# Patient Record
Sex: FEMALE | Race: WHITE | NOT HISPANIC OR LATINO | Employment: PART TIME | URBAN - METROPOLITAN AREA
[De-identification: names, ages, dates, MRNs, and addresses within clinical notes are randomized per-mention and may not be internally consistent; named-entity substitution may affect disease eponyms.]

---

## 2017-01-06 ENCOUNTER — RADIANT APPOINTMENT (OUTPATIENT)
Dept: GENERAL RADIOLOGY | Facility: CLINIC | Age: 57
End: 2017-01-06
Attending: NURSE PRACTITIONER
Payer: COMMERCIAL

## 2017-01-06 ENCOUNTER — OFFICE VISIT (OUTPATIENT)
Dept: URGENT CARE | Facility: URGENT CARE | Age: 57
End: 2017-01-06
Payer: COMMERCIAL

## 2017-01-06 VITALS
SYSTOLIC BLOOD PRESSURE: 176 MMHG | DIASTOLIC BLOOD PRESSURE: 87 MMHG | HEART RATE: 86 BPM | TEMPERATURE: 98.4 F | OXYGEN SATURATION: 95 %

## 2017-01-06 DIAGNOSIS — M79.604 PAIN OF RIGHT LOWER EXTREMITY: Primary | ICD-10-CM

## 2017-01-06 PROCEDURE — 73610 X-RAY EXAM OF ANKLE: CPT | Mod: RT

## 2017-01-06 PROCEDURE — 99213 OFFICE O/P EST LOW 20 MIN: CPT | Performed by: NURSE PRACTITIONER

## 2017-01-06 PROCEDURE — 73660 X-RAY EXAM OF TOE(S): CPT | Mod: RT

## 2017-01-06 RX ORDER — NAPROXEN 500 MG/1
500 TABLET ORAL 2 TIMES DAILY PRN
Qty: 10 TABLET | Refills: 0 | Status: SHIPPED | OUTPATIENT
Start: 2017-01-06 | End: 2017-01-11

## 2017-01-06 NOTE — PROGRESS NOTES
SUBJECTIVE:                                                    Jessica Jay is a 56 year old female who presents to clinic today for the following health issues:      Musculoskeletal problem/pain      Duration: yesterday    Description  Location: right ankle and big toe    Intensity:  8/10    Accompanying signs and symptoms: tingling and swelling    History  Previous similar problem: YES  Previous evaluation:  none    Precipitating or alleviating factors:  Trauma or overuse: YES  Aggravating factors include: standing, walking, climbing stairs and exercise    Therapies tried and outcome: rest/inactivity, ice, acetaminophen and NSAID - ibuprofen and no relief         Allergies   Allergen Reactions     Buspar [Buspirone Hcl] Rash       Past Medical History   Diagnosis Date     LBP (low back pain)      Panic attacks      Migraines      GERD (gastroesophageal reflux disease)      Depression, major      Cellulitis 9/26/2010     [facility]     Seasonal allergies      Sleep apnoea      Wears C-PAP     Degenerative disc disease      cervical     Dermoid cyst of brain (H)      Depressive disorder          Current Outpatient Prescriptions on File Prior to Visit:  cyclobenzaprine (FLEXERIL) 10 MG tablet Take 0.5-1 tablets (5-10 mg) by mouth 3 times daily as needed for muscle spasms   buPROPion (WELLBUTRIN SR) 150 MG 12 hr tablet Take 1 tablet once daily and increase to 1 tablet twice daily after 4 to 7 days   DULoxetine (CYMBALTA) 60 MG capsule Take 1 capsule (60 mg) by mouth daily   order for DME Equipment being ordered: SI belt (bilat, or two)Please page me [phone] if any problems or if you need an alternate order/referral.  Thanks.   omeprazole 20 MG tablet Take 1 tablet (20 mg) by mouth daily   albuterol (PROAIR HFA, PROVENTIL HFA, VENTOLIN HFA) 108 (90 BASE) MCG/ACT inhaler Inhale 2 puffs into the lungs every 6 hours as needed   traZODone (DESYREL) 50 MG tablet Take 0.5-1 tablets (25-50 mg) by mouth nightly  as needed for sleep   triamcinolone (KENALOG) 0.1 % cream APPLY TWICE DAILY TO AFFECTED AREA AS DIRECTED     No current facility-administered medications on file prior to visit.    Social History   Substance Use Topics     Smoking status: Current Every Day Smoker -- 0.80 packs/day for 30 years     Types: Cigarettes     Last Attempt to Quit: 05/08/2011     Smokeless tobacco: Never Used      Comment: 4/12- 1 ppd      Alcohol Use: 0.0 oz/week      Comment: 6 drinks a year       ROS:  GEN no fevers  SKIN as above  Musculoskel: + as above    OBJECTIVE:  /87 mmHg  Pulse 86  Temp(Src) 98.4  F (36.9  C) (Oral)  Wt   SpO2 95%  Breastfeeding? No   General:   awake, alert, and cooperative.  NAD.   Head: Normocephalic, atraumatic.  Eyes: Conjunctiva clear,   MS:  Swollen right malleolus and right big toe, reduced ROM. PULSES and sensation is intact.  Neuro: Alert and oriented - normal speech.  Results for orders placed or performed in visit on 01/06/17 (from the past 24 hour(s))   XR Ankle Right G/E 3 Views    Narrative    ANKLE RIGHT THREE OR MORE VIEWS 1/6/2017 3:49 PM     HISTORY: Fell on the floor. Pain in right leg.    COMPARISON: None.      Impression    IMPRESSION: No evidence of fracture. The mortise is congruent.    SULAIMAN ZARATE MD   XR Toe Right G/E 2 Views    Narrative    TOE RIGHT TWO OR MORE VIEWS 1/6/2017 3:50 PM     HISTORY: Fell on the floor. Pain in right leg.    COMPARISON: None.      Impression    IMPRESSION: Moderate degenerative changes of the first  metatarsophalangeal joint. No evidence of acute fracture.    SULAIMAN ZARATE MD       ASSESSMENT:    ICD-10-CM    1. Pain of right lower extremity M79.604 XR Ankle Right G/E 3 Views     XR Toe Right G/E 2 Views     naproxen (NAPROSYN) 500 MG tablet           PLAN:   I discussed xray results with the patient.  Rest the affected painful area as much as possible.  Apply ice for 15-20 minutes intermittently as needed and especially after any offending  activity. Daily stretching.  As pain recedes, begin normal activities slowly as tolerated.  Consider Physical Therapy if symptoms not better with symptomatic care.  Advised about symptoms which might herald more serious problems.    Vy Ni  [facility]  Family Nurse Practitoner

## 2017-01-06 NOTE — MR AVS SNAPSHOT
After Visit Summary   1/6/2017    Jessica Jay    MRN: [id number]           Patient Information     Date Of Birth          1960        Visit Information        Provider Department      1/6/2017 2:30 PM Vy Ni NP [facility]        Today's Diagnoses     Pain of right lower extremity    -  1       Care Instructions      Ace Wrap  Minor muscle or joint injuries are often treated with an elastic bandage. The bandage provides support and compression to the injured area. An elastic bandage is a stretchy, rolled bandage. Elastic bandages range in width from 2 to 6 inches. They can be used for a variety of injuries. The bandages are often called  ACE  bandages, after the most common brand name.  If used correctly, elastic bandages help control swelling and ease pain. An elastic bandage is also a good reminder not to overuse the injured area. However, elastic bandages do not provide a lot of support and will not prevent reinjury.  Home care  To apply an elastic bandage:    Check the skin before wrapping the injury. It should be clean, dry, and free of drainage.    Start wrapping below the injury and work your way toward the body. For an ankle sprain, start wrapping around the foot and work up toward the calf. This will help control swelling.    Overlap the edges of the bandage so it stays snuggly in place.    Wrap the bandage firmly, but not too tightly. A tight bandage can increase swelling on either end of the bandage. Make sure the bandage is wrinkle free.    Leave fingers and toes exposed.    Secure ends of the bandage (even self-sticking ones) with clips or tape.    Check frequently to ensure adequate circulation, especially in the fingers and toes. Loosen the bandage if there is local swelling, numbness, tingling, discomfort, coldness, or discoloration (skin pale or bluish in color).    Rewrap the bandage as needed during the day. Reroll the bandage as you unwind  it.  Continue using the elastic bandage until the pain and swelling are gone or as your healthcare provider advises.  If you have been told to ice the area, the ice can be secured in place with the elastic bandage. Wrap the ice pack with a thin towel to protect the skin. Do not put ice or an ice pack directly on the skin.  Ice the area for no more than 20 minutes at a time.    Follow-up care  Follow up with your healthcare provider, as advised.  When to seek medical advice  Call your healthcare provider for any of the following:    Pain and swelling that doesn't get better or gets worse    Trouble moving injured area    Skin discoloration, numbness, or tingling that doesn t go away after bandage is removed    6126-0059 [employer]. [address] [place] [address]. All rights reserved. This information is not intended as a substitute for professional medical care. Always follow your healthcare professional's instructions.        What Are Ankle Sprains?  The ankle is one of the most common places in the body for a sprain. Landing wrong on your foot can cause the ankle to roll to the side. This can stretch or tear ligaments. Ankle sprains can occur at any time, such as when you step off a curb or play sports. Once you ve had an ankle sprain, you may be more likely to sprain that ankle again.    When ligaments tear  Your ankle joint is where the bones in your leg and foot meet. Strong bands of tissue called ligaments connect these bones. Tendons cross the ankle and connect muscles in the lower leg to the foot. The ligaments and tendons help keep the ankle joint stable when you move. If you twist or turn your ankle, the ligaments can stretch or tear. This is called a sprain. A sprain can be mild, moderate, or severe. This depends on how badly the ligaments are damaged.    Symptoms  Your symptoms depend on how badly the ligaments are damaged. You may have little pain and swelling if the ligaments  are only stretched. If the ligaments tear, you will have more pain and swelling. The more severe the sprain, the less you ll be able to move the ankle or put weight on it. The ankle may also turn black-and-blue, and the bruising may extend into the foot and leg.     5950-5188 [employer]. [address] [place] [address]. All rights reserved. This information is not intended as a substitute for professional medical care. Always follow your healthcare professional's instructions.              Follow-ups after your visit        Your next 10 appointments already scheduled     Jan 11, 2017  3:00 PM   New Visit with SONIA Hogan CNP   [facility] ([facility])    [address] [facility] [address] [phone]              Who to contact     If you have questions or need follow up information about today's clinic visit or your schedule please contact [facility] directly at [phone].  Normal or non-critical lab and imaging results will be communicated to you by [employer], letter or phone within 4 business days after the clinic has received the results. If you do not hear from us within 7 days, please contact the clinic through [employer] or phone. If you have a critical or abnormal lab result, we will notify you by phone as soon as possible.  Submit refill requests through [employer] or call your pharmacy and they will forward the refill request to us. Please allow 3 business days for your refill to be completed.          Additional Information About Your Visit        [employer] Information     [employer] gives you secure access to your electronic health record. If you see a primary care provider, you can also send messages to your care team and make appointments. If you have questions, please call your primary care clinic.  If you do not have a primary care provider, please call [phone] and they will assist you.        Care  EveryWhere ID     This is your Care EveryWhere ID. This could be used by other organizations to access your [facility] medical records  [id number]        Your Vitals Were     Pulse Temperature Pulse Oximetry Breastfeeding?          86 98.4  F (36.9  C) (Oral) 95% No         Blood Pressure from Last 3 Encounters:   01/06/17 176/87   10/31/16 132/82   09/16/16 126/82    Weight from Last 3 Encounters:   10/31/16 278 lb 4 oz (126.213 kg)   10/04/16 276 lb (125.193 kg)   09/16/16 275 lb (124.739 kg)              We Performed the Following     XR Ankle Right G/E 3 Views     XR Toe Right G/E 2 Views          Today's Medication Changes          These changes are accurate as of: 1/6/17  4:11 PM.  If you have any questions, ask your nurse or doctor.               Start taking these medicines.        Dose/Directions    naproxen 500 MG tablet   Commonly known as:  NAPROSYN   Used for:  Pain of right lower extremity   Started by:  Vy Ni NP        Dose:  500 mg   Take 1 tablet (500 mg) by mouth 2 times daily as needed for moderate pain   Quantity:  10 tablet   Refills:  0            Where to get your medicines      These medications were sent to [id number] - [address]  AT [address] [facility] [address]     Phone:  [phone]    - naproxen 500 MG tablet             Primary Care Provider Office Phone # Fax #    Jazmyne Chapman [phone]       [address] [address]        Thank you!     Thank you for choosing [facility]  for your care. Our goal is always to provide you with excellent care. Hearing back from our patients is one way we can continue to improve our services. Please take a few minutes to complete the written survey that you may receive in the mail after your visit with us. Thank you!             Your Updated Medication List - Protect others  around you: Learn how to safely use, store and throw away your medicines at www.disposemymeds.org.          This list is accurate as of: 1/6/17  4:11 PM.  Always use your most recent med list.                   Brand Name Dispense Instructions for use    albuterol 108 (90 BASE) MCG/ACT Inhaler    PROAIR HFA/PROVENTIL HFA/VENTOLIN HFA    1 Inhaler    Inhale 2 puffs into the lungs every 6 hours as needed       buPROPion 150 MG 12 hr tablet    WELLBUTRIN SR    60 tablet    Take 1 tablet once daily and increase to 1 tablet twice daily after 4 to 7 days       cyclobenzaprine 10 MG tablet    FLEXERIL    20 tablet    Take 0.5-1 tablets (5-10 mg) by mouth 3 times daily as needed for muscle spasms       DULoxetine 60 MG EC capsule    CYMBALTA    90 capsule    Take 1 capsule (60 mg) by mouth daily       naproxen 500 MG tablet    NAPROSYN    10 tablet    Take 1 tablet (500 mg) by mouth 2 times daily as needed for moderate pain       omeprazole 20 MG tablet     90 tablet    Take 1 tablet (20 mg) by mouth daily       order for DME     2 each    Equipment being ordered: SI belt (bilat, or two) Please page me [phone] if any problems or if you need an alternate order/referral.  Thanks.       traZODone 50 MG tablet    DESYREL    90 tablet    Take 0.5-1 tablets (25-50 mg) by mouth nightly as needed for sleep       triamcinolone 0.1 % cream    KENALOG    60 g    APPLY TWICE DAILY TO AFFECTED AREA AS DIRECTED

## 2017-01-06 NOTE — PATIENT INSTRUCTIONS
Ace Wrap  Minor muscle or joint injuries are often treated with an elastic bandage. The bandage provides support and compression to the injured area. An elastic bandage is a stretchy, rolled bandage. Elastic bandages range in width from 2 to 6 inches. They can be used for a variety of injuries. The bandages are often called  ACE  bandages, after the most common brand name.  If used correctly, elastic bandages help control swelling and ease pain. An elastic bandage is also a good reminder not to overuse the injured area. However, elastic bandages do not provide a lot of support and will not prevent reinjury.  Home care  To apply an elastic bandage:    Check the skin before wrapping the injury. It should be clean, dry, and free of drainage.    Start wrapping below the injury and work your way toward the body. For an ankle sprain, start wrapping around the foot and work up toward the calf. This will help control swelling.    Overlap the edges of the bandage so it stays snuggly in place.    Wrap the bandage firmly, but not too tightly. A tight bandage can increase swelling on either end of the bandage. Make sure the bandage is wrinkle free.    Leave fingers and toes exposed.    Secure ends of the bandage (even self-sticking ones) with clips or tape.    Check frequently to ensure adequate circulation, especially in the fingers and toes. Loosen the bandage if there is local swelling, numbness, tingling, discomfort, coldness, or discoloration (skin pale or bluish in color).    Rewrap the bandage as needed during the day. Reroll the bandage as you unwind it.  Continue using the elastic bandage until the pain and swelling are gone or as your healthcare provider advises.  If you have been told to ice the area, the ice can be secured in place with the elastic bandage. Wrap the ice pack with a thin towel to protect the skin. Do not put ice or an ice pack directly on the skin.  Ice the area for no more than 20 minutes at a  time.    Follow-up care  Follow up with your healthcare provider, as advised.  When to seek medical advice  Call your healthcare provider for any of the following:    Pain and swelling that doesn't get better or gets worse    Trouble moving injured area    Skin discoloration, numbness, or tingling that doesn t go away after bandage is removed    9498-2677 [employer]. [address] [place] [address]. All rights reserved. This information is not intended as a substitute for professional medical care. Always follow your healthcare professional's instructions.        What Are Ankle Sprains?  The ankle is one of the most common places in the body for a sprain. Landing wrong on your foot can cause the ankle to roll to the side. This can stretch or tear ligaments. Ankle sprains can occur at any time, such as when you step off a curb or play sports. Once you ve had an ankle sprain, you may be more likely to sprain that ankle again.    When ligaments tear  Your ankle joint is where the bones in your leg and foot meet. Strong bands of tissue called ligaments connect these bones. Tendons cross the ankle and connect muscles in the lower leg to the foot. The ligaments and tendons help keep the ankle joint stable when you move. If you twist or turn your ankle, the ligaments can stretch or tear. This is called a sprain. A sprain can be mild, moderate, or severe. This depends on how badly the ligaments are damaged.    Symptoms  Your symptoms depend on how badly the ligaments are damaged. You may have little pain and swelling if the ligaments are only stretched. If the ligaments tear, you will have more pain and swelling. The more severe the sprain, the less you ll be able to move the ankle or put weight on it. The ankle may also turn black-and-blue, and the bruising may extend into the foot and leg.     9938-2517 [employer]. [address] [place] [address]. All rights reserved. This  information is not intended as a substitute for professional medical care. Always follow your healthcare professional's instructions.

## 2017-01-06 NOTE — NURSING NOTE
"Chief Complaint   Patient presents with     Musculoskeletal Problem     R Foot Pain- fell inside, ankle and big toe hurt       Initial /87 mmHg  Pulse 86  Temp(Src) 98.4  F (36.9  C) (Oral)  Wt   SpO2 95%  Breastfeeding? No Estimated body mass index is 50.05 kg/(m^2) as calculated from the following:    Height as of 10/31/16: 5' 2.5\" (1.588 m).    Weight as of 10/31/16: 278 lb 4 oz (126.213 kg).  BP completed using cuff size: luanne Orta CMA      "

## 2017-01-11 ENCOUNTER — OFFICE VISIT (OUTPATIENT)
Dept: PALLIATIVE MEDICINE | Facility: CLINIC | Age: 57
End: 2017-01-11
Payer: COMMERCIAL

## 2017-01-11 VITALS — HEART RATE: 102 BPM | DIASTOLIC BLOOD PRESSURE: 80 MMHG | SYSTOLIC BLOOD PRESSURE: 124 MMHG

## 2017-01-11 DIAGNOSIS — M25.50 PAIN IN JOINT, MULTIPLE SITES: ICD-10-CM

## 2017-01-11 DIAGNOSIS — M79.18 MYOFASCIAL PAIN: ICD-10-CM

## 2017-01-11 DIAGNOSIS — M51.369 DDD (DEGENERATIVE DISC DISEASE), LUMBAR: Primary | ICD-10-CM

## 2017-01-11 DIAGNOSIS — M54.16 LUMBAR RADICULAR PAIN: ICD-10-CM

## 2017-01-11 PROCEDURE — 99244 OFF/OP CNSLTJ NEW/EST MOD 40: CPT | Performed by: NURSE PRACTITIONER

## 2017-01-11 RX ORDER — HYDROCODONE BITARTRATE AND ACETAMINOPHEN 5; 325 MG/1; MG/1
1 TABLET ORAL EVERY 6 HOURS PRN
Qty: 15 TABLET | Refills: 0 | Status: SHIPPED | OUTPATIENT
Start: 2017-01-11 | End: 2017-10-16

## 2017-01-11 RX ORDER — GABAPENTIN 300 MG/1
CAPSULE ORAL
Qty: 120 CAPSULE | Refills: 0 | Status: SHIPPED
Start: 2017-01-11 | End: 2017-02-08

## 2017-01-11 ASSESSMENT — PAIN SCALES - GENERAL: PAINLEVEL: SEVERE PAIN (6)

## 2017-01-11 NOTE — NURSING NOTE
The norco script was given to pt.    Elsie Lester RN-BSN  [facility]

## 2017-01-11 NOTE — PATIENT INSTRUCTIONS
PLAN:  1. Schedule with pain management PHYSICAL THERAPY   2. Schedule with pain management HEALTH PSYCHOLOGY   3. Medications:   1. Start gabapentin  2. Use hydrocodone/acetaminophen sparingly, max of 2 tabs per day  4. Procedures: consider Lumbar epidural steroid injection, call me if you wish to proceed and I will place the order  5. Follow-up with me in 8-10 weeks.       Nurse Triage line:  [phone]   Call this number with any questions or concerns. You may leave a detailed message anytime. Calls are typically returned Monday through Friday between 8 AM and 4:30 PM. We usually get back to you within 2 business days depending on the issue/request.       Medication refills:    For non-narcotic medications, call your pharmacy directly to request a refill. The pharmacy will contact the [facility] Management Center for authorization. Please allow 3-4 days for these refills to be processed.     For narcotic refills, call the nurse triage line or send a [employer] message. Please contact us 7-10 days before your refill is due. The message MUST include the name of the specific medication(s) requested and how you would like to receive the prescription(s). The options are as follows:    Pain Clinic staff can mail the prescription to your pharmacy. Please tell us the name of the pharmacy.    You may pick the prescription up at the Pain Clinic (tell us the location) or during a clinic visit with your pain provider    Pain Clinic staff can deliver the prescription to the [facility] pharmacy in the clinic building. Please tell us the location.      Scheduling number: [phone].  Call this number to schedule or change appointments.    We believe regular attendance is key to your success in our program.    Any time you are unable to keep your appointment we ask that you call us at least 24 hours in advance to let us know. This will allow us to offer the appointment time to another patient.

## 2017-01-11 NOTE — MR AVS SNAPSHOT
After Visit Summary   1/11/2017    Jessica Jay    MRN: [id number]           Patient Information     Date Of Birth          1960        Visit Information        Provider Department      1/11/2017 3:00 PM Aundrea Sanchez APRN [facility]        Today's Diagnoses     DDD (degenerative disc disease), lumbar    -  1     Lumbar radicular pain         Pain in joint, multiple sites         Myofascial pain           Care Instructions    PLAN:  1. Schedule with pain management PHYSICAL THERAPY   2. Schedule with pain management HEALTH PSYCHOLOGY   3. Medications:   1. Start gabapentin  2. Use hydrocodone/acetaminophen sparingly, max of 2 tabs per day  4. Procedures: consider Lumbar epidural steroid injection, call me if you wish to proceed and I will place the order  5. Follow-up with me in 8-10 weeks.       Nurse Triage line:  [phone]   Call this number with any questions or concerns. You may leave a detailed message anytime. Calls are typically returned Monday through Friday between 8 AM and 4:30 PM. We usually get back to you within 2 business days depending on the issue/request.       Medication refills:    For non-narcotic medications, call your pharmacy directly to request a refill. The pharmacy will contact the [facility] for authorization. Please allow 3-4 days for these refills to be processed.     For narcotic refills, call the nurse triage line or send a Myntra message. Please contact us 7-10 days before your refill is due. The message MUST include the name of the specific medication(s) requested and how you would like to receive the prescription(s). The options are as follows:    Pain Clinic staff can mail the prescription to your pharmacy. Please tell us the name of the pharmacy.    You may pick the prescription up at the Pain Clinic (tell us the location) or during a clinic visit with your pain provider    Pain Clinic staff can deliver the  prescription to the [facility] pharmacy in the clinic building. Please tell us the location.      Scheduling number: [phone].  Call this number to schedule or change appointments.    We believe regular attendance is key to your success in our program.    Any time you are unable to keep your appointment we ask that you call us at least 24 hours in advance to let us know. This will allow us to offer the appointment time to another patient.             Follow-ups after your visit        Additional Services     PAIN PHD EVAL/TREAT/FOLLOW UP           PAIN PT EVAL AND TREAT                 Who to contact     If you have questions or need follow up information about today's clinic visit or your schedule please contact [facility] directly at [phone].  Normal or non-critical lab and imaging results will be communicated to you by MyChart, letter or phone within 4 business days after the clinic has received the results. If you do not hear from us within 7 days, please contact the clinic through [facility] or phone. If you have a critical or abnormal lab result, we will notify you by phone as soon as possible.  Submit refill requests through [employer] or call your pharmacy and they will forward the refill request to us. Please allow 3 business days for your refill to be completed.          Additional Information About Your Visit        [facility] Information     [employer] gives you secure access to your electronic health record. If you see a primary care provider, you can also send messages to your care team and make appointments. If you have questions, please call your primary care clinic.  If you do not have a primary care provider, please call [phone] and they will assist you.        Care EveryWhere ID     This is your Care EveryWhere ID. This could be used by other organizations to access your [facility] medical records  [id number]        Your Vitals Were     Pulse                   102            Blood  Pressure from Last 3 Encounters:   01/11/17 124/80   01/06/17 176/87   10/31/16 132/82    Weight from Last 3 Encounters:   10/31/16 126.213 kg (278 lb 4 oz)   10/04/16 125.193 kg (276 lb)   09/16/16 124.739 kg (275 lb)              We Performed the Following     PAIN PHD EVAL/TREAT/FOLLOW UP     PAIN PT EVAL AND TREAT          Today's Medication Changes          These changes are accurate as of: 1/11/17  4:25 PM.  If you have any questions, ask your nurse or doctor.               Start taking these medicines.        Dose/Directions    gabapentin 300 MG capsule   Commonly known as:  NEURONTIN   Used for:  DDD (degenerative disc disease), lumbar, Lumbar radicular pain, Pain in joint, multiple sites, Myofascial pain        Take 300mg at bedtime for 7 days, then 300mg twice daily for 7 days, then 300mg three times daily for 7 days, then 300mg in the morning and afternoon and 600mg at bedtime. If side effects, then reduce to last tolerable dosage.   Quantity:  120 capsule   Refills:  0       HYDROcodone-acetaminophen 5-325 MG per tablet   Commonly known as:  NORCO   Used for:  DDD (degenerative disc disease), lumbar, Lumbar radicular pain, Pain in joint, multiple sites, Myofascial pain        Dose:  1 tablet   Take 1 tablet by mouth every 6 hours as needed for moderate to severe pain Max of 2 tabs per day, use sparingly. OK to fill and begin on 1/11/2017 and must last at least 30 days   Quantity:  15 tablet   Refills:  0         Stop taking these medicines if you haven't already. Please contact your care team if you have questions.     order for DME           traZODone 50 MG tablet   Commonly known as:  DESYREL           triamcinolone 0.1 % cream   Commonly known as:  KENALOG                Where to get your medicines      These medications were sent to [id number] [place] [id number] AT [facility]  [address] [place] [address]     Phone:  [phone] -  gabapentin 300 MG capsule      Some of these will need a paper prescription and others can be bought over the counter.  Ask your nurse if you have questions.     Bring a paper prescription for each of these medications    - HYDROcodone-acetaminophen 5-325 MG per tablet             Primary Care Provider Office Phone # Fax #    Jazmyne Chapman [phone]       [facility] [address] [place] [id number]        Thank you!     Thank you for choosing [facility]  for your care. Our goal is always to provide you with excellent care. Hearing back from our patients is one way we can continue to improve our services. Please take a few minutes to complete the written survey that you may receive in the mail after your visit with us. Thank you!             Your Updated Medication List - Protect others around you: Learn how to safely use, store and throw away your medicines at www.disposemymeds.org.          This list is accurate as of: 1/11/17  4:25 PM.  Always use your most recent med list.                   Brand Name Dispense Instructions for use    albuterol 108 (90 BASE) MCG/ACT Inhaler    PROAIR HFA/PROVENTIL HFA/VENTOLIN HFA    1 Inhaler    Inhale 2 puffs into the lungs every 6 hours as needed       buPROPion 150 MG 12 hr tablet    WELLBUTRIN SR    60 tablet    Take 1 tablet once daily and increase to 1 tablet twice daily after 4 to 7 days       cyclobenzaprine 10 MG tablet    FLEXERIL    20 tablet    Take 0.5-1 tablets (5-10 mg) by mouth 3 times daily as needed for muscle spasms       DULoxetine 60 MG EC capsule    CYMBALTA    90 capsule    Take 1 capsule (60 mg) by mouth daily       gabapentin 300 MG capsule    NEURONTIN    120 capsule    Take 300mg at bedtime for 7 days, then 300mg twice daily for 7 days, then 300mg three times daily for 7 days, then 300mg in the morning and afternoon and 600mg at bedtime. If side effects, then reduce to last tolerable  dosage.       HYDROcodone-acetaminophen 5-325 MG per tablet    NORCO    15 tablet    Take 1 tablet by mouth every 6 hours as needed for moderate to severe pain Max of 2 tabs per day, use sparingly. OK to fill and begin on 1/11/2017 and must last at least 30 days       naproxen 500 MG tablet    NAPROSYN    10 tablet    Take 1 tablet (500 mg) by mouth 2 times daily as needed for moderate pain       omeprazole 20 MG tablet     90 tablet    Take 1 tablet (20 mg) by mouth daily
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
## 2017-01-11 NOTE — NURSING NOTE
"Chief Complaint   Patient presents with     Pain       Initial /80 mmHg  Pulse 102 Estimated body mass index is 50.05 kg/(m^2) as calculated from the following:    Height as of 10/31/16: 1.588 m (5' 2.5\").    Weight as of 10/31/16: 126.213 kg (278 lb 4 oz).  BP completed using cuff size: luanne Ren CMA (EDUARDO)      "

## 2017-01-18 ASSESSMENT — PATIENT HEALTH QUESTIONNAIRE - PHQ9: SUM OF ALL RESPONSES TO PHQ QUESTIONS 1-9: 5

## 2017-01-21 ENCOUNTER — MYC MEDICAL ADVICE (OUTPATIENT)
Dept: FAMILY MEDICINE | Facility: CLINIC | Age: 57
End: 2017-01-21

## 2017-01-21 DIAGNOSIS — Z72.0 TOBACCO ABUSE: ICD-10-CM

## 2017-01-21 DIAGNOSIS — F33.1 MODERATE EPISODE OF RECURRENT MAJOR DEPRESSIVE DISORDER (H): Primary | ICD-10-CM

## 2017-01-23 RX ORDER — BUPROPION HYDROCHLORIDE 150 MG/1
TABLET, EXTENDED RELEASE ORAL
Qty: 180 TABLET | Refills: 2 | Status: SHIPPED | OUTPATIENT
Start: 2017-01-23 | End: 2017-11-08

## 2017-01-24 ENCOUNTER — OFFICE VISIT (OUTPATIENT)
Dept: PALLIATIVE MEDICINE | Facility: CLINIC | Age: 57
End: 2017-01-24
Payer: COMMERCIAL

## 2017-01-24 DIAGNOSIS — M79.18 MYOFASCIAL PAIN: ICD-10-CM

## 2017-01-24 DIAGNOSIS — M51.369 DDD (DEGENERATIVE DISC DISEASE), LUMBAR: Primary | ICD-10-CM

## 2017-01-24 DIAGNOSIS — M25.50 MULTIPLE JOINT PAIN: ICD-10-CM

## 2017-01-24 DIAGNOSIS — M54.16 LUMBAR RADICULAR PAIN: ICD-10-CM

## 2017-01-24 PROCEDURE — 97162 PT EVAL MOD COMPLEX 30 MIN: CPT | Mod: GP | Performed by: PHYSICAL THERAPIST

## 2017-01-24 PROCEDURE — 97530 THERAPEUTIC ACTIVITIES: CPT | Mod: GP | Performed by: PHYSICAL THERAPIST

## 2017-01-24 PROCEDURE — 96150 HC HEALTH & BEHAVIOR ASSESS INITIAL, EA 15MIN: CPT | Performed by: PSYCHOLOGIST
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
## 2017-02-07 ENCOUNTER — OFFICE VISIT (OUTPATIENT)
Dept: PALLIATIVE MEDICINE | Facility: CLINIC | Age: 57
End: 2017-02-07
Payer: COMMERCIAL

## 2017-02-07 DIAGNOSIS — M51.369 DDD (DEGENERATIVE DISC DISEASE), LUMBAR: Primary | ICD-10-CM

## 2017-02-07 DIAGNOSIS — M25.50 MULTIPLE JOINT PAIN: ICD-10-CM

## 2017-02-07 DIAGNOSIS — M54.16 LUMBAR RADICULAR PAIN: ICD-10-CM

## 2017-02-07 DIAGNOSIS — M79.18 MYOFASCIAL PAIN: ICD-10-CM

## 2017-02-07 PROCEDURE — 96152 HC HEALTH AND BEHAVIOR INTERVENTION, INDIVIDUAL, EACH 15 MINUTES: CPT | Performed by: PSYCHOLOGIST

## 2017-02-07 PROCEDURE — 97530 THERAPEUTIC ACTIVITIES: CPT | Mod: GP | Performed by: PHYSICAL THERAPIST

## 2017-02-07 PROCEDURE — 97110 THERAPEUTIC EXERCISES: CPT | Mod: GP | Performed by: PHYSICAL THERAPIST

## 2017-02-07 NOTE — PROGRESS NOTES
"                                  [facility]   [facility] [place]  Behavioral Medicine Visit    Patient Name: Jessica Jay    YOB: 1960   Medical Record Number: [id number]  Date: 2/7/2017                SUBJECTIVE:  Session objective: First appointment post intake. Did introductory pain education, discussed treatment options including relaxation techniques, behavioral strategies, cognitive approaches. She reports she is interested somewhat in the relaxation skills approaches but she is seems ambivalent about it in many ways. We did introductory breathing and progressive muscle relaxation skill practice. Gemma cites many reasons why she might not be able to implement practice despite her knowing \"the path I am on is going to land me in a wheel chair\"    Interval history: requested return in 3 weeks. Reports she will return in 4 so her appt can coincide with her PT    OBJECTIVE:   Length of Visit: 60 minutes      Mental status: alert, moderate distress, cooperative, smiling, over weight and fatigued    ASSESSMENT:   Axis I: Degenerative Disc Disease     Axis II: Dx deferred    Progress toward goals: as expected.      Pain Status: unchanged    Emotional Status: unchanged   Medication / chemical use concerns: None    PLAN:   Next Appointment: Jessica Jay will schedule a follow-up appointment in 4 weeks.  Assignment: Practice intervention skills developed today  Objectives / interventions for next session: Further discussion about treatment options including a discussion of how self hypnosis might work    Adam West MA, LP, [facility]  Pain Specialist  [facility] [place]    "

## 2017-02-07 NOTE — PROGRESS NOTES
Pain Physical Therapy Progress Note    Patient Name: Jessica Jay     YOB: 1960     Medical Record Number: [id number]    Visits: 2/10    Diagnosis:    DDD (degenerative disc disease), lumbar  Multiple joint pain  Myofascial pain  Lumbar radicular pain    Subjective: Patient reports that she is doing OK.    Has tried to use sit-stand desk during the day--has helped some with back pain, feet get sore from standing more than a few minutes    Does have back support for desk chair--has been helping; keeping moving around, so needing to get used to it    Slowly working on cutting down on cigarettes during the day, down to 6/day    Was doing good with slowly putting Rony decorations away, but then needed to get it all done before sister came to help organize closet--increase pain for a couple days    Saw Adam West LP prior to PT.    Self Care  HEP: NA  Walking/Aerobic Activity: NA  Heat/Ice: NA  Posture: has been thinking about it more--has noticed hiking of shoulders, leaning forward at computer  Mini Breaks: NA  Breathing/Relaxation: NA  Pacing: NA    Objective Findings:    OBSERVATION: Fidgety; moving around in chair, room throughout session, less Guarded posture as compared to first visit; demo forward head; rounded shoulders; clenching jaw; poor core engagement with seated posture; upper chest breathing noted  Forward flexed in standing, moving from side to side in standing  Needs cues to stay on task    GAIT & LOCOMOTION: Antalgic, wide base of support, moves slowly; leaning from side to side; decreased step length    Treatment Interventions:  Therapeutic Procedures/Exercises: discussed basic walking program, working on increasing activity slowly, starting with goal of 5-10 mins a day of focused movement, can break up into small parts to begin with; discussed various activities--walking, dancing to favorite music, getting outside--patient looking into doing geocaching; using nordic  "track--starting out small.  Discussed importance of beginning active movement--patient states that she is used to playing on computer much of evening and then going to bed.     Therapeutic Activity:    Pacing and Energy Conservation: instructed patient in basics of pacing activity and importance of pacing and energy conservation; discussed basic EC principles.  Discussed looking at priorities, looking at doing activities that are \"needs vs wants\"; discussed how overdoing things can cause increase in pain; how spacing out activities may be more helpful, less painful in the long run.  Sometimes need to change old habits or look back on why one is doing things a certain way.    Discussed that she may benefit from making plans to spread out cleaning--doing one part of a room each day; do washing of kitchen floor one day a month on hands and knees--if that is important and more painful--but then have that be the one thing for that day, not a lot of other activities.    Discussed working on getting into habit of being more active; working on motiviation--as it has been lacking for a long time.  Discussed making a goal of doing 3 active things before returning to PT in 2 weeks; one may be working making a plan for how to organize spare bedroom--how to plan out how to \"tackle it\".  Patient thought 3 things was a makeable goal.  __________________________________________________________________    Instruction on home program: pacing, increasing activity    Assessment:  Ongoing Functional Limitations Include:  Patient tolerated/responded well to treatment  Impression: patient needs encouragement for activity, decreased motiviation    Recommendations: TEAM    Intensity Level: 1 (1=low intensity; 5=high intensity)    Demonstrates/Verbalizes Technique: 2 (1= poor technique-difficulty performing exercises,significant cues required; 5= good technique-performs exercises without cues)    Body Awareness: 2 (1=low awareness; 5=high " awareness)    Posture/Stability: 1, 2 (1= poor posture, stability; 5= good posture, stability)    Motivational Level: Cooperative    Response to Teaching: needs reinforcement    Factors that affect learning: None    _______________________________________________________________________  Plan of Care   Continue PT to support reactivation and integration of self regulation pain management skills;  Focus of next session will be on: self care, mini breaks, HEP     Present:  DONNA     Total Visit Time:  45 minutes  TA: 35 mins;  TE: 10 mins      Therapist: Jeanette Singh PT             Date: 2/7/2017

## 2017-02-08 DIAGNOSIS — M51.369 DDD (DEGENERATIVE DISC DISEASE), LUMBAR: Primary | ICD-10-CM

## 2017-02-08 DIAGNOSIS — M25.50 PAIN IN JOINT, MULTIPLE SITES: ICD-10-CM

## 2017-02-08 DIAGNOSIS — M54.16 LUMBAR RADICULAR PAIN: ICD-10-CM

## 2017-02-08 DIAGNOSIS — M79.18 MYOFASCIAL PAIN: ICD-10-CM

## 2017-02-08 NOTE — TELEPHONE ENCOUNTER
[facility] for gabapentin (NEURONTIN) 300 MG capsule    Last refilled on 1/11/17    Pt last seen on 1/11/17  Next appt scheduled for none    Alvarado Ambrocio MA  [facility]      Will facilitate refill.

## 2017-02-08 NOTE — TELEPHONE ENCOUNTER
Please call patient to find out how much she is currently using and if it helpful or not. If not helpful and she is tolerating it well, I can continue to increase the dose.     Thanks.  Aundrea SCOTT RN CNP, FNP  [facility] [place]

## 2017-02-09 NOTE — TELEPHONE ENCOUNTER
Called patient. LM to call triage with dosing information, provided triage number and asked her to leave a timeframe nursing can call to discuss.     Yue Wilkes  BSN-RN Care Coordinator  [facility]

## 2017-02-10 NOTE — TELEPHONE ENCOUNTER
Called pt   Current dose:  Gabapentin 300m tab BID and 2 QHS  She isn't sure if it is benefical for her.  No side effects.  She is open to increasing dose if Aundrea's feels this is an option for her.      Routed to Aundrea to review.    Elsie Lester RN-BSN  [facility]

## 2017-02-12 RX ORDER — GABAPENTIN 300 MG/1
CAPSULE ORAL
Qty: 180 CAPSULE | Refills: 0 | Status: SHIPPED
Start: 2017-02-12 | End: 2017-03-22

## 2017-02-12 NOTE — TELEPHONE ENCOUNTER
Signed Prescriptions:                        Disp   Refills    gabapentin (NEURONTIN) 300 MG capsule      180 ca*0        Sig: Take 600mg in the morning, 300mg in the afternoon and           600mg at bedtime x 7 days, then take 600mg three           times daily.If side effects, then reduce to last           tolerable dosage.  Authorizing Provider: AUNDREA LAINEZ    Notify patient I am OK with her slowly increasing according to this script's instructions.   Thanks.  Aundrea SCOTT, RN CNP, FNP  [facility] [place]

## 2017-02-13 NOTE — TELEPHONE ENCOUNTER
Called pt and left a detailed message re: OK to increase per instructions. Relayed instructions listed on VM and said that she should call back with any questions.     GABINO SolisN, RN-BC  Patient Care Supervisor/Care Coordinator  [facility]

## 2017-02-21 ENCOUNTER — OFFICE VISIT (OUTPATIENT)
Dept: PALLIATIVE MEDICINE | Facility: CLINIC | Age: 57
End: 2017-02-21
Payer: COMMERCIAL

## 2017-02-21 DIAGNOSIS — M79.18 MYOFASCIAL PAIN: ICD-10-CM

## 2017-02-21 DIAGNOSIS — M54.16 LUMBAR RADICULOPATHY: ICD-10-CM

## 2017-02-21 DIAGNOSIS — M25.50 MULTIPLE JOINT PAIN: ICD-10-CM

## 2017-02-21 DIAGNOSIS — M51.369 DDD (DEGENERATIVE DISC DISEASE), LUMBAR: Primary | ICD-10-CM

## 2017-02-21 PROCEDURE — 97112 NEUROMUSCULAR REEDUCATION: CPT | Mod: GP | Performed by: PHYSICAL THERAPIST

## 2017-02-21 PROCEDURE — 97530 THERAPEUTIC ACTIVITIES: CPT | Mod: GP | Performed by: PHYSICAL THERAPIST

## 2017-02-21 PROCEDURE — 97110 THERAPEUTIC EXERCISES: CPT | Mod: GP | Performed by: PHYSICAL THERAPIST

## 2017-02-21 NOTE — PROGRESS NOTES
Pain Physical Therapy Progress Note    Patient Name: Jessica Jay     YOB: 1960     Medical Record Number: [id number]    Visits: 3/10    Diagnosis:    DDD (degenerative disc disease), lumbar  Multiple joint pain  Myofascial pain  Lumbar radiculopathy    Subjective: Patient reports that back is doing better--did have a few days of it worse--was cleaning basement--told my time    Walking about 10 mins; hurting in the back--walking to the car--end of work day    Overall better, having issues with hips and knees.    Self Care  HEP: NA  Walking/Aerobic Activity: NA  Heat/Ice: has used rice pack a couple times--has been helpful  Posture: has been more aware of shoulders/crossing arms; leaning forward at computer  Mini Breaks: NA  Breathing/Relaxation: has been thinking about breathing more--some things that she has been learning from Adam West LP--hasn't been doing regularly  Pacing: did take breaks with working on basement--realized that she couldn't do it all in one weekend    Objective Findings:    OBSERVATION: Fidgety; moving around in chair, room throughout session, less Guarded posture as compared to first visit; demo forward head; rounded shoulders; clenching jaw; poor core engagement with seated posture; upper chest breathing noted  Forward flexed in standing, moving from side to side in standing  Needs cues to stay on task  GAIT & LOCOMOTION: Antalgic, wide base of support, moves slowly; leaning from side to side; decreased step length; difficulty with sit to stand, especially when sitting for extended time  MUSCLE PERFORMANCE: needs cues for small movement with ab sets; scapular retraction    Treatment Interventions:  Therapeutic Procedures/Exercises: Discussed importance of working on increasing activity slowly--especially since patient wants to ride bicycle again this spring/summer--hasn't done for 3 years and since pain started; discussed need to work on increasing motivation for  "activity--not just playing on computer.  Will need to starting with goal of 5-10 mins a day of focused movement, can break up into small parts to begin with; discussed various activities--walking, dancing to favorite music, getting outside--patient looking into doing geocaching--in spring when weather nicer; using nordic track--starting out small.   UBE x 4 mins  Forward/backward-liked it; has done in the past when having shoulder surgeries; did recommend to patient multiple times to slow down speed of arms--since first time doing it in awhile     Therapeutic Activity:  Discussed basics on pacing; encouraged patient to continue to be active with working on basement; spare bedroom, OK that breaking it up into parts--reinforced that she made good decision with it, vs trying to do too much and \"overdoing it\"  Discussed making adjustments to office chair to be more comfortable and assist with body mechanics/posture.  Discussed safety with riding bike again--need to have balance; strength/core, etc; patient states that at times has issues with walking straight--discussed how improving mobility on land first may be needed before safely riding bike; especially on streets.    Neuromuscular Reeducation:   Posture:  went over basic posture cues in sitting and standing, and walking, used mirror to assist with visual feedback and body awareness.  Discussed with patient that it does take time to learn; it is important to start working on it and gradually will become new habit; patient may have some muscles that are more sore as they are starting to be used in different ways, and that is OK; gave handout.  __________________________________________________________________    Instruction on home program: posture, increase activity    Assessment:  Ongoing Functional Limitations Include:  Patient tolerated/responded well to treatment    Impression: patient needs encouragement for activity; to make changes; is giving multiple " suggestions, but does come up with reasons/excuses of why that won't work or doesn't want to do it.  Wants to ride bike this spring/summer--hasn't for 3 years, discussed importance of safety; working up to the goal    Recommendations: TEAM    Intensity Level: 2 (1=low intensity; 5=high intensity)    Demonstrates/Verbalizes Technique: 2 (1= poor technique-difficulty performing exercises,significant cues required; 5= good technique-performs exercises without cues)    Body Awareness: 1 (1=low awareness; 5=high awareness)    Posture/Stability: 2 (1= poor posture, stability; 5= good posture, stability)    Motivational Level: Cooperative and limited motivation for activity at home    Response to Teaching: needs reinforcement    Factors that affect learning: None    _______________________________________________________________________  Plan of Care   Continue PT to support reactivation and integration of self regulation pain management skills;  Focus of next session will be on: HEP, mini breaks     Present:  NA     Total Visit Time:  50 minutes  TA: 15 mins; Neuro-Re Ed: 20 mins; TE: 15 mins      Therapist: Jeanette Singh PT             Date: 2/21/2017

## 2017-02-21 NOTE — MR AVS SNAPSHOT
After Visit Summary   2/21/2017    Jessica Jay    MRN: [id number]           Patient Information     Date Of Birth          1960        Visit Information        Provider Department      2/21/2017 3:45 PM Jeanette Singh, PT [facility]        Today's Diagnoses     DDD (degenerative disc disease), lumbar    -  1    Multiple joint pain        Myofascial pain        Lumbar radiculopathy           Follow-ups after your visit        Your next 10 appointments already scheduled     Mar 07, 2017  2:00 PM CST   Return Visit with Adam West LP   [facility] ([facility])    [address] [address]   [phone]            Mar 07, 2017  3:45 PM CST   Return Visit with Jeanette Singh, PT   [facility] ([facility])    [address] [address]   [phone]            Mar 22, 2017  4:00 PM CDT   Return Visit with SONIA Hogan [facility] ([facility])    [address] [id number] [phone]              Who to contact     If you have questions or need follow up information about today's clinic visit or your schedule please contact [facility] directly at [phone].  Normal or non-critical lab and imaging results will be communicated to you by MyChart, letter or phone within 4 business days after the clinic has received the results. If you do not hear from us within 7 days, please contact the clinic through MyChart or phone. If you have a critical or abnormal lab result, we will notify you by phone as soon as possible.  Submit refill requests through [employer] or call your pharmacy and they will forward the refill request to us. Please allow 3 business days for your refill to be completed.          Additional Information About Your Visit        MyChart Information     [employer]  gives you secure access to your electronic health record. If you see a primary care provider, you can also send messages to your care team and make appointments. If you have questions, please call your primary care clinic.  If you do not have a primary care provider, please call [phone] and they will assist you.        Care EveryWhere ID     This is your Care EveryWhere ID. This could be used by other organizations to access your [place] medical records  [id number]         Blood Pressure from Last 3 Encounters:   01/11/17 124/80   01/06/17 176/87   10/31/16 132/82    Weight from Last 3 Encounters:   10/31/16 126.2 kg (278 lb 4 oz)   10/04/16 125.2 kg (276 lb)   09/16/16 124.7 kg (275 lb)              We Performed the Following     NEUROMUSCULAR RE-EDUCATION     THERAPEUTIC ACTIVITIES     THERAPEUTIC EXERCISES        Primary Care Provider Office Phone # Fax #    Jazmyne Chapman [phone]       [address] [address]        Thank you!     Thank you for choosing [facility]  for your care. Our goal is always to provide you with excellent care. Hearing back from our patients is one way we can continue to improve our services. Please take a few minutes to complete the written survey that you may receive in the mail after your visit with us. Thank you!             Your Updated Medication List - Protect others around you: Learn how to safely use, store and throw away your medicines at www.disposemymeds.org.          This list is accurate as of: 2/21/17 11:59 PM.  Always use your most recent med list.                   Brand Name Dispense Instructions for use    albuterol 108 (90 BASE) MCG/ACT Inhaler    PROAIR HFA/PROVENTIL HFA/VENTOLIN HFA    1 Inhaler    Inhale 2 puffs into the lungs every 6 hours as needed       buPROPion 150 MG 12 hr tablet    WELLBUTRIN SR    180 tablet    Take 1 tablet once daily and increase to 1 tablet  twice daily after 4 to 7 days       cyclobenzaprine 10 MG tablet    FLEXERIL    20 tablet    Take 0.5-1 tablets (5-10 mg) by mouth 3 times daily as needed for muscle spasms       DULoxetine 60 MG EC capsule    CYMBALTA    90 capsule    Take 1 capsule (60 mg) by mouth daily       gabapentin 300 MG capsule    NEURONTIN    180 capsule    Take 600mg in the morning, 300mg in the afternoon and 600mg at bedtime x 7 days, then take 600mg three times daily.If side effects, then reduce to last tolerable dosage.       HYDROcodone-acetaminophen 5-325 MG per tablet    NORCO    15 tablet    Take 1 tablet by mouth every 6 hours as needed for moderate to severe pain Max of 2 tabs per day, use sparingly. OK to fill and begin on 1/11/2017 and must last at least 30 days       omeprazole 20 MG tablet     90 tablet    Take 1 tablet (20 mg) by mouth daily

## 2017-02-28 ENCOUNTER — TELEPHONE (OUTPATIENT)
Dept: FAMILY MEDICINE | Facility: CLINIC | Age: 57
End: 2017-02-28

## 2017-02-28 DIAGNOSIS — M54.50 CHRONIC BILATERAL LOW BACK PAIN WITHOUT SCIATICA: ICD-10-CM

## 2017-02-28 DIAGNOSIS — G89.29 CHRONIC BILATERAL LOW BACK PAIN WITHOUT SCIATICA: ICD-10-CM

## 2017-02-28 DIAGNOSIS — M79.671 PAIN IN BOTH FEET: ICD-10-CM

## 2017-02-28 DIAGNOSIS — M79.672 PAIN IN BOTH FEET: ICD-10-CM

## 2017-02-28 DIAGNOSIS — F33.1 MODERATE EPISODE OF RECURRENT MAJOR DEPRESSIVE DISORDER (H): ICD-10-CM

## 2017-02-28 NOTE — TELEPHONE ENCOUNTER
DULoxetine (CYMBALTA) 60 MG capsule   60 mg, DAILY 1 ordered  Edit     Summary: Take 1 capsule (60 mg) by mouth daily, Disp-90 capsule, R-1, E-Prescribe   Dose, Route, Frequency: 60 mg, Oral, DAILY  Start: 9/21/2016  Ord/Sold: 9/21/2016 (O)  Report  Taking:   Long-term:   Pharmacy: [facility] [address] [place] [address]  AT [facility] Dose History       Patient Sig: Take 1 capsule (60 mg) by mouth daily       Ordered on: 9/21/2016       Authorized by: SHANIKA THOMPSON       Dispense: 90 capsule          Last Written Prescription Date: 9-  Last Fill Quantity: 90, # refills: 1  Last Office Visit with [facility], P or [facility] prescribing provider: 10-   Next 5 appointments (look out 90 days)     Mar 07, 2017  2:00 PM CST   Return Visit with Adam West LP   [facility] ([facility])    [address] [address]   [phone]            Mar 07, 2017  3:45 PM CST   Return Visit with Jeanette Singh PT   [facility] ([facility])    [address] [address]   [phone]            Mar 22, 2017  4:00 PM CDT   Return Visit with SONIA Hogan [facility] ([facility])    [address] [address]   [phone]                   BP Readings from Last 3 Encounters:   01/11/17 124/80   01/06/17 176/87   10/31/16 132/82     Pulse: (for Fetzima)  Creatinine   Date Value Ref Range Status   09/04/2012 0.77 0.52 - 1.04 mg/dL Final   ]    Last PHQ-9 score on record=   PHQ-9 SCORE 1/17/2017   Total Score -   Total Score 5

## 2017-03-01 ENCOUNTER — TELEPHONE (OUTPATIENT)
Dept: PALLIATIVE MEDICINE | Facility: CLINIC | Age: 57
End: 2017-03-01

## 2017-03-01 RX ORDER — DULOXETIN HYDROCHLORIDE 60 MG/1
CAPSULE, DELAYED RELEASE ORAL
Qty: 90 CAPSULE | Refills: 0 | Status: SHIPPED | OUTPATIENT
Start: 2017-03-01 | End: 2017-04-10

## 2017-03-01 NOTE — TELEPHONE ENCOUNTER
Reviewed, no instruction to d/c cymbalata found in pain clinic notes. Rx sent.     TC please call her to schedule a chronic visit with Dr. Chapman.     Vivi Schwab RN   March 1, 2017 5:21 PM  [facility] Triage   [phone]

## 2017-03-01 NOTE — TELEPHONE ENCOUNTER
Routing refill request to provider for review/approval because:  Is she supposed to be on both Cymbalta and Gabapentin?     Vivi Schwab RN   March 1, 2017 3:08 PM  [facility] Triage   [phone]

## 2017-03-01 NOTE — TELEPHONE ENCOUNTER
Review pain clinic notes to see what recommendations are for medication  Okay to refill duloxetine unless pain clinic notes state otherwise.  Schedule routine chronic care with me  Jazmyne Chapman MD

## 2017-03-03 ENCOUNTER — MYC MEDICAL ADVICE (OUTPATIENT)
Dept: FAMILY MEDICINE | Facility: CLINIC | Age: 57
End: 2017-03-03

## 2017-03-03 NOTE — TELEPHONE ENCOUNTER
Called patient and left a detailed VM message for patient to call clinic and schedule a follow up w/ Dr Chapman.    Monika Olivia

## 2017-03-07 ENCOUNTER — OFFICE VISIT (OUTPATIENT)
Dept: PALLIATIVE MEDICINE | Facility: CLINIC | Age: 57
End: 2017-03-07
Payer: COMMERCIAL

## 2017-03-07 ENCOUNTER — OFFICE VISIT (OUTPATIENT)
Dept: ORTHOPEDICS | Facility: CLINIC | Age: 57
End: 2017-03-07
Payer: COMMERCIAL

## 2017-03-07 VITALS — HEART RATE: 88 BPM | RESPIRATION RATE: 16 BRPM | HEIGHT: 63 IN

## 2017-03-07 DIAGNOSIS — M51.369 DDD (DEGENERATIVE DISC DISEASE), LUMBAR: Primary | ICD-10-CM

## 2017-03-07 DIAGNOSIS — M19.079 ARTHRITIS OF FIRST METATARSOPHALANGEAL JOINT: Primary | ICD-10-CM

## 2017-03-07 DIAGNOSIS — M79.18 MYOFASCIAL PAIN: ICD-10-CM

## 2017-03-07 DIAGNOSIS — M25.50 MULTIPLE JOINT PAIN: ICD-10-CM

## 2017-03-07 DIAGNOSIS — M54.16 LUMBAR RADICULOPATHY: ICD-10-CM

## 2017-03-07 PROCEDURE — 96152 HC HEALTH AND BEHAVIOR INTERVENTION, INDIVIDUAL, EACH 15 MINUTES: CPT | Performed by: PSYCHOLOGIST

## 2017-03-07 PROCEDURE — 99214 OFFICE O/P EST MOD 30 MIN: CPT | Performed by: PEDIATRICS

## 2017-03-07 PROCEDURE — 97530 THERAPEUTIC ACTIVITIES: CPT | Mod: GP | Performed by: PHYSICAL THERAPIST

## 2017-03-07 PROCEDURE — 97112 NEUROMUSCULAR REEDUCATION: CPT | Mod: GP | Performed by: PHYSICAL THERAPIST

## 2017-03-07 RX ORDER — METHYLPREDNISOLONE 4 MG
TABLET, DOSE PACK ORAL
Qty: 21 TABLET | Refills: 0 | Status: SHIPPED | OUTPATIENT
Start: 2017-03-07 | End: 2017-03-22

## 2017-03-07 NOTE — PROGRESS NOTES
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  Patient had a fall about 8 or so weeks ago where she tripped at home and she felt like her toe hyperflexed into the ground.  She did also have ankle pain at that time.  She was seen in urgent care, x rays were taken.  No treatment.      Injury: tripped and jammed toe into the ground.   No bruising.  No pain at rest, which is typical.    Location of Pain: right great toe  Duration of Pain: 8 week(s)  Rating of Pain at worst: 8/10  Rating of Pain Currently: 8/10  Symptoms are better with: Rest  Symptoms are worse with: weight bearing  Additional Features:   Positive: swelling   Negative: bruising, popping, grinding, catching, locking, instability, paresthesias, numbness, weakness, pain in other joints and systemic symptoms  Other evaluation and/or treatments so far consists of: x rays  Prior History of related problems: HX of foot injury, unsure of what, was wearing a CAM boot for about 6 weeks in 2014?    Social History: computer work     Review of Systems  Musculoskeletal: as above  Remainder of review of systems is negative including constitutional, CV, pulmonary, GI, Skin and Neurologic except as noted in HPI or medical history.    Past Medical History   Diagnosis Date     Cellulitis 9/26/2010     [facility]     Degenerative disc disease      cervical     Depression, major      Depressive disorder      Dermoid cyst of brain (H)      GERD (gastroesophageal reflux disease)      LBP (low back pain)      Migraines      Panic attacks      Seasonal allergies      Sleep apnoea      Wears C-PAP     Past Surgical History   Procedure Laterality Date     Arthroscopy shoulder  1990     Right/spurs     C foot/toes surgery proc unlisted  1975     Toe fracture, left     Rotator cuff repair rt/lt  2009      Left     Carpal tunnel release rt/lt  1990      "Left     Craniotomy, excise tumor complex, combined  5/9/2011     Procedure:COMBINED CRANIOTOMY, EXCISE TUMOR COMPLEX; 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; Surgeon:LEE ANN PAULA; Location:[facility] OR     Colonoscopy  2016     Family History   Problem Relation Age of Onset     Hypertension Mother      C.A.D. Father      CANCER Maternal Grandmother      Neurologic Disorder Sister      migraine     CANCER Brother      Cancer - colorectal Brother      CEREBROVASCULAR DISEASE Paternal Grandmother      Breast Cancer Paternal Grandmother      Colon Cancer Brother      Social History     Social History     Marital status: Single     Spouse name: N/A     Number of children: 0     Years of education: 18     Occupational History      Ghost     Social History Main Topics     Smoking status: Current Every Day Smoker     Packs/day: 0.80     Years: 30.00     Types: Cigarettes     Last attempt to quit: 5/8/2011     Smokeless tobacco: Never Used      Comment: 4/12- 1 ppd      Alcohol use 0.0 oz/week      Comment: 6 drinks a year     Drug use: No     Sexual activity: Not Currently     Birth control/ protection: None     Other Topics Concern      Service No     Blood Transfusions No     Caffeine Concern Yes     Coffee     Occupational Exposure No     Hobby Hazards No     Sleep Concern Yes     feels she sleeps to much     Stress Concern No     Weight Concern Yes     cannot lose     Special Diet No     Back Care No     Exercise Yes     Walk on treadmill, swim     Seat Belt Yes     Self-Exams No     Parent/Sibling W/ Cabg, Mi Or Angioplasty Before 65f 55m? No     Social History Narrative    Process death claims       Objective  Pulse 88  Resp 16  Ht 5' 2.5\" (1.588 m)    GENERAL APPEARANCE: healthy, alert, no distress and over weight   GAIT: antalgic  SKIN: no suspicious lesions or rashes  NEURO: Normal strength and tone, mentation intact and speech normal  PSYCH:  mentation " appears normal and affect normal/bright  HEENT: no scleral icterus  CV: no extremity edema  RESP: nonlabored breathing    Right Foot exam    ROM:        Ankle, midfoot grossly full  Moderate limitation active motion great toe MTP joint    Strength:   Ankle grossly full  Great toe mild limitation with pain     Tender:       First MTP joint    Non-Tender:       remainder of foot and ankle    Inspection:        pes cavus    Skin:       neg (-) bruising        positive (+) swelling mild first MTP joint       well perfused       capillary refill brisk     Weightbearing:       Unable to rise to toes with pain    Sensation grossly intact light touch        Radiology:  Visualized radiographs of right great toe 1/6/17, and reviewed the images with the patient.  Impression: DJD first MTP joint.  Report reviewed:    TOE RIGHT TWO OR MORE VIEWS 1/6/2017 3:50 PM      HISTORY: Fell on the floor. Pain in right leg.     COMPARISON: None.         IMPRESSION: Moderate degenerative changes of the first  metatarsophalangeal joint. No evidence of acute fracture.     SULAIMAN ZARATE MD    Assessment:  1. Arthritis of first metatarsophalangeal joint        Plan:  Discussed the assessment with the patient. We discussed the following treatment options: symptom treatment, activity modification/rest, imaging, rehab, injection therapy, medication and support for the affected area. Following discussion, plan:  Topical Treatments: Ice, Heat or Topical Analgesics  Over the counter medication: Patient's preferred OTC medication as directed on packaging. No NSAIDs with oral steroid.  Prescription Medication as directed: Medrol Dose Pack   She desired oral steroid over injection   Pertinent potential adverse effect profile of prescribed medication(s) was discussed with the patient, who expressed understanding.  Plain films of the foot reviewed.  Activity Modification: discussed  Rehab: potential benefits discussed, will focus on getting this to calm  down first  Medical Equipment: has cam walker, may use prn; has fracture shoe, may use prn; discussed Dynaflex plate, she desired, provided, may use prn  Discussed steroid injection; she does not desire, but if she does, suggest US guidance with Dr Priest  Follow up: will leave as needed.  Questions answered. The patient indicates understanding of these issues and agrees with the plan.    Francisco Farris DO, CAQ          Disclaimer: This note consists of symbols derived from keyboarding, dictation and/or voice recognition software. As a result, there may be errors in the script that have gone undetected. Please consider this when interpreting information found in this chart.

## 2017-03-07 NOTE — MR AVS SNAPSHOT
After Visit Summary   3/7/2017    Jessica Jay    MRN: [id number]           Patient Information     Date Of Birth          1960        Visit Information        Provider Department      3/7/2017 2:00 PM Adam West LP [facility]        Today's Diagnoses     DDD (degenerative disc disease), lumbar    -  1       Follow-ups after your visit        Your next 10 appointments already scheduled     Mar 22, 2017  4:00 PM CDT   Return Visit with SONIA Hogan CNP   [facility] ([facility])    [address]  [place] [address] [phone]              Who to contact     If you have questions or need follow up information about today's clinic visit or your schedule please contact [facility] directly at [phone].  Normal or non-critical lab and imaging results will be communicated to you by MyChart, letter or phone within 4 business days after the clinic has received the results. If you do not hear from us within 7 days, please contact the clinic through MyChart or phone. If you have a critical or abnormal lab result, we will notify you by phone as soon as possible.  Submit refill requests through [employer] or call your pharmacy and they will forward the refill request to us. Please allow 3 business days for your refill to be completed.          Additional Information About Your Visit        MyChart Information     [employer] gives you secure access to your electronic health record. If you see a primary care provider, you can also send messages to your care team and make appointments. If you have questions, please call your primary care clinic.  If you do not have a primary care provider, please call [phone] and they will assist you.        Care EveryWhere ID     This is your Care EveryWhere ID. This could be used by other organizations to access your [place] medical records  [id number]         Blood  Pressure from Last 3 Encounters:   01/11/17 124/80   01/06/17 176/87   10/31/16 132/82    Weight from Last 3 Encounters:   10/31/16 126.2 kg (278 lb 4 oz)   10/04/16 125.2 kg (276 lb)   09/16/16 124.7 kg (275 lb)              We Performed the Following     HEALTH & BEHAVIOR ASSESS, INITIAL, EA 15MIN PHD          Today's Medication Changes          These changes are accurate as of: 3/7/17 11:59 PM.  If you have any questions, ask your nurse or doctor.               Start taking these medicines.        Dose/Directions    methylPREDNISolone 4 MG tablet   Commonly known as:  MEDROL DOSEPAK   Used for:  Arthritis of first metatarsophalangeal joint   Started by:  Francisco Farris, DO        Follow package instructions   Quantity:  21 tablet   Refills:  0            Where to get your medicines      These medications were sent to [facility] [place] [address] AT [address] , [facility] [address]     Phone:  [phone]     methylPREDNISolone 4 MG tablet                Primary Care Provider Office Phone # Fax #    Jazmyne Chapman [phone]       [address] [address]        Thank you!     Thank you for choosing [facility]  for your care. Our goal is always to provide you with excellent care. Hearing back from our patients is one way we can continue to improve our services. Please take a few minutes to complete the written survey that you may receive in the mail after your visit with us. Thank you!             Your Updated Medication List - Protect others around you: Learn how to safely use, store and throw away your medicines at www.disposemymeds.org.          This list is accurate as of: 3/7/17 11:59 PM.  Always use your most recent med list.                   Brand Name Dispense Instructions for use    albuterol 108 (90 BASE) MCG/ACT Inhaler    PROAIR  HFA/PROVENTIL HFA/VENTOLIN HFA    1 Inhaler    Inhale 2 puffs into the lungs every 6 hours as needed       buPROPion 150 MG 12 hr tablet    WELLBUTRIN SR    180 tablet    Take 1 tablet once daily and increase to 1 tablet twice daily after 4 to 7 days       cyclobenzaprine 10 MG tablet    FLEXERIL    20 tablet    Take 0.5-1 tablets (5-10 mg) by mouth 3 times daily as needed for muscle spasms       DULoxetine 60 MG EC capsule    CYMBALTA    90 capsule    TAKE 1 CAPSULE(60 MG) BY MOUTH DAILY       gabapentin 300 MG capsule    NEURONTIN    180 capsule    Take 600mg in the morning, 300mg in the afternoon and 600mg at bedtime x 7 days, then take 600mg three times daily.If side effects, then reduce to last tolerable dosage.       HYDROcodone-acetaminophen 5-325 MG per tablet    NORCO    15 tablet    Take 1 tablet by mouth every 6 hours as needed for moderate to severe pain Max of 2 tabs per day, use sparingly. OK to fill and begin on 1/11/2017 and must last at least 30 days       methylPREDNISolone 4 MG tablet    MEDROL DOSEPAK    21 tablet    Follow package instructions       omeprazole 20 MG tablet     90 tablet    Take 1 tablet (20 mg) by mouth daily

## 2017-03-07 NOTE — NURSING NOTE
"Chief Complaint   Patient presents with     Musculoskeletal Problem     right great toe pain       Initial Pulse 88  Resp 16  Ht 5' 2.5\" (1.588 m) Estimated body mass index is 50.08 kg/(m^2) as calculated from the following:    Height as of 10/31/16: 5' 2.5\" (1.588 m).    Weight as of 10/31/16: 278 lb 4 oz (126.2 kg).  Medication Reconciliation: complete  "

## 2017-03-07 NOTE — Clinical Note
Jessica CHANG was seen in [facility] clinic for her great toe MTP arthrosis. Please see copy of the chart note for additional details. Thanks.

## 2017-03-07 NOTE — MR AVS SNAPSHOT
After Visit Summary   3/7/2017    Jessica Jay    MRN: [id number]           Patient Information     Date Of Birth          1960        Visit Information        Provider Department      3/7/2017 3:45 PM Jeanette Singh, [facility]        Today's Diagnoses     DDD (degenerative disc disease), lumbar    -  1    Multiple joint pain        Myofascial pain        Lumbar radiculopathy           Follow-ups after your visit        Your next 10 appointments already scheduled     Mar 22, 2017  4:00 PM CDT   Return Visit with SONIA Hogan CNP   [facility] ([place] [facility])    [address] [facility] [address] [phone]              Who to contact     If you have questions or need follow up information about today's clinic visit or your schedule please contact [facility] directly at [phone].  Normal or non-critical lab and imaging results will be communicated to you by MyChart, letter or phone within 4 business days after the clinic has received the results. If you do not hear from us within 7 days, please contact the clinic through [employer] or phone. If you have a critical or abnormal lab result, we will notify you by phone as soon as possible.  Submit refill requests through [employer] or call your pharmacy and they will forward the refill request to us. Please allow 3 business days for your refill to be completed.          Additional Information About Your Visit        MyChart Information     [employer] gives you secure access to your electronic health record. If you see a primary care provider, you can also send messages to your care team and make appointments. If you have questions, please call your primary care clinic.  If you do not have a primary care provider, please call [phone] and they will assist you.        Care EveryWhere ID     This is your Care EveryWhere ID. This could be used by other  organizations to access your [facility] medical records  [id number]         Blood Pressure from Last 3 Encounters:   01/11/17 124/80   01/06/17 176/87   10/31/16 132/82    Weight from Last 3 Encounters:   10/31/16 126.2 kg (278 lb 4 oz)   10/04/16 125.2 kg (276 lb)   09/16/16 124.7 kg (275 lb)              We Performed the Following     NEUROMUSCULAR RE-EDUCATION     THERAPEUTIC ACTIVITIES          Today's Medication Changes          These changes are accurate as of: 3/7/17 11:59 PM.  If you have any questions, ask your nurse or doctor.               Start taking these medicines.        Dose/Directions    methylPREDNISolone 4 MG tablet   Commonly known as:  MEDROL DOSEPAK   Used for:  Arthritis of first metatarsophalangeal joint   Started by:  Francisco Farris, DO        Follow package instructions   Quantity:  21 tablet   Refills:  0       order for DME   Used for:  Arthritis of first metatarsophalangeal joint   Started by:  rFancisco Farris DO        Equipment being ordered: Sabreaf[employer] foot plate, 23.5 Right   Quantity:  1 Device   Refills:  0            Where to get your medicines      These medications were sent to [facility] [address] [place] [address] AT [address] [facility] [address]     Phone:  [phone]     methylPREDNISolone 4 MG tablet         Some of these will need a paper prescription and others can be bought over the counter.  Ask your nurse if you have questions.     Bring a paper prescription for each of these medications     order for DME                Primary Care Provider Office Phone # Fax #    Jazmyne Chapman [phone]       [address] [address]        Thank you!     Thank you for choosing [facility]  for your care. Our goal is always to provide you with excellent care. Hearing back from our patients is one way we can continue to  improve our services. Please take a few minutes to complete the written survey that you may receive in the mail after your visit with us. Thank you!             Your Updated Medication List - Protect others around you: Learn how to safely use, store and throw away your medicines at www.disposemymeds.org.          This list is accurate as of: 3/7/17 11:59 PM.  Always use your most recent med list.                   Brand Name Dispense Instructions for use    albuterol 108 (90 BASE) MCG/ACT Inhaler    PROAIR HFA/PROVENTIL HFA/VENTOLIN HFA    1 Inhaler    Inhale 2 puffs into the lungs every 6 hours as needed       buPROPion 150 MG 12 hr tablet    WELLBUTRIN SR    180 tablet    Take 1 tablet once daily and increase to 1 tablet twice daily after 4 to 7 days       cyclobenzaprine 10 MG tablet    FLEXERIL    20 tablet    Take 0.5-1 tablets (5-10 mg) by mouth 3 times daily as needed for muscle spasms       DULoxetine 60 MG EC capsule    CYMBALTA    90 capsule    TAKE 1 CAPSULE(60 MG) BY MOUTH DAILY       gabapentin 300 MG capsule    NEURONTIN    180 capsule    Take 600mg in the morning, 300mg in the afternoon and 600mg at bedtime x 7 days, then take 600mg three times daily.If side effects, then reduce to last tolerable dosage.       HYDROcodone-acetaminophen 5-325 MG per tablet    NORCO    15 tablet    Take 1 tablet by mouth every 6 hours as needed for moderate to severe pain Max of 2 tabs per day, use sparingly. OK to fill and begin on 1/11/2017 and must last at least 30 days       methylPREDNISolone 4 MG tablet    MEDROL DOSEPAK    21 tablet    Follow package instructions       omeprazole 20 MG tablet     90 tablet    Take 1 tablet (20 mg) by mouth daily       order for DME     1 Device    Equipment being ordered: [employer] foot plate, 23.5 Right

## 2017-03-07 NOTE — PROGRESS NOTES
"Pain Physical Therapy Progress Note    Patient Name: Jessica Jay     YOB: 1960     Medical Record Number: [id number]    Visits: 4/10    Diagnosis:    DDD (degenerative disc disease), lumbar  Multiple joint pain  Myofascial pain  Lumbar radiculopathy    Subjective: Patient reports that things are overall doing better; back is better.  Knees and feet are still sore--thinks it may be due to her toe.    Complaints about R big toe--sore, fell about a month ago; states that she didn't have it looked at.  Feels like it's been affecting her walking; can't push off her toes when walking; has been walking mostly on her R heel lately.  Wondering it would have been broken or not.  Did offer patient opportunity to follow up with [facility] while here.    Patient decided to check into [facility] for R big toe complaints.      PT session limited due to patient being called back for [facility] appointment.    Self Care  HEP: NA  Walking/Aerobic Activity: states that she tried her [employer] track; did for a short time, went OK; didn't do the arms; plans to continue  Heat/Ice: using the rice pack, is helpful  Posture: continues to work on her posture, has been working on sitting at EOC; muscles get tired and then \"scoots back for a little\"  Mini Breaks: NA  Breathing/Relaxation: has been working on it more; has been helpful  Pacing: has been working on doing a little at a time with going through basement and working on planning out going through spare bedroom; has realized that can't do it all at once      Objective Findings:    OBSERVATION: Less fidgety than before, less Guarded posture as compared to first visit; demo slight forward head; slight rounded shoulders; clenching jaw; fair core engagement with seated posture; upper chest breathing noted--overall improving, able to self correct with minimal cueing  GAIT & LOCOMOTION: Antalgic, wide base of support, moves slowly; leaning from side to side; decreased step length; decreased " "heel to toe gait bilaterally    Treatment Interventions:  Therapeutic Activity:   Reviewed basics of pacing; encouraged patient to continue to work on doing a little at a time; OK to take breaks with cleaning, activities, exercise; encouraged patient to continue to work on increasing activity when getting home from work--nordic track, walking outside, geocaching, cleaning, etc--limiting computer/screen time    Neuromuscular Reeducation:   Posture: reviewed posture basics, encouraged patient to continue to keep working on it, has been making good improvements; OK to take breaks and \"lean back and use chair for support\"; muscles will take time to get used to new positions and body will take time to get used to new habits;     __________________________________________________________________    Instruction on home program: posture, self care    Assessment:  Ongoing Functional Limitations Include:  Patient tolerated/responded well to treatment  Impression: has slowly been progressing    Recommendations: recommend continuing, patient states that she wants to wait and see; does have follow up with pain provider at end of the month    Intensity Level: 1 (1=low intensity; 5=high intensity)    Demonstrates/Verbalizes Technique: 3 (1= poor technique-difficulty performing exercises,significant cues required; 5= good technique-performs exercises without cues)    Body Awareness: 2 (1=low awareness; 5=high awareness)    Posture/Stability: 2, 3 (1= poor posture, stability; 5= good posture, stability)    Motivational Level: Cooperative    Response to Teaching: cooperative and needs reinforcement    Factors that affect learning: None    _______________________________________________________________________  Plan of Care   Continue PT to support reactivation and integration of self regulation pain management skills;  Focus of next session will be on: mini breaks, HEP     Present:  NA     Total Visit Time:  35 " minutes  TA: 20 mins; Neuro-Re Ed: 15 mins      Therapist: Jeanette Singh PT             Date: 3/7/2017

## 2017-03-08 NOTE — TELEPHONE ENCOUNTER
Mario Lopez-    I have you on gabapentin, this is not an antidepressant, but an anti-seizure drug that works well for chronic pain. It is OK with me if you are on the gabapentin and the 2 anti-depressants from Dr. Chapman.    Thanks.  Aundrea SCOTT RN CNP, P  [facility]      I sent the above [place] message to the patient.    Aundrea SCOTT RN CNP, P  [facility] [place]

## 2017-03-08 NOTE — PROGRESS NOTES
[facility]   [facility]  Behavioral Medicine Visit     Patient Name: Jessica Jay    YOB: 1960   Medical Record Number: [id number]  Date: 3/7/2017         SUBJECTIVE:  Session objective: Patient checks in on pain concerns reporting significant improvement in her discomfort. She believes it is the impact of medication    Patient reports things in the work place are going socially better for her with no temper difficulties and multiple instances of positive feedback from peers.    Pt is informed fully on the elements of self hypnosis as she had previously requested more information    Patient reports she has 85% decided she is going to stop participation in the PM program as she is doing better. She is concerned about pain relapse she has some hesitance. I have suggested she reschedule until she has a chance to meet with her pain provider in two weeks and if she remains we can continue her treatment plan.     Patient reports she also wants to speak with her PT about her perspective as she has found that work to be very helpful. She also is interested in figuring out how to start out with a plan such as bike riding as spring arrive     OBJECTIVE:  Length of Visit: 60 minutes      Mental status: alert, minimal distress, cooperative, somewhat odd mannerisms and presentation. Facial expressions at times in congruent with what she was  Talking about.      ASSESSMENT:   Axis I: Degenerative Disc Disease      Axis II: Dx deferred     Progress toward goals: as expected.      Pain Status: improved  Emotional Status: some improvement with interpersonal relating  Medication / chemical use concerns: None     PLAN:   Next Appointment: Jessica Jay will schedule a follow-up appointment in 4 weeks.  Assignment: Practice intervention skills developed today  Objectives / interventions for next session: Continue treatment plan if she opts to  continue     Adam West MA, LP, [facility]  Pain Specialist  [facility] [place]

## 2017-03-17 NOTE — TELEPHONE ENCOUNTER
Message read by pt on 3/10.     GABINO SolisN, RN-BC  Patient Care Supervisor/Care Coordinator  [facility] [place]

## 2017-03-20 ENCOUNTER — TELEPHONE (OUTPATIENT)
Dept: FAMILY MEDICINE | Facility: CLINIC | Age: 57
End: 2017-03-20

## 2017-03-20 NOTE — TELEPHONE ENCOUNTER
Reason for Call:  Form, our goal is to have forms completed with 72 hours, however, some forms may require a visit or additional information.    Type of letter, form or note:  medical    Who is the form from?: Patient    Where did the form come from: Patient or family brought in       What clinic location was the form placed at?: [facility])    Where the form was placed: 's Box    What number is listed as a contact on the form?: [phone]       Additional comments: Please fax when completed to [phone].  Call taken on 3/20/2017 at 4:47 PM by Anastacia Thurston

## 2017-03-21 NOTE — TELEPHONE ENCOUNTER
Forms received from [employer] for Jazmyne Chapman MD.  Forms placed in provider 'sign me' folder.  Please fax forms to [phone] after completion.    Monika Olivia  Patient Representative

## 2017-03-22 ENCOUNTER — OFFICE VISIT (OUTPATIENT)
Dept: PALLIATIVE MEDICINE | Facility: CLINIC | Age: 57
End: 2017-03-22
Payer: COMMERCIAL

## 2017-03-22 ENCOUNTER — TELEPHONE (OUTPATIENT)
Dept: PALLIATIVE MEDICINE | Facility: CLINIC | Age: 57
End: 2017-03-22

## 2017-03-22 VITALS
SYSTOLIC BLOOD PRESSURE: 150 MMHG | DIASTOLIC BLOOD PRESSURE: 92 MMHG | BODY MASS INDEX: 49.86 KG/M2 | HEART RATE: 84 BPM | WEIGHT: 277 LBS

## 2017-03-22 DIAGNOSIS — M79.18 MYOFASCIAL PAIN: ICD-10-CM

## 2017-03-22 DIAGNOSIS — M25.50 PAIN IN JOINT, MULTIPLE SITES: ICD-10-CM

## 2017-03-22 DIAGNOSIS — M47.817 LUMBOSACRAL SPONDYLOSIS WITHOUT MYELOPATHY: Primary | ICD-10-CM

## 2017-03-22 DIAGNOSIS — M54.16 LUMBAR RADICULAR PAIN: ICD-10-CM

## 2017-03-22 DIAGNOSIS — M51.369 DDD (DEGENERATIVE DISC DISEASE), LUMBAR: ICD-10-CM

## 2017-03-22 PROCEDURE — 99214 OFFICE O/P EST MOD 30 MIN: CPT | Performed by: NURSE PRACTITIONER

## 2017-03-22 RX ORDER — GABAPENTIN 300 MG/1
CAPSULE ORAL
Qty: 270 CAPSULE | Refills: 0 | Status: SHIPPED | OUTPATIENT
Start: 2017-03-22 | End: 2017-04-24

## 2017-03-22 ASSESSMENT — PAIN SCALES - GENERAL: PAINLEVEL: MILD PAIN (2)

## 2017-03-22 NOTE — MR AVS SNAPSHOT
After Visit Summary   3/22/2017    Jessica Jay    MRN: [id number]           Patient Information     Date Of Birth          1960        Visit Information        Provider Department      3/22/2017 4:00 PM Aundrea Sanchez APRN [facility]        Today's Diagnoses     Lumbosacral spondylosis without myelopathy    -  1    DDD (degenerative disc disease), lumbar        Lumbar radicular pain        Pain in joint, multiple sites        Myofascial pain          Care Instructions    PLAN:  1. Follow up with Ginger in PHYSICAL THERAPY and Adam in HEALTH PSYCHOLOGY as needed  2. Medications:   1. Slow increase in gabapentin as directed to 900mg three times daily  3. Procedures: discussed possible lumbar facet joint injections vs blocks. Call if you decide to move forward  4. Follow-up with me in 8-10 weeks.  5. If you continue to do well, will tentatively plan on transferring your care back to your Primary Care Provider.       ----------------------------------------------------------------  Nurse Triage line:  [phone]   Call this number with any questions or concerns. You may leave a detailed message anytime. Calls are typically returned Monday through Friday between 8 AM and 4:30 PM. We usually get back to you within 2 business days depending on the issue/request.       Medication refills:    For non-narcotic medications, call your pharmacy directly to request a refill. The pharmacy will contact the [facility] for authorization. Please allow 3-4 days for these refills to be processed.     For narcotic refills, call the nurse triage line or send a [employer] message. Please contact us 7-10 days before your refill is due. The message MUST include the name of the specific medication(s) requested and how you would like to receive the prescription(s). The options are as follows:    Pain Clinic staff can mail the prescription to your pharmacy. Please tell us the name of  the pharmacy.    You may pick the prescription up at the Pain Clinic (tell us the location) or during a clinic visit with your pain provider    Pain Clinic staff can deliver the prescription to the [facility] pharmacy in the clinic building. Please tell us the location.      Scheduling number: [phone].  Call this number to schedule or change appointments.    We believe regular attendance is key to your success in our program.    Any time you are unable to keep your appointment we ask that you call us at least 24 hours in advance to let us know. This will allow us to offer the appointment time to another patient.             Follow-ups after your visit        Your next 10 appointments already scheduled     Apr 10, 2017  5:40 PM CDT   [facility] with [facility] Chapman [facility]   [facility]    [address] [place] [id number] [phone]           Your Arrival times is X, We need you to be here at this time to get checked in and have the assistant get you ready for the provider, which can take about 15 minutes. Your appointment time with your provider is at  X. Thank you.              Who to contact     If you have questions or need follow up information about today's clinic visit or your schedule please contact [facility] directly at [phone].  Normal or non-critical lab and imaging results will be communicated to you by MyChart, letter or phone within 4 business days after the clinic has received the results. If you do not hear from us within 7 days, please contact the clinic through [email or web address] or phone. If you have a critical or abnormal lab result, we will notify you by phone as soon as possible.  Submit refill requests through [employer] or call your pharmacy and they will forward the refill request to us. Please allow 3 business days for your refill to be completed.          Additional Information About Your Visit        KUB[employer]t  Information     [employer] gives you secure access to your electronic health record. If you see a primary care provider, you can also send messages to your care team and make appointments. If you have questions, please call your primary care clinic.  If you do not have a primary care provider, please call [phone] and they will assist you.        Care EveryWhere ID     This is your Care EveryWhere ID. This could be used by other organizations to access your [facility] medical records  [id number]        Your Vitals Were     Pulse BMI (Body Mass Index)                84 49.86 kg/m2           Blood Pressure from Last 3 Encounters:   03/22/17 (!) 150/92   01/11/17 124/80   01/06/17 176/87    Weight from Last 3 Encounters:   03/22/17 125.6 kg (277 lb)   10/31/16 126.2 kg (278 lb 4 oz)   10/04/16 125.2 kg (276 lb)              Today, you had the following     No orders found for display         Today's Medication Changes          These changes are accurate as of: 3/22/17  5:25 PM.  If you have any questions, ask your nurse or doctor.               These medicines have changed or have updated prescriptions.        Dose/Directions    gabapentin 300 MG capsule   Commonly known as:  NEURONTIN   This may have changed:  additional instructions   Used for:  DDD (degenerative disc disease), lumbar, Lumbar radicular pain, Pain in joint, multiple sites, Myofascial pain   Changed by:  Aundrea Sanchez APRN CNP        Take 600mg in the morning and afternoon and 900mg at bedtime for 7 days, then take 900mg in the morning, 600mg in the afternoon and 900mg at bedtime for 7 days, then take 900mg three times daily. If side effects, then reduce to last tolerable dosage.   Quantity:  270 capsule   Refills:  0            Where to get your medicines      Some of these will need a paper prescription and others can be bought over the counter.  Ask your nurse if you have questions.     Bring a paper prescription for each of these  medications     gabapentin 300 MG capsule                Primary Care Provider Office Phone # Fax #    Jazmyne Chapman [phone]       [address] [address]        Thank you!     Thank you for choosing [facility]  for your care. Our goal is always to provide you with excellent care. Hearing back from our patients is one way we can continue to improve our services. Please take a few minutes to complete the written survey that you may receive in the mail after your visit with us. Thank you!             Your Updated Medication List - Protect others around you: Learn how to safely use, store and throw away your medicines at www.disposemymeds.org.          This list is accurate as of: 3/22/17  5:25 PM.  Always use your most recent med list.                   Brand Name Dispense Instructions for use    albuterol 108 (90 BASE) MCG/ACT Inhaler    PROAIR HFA/PROVENTIL HFA/VENTOLIN HFA    1 Inhaler    Inhale 2 puffs into the lungs every 6 hours as needed       buPROPion 150 MG 12 hr tablet    WELLBUTRIN SR    180 tablet    Take 1 tablet once daily and increase to 1 tablet twice daily after 4 to 7 days       cyclobenzaprine 10 MG tablet    FLEXERIL    20 tablet    Take 0.5-1 tablets (5-10 mg) by mouth 3 times daily as needed for muscle spasms       DULoxetine 60 MG EC capsule    CYMBALTA    90 capsule    TAKE 1 CAPSULE(60 MG) BY MOUTH DAILY       gabapentin 300 MG capsule    NEURONTIN    270 capsule    Take 600mg in the morning and afternoon and 900mg at bedtime for 7 days, then take 900mg in the morning, 600mg in the afternoon and 900mg at bedtime for 7 days, then take 900mg three times daily. If side effects, then reduce to last tolerable dosage.       HYDROcodone-acetaminophen 5-325 MG per tablet    NORCO    15 tablet    Take 1 tablet by mouth every 6 hours as needed for moderate to severe pain Max of 2 tabs per day, use sparingly. OK to  fill and begin on 1/11/2017 and must last at least 30 days       omeprazole 20 MG tablet     90 tablet    Take 1 tablet (20 mg) by mouth daily       order for DME     1 Device    Equipment being ordered: [employer] foot plate, 23.5 Right

## 2017-03-22 NOTE — NURSING NOTE
"    Chief Complaint   Patient presents with     Pain       Initial BP (!) 163/91  Wt 125.6 kg (277 lb)  BMI 49.86 kg/m2 Estimated body mass index is 49.86 kg/(m^2) as calculated from the following:    Height as of 3/7/17: 1.588 m (5' 2.5\").    Weight as of this encounter: 125.6 kg (277 lb).  Medication Reconciliation: complete     Flor Ren CMA (AAMA)      "

## 2017-03-22 NOTE — PATIENT INSTRUCTIONS
PLAN:  1. Follow up with Ginger in PHYSICAL THERAPY and Adam in HEALTH PSYCHOLOGY as needed  2. Medications:   1. Slow increase in gabapentin as directed to 900mg three times daily  3. Procedures: discussed possible lumbar facet joint injections vs blocks. Call if you decide to move forward  4. Follow-up with me in 8-10 weeks.  5. If you continue to do well, will tentatively plan on transferring your care back to your Primary Care Provider.       ----------------------------------------------------------------  Nurse Triage line:  [phone]   Call this number with any questions or concerns. You may leave a detailed message anytime. Calls are typically returned Monday through Friday between 8 AM and 4:30 PM. We usually get back to you within 2 business days depending on the issue/request.       Medication refills:    For non-narcotic medications, call your pharmacy directly to request a refill. The pharmacy will contact the [facility] Center for authorization. Please allow 3-4 days for these refills to be processed.     For narcotic refills, call the nurse triage line or send a [employer] message. Please contact us 7-10 days before your refill is due. The message MUST include the name of the specific medication(s) requested and how you would like to receive the prescription(s). The options are as follows:    Pain Clinic staff can mail the prescription to your pharmacy. Please tell us the name of the pharmacy.    You may pick the prescription up at the Pain Clinic (tell us the location) or during a clinic visit with your pain provider    Pain Clinic staff can deliver the prescription to the [facility] pharmacy in the clinic building. Please tell us the location.      Scheduling number: [phone].  Call this number to schedule or change appointments.    We believe regular attendance is key to your success in our program.    Any time you are unable to keep your appointment we ask that you call us at least 24  hours in advance to let us know. This will allow us to offer the appointment time to another patient.
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
## 2017-03-22 NOTE — TELEPHONE ENCOUNTER
Received fax request from [facility] pharmacy requesting refill(s) for gabapentin 300 mg, #180    Last refilled on 2/12/17    Pt last seen on 1/11/17  Next appt scheduled for 3/22/17;     Pt was titrating dose:  Take 600mg in the morning, 300mg in the afternoon and 600mg at bedtime x 7 days, then take 600mg three times daily.If side effects, then reduce to last tolerable dosage    Since pt is scheduled for an appointment this afternoon, will not contact to determine status of titration since this will likely be addressed today.  Will leave call open just in case further follow up is needed.     Elinor Llanos, GABINON, RN-BC  Patient Care Supervisor/Care Coordinator  [facility] [place]

## 2017-03-22 NOTE — TELEPHONE ENCOUNTER
Closing encounter to be addressed in encounter.     Aye Huggins RN, [facility]  Pain Clinic Care Coordinator

## 2017-04-10 ENCOUNTER — OFFICE VISIT (OUTPATIENT)
Dept: FAMILY MEDICINE | Facility: CLINIC | Age: 57
End: 2017-04-10
Payer: COMMERCIAL

## 2017-04-10 VITALS
WEIGHT: 280.1 LBS | SYSTOLIC BLOOD PRESSURE: 128 MMHG | HEIGHT: 63 IN | HEART RATE: 64 BPM | TEMPERATURE: 97.8 F | BODY MASS INDEX: 49.63 KG/M2 | DIASTOLIC BLOOD PRESSURE: 78 MMHG

## 2017-04-10 DIAGNOSIS — Z72.0 TOBACCO ABUSE: ICD-10-CM

## 2017-04-10 DIAGNOSIS — G89.29 CHRONIC BILATERAL LOW BACK PAIN WITHOUT SCIATICA: ICD-10-CM

## 2017-04-10 DIAGNOSIS — Z11.59 NEED FOR HEPATITIS C SCREENING TEST: Primary | ICD-10-CM

## 2017-04-10 DIAGNOSIS — M54.50 CHRONIC BILATERAL LOW BACK PAIN WITHOUT SCIATICA: ICD-10-CM

## 2017-04-10 DIAGNOSIS — E66.01 MORBID OBESITY DUE TO EXCESS CALORIES (H): ICD-10-CM

## 2017-04-10 DIAGNOSIS — F33.1 MODERATE EPISODE OF RECURRENT MAJOR DEPRESSIVE DISORDER (H): ICD-10-CM

## 2017-04-10 PROCEDURE — 99214 OFFICE O/P EST MOD 30 MIN: CPT | Performed by: FAMILY MEDICINE

## 2017-04-10 PROCEDURE — 36415 COLL VENOUS BLD VENIPUNCTURE: CPT | Performed by: FAMILY MEDICINE

## 2017-04-10 PROCEDURE — 86803 HEPATITIS C AB TEST: CPT | Performed by: FAMILY MEDICINE

## 2017-04-10 RX ORDER — DULOXETIN HYDROCHLORIDE 60 MG/1
60 CAPSULE, DELAYED RELEASE ORAL DAILY
Qty: 90 CAPSULE | Refills: 3 | Status: SHIPPED | OUTPATIENT
Start: 2017-04-10 | End: 2018-06-22

## 2017-04-10 ASSESSMENT — ANXIETY QUESTIONNAIRES
5. BEING SO RESTLESS THAT IT IS HARD TO SIT STILL: NEARLY EVERY DAY
2. NOT BEING ABLE TO STOP OR CONTROL WORRYING: NEARLY EVERY DAY
6. BECOMING EASILY ANNOYED OR IRRITABLE: NEARLY EVERY DAY
1. FEELING NERVOUS, ANXIOUS, OR ON EDGE: MORE THAN HALF THE DAYS
3. WORRYING TOO MUCH ABOUT DIFFERENT THINGS: NEARLY EVERY DAY

## 2017-04-10 ASSESSMENT — PATIENT HEALTH QUESTIONNAIRE - PHQ9: 5. POOR APPETITE OR OVEREATING: MORE THAN HALF THE DAYS

## 2017-04-10 NOTE — MR AVS SNAPSHOT
After Visit Summary   4/10/2017    Jessica Jay    MRN: [id number]           Patient Information     Date Of Birth          1960        Visit Information        Provider Department      4/10/2017 5:40 PM Jazmyne Chapman MD [facility]        Today's Diagnoses     Need for hepatitis C screening test    -  1    Tobacco abuse        Chronic bilateral low back pain without sciatica        Moderate episode of recurrent major depressive disorder (H)        Pain in both feet        overweight with BMI >40           Follow-ups after your visit        Your next 10 appointments already scheduled     Jun 06, 2017  2:00 PM CDT   Return Visit with SONIA Hogan CNP   [facility] ([facility])    [address] [address] [phone]              Who to contact     If you have questions or need follow up information about today's clinic visit or your schedule please contact [facility] directly at [phone].  Normal or non-critical lab and imaging results will be communicated to you by [employer], letter or phone within 4 business days after the clinic has received the results. If you do not hear from us within 7 days, please contact the clinic through [employer] or phone. If you have a critical or abnormal lab result, we will notify you by phone as soon as possible.  Submit refill requests through [employer] or call your pharmacy and they will forward the refill request to us. Please allow 3 business days for your refill to be completed.          Additional Information About Your Visit        MyChart Information     [employer] gives you secure access to your electronic health record. If you see a primary care provider, you can also send messages to your care team and make appointments. If you have questions, please call your primary care clinic.  If you do not have a primary care provider, please call  "[phone] and they will assist you.        Care EveryWhere ID     This is your Care EveryWhere ID. This could be used by other organizations to access your [facility] medical records  [id number]        Your Vitals Were     Pulse Temperature Height BMI (Body Mass Index)          64 97.8  F (36.6  C) (Oral) 5' 2.5\" (1.588 m) 50.41 kg/m2         Blood Pressure from Last 3 Encounters:   04/10/17 128/78   03/22/17 (!) 150/92   01/11/17 124/80    Weight from Last 3 Encounters:   04/10/17 280 lb 1.6 oz (127.1 kg)   03/22/17 277 lb (125.6 kg)   10/31/16 278 lb 4 oz (126.2 kg)              We Performed the Following     Hepatitis C Screen Reflex to HCV RNA Quant and Genotype          Today's Medication Changes          These changes are accurate as of: 4/10/17  6:43 PM.  If you have any questions, ask your nurse or doctor.               Start taking these medicines.        Dose/Directions    nicotine 10 MG Inhaler   Commonly known as:  NICOTROL   Used for:  Tobacco abuse   Started by:  Jazmyne Chapman MD        Dose:  6-16 cartridge   Inhale 6-16 cartridge into the lungs daily as needed for smoking cessation   Quantity:  180 each   Refills:  3         These medicines have changed or have updated prescriptions.        Dose/Directions    DULoxetine 60 MG EC capsule   Commonly known as:  CYMBALTA   This may have changed:  See the new instructions.   Used for:  Chronic bilateral low back pain without sciatica, Moderate episode of recurrent major depressive disorder (H), Pain in both feet   Changed by:  Jazmyne Chapman MD        Dose:  60 mg   Take 1 capsule (60 mg) by mouth daily   Quantity:  90 capsule   Refills:  3            Where to get your medicines      [facility] [place] [address] AT [place] [address], [facility] [address]     Phone:  [phone]     DULoxetine 60 MG EC capsule    nicotine 10 MG Inhaler          "       Primary Care Provider Office Phone # Fax #    Jazmyne Chapman [phone]       [facility] [address] [facility] [address]        Thank you!     Thank you for choosing [facility]  for your care. Our goal is always to provide you with excellent care. Hearing back from our patients is one way we can continue to improve our services. Please take a few minutes to complete the written survey that you may receive in the mail after your visit with us. Thank you!             Your Updated Medication List - Protect others around you: Learn how to safely use, store and throw away your medicines at www.disposemymeds.org.          This list is accurate as of: 4/10/17  6:43 PM.  Always use your most recent med list.                   Brand Name Dispense Instructions for use    albuterol 108 (90 BASE) MCG/ACT Inhaler    PROAIR HFA/PROVENTIL HFA/VENTOLIN HFA    1 Inhaler    Inhale 2 puffs into the lungs every 6 hours as needed       buPROPion 150 MG 12 hr tablet    WELLBUTRIN SR    180 tablet    Take 1 tablet once daily and increase to 1 tablet twice daily after 4 to 7 days       cyclobenzaprine 10 MG tablet    FLEXERIL    20 tablet    Take 0.5-1 tablets (5-10 mg) by mouth 3 times daily as needed for muscle spasms       DULoxetine 60 MG EC capsule    CYMBALTA    90 capsule    Take 1 capsule (60 mg) by mouth daily       gabapentin 300 MG capsule    NEURONTIN    270 capsule    Take 600mg in the morning and afternoon and 900mg at bedtime for 7 days, then take 900mg in the morning, 600mg in the afternoon and 900mg at bedtime for 7 days, then take 900mg three times daily. If side effects, then reduce to last tolerable dosage.       HYDROcodone-acetaminophen 5-325 MG per tablet    NORCO    15 tablet    Take 1 tablet by mouth every 6 hours as needed for moderate to severe pain Max of 2 tabs per day, use sparingly. OK to fill and begin on 1/11/2017 and must last at  least 30 days       nicotine 10 MG Inhaler    NICOTROL    180 each    Inhale 6-16 cartridge into the lungs daily as needed for smoking cessation       omeprazole 20 MG tablet     90 tablet    Take 1 tablet (20 mg) by mouth daily       order for DME     1 Device    Equipment being ordered: [employer] foot plate, 23.5 Right

## 2017-04-10 NOTE — NURSING NOTE
"Chief Complaint   Patient presents with     RECHECK     pain management       Initial /78 (BP Location: Other (Comment), Patient Position: Chair, Cuff Size: Adult Large)  Pulse 64  Temp 97.8  F (36.6  C) (Oral)  Ht 5' 2.5\" (1.588 m)  Wt 280 lb 1.6 oz (127.1 kg)  BMI 50.41 kg/m2 Estimated body mass index is 50.41 kg/(m^2) as calculated from the following:    Height as of this encounter: 5' 2.5\" (1.588 m).    Weight as of this encounter: 280 lb 1.6 oz (127.1 kg).  Medication Reconciliation: complete    "

## 2017-04-10 NOTE — PROGRESS NOTES
SUBJECTIVE:                                                    Jessica Jay is a 56 year old female who presents to clinic today for the following health issues:    Depression Followup    Status since last visit: Stable     See PHQ-9 for current symptoms.  Other associated symptoms: None    Complicating factors:   Significant life event:  No   Current substance abuse:  None  Anxiety or Panic symptoms:  Yes-  Been a while since last anxiety attack    PHQ-9  English PHQ-9   Any Language          Amount of exercise or physical activity: None    Problems taking medications regularly: No    Medication side effects: exhausted    Diet: regular (no restrictions)  She has been taking both Cymbalta and Wellbutrin. She relates that she is a lot happier in some ways since starting the medications. There are times that she will have a down mood, but for the most part the medication regimen has been effective. She notes that she has gained a lot of weight from her medications, which influences her moods negatively. She relates that she has been trying to lose weight by limiting caloric intake, but she has not really lost any weight doing this. She has met with a nutritionist last year, and felt that it helped, but she does not tend to cook at home.  Wt Readings from Last 5 Encounters:   04/10/17 280 lb 1.6 oz (127.1 kg)   03/22/17 277 lb (125.6 kg)   10/31/16 278 lb 4 oz (126.2 kg)   10/04/16 276 lb (125.2 kg)   09/16/16 275 lb (124.7 kg)      Back Pain: She reports that her back has been bothering her a lot today. This is the worst that she has felt in a long time. She has been keeping up with the pain clinic, especially the advice and exercises provided to her by physical therapy. She usually has intermittent back pain, but not as bad as days like today. Her Gabapentin prescription is being handled by SONIA Chapa CNP. She has been taking her Gabapentin 3 tablets three times a day. She reports that she has been  "very drowsy since starting this regimen about a week ago. She feels that it has helped a lot with her pain.     Additional Notes: She has been having pain in her left knee. This is most noticeable when she is walking, which negatively affects her weight. She is down to 6 cigarettes a day and feels that she needs something to help with break the habit of smoking.       Problem list and histories reviewed & adjusted, as indicated.  Additional history: as documented      Reviewed and updated as needed this visit by clinical staff  Tobacco  Allergies  Problems  Med Hx  Surg Hx  Fam Hx  Soc Hx      Reviewed and updated as needed this visit by Provider  Problems       ROS:  Constitutional, HEENT, cardiovascular, pulmonary, GI, , musculoskeletal, neuro, skin, endocrine and psych systems are negative, except as otherwise noted.    This document serves as a record of the services and decisions personally performed and made by Jazmyne Nye MD. It was created on his/her behalf by Yue Barfield, a trained medical scribe. The creation of this document is based the provider's statements to the medical scribe.    Scribe Yue Barfield 6:07 PM, April 10, 2017    OBJECTIVE:                                                    /78 (BP Location: Other (Comment), Patient Position: Chair, Cuff Size: Adult Large)  Pulse 64  Temp 97.8  F (36.6  C) (Oral)  Ht 5' 2.5\" (1.588 m)  Wt 280 lb 1.6 oz (127.1 kg)  BMI 50.41 kg/m2  Body mass index is 50.41 kg/(m^2).  GENERAL: healthy, alert and no distress, morbid obesity  PSYCH: mentation appears normal, affect normal/bright     Diagnostic Test Results:  No results found for this or any previous visit (from the past 24 hour(s)).   Component      Latest Ref Rng & Units 5/6/2016   Cholesterol      <200 mg/dL 209 (H)   Triglycerides      <150 mg/dL 115   HDL Cholesterol      >49 mg/dL 55   LDL Cholesterol Calculated      <100 mg/dL 131 (H)   Non HDL Cholesterol      <130 " mg/dL 154 (H)        ASSESSMENT/PLAN:                                                    1. Need for hepatitis C screening test   Given today in clinic   - Hepatitis C Screen Reflex to HCV RNA Quant and Genotype    2. Tobacco abuse   Patient to start mned  - nicotine (NICOTROL) 10 MG Inhaler; Inhale 6-16 cartridge into the lungs daily as needed for smoking cessation  Dispense: 180 each; Refill: 3    3. Chronic bilateral low back pain without sciatica  Has been going to the pain clinic. Pain has overall been more tolerable  Refill done  - DULoxetine (CYMBALTA) 60 MG EC capsule; Take 1 capsule (60 mg) by mouth daily  Dispense: 90 capsule; Refill: 3    4. Moderate episode of recurrent major depressive disorder (H)  Much improved with duloxetine  - DULoxetine (CYMBALTA) 60 MG EC capsule; Take 1 capsule (60 mg) by mouth daily  Dispense: 90 capsule; Refill: 3    5. overweight with BMI >40  Discussed nutrition consult, increasing activity as tolerated, talking to her pain team about small decrease in gabapentin     There are no Patient Instructions on file for this visit.    The information in this document, created by the medical scribe for me, accurately reflects the services I personally performed and the decisions made by me. I have reviewed and approved this document for accuracy prior to leaving the patient care area.  Jazmyne Chapman MD  6:07 PM, 04/10/17    Jazmyne Chapman MD  [employer]

## 2017-04-11 LAB — HCV AB SERPL QL IA: NORMAL

## 2017-04-11 NOTE — TELEPHONE ENCOUNTER
[employer] forms along with med list and problem list faxed per Dr. Chapman to fax: [phone].    Vickie Vogel MA

## 2017-04-24 DIAGNOSIS — M79.18 MYOFASCIAL PAIN: ICD-10-CM

## 2017-04-24 DIAGNOSIS — M54.16 LUMBAR RADICULAR PAIN: ICD-10-CM

## 2017-04-24 DIAGNOSIS — M51.369 DDD (DEGENERATIVE DISC DISEASE), LUMBAR: ICD-10-CM

## 2017-04-24 DIAGNOSIS — M25.50 PAIN IN JOINT, MULTIPLE SITES: ICD-10-CM

## 2017-04-24 NOTE — TELEPHONE ENCOUNTER
Received fax request from [facility] pharmacy requesting refill(s) for gabapentin (NEURONTIN) 300 MG capsule    Last refilled on 3/22/17    Pt last seen on 3/22/17  Next appt scheduled for 6/6/17    Alvarado Ambrocio MA  [facility]      Will facilitate refill.

## 2017-04-25 RX ORDER — GABAPENTIN 300 MG/1
CAPSULE ORAL
Qty: 270 CAPSULE | Refills: 1 | Status: SHIPPED | OUTPATIENT
Start: 2017-04-25 | End: 2017-06-06

## 2017-04-25 NOTE — TELEPHONE ENCOUNTER
Signed Prescriptions:                        Disp   Refills    gabapentin (NEURONTIN) 300 MG capsule      270 ca*1        Sig: take 900mg three times daily. If side effects, then           reduce to last tolerable dosage.  Authorizing Provider: LOLIS LAINEZ RN CNP, FNP  [place] [facility] [place]

## 2017-05-11 NOTE — TELEPHONE ENCOUNTER
Printed copy of forms and refaxed it to number below with copy of medication list and problem list.    Gemma SWEET, Certified Medical Assistant (AAMA)May 11, 2017 2:18 PM

## 2017-05-12 NOTE — TELEPHONE ENCOUNTER
The form in patients chart in Media is not complete.    Forms received from patient for Jazmyne Chapman MD.  Forms placed in provider 'sign me' folder.  Please fax forms to [phone] after completion.    Monika Olivia  Patient Representative

## 2017-05-22 ENCOUNTER — MYC MEDICAL ADVICE (OUTPATIENT)
Dept: FAMILY MEDICINE | Facility: CLINIC | Age: 57
End: 2017-05-22

## 2017-05-22 DIAGNOSIS — K21.9 GASTROESOPHAGEAL REFLUX DISEASE WITHOUT ESOPHAGITIS: ICD-10-CM

## 2017-05-22 NOTE — TELEPHONE ENCOUNTER
omeprazole 20 MG tablet   20 mg, DAILY 3 ordered  EditCancel Reorder     Summary: Take 1 tablet (20 mg) by mouth daily, Disp-90 tablet, R-3, E-Prescribe   Dose, Route, Frequency: 20 mg, Oral, DAILY  Start: 5/20/2016  Ord/Sold: 5/20/2016 (O)  Report  Taking:   Long-term:   Pharmacy: [facility] [address] [place] [address]  AT [facility] Dose History       Patient Sig: Take 1 tablet (20 mg) by mouth daily       Ordered on: 5/20/2016       Authorized by: SHANIKA THOMPSON       Dispense: 90 tablet          Last Office Visit with [facility], P or [facility] prescribing provider: 4-                                         Next 5 appointments (look out 90 days)     Jun 06, 2017  2:00 PM CDT   Return Visit with SONIA Hogan CNP   [facility] [place] ([facility] [place])    [address]  [place] [address] [phone]

## 2017-05-23 NOTE — TELEPHONE ENCOUNTER
Called patient and let her know that we do have the second set up of [employer] forms.  Explained to patient that the first set of forms was sent back to [employer] without being completed by the doctor.  Dr Chapman has completed the second set of documents but forgot to answer one question.  Will have PCP do it tomorrow and will fax tomorrow.    Monika Olivia

## 2017-05-24 ENCOUNTER — TELEPHONE (OUTPATIENT)
Dept: FAMILY MEDICINE | Facility: CLINIC | Age: 57
End: 2017-05-24

## 2017-05-24 RX ORDER — NICOTINE POLACRILEX 4 MG/1
20 GUM, CHEWING ORAL DAILY
Qty: 90 TABLET | Refills: 3 | Status: SHIPPED | OUTPATIENT
Start: 2017-05-24 | End: 2018-05-16

## 2017-05-24 NOTE — TELEPHONE ENCOUNTER
Routing refill request to provider for review/approval because:  No plan for this medication found in the last 12 months.     Vivi Schwab RN

## 2017-05-24 NOTE — TELEPHONE ENCOUNTER
Reason for Call:  Form, our goal is to have forms completed with 72 hours, however, some forms may require a visit or additional information.    Type of letter, form or note:  [employer] program form    Who is the form from?: [employer] (if other please explain)    Where did the form come from: Patient or family brought in       What clinic location was the form placed at?: [facility])    Where the form was placed: 's Box    What number is listed as a contact on the form?: [phone]       Additional comments: none    Call taken on 5/24/2017 at 5:13 PM by Jeanette Heller

## 2017-05-31 ENCOUNTER — MYC MEDICAL ADVICE (OUTPATIENT)
Dept: FAMILY MEDICINE | Facility: CLINIC | Age: 57
End: 2017-05-31

## 2017-06-02 ENCOUNTER — TRANSFERRED RECORDS (OUTPATIENT)
Dept: HEALTH INFORMATION MANAGEMENT | Facility: CLINIC | Age: 57
End: 2017-06-02

## 2017-06-06 ENCOUNTER — OFFICE VISIT (OUTPATIENT)
Dept: PALLIATIVE MEDICINE | Facility: CLINIC | Age: 57
End: 2017-06-06
Payer: COMMERCIAL

## 2017-06-06 VITALS
DIASTOLIC BLOOD PRESSURE: 76 MMHG | HEART RATE: 87 BPM | HEIGHT: 63 IN | BODY MASS INDEX: 49.08 KG/M2 | SYSTOLIC BLOOD PRESSURE: 148 MMHG | WEIGHT: 277 LBS

## 2017-06-06 DIAGNOSIS — G89.29 CHRONIC PAIN OF BOTH KNEES: ICD-10-CM

## 2017-06-06 DIAGNOSIS — M25.50 PAIN IN JOINT, MULTIPLE SITES: ICD-10-CM

## 2017-06-06 DIAGNOSIS — M25.561 CHRONIC PAIN OF BOTH KNEES: ICD-10-CM

## 2017-06-06 DIAGNOSIS — M70.60 GREATER TROCHANTERIC BURSITIS, UNSPECIFIED LATERALITY: ICD-10-CM

## 2017-06-06 DIAGNOSIS — M47.817 LUMBOSACRAL SPONDYLOSIS WITHOUT MYELOPATHY: Primary | ICD-10-CM

## 2017-06-06 DIAGNOSIS — M79.18 MYOFASCIAL PAIN: ICD-10-CM

## 2017-06-06 DIAGNOSIS — M54.50 CHRONIC BILATERAL LOW BACK PAIN WITHOUT SCIATICA: ICD-10-CM

## 2017-06-06 DIAGNOSIS — G89.29 CHRONIC BILATERAL LOW BACK PAIN WITHOUT SCIATICA: ICD-10-CM

## 2017-06-06 DIAGNOSIS — M25.562 CHRONIC PAIN OF BOTH KNEES: ICD-10-CM

## 2017-06-06 DIAGNOSIS — M51.369 DDD (DEGENERATIVE DISC DISEASE), LUMBAR: ICD-10-CM

## 2017-06-06 PROCEDURE — 99214 OFFICE O/P EST MOD 30 MIN: CPT | Performed by: NURSE PRACTITIONER

## 2017-06-06 RX ORDER — GABAPENTIN 300 MG/1
600 CAPSULE ORAL 3 TIMES DAILY
Qty: 180 CAPSULE | Refills: 1 | Status: SHIPPED | OUTPATIENT
Start: 2017-06-06 | End: 2017-12-12

## 2017-06-06 RX ORDER — CYCLOBENZAPRINE HCL 10 MG
5-10 TABLET ORAL 3 TIMES DAILY PRN
Qty: 20 TABLET | Refills: 0 | Status: SHIPPED | OUTPATIENT
Start: 2017-06-06 | End: 2018-05-16

## 2017-06-06 ASSESSMENT — PAIN SCALES - GENERAL: PAINLEVEL: MODERATE PAIN (4)

## 2017-06-06 NOTE — PATIENT INSTRUCTIONS
PLAN:.  1. Continue gentle home activity, call me if you would like to try aquatic therapy  2. Medications:   1. Reduce gabapentin to 600mg three times daily  2. Continue very sparing use of hydrocodone as you are  3. Refill for Flexeril sent  4. Start Voltaren gel, may use 4 grams to knees and lateral hips up to 4 times daily as needed for pain  5. Trial Aspercreme with 4% lidocaine cream; this is found over-the-counter and may be used according to package instructions for topical pain relief  3. Procedures: none at present. Consider lumbar facet joint steroid injections. Call clinic if you decide to try this  4. Follow-up with me in 10 weeks.      ----------------------------------------------------------------  Nurse Triage line:  [phone]   Call this number with any questions or concerns. You may leave a detailed message anytime. Calls are typically returned Monday through Friday between 8 AM and 4:30 PM. We usually get back to you within 2 business days depending on the issue/request.       Medication refills:    For non-narcotic medications, call your pharmacy directly to request a refill. The pharmacy will contact the [facility] for authorization. Please allow 3-4 days for these refills to be processed.     For narcotic refills, call the nurse triage line or send a [employer] message. Please contact us 7-10 days before your refill is due. The message MUST include the name of the specific medication(s) requested and how you would like to receive the prescription(s). The options are as follows:    Pain Clinic staff can mail the prescription to your pharmacy. Please tell us the name of the pharmacy.    You may pick the prescription up at the Pain Clinic (tell us the location) or during a clinic visit with your pain provider    Pain Clinic staff can deliver the prescription to the [facility] pharmacy in the clinic building. Please tell us the location.      Scheduling number: [phone].  Call this  number to schedule or change appointments.    We believe regular attendance is key to your success in our program.    Any time you are unable to keep your appointment we ask that you call us at least 24 hours in advance to let us know. This will allow us to offer the appointment time to another patient.

## 2017-06-06 NOTE — NURSING NOTE
"Chief Complaint   Patient presents with     Pain     Follow up        Initial /76  Pulse 87  Ht 1.588 m (5' 2.5\")  Wt 125.6 kg (277 lb)  BMI 49.86 kg/m2 Estimated body mass index is 49.86 kg/(m^2) as calculated from the following:    Height as of this encounter: 1.588 m (5' 2.5\").    Weight as of this encounter: 125.6 kg (277 lb).  Medication Reconciliation: complete     Gela Naranjo MA        "

## 2017-06-06 NOTE — MR AVS SNAPSHOT
After Visit Summary   6/6/2017    Jessica Jay    MRN: [id number]           Patient Information     Date Of Birth          1960        Visit Information        Provider Department      6/6/2017 2:00 PM Aundrea Sanchez APRN [facility]        Today's Diagnoses     Lumbosacral spondylosis without myelopathy    -  1    DDD (degenerative disc disease), lumbar        Pain in joint, multiple sites        Myofascial pain        Chronic bilateral low back pain without sciatica        Greater trochanteric bursitis, unspecified laterality        Chronic pain of both knees          Care Instructions    PLAN:.  1. Continue gentle home activity, call me if you would like to try aquatic therapy  2. Medications:   1. Reduce gabapentin to 600mg three times daily  2. Continue very sparing use of hydrocodone as you are  3. Refill for Flexeril sent  4. Start Voltaren gel, may use 4 grams to knees and lateral hips up to 4 times daily as needed for pain  5. Trial Aspercreme with 4% lidocaine cream; this is found over-the-counter and may be used according to package instructions for topical pain relief  3. Procedures: none at present. Consider lumbar facet joint steroid injections. Call clinic if you decide to try this  4. Follow-up with me in 10 weeks.      ----------------------------------------------------------------  Nurse Triage line:  [phone]   Call this number with any questions or concerns. You may leave a detailed message anytime. Calls are typically returned Monday through Friday between 8 AM and 4:30 PM. We usually get back to you within 2 business days depending on the issue/request.       Medication refills:    For non-narcotic medications, call your pharmacy directly to request a refill. The pharmacy will contact the [facility] for authorization. Please allow 3-4 days for these refills to be processed.     For narcotic refills, call the nurse triage line or send  a Avacen message. Please contact us 7-10 days before your refill is due. The message MUST include the name of the specific medication(s) requested and how you would like to receive the prescription(s). The options are as follows:    Pain Clinic staff can mail the prescription to your pharmacy. Please tell us the name of the pharmacy.    You may pick the prescription up at the Pain Clinic (tell us the location) or during a clinic visit with your pain provider    Pain Clinic staff can deliver the prescription to the [place] pharmacy in the clinic building. Please tell us the location.      Scheduling number: [phone].  Call this number to schedule or change appointments.    We believe regular attendance is key to your success in our program.    Any time you are unable to keep your appointment we ask that you call us at least 24 hours in advance to let us know. This will allow us to offer the appointment time to another patient.               Follow-ups after your visit        Who to contact     If you have questions or need follow up information about today's clinic visit or your schedule please contact [facility] directly at [phone].  Normal or non-critical lab and imaging results will be communicated to you by HireAHelperhart, letter or phone within 4 business days after the clinic has received the results. If you do not hear from us within 7 days, please contact the clinic through Crackt or phone. If you have a critical or abnormal lab result, we will notify you by phone as soon as possible.  Submit refill requests through Avacen or call your pharmacy and they will forward the refill request to us. Please allow 3 business days for your refill to be completed.          Additional Information About Your Visit        Avacen Information     Avacen gives you secure access to your electronic health record. If you see a primary care provider, you can also send messages to your care team and make  "appointments. If you have questions, please call your primary care clinic.  If you do not have a primary care provider, please call [phone] and they will assist you.        Care EveryWhere ID     This is your Care EveryWhere ID. This could be used by other organizations to access your [facility] records  [id number]        Your Vitals Were     Pulse Height BMI (Body Mass Index)             87 1.588 m (5' 2.5\") 49.86 kg/m2          Blood Pressure from Last 3 Encounters:   06/06/17 148/76   04/10/17 128/78   03/22/17 (!) 150/92    Weight from Last 3 Encounters:   06/06/17 125.6 kg (277 lb)   04/10/17 127.1 kg (280 lb 1.6 oz)   03/22/17 125.6 kg (277 lb)              Today, you had the following     No orders found for display         Today's Medication Changes          These changes are accurate as of: 6/6/17  2:46 PM.  If you have any questions, ask your nurse or doctor.               Start taking these medicines.        Dose/Directions    diclofenac 1 % Gel topical gel   Commonly known as:  VOLTAREN   Used for:  Pain in joint, multiple sites, Chronic pain of both knees, Greater trochanteric bursitis, unspecified laterality        Apply 4 grams to knees or lateral hips up to  four times daily as needed for pain using enclosed dosing card.   Quantity:  100 g   Refills:  1         These medicines have changed or have updated prescriptions.        Dose/Directions    gabapentin 300 MG capsule   Commonly known as:  NEURONTIN   This may have changed:    - how much to take  - how to take this  - when to take this  - additional instructions   Used for:  DDD (degenerative disc disease), lumbar, Pain in joint, multiple sites, Myofascial pain        Dose:  600 mg   Take 2 capsules (600 mg) by mouth 3 times daily   Quantity:  180 capsule   Refills:  1            Where to get your medicines      [facility] [place] [address] AT [facility] "  [address] [address]     Phone:  [phone]     cyclobenzaprine 10 MG tablet    diclofenac 1 % Gel topical gel    gabapentin 300 MG capsule                Primary Care Provider Office Phone # Fax #    Jazmyne Chapman [phone]       [facility] [address] [facility] [address]        Thank you!     Thank you for choosing [facility]  for your care. Our goal is always to provide you with excellent care. Hearing back from our patients is one way we can continue to improve our services. Please take a few minutes to complete the written survey that you may receive in the mail after your visit with us. Thank you!             Your Updated Medication List - Protect others around you: Learn how to safely use, store and throw away your medicines at www.disposemymeds.org.          This list is accurate as of: 6/6/17  2:46 PM.  Always use your most recent med list.                   Brand Name Dispense Instructions for use    albuterol 108 (90 BASE) MCG/ACT Inhaler    PROAIR HFA/PROVENTIL HFA/VENTOLIN HFA    1 Inhaler    Inhale 2 puffs into the lungs every 6 hours as needed       buPROPion 150 MG 12 hr tablet    WELLBUTRIN SR    180 tablet    Take 1 tablet once daily and increase to 1 tablet twice daily after 4 to 7 days       cyclobenzaprine 10 MG tablet    FLEXERIL    20 tablet    Take 0.5-1 tablets (5-10 mg) by mouth 3 times daily as needed for muscle spasms       diclofenac 1 % Gel topical gel    VOLTAREN    100 g    Apply 4 grams to knees or lateral hips up to  four times daily as needed for pain using enclosed dosing card.       DULoxetine 60 MG EC capsule    CYMBALTA    90 capsule    Take 1 capsule (60 mg) by mouth daily       gabapentin 300 MG capsule    NEURONTIN    180 capsule    Take 2 capsules (600 mg) by mouth 3 times daily       HYDROcodone-acetaminophen 5-325 MG per tablet    NORCO    15 tablet    Take 1 tablet by mouth every 6  hours as needed for moderate to severe pain Max of 2 tabs per day, use sparingly. OK to fill and begin on 1/11/2017 and must last at least 30 days       nicotine 10 MG Inhaler    NICOTROL    180 each    Inhale 6-16 cartridge into the lungs daily as needed for smoking cessation       omeprazole 20 MG tablet     90 tablet    Take 1 tablet (20 mg) by mouth daily       order for DME     1 Device    Equipment being ordered: [employer] foot plate, 23.5 Right
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
## 2017-06-07 ENCOUNTER — MYC MEDICAL ADVICE (OUTPATIENT)
Dept: FAMILY MEDICINE | Facility: CLINIC | Age: 57
End: 2017-06-07

## 2017-07-18 ENCOUNTER — OFFICE VISIT (OUTPATIENT)
Dept: OTOLARYNGOLOGY | Facility: CLINIC | Age: 57
End: 2017-07-18
Payer: COMMERCIAL

## 2017-07-18 ENCOUNTER — OFFICE VISIT (OUTPATIENT)
Dept: AUDIOLOGY | Facility: CLINIC | Age: 57
End: 2017-07-18
Payer: COMMERCIAL

## 2017-07-18 VITALS — BODY MASS INDEX: 48.8 KG/M2 | HEIGHT: 63 IN | RESPIRATION RATE: 18 BRPM | WEIGHT: 275.4 LBS

## 2017-07-18 DIAGNOSIS — H93.13 TINNITUS OF BOTH EARS: ICD-10-CM

## 2017-07-18 DIAGNOSIS — H90.3 SENSORINEURAL HEARING LOSS, BILATERAL: Primary | ICD-10-CM

## 2017-07-18 DIAGNOSIS — H93.13 TINNITUS, BILATERAL: Primary | ICD-10-CM

## 2017-07-18 DIAGNOSIS — R27.0 ATAXIA: ICD-10-CM

## 2017-07-18 PROCEDURE — 92567 TYMPANOMETRY: CPT | Performed by: AUDIOLOGIST

## 2017-07-18 PROCEDURE — 92557 COMPREHENSIVE HEARING TEST: CPT | Performed by: AUDIOLOGIST

## 2017-07-18 PROCEDURE — 99207 ZZC NO CHARGE LOS: CPT | Performed by: AUDIOLOGIST

## 2017-07-18 PROCEDURE — 99203 OFFICE O/P NEW LOW 30 MIN: CPT | Performed by: OTOLARYNGOLOGY

## 2017-07-18 RX ORDER — ALPRAZOLAM 0.5 MG
TABLET ORAL
Qty: 4 TABLET | Refills: 0 | Status: SHIPPED | OUTPATIENT
Start: 2017-07-18 | End: 2017-12-12

## 2017-07-18 ASSESSMENT — PAIN SCALES - GENERAL: PAINLEVEL: NO PAIN (0)

## 2017-07-18 NOTE — PATIENT INSTRUCTIONS
General Scheduling Information  To schedule your CT/MRI scan, please contact [place] [facility] at [phone]   [address] [place] [address]    To schedule your Surgery, please contact our Specialty Schedulers at [phone]    ENT Clinic Locations Clinic Hours Telephone Number     [facility]  [address] [place] [address]   Tuesday:       8:00am -- 4:00pm    Wednesday:  8:00am - 4:00pm   To schedule an appointment with   Dr. Gross,   please contact our   Specialty Scheduling Department at:     [phone]       [facility]  [address] [place] [address]   Friday:          8:00am - 4:00pm         Urgent Care Locations Clinic Hours Telephone Numbers     [facility]  [address] [place] [address]     Monday-Friday:     11:00pm - 9:00pm    Saturday-Sunday:  9:00am - 5:00pm   [phone]     [facility]  [address] [place] [address]     Monday-Friday:      5:00pm - 9:00pm     Saturday-Sunday:  9:00am - 5:00pm   [phone]

## 2017-07-18 NOTE — MR AVS SNAPSHOT
After Visit Summary   7/18/2017    Jessica Jay    MRN: [id number]           Patient Information     Date Of Birth          1960        Visit Information        Provider Department      7/18/2017 2:30 PM Sarthak Gross MD [facility]        Today's Diagnoses     Tinnitus, bilateral    -  1    Ataxia          Care Instructions    General Scheduling Information  To schedule your CT/MRI scan, please contact [place] [facility] at [phone] [address] [place] [address]    To schedule your Surgery, please contact our Specialty Schedulers at [phone]    ENT Clinic Locations Clinic Hours Telephone Number     [facility]  [address]  [place] [address]   Tuesday:       8:00am -- 4:00pm    Wednesday:  8:00am - 4:00pm   To schedule an appointment with   Dr. Gross,   please contact our   Specialty Scheduling Department at:     [phone]       [facility]  [address] [place] [address]   Friday:          8:00am - 4:00pm         Urgent Care Locations Clinic Hours Telephone Numbers     [facility]  [address] [place] [address]     Monday-Friday:     11:00pm - 9:00pm    Saturday-Sunday:  9:00am - 5:00pm   [phone]     [facility]  [address] [place] [address]     Monday-Friday:      5:00pm - 9:00pm     Saturday-Sunday:  9:00am - 5:00pm   [phone]               Follow-ups after your visit        Additional Services     AUDIOLOGY ADULT REFERRAL       Your provider has referred you to: [facility]: [facility] [phone]   [email or web address]    Treatment:  Evaluation & Treatment  Specialty Testing:  Audiogram w/Tymps and Reflexes    Please be aware that coverage of these services is subject to the terms and limitations of your health insurance plan.  Call member services at your health plan with any benefit or coverage questions.      Please bring the  following to your appointment:  >>   Any x-rays, CTs or MRIs which have been performed.  Contact the facility where they were done to arrange for  prior to your scheduled appointment.   >>   List of current medications  >>   This referral request   >>   Any documents/labs given to you for this referral                  Your next 10 appointments already scheduled     Jul 22, 2017  8:15 AM CDT   MR BRAIN W/O & W CONTRAST with BEMR1   [facility] [place] ([facility] [place])    [address]  [place] [address] [phone]           Take your medicines as usual, unless your doctor tells you not to. Bring a list of your current medicines to your exam (including vitamins, minerals and over-the-counter drugs).  You will be given intravenous contrast for this exam. To prepare:   The day before your exam, drink extra fluids at least six 8-ounce glasses (unless your doctor tells you to restrict your fluids).   Have a blood test (creatinine test) within 30 days of your exam. Go to your clinic or Diagnostic Imaging Department for this test.  The MRI machine uses a strong magnet. Please wear clothes without metal (snaps, zippers). A sweatsuit works well, or we may give you a hospital gown.  Please remove any body piercings and hair extensions before you arrive. You will also remove watches, jewelry, hairpins, wallets, dentures, partial dental plates and hearing aids. You may wear contact lenses, and you may be able to wear your rings. We have a safe place to keep your personal items, but it is safer to leave them at home.   **IMPORTANT** THE INSTRUCTIONS BELOW ARE ONLY FOR THOSE PATIENTS WHO HAVE BEEN TOLD THEY WILL RECEIVE SEDATION OR GENERAL ANESTHESIA DURING THEIR MRI PROCEDURE:  IF YOU WILL RECEIVE SEDATION (take medicine to help you relax during your exam):   You must get the medicine from your doctor before you arrive. Bring the medicine to the exam. Do not take it at home.   Arrive one  hour early. Bring someone who can take you home after the test. Your medicine will make you sleepy. After the exam, you may not drive, take a bus or take a taxi by yourself.   No eating 8 hours before your exam. You may have clear liquids up until 4 hours before your exam. (Clear liquids include water, clear tea, black coffee and fruit juice without pulp.)  IF YOU WILL RECEIVE ANESTHESIA (be asleep for your exam):   Arrive 1 1/2 hours early. Bring someone who can take you home after the test. You may not drive, take a bus or take a taxi by yourself.   No eating 8 hours before your exam. You may have clear liquids up until 4 hours before your exam. (Clear liquids include water, clear tea, black coffee and fruit juice without pulp.)  Please call the Imaging Department at your exam site with any questions.              Future tests that were ordered for you today     Open Future Orders        Priority Expected Expires Ordered    MR Brain w/o & w Contrast Routine  7/18/2018 7/18/2017            Who to contact     If you have questions or need follow up information about today's clinic visit or your schedule please contact [facility] directly at [phone].  Normal or non-critical lab and imaging results will be communicated to you by MyChart, letter or phone within 4 business days after the clinic has received the results. If you do not hear from us within 7 days, please contact the clinic through [employer] or phone. If you have a critical or abnormal lab result, we will notify you by phone as soon as possible.  Submit refill requests through [employer] or call your pharmacy and they will forward the refill request to us. Please allow 3 business days for your refill to be completed.          Additional Information About Your Visit        [employer] Information     [employer] gives you secure access to your electronic health record. If you see a primary care provider, you can also send messages to your care team and  "make appointments. If you have questions, please call your primary care clinic.  If you do not have a primary care provider, please call [phone] and they will assist you.        Care EveryWhere ID     This is your Care EveryWhere ID. This could be used by other organizations to access your [place] medical records  [id number]        Your Vitals Were     Respirations Height BMI (Body Mass Index)             18 1.588 m (5' 2.5\") 49.57 kg/m2          Blood Pressure from Last 3 Encounters:   06/06/17 148/76   04/10/17 128/78   03/22/17 (!) 150/92    Weight from Last 3 Encounters:   07/18/17 124.9 kg (275 lb 6.4 oz)   06/06/17 125.6 kg (277 lb)   04/10/17 127.1 kg (280 lb 1.6 oz)              We Performed the Following     AUDIOLOGY ADULT REFERRAL          Today's Medication Changes          These changes are accurate as of: 7/18/17  4:06 PM.  If you have any questions, ask your nurse or doctor.               Start taking these medicines.        Dose/Directions    ALPRAZolam 0.5 MG tablet   Commonly known as:  XANAX   Used for:  Ataxia   Started by:  Sarthak Gross MD        Take 1 tablet by mouth 30 minutes before MRI. If this fails, take an additional tablet 30 minutes later. Do not take more than 2 tablets in 6 hours.   Quantity:  4 tablet   Refills:  0            Where to get your medicines      Some of these will need a paper prescription and others can be bought over the counter.  Ask your nurse if you have questions.     Bring a paper prescription for each of these medications     ALPRAZolam 0.5 MG tablet                Primary Care Provider Office Phone # Fax #    Jazmyne Chapman [phone]       [facility] [address] [address]        Equal Access to Services     ANAID VENEGAS : Mike Graff, trinity walter, makayla farah. So [facility] [phone].    ATENCIÓN: Si habla " español, tiene a hatfield disposición servicios gratuitos de asistencia lingüística. Sav rbandon [phone].    We comply with applicable federal civil rights laws and [place] laws. We do not discriminate on the basis of race, color, national origin, age, disability sex, sexual orientation or gender identity.            Thank you!     Thank you for choosing [facility]  for your care. Our goal is always to provide you with excellent care. Hearing back from our patients is one way we can continue to improve our services. Please take a few minutes to complete the written survey that you may receive in the mail after your visit with us. Thank you!             Your Updated Medication List - Protect others around you: Learn how to safely use, store and throw away your medicines at www.disposemymeds.org.          This list is accurate as of: 7/18/17  4:06 PM.  Always use your most recent med list.                   Brand Name Dispense Instructions for use Diagnosis    albuterol 108 (90 BASE) MCG/ACT Inhaler    PROAIR HFA/PROVENTIL HFA/VENTOLIN HFA    1 Inhaler    Inhale 2 puffs into the lungs every 6 hours as needed    Acute bronchitis, unspecified organism       ALPRAZolam 0.5 MG tablet    XANAX    4 tablet    Take 1 tablet by mouth 30 minutes before MRI. If this fails, take an additional tablet 30 minutes later. Do not take more than 2 tablets in 6 hours.    Ataxia       buPROPion 150 MG 12 hr tablet    WELLBUTRIN SR    180 tablet    Take 1 tablet once daily and increase to 1 tablet twice daily after 4 to 7 days    Moderate episode of recurrent major depressive disorder (H), Tobacco abuse       cyclobenzaprine 10 MG tablet    FLEXERIL    20 tablet    Take 0.5-1 tablets (5-10 mg) by mouth 3 times daily as needed for muscle spasms    Chronic bilateral low back pain without sciatica       diclofenac 1 % Gel topical gel    VOLTAREN    100 g    Apply 4 grams to knees or lateral hips up to  four times daily as needed  for pain using enclosed dosing card.    Pain in joint, multiple sites, Chronic pain of both knees, Greater trochanteric bursitis, unspecified laterality       DULoxetine 60 MG EC capsule    CYMBALTA    90 capsule    Take 1 capsule (60 mg) by mouth daily    Chronic bilateral low back pain without sciatica, Moderate episode of recurrent major depressive disorder (H)       gabapentin 300 MG capsule    NEURONTIN    180 capsule    Take 2 capsules (600 mg) by mouth 3 times daily    DDD (degenerative disc disease), lumbar, Pain in joint, multiple sites, Myofascial pain       HYDROcodone-acetaminophen 5-325 MG per tablet    NORCO    15 tablet    Take 1 tablet by mouth every 6 hours as needed for moderate to severe pain Max of 2 tabs per day, use sparingly. OK to fill and begin on 1/11/2017 and must last at least 30 days    DDD (degenerative disc disease), lumbar, Lumbar radicular pain, Pain in joint, multiple sites, Myofascial pain       nicotine 10 MG Inhaler    NICOTROL    180 each    Inhale 6-16 cartridge into the lungs daily as needed for smoking cessation    Tobacco abuse       omeprazole 20 MG tablet     90 tablet    Take 1 tablet (20 mg) by mouth daily    Gastroesophageal reflux disease without esophagitis       order for DME     1 Device    Equipment being ordered: [employer] foot plate, 23.5 Right    Arthritis of first metatarsophalangeal joint

## 2017-07-18 NOTE — PROGRESS NOTES
Chief Complaint - Dizziness, tinnitus    History of Present Illness - Jessica Jay is a 56 year old female who presents with dizziness, or imbalance. At times she feels off-balance. The patient denies vertigo in which the entire room spins. She has had two episodes of falling at [employer]. She doesn't pass out. She just falls, feels fine later. Her imbalance with walking has been going on for a few years. She notes when trimming bushes she fades to a side. Walking down steps is the worst. The patient denies nausea and/or vomiting. She feels ears ring some while on computer, not overly bothersome. Hearing is okay. No otalgia. Has a history of headaches. She has cataracts. 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 Recovery is okay.     Past Medical History -   Patient Active Problem List   Diagnosis     Depression, major     GERD (gastroesophageal reflux disease)     Migraines     CARDIOVASCULAR SCREENING; LDL GOAL LESS THAN 160     Disc herniation     DDD (degenerative disc disease), lumbar     DDD (degenerative disc disease), cervical     Neck pain     Headache     Dermoid cyst of brain (H)     Chronic daily headache     Dizziness - light-headed     Tobacco abuse     ELYSSA (obstructive sleep apnea)     Spondylolisthesis, grade 1     Chronic low back pain     Brain lipoma     Vulvar dermatitis     History of colonic polyps     Gastroesophageal reflux disease without esophagitis     overweight with BMI >40       Current Medications -   Current Outpatient Prescriptions:      gabapentin (NEURONTIN) 300 MG capsule, Take 2 capsules (600 mg) by mouth 3 times daily, Disp: 180 capsule, Rfl: 1     cyclobenzaprine (FLEXERIL) 10 MG tablet, Take 0.5-1 tablets (5-10 mg) by mouth 3 times daily as needed for muscle spasms, Disp: 20 tablet, Rfl: 0     diclofenac (VOLTAREN) 1 % GEL topical gel, Apply 4 grams to knees or lateral hips up to  four times daily as needed for pain using enclosed dosing card., Disp: 100 g, Rfl: 1      omeprazole 20 MG tablet, Take 1 tablet (20 mg) by mouth daily, Disp: 90 tablet, Rfl: 3     DULoxetine (CYMBALTA) 60 MG EC capsule, Take 1 capsule (60 mg) by mouth daily, Disp: 90 capsule, Rfl: 3     nicotine (NICOTROL) 10 MG Inhaler, Inhale 6-16 cartridge into the lungs daily as needed for smoking cessation, Disp: 180 each, Rfl: 3     order for DME, Equipment being ordered: [employer] foot plate, 23.5 Right, Disp: 1 Device, Rfl: 0     buPROPion (WELLBUTRIN SR) 150 MG 12 hr tablet, Take 1 tablet once daily and increase to 1 tablet twice daily after 4 to 7 days, Disp: 180 tablet, Rfl: 2     HYDROcodone-acetaminophen (NORCO) 5-325 MG per tablet, Take 1 tablet by mouth every 6 hours as needed for moderate to severe pain Max of 2 tabs per day, use sparingly. OK to fill and begin on 1/11/2017 and must last at least 30 days, Disp: 15 tablet, Rfl: 0     albuterol (PROAIR HFA, PROVENTIL HFA, VENTOLIN HFA) 108 (90 BASE) MCG/ACT inhaler, Inhale 2 puffs into the lungs every 6 hours as needed, Disp: 1 Inhaler, Rfl: 1    Allergies -   Allergies   Allergen Reactions     Buspar [Buspirone Hcl] Rash       Social History -   Social History     Social History     Marital status: Single     Spouse name: N/A     Number of children: 0     Years of education: 18     Occupational History      [employer]     Social History Main Topics     Smoking status: Current Every Day Smoker     Packs/day: 0.80     Years: 30.00     Types: Cigarettes     Last attempt to quit: 5/8/2011     Smokeless tobacco: Never Used      Comment: 4/12- 1 ppd      Alcohol use 0.0 oz/week      Comment: 6 drinks a year     Drug use: No     Sexual activity: Not Currently     Birth control/ protection: None     Other Topics Concern      Service No     Blood Transfusions No     Caffeine Concern Yes     Coffee     Occupational Exposure No     Hobby Hazards No     Sleep Concern Yes     feels she sleeps to much     Stress Concern No     Weight  "Concern Yes     cannot lose     Special Diet No     Back Care No     Exercise Yes     Walk on treadmill, swim     Seat Belt Yes     Self-Exams No     Parent/Sibling W/ Cabg, Mi Or Angioplasty Before 65f 55m? No     Social History Narrative    Process death claims       Family History -   Family History   Problem Relation Age of Onset     Hypertension Mother      C.A.D. Father      CANCER Maternal Grandmother      CEREBROVASCULAR DISEASE Paternal Grandmother      Breast Cancer Paternal Grandmother      Neurologic Disorder Sister      migraine     CANCER Brother      Cancer - colorectal Brother      Colon Cancer Brother        Review of Systems - As per HPI and PMHx, otherwise 7 system review of the head and neck negative.    Physical Exam  Resp 18  Ht 1.588 m (5' 2.5\")  Wt 124.9 kg (275 lb 6.4 oz)  BMI 49.57 kg/m2  General - The patient is in no distress.  Alert and oriented x3, answers questions and cooperates with examination appropriately.   Voice and Breathing - The patient was breathing comfortably without the use of accessory muscles. There was no wheezing, stridor, or stertor.  The patients voice was clear and strong, with no dysphonia.    Eyes - Pupils are reactive to light. Extraocular movements intact. Sclera were not icteric or injected, conjunctiva were pink and moist. No nystagmus.  Neurologic - Cranial nerves II-XII are grossly intact. Specifically, the facial nerve is intact, House-Brackmann grade 1 of 6. Finger-nose-finger is normal. Normal Romberg. Unsteady gait.   Mouth - Examination of the oral cavity showed pink, healthy oral mucosa. No lesions or ulcerations noted.  The tongue was mobile and protrudes midline.   Oropharynx - The walls of the oropharynx were smooth, pink, moist, symmetric, and had no lesions or ulcerations. The uvula was midline and the palate raised symmetrically.   Neck -  Palpation of the occipital, submental, submandibular, internal jugular chain, and supraclavicular nodes " did not demonstrate any abnormal lymph nodes or masses. The parotid glands were without masses. Palpation of the thyroid was soft and smooth, with no nodules or goiter appreciated.  The trachea was midline.  Ears - The auricles appeared normal. The external auditory canals were nonedematous and nonerythematous. The tympanic membranes are normal in appearance, bony landmarks are intact.  No retraction, perforation, or masses.  No fluid or purulence was seen in the external canal or the middle ear.     Audiologic Studies - An audiogram and tympanogram were performed today as part of the evaluation and personally reviewed. The tympanogram shows normal Type A curves, with normal canal volumes and middle ear pressures.  There is no sign of eustachian tube dysfunction or middle ear effusion.  The audiogram showed mild flat sensorineural hearing loss.     A/P - Jessica Jay is a 56 year old female with dizziness. This seems most likely unsteadiness, possibly a cerebellar issue given her prior lipoma and surgery. I'll get an MRI. If this doesn't explain her symptoms I'll consider neurology, PT, or possibly vestibular testing although I doubt this is an inner ear issue. She can repeat audiogram in 1 year, sooner if this worsens.       Sarthak Gross MD  Otolaryngology  [facility]

## 2017-07-18 NOTE — NURSING NOTE
"Chief Complaint   Patient presents with     Tinnitus       Initial Resp 18  Ht 1.588 m (5' 2.5\")  Wt 124.9 kg (275 lb 6.4 oz)  BMI 49.57 kg/m2 Estimated body mass index is 49.57 kg/(m^2) as calculated from the following:    Height as of this encounter: 1.588 m (5' 2.5\").    Weight as of this encounter: 124.9 kg (275 lb 6.4 oz).  Medication Reconciliation: complete     Maritza Tanner MA    "

## 2017-07-18 NOTE — PROGRESS NOTES
AUDIOLOGY REPORT:    Patient was referred to Audiology from ENT by Dr. Gross for a hearing examination. Patient complained of occasional ringing in both ears (30 minutes or more several times a week), decreased hearing in both ears (left worse than right), and spontaneous balance issues for the past 2-3 months. Patient reported specific instance where she all of the sudden lost her balance after descending a flight of stairs.    Testing:    Otoscopy:   Otoscopic exam indicates ears are clear of cerumen bilaterally     Tympanograms:    RIGHT: normal eardrum mobility     LEFT:   normal eardrum mobility    Thresholds:   Pure Tone Thresholds assessed using conventional audiometry with good  reliability from 250-8000 Hz bilaterally using insert earphones     RIGHT:  mild sensorineural hearing loss    LEFT:    mild sensorineural hearing loss    Speech Reception Threshold:    RIGHT: 30 dB HL    LEFT:   35 dB HL    Word Recognition Score:     RIGHT: 100% at 70 dB HL using NU-6 recorded word list.    LEFT:   100% at 75 dB HL using NU-6 recorded word list.    Discussed results with the patient.     Patient was returned to ENT for follow up.     Rubi Capps.  Licensed Audiologist #[id number]  7/18/2017

## 2017-07-18 NOTE — MR AVS SNAPSHOT
After Visit Summary   7/18/2017    Jessica Jay    MRN: [id number]           Patient Information     Date Of Birth          1960        Visit Information        Provider Department      7/18/2017 2:00 PM Juan Traylor, [facility]        Today's Diagnoses     Sensorineural hearing loss, bilateral    -  1    Tinnitus of both ears           Follow-ups after your visit        Future tests that were ordered for you today     Open Future Orders        Priority Expected Expires Ordered    MR Brain w/o & w Contrast Routine  7/18/2018 7/18/2017            Who to contact     If you have questions or need follow up information about today's clinic visit or your schedule please contact [facility] directly at [phone].  Normal or non-critical lab and imaging results will be communicated to you by Veaconhart, letter or phone within 4 business days after the clinic has received the results. If you do not hear from us within 7 days, please contact the clinic through Veaconhart or phone. If you have a critical or abnormal lab result, we will notify you by phone as soon as possible.  Submit refill requests through Avocadoâ„¢ or call your pharmacy and they will forward the refill request to us. Please allow 3 business days for your refill to be completed.          Additional Information About Your Visit        MyChart Information     Avocadoâ„¢ gives you secure access to your electronic health record. If you see a primary care provider, you can also send messages to your care team and make appointments. If you have questions, please call your primary care clinic.  If you do not have a primary care provider, please call [phone] and they will assist you.        Care EveryWhere ID     This is your Care EveryWhere ID. This could be used by other organizations to access your [place] medical records  [id number]         Blood Pressure from Last 3 Encounters:   06/06/17 148/76    04/10/17 128/78   03/22/17 (!) 150/92    Weight from Last 3 Encounters:   07/18/17 275 lb 6.4 oz (124.9 kg)   06/06/17 277 lb (125.6 kg)   04/10/17 280 lb 1.6 oz (127.1 kg)              We Performed the Following     AUDIOGRAM/TYMPANOGRAM - INTERFACE     COMPREHENSIVE HEARING TEST     TYMPANOMETRY          Today's Medication Changes          These changes are accurate as of: 7/18/17  2:47 PM.  If you have any questions, ask your nurse or doctor.               Start taking these medicines.        Dose/Directions    ALPRAZolam 0.5 MG tablet   Commonly known as:  XANAX   Used for:  Ataxia   Started by:  Sarthak Gross MD        Take 1 tablet by mouth 30 minutes before MRI. If this fails, take an additional tablet 30 minutes later. Do not take more than 2 tablets in 6 hours.   Quantity:  4 tablet   Refills:  0            Where to get your medicines      Some of these will need a paper prescription and others can be bought over the counter.  Ask your nurse if you have questions.     Bring a paper prescription for each of these medications     ALPRAZolam 0.5 MG tablet                Primary Care Provider Office Phone # Fax #    Jazmyne Chapman [phone]       [address] [address]        Equal Access to Services     ANAID VENEGAS AH: Hadsly eli hadasho Soomaali, waaxda luqadaha, qaybta kaalmada adeegyada, waxay jermainein hayenma warner. So [facility] [phone].    ATENCIÓN: Si habla español, tiene a hatfield disposición servicios gratuitos de asistencia lingüística. Llame al [phone].    We comply with applicable federal civil rights laws and [place] laws. We do not discriminate on the basis of race, color, national origin, age, disability sex, sexual orientation or gender identity.            Thank you!     Thank you for choosing [facility]  for your care. Our goal is always to provide you with excellent care. Hearing  back from our patients is one way we can continue to improve our services. Please take a few minutes to complete the written survey that you may receive in the mail after your visit with us. Thank you!             Your Updated Medication List - Protect others around you: Learn how to safely use, store and throw away your medicines at www.disposemymeds.org.          This list is accurate as of: 7/18/17  2:47 PM.  Always use your most recent med list.                   Brand Name Dispense Instructions for use Diagnosis    albuterol 108 (90 BASE) MCG/ACT Inhaler    PROAIR HFA/PROVENTIL HFA/VENTOLIN HFA    1 Inhaler    Inhale 2 puffs into the lungs every 6 hours as needed    Acute bronchitis, unspecified organism       ALPRAZolam 0.5 MG tablet    XANAX    4 tablet    Take 1 tablet by mouth 30 minutes before MRI. If this fails, take an additional tablet 30 minutes later. Do not take more than 2 tablets in 6 hours.    Ataxia       buPROPion 150 MG 12 hr tablet    WELLBUTRIN SR    180 tablet    Take 1 tablet once daily and increase to 1 tablet twice daily after 4 to 7 days    Moderate episode of recurrent major depressive disorder (H), Tobacco abuse       cyclobenzaprine 10 MG tablet    FLEXERIL    20 tablet    Take 0.5-1 tablets (5-10 mg) by mouth 3 times daily as needed for muscle spasms    Chronic bilateral low back pain without sciatica       diclofenac 1 % Gel topical gel    VOLTAREN    100 g    Apply 4 grams to knees or lateral hips up to  four times daily as needed for pain using enclosed dosing card.    Pain in joint, multiple sites, Chronic pain of both knees, Greater trochanteric bursitis, unspecified laterality       DULoxetine 60 MG EC capsule    CYMBALTA    90 capsule    Take 1 capsule (60 mg) by mouth daily    Chronic bilateral low back pain without sciatica, Moderate episode of recurrent major depressive disorder (H)       gabapentin 300 MG capsule    NEURONTIN    180 capsule    Take 2 capsules (600 mg) by  mouth 3 times daily    DDD (degenerative disc disease), lumbar, Pain in joint, multiple sites, Myofascial pain       HYDROcodone-acetaminophen 5-325 MG per tablet    NORCO    15 tablet    Take 1 tablet by mouth every 6 hours as needed for moderate to severe pain Max of 2 tabs per day, use sparingly. OK to fill and begin on 1/11/2017 and must last at least 30 days    DDD (degenerative disc disease), lumbar, Lumbar radicular pain, Pain in joint, multiple sites, Myofascial pain       nicotine 10 MG Inhaler    NICOTROL    180 each    Inhale 6-16 cartridge into the lungs daily as needed for smoking cessation    Tobacco abuse       omeprazole 20 MG tablet     90 tablet    Take 1 tablet (20 mg) by mouth daily    Gastroesophageal reflux disease without esophagitis       order for DME     1 Device    Equipment being ordered: nWayaflex foot plate, 23.5 Right    Arthritis of first metatarsophalangeal joint

## 2017-07-22 ENCOUNTER — RADIANT APPOINTMENT (OUTPATIENT)
Dept: MRI IMAGING | Facility: CLINIC | Age: 57
End: 2017-07-22
Attending: OTOLARYNGOLOGY
Payer: COMMERCIAL

## 2017-07-22 DIAGNOSIS — R27.0 ATAXIA: ICD-10-CM

## 2017-07-22 PROCEDURE — 70553 MRI BRAIN STEM W/O & W/DYE: CPT | Mod: TC

## 2017-07-22 PROCEDURE — A9585 GADOBUTROL INJECTION: HCPCS | Mod: JW | Performed by: OTOLARYNGOLOGY

## 2017-07-22 RX ORDER — SODIUM CHLORIDE 9 MG/ML
INJECTION, SOLUTION INTRAVENOUS ONCE
Status: DISCONTINUED | OUTPATIENT
Start: 2017-07-22 | End: 2017-07-22

## 2017-07-22 RX ORDER — GADOBUTROL 604.72 MG/ML
15 INJECTION INTRAVENOUS ONCE
Status: COMPLETED | OUTPATIENT
Start: 2017-07-22 | End: 2017-07-22

## 2017-07-22 RX ADMIN — GADOBUTROL 15 ML: 604.72 INJECTION INTRAVENOUS at 09:23

## 2017-07-24 ENCOUNTER — MYC MEDICAL ADVICE (OUTPATIENT)
Dept: FAMILY MEDICINE | Facility: CLINIC | Age: 57
End: 2017-07-24

## 2017-07-24 ENCOUNTER — OFFICE VISIT (OUTPATIENT)
Dept: FAMILY MEDICINE | Facility: CLINIC | Age: 57
End: 2017-07-24
Payer: COMMERCIAL

## 2017-07-24 VITALS
TEMPERATURE: 98.4 F | BODY MASS INDEX: 49.28 KG/M2 | SYSTOLIC BLOOD PRESSURE: 138 MMHG | DIASTOLIC BLOOD PRESSURE: 88 MMHG | WEIGHT: 273.8 LBS | HEART RATE: 80 BPM

## 2017-07-24 DIAGNOSIS — Z13.6 CARDIOVASCULAR SCREENING; LDL GOAL LESS THAN 160: ICD-10-CM

## 2017-07-24 DIAGNOSIS — I49.8 VENTRICULAR BIGEMINY: ICD-10-CM

## 2017-07-24 DIAGNOSIS — R00.2 PALPITATIONS: ICD-10-CM

## 2017-07-24 DIAGNOSIS — Z09 HOSPITAL DISCHARGE FOLLOW-UP: Primary | ICD-10-CM

## 2017-07-24 PROCEDURE — 99495 TRANSJ CARE MGMT MOD F2F 14D: CPT | Performed by: FAMILY MEDICINE

## 2017-07-24 PROCEDURE — 80061 LIPID PANEL: CPT | Performed by: FAMILY MEDICINE

## 2017-07-24 PROCEDURE — 84443 ASSAY THYROID STIM HORMONE: CPT | Performed by: FAMILY MEDICINE

## 2017-07-24 PROCEDURE — 36415 COLL VENOUS BLD VENIPUNCTURE: CPT | Performed by: FAMILY MEDICINE

## 2017-07-24 ASSESSMENT — PAIN SCALES - GENERAL: PAINLEVEL: EXTREME PAIN (8)

## 2017-07-24 NOTE — TELEPHONE ENCOUNTER
Called and left message and sent mychart informing that we can see her tonight at 6 pm. Will wait to hear back from her to schedule.  Casi Brunson RN

## 2017-07-24 NOTE — PROGRESS NOTES
SUBJECTIVE:                                                    Jessica Jya is a 56 year old female who presents to clinic today for the following health issues:      Hospital Follow-up Visit:    Hospital/Nursing Home/IP Rehab Facility: [facility]  Date of Admission: Friday 21 July 2017  Date of Discharge: Same day  Reason(s) for Admission: Palpitations; PVC Bigeminy            Problems taking medications regularly:  None       Medication changes since discharge: None       Problems adhering to non-medication therapy:  None    Summary of hospitalization:  CareEverywhere information obtained and reviewed  Diagnostic Tests/Treatments reviewed.  Follow up needed: none  Other Healthcare Providers Involved in Patient s Care:         None  Update since discharge: stable.     Post Discharge Medication Reconciliation: discharge medications reconciled, continue medications without change.  Plan of care communicated with patient     Coding guidelines for this visit:  Type of Medical   Decision Making Face-to-Face Visit       within 7 Days of discharge Face-to-Face Visit        within 14 days of discharge   Moderate Complexity 74144 52228   High Complexity 65054 69422          [facility]  Emergency Department Attending Supervision Note    Assessment:  56 yr woman hx anxiety, now with palpitations. No other symptoms. EMS report ventricular trigeminy.    Exam:  Anxious, otherwise reassuring.    Plan:  Labs  trop  EKG    Course/MDM:  Other than ventricular ectopy, unremarkable workup. Discussed with Dr Dale (Cardiology), who advised outpt f/u with Cardiology adequate. Unlikely related to ACS. Recommended against beta blocker at this time. Discharged home.             Problem list and histories reviewed & adjusted, as indicated.  Additional history: as documented    Patient Active Problem List   Diagnosis     Depression, major     GERD (gastroesophageal reflux disease)     Migraines     CARDIOVASCULAR  SCREENING; LDL GOAL LESS THAN 160     Disc herniation     DDD (degenerative disc disease), lumbar     DDD (degenerative disc disease), cervical     Neck pain     Headache     Dermoid cyst of brain (H)     Chronic daily headache     Dizziness - light-headed     Tobacco abuse     ELYSSA (obstructive sleep apnea)     Spondylolisthesis, grade 1     Chronic low back pain     Brain lipoma     Vulvar dermatitis     History of colonic polyps     Gastroesophageal reflux disease without esophagitis     overweight with BMI >40     Past Surgical History:   Procedure Laterality Date     ARTHROSCOPY SHOULDER  1990    Right/spurs     C FOOT/TOES SURGERY PROC UNLISTED  1975    Toe fracture, left     CARPAL TUNNEL RELEASE RT/LT  1990    Left     COLONOSCOPY  2016     CRANIOTOMY, EXCISE TUMOR COMPLEX, COMBINED  5/9/2011    Procedure:COMBINED CRANIOTOMY, EXCISE TUMOR COMPLEX; 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 Father      CANCER Maternal Grandmother      CEREBROVASCULAR DISEASE Paternal Grandmother      Breast Cancer Paternal Grandmother      Neurologic Disorder Sister      migraine     CANCER Brother      Cancer - colorectal Brother      Colon Cancer Brother          Current Outpatient Prescriptions   Medication Sig Dispense Refill     ALPRAZolam (XANAX) 0.5 MG tablet Take 1 tablet by mouth 30 minutes before MRI. If this fails, take an additional tablet 30 minutes later. Do not take more than 2 tablets in 6 hours. 4 tablet 0     gabapentin  (NEURONTIN) 300 MG capsule Take 2 capsules (600 mg) by mouth 3 times daily 180 capsule 1     cyclobenzaprine (FLEXERIL) 10 MG tablet Take 0.5-1 tablets (5-10 mg) by mouth 3 times daily as needed for muscle spasms 20 tablet 0     diclofenac (VOLTAREN) 1 % GEL topical gel Apply 4 grams to knees or lateral hips up to  four times daily as needed for pain using enclosed dosing card. 100 g 1     omeprazole 20 MG tablet Take 1 tablet (20 mg) by mouth daily 90 tablet 3     DULoxetine (CYMBALTA) 60 MG EC capsule Take 1 capsule (60 mg) by mouth daily 90 capsule 3     nicotine (NICOTROL) 10 MG Inhaler Inhale 6-16 cartridge into the lungs daily as needed for smoking cessation 180 each 3     order for DME Equipment being ordered: SQZ Biotechaflex foot plate, 23.5 Right 1 Device 0     buPROPion (WELLBUTRIN SR) 150 MG 12 hr tablet Take 1 tablet once daily and increase to 1 tablet twice daily after 4 to 7 days 180 tablet 2     HYDROcodone-acetaminophen (NORCO) 5-325 MG per tablet Take 1 tablet by mouth every 6 hours as needed for moderate to severe pain Max of 2 tabs per day, use sparingly. OK to fill and begin on 1/11/2017 and must last at least 30 days 15 tablet 0     albuterol (PROAIR HFA, PROVENTIL HFA, VENTOLIN HFA) 108 (90 BASE) MCG/ACT inhaler Inhale 2 puffs into the lungs every 6 hours as needed 1 Inhaler 1     Allergies   Allergen Reactions     Buspar [Buspirone Hcl] Rash     Recent Labs   Lab Test  05/06/16   0856  09/04/12   1307  06/28/12   1157  08/22/11   1035   02/01/11   0733  05/20/10   1329   LDL  131*   --   142*   --    --   120   --    HDL  55   --   52   --    --   64   --    TRIG  115   --   124   --    --   97   --    ALT   --   39   --   36   --    --    --    CR   --   0.77  0.82  0.77   < >   --    --    GFRESTIMATED   --   79  74  79   < >   --    --    GFRESTBLACK   --   >90  89  >90   < >   --    --    POTASSIUM   --   4.6  4.3  4.5   < >   --    --    TSH   --    --    --   2.62   --    --   2.55    < > =  values in this interval not displayed.      BP Readings from Last 3 Encounters:   07/24/17 138/88   06/06/17 148/76   04/10/17 128/78    Wt Readings from Last 3 Encounters:   07/24/17 124.2 kg (273 lb 12.8 oz)   07/18/17 124.9 kg (275 lb 6.4 oz)   06/06/17 125.6 kg (277 lb)           Labs reviewed in EPIC      ROS:  Had similar symptoms today of palpitations. No shortness of breath, no chest pain, no abd pain  Constitutional, HEENT, cardiovascular, pulmonary, gi and gu systems are negative, except as otherwise noted.      OBJECTIVE:   /88 (BP Location: Right arm, Patient Position: Sitting, Cuff Size: Adult Large)  Pulse 80  Temp 98.4  F (36.9  C) (Oral)  Wt 124.2 kg (273 lb 12.8 oz)  Breastfeeding? No  BMI 49.28 kg/m2  Body mass index is 49.28 kg/(m^2).  GENERAL: healthy, alert and no distress  GENERAL: morbidly obese  NECK: no adenopathy, no asymmetry, masses, or scars and thyroid normal to palpation  RESP: lungs clear to auscultation - no rales, rhonchi or wheezes  CV: regular rate and rhythm, normal S1 S2, no S3 or S4, no murmur, click or rub, no peripheral edema and peripheral pulses strong  MS: no gross musculoskeletal defects noted, no edema  PSYCH: mentation appears normal, affect normal/bright        ASSESSMENT/PLAN:             1. Hospital discharge follow-up  Reviewed care everywhere discharge notes    2. Palpitations  She doesn't want to see cardiology at this time.  Did agree to ziopatch  - TSH with free T4 reflex  - Zio Patch Holter; Future    3. Ventricular bigeminy  Per discharge notes, will get ziopatch  - Zio Patch Holter; Future    4. CARDIOVASCULAR SCREENING; LDL GOAL LESS THAN 160    - Lipid panel reflex to direct LDL    FUTURE APPOINTMENTS:       - Follow-up visit pending results.  I've recommended cardiology consult, she isn't ready to do this.    Jazmyne Chapman MD  [facility]    Can be hear by 5 mayela

## 2017-07-24 NOTE — NURSING NOTE
"Chief Complaint   Patient presents with     Hospital F/U       Initial /88 (BP Location: Right arm, Patient Position: Sitting, Cuff Size: Adult Large)  Pulse 80  Temp 98.4  F (36.9  C) (Oral)  Wt 273 lb 12.8 oz (124.2 kg)  Breastfeeding? No  BMI 49.28 kg/m2 Estimated body mass index is 49.28 kg/(m^2) as calculated from the following:    Height as of 7/18/17: 5' 2.5\" (1.588 m).    Weight as of this encounter: 273 lb 12.8 oz (124.2 kg).  Medication Reconciliation: complete  Mary Anne Vogel Medical Assistant  "

## 2017-07-24 NOTE — MR AVS SNAPSHOT
After Visit Summary   7/24/2017    Jessica Jay    MRN: [id number]           Patient Information     Date Of Birth          1960        Visit Information        Provider Department      7/24/2017 6:00 PM Jazmyne Chapman MD [facility]        Today's Diagnoses     Hospital discharge follow-up    -  1    Palpitations        Ventricular bigeminy        CARDIOVASCULAR SCREENING; LDL GOAL LESS THAN 160           Follow-ups after your visit        Your next 10 appointments already scheduled     Jul 26, 2017  4:00 PM CDT   Nurse Only with [facility]   [facility]    [address] [place] [id number] [phone]              Future tests that were ordered for you today     Open Future Orders        Priority Expected Expires Ordered    Zio Patch Holter Routine  9/7/2017 7/24/2017            Who to contact     If you have questions or need follow up information about today's clinic visit or your schedule please contact [facility] directly at [phone].  Normal or non-critical lab and imaging results will be communicated to you by Amgenhart, letter or phone within 4 business days after the clinic has received the results. If you do not hear from us within 7 days, please contact the clinic through Bestcaket or phone. If you have a critical or abnormal lab result, we will notify you by phone as soon as possible.  Submit refill requests through Impraise or call your pharmacy and they will forward the refill request to us. Please allow 3 business days for your refill to be completed.          Additional Information About Your Visit        Amgenhart Information     Impraise gives you secure access to your electronic health record. If you see a primary care provider, you can also send messages to your care team and make appointments. If you have questions, please call your primary care clinic.  If you do  not have a primary care provider, please call [phone] and they will assist you.        Care EveryWhere ID     This is your Care EveryWhere ID. This could be used by other organizations to access your [place] medical records  [id number]        Your Vitals Were     Pulse Temperature Breastfeeding? BMI (Body Mass Index)          80 98.4  F (36.9  C) (Oral) No 49.28 kg/m2         Blood Pressure from Last 3 Encounters:   07/24/17 138/88   06/06/17 148/76   04/10/17 128/78    Weight from Last 3 Encounters:   07/24/17 273 lb 12.8 oz (124.2 kg)   07/18/17 275 lb 6.4 oz (124.9 kg)   06/06/17 277 lb (125.6 kg)              We Performed the Following     Lipid panel reflex to direct LDL     TSH with free T4 reflex        Primary Care Provider Office Phone # Fax #    Jazmyne Chapman [phone]       [address] [address]        Equal Access to Services     ANAID VENEGAS : Hadii aad ku hadasho Soomaali, waaxda luqadaha, qaybta kaalmada adeegyada, waxay cuco hayenma barrera . So [facility] [phone].    ATENCIÓN: Si habla español, tiene a hatfield disposición servicios gratuitos de asistencia lingüística. Llame al [phone].    We comply with applicable federal civil rights laws and [place] laws. We do not discriminate on the basis of race, color, national origin, age, disability sex, sexual orientation or gender identity.            Thank you!     Thank you for choosing [facility]  for your care. Our goal is always to provide you with excellent care. Hearing back from our patients is one way we can continue to improve our services. Please take a few minutes to complete the written survey that you may receive in the mail after your visit with us. Thank you!             Your Updated Medication List - Protect others around you: Learn how to safely use, store and throw away your medicines at www.disposemymeds.org.           This list is accurate as of: 7/24/17 11:59 PM.  Always use your most recent med list.                   Brand Name Dispense Instructions for use Diagnosis    albuterol 108 (90 BASE) MCG/ACT Inhaler    PROAIR HFA/PROVENTIL HFA/VENTOLIN HFA    1 Inhaler    Inhale 2 puffs into the lungs every 6 hours as needed    Acute bronchitis, unspecified organism       ALPRAZolam 0.5 MG tablet    XANAX    4 tablet    Take 1 tablet by mouth 30 minutes before MRI. If this fails, take an additional tablet 30 minutes later. Do not take more than 2 tablets in 6 hours.    Ataxia       buPROPion 150 MG 12 hr tablet    WELLBUTRIN SR    180 tablet    Take 1 tablet once daily and increase to 1 tablet twice daily after 4 to 7 days    Moderate episode of recurrent major depressive disorder (H), Tobacco abuse       cyclobenzaprine 10 MG tablet    FLEXERIL    20 tablet    Take 0.5-1 tablets (5-10 mg) by mouth 3 times daily as needed for muscle spasms    Chronic bilateral low back pain without sciatica       diclofenac 1 % Gel topical gel    VOLTAREN    100 g    Apply 4 grams to knees or lateral hips up to  four times daily as needed for pain using enclosed dosing card.    Pain in joint, multiple sites, Chronic pain of both knees, Greater trochanteric bursitis, unspecified laterality       DULoxetine 60 MG EC capsule    CYMBALTA    90 capsule    Take 1 capsule (60 mg) by mouth daily    Chronic bilateral low back pain without sciatica, Moderate episode of recurrent major depressive disorder (H)       gabapentin 300 MG capsule    NEURONTIN    180 capsule    Take 2 capsules (600 mg) by mouth 3 times daily    DDD (degenerative disc disease), lumbar, Pain in joint, multiple sites, Myofascial pain       HYDROcodone-acetaminophen 5-325 MG per tablet    NORCO    15 tablet    Take 1 tablet by mouth every 6 hours as needed for moderate to severe pain Max of 2 tabs per day, use sparingly. OK to fill and begin on 1/11/2017 and must last at least 30 days     DDD (degenerative disc disease), lumbar, Lumbar radicular pain, Pain in joint, multiple sites, Myofascial pain       nicotine 10 MG Inhaler    NICOTROL    180 each    Inhale 6-16 cartridge into the lungs daily as needed for smoking cessation    Tobacco abuse       omeprazole 20 MG tablet     90 tablet    Take 1 tablet (20 mg) by mouth daily    Gastroesophageal reflux disease without esophagitis       order for DME     1 Device    Equipment being ordered: Smartmarketaflex foot plate, 23.5 Right    Arthritis of first metatarsophalangeal joint

## 2017-07-24 NOTE — TELEPHONE ENCOUNTER
I can see her tonight for hospital follow up at 6:00 pm, 40 minute appointment.  Jazmyne Chapman MD

## 2017-07-25 ENCOUNTER — TELEPHONE (OUTPATIENT)
Dept: OTOLARYNGOLOGY | Facility: CLINIC | Age: 57
End: 2017-07-25

## 2017-07-25 ENCOUNTER — MYC MEDICAL ADVICE (OUTPATIENT)
Dept: FAMILY MEDICINE | Facility: CLINIC | Age: 57
End: 2017-07-25

## 2017-07-25 DIAGNOSIS — R27.0 ATAXIA: Primary | ICD-10-CM

## 2017-07-25 LAB
CHOLEST SERPL-MCNC: 199 MG/DL
HDLC SERPL-MCNC: 50 MG/DL
LDLC SERPL CALC-MCNC: 125 MG/DL
NONHDLC SERPL-MCNC: 149 MG/DL
TRIGL SERPL-MCNC: 121 MG/DL
TSH SERPL DL<=0.005 MIU/L-ACNC: 3.22 MU/L (ref 0.4–4)

## 2017-07-25 NOTE — TELEPHONE ENCOUNTER
Not sure who patient spoke with- no messages added to phone encounter. [facility]ics[facility] message sent to patient- if patient can make it here tomorrow Wed 7/26 by 5pm- I can add her to the schedule since I work until 5:30pm. If not- we will have to schedule it for another time since there is no extra staff for the evenings. Will wait for patient to respond back.    Vickie Vogel MA

## 2017-07-25 NOTE — TELEPHONE ENCOUNTER
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 She still has imbalance/ataxia. I'll have her see neurology. We can also consider vestibular testing if neurology cannot find a cause.

## 2017-07-25 NOTE — TELEPHONE ENCOUNTER
MA-please contact patient to schedule appt to set up zio patch placement.  I have signed order.  She can come at the end of her work day.      Jazmyne Chapman MD

## 2017-07-25 NOTE — TELEPHONE ENCOUNTER
Reason for Call:  Request for results:    Name of test or procedure: MRI    Date of test of procedure: Rivas    Location of the test or procedure: July 22nd.    OK to leave the result message on voice mail or with a family member? YES    Phone number Patient can be reached at:  Cell number on file:    Telephone Information:   Mobile [phone]       Additional comments: any    Call taken on 7/25/2017 at 9:29 AM by Renetta Noriega

## 2017-07-25 NOTE — TELEPHONE ENCOUNTER
"Tried calling patient- but cell # listed is for \"Emmanuel\" . Tried calling home # but line picked up and then disconnected. Social[employer] message sent. Will try again later ifr patient doesn't respond.    Vickie Vogel MA      "

## 2017-07-26 ENCOUNTER — ALLIED HEALTH/NURSE VISIT (OUTPATIENT)
Dept: NURSING | Facility: CLINIC | Age: 57
End: 2017-07-26
Payer: COMMERCIAL

## 2017-07-26 DIAGNOSIS — I49.8 VENTRICULAR BIGEMINY: ICD-10-CM

## 2017-07-26 DIAGNOSIS — R00.2 PALPITATIONS: ICD-10-CM

## 2017-07-26 PROCEDURE — 0298T ZZC EXT ECG > 48HR TO 21 DAY REVIEW AND INTERPRETATN: CPT | Performed by: INTERNAL MEDICINE

## 2017-07-26 NOTE — PROGRESS NOTES
Zio Patch and/or Holter monitor placed on 7/26/17. Patient instructed in use, questions answered. Instructed the patient to remove and mail the device via the pre-addressed and pre-postage box/envelope after 14/days (per provider instruction).    Vickie Vogel MA

## 2017-07-26 NOTE — MR AVS SNAPSHOT
After Visit Summary   7/26/2017    Jessica Jay    MRN: [id number]           Patient Information     Date Of Birth          1960        Visit Information        Provider Department      7/26/2017 4:00 PM NE ANCILLARY [facility]        Today's Diagnoses     Palpitations        Ventricular bigeminy           Follow-ups after your visit        Who to contact     If you have questions or need follow up information about today's clinic visit or your schedule please contact [facility] directly at [phone].  Normal or non-critical lab and imaging results will be communicated to you by MyChart, letter or phone within 4 business days after the clinic has received the results. If you do not hear from us within 7 days, please contact the clinic through [employer] or phone. If you have a critical or abnormal lab result, we will notify you by phone as soon as possible.  Submit refill requests through Etacts or call your pharmacy and they will forward the refill request to us. Please allow 3 business days for your refill to be completed.          Additional Information About Your Visit        MyChart Information     Etacts gives you secure access to your electronic health record. If you see a primary care provider, you can also send messages to your care team and make appointments. If you have questions, please call your primary care clinic.  If you do not have a primary care provider, please call [phone] and they will assist you.        Care EveryWhere ID     This is your Care EveryWhere ID. This could be used by other organizations to access your [place] medical records  [id number]         Blood Pressure from Last 3 Encounters:   07/24/17 138/88   06/06/17 148/76   04/10/17 128/78    Weight from Last 3 Encounters:   07/24/17 273 lb 12.8 oz (124.2 kg)   07/18/17 275 lb 6.4 oz (124.9 kg)   06/06/17 277 lb (125.6 kg)              We Performed the Following      Zio Patch Holter        Primary Care Provider Office Phone # Fax #    Jazmyne Chapman [phone]       [facility] [address] [address]        Equal Access to Services     ANAID VENEGAS : Hadii danielle ku hadjeno Soomaali, waaxda luqadaha, qaybta kaalmada adeegyada, waxdeandra idiin noeln ademukesh shore holly warner. So [facility] [phone].    ATENCIÓN: Si habla español, tiene a hatfield disposición servicios gratuitos de asistencia lingüística. Llame al [phone].    We comply with applicable federal civil rights laws and [place] laws. We do not discriminate on the basis of race, color, national origin, age, disability sex, sexual orientation or gender identity.            Thank you!     Thank you for choosing [facility]  for your care. Our goal is always to provide you with excellent care. Hearing back from our patients is one way we can continue to improve our services. Please take a few minutes to complete the written survey that you may receive in the mail after your visit with us. Thank you!             Your Updated Medication List - Protect others around you: Learn how to safely use, store and throw away your medicines at www.disposemymeds.org.          This list is accurate as of: 7/26/17  5:17 PM.  Always use your most recent med list.                   Brand Name Dispense Instructions for use Diagnosis    albuterol 108 (90 BASE) MCG/ACT Inhaler    PROAIR HFA/PROVENTIL HFA/VENTOLIN HFA    1 Inhaler    Inhale 2 puffs into the lungs every 6 hours as needed    Acute bronchitis, unspecified organism       ALPRAZolam 0.5 MG tablet    XANAX    4 tablet    Take 1 tablet by mouth 30 minutes before MRI. If this fails, take an additional tablet 30 minutes later. Do not take more than 2 tablets in 6 hours.    Ataxia       buPROPion 150 MG 12 hr tablet    WELLBUTRIN SR    180 tablet    Take 1 tablet once daily and increase to 1 tablet twice daily after 4  to 7 days    Moderate episode of recurrent major depressive disorder (H), Tobacco abuse       cyclobenzaprine 10 MG tablet    FLEXERIL    20 tablet    Take 0.5-1 tablets (5-10 mg) by mouth 3 times daily as needed for muscle spasms    Chronic bilateral low back pain without sciatica       diclofenac 1 % Gel topical gel    VOLTAREN    100 g    Apply 4 grams to knees or lateral hips up to  four times daily as needed for pain using enclosed dosing card.    Pain in joint, multiple sites, Chronic pain of both knees, Greater trochanteric bursitis, unspecified laterality       DULoxetine 60 MG EC capsule    CYMBALTA    90 capsule    Take 1 capsule (60 mg) by mouth daily    Chronic bilateral low back pain without sciatica, Moderate episode of recurrent major depressive disorder (H)       gabapentin 300 MG capsule    NEURONTIN    180 capsule    Take 2 capsules (600 mg) by mouth 3 times daily    DDD (degenerative disc disease), lumbar, Pain in joint, multiple sites, Myofascial pain       HYDROcodone-acetaminophen 5-325 MG per tablet    NORCO    15 tablet    Take 1 tablet by mouth every 6 hours as needed for moderate to severe pain Max of 2 tabs per day, use sparingly. OK to fill and begin on 1/11/2017 and must last at least 30 days    DDD (degenerative disc disease), lumbar, Lumbar radicular pain, Pain in joint, multiple sites, Myofascial pain       nicotine 10 MG Inhaler    NICOTROL    180 each    Inhale 6-16 cartridge into the lungs daily as needed for smoking cessation    Tobacco abuse       omeprazole 20 MG tablet     90 tablet    Take 1 tablet (20 mg) by mouth daily    Gastroesophageal reflux disease without esophagitis       order for DME     1 Device    Equipment being ordered: Dynaflex foot plate, 23.5 Right    Arthritis of first metatarsophalangeal joint

## 2017-07-26 NOTE — TELEPHONE ENCOUNTER
Patient placed on the ancillary scheduled for today 7/26/17- She will be arriving around 5pm for Ziopatch placement. Patient has been registered through i[employer] and should only need placement.     Vickie Vogel MA

## 2017-07-27 ENCOUNTER — MYC MEDICAL ADVICE (OUTPATIENT)
Dept: FAMILY MEDICINE | Facility: CLINIC | Age: 57
End: 2017-07-27

## 2017-07-27 NOTE — TELEPHONE ENCOUNTER
Patient arrived for appointment.  Encounter can be closed.    Gemma SWEET, Certified Medical Assistant (AAMA)July 27, 2017 1:36 PM

## 2017-08-03 ENCOUNTER — MYC MEDICAL ADVICE (OUTPATIENT)
Dept: PALLIATIVE MEDICINE | Facility: CLINIC | Age: 57
End: 2017-08-03

## 2017-08-03 ENCOUNTER — MYC MEDICAL ADVICE (OUTPATIENT)
Dept: FAMILY MEDICINE | Facility: CLINIC | Age: 57
End: 2017-08-03

## 2017-08-03 NOTE — TELEPHONE ENCOUNTER
Reviewed 6/6 note & pain team has been managing her gabapentin. It appears patient has already sent a message today to Aundrea Sanchez CNP, close.   Cesia Peters RN

## 2017-08-04 NOTE — TELEPHONE ENCOUNTER
Called pt. ELIDA that I wanted to gather a bit more information in response to her message. Let her know that I would send her a [address] message with my questions.    RES[employer] message sent to pt:    Lisette Gemma,     If you have been taking gabapentin for a long time, the recent balance issues may not be related to that medication.  The weight gain, however, certainly may be.  What dose are you currently taking of the gabapentin? Your chart states that you are taking two 300 mg caps 3 times daily.  If that is the case, do NOT stop taking all of the medication at once. Aundrea will need to provide instructions for how to decrease the dose safely so that you tolerate the change better.     Also, congratulations on the weight loss!    I look forward to hearing back from you,     MAGGY Solis, RN-BC  Patient Care Supervisor/Care Coordinator  [facility]

## 2017-08-04 NOTE — TELEPHONE ENCOUNTER
My chart message from pt:    I have been losing my balance a lot lately.  I am wondering if the gabapentin is the cause of the imbalance.  Also am wondering about stopping that medication all together.  I had gained 22 pounds since starting the medication and now have finally lost 10 of those 22 pounds.  Losing weight is more important than fixing the back right now.  If I had to choose one of the 2, pills lose.       Sending to provider to address.     Aye Huggins RN, [facility]  Pain Clinic Care Coordinator

## 2017-08-08 NOTE — TELEPHONE ENCOUNTER
Since the balance issues seem to be new, I would recommend that she see her Primary Care Provider for initial work up. I do not believe that it is likely the gabapentin as she has been on it for awhile.   Gabapentin can contribute to weight gain, and she certainly could begin to taper this medication. She can reduce the gabapentin by one 300mg capsule per day every 5-7 days until off.     Aundrea SCOTT RN CNP, FNP  [facility] [place]

## 2017-08-09 NOTE — TELEPHONE ENCOUNTER
"Gemma here is Aundrea's response:    \"Since the balance issues seem to be new, I would recommend that she see her Primary Care Provider for initial work up. I do not believe that it is likely the gabapentin as she has been on it for awhile.   Gabapentin can contribute to weight gain, and she certainly could begin to taper this medication. She can reduce the gabapentin by one 300mg capsule per day every 5-7 days until off.      Aundrea SCOTT RN CNP, FNP  [facility]\"  "

## 2017-08-20 ENCOUNTER — MYC MEDICAL ADVICE (OUTPATIENT)
Dept: PALLIATIVE MEDICINE | Facility: CLINIC | Age: 57
End: 2017-08-20

## 2017-08-21 NOTE — TELEPHONE ENCOUNTER
Per patient Denzelhart message:  From: Jessica Jay      Created: 8/20/2017 11:18 AM      I am hoping that you will be kind here.  The  15 pain't pills you provided me in January are close to coming to an end.   Out of 15 pills I only have 4 left. I had to take one this morning.   I will need at least 1 on September 24th probably 2, worst,case 3 that Sunday.  May I get 30 more?        Routed to the nursing pool to triage.    Elsie Lester, RN-BSN  [facility]

## 2017-08-22 ENCOUNTER — MYC MEDICAL ADVICE (OUTPATIENT)
Dept: FAMILY MEDICINE | Facility: CLINIC | Age: 57
End: 2017-08-22

## 2017-08-22 NOTE — TELEPHONE ENCOUNTER
7/26 IRhythm results say 11 beats of SVT, bigeminy & trigeminy & I am unable decipher the handwritten interpretation. Route to decide on urgency or if it is okay to wait for PCP next Monday.    Result note says her cholesterol is improving, keep up the good work. I can review good food choices if she wants & encourage her to manage her stress levels.  Cesia Peters RN

## 2017-08-22 NOTE — TELEPHONE ENCOUNTER
She is not contracted w/ anyone and there is no urine drug screen.    Aundrea Sanchez NP did prescribe #15 tabs norco for her on 1/11/17.    MPMP:  Last filled 1/11/17 for 15 tabs.  No other opioid refills.     Last office visit:  6/6/17  Future appointment:      Aundrea Sanchez NP is out of the office this week.  Unlikely that a covering provider would prescribe but will send to provider pool.  Mychart sent to pt:  From: Elsie Lester RN        Created: 8/22/2017 3:33 PM       Mario Lopez.  Aundrea Sanchez NP is out of the office until Tuesday can this wait until then?  I am doubtful that a covering provider here would prescribe b/c we don't have a urine drug screen on file for you and there is no opioid contract.  If you want I'd be happy to send it on to them to see if they would be open to this.  You are on a very little amount.    Also please call the appointment line and schedule a clinic appointment with Aundrea as she books out.      Let me know what you would like to do        Will await pt response back.    Elsie Lester RN-BSN  [facility]

## 2017-08-22 NOTE — TELEPHONE ENCOUNTER
Route about Ziopatch results. Relayed below message. Before the patch was placed her fast heart beat lasted for about 6 hours & she was at [employer] all day, but now patient states the episodes don't happen very often & she is leary to start a medication before talking to PCP.     She is trying to eat more salads to improve her cholesterol & we reviewed the list of other good foods. She is also working on managing her job stress better as well.    Seen 7/24.

## 2017-08-22 NOTE — TELEPHONE ENCOUNTER
Reviewed  Overall the heart was normal rhythm.  She did have 3 episodes of short lived SVT - longest was 11 seconds - (fast heart beat caused by an excitable heart). These are somewhat common.   They can lead to low blood pressure if they last a long time for some patients.    We could start her on a blood pressure medication to reduce episodes (probably metoprolol 25 mg twice daily) and have her see PCP in follow up in a week or two to discuss next steps - generally this doesn't cause issues. A good hard deep cough, or a few deep breaths can often help stop this when a patient is having them.      Let me know if she wishes to start.    Ramírez Mccord, MPAS, PA-C

## 2017-08-23 NOTE — TELEPHONE ENCOUNTER
My chart response from pt:    I  don't have a choice here.  I  will  have to wait.   Well all I can  do is take the  last 4 pain pills in the  next few days and then cry and get no sleep after that.     Sending to nurse pool will call pt.     Aye Huggins RN, [facility]  Pain Clinic Care Coordinator

## 2017-08-28 NOTE — TELEPHONE ENCOUNTER
No need for medication at this time unless symptoms come back/ increase.  Please have her see me if symptoms increase and we can talk about medication/management options.  Jazmyne Chapman MD

## 2017-08-31 NOTE — TELEPHONE ENCOUNTER
Contact Attempt      Outreach attempted x 1.  Left message on voicemail with call back information and requested return call to follow up with her plan as pt has not made a f/u appt with provider.     Plan:  Will await for CB.     Aye Huggins RN, [facility]  Pain Clinic Care Coordinator

## 2017-09-08 NOTE — TELEPHONE ENCOUNTER
Patient never responded back and never made a follow up appointment closed encounter.     Aye Huggins RN, [facility]  Pain Clinic Care Coordinator

## 2017-09-15 ENCOUNTER — TELEPHONE (OUTPATIENT)
Dept: PALLIATIVE MEDICINE | Facility: CLINIC | Age: 57
End: 2017-09-15

## 2017-09-15 NOTE — TELEPHONE ENCOUNTER
I typically do not do work-related forms. I am not sure what the patient is requesting. I have not seen her in clinic since June. This likely should be signed by her Primary Care Provider.     Aundrea SCOTT RN CNP, FNP  [facility] [place]

## 2017-09-15 NOTE — TELEPHONE ENCOUNTER
Form received from [employer] Asking for documentation and signature. Put in Aundrea basket for review and signature. Will fax back when it is signed.     Routed to Aundrea.      Gela Naranjo MA

## 2017-09-18 ENCOUNTER — MYC MEDICAL ADVICE (OUTPATIENT)
Dept: PALLIATIVE MEDICINE | Facility: CLINIC | Age: 57
End: 2017-09-18

## 2017-09-18 NOTE — TELEPHONE ENCOUNTER
Aundrea. Do you know where the forms are?  Pt wants the forms mailed back to her.    Elsie Lester RN-BSN  [facility] [place]

## 2017-09-18 NOTE — TELEPHONE ENCOUNTER
My chart message from pt:    What I am trying to do is to get set up FMLA so when my back is so bad, I cannot make it to work, my job would be safe.  You are still in charge of my back care.  You have not transferred my records over to Dr. Chapman.  If she will be able to fill it out I do not know.  But if between you two, I don't get FMLA, ....................................     Patient was already informed that Aundrea Sanchez does not fill out FMLA forms and this was her response. Sending to provider for an FYI only.     Aye Huggisn RN, [facility]  Pain Clinic Care Coordinator

## 2017-09-18 NOTE — TELEPHONE ENCOUNTER
See the 9/18/17 mychart encounter for more information.    Elsie Lester RN-BSN  [facility]

## 2017-09-18 NOTE — TELEPHONE ENCOUNTER
"Penelopehart sent to pt:  Mario Lopez  We received forms from [employer] Asking for documentation and signature.    This was Aundrea's response:  \"I typically do not do work-related forms. I am not sure what the patient is requesting. I have not seen her in clinic since June. This likely should be signed by her Primary Care Provider. \"    Do you need these blank forms returned to you or should we shred?    Elsie Lester, RN-BSN  [facility] [place]    "

## 2017-09-19 NOTE — TELEPHONE ENCOUNTER
Forms mailed back to patient.     Aye Huggins RN, [facility]  Pain Clinic Care Coordinator

## 2017-09-25 ENCOUNTER — TELEPHONE (OUTPATIENT)
Dept: FAMILY MEDICINE | Facility: CLINIC | Age: 57
End: 2017-09-25

## 2017-09-25 NOTE — TELEPHONE ENCOUNTER
Reason for Call:  Form, our goal is to have forms completed with 72 hours, however, some forms may require a visit or additional information.    Type of letter, form or note:  FMLA    Who is the form from?: Insurance comp    Where did the form come from: Patient or family brought in       What clinic location was the form placed at?: [facility])    Where the form was placed: 's Box    What number is listed as a contact on the form?: Per Patient *Please fill out and send in by end of this week. For leave of absence without losing job       Additional comments: Fax to: Occupational Health at [phone]    Call taken on 9/25/2017 at 2:23 PM by Claire Quinn

## 2017-09-26 NOTE — TELEPHONE ENCOUNTER
"Routing to PCP.  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  You have not seen patient since 07/24/17 so I assume you would want to have an appointment with patient before completing form.  Patient wrote on drop off form that this needs to be sent to her employer by end of this week (for leave of absence w/out losing job).  Please advise if you want me to schedule by end of week??    Monika Olivia      "

## 2017-09-27 NOTE — TELEPHONE ENCOUNTER
Huddled with Dr Chapman and she needs to see patient before she can complete form.    Called patient and she said that the form needs to be to her employer by end of week.  Patient is on vacation and would not be able to come into clinic this week. Patient asked that the form be mailed back to her at her home address.    Mailed form to home address.    Monika Olivia

## 2017-10-16 ENCOUNTER — OFFICE VISIT (OUTPATIENT)
Dept: FAMILY MEDICINE | Facility: CLINIC | Age: 57
End: 2017-10-16
Payer: COMMERCIAL

## 2017-10-16 VITALS
HEART RATE: 98 BPM | TEMPERATURE: 97.7 F | BODY MASS INDEX: 48.24 KG/M2 | DIASTOLIC BLOOD PRESSURE: 80 MMHG | OXYGEN SATURATION: 98 % | WEIGHT: 268 LBS | SYSTOLIC BLOOD PRESSURE: 143 MMHG

## 2017-10-16 DIAGNOSIS — E66.01 MORBID OBESITY DUE TO EXCESS CALORIES (H): ICD-10-CM

## 2017-10-16 DIAGNOSIS — M54.16 LUMBAR RADICULAR PAIN: ICD-10-CM

## 2017-10-16 DIAGNOSIS — R03.0 ELEVATED BLOOD PRESSURE READING WITHOUT DIAGNOSIS OF HYPERTENSION: ICD-10-CM

## 2017-10-16 DIAGNOSIS — M19.071 ARTHRITIS OF FIRST METATARSOPHALANGEAL (MTP) JOINT OF RIGHT FOOT: ICD-10-CM

## 2017-10-16 DIAGNOSIS — R42 DIZZINESS: Primary | ICD-10-CM

## 2017-10-16 PROCEDURE — 99215 OFFICE O/P EST HI 40 MIN: CPT | Performed by: FAMILY MEDICINE

## 2017-10-16 RX ORDER — HYDROCODONE BITARTRATE AND ACETAMINOPHEN 5; 325 MG/1; MG/1
1 TABLET ORAL EVERY 6 HOURS PRN
Qty: 15 TABLET | Refills: 0 | Status: SHIPPED | OUTPATIENT
Start: 2017-10-16 | End: 2019-02-26

## 2017-10-16 ASSESSMENT — ANXIETY QUESTIONNAIRES
IF YOU CHECKED OFF ANY PROBLEMS ON THIS QUESTIONNAIRE, HOW DIFFICULT HAVE THESE PROBLEMS MADE IT FOR YOU TO DO YOUR WORK, TAKE CARE OF THINGS AT HOME, OR GET ALONG WITH OTHER PEOPLE: SOMEWHAT DIFFICULT
GAD7 TOTAL SCORE: 6
6. BECOMING EASILY ANNOYED OR IRRITABLE: NEARLY EVERY DAY
5. BEING SO RESTLESS THAT IT IS HARD TO SIT STILL: NOT AT ALL
7. FEELING AFRAID AS IF SOMETHING AWFUL MIGHT HAPPEN: NOT AT ALL
3. WORRYING TOO MUCH ABOUT DIFFERENT THINGS: NOT AT ALL
1. FEELING NERVOUS, ANXIOUS, OR ON EDGE: NOT AT ALL
2. NOT BEING ABLE TO STOP OR CONTROL WORRYING: NOT AT ALL

## 2017-10-16 ASSESSMENT — PATIENT HEALTH QUESTIONNAIRE - PHQ9: 5. POOR APPETITE OR OVEREATING: NEARLY EVERY DAY

## 2017-10-16 NOTE — PATIENT INSTRUCTIONS
-- Please schedule with Podiatry Dr. Rodney Selby     -- Please make appointment with weight management center     -- Follow up with me in 3 months    -Please stop by our pharmacy and get your blood pressure rechecked in 1-2 weeks. If it is above 140/90, then I will want to see you back.

## 2017-10-16 NOTE — NURSING NOTE
"Chief Complaint   Patient presents with     RECHECK     Go over medications      Toe Pain     Right big toe        Initial /80  Pulse 98  Temp 97.7  F (36.5  C) (Oral)  Wt 268 lb (121.6 kg)  SpO2 98%  BMI 48.24 kg/m2 Estimated body mass index is 48.24 kg/(m^2) as calculated from the following:    Height as of 7/18/17: 5' 2.5\" (1.588 m).    Weight as of this encounter: 268 lb (121.6 kg).   Medication Reconciliation: complete   Clarissa Kitchen CMA      "

## 2017-10-16 NOTE — MR AVS SNAPSHOT
After Visit Summary   10/16/2017    Jessica Jay    MRN: [id number]           Patient Information     Date Of Birth          1960        Visit Information        Provider Department      10/16/2017 5:20 PM Jazmyne Chapman MD [facility]        Today's Diagnoses     Dizziness    -  1    Arthritis of first metatarsophalangeal (MTP) joint of right foot        overweight with BMI >40        Lumbar radicular pain        Elevated blood pressure reading without diagnosis of hypertension          Care Instructions    -- Please schedule with Podiatry Dr. Rodney Selby     -- Please make appointment with weight management center     -- Follow up with me in 3 months    -Please stop by our pharmacy and get your blood pressure rechecked in 1-2 weeks. If it is above 140/90, then I will want to see you back.          Follow-ups after your visit        Additional Services     BARIATRIC ADULT REFERRAL       Your provider has referred you to: [facility]: [facility] [phone]   [email or web address]  [facility]: [facility] [phone]   [email or web address]    Please be aware that coverage of these services is subject to the terms and limitations of your health insurance plan.  Call member services at your health plan with any benefit or coverage questions.      Please bring the following with you to your appointment:      (1) List of current medications   (2) This referral request   (3) Any documents/labs given to you for this referral                  Your next 10 appointments already scheduled     Dec 12, 2017  1:30 PM CST   Return Visit with SONIA Hogan CNP   [facility] [place] ([facility] [place])    [address]  [place] [address] [phone]              Who to contact      If you have questions or need follow up information about today's clinic visit or your schedule please contact [facility] directly at [phone].  Normal or non-critical lab and imaging results will be communicated to you by MyChart, letter or phone within 4 business days after the clinic has received the results. If you do not hear from us within 7 days, please contact the clinic through userfoxhart or phone. If you have a critical or abnormal lab result, we will notify you by phone as soon as possible.  Submit refill requests through SlickLogin or call your pharmacy and they will forward the refill request to us. Please allow 3 business days for your refill to be completed.          Additional Information About Your Visit        userfoxharViking Systems Information     SlickLogin gives you secure access to your electronic health record. If you see a primary care provider, you can also send messages to your care team and make appointments. If you have questions, please call your primary care clinic.  If you do not have a primary care provider, please call [phone] and they will assist you.        Care EveryWhere ID     This is your Care EveryWhere ID. This could be used by other organizations to access your [facility] medical records  [id number]        Your Vitals Were     Pulse Temperature Pulse Oximetry BMI (Body Mass Index)          98 97.7  F (36.5  C) (Oral) 98% 48.24 kg/m2         Blood Pressure from Last 3 Encounters:   10/16/17 143/80   07/24/17 138/88   06/06/17 148/76    Weight from Last 3 Encounters:   10/16/17 268 lb (121.6 kg)   07/24/17 273 lb 12.8 oz (124.2 kg)   07/18/17 275 lb 6.4 oz (124.9 kg)              We Performed the Following     BARIATRIC ADULT REFERRAL          Today's Medication Changes          These changes are accurate as of: 10/16/17  6:25 PM.  If you have any questions, ask your nurse or doctor.               These medicines have changed or have updated prescriptions.         Dose/Directions    HYDROcodone-acetaminophen 5-325 MG per tablet   Commonly known as:  NORCO   This may have changed:  additional instructions   Used for:  Lumbar radicular pain   Changed by:  Jazmyne Chapman MD        Dose:  1 tablet   Take 1 tablet by mouth every 6 hours as needed for moderate to severe pain Max of 2 tabs per day, use sparingly. OK to fill and begin on 10/16/2017.  Last refill was 1/11/17 for #15   Quantity:  15 tablet   Refills:  0            Where to get your medicines      Some of these will need a paper prescription and others can be bought over the counter.  Ask your nurse if you have questions.     Bring a paper prescription for each of these medications     HYDROcodone-acetaminophen 5-325 MG per tablet                Primary Care Provider Office Phone # Fax #    Jazmyne Chapman [phone]       [address] [address]        Equal Access to Services     [facility]: Hadii danielle ku hadasho Soomaali, waaxda luqadaha, qaybta kaalmada adeegyada, waxay idiin hayaan ellyn kharadragan barrera . So [facility] [phone].    ATENCIÓN: Si habla español, tiene a hatfield disposición servicios gratuitos de asistencia lingüística. Llame al [phone].    We comply with applicable federal civil rights laws and [place] laws. We do not discriminate on the basis of race, color, national origin, age, disability, sex, sexual orientation, or gender identity.            Thank you!     Thank you for choosing [facility]  for your care. Our goal is always to provide you with excellent care. Hearing back from our patients is one way we can continue to improve our services. Please take a few minutes to complete the written survey that you may receive in the mail after your visit with us. Thank you!             Your Updated Medication List - Protect others around you: Learn how to safely use, store and throw away your medicines at www.disposemymeds.org.           This list is accurate as of: 10/16/17  6:25 PM.  Always use your most recent med list.                   Brand Name Dispense Instructions for use Diagnosis    albuterol 108 (90 BASE) MCG/ACT Inhaler    PROAIR HFA/PROVENTIL HFA/VENTOLIN HFA    1 Inhaler    Inhale 2 puffs into the lungs every 6 hours as needed    Acute bronchitis, unspecified organism       ALPRAZolam 0.5 MG tablet    XANAX    4 tablet    Take 1 tablet by mouth 30 minutes before MRI. If this fails, take an additional tablet 30 minutes later. Do not take more than 2 tablets in 6 hours.    Ataxia       buPROPion 150 MG 12 hr tablet    WELLBUTRIN SR    180 tablet    Take 1 tablet once daily and increase to 1 tablet twice daily after 4 to 7 days    Moderate episode of recurrent major depressive disorder (H), Tobacco abuse       cyclobenzaprine 10 MG tablet    FLEXERIL    20 tablet    Take 0.5-1 tablets (5-10 mg) by mouth 3 times daily as needed for muscle spasms    Chronic bilateral low back pain without sciatica       diclofenac 1 % Gel topical gel    VOLTAREN    100 g    Apply 4 grams to knees or lateral hips up to  four times daily as needed for pain using enclosed dosing card.    Pain in joint, multiple sites, Chronic pain of both knees, Greater trochanteric bursitis, unspecified laterality       DULoxetine 60 MG EC capsule    CYMBALTA    90 capsule    Take 1 capsule (60 mg) by mouth daily    Chronic bilateral low back pain without sciatica, Moderate episode of recurrent major depressive disorder (H)       gabapentin 300 MG capsule    NEURONTIN    180 capsule    Take 2 capsules (600 mg) by mouth 3 times daily    DDD (degenerative disc disease), lumbar, Pain in joint, multiple sites, Myofascial pain       HYDROcodone-acetaminophen 5-325 MG per tablet    NORCO    15 tablet    Take 1 tablet by mouth every 6 hours as needed for moderate to severe pain Max of 2 tabs per day, use sparingly. OK to fill and begin on 10/16/2017.  Last refill was 1/11/17 for  #15    Lumbar radicular pain       nicotine 10 MG Inhaler    NICOTROL    180 each    Inhale 6-16 cartridge into the lungs daily as needed for smoking cessation    Tobacco abuse       omeprazole 20 MG tablet     90 tablet    Take 1 tablet (20 mg) by mouth daily    Gastroesophageal reflux disease without esophagitis       order for DME     1 Device    Equipment being ordered: Branchly foot plate, 23.5 Right    Arthritis of first metatarsophalangeal joint

## 2017-10-16 NOTE — PROGRESS NOTES
"  SUBJECTIVE:   Jessica Jay is a 57 year old female who presents to clinic today for the following health issues:    Back pain-- She has been having chronic bilateral lower back pain. She describes her pain as being \"poked in the back with a tonya.\" She also has radiation down to the side of her legs. She notes that laying down on a floor alleviates her pain. Her backs usually gets swollen and this makes it harder for her to get out of bed. She notes that sitting and standing aggravates her pain and this has made it difficult for her to engage in her daily activities including working. She states that she was on Gabapentin 300 MG and Hydrocodone-acetaminophen 5-325 MG , but she stopped taking her Gabapentin 300 MG due to side effects including balance problems. She notes that her balance is better since she stopped taking her Gabapentin 300 MG. She does exercises by herself at home including daily home stretches, but she had not done any PT. Patient has had injections in a pain clinic which provided only minimal benefits. She reports that she usually has flare ups about 2 times a month which makes it difficult for her to get out of bed.     Medications-- She was started on Hydrocodone-acetaminophen 5-325 MG as needed and she was wondering if she can get a refill or if she has to wait until she goes back to the pain clinic. She notes that her Hydrocodone-acetaminophen 5-325 MG usually are beneficial, but sometimes her flare ups are so severe they do not help as much. She had not tried tramadol. She followed up with her pain clinic in July but she did not need any refills then. She is unsure of having signed a pain medication contract with the pain clinic.     Right big toe-- Patient complains of constant pain, with varying degree in her right toe, some days worse than others. Her pain started in January 2017 and she has followed up with Urgent care specialist were she had an X-ray done. She states that her pain " sometimes keep her up at night. She is yet to follow up with a podiatrist.     Dizziness-- Patient notes that she started having dizzy feeling and felt like she was going to fall after she started her Gabapentin 300 MG. She is currently off her Gabapentin 300 MG but she is still having her symptoms which are not as much as she previously used to have when she was on her Gabapentin 300 MG. She notes that she usually experiences her symptoms when she sits for a long time and gets up or during sudden movement. She does not have any time of day when her balance is worse than other times. She states that she has not always had good balance, but her balance is worse now.     Weight-- She states that she has been trying to lose weight and this is making her frustrated because she has only lost 14 lbs since May 2017. She notes that she is limited on what she could do for her exercise due to her foot and back pain. She states that her weight is not due to her eating habit and she is thinking of signing up for a gym. She is currently doing weight watchers. She notes that she is not open to going for weight reduction surgery.     Problem list and histories reviewed & adjusted, as indicated.  Additional history: as documented    Patient Active Problem List   Diagnosis     Depression, major     GERD (gastroesophageal reflux disease)     Migraines     CARDIOVASCULAR SCREENING; LDL GOAL LESS THAN 160     Disc herniation     DDD (degenerative disc disease), lumbar     DDD (degenerative disc disease), cervical     Neck pain     Headache     Dermoid cyst of brain (H)     Chronic daily headache     Dizziness - light-headed     Tobacco abuse     ELYSSA (obstructive sleep apnea)     Spondylolisthesis, grade 1     Chronic low back pain     Brain lipoma     Vulvar dermatitis     History of colonic polyps     Gastroesophageal reflux disease without esophagitis     overweight with BMI >40     Past Surgical History:   Procedure Laterality Date      ARTHROSCOPY SHOULDER  1990    Right/spurs     C FOOT/TOES SURGERY PROC UNLISTED  1975    Toe fracture, left     CARPAL TUNNEL RELEASE RT/LT  1990    Left     COLONOSCOPY  2016     CRANIOTOMY, EXCISE TUMOR COMPLEX, COMBINED  5/9/2011    Procedure:COMBINED CRANIOTOMY, EXCISE TUMOR COMPLEX; 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 Father      CANCER Maternal Grandmother      CEREBROVASCULAR DISEASE Paternal Grandmother      Breast Cancer Paternal Grandmother      Neurologic Disorder Sister      migraine     CANCER Brother      Cancer - colorectal Brother      Colon Cancer Brother          Current Outpatient Prescriptions   Medication Sig Dispense Refill     HYDROcodone-acetaminophen (NORCO) 5-325 MG per tablet Take 1 tablet by mouth every 6 hours as needed for moderate to severe pain Max of 2 tabs per day, use sparingly. OK to fill and begin on 10/16/2017.  Last refill was 1/11/17 for #15 15 tablet 0     ALPRAZolam (XANAX) 0.5 MG tablet Take 1 tablet by mouth 30 minutes before MRI. If this fails, take an additional tablet 30 minutes later. Do not take more than 2 tablets in 6 hours. 4 tablet 0     cyclobenzaprine (FLEXERIL) 10 MG tablet Take 0.5-1 tablets (5-10 mg) by mouth 3 times daily as needed for muscle spasms 20 tablet 0     diclofenac (VOLTAREN) 1 % GEL topical gel Apply 4 grams to knees or lateral hips up to  four times daily as needed for pain using enclosed dosing card. 100 g 1     omeprazole 20 MG tablet Take 1  tablet (20 mg) by mouth daily 90 tablet 3     DULoxetine (CYMBALTA) 60 MG EC capsule Take 1 capsule (60 mg) by mouth daily 90 capsule 3     nicotine (NICOTROL) 10 MG Inhaler Inhale 6-16 cartridge into the lungs daily as needed for smoking cessation 180 each 3     order for DME Equipment being ordered: Dynaflex foot plate, 23.5 Right 1 Device 0     buPROPion (WELLBUTRIN SR) 150 MG 12 hr tablet Take 1 tablet once daily and increase to 1 tablet twice daily after 4 to 7 days 180 tablet 2     albuterol (PROAIR HFA, PROVENTIL HFA, VENTOLIN HFA) 108 (90 BASE) MCG/ACT inhaler Inhale 2 puffs into the lungs every 6 hours as needed 1 Inhaler 1     gabapentin (NEURONTIN) 300 MG capsule Take 2 capsules (600 mg) by mouth 3 times daily (Patient not taking: Reported on 10/16/2017) 180 capsule 1     Labs reviewed in EPIC    Reviewed and updated as needed this visit by clinical staffTobacco  Allergies  Med Hx  Surg Hx  Fam Hx  Soc Hx      Reviewed and updated as needed this visit by Provider       ROS:  Constitutional, HEENT, cardiovascular, pulmonary, GI, , musculoskeletal, neuro, skin, endocrine and psych systems are negative, except as otherwise noted.    This document serves as a record of the services and decisions personally performed and made by Jazmyne Nye MD. It was created on his/her behalf by Malorie Wagner, a trained medical scribe. The creation of this document is based the provider's statements to the medical scribe.    Scribmathieu Wagner  5:18 PM, October 16, 2017  OBJECTIVE:   /80  Pulse 98  Temp 97.7  F (36.5  C) (Oral)  Wt 121.6 kg (268 lb)  SpO2 98%  BMI 48.24 kg/m2  Body mass index is 48.24 kg/(m^2).  GENERAL: healthy, alert and no distress, morbidly obese  RESP: lungs clear to auscultation - no rales, rhonchi or wheezes  CV: regular rate and rhythm, normal S1 S2, no S3 or S4, no murmur, click or rub, no peripheral edema and peripheral pulses strong  MS:Tenderness in right medial big  toe.  No swelling or erythema.  SKIN: no suspicious lesions or rashes, No erythema.  NEURO: Normal strength and tone, mentation intact and speech normal  PSYCH: mentation appears normal, affect normal/bright    Orthostatics: Sit - 146/82     Stand - 147/83     Lay - 147/88     Diagnostic Test Results:  Results for orders placed or performed in visit on 17   Zio Patch Holter    Narrative    .[address] [id number] [phone]  2017      Patient:  Jessica Jay  Chart: [id number]  :  1960  Age:  56 year old  Sex:  female       Procedure:  ZioPatch Monitor.        Technician performing hook-up:  Vickie Vogel         ASSESSMENT/PLAN:   1. Dizziness  Chronic, intermittent.  Much improved since stopping the gabapentin, but not resolved.  Had holter.  2017 had CBC, BMP which were normal  Blood pressure high today, not orthostatic.  Recommend repeat BP at pharmacy, see below  Denies vertigo.  She is considering pursuing consult at the [facility]  Discussed PT referral for balance  Discussed other medications as possible contributors.    2. Arthritis of first metatarsophalangeal (MTP) joint of right foot  Had xray 2017, reviewed together  Podiatry consult     3. overweight with BMI >40  She is ready for consultation on options    - BARIATRIC ADULT REFERRAL    4. Lumbar radicular pain  FMLA done today for intermittent leave. She has pain clinic appointment in 2017. Small refill of hydrocodone done today, patient uses it rarely   - HYDROcodone-acetaminophen (NORCO) 5-325 MG per tablet; Take 1 tablet by mouth every 6 hours as needed for moderate to severe pain Max of 2 tabs per day, use sparingly. OK to fill and begin on 10/16/2017.  Last refill was 17 for #15  Dispense: 15 tablet; Refill: 0    (R03.0) Elevated blood pressure reading without diagnosis of hypertension  Comment: she is not convinced that  tonight's blood pressure readings are accurate for her  Plan: recommend rechecking blood pressure at pharmacy over the next 4-6 weeks.  If 140/90 or greater, needs follow up visit with me for HTN.        FUTURE APPOINTMENTS:       - Follow-up visit in 3 months, sooner if blood pressure readings are high    Greater than 50% of the 40 minute visit was spent in discussion of her back pain, reviewing pain clinic notes, filling out FMLA as well as reviewing further options on symptom of dizziness and differential that is long with dizziness.      The information in this document, created by the medical scribe for me, accurately reflects the services I personally performed and the decisions made by me. I have reviewed and approved this document for accuracy prior to leaving the patient care area.  Jazmyne Chapman MD  5:18 PM, 10/16/17    Jazmyne Chapman MD  [facility]

## 2017-10-17 ASSESSMENT — ANXIETY QUESTIONNAIRES: GAD7 TOTAL SCORE: 6

## 2017-10-25 ENCOUNTER — OFFICE VISIT (OUTPATIENT)
Dept: PODIATRY | Facility: CLINIC | Age: 57
End: 2017-10-25
Payer: COMMERCIAL

## 2017-10-25 ENCOUNTER — RADIANT APPOINTMENT (OUTPATIENT)
Dept: GENERAL RADIOLOGY | Facility: CLINIC | Age: 57
End: 2017-10-25
Attending: PODIATRIST
Payer: COMMERCIAL

## 2017-10-25 VITALS
SYSTOLIC BLOOD PRESSURE: 133 MMHG | WEIGHT: 268 LBS | DIASTOLIC BLOOD PRESSURE: 80 MMHG | BODY MASS INDEX: 47.48 KG/M2 | HEIGHT: 63 IN

## 2017-10-25 DIAGNOSIS — M20.5X1 HALLUX LIMITUS OF RIGHT FOOT: Primary | ICD-10-CM

## 2017-10-25 DIAGNOSIS — M79.671 RIGHT FOOT PAIN: ICD-10-CM

## 2017-10-25 PROCEDURE — 73630 X-RAY EXAM OF FOOT: CPT | Mod: RT

## 2017-10-25 PROCEDURE — 99203 OFFICE O/P NEW LOW 30 MIN: CPT | Performed by: PODIATRIST

## 2017-10-25 RX ORDER — MELOXICAM 7.5 MG/1
7.5 TABLET ORAL DAILY PRN
Qty: 30 TABLET | Refills: 1 | Status: SHIPPED | OUTPATIENT
Start: 2017-10-25 | End: 2018-03-07

## 2017-10-25 NOTE — PROGRESS NOTES
Weight management plan: Patient was referred to their PCP to discuss a diet and exercise plan.     EDWARD Ravi MA October 25, 2017 9:16 AM

## 2017-10-25 NOTE — LETTER
10/25/2017         RE: Jessica Jay  [address] [facility] [address]        Dear Colleague,    Thank you for referring your patient, Jessica Jay, to the [facility]. Please see a copy of my visit note below.    Weight management plan: Patient was referred to their PCP to discuss a diet and exercise plan.     EDWARD Ravi MA October 25, 2017 9:16 AM      PATIENT HISTORY:  Jessica Jay is a 57 year old female who presents to clinic for right 1st MPJ pain with activity.  Present since January.  No injury.  7-10/10 pain.  No treatments.      Review of Systems:  Patient denies fever, chills, rash, wound, limping, numbness, weakness, heart burn, blood in stool, chest pain with activity, calf pain when walking, shortness of breath with activity, chronic cough, easy bleeding/bruising, swelling of ankles, excessive thirst,  anxiety.  Patient admits to fatigue, depression, stiffness.     PAST MEDICAL HISTORY:   Past Medical History:   Diagnosis Date     Cellulitis 9/26/2010    [facility]     Degenerative disc disease     cervical     Depression, major      Depressive disorder      Dermoid cyst of brain (H)      GERD (gastroesophageal reflux disease)      LBP (low back pain)      Migraines      Panic attacks      Seasonal allergies      Sleep apnoea     Wears C-PAP        PAST SURGICAL HISTORY:   Past Surgical History:   Procedure Laterality Date     ARTHROSCOPY SHOULDER  1990    Right/spurs     C FOOT/TOES SURGERY PROC UNLISTED  1975    Toe fracture, left     CARPAL TUNNEL RELEASE RT/LT  1990    Left     COLONOSCOPY  2016     CRANIOTOMY, EXCISE TUMOR COMPLEX, COMBINED  5/9/2011    Procedure:COMBINED CRANIOTOMY, EXCISE TUMOR COMPLEX; 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; Surgeon:LEE ANN PAULA; Location:[facility] OR     ROTATOR CUFF REPAIR RT/LT  2009     Left        MEDICATIONS:   Current Outpatient Prescriptions:      meloxicam (MOBIC) 7.5 MG tablet,  Take 1 tablet (7.5 mg) by mouth daily as needed, Disp: 30 tablet, Rfl: 1     HYDROcodone-acetaminophen (NORCO) 5-325 MG per tablet, Take 1 tablet by mouth every 6 hours as needed for moderate to severe pain Max of 2 tabs per day, use sparingly. OK to fill and begin on 10/16/2017.  Last refill was 1/11/17 for #15, Disp: 15 tablet, Rfl: 0     ALPRAZolam (XANAX) 0.5 MG tablet, Take 1 tablet by mouth 30 minutes before MRI. If this fails, take an additional tablet 30 minutes later. Do not take more than 2 tablets in 6 hours., Disp: 4 tablet, Rfl: 0     gabapentin (NEURONTIN) 300 MG capsule, Take 2 capsules (600 mg) by mouth 3 times daily, Disp: 180 capsule, Rfl: 1     cyclobenzaprine (FLEXERIL) 10 MG tablet, Take 0.5-1 tablets (5-10 mg) by mouth 3 times daily as needed for muscle spasms, Disp: 20 tablet, Rfl: 0     diclofenac (VOLTAREN) 1 % GEL topical gel, Apply 4 grams to knees or lateral hips up to  four times daily as needed for pain using enclosed dosing card., Disp: 100 g, Rfl: 1     omeprazole 20 MG tablet, Take 1 tablet (20 mg) by mouth daily, Disp: 90 tablet, Rfl: 3     DULoxetine (CYMBALTA) 60 MG EC capsule, Take 1 capsule (60 mg) by mouth daily, Disp: 90 capsule, Rfl: 3     nicotine (NICOTROL) 10 MG Inhaler, Inhale 6-16 cartridge into the lungs daily as needed for smoking cessation, Disp: 180 each, Rfl: 3     order for DME, Equipment being ordered: [employer] foot plate, 23.5 Right, Disp: 1 Device, Rfl: 0     buPROPion (WELLBUTRIN SR) 150 MG 12 hr tablet, Take 1 tablet once daily and increase to 1 tablet twice daily after 4 to 7 days, Disp: 180 tablet, Rfl: 2     albuterol (PROAIR HFA, PROVENTIL HFA, VENTOLIN HFA) 108 (90 BASE) MCG/ACT inhaler, Inhale 2 puffs into the lungs every 6 hours as needed, Disp: 1 Inhaler, Rfl: 1     ALLERGIES:    Allergies   Allergen Reactions     Buspar [Buspirone Hcl] Rash        SOCIAL HISTORY:   Social History     Social History     Marital status: Single     Spouse name: N/A      "Number of children: 0     Years of education: 18     Occupational History      [employer]     Social History Main Topics     Smoking status: Former Smoker     Packs/day: 0.80     Years: 30.00     Types: Cigarettes     Quit date: 7/4/2017     Smokeless tobacco: Never Used     Alcohol use 0.0 oz/week      Comment: 6 drinks a year     Drug use: No     Sexual activity: Not Currently     Birth control/ protection: None     Other Topics Concern      Service No     Blood Transfusions No     Caffeine Concern Yes     Coffee     Occupational Exposure No     Hobby Hazards No     Sleep Concern Yes     feels she sleeps to much     Stress Concern No     Weight Concern Yes     cannot lose     Special Diet No     Back Care No     Exercise Yes     Walk on treadmill, swim     Seat Belt Yes     Self-Exams No     Parent/Sibling W/ Cabg, Mi Or Angioplasty Before 65f 55m? No     Social History Narrative    Process death claims        FAMILY HISTORY:   Family History   Problem Relation Age of Onset     Hypertension Mother      C.A.D. Father      CANCER Maternal Grandmother      CEREBROVASCULAR DISEASE Paternal Grandmother      Breast Cancer Paternal Grandmother      Neurologic Disorder Sister      migraine     CANCER Brother      Cancer - colorectal Brother      Colon Cancer Brother         EXAM:Vitals: /80 (BP Location: Right arm, Patient Position: Chair, Cuff Size: Adult Large)  Ht 5' 2.5\" (1.588 m)  Wt 268 lb (121.6 kg)  BMI 48.24 kg/m2  BMI= Body mass index is 48.24 kg/(m^2).    General appearance: Patient is alert and fully cooperative with history & exam.  No sign of distress is noted during the visit.     Psychiatric: Affect is pleasant & appropriate.  Patient appears motivated to improve health.     Respiratory: Breathing is regular & unlabored while sitting.     HEENT: Hearing is intact to spoken word.  Speech is clear.  No gross evidence of visual impairment that would impact ambulation.   "   Dermatologic: Skin is intact to the right foot without significant lesions, rash or abrasion.  No paronychia or evidence of soft tissue infection is noted.     Vascular: DP & PT pulses are intact & regular on the right.  Mild b/l LE edema noted.  CFT and skin temperature are normal to both lower extremities.     Neurologic: Lower extremity sensation is intact to light touch.  No evidence of weakness or contracture in the lower extremities.  No evidence of neuropathy.     Musculoskeletal: Right 1st MPJ pain with ROM, which is limited.  Palpable spurring noted.  Patient is ambulatory without assistive device or brace.  No gross ankle deformity noted.  No foot or ankle joint effusion is noted.    XRs of right foot reviewed with pt.  Moderate to severe 1st MPJ DJD noted with loose body.     ASSESSMENT:  Right hallux limitus     PLAN:  Reviewed patient's chart in epic.  Discussed condition and treatment options including pros and cons.    Surgical and non-surgical treatment options for hallux limitus were discussed.  Non-operative treatments like stiff soles, orthotics, injection and NSAIDs (to be taken with food) were discussed.  Shoes that provide extra room for the enlarged joint should be helpful.  I would anticipate somewhat short term benefit from joint injection.  Discussed surgical options including fusion.    Pt will try new shoes, otc inserts.  Mobic prescribed prn.  F/u as needed.  Handout given.      Rodney Selby DPM, FACFAS      Again, thank you for allowing me to participate in the care of your patient.        Sincerely,        Rodney Selby DPM

## 2017-10-25 NOTE — MR AVS SNAPSHOT
"              After Visit Summary   10/25/2017    Jessica Jay    MRN: [id number]           Patient Information     Date Of Birth          1960        Visit Information        Provider Department      10/25/2017 9:15 AM Rodney Selby DPM [facility]        Today's Diagnoses     Hallux limitus of right foot    -  1    Right foot pain          Care Instructions    DEGENERATIVE ARTHRITIS OF THE BIG TOE JOINT (hallux limitus/hallux rigidus)   Arthritis of the joint at the base of the big toe (metatarsophalangeal joint) has several causes. Usually it results from repetitive trauma to the joint, secondary to abnormal foot mechanics. Often it is hereditary. However, a one-time traumatic event can lead to arthritis. The condition can worsen with time. The cartilage wears out, joint surfaces are no longer smooth, bone rubs on bone, inflammation occurs with pain, and eventually bone spurs and loose fragments might develop.   The joint is often painful with activity, worse with flimsy shoes or walking barefoot, and it slowly progresses over time. A person might notice the toe \"locking up\" with walking. There often is an obvious, and irritating, bony bump on top of the foot. Shoes might be uncomfortable. In some people the pain is so bothersome that recreational activities sometimes even normal daily activities are difficult to perform.   The pain from this arthritis is likely a combination of joint jamming, cartilage loss and inflammation, and irritation from shoes rubbing on the bump. Sometimes other parts of the foot, leg, or back hurt from altering one's walk to compensate for the painful joint.     Ways to help a person live with the discomfort include wearing a good, supportive shoe with a rigid, rocker-type bottom. An example is a hiking boot. A rigid sole minimizes bending of the joint, and therefore, joint motion and pain. Shoes with a high toe box allow for less rubbing on the bump. " Avoiding barefoot walking, sandals, flip-flops and slippers usually helps.     Sometimes an insert or orthotic provides symptom relief. This might make shoe fit more difficult. Pads over the bump and occasionally injections into the joint provide relief.     Surgery for this condition is aimed towards alleviating pain. It does not cure the arthritis nor does it guarantee better joint motion. Depending on the condition of the metatarsophalangeal joint, there are several surgical options:    1.  Cutting off the bony bump(s) and cleaning the joint    2.  Loosening the joint up by making cuts in the first metatarsal bone or the big toe bone and removing a small section of bone.    3.  Repositioning bone to minimize jamming of the joint.    4.  In severe cases, the joint is fused. By fusing the joint, it will never bend again. This resolves the pain, because it's the movement of a worn out joint that causes pain. Oftentimes the operation involves a combination of these procedures and requires the use of screws, pins, and/or a small surgical plate.     Healing after surgery requires about six weeks of protection. This allows the bone to heal. Maximum recovery takes about one year. The scar tissue and joint structures require this amount of time to finish the healing process. Expect stiffness, swelling and numbness during that time frame.   Surgery for arthritis of the metatarsophalangeal joint does involve side effects. Some side effects are predictable and others are less common but do occur. A scar will be visible and could be irritated by shoes. The shoe may rub on the screw or internal pin requiring surgical removal of these fixation devices. The screw and pin would likely be left in place for a full year. The first toe may remain stiff after surgery. The amount of stiffness is variable. Most people never regain normal motion of the first toe. This is due to scar tissue inherent to any surgery, in addition to the  cumulative effects of arthritis. Sometimes the big toe drifts to one side or the other. Joint fusion is one option to correct an unstable, drifting toe.   All surgical procedures involve risk of infection, numbness, pain, delayed bone healing, osteotomy (bone cut) dislocation, blood clots, continued foot pain, etc. Arthritic joint surgery is quite complex and should not be taken lightly.    Any skin incision can lead to infection. Deep infection might involve the bone and thus repeat surgery and six weeks of IV antibiotics. Scar tissue can cause nerve pain or numbness. This is generally temporary but can be permanent. We do not have treatments that cure nerve problems. Second toe pain could be related to altered mechanics and pressure transferred to the second toe. Delayed bone healing would lengthen the healing time. Some bones simply do not heal. This requires repeat surgery, electronic bone stimulation and/or extended protection. Smokers have an approximate 20% chance of poor bone healing. This is double that of a non-smoker. The bone cut may displace. This may need to be repaired with a second operation. Displacement can cause joint malalignment. Immobility after surgery can cause a blood clot in the legs and lungs. This could result in death.   Foot pain is complex. Most feet hurt for more than one reason. Operating on the arthritic   big toe joint will not necessarily create a pain free foot. Appropriate shoes, healthy body weight, avoidance of bare foot walking and moderation of activity will always be necessary to enjoy foot comfort. Arthritis is incurable even with surgery.     Surgery for this type of arthritis is nevertheless quite successful. Most surgical patients are pleased with their foot following surgery. Many of the issues described above can be controlled by taking proper care of your foot during the healing process.   Cosmetic bump surgery is discouraged for the reasons listed above. A bump and  joint that is comfortable when wearing appropriate shoes should simply be treated with appropriate shoes.   Your surgeon would be happy to fully describe any of the above issues. You should pursue a full understanding of the operation, recovery process and any potential problems that could develop.     Over the Counter Inserts    Super Feet are the most common and easiest to find.    Locations include any [employer]oes [employer], [employer] in [place] on [address] and in [place] on [address], [facility] Running Room in [place] on [address], [facility] Running Room in [place] on [address], [employer] in [place] on [address] and [employer] in [place] on [address] and in [place] on [address].    Spenco can be found online and at [employer] in [place] on [address], [employer] in [facility] on [address], Gear Running Store in [place] on [address], [employer] in [place] on [address] and [employer] in [place] on [address].    Power Step can be a little harder to find.  Locations include [employer] in [place] on [address], [place] in [facility], [employer] Store in [place] on [address] and online    Walk-Fit - [employer]     [facility]    **  A good high quality over the counter insert can cost around $40-$50.  Body Mass Index (BMI)  Many things can cause foot and ankle problems. Foot structure, activity level, foot mechanics and injuries are common causes of pain.  One very important issue that often goes unmentioned is body weight. Extra weight can cause increased stress on muscles, ligaments, bones and tendons. Sometimes just a few extra pounds is all it takes to put one over her/his threshold. Without reducing that stress, it can be difficult to alleviate pain.   Some people are uncomfortable addressing this issue, but we feel it is important for you to think about it. As Foot & Ankle specialists, our job  is addressing the lower extremity problem and possible causes.   Regarding extra body weight, we encourage patients to discuss diet and weight management plans with their primary care doctors. It is this team approach that gives you the best opportunity for pain relief and getting you back on your feet.             Follow-ups after your visit        Your next 10 appointments already scheduled     Dec 12, 2017  1:30 PM CST   Return Visit with SONIA Hogan CNP   [facility] [place] ([facility])    [address]  [place] [address] [phone]              Who to contact     If you have questions or need follow up information about today's clinic visit or your schedule please contact [facility] directly at [phone].  Normal or non-critical lab and imaging results will be communicated to you by MyChart, letter or phone within 4 business days after the clinic has received the results. If you do not hear from us within 7 days, please contact the clinic through MyChart or phone. If you have a critical or abnormal lab result, we will notify you by phone as soon as possible.  Submit refill requests through [employer] or call your pharmacy and they will forward the refill request to us. Please allow 3 business days for your refill to be completed.          Additional Information About Your Visit        [employer] Information     [employer] gives you secure access to your electronic health record. If you see a primary care provider, you can also send messages to your care team and make appointments. If you have questions, please call your primary care clinic.  If you do not have a primary care provider, please call [phone] and they will assist you.        Care EveryWhere ID     This is your Care EveryWhere ID. This could be used by other organizations to access your [facility] records  [id number]        Your Vitals Were     Height BMI (Body Mass  "Index)                5' 2.5\" (1.588 m) 48.24 kg/m2           Blood Pressure from Last 3 Encounters:   10/25/17 133/80   10/16/17 143/80   07/24/17 138/88    Weight from Last 3 Encounters:   10/25/17 268 lb (121.6 kg)   10/16/17 268 lb (121.6 kg)   07/24/17 273 lb 12.8 oz (124.2 kg)              We Performed the Following     XR Foot Right G/E 3 Views          Today's Medication Changes          These changes are accurate as of: 10/25/17  9:49 AM.  If you have any questions, ask your nurse or doctor.               Start taking these medicines.        Dose/Directions    meloxicam 7.5 MG tablet   Commonly known as:  MOBIC   Used for:  Hallux limitus of right foot, Right foot pain   Started by:  Rodney Selby DPM        Dose:  7.5 mg   Take 1 tablet (7.5 mg) by mouth daily as needed   Quantity:  30 tablet   Refills:  1            Where to get your medicines      These medications were sent to [facility] [address] [place] [id number]     Phone:  [phone]     meloxicam 7.5 MG tablet                Primary Care Provider Office Phone # Fax #    Jazmyne Chapman [phone]       [address] [place] [address]        Equal Access to Services     ANAID VENEGAS AH: Hadii danielle ku hadasho Soomaali, waaxda luqadaha, qaybta kaalmada adeegyada, makayla warner. So [facility] [phone].    ATENCIÓN: Si habla español, tiene a hatfield disposición servicios gratuitos de asistencia lingüística. Llame al [phone].    We comply with applicable federal civil rights laws and [place] laws. We do not discriminate on the basis of race, color, national origin, age, disability, sex, sexual orientation, or gender identity.            Thank you!     Thank you for choosing [facility]  for your care. Our goal is always to provide you with excellent care. Hearing back from " our patients is one way we can continue to improve our services. Please take a few minutes to complete the written survey that you may receive in the mail after your visit with us. Thank you!             Your Updated Medication List - Protect others around you: Learn how to safely use, store and throw away your medicines at www.disposemymeds.org.          This list is accurate as of: 10/25/17  9:49 AM.  Always use your most recent med list.                   Brand Name Dispense Instructions for use Diagnosis    albuterol 108 (90 BASE) MCG/ACT Inhaler    PROAIR HFA/PROVENTIL HFA/VENTOLIN HFA    1 Inhaler    Inhale 2 puffs into the lungs every 6 hours as needed    Acute bronchitis, unspecified organism       ALPRAZolam 0.5 MG tablet    XANAX    4 tablet    Take 1 tablet by mouth 30 minutes before MRI. If this fails, take an additional tablet 30 minutes later. Do not take more than 2 tablets in 6 hours.    Ataxia       buPROPion 150 MG 12 hr tablet    WELLBUTRIN SR    180 tablet    Take 1 tablet once daily and increase to 1 tablet twice daily after 4 to 7 days    Moderate episode of recurrent major depressive disorder (H), Tobacco abuse       cyclobenzaprine 10 MG tablet    FLEXERIL    20 tablet    Take 0.5-1 tablets (5-10 mg) by mouth 3 times daily as needed for muscle spasms    Chronic bilateral low back pain without sciatica       diclofenac 1 % Gel topical gel    VOLTAREN    100 g    Apply 4 grams to knees or lateral hips up to  four times daily as needed for pain using enclosed dosing card.    Pain in joint, multiple sites, Chronic pain of both knees, Greater trochanteric bursitis, unspecified laterality       DULoxetine 60 MG EC capsule    CYMBALTA    90 capsule    Take 1 capsule (60 mg) by mouth daily    Chronic bilateral low back pain without sciatica, Moderate episode of recurrent major depressive disorder (H)       gabapentin 300 MG capsule    NEURONTIN    180 capsule    Take 2 capsules (600 mg) by mouth 3  times daily    DDD (degenerative disc disease), lumbar, Pain in joint, multiple sites, Myofascial pain       HYDROcodone-acetaminophen 5-325 MG per tablet    NORCO    15 tablet    Take 1 tablet by mouth every 6 hours as needed for moderate to severe pain Max of 2 tabs per day, use sparingly. OK to fill and begin on 10/16/2017.  Last refill was 1/11/17 for #15    Lumbar radicular pain       meloxicam 7.5 MG tablet    MOBIC    30 tablet    Take 1 tablet (7.5 mg) by mouth daily as needed    Hallux limitus of right foot, Right foot pain       nicotine 10 MG Inhaler    NICOTROL    180 each    Inhale 6-16 cartridge into the lungs daily as needed for smoking cessation    Tobacco abuse       omeprazole 20 MG tablet     90 tablet    Take 1 tablet (20 mg) by mouth daily    Gastroesophageal reflux disease without esophagitis       order for DME     1 Device    Equipment being ordered: [employer] foot plate, 23.5 Right    Arthritis of first metatarsophalangeal joint

## 2017-10-25 NOTE — NURSING NOTE
"Chief Complaint   Patient presents with     Toe Pain     R 1st toe pain        Initial /80 (BP Location: Right arm, Patient Position: Chair, Cuff Size: Adult Large)  Ht 5' 2.5\" (1.588 m)  Wt 268 lb (121.6 kg)  BMI 48.24 kg/m2 Estimated body mass index is 48.24 kg/(m^2) as calculated from the following:    Height as of this encounter: 5' 2.5\" (1.588 m).    Weight as of this encounter: 268 lb (121.6 kg).  Medication Reconciliation: unable or not appropriate to perform    ACesar Ravi MA October 25, 2017 9:16 AM  "

## 2017-10-25 NOTE — PATIENT INSTRUCTIONS
"DEGENERATIVE ARTHRITIS OF THE BIG TOE JOINT (hallux limitus/hallux rigidus)   Arthritis of the joint at the base of the big toe (metatarsophalangeal joint) has several causes. Usually it results from repetitive trauma to the joint, secondary to abnormal foot mechanics. Often it is hereditary. However, a one-time traumatic event can lead to arthritis. The condition can worsen with time. The cartilage wears out, joint surfaces are no longer smooth, bone rubs on bone, inflammation occurs with pain, and eventually bone spurs and loose fragments might develop.   The joint is often painful with activity, worse with flimsy shoes or walking barefoot, and it slowly progresses over time. A person might notice the toe \"locking up\" with walking. There often is an obvious, and irritating, bony bump on top of the foot. Shoes might be uncomfortable. In some people the pain is so bothersome that recreational activities sometimes even normal daily activities are difficult to perform.   The pain from this arthritis is likely a combination of joint jamming, cartilage loss and inflammation, and irritation from shoes rubbing on the bump. Sometimes other parts of the foot, leg, or back hurt from altering one's walk to compensate for the painful joint.     Ways to help a person live with the discomfort include wearing a good, supportive shoe with a rigid, rocker-type bottom. An example is a hiking boot. A rigid sole minimizes bending of the joint, and therefore, joint motion and pain. Shoes with a high toe box allow for less rubbing on the bump. Avoiding barefoot walking, sandals, flip-flops and slippers usually helps.     Sometimes an insert or orthotic provides symptom relief. This might make shoe fit more difficult. Pads over the bump and occasionally injections into the joint provide relief.     Surgery for this condition is aimed towards alleviating pain. It does not cure the arthritis nor does it guarantee better joint motion. " Depending on the condition of the metatarsophalangeal joint, there are several surgical options:    1.  Cutting off the bony bump(s) and cleaning the joint    2.  Loosening the joint up by making cuts in the first metatarsal bone or the big toe bone and removing a small section of bone.    3.  Repositioning bone to minimize jamming of the joint.    4.  In severe cases, the joint is fused. By fusing the joint, it will never bend again. This resolves the pain, because it's the movement of a worn out joint that causes pain. Oftentimes the operation involves a combination of these procedures and requires the use of screws, pins, and/or a small surgical plate.     Healing after surgery requires about six weeks of protection. This allows the bone to heal. Maximum recovery takes about one year. The scar tissue and joint structures require this amount of time to finish the healing process. Expect stiffness, swelling and numbness during that time frame.   Surgery for arthritis of the metatarsophalangeal joint does involve side effects. Some side effects are predictable and others are less common but do occur. A scar will be visible and could be irritated by shoes. The shoe may rub on the screw or internal pin requiring surgical removal of these fixation devices. The screw and pin would likely be left in place for a full year. The first toe may remain stiff after surgery. The amount of stiffness is variable. Most people never regain normal motion of the first toe. This is due to scar tissue inherent to any surgery, in addition to the cumulative effects of arthritis. Sometimes the big toe drifts to one side or the other. Joint fusion is one option to correct an unstable, drifting toe.   All surgical procedures involve risk of infection, numbness, pain, delayed bone healing, osteotomy (bone cut) dislocation, blood clots, continued foot pain, etc. Arthritic joint surgery is quite complex and should not be taken lightly.    Any  skin incision can lead to infection. Deep infection might involve the bone and thus repeat surgery and six weeks of IV antibiotics. Scar tissue can cause nerve pain or numbness. This is generally temporary but can be permanent. We do not have treatments that cure nerve problems. Second toe pain could be related to altered mechanics and pressure transferred to the second toe. Delayed bone healing would lengthen the healing time. Some bones simply do not heal. This requires repeat surgery, electronic bone stimulation and/or extended protection. Smokers have an approximate 20% chance of poor bone healing. This is double that of a non-smoker. The bone cut may displace. This may need to be repaired with a second operation. Displacement can cause joint malalignment. Immobility after surgery can cause a blood clot in the legs and lungs. This could result in death.   Foot pain is complex. Most feet hurt for more than one reason. Operating on the arthritic   big toe joint will not necessarily create a pain free foot. Appropriate shoes, healthy body weight, avoidance of bare foot walking and moderation of activity will always be necessary to enjoy foot comfort. Arthritis is incurable even with surgery.     Surgery for this type of arthritis is nevertheless quite successful. Most surgical patients are pleased with their foot following surgery. Many of the issues described above can be controlled by taking proper care of your foot during the healing process.   Cosmetic bump surgery is discouraged for the reasons listed above. A bump and joint that is comfortable when wearing appropriate shoes should simply be treated with appropriate shoes.   Your surgeon would be happy to fully describe any of the above issues. You should pursue a full understanding of the operation, recovery process and any potential problems that could develop.     Over the Counter Inserts    Super Feet are the most common and easiest to find.    Locations  include any [employer], [employer] in [place] on [address] and in [place] on [address], [employer] in [place] on [address], [facility] [employer] in [place] on [address], [employer] in [place] on [address] and [employer] in [place] on [address] and in [place] on [address].    Spenco can be found online and at [employer] in [place] on [address], [employer] in [place] on [address], [employer] in [place] on [address], [employer] in [place] on [address] and [employer] in [place] on [address].    Power Step can be a little harder to find.  Locations include [employer] in [place] on [address], [employer] in [place], [employer] in [place] on [address] and online    Walk-Fit - [employer]     [facility]    **  A good high quality over the counter insert can cost around $40-$50.  Body Mass Index (BMI)  Many things can cause foot and ankle problems. Foot structure, activity level, foot mechanics and injuries are common causes of pain.  One very important issue that often goes unmentioned is body weight. Extra weight can cause increased stress on muscles, ligaments, bones and tendons. Sometimes just a few extra pounds is all it takes to put one over her/his threshold. Without reducing that stress, it can be difficult to alleviate pain.   Some people are uncomfortable addressing this issue, but we feel it is important for you to think about it. As Foot & Ankle specialists, our job is addressing the lower extremity problem and possible causes.   Regarding extra body weight, we encourage patients to discuss diet and weight management plans with their primary care doctors. It is this team approach that gives you the best opportunity for pain relief and getting you back on your feet.

## 2017-10-25 NOTE — PROGRESS NOTES
PATIENT HISTORY:  Jessica Jay is a 57 year old female who presents to clinic for right 1st MPJ pain with activity.  Present since January.  No injury.  7-10/10 pain.  No treatments.      Review of Systems:  Patient denies fever, chills, rash, wound, limping, numbness, weakness, heart burn, blood in stool, chest pain with activity, calf pain when walking, shortness of breath with activity, chronic cough, easy bleeding/bruising, swelling of ankles, excessive thirst,  anxiety.  Patient admits to fatigue, depression, stiffness.     PAST MEDICAL HISTORY:   Past Medical History:   Diagnosis Date     Cellulitis 9/26/2010    [facility]     Degenerative disc disease     cervical     Depression, major      Depressive disorder      Dermoid cyst of brain (H)      GERD (gastroesophageal reflux disease)      LBP (low back pain)      Migraines      Panic attacks      Seasonal allergies      Sleep apnoea     Wears C-PAP        PAST SURGICAL HISTORY:   Past Surgical History:   Procedure Laterality Date     ARTHROSCOPY SHOULDER  1990    Right/spurs     C FOOT/TOES SURGERY PROC UNLISTED  1975    Toe fracture, left     CARPAL TUNNEL RELEASE RT/LT  1990    Left     COLONOSCOPY  2016     CRANIOTOMY, EXCISE TUMOR COMPLEX, COMBINED  5/9/2011    Procedure:COMBINED CRANIOTOMY, EXCISE TUMOR COMPLEX; 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; Surgeon:LEE ANN PAULA; Location:[facility]     ROTATOR CUFF REPAIR RT/LT  2009     Left        MEDICATIONS:   Current Outpatient Prescriptions:      meloxicam (MOBIC) 7.5 MG tablet, Take 1 tablet (7.5 mg) by mouth daily as needed, Disp: 30 tablet, Rfl: 1     HYDROcodone-acetaminophen (NORCO) 5-325 MG per tablet, Take 1 tablet by mouth every 6 hours as needed for moderate to severe pain Max of 2 tabs per day, use sparingly. OK to fill and begin on 10/16/2017.  Last refill was 1/11/17 for #15, Disp: 15 tablet, Rfl: 0     ALPRAZolam (XANAX) 0.5 MG tablet, Take 1 tablet by mouth  30 minutes before MRI. If this fails, take an additional tablet 30 minutes later. Do not take more than 2 tablets in 6 hours., Disp: 4 tablet, Rfl: 0     gabapentin (NEURONTIN) 300 MG capsule, Take 2 capsules (600 mg) by mouth 3 times daily, Disp: 180 capsule, Rfl: 1     cyclobenzaprine (FLEXERIL) 10 MG tablet, Take 0.5-1 tablets (5-10 mg) by mouth 3 times daily as needed for muscle spasms, Disp: 20 tablet, Rfl: 0     diclofenac (VOLTAREN) 1 % GEL topical gel, Apply 4 grams to knees or lateral hips up to  four times daily as needed for pain using enclosed dosing card., Disp: 100 g, Rfl: 1     omeprazole 20 MG tablet, Take 1 tablet (20 mg) by mouth daily, Disp: 90 tablet, Rfl: 3     DULoxetine (CYMBALTA) 60 MG EC capsule, Take 1 capsule (60 mg) by mouth daily, Disp: 90 capsule, Rfl: 3     nicotine (NICOTROL) 10 MG Inhaler, Inhale 6-16 cartridge into the lungs daily as needed for smoking cessation, Disp: 180 each, Rfl: 3     order for DME, Equipment being ordered: [employer] foot plate, 23.5 Right, Disp: 1 Device, Rfl: 0     buPROPion (WELLBUTRIN SR) 150 MG 12 hr tablet, Take 1 tablet once daily and increase to 1 tablet twice daily after 4 to 7 days, Disp: 180 tablet, Rfl: 2     albuterol (PROAIR HFA, PROVENTIL HFA, VENTOLIN HFA) 108 (90 BASE) MCG/ACT inhaler, Inhale 2 puffs into the lungs every 6 hours as needed, Disp: 1 Inhaler, Rfl: 1     ALLERGIES:    Allergies   Allergen Reactions     Buspar [Buspirone Hcl] Rash        SOCIAL HISTORY:   Social History     Social History     Marital status: Single     Spouse name: N/A     Number of children: 0     Years of education: 18     Occupational History      [employer]     Social History Main Topics     Smoking status: Former Smoker     Packs/day: 0.80     Years: 30.00     Types: Cigarettes     Quit date: 7/4/2017     Smokeless tobacco: Never Used     Alcohol use 0.0 oz/week      Comment: 6 drinks a year     Drug use: No     Sexual activity: Not  "Currently     Birth control/ protection: None     Other Topics Concern      Service No     Blood Transfusions No     Caffeine Concern Yes     Coffee     Occupational Exposure No     Hobby Hazards No     Sleep Concern Yes     feels she sleeps to much     Stress Concern No     Weight Concern Yes     cannot lose     Special Diet No     Back Care No     Exercise Yes     Walk on treadmill, swim     Seat Belt Yes     Self-Exams No     Parent/Sibling W/ Cabg, Mi Or Angioplasty Before 65f 55m? No     Social History Narrative    Process death claims        FAMILY HISTORY:   Family History   Problem Relation Age of Onset     Hypertension Mother      C.A.D. Father      CANCER Maternal Grandmother      CEREBROVASCULAR DISEASE Paternal Grandmother      Breast Cancer Paternal Grandmother      Neurologic Disorder Sister      migraine     CANCER Brother      Cancer - colorectal Brother      Colon Cancer Brother         EXAM:Vitals: /80 (BP Location: Right arm, Patient Position: Chair, Cuff Size: Adult Large)  Ht 5' 2.5\" (1.588 m)  Wt 268 lb (121.6 kg)  BMI 48.24 kg/m2  BMI= Body mass index is 48.24 kg/(m^2).    General appearance: Patient is alert and fully cooperative with history & exam.  No sign of distress is noted during the visit.     Psychiatric: Affect is pleasant & appropriate.  Patient appears motivated to improve health.     Respiratory: Breathing is regular & unlabored while sitting.     HEENT: Hearing is intact to spoken word.  Speech is clear.  No gross evidence of visual impairment that would impact ambulation.     Dermatologic: Skin is intact to the right foot without significant lesions, rash or abrasion.  No paronychia or evidence of soft tissue infection is noted.     Vascular: DP & PT pulses are intact & regular on the right.  Mild b/l LE edema noted.  CFT and skin temperature are normal to both lower extremities.     Neurologic: Lower extremity sensation is intact to light touch.  No evidence " of weakness or contracture in the lower extremities.  No evidence of neuropathy.     Musculoskeletal: Right 1st MPJ pain with ROM, which is limited.  Palpable spurring noted.  Patient is ambulatory without assistive device or brace.  No gross ankle deformity noted.  No foot or ankle joint effusion is noted.    XRs of right foot reviewed with pt.  Moderate to severe 1st MPJ DJD noted with loose body.     ASSESSMENT:  Right hallux limitus     PLAN:  Reviewed patient's chart in epic.  Discussed condition and treatment options including pros and cons.    Surgical and non-surgical treatment options for hallux limitus were discussed.  Non-operative treatments like stiff soles, orthotics, injection and NSAIDs (to be taken with food) were discussed.  Shoes that provide extra room for the enlarged joint should be helpful.  I would anticipate somewhat short term benefit from joint injection.  Discussed surgical options including fusion.    Pt will try new shoes, otc inserts.  Mobic prescribed prn.  F/u as needed.  Handout given.      Rodney Selby DPM, FACFAS

## 2017-10-30 DIAGNOSIS — Z72.0 TOBACCO ABUSE: ICD-10-CM

## 2017-10-30 DIAGNOSIS — F33.1 MODERATE EPISODE OF RECURRENT MAJOR DEPRESSIVE DISORDER (H): ICD-10-CM

## 2017-11-02 NOTE — TELEPHONE ENCOUNTER
Checked chart. Patient has read [employer] message but has not replied and has not completed PHQ-9.    Roberto David RN

## 2017-11-08 ENCOUNTER — MYC MEDICAL ADVICE (OUTPATIENT)
Dept: FAMILY MEDICINE | Facility: CLINIC | Age: 57
End: 2017-11-08

## 2017-11-08 DIAGNOSIS — Z72.0 TOBACCO ABUSE: ICD-10-CM

## 2017-11-08 DIAGNOSIS — F33.1 MODERATE EPISODE OF RECURRENT MAJOR DEPRESSIVE DISORDER (H): ICD-10-CM

## 2017-11-08 RX ORDER — BUPROPION HYDROCHLORIDE 150 MG/1
150 TABLET, EXTENDED RELEASE ORAL 2 TIMES DAILY
Qty: 180 TABLET | Refills: 3 | Status: SHIPPED | OUTPATIENT
Start: 2017-11-08 | End: 2018-10-27

## 2017-11-08 NOTE — TELEPHONE ENCOUNTER
Bupriopion 150 mg      Last Written Prescription Date: 1/23/17  Last Fill Quantity: 180,  # refills: 2   Last Office Visit with [facility], [facility] or [facility] prescribing provider: 10.16.17                                         Next 5 appointments (look out 90 days)     Dec 12, 2017  1:30 PM CST   Return Visit with SONIA Hogan CNP   [facility] ([facility])    [id number] [id number] [phone]                Encounter from 10/30/17. PHQ-9 survey was sent to patient to complete (last PHQ-9 1/17/17).   Routing refill request to provider for review/approval because:  Patient d/t be seen 3 months from last OV.  However, did not come in to get her BP checked in between.     Isamar Zuleta, RN  [facility]

## 2017-11-16 ENCOUNTER — MYC MEDICAL ADVICE (OUTPATIENT)
Dept: FAMILY MEDICINE | Facility: CLINIC | Age: 57
End: 2017-11-16

## 2017-11-24 RX ORDER — BUPROPION HYDROCHLORIDE 150 MG/1
TABLET, EXTENDED RELEASE ORAL
Qty: 180 TABLET | Refills: 0 | OUTPATIENT
Start: 2017-11-24

## 2017-11-24 NOTE — TELEPHONE ENCOUNTER
An Item was picked up at the [facility] :    Date it was picked up?  11/24/2017     What was picked up?  paperwork    Who picked them up?  Gemma Jay  Relationship to patient? Patient    Did they show ID?  Yes    Was it logged in book? Yes    Who gave it to the patient?  Gillian Jang  Patient Representative

## 2017-11-24 NOTE — TELEPHONE ENCOUNTER
Called patient at both phone numbers and left a VM message that parting Certificate is ready for .    Also sent a [employer] message that I am placing Application for Disability Parking Cert at  for her to .    Monika Olivia

## 2017-12-12 ENCOUNTER — OFFICE VISIT (OUTPATIENT)
Dept: PALLIATIVE MEDICINE | Facility: CLINIC | Age: 57
End: 2017-12-12
Payer: COMMERCIAL

## 2017-12-12 VITALS
SYSTOLIC BLOOD PRESSURE: 152 MMHG | HEART RATE: 82 BPM | DIASTOLIC BLOOD PRESSURE: 78 MMHG | BODY MASS INDEX: 45.36 KG/M2 | WEIGHT: 252 LBS

## 2017-12-12 DIAGNOSIS — M54.50 CHRONIC BILATERAL LOW BACK PAIN WITHOUT SCIATICA: Primary | ICD-10-CM

## 2017-12-12 DIAGNOSIS — M51.369 DDD (DEGENERATIVE DISC DISEASE), LUMBAR: ICD-10-CM

## 2017-12-12 DIAGNOSIS — M79.18 MYOFASCIAL PAIN: ICD-10-CM

## 2017-12-12 DIAGNOSIS — G89.29 CHRONIC BILATERAL LOW BACK PAIN WITHOUT SCIATICA: Primary | ICD-10-CM

## 2017-12-12 DIAGNOSIS — M25.50 MULTIPLE JOINT PAIN: ICD-10-CM

## 2017-12-12 DIAGNOSIS — M47.817 LUMBOSACRAL SPONDYLOSIS WITHOUT MYELOPATHY: ICD-10-CM

## 2017-12-12 PROCEDURE — 99214 OFFICE O/P EST MOD 30 MIN: CPT | Performed by: NURSE PRACTITIONER

## 2017-12-12 ASSESSMENT — PAIN SCALES - GENERAL: PAINLEVEL: MODERATE PAIN (4)

## 2017-12-12 NOTE — Clinical Note
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 She is on minimal opiates, Flexeril and psych meds prescribed by you. She is working on weight loss with [employer]. Overall, is feeling better. Tapered off of gabapentin without increase in pain. Will transfer her care back to you. She can see me if needed without referral in the next year, after that, would require a new patient evaluation referral.  Let me know if you have questions.  Thanks so much- Aundrea

## 2017-12-12 NOTE — PATIENT INSTRUCTIONS
PLAN:  1. Medications:   1. Continue taking flexeril 5-10mg 3 times daily.  2. Continue taking Cymbalta 60mg daily as prescirbed by PCP  3. Continue taking Wellbutrin 150mg twice daily  4. Continue sparing use of Norco as you have been.  2. Procedures: None at present  3. Use back brace at work for severe pain, on occasion. Use sparingly as regular use will weaken your back and make pain worse over time  4. Follow-up with me as needed.   5. Follow up with sleep medicine provider, to adjust sleep apnea mask at [facility]. Address: [facility] [address], [place]. Phone number: [phone]  6. I will plan on having you transfer your care for pain back to Primary Care Provider, if you have not seen me in over a year, you would need a new referral.           ----------------------------------------------------------------  Nurse Triage line:  [phone]   Call this number with any questions or concerns. You may leave a detailed message anytime. Calls are typically returned Monday through Friday between 8 AM and 4:30 PM. We usually get back to you within 2 business days depending on the issue/request.       Medication refills:    For non-narcotic medications, call your pharmacy directly to request a refill. The pharmacy will contact the [facility] for authorization. Please allow 3-4 days for these refills to be processed.     For narcotic refills, call the nurse triage line or send a [employer] message. Please contact us 7-10 days before your refill is due. The message MUST include the name of the specific medication(s) requested and how you would like to receive the prescription(s). The options are as follows:    Pain Clinic staff can mail the prescription to your pharmacy. Please tell us the name of the pharmacy.    You may pick the prescription up at the Pain Clinic (tell us the location) or during a clinic visit with your pain provider    Pain Clinic staff can deliver the prescription to the  [place] pharmacy in the clinic building. Please tell us the location.      Scheduling number: [phone].  Call this number to schedule or change appointments.    We believe regular attendance is key to your success in our program.    Any time you are unable to keep your appointment we ask that you call us at least 24 hours in advance to let us know. This will allow us to offer the appointment time to another patient.

## 2017-12-12 NOTE — NURSING NOTE
"Chief Complaint   Patient presents with     Pain     Back pain        Initial /78  Pulse 82  Wt 114.3 kg (252 lb)  BMI 45.36 kg/m2 Estimated body mass index is 45.36 kg/(m^2) as calculated from the following:    Height as of 10/25/17: 1.588 m (5' 2.5\").    Weight as of this encounter: 114.3 kg (252 lb).  Medication Reconciliation: complete     Gela Naranjo MA      "
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
## 2017-12-12 NOTE — MR AVS SNAPSHOT
After Visit Summary   12/12/2017    Jessica Jay    MRN: [id number]           Patient Information     Date Of Birth          1960        Visit Information        Provider Department      12/12/2017 1:30 PM Aundrea Sanchez APRN [facility] Instructions    PLAN:  1. Medications:   1. Continue taking flexeril 5-10mg 3 times daily.  2. Continue taking Cymbalta 60mg daily as prescirbed by PCP  3. Continue taking Wellbutrin 150mg twice daily  4. Continue sparing use of Norco as you have been.  2. Procedures: None at present  3. Use back brace at work for severe pain, on occasion. Use sparingly as regular use will weaken your back and make pain worse over time  4. Follow-up with me as needed.   5. Follow up with sleep medicine provider, to adjust sleep apnea mask at [facility]. Address: [facility] [address], [place]. Phone number: [phone]  6. I will plan on having you transfer your care for pain back to Primary Care Provider, if you have not seen me in over a year, you would need a new referral.           ----------------------------------------------------------------  Nurse Triage line:  [phone]   Call this number with any questions or concerns. You may leave a detailed message anytime. Calls are typically returned Monday through Friday between 8 AM and 4:30 PM. We usually get back to you within 2 business days depending on the issue/request.       Medication refills:    For non-narcotic medications, call your pharmacy directly to request a refill. The pharmacy will contact the Pain Management Center for authorization. Please allow 3-4 days for these refills to be processed.     For narcotic refills, call the nurse triage line or send a [employer] message. Please contact us 7-10 days before your refill is due. The message MUST include the name of the specific medication(s) requested and how you would like to receive the prescription(s). The  options are as follows:    Pain Clinic staff can mail the prescription to your pharmacy. Please tell us the name of the pharmacy.    You may pick the prescription up at the Pain Clinic (tell us the location) or during a clinic visit with your pain provider    Pain Clinic staff can deliver the prescription to the [facility] pharmacy in the clinic building. Please tell us the location.      Scheduling number: [phone].  Call this number to schedule or change appointments.    We believe regular attendance is key to your success in our program.    Any time you are unable to keep your appointment we ask that you call us at least 24 hours in advance to let us know. This will allow us to offer the appointment time to another patient.               Follow-ups after your visit        Who to contact     If you have questions or need follow up information about today's clinic visit or your schedule please contact [facility] directly at [phone].  Normal or non-critical lab and imaging results will be communicated to you by MyChart, letter or phone within 4 business days after the clinic has received the results. If you do not hear from us within 7 days, please contact the clinic through [employer] or phone. If you have a critical or abnormal lab result, we will notify you by phone as soon as possible.  Submit refill requests through [facility] My [facility] or call your pharmacy and they will forward the refill request to us. Please allow 3 business days for your refill to be completed.          Additional Information About Your Visit        MyChart Information     Monitor My Meds gives you secure access to your electronic health record. If you see a primary care provider, you can also send messages to your care team and make appointments. If you have questions, please call your primary care clinic.  If you do not have a primary care provider, please call [phone] and they will assist you.        Care EveryWhere ID     This is your  Care EveryWhere ID. This could be used by other organizations to access your [place] medical records  [id number]        Your Vitals Were     Pulse BMI (Body Mass Index)                82 45.36 kg/m2           Blood Pressure from Last 3 Encounters:   12/12/17 152/78   10/25/17 133/80   10/16/17 143/80    Weight from Last 3 Encounters:   12/12/17 114.3 kg (252 lb)   10/25/17 121.6 kg (268 lb)   10/16/17 121.6 kg (268 lb)              Today, you had the following     No orders found for display       Primary Care Provider Office Phone # Fax #    Jazmyne Chapman [phone]       [address] [facility] [address]        Equal Access to Services     ANIAD VENEGAS : Mike mackeyo Soedwardoali, waaxda luqadaha, qaybta kaalmada adeegyada, makayla warner. So [facility] [phone].    ATENCIÓN: Si habla español, tiene a hatfield disposición servicios gratuitos de asistencia lingüística. Llame al [phone].    We comply with applicable federal civil rights laws and [place] laws. We do not discriminate on the basis of race, color, national origin, age, disability, sex, sexual orientation, or gender identity.            Thank you!     Thank you for choosing [facility]  for your care. Our goal is always to provide you with excellent care. Hearing back from our patients is one way we can continue to improve our services. Please take a few minutes to complete the written survey that you may receive in the mail after your visit with us. Thank you!             Your Updated Medication List - Protect others around you: Learn how to safely use, store and throw away your medicines at www.disposemymeds.org.          This list is accurate as of: 12/12/17  2:11 PM.  Always use your most recent med list.                   Brand Name Dispense Instructions for use Diagnosis    albuterol 108 (90 BASE) MCG/ACT Inhaler    PROAIR HFA/PROVENTIL HFA/VENTOLIN HFA    1 Inhaler     Inhale 2 puffs into the lungs every 6 hours as needed    Acute bronchitis, unspecified organism       buPROPion 150 MG 12 hr tablet    WELLBUTRIN SR    180 tablet    Take 1 tablet (150 mg) by mouth 2 times daily    Moderate episode of recurrent major depressive disorder (H), Tobacco abuse       cyclobenzaprine 10 MG tablet    FLEXERIL    20 tablet    Take 0.5-1 tablets (5-10 mg) by mouth 3 times daily as needed for muscle spasms    Chronic bilateral low back pain without sciatica       DULoxetine 60 MG EC capsule    CYMBALTA    90 capsule    Take 1 capsule (60 mg) by mouth daily    Chronic bilateral low back pain without sciatica, Moderate episode of recurrent major depressive disorder (H)       HYDROcodone-acetaminophen 5-325 MG per tablet    NORCO    15 tablet    Take 1 tablet by mouth every 6 hours as needed for moderate to severe pain Max of 2 tabs per day, use sparingly. OK to fill and begin on 10/16/2017.  Last refill was 1/11/17 for #15    Lumbar radicular pain       meloxicam 7.5 MG tablet    MOBIC    30 tablet    Take 1 tablet (7.5 mg) by mouth daily as needed    Hallux limitus of right foot, Right foot pain       nicotine 10 MG Inhaler    NICOTROL    180 each    Inhale 6-16 cartridge into the lungs daily as needed for smoking cessation    Tobacco abuse       omeprazole 20 MG tablet     90 tablet    Take 1 tablet (20 mg) by mouth daily    Gastroesophageal reflux disease without esophagitis       order for DME     1 Device    Equipment being ordered: Pharmaxis foot plate, 23.5 Right    Arthritis of first metatarsophalangeal joint

## 2018-01-12 ENCOUNTER — MYC MEDICAL ADVICE (OUTPATIENT)
Dept: FAMILY MEDICINE | Facility: CLINIC | Age: 58
End: 2018-01-12

## 2018-01-12 DIAGNOSIS — L91.8 SKIN TAG: Primary | ICD-10-CM

## 2018-01-12 NOTE — TELEPHONE ENCOUNTER
Route to PCP about a dermatology referral, pended. I don't see skin tags noted in OV notes.   Cesia Peters RN

## 2018-02-02 ENCOUNTER — MYC MEDICAL ADVICE (OUTPATIENT)
Dept: FAMILY MEDICINE | Facility: CLINIC | Age: 58
End: 2018-02-02

## 2018-02-02 DIAGNOSIS — L91.8 SKIN TAG: Primary | ICD-10-CM

## 2018-02-08 ENCOUNTER — TRANSFERRED RECORDS (OUTPATIENT)
Dept: HEALTH INFORMATION MANAGEMENT | Facility: CLINIC | Age: 58
End: 2018-02-08

## 2018-02-13 ENCOUNTER — OFFICE VISIT (OUTPATIENT)
Dept: PODIATRY | Facility: CLINIC | Age: 58
End: 2018-02-13
Payer: COMMERCIAL

## 2018-02-13 VITALS — HEART RATE: 90 BPM | HEIGHT: 62 IN | OXYGEN SATURATION: 96 %

## 2018-02-13 DIAGNOSIS — M20.5X1 HALLUX LIMITUS OF RIGHT FOOT: Primary | ICD-10-CM

## 2018-02-13 PROCEDURE — 99214 OFFICE O/P EST MOD 30 MIN: CPT | Performed by: PODIATRIST

## 2018-02-13 NOTE — PATIENT INSTRUCTIONS
We wish you continued good healing. If you have any questions or concerns, please do not hesitate to contact us at [phone]    Please remember to call and schedule a follow up appointment if one was recommended at your earliest convenience.   PODIATRY CLINIC HOURS  TELEPHONE NUMBER    Dr. Marshall Grewal D.P.[facility]    Clinics:  [facility]    Katheryn Cabrera [facility]   Tuesday 1PM-6PM  [place]  Wednesday 7AM-2PM  [facility]  Thursday 10AM-6PM  [place]  Friday 7AM-3PM  [place]  Specialty schedulers:   [phone] to make an appointment with any Specialty Provider.        Urgent Care locations:    [facility] Monday-Friday 5 pm - 9 pm. Saturday-Sunday 9 am -5pm    Monday-Friday 11 am - 9 pm Saturday 9 am - 5 pm     Monday-Sunday 12 noon-8PM [phone]     If you need a medication refill, please contact us you may need lab work and/or a follow up visit prior to your refill (i.e. Antifungal medications).    [email or web address] (secure e-mail communication and access to your chart) to send a message or to make an appointment.    If MRI needed please call [place] at [phone]        Weight management plan: Patient was referred to their PCP to discuss a diet and exercise plan.

## 2018-02-13 NOTE — PROGRESS NOTES
Subjective:    Pt is seen today as a new pt referral with the c/c of painful right foot.  This has been symptomatic for the past 1 years.  Points to dorsum of first metatarsal phalangeal joint.  Has pain w/ ambulation and shoewear and is relieved by rest.  Denies pain in the contralateral foot.  Has tried NSAIDS,  and changing shoewear around w/o much success.  Works at sit down job.       ROS:  Denies weakness, numbness, edema and echymosis       Allergies   Allergen Reactions     Buspar [Buspirone Hcl] Rash       Current Outpatient Prescriptions   Medication Sig Dispense Refill     buPROPion (WELLBUTRIN SR) 150 MG 12 hr tablet Take 1 tablet (150 mg) by mouth 2 times daily 180 tablet 3     meloxicam (MOBIC) 7.5 MG tablet Take 1 tablet (7.5 mg) by mouth daily as needed 30 tablet 1     HYDROcodone-acetaminophen (NORCO) 5-325 MG per tablet Take 1 tablet by mouth every 6 hours as needed for moderate to severe pain Max of 2 tabs per day, use sparingly. OK to fill and begin on 10/16/2017.  Last refill was 1/11/17 for #15 15 tablet 0     cyclobenzaprine (FLEXERIL) 10 MG tablet Take 0.5-1 tablets (5-10 mg) by mouth 3 times daily as needed for muscle spasms 20 tablet 0     omeprazole 20 MG tablet Take 1 tablet (20 mg) by mouth daily 90 tablet 3     DULoxetine (CYMBALTA) 60 MG EC capsule Take 1 capsule (60 mg) by mouth daily 90 capsule 3     order for DME Equipment being ordered: Dynaflex foot plate, 23.5 Right 1 Device 0     albuterol (PROAIR HFA, PROVENTIL HFA, VENTOLIN HFA) 108 (90 BASE) MCG/ACT inhaler Inhale 2 puffs into the lungs every 6 hours as needed 1 Inhaler 1       Patient Active Problem List   Diagnosis     Depression, major     GERD (gastroesophageal reflux disease)     Migraines     CARDIOVASCULAR SCREENING; LDL GOAL LESS THAN 160     Disc herniation     DDD (degenerative disc disease), lumbar     DDD (degenerative disc disease), cervical     Neck pain     Headache     Dermoid cyst of brain (H)     Chronic  "daily headache     Dizziness - light-headed     Tobacco abuse     ELYSSA (obstructive sleep apnea)     Spondylolisthesis, grade 1     Chronic low back pain     Brain lipoma     Vulvar dermatitis     History of colonic polyps     Gastroesophageal reflux disease without esophagitis     overweight with BMI >40       Past Medical History:   Diagnosis Date     Cellulitis 9/26/2010    [facility]     Degenerative disc disease     cervical     Depression, major      Depressive disorder      Dermoid cyst of brain (H)      GERD (gastroesophageal reflux disease)      LBP (low back pain)      Migraines      Panic attacks      Seasonal allergies      Sleep apnoea     Wears C-PAP       Past Surgical History:   Procedure Laterality Date     ARTHROSCOPY SHOULDER  1990    Right/spurs     C FOOT/TOES SURGERY PROC UNLISTED  1975    Toe fracture, left     CARPAL TUNNEL RELEASE RT/LT  1990    Left     COLONOSCOPY  2016     CRANIOTOMY, EXCISE TUMOR COMPLEX, COMBINED  5/9/2011    Procedure:COMBINED CRANIOTOMY, EXCISE TUMOR COMPLEX; 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; Surgeon:LEE ANN PAULA; Location:[facility] OR     ROTATOR CUFF REPAIR RT/LT  2009     Left       Family History   Problem Relation Age of Onset     Hypertension Mother      C.A.D. Father      CANCER Maternal Grandmother      CEREBROVASCULAR DISEASE Paternal Grandmother      Breast Cancer Paternal Grandmother      Neurologic Disorder Sister      migraine     CANCER Brother      Cancer - colorectal Brother      Colon Cancer Brother        Social History   Substance Use Topics     Smoking status: Former Smoker     Packs/day: 0.80     Years: 30.00     Types: Cigarettes     Quit date: 7/4/2017     Smokeless tobacco: Never Used     Alcohol use 0.0 oz/week      Comment: 6 drinks a year       Objective:    Pulse 90  Ht 5' 2\" (1.575 m)  SpO2 96%.  Alert and oriented X 3.  Patient good historian.   Pulses DP, PT 2/4 b/l.  CRT < 3 seconds X 10 digits.  " Bilateral ankle edema or varicosities noted.  Sensation to light touch intact b/l.  Reflexes 2/4 b/l.  Skin has normal texture and turgor b/l.  Normal arch with weightbearing.  No forefoot or rear foot deformities noted.  MS 5/5 all compartments.    No equinus.   Normal ROM all forefoot and rearfoot joints except [id number] MTPJ.  Patient has pain with range of motion.  Michelet proliferation dorsally.  No echymosis, edema, signs of infection or trauma. No open lesions, increased warmth or ascending cellulitis.  X-ray three views foot shows joint space narrowing of 1st mtpj, subcondral schlerosis, and a dorsal flag with joint mice.  No other fractures/pathology noted.      Assessment:  Hallux Limitus right foot    Plan:  X-rays personally reviewed.  Discussed etiology and treatment options in detail w/ the pt.  Xrays reviewed with patient.  Recommended wearing a stiff soled shoe, icing, limiting activities, not going barefoot, and taking ibuprofen prn for discomfort.  Stiff walking shoes and made suggestions.  Discussed injection.  Discussed fusion.  Return to clinic prn.      Marshall Grewal DPM, FACFAS

## 2018-02-13 NOTE — LETTER
2/13/2018         RE: Jessica Jay  [address] [facility] [address]        Dear Colleague,    Thank you for referring your patient, Jessica Jay, to the [facility]. Please see a copy of my visit note below.     Subjective:    Pt is seen today as a new pt referral with the c/c of painful right foot.  This has been symptomatic for the past 1 years.  Points to dorsum of first metatarsal phalangeal joint.  Has pain w/ ambulation and shoewear and is relieved by rest.  Denies pain in the contralateral foot.  Has tried NSAIDS,  and changing shoewear around w/o much success.  Works at sit down job.       ROS:  Denies weakness, numbness, edema and echymosis       Allergies   Allergen Reactions     Buspar [Buspirone Hcl] Rash       Current Outpatient Prescriptions   Medication Sig Dispense Refill     buPROPion (WELLBUTRIN SR) 150 MG 12 hr tablet Take 1 tablet (150 mg) by mouth 2 times daily 180 tablet 3     meloxicam (MOBIC) 7.5 MG tablet Take 1 tablet (7.5 mg) by mouth daily as needed 30 tablet 1     HYDROcodone-acetaminophen (NORCO) 5-325 MG per tablet Take 1 tablet by mouth every 6 hours as needed for moderate to severe pain Max of 2 tabs per day, use sparingly. OK to fill and begin on 10/16/2017.  Last refill was 1/11/17 for #15 15 tablet 0     cyclobenzaprine (FLEXERIL) 10 MG tablet Take 0.5-1 tablets (5-10 mg) by mouth 3 times daily as needed for muscle spasms 20 tablet 0     omeprazole 20 MG tablet Take 1 tablet (20 mg) by mouth daily 90 tablet 3     DULoxetine (CYMBALTA) 60 MG EC capsule Take 1 capsule (60 mg) by mouth daily 90 capsule 3     order for DME Equipment being ordered: Dynaflex foot plate, 23.5 Right 1 Device 0     albuterol (PROAIR HFA, PROVENTIL HFA, VENTOLIN HFA) 108 (90 BASE) MCG/ACT inhaler Inhale 2 puffs into the lungs every 6 hours as needed 1 Inhaler 1       Patient Active Problem List   Diagnosis     Depression, major     GERD (gastroesophageal reflux disease)      Migraines     CARDIOVASCULAR SCREENING; LDL GOAL LESS THAN 160     Disc herniation     DDD (degenerative disc disease), lumbar     DDD (degenerative disc disease), cervical     Neck pain     Headache     Dermoid cyst of brain (H)     Chronic daily headache     Dizziness - light-headed     Tobacco abuse     ELYSSA (obstructive sleep apnea)     Spondylolisthesis, grade 1     Chronic low back pain     Brain lipoma     Vulvar dermatitis     History of colonic polyps     Gastroesophageal reflux disease without esophagitis     overweight with BMI >40       Past Medical History:   Diagnosis Date     Cellulitis 9/26/2010    [facility]     Degenerative disc disease     cervical     Depression, major      Depressive disorder      Dermoid cyst of brain (H)      GERD (gastroesophageal reflux disease)      LBP (low back pain)      Migraines      Panic attacks      Seasonal allergies      Sleep apnoea     Wears C-PAP       Past Surgical History:   Procedure Laterality Date     ARTHROSCOPY SHOULDER  1990    Right/spurs     C FOOT/TOES SURGERY PROC UNLISTED  1975    Toe fracture, left     CARPAL TUNNEL RELEASE RT/LT  1990    Left     COLONOSCOPY  2016     CRANIOTOMY, EXCISE TUMOR COMPLEX, COMBINED  5/9/2011    Procedure:COMBINED CRANIOTOMY, EXCISE TUMOR COMPLEX; 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; Surgeon:LEE ANN PAULA; Location:[facility]     ROTATOR CUFF REPAIR RT/LT  2009     Left       Family History   Problem Relation Age of Onset     Hypertension Mother      C.A.D. Father      CANCER Maternal Grandmother      CEREBROVASCULAR DISEASE Paternal Grandmother      Breast Cancer Paternal Grandmother      Neurologic Disorder Sister      migraine     CANCER Brother      Cancer - colorectal Brother      Colon Cancer Brother        Social History   Substance Use Topics     Smoking status: Former Smoker     Packs/day: 0.80     Years: 30.00     Types: Cigarettes     Quit date: 7/4/2017     Smokeless  "tobacco: Never Used     Alcohol use 0.0 oz/week      Comment: 6 drinks a year       Objective:    Pulse 90  Ht 5' 2\" (1.575 m)  SpO2 96%.  Alert and oriented X 3.  Patient good historian.   Pulses DP, PT 2/4 b/l.  CRT < 3 seconds X 10 digits.  Bilateral ankle edema or varicosities noted.  Sensation to light touch intact b/l.  Reflexes 2/4 b/l.  Skin has normal texture and turgor b/l.  Normal arch with weightbearing.  No forefoot or rear foot deformities noted.  MS 5/5 all compartments.    No equinus.   Normal ROM all forefoot and rearfoot joints except bften2de MTPJ.  Patient has pain with range of motion.  Michelet proliferation dorsally.  No echymosis, edema, signs of infection or trauma. No open lesions, increased warmth or ascending cellulitis.  X-ray three views foot shows joint space narrowing of 1st mtpj, subcondral schlerosis, and a dorsal flag with joint mice.  No other fractures/pathology noted.      Assessment:  Hallux Limitus right foot    Plan:  X-rays personally reviewed.  Discussed etiology and treatment options in detail w/ the pt.  Xrays reviewed with patient.  Recommended wearing a stiff soled shoe, icing, limiting activities, not going barefoot, and taking ibuprofen prn for discomfort.  Stiff walking shoes and made suggestions.  Discussed injection.  Discussed fusion.  Return to clinic prn.      Marshall Grewal DPM, FACFAS       Again, thank you for allowing me to participate in the care of your patient.        Sincerely,        Marshall Grewal DPM    "

## 2018-02-13 NOTE — MR AVS SNAPSHOT
After Visit Summary   2/13/2018    Jessica Jay    MRN: [id number]           Patient Information     Date Of Birth          1960        Visit Information        Provider Department      2/13/2018 5:15 PM Marshall Grewal, ALB[facility] Aide        Today's Diagnoses     Hallux limitus of right foot    -  1      Care Instructions    We wish you continued good healing. If you have any questions or concerns, please do not hesitate to contact us at [phone]    Please remember to call and schedule a follow up appointment if one was recommended at your earliest convenience.   PODIATRY CLINIC HOURS  TELEPHONE NUMBER    Dr. Marshall Grewal D.P.M FAC FAS    Clinics:  [facility]    Katheryn Cabrera [facility]   Tuesday 1PM-6PM  [place]  Wednesday 7AM-2PM  [place]  Thursday 10AM-6PM  [place]  Friday 7AM-3PM  [place]  Specialty schedulers:   [phone] to make an appointment with any Specialty Provider.        Urgent Care locations:    [facility] Monday-Friday 5 pm - 9 pm. Saturday-Sunday 9 am -5pm    Monday-Friday 11 am - 9 pm Saturday 9 am - 5 pm     Monday-Sunday 12 noon-8PM [phone]     If you need a medication refill, please contact us you may need lab work and/or a follow up visit prior to your refill (i.e. Antifungal medications).    [employer] (secure e-mail communication and access to your chart) to send a message or to make an appointment.    If MRI needed please call [facility] at [phone]        Weight management plan: Patient was referred to their PCP to discuss a diet and exercise plan.            Follow-ups after your visit        Who to contact     If you have questions or need follow up information about today's clinic visit or your schedule please contact [facility] AIDE directly at [phone].  Normal or  "non-critical lab and imaging results will be communicated to you by MyChart, letter or phone within 4 business days after the clinic has received the results. If you do not hear from us within 7 days, please contact the clinic through [employer] or phone. If you have a critical or abnormal lab result, we will notify you by phone as soon as possible.  Submit refill requests through [employer] or call your pharmacy and they will forward the refill request to us. Please allow 3 business days for your refill to be completed.          Additional Information About Your Visit        [employer] Information     [employer] gives you secure access to your electronic health record. If you see a primary care provider, you can also send messages to your care team and make appointments. If you have questions, please call your primary care clinic.  If you do not have a primary care provider, please call [phone] and they will assist you.        Care EveryWhere ID     This is your Care EveryWhere ID. This could be used by other organizations to access your [place] medical records  [id number]        Your Vitals Were     Pulse Height Pulse Oximetry             90 5' 2\" (1.575 m) 96%          Blood Pressure from Last 3 Encounters:   12/12/17 152/78   10/25/17 133/80   10/16/17 143/80    Weight from Last 3 Encounters:   12/12/17 252 lb (114.3 kg)   10/25/17 268 lb (121.6 kg)   10/16/17 268 lb (121.6 kg)              Today, you had the following     No orders found for display       Primary Care Provider Office Phone # Fax #    Jazmyne Chapman [phone]       [facility] [address]        Equal Access to Services     [facility] : Hadii danielle eli hadjacquelyn Soemilia, waaxda luqadaha, qaybta kaalmada makayla carrizales. So [facility] [phone].    ATENCIÓN: Si habla español, tiene a hatfield disposición servicios gratuitos de asistencia lingüística. Llame al [phone].    We " comply with applicable federal civil rights laws and [place] laws. We do not discriminate on the basis of race, color, national origin, age, disability, sex, sexual orientation, or gender identity.            Thank you!     Thank you for choosing [facility] [place]  for your care. Our goal is always to provide you with excellent care. Hearing back from our patients is one way we can continue to improve our services. Please take a few minutes to complete the written survey that you may receive in the mail after your visit with us. Thank you!             Your Updated Medication List - Protect others around you: Learn how to safely use, store and throw away your medicines at www.disposemymeds.org.          This list is accurate as of 2/13/18  6:15 PM.  Always use your most recent med list.                   Brand Name Dispense Instructions for use Diagnosis    albuterol 108 (90 BASE) MCG/ACT Inhaler    PROAIR HFA/PROVENTIL HFA/VENTOLIN HFA    1 Inhaler    Inhale 2 puffs into the lungs every 6 hours as needed    Acute bronchitis, unspecified organism       buPROPion 150 MG 12 hr tablet    WELLBUTRIN SR    180 tablet    Take 1 tablet (150 mg) by mouth 2 times daily    Moderate episode of recurrent major depressive disorder (H), Tobacco abuse       cyclobenzaprine 10 MG tablet    FLEXERIL    20 tablet    Take 0.5-1 tablets (5-10 mg) by mouth 3 times daily as needed for muscle spasms    Chronic bilateral low back pain without sciatica       DULoxetine 60 MG EC capsule    CYMBALTA    90 capsule    Take 1 capsule (60 mg) by mouth daily    Chronic bilateral low back pain without sciatica, Moderate episode of recurrent major depressive disorder (H)       HYDROcodone-acetaminophen 5-325 MG per tablet    NORCO    15 tablet    Take 1 tablet by mouth every 6 hours as needed for moderate to severe pain Max of 2 tabs per day, use sparingly. OK to fill and begin on 10/16/2017.  Last refill was 1/11/17 for #15    Lumbar  radicular pain       meloxicam 7.5 MG tablet    MOBIC    30 tablet    Take 1 tablet (7.5 mg) by mouth daily as needed    Hallux limitus of right foot, Right foot pain       omeprazole 20 MG tablet     90 tablet    Take 1 tablet (20 mg) by mouth daily    Gastroesophageal reflux disease without esophagitis       order for DME     1 Device    Equipment being ordered: Curvolex foot plate, 23.5 Right    Arthritis of first metatarsophalangeal joint

## 2018-02-20 ENCOUNTER — TELEPHONE (OUTPATIENT)
Dept: FAMILY MEDICINE | Facility: CLINIC | Age: 58
End: 2018-02-20

## 2018-02-20 NOTE — TELEPHONE ENCOUNTER
Forms received from [facility]/Order for CPAP/Bi-Level and Supplies(02/08/18) for Dr Chapman.  Forms placed in provider 'sign me' folder.  Please Fax forms to [phone] after completion.    Monika Olivia  Patient Representative

## 2018-03-07 ENCOUNTER — OFFICE VISIT (OUTPATIENT)
Dept: FAMILY MEDICINE | Facility: CLINIC | Age: 58
End: 2018-03-07
Payer: COMMERCIAL

## 2018-03-07 ENCOUNTER — RADIANT APPOINTMENT (OUTPATIENT)
Dept: GENERAL RADIOLOGY | Facility: CLINIC | Age: 58
End: 2018-03-07
Attending: FAMILY MEDICINE
Payer: COMMERCIAL

## 2018-03-07 VITALS
HEART RATE: 80 BPM | WEIGHT: 232.2 LBS | DIASTOLIC BLOOD PRESSURE: 89 MMHG | BODY MASS INDEX: 42.73 KG/M2 | OXYGEN SATURATION: 98 % | TEMPERATURE: 98.2 F | SYSTOLIC BLOOD PRESSURE: 136 MMHG | HEIGHT: 62 IN

## 2018-03-07 DIAGNOSIS — M25.562 BILATERAL CHRONIC KNEE PAIN: Primary | ICD-10-CM

## 2018-03-07 DIAGNOSIS — F33.1 MODERATE EPISODE OF RECURRENT MAJOR DEPRESSIVE DISORDER (H): ICD-10-CM

## 2018-03-07 DIAGNOSIS — R03.0 ELEVATED BLOOD PRESSURE READING WITHOUT DIAGNOSIS OF HYPERTENSION: ICD-10-CM

## 2018-03-07 DIAGNOSIS — G89.29 BILATERAL CHRONIC KNEE PAIN: Primary | ICD-10-CM

## 2018-03-07 DIAGNOSIS — M20.5X1 HALLUX LIMITUS OF RIGHT FOOT: ICD-10-CM

## 2018-03-07 DIAGNOSIS — M51.369 DDD (DEGENERATIVE DISC DISEASE), LUMBAR: ICD-10-CM

## 2018-03-07 DIAGNOSIS — M25.561 BILATERAL CHRONIC KNEE PAIN: Primary | ICD-10-CM

## 2018-03-07 PROCEDURE — 73560 X-RAY EXAM OF KNEE 1 OR 2: CPT | Mod: LT

## 2018-03-07 PROCEDURE — 99214 OFFICE O/P EST MOD 30 MIN: CPT | Performed by: FAMILY MEDICINE

## 2018-03-07 ASSESSMENT — ANXIETY QUESTIONNAIRES
2. NOT BEING ABLE TO STOP OR CONTROL WORRYING: SEVERAL DAYS
3. WORRYING TOO MUCH ABOUT DIFFERENT THINGS: SEVERAL DAYS
1. FEELING NERVOUS, ANXIOUS, OR ON EDGE: NOT AT ALL
GAD7 TOTAL SCORE: 11
7. FEELING AFRAID AS IF SOMETHING AWFUL MIGHT HAPPEN: NOT AT ALL
7. FEELING AFRAID AS IF SOMETHING AWFUL MIGHT HAPPEN: NOT AT ALL
6. BECOMING EASILY ANNOYED OR IRRITABLE: NEARLY EVERY DAY
5. BEING SO RESTLESS THAT IT IS HARD TO SIT STILL: NEARLY EVERY DAY
GAD7 TOTAL SCORE: 11
IF YOU CHECKED OFF ANY PROBLEMS ON THIS QUESTIONNAIRE, HOW DIFFICULT HAVE THESE PROBLEMS MADE IT FOR YOU TO DO YOUR WORK, TAKE CARE OF THINGS AT HOME, OR GET ALONG WITH OTHER PEOPLE: SOMEWHAT DIFFICULT
5. BEING SO RESTLESS THAT IT IS HARD TO SIT STILL: NEARLY EVERY DAY
3. WORRYING TOO MUCH ABOUT DIFFERENT THINGS: SEVERAL DAYS
2. NOT BEING ABLE TO STOP OR CONTROL WORRYING: SEVERAL DAYS
6. BECOMING EASILY ANNOYED OR IRRITABLE: NEARLY EVERY DAY
IF YOU CHECKED OFF ANY PROBLEMS ON THIS QUESTIONNAIRE, HOW DIFFICULT HAVE THESE PROBLEMS MADE IT FOR YOU TO DO YOUR WORK, TAKE CARE OF THINGS AT HOME, OR GET ALONG WITH OTHER PEOPLE: SOMEWHAT DIFFICULT
1. FEELING NERVOUS, ANXIOUS, OR ON EDGE: NOT AT ALL

## 2018-03-07 ASSESSMENT — PATIENT HEALTH QUESTIONNAIRE - PHQ9
5. POOR APPETITE OR OVEREATING: NEARLY EVERY DAY
5. POOR APPETITE OR OVEREATING: NEARLY EVERY DAY

## 2018-03-07 NOTE — MR AVS SNAPSHOT
"              After Visit Summary   3/7/2018    Jessica Jay    MRN: [id number]           Patient Information     Date Of Birth          1960        Visit Information        Provider Department      3/7/2018 5:30 PM Marybeth Silver MD [facility]        Today's Diagnoses     Bilateral chronic knee pain    -  1    DDD (degenerative disc disease), lumbar        Hallux limitus of right foot        Elevated blood pressure reading without diagnosis of hypertension           Follow-ups after your visit        Additional Services     PHYSICAL THERAPY REFERRAL       *This therapy referral will be filtered to a centralized scheduling office at [facility] and the patient will receive a call to schedule an appointment at a [place] location most convenient for them. *     [facility] provides Physical Therapy evaluation and treatment and many specialty services across the [place] system.  If requesting a specialty program, please choose from the list below.    If you have not heard from the scheduling office within 2 business days, please call [phone] for all locations, with the exception of [facility], please call [phone] and [facility], please call [phone]  Treatment: Evaluation & Treatment  Special Instructions/Modalities: None  Special Programs: None    Please be aware that coverage of these services is subject to the terms and limitations of your health insurance plan.  Call member services at your health plan with any benefit or coverage questions.      **Note to Provider:  If you are referring outside of [place] for the therapy appointment, please list the name of the location in the \"special instructions\" above, print the referral and give to the patient to schedule the appointment.                  Follow-up notes from your care team     Return if symptoms worsen or fail to improve, for Physical Exam at earliest convenience.      Who to " contact     Normal or non-critical lab and imaging results will be communicated to you by [employer], letter or phone within 4 business days after the clinic has received the results. If you do not hear from us within 7 days, please contact the clinic through [employer] or phone. If you have a critical or abnormal lab result, we will notify you by phone as soon as possible.  Submit refill requests through [employer] or call your pharmacy and they will forward the refill request to us. Please allow 3 business days for your refill to be completed.          If you need to speak with a  for additional information , please call: [phone]             Additional Information About Your Visit        Well[employer] Information     [employer] gives you secure access to your electronic health record. If you see a primary care provider, you can also send messages to your care team and make appointments. If you have questions, please call your primary care clinic.  If you do not have a primary care provider, please call [phone] and they will assist you.        Care EveryWhere ID     This is your Care EveryWhere ID. This could be used by other organizations to access your [place] medical records  [id number]         Blood Pressure from Last 3 Encounters:   12/12/17 152/78   10/25/17 133/80   10/16/17 143/80    Weight from Last 3 Encounters:   12/12/17 252 lb (114.3 kg)   10/25/17 268 lb (121.6 kg)   10/16/17 268 lb (121.6 kg)              We Performed the Following     DEPRESSION ACTION PLAN (DAP)     PHYSICAL THERAPY REFERRAL     XR Knee Bilateral 1/2 Views          Today's Medication Changes          These changes are accurate as of 3/7/18  6:10 PM.  If you have any questions, ask your nurse or doctor.               Start taking these medicines.        Dose/Directions    diclofenac 1 % Gel topical gel   Commonly known as:  VOLTAREN   Used for:  Bilateral chronic knee pain        Apply 4 grams to knees  four times daily  using enclosed dosing card.   Quantity:  100 g   Refills:  1            Where to get your medicines      These medications were sent to [id number] - [place] [address] AT [facility] & [place] [address], [facility] [address]     Phone:  [phone]     diclofenac 1 % Gel topical gel                Primary Care Provider Office Phone # Fax #    [facility] [phone]       [address] [facility] [address]        Equal Access to Services     ANAID VENEGAS : Hadii aad ku hadasho Soomaali, waaxda luqadaha, qaybta kaalmada adeegyada, waxay idiin hayfreedomn ellyn barrera . So [facility] [phone].    ATENCIÓN: Si habla español, tiene a hatfield disposición servicios gratuitos de asistencia lingüística. Sav al [phone].    We comply with applicable federal civil rights laws and [place] laws. We do not discriminate on the basis of race, color, national origin, age, disability, sex, sexual orientation, or gender identity.            Thank you!     Thank you for choosing [facility]  for your care. Our goal is always to provide you with excellent care. Hearing back from our patients is one way we can continue to improve our services. Please take a few minutes to complete the written survey that you may receive in the mail after your visit with us. Thank you!             Your Updated Medication List - Protect others around you: Learn how to safely use, store and throw away your medicines at www.disposemymeds.org.          This list is accurate as of 3/7/18  6:10 PM.  Always use your most recent med list.                   Brand Name Dispense Instructions for use Diagnosis    albuterol 108 (90 BASE) MCG/ACT Inhaler    PROAIR HFA/PROVENTIL HFA/VENTOLIN HFA    1 Inhaler    Inhale 2 puffs into the lungs every 6 hours as needed    Acute bronchitis, unspecified organism       buPROPion 150 MG 12 hr tablet    WELLBUTRIN SR    180  tablet    Take 1 tablet (150 mg) by mouth 2 times daily    Moderate episode of recurrent major depressive disorder (H), Tobacco abuse       cyclobenzaprine 10 MG tablet    FLEXERIL    20 tablet    Take 0.5-1 tablets (5-10 mg) by mouth 3 times daily as needed for muscle spasms    Chronic bilateral low back pain without sciatica       diclofenac 1 % Gel topical gel    VOLTAREN    100 g    Apply 4 grams to knees  four times daily using enclosed dosing card.    Bilateral chronic knee pain       DULoxetine 60 MG EC capsule    CYMBALTA    90 capsule    Take 1 capsule (60 mg) by mouth daily    Chronic bilateral low back pain without sciatica, Moderate episode of recurrent major depressive disorder (H)       HYDROcodone-acetaminophen 5-325 MG per tablet    NORCO    15 tablet    Take 1 tablet by mouth every 6 hours as needed for moderate to severe pain Max of 2 tabs per day, use sparingly. OK to fill and begin on 10/16/2017.  Last refill was 1/11/17 for #15    Lumbar radicular pain       omeprazole 20 MG tablet     90 tablet    Take 1 tablet (20 mg) by mouth daily    Gastroesophageal reflux disease without esophagitis       order for DME     1 Device    Equipment being ordered: [employer]-Techaflex foot plate, 23.5 Right    Arthritis of first metatarsophalangeal joint

## 2018-03-07 NOTE — LETTER
My Depression Action Plan  Name: Jessica Jay   Date of Birth 1960  Date: 3/7/2018    My doctor: [facility], [place]   My clinic: [facility]  [address]  [place] [id number] [phone]          GREEN    ZONE   Good Control    What it looks like:     Things are going generally well. You have normal up s and down s. You may even feel depressed from time to time, but bad moods usually last less than a day.   What you need to do:  1. Continue to care for yourself (see self care plan)  2. Check your depression survival kit and update it as needed  3. Follow your physician s recommendations including any medication.  4. Do not stop taking medication unless you consult with your physician first.           YELLOW         ZONE Getting Worse    What it looks like:     Depression is starting to interfere with your life.     It may be hard to get out of bed; you may be starting to isolate yourself from others.    Symptoms of depression are starting to last most all day and this has happened for several days.     You may have suicidal thoughts but they are not constant.   What you need to do:     1. Call your care team, your response to treatment will improve if you keep your care team informed of your progress. Yellow periods are signs an adjustment may need to be made.     2. Continue your self-care, even if you have to fake it!    3. Talk to someone in your support network    4. Open up your depression survival kit           RED    ZONE Medical Alert - Get Help    What it looks like:     Depression is seriously interfering with your life.     You may experience these or other symptoms: You can t get out of bed most days, can t work or engage in other necessary activities, you have trouble taking care of basic hygiene, or basic responsibilities, thoughts of suicide or death that will not go away, self-injurious behavior.     What you need to do:  1. Call your  care team and request a same-day appointment. If they are not available (weekends or after hours) call your local crisis line, emergency room or 911.      Electronically signed by: Lora Sanches, March 7, 2018    Depression Self Care Plan / Survival Kit    Self-Care for Depression  Here s the deal. Your body and mind are really not as separate as most people think.  What you do and think affects how you feel and how you feel influences what you do and think. This means if you do things that people who feel good do, it will help you feel better.  Sometimes this is all it takes.  There is also a place for medication and therapy depending on how severe your depression is, so be sure to consult with your medical provider and/ or Behavioral Health Consultant if your symptoms are worsening or not improving.     In order to better manage my stress, I will:    Exercise  Get some form of exercise, every day. This will help reduce pain and release endorphins, the  feel good  chemicals in your brain. This is almost as good as taking antidepressants!  This is not the same as joining a gym and then never going! (they count on that by the way ) It can be as simple as just going for a walk or doing some gardening, anything that will get you moving.      Hygiene   Maintain good hygiene (Get out of bed in the morning, Make your bed, Brush your teeth, Take a shower, and Get dressed like you were going to work, even if you are unemployed).  If your clothes don't fit try to get ones that do.    Diet  I will strive to eat foods that are good for me, drink plenty of water, and avoid excessive sugar, caffeine, alcohol, and other mood-altering substances.  Some foods that are helpful in depression are: complex carbohydrates, B vitamins, flaxseed, fish or fish oil, fresh fruits and vegetables.    Psychotherapy  I agree to participate in Individual Therapy (if recommended).    Medication  If prescribed medications, I agree to take  them.  Missing doses can result in serious side effects.  I understand that drinking alcohol, or other illicit drug use, may cause potential side effects.  I will not stop my medication abruptly without first discussing it with my provider.    Staying Connected With Others  I will stay in touch with my friends, family members, and my primary care provider/team.    Use your imagination  Be creative.  We all have a creative side; it doesn t matter if it s oil painting, sand castles, or mud pies! This will also kick up the endorphins.    Witness Beauty  (AKA stop and smell the roses) Take a look outside, even in mid-winter. Notice colors, textures. Watch the squirrels and birds.     Service to others  Be of service to others.  There is always someone else in need.  By helping others we can  get out of ourselves  and remember the really important things.  This also provides opportunities for practicing all the other parts of the program.    Humor  Laugh and be silly!  Adjust your TV habits for less news and crime-drama and more comedy.    Control your stress  Try breathing deep, massage therapy, biofeedback, and meditation. Find time to relax each day.     My support system    Clinic Contact:  Phone number:    Contact 1:  Phone number:    Contact 2:  Phone number:    Hoahaoism/:  Phone number:    Therapist:  Phone number:    Local crisis center:    Phone number:    Other community support:  Phone number:

## 2018-03-07 NOTE — PROGRESS NOTES
SUBJECTIVE:   Jessica Jya is a 57 year old female who presents to clinic today for the following health issues:      Joint Pain    Onset: x5 months    Description:   Location: left knee and right knee  Character: feels like bone on bone    Intensity: 6/10 Worsens when going up or down stairs.     Progression of Symptoms: worse over the winter season     Accompanying Signs & Symptoms:  Other symptoms: none    History:   Previous similar pain: no       Precipitating factors:   Trauma or overuse: no     Alleviating factors:  Improved by: rest/inactivity    Therapies Tried and outcome: none    Additional Concern    Chronic Lower Back  Has been seen for back pain through pain clinic but patient says there is nothing more they can do about her pain in her back.  Flexeril has not been giving full relief to back pain.     Carolynn STarT Back    Last 2 scores:     CAROLYNN START BACK TOTAL SCORE 3/11/2015 3/7/2018   Total Score (all 9) 9 5         Hallux limitus of right foot   Seen by podiatry, is not using plate as directed but has been wearing inserts.  No improvements with foot pain.       Patient has a history of depression, currently on antidepressants. Admits to having suicidal thoughts sometimes but no plans to carry them out.   Verbal contract that she would not do anything to hurt herself or others.   Willing to follow through with Psyhotherapay counseling    Problem list and histories reviewed & adjusted, as indicated.  Additional history: as documented    Patient Active Problem List   Diagnosis     Depression, major     GERD (gastroesophageal reflux disease)     Migraines     CARDIOVASCULAR SCREENING; LDL GOAL LESS THAN 160     Disc herniation     DDD (degenerative disc disease), lumbar     DDD (degenerative disc disease), cervical     Neck pain     Headache     Dermoid cyst of brain (H)     Chronic daily headache     Dizziness - light-headed     Tobacco abuse     ELYSSA (obstructive sleep apnea)      Spondylolisthesis, grade 1     Chronic low back pain     Brain lipoma     Vulvar dermatitis     History of colonic polyps     Gastroesophageal reflux disease without esophagitis     overweight with BMI >40     Past Surgical History:   Procedure Laterality Date     ARTHROSCOPY SHOULDER  1990    Right/spurs     C FOOT/TOES SURGERY PROC UNLISTED  1975    Toe fracture, left     CARPAL TUNNEL RELEASE RT/LT  1990    Left     COLONOSCOPY  2016     CRANIOTOMY, EXCISE TUMOR COMPLEX, COMBINED  5/9/2011    Procedure:COMBINED CRANIOTOMY, EXCISE TUMOR COMPLEX; 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 Father      CANCER Maternal Grandmother      CEREBROVASCULAR DISEASE Paternal Grandmother      Breast Cancer Paternal Grandmother      Neurologic Disorder Sister      migraine     CANCER Brother      Cancer - colorectal Brother      Colon Cancer Brother          Current Outpatient Prescriptions   Medication Sig Dispense Refill     diclofenac (VOLTAREN) 1 % GEL topical gel Apply 4 grams to knees  four times daily using enclosed dosing card. 100 g 1     buPROPion (WELLBUTRIN SR) 150 MG 12 hr tablet Take 1 tablet (150 mg) by mouth 2 times daily 180 tablet 3     cyclobenzaprine (FLEXERIL) 10 MG tablet Take 0.5-1 tablets (5-10 mg) by mouth 3 times daily as needed for muscle spasms 20 tablet 0     omeprazole 20 MG tablet Take 1 tablet (20 mg) by mouth daily 90 tablet 3     DULoxetine (CYMBALTA) 60 MG EC capsule Take 1 capsule (60 mg) by mouth daily 90 capsule 3     order for DME Equipment being  "ordered: Dynaflex foot plate, 23.5 Right 1 Device 0     HYDROcodone-acetaminophen (NORCO) 5-325 MG per tablet Take 1 tablet by mouth every 6 hours as needed for moderate to severe pain Max of 2 tabs per day, use sparingly. OK to fill and begin on 10/16/2017.  Last refill was 1/11/17 for #15 (Patient not taking: Reported on 3/7/2018) 15 tablet 0     albuterol (PROAIR HFA, PROVENTIL HFA, VENTOLIN HFA) 108 (90 BASE) MCG/ACT inhaler Inhale 2 puffs into the lungs every 6 hours as needed (Patient not taking: Reported on 3/7/2018) 1 Inhaler 1     Allergies   Allergen Reactions     Buspar [Buspirone Hcl] Rash       Reviewed and updated as needed this visit by clinical staff  Meds       Reviewed and updated as needed this visit by Provider         ROS:  Constitutional, HEENT, cardiovascular, pulmonary, gi and gu systems are negative, except as otherwise noted.    OBJECTIVE:     /89  Pulse 80  Temp 98.2  F (36.8  C) (Tympanic)  Ht 5' 2\" (1.575 m)  Wt 232 lb 3.2 oz (105.3 kg)  SpO2 98%  Breastfeeding? No  BMI 42.47 kg/m2  Body mass index is 42.47 kg/(m^2).  GENERAL: healthy, alert and no distress  RESP: lungs clear to auscultation - no rales, rhonchi or wheezes  CV: regular rate and rhythm, normal S1 S2, no S3 or S4, no murmur, click or rub, no peripheral edema and peripheral pulses strong  MS: RLE/LLE exam shows normal strength and muscle mass, no deformities, no erythema, induration, or nodules, no evidence of joint effusion, ROM of all joints is normal and no evidence of joint instability. Medial joint line tenderness bilaterally. Positive crepitus bilaterally. Negative anterior and posterior drawers sign.  Comprehensive back pain exam:  Tenderness of paraspinal tenderness to palpatin in the lumbarsacral area., Pain limits the following motions: flexion and quick extension., Lower extremity strength functional and equal on both sides, Lower extremity reflexes within normal limits bilaterally, Lower extremity " sensation normal and equal on both sides and Straight leg raise negative bilaterally  PSYCH: mentation appears normal, affect normal/bright        Diagnostic Test Results:    Previous lumbar spine MRI reviewed.  MR LUMBAR SPINE WITHOUT CONTRAST  10/11/2016 6:36 PM     HISTORY: Low back pain with tingling, numbness and weakness for two  years. No specific injury.     TECHNIQUE: Sagittal T1 and T2 and STIR, axial proton density and T2  images.     COMPARISON: 3/12/2015.     FINDINGS: Plain films dated 11/28/2014 confirm five functional lumbar  vertebral segments. The caudal thecal sac contents appear  intrinsically normal. There is a mild grade 1 degenerative  anterolisthesis of L4 on L5, and alignment in the sagittal view is  otherwise normal. There are a few small scattered Schmorl's nodes,  especially involving superior endplates of L1 and L2. No  acute-appearing bony abnormalities.     T11-T12: Signal loss, minimal disc bulging and no disc protrusion or  central or foraminal stenosis.     T12-L1: Early signal loss, minimal disc bulging and no disc protrusion  or central or foraminal stenosis. No change since prior exam.     L1-L2: Signal loss, mild disc bulging and no disc protrusion or  central or foraminal stenosis. Posterior facets are normal, and this  level is unchanged.     L2-L3: Signal loss without disc bulge. There is a central annular  fissure. No disc protrusion or central or foraminal stenosis.  Posterior facets are normal, and this level is unchanged.     L3-L4: Signal loss without disc space narrowing. Minimal disc bulging  and no disc protrusion or central or foraminal stenosis. Posterior  facets are normal, and this level is unchanged.     L4-L5: Signal loss, moderate disc space narrowing, marked posterior  facet degenerative changes with grade 1 anterolisthesis and no disc  protrusion or central stenosis. At least mild left foraminal  narrowing, and the right foramen is unremarkable. No change  since the  prior exam.     L5-S1: Signal loss and minimal posterior disc space narrowing with  minimal posterior disc bulging and no disc protrusion or central or  foraminal stenosis. Posterior facets are normal, and this level is  unchanged.         IMPRESSION:   1. No significant changes since 3/12/2015.  2. Multilevel early degenerative disc disease without disc protrusion  or significant central stenosis.  3. Grade 1 degenerative anterolisthesis L4 on L5. At least mild left  foraminal stenosis at this level. No significant foraminal stenosis  elsewhere.     MIKE DE ANDA MD    Xray - in process    ASSESSMENT/PLAN:     Jessica was seen today for back pain, knee pain and foot pain.    Diagnoses and all orders for this visit:    Bilateral chronic knee pain,likely due to degenerative changes  -     PHYSICAL THERAPY REFERRAL  -     XR Knee Bilateral 1/2 Views  -     diclofenac (VOLTAREN) 1 % GEL topical gel; Apply 4 grams to knees  four times daily using enclosed dosing card.    DDD (degenerative disc disease), lumbar  -     PHYSICAL THERAPY REFERRAL    Hallux limitus of right foot  Following with Podiatry    Elevated blood pressure reading without diagnosis of hypertension  Repeat BP improved, continue to monitor.    Moderate episode of recurrent major depressive disorder (H)  -    PHQ-9/GABRIELA 7 completed   -    DEPRESSION ACTION PLAN (DAP)  -     MENTAL HEALTH REFERRAL  - Adult; Outpatient Treatment; Individual/Couples/Family/Group Therapy/Health Psychology; [facility]: [facility] [phone]; We will contact you to schedule the appointment or please call with any questions; Future      Schedule a Physical Exam at earliest convenience.     Will notify her with the results.    Marybeth Silver MD  [facility]

## 2018-03-08 ASSESSMENT — PATIENT HEALTH QUESTIONNAIRE - PHQ9: SUM OF ALL RESPONSES TO PHQ QUESTIONS 1-9: 11

## 2018-03-08 ASSESSMENT — ANXIETY QUESTIONNAIRES: GAD7 TOTAL SCORE: 11

## 2018-04-23 ENCOUNTER — MYC MEDICAL ADVICE (OUTPATIENT)
Dept: FAMILY MEDICINE | Facility: CLINIC | Age: 58
End: 2018-04-23

## 2018-04-23 NOTE — TELEPHONE ENCOUNTER
FMLA forms are quite detailed and specific.  Patient will need to schedule an office visit to get these done. Please help her schedule an OV. Thanks

## 2018-05-03 ENCOUNTER — MYC MEDICAL ADVICE (OUTPATIENT)
Dept: FAMILY MEDICINE | Facility: CLINIC | Age: 58
End: 2018-05-03

## 2018-05-16 ENCOUNTER — OFFICE VISIT (OUTPATIENT)
Dept: FAMILY MEDICINE | Facility: CLINIC | Age: 58
End: 2018-05-16
Payer: COMMERCIAL

## 2018-05-16 VITALS
HEIGHT: 62 IN | SYSTOLIC BLOOD PRESSURE: 158 MMHG | TEMPERATURE: 98.7 F | WEIGHT: 228.4 LBS | DIASTOLIC BLOOD PRESSURE: 92 MMHG | OXYGEN SATURATION: 97 % | BODY MASS INDEX: 42.03 KG/M2 | HEART RATE: 90 BPM

## 2018-05-16 DIAGNOSIS — M51.369 DDD (DEGENERATIVE DISC DISEASE), LUMBAR: Primary | ICD-10-CM

## 2018-05-16 DIAGNOSIS — G89.29 CHRONIC BILATERAL LOW BACK PAIN WITHOUT SCIATICA: ICD-10-CM

## 2018-05-16 DIAGNOSIS — M54.50 CHRONIC BILATERAL LOW BACK PAIN WITHOUT SCIATICA: ICD-10-CM

## 2018-05-16 DIAGNOSIS — I10 HYPERTENSION GOAL BP (BLOOD PRESSURE) < 140/90: ICD-10-CM

## 2018-05-16 PROCEDURE — 99214 OFFICE O/P EST MOD 30 MIN: CPT | Performed by: FAMILY MEDICINE

## 2018-05-16 RX ORDER — CYCLOBENZAPRINE HCL 10 MG
5-10 TABLET ORAL 3 TIMES DAILY PRN
Qty: 45 TABLET | Refills: 0 | Status: SHIPPED | OUTPATIENT
Start: 2018-05-16 | End: 2020-03-26

## 2018-05-16 ASSESSMENT — ANXIETY QUESTIONNAIRES
GAD7 TOTAL SCORE: 9
5. BEING SO RESTLESS THAT IT IS HARD TO SIT STILL: SEVERAL DAYS
3. WORRYING TOO MUCH ABOUT DIFFERENT THINGS: MORE THAN HALF THE DAYS
IF YOU CHECKED OFF ANY PROBLEMS ON THIS QUESTIONNAIRE, HOW DIFFICULT HAVE THESE PROBLEMS MADE IT FOR YOU TO DO YOUR WORK, TAKE CARE OF THINGS AT HOME, OR GET ALONG WITH OTHER PEOPLE: SOMEWHAT DIFFICULT
7. FEELING AFRAID AS IF SOMETHING AWFUL MIGHT HAPPEN: NOT AT ALL
6. BECOMING EASILY ANNOYED OR IRRITABLE: SEVERAL DAYS
1. FEELING NERVOUS, ANXIOUS, OR ON EDGE: NOT AT ALL
2. NOT BEING ABLE TO STOP OR CONTROL WORRYING: MORE THAN HALF THE DAYS

## 2018-05-16 ASSESSMENT — PATIENT HEALTH QUESTIONNAIRE - PHQ9: 5. POOR APPETITE OR OVEREATING: NEARLY EVERY DAY

## 2018-05-16 NOTE — PROGRESS NOTES
SUBJECTIVE:   Jessica Jay is a 57 year old female who presents to clinic today for the following health issues:      Back Pain   Requesting FMLA forms completed for this reason.     Duration: Current flare started x3 days ago; initial pain in back began in November 2014        Specific cause: patient was in a boot for stress fracture for 12 weeks- about a month after getting out of the boot her back began to hurt.     Description:   Location of pain: low back bilateral  Character of pain: sharp at times (spasms) and cramping  Pain radiation:radiates into the right buttocks and radiates into the left buttocks  New numbness or weakness in legs, not attributed to pain:  no     Intensity: Currently 8/10, At its worst 10/10    History:   Pain interferes with job: YES, requesting FMLA forms be completed for intermittent leave.   History of back problems: no prior back problems  Any previous MRI or X-rays: Yes- at [facility].  Sees a specialist for back pain:  Yes through [facility] has not seen in about x1 year.  Therapies tried without relief: Physical Therapy. Currently taking Opioid during flare and flexeril, requesting a refill.    Alleviating factors:   Improved by: laying down on floor can give relief at times.       Precipitating factors:  Worsened by: Sitting    Functional and Psychosocial Screen (Carolynn STarT Back):      Last 2 scores:     CAROLYNN START BACK TOTAL SCORE 3/7/2018 5/16/2018   Total Score (all 9) 5 5         Accompanying Signs & Symptoms:  Risk of Fracture:  None  Risk of Cauda Equina:  None  Risk of Infection:  None  Risk of Cancer:  None  Risk of Ankylosing Spondylitis:  Onset at age <35, male, AND morning back stiffness. no         Blood pressure remains elevated today. Previously elevated as well. Currently not on medications.     BP Readings from Last 3 Encounters:   05/16/18 (!) 158/92   03/07/18 136/89   12/12/17 152/78         Problem list and histories reviewed & adjusted, as  indicated.  Additional history: as documented    Patient Active Problem List   Diagnosis     Depression, major     GERD (gastroesophageal reflux disease)     Migraines     CARDIOVASCULAR SCREENING; LDL GOAL LESS THAN 160     Disc herniation     DDD (degenerative disc disease), lumbar     DDD (degenerative disc disease), cervical     Neck pain     Headache     Dermoid cyst of brain (H)     Chronic daily headache     Dizziness - light-headed     Tobacco abuse     ELYSSA (obstructive sleep apnea)     Spondylolisthesis, grade 1     Chronic low back pain     Brain lipoma     Vulvar dermatitis     History of colonic polyps     Gastroesophageal reflux disease without esophagitis     overweight with BMI >40     Past Surgical History:   Procedure Laterality Date     ARTHROSCOPY SHOULDER  1990    Right/spurs     C FOOT/TOES SURGERY PROC UNLISTED  1975    Toe fracture, left     CARPAL TUNNEL RELEASE RT/LT  1990    Left     COLONOSCOPY  2016     CRANIOTOMY, EXCISE TUMOR COMPLEX, COMBINED  5/9/2011    Procedure:COMBINED CRANIOTOMY, EXCISE TUMOR COMPLEX; 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 Father      CANCER Maternal Grandmother      CEREBROVASCULAR DISEASE Paternal Grandmother      Breast Cancer Paternal Grandmother      Neurologic Disorder Sister      migraine     CANCER Brother      Cancer - colorectal Brother      Colon Cancer Brother          Current Outpatient Prescriptions   Medication Sig Dispense Refill     albuterol (PROAIR HFA, PROVENTIL HFA, VENTOLIN HFA) 108 (90 BASE)  "MCG/ACT inhaler Inhale 2 puffs into the lungs every 6 hours as needed 1 Inhaler 1     buPROPion (WELLBUTRIN SR) 150 MG 12 hr tablet Take 1 tablet (150 mg) by mouth 2 times daily 180 tablet 3     cyclobenzaprine (FLEXERIL) 10 MG tablet Take 0.5-1 tablets (5-10 mg) by mouth 3 times daily as needed for muscle spasms 45 tablet 0     diclofenac (VOLTAREN) 1 % GEL topical gel Apply 4 grams to knees  four times daily using enclosed dosing card. 100 g 1     DULoxetine (CYMBALTA) 60 MG EC capsule Take 1 capsule (60 mg) by mouth daily 90 capsule 3     HYDROcodone-acetaminophen (NORCO) 5-325 MG per tablet Take 1 tablet by mouth every 6 hours as needed for moderate to severe pain Max of 2 tabs per day, use sparingly. OK to fill and begin on 10/16/2017.  Last refill was 1/11/17 for #15 15 tablet 0     order for DME Equipment being ordered: [employer] foot plate, 23.5 Right 1 Device 0     Allergies   Allergen Reactions     Buspar [Buspirone Hcl] Rash       Reviewed and updated as needed this visit by clinical staff  Tobacco  Meds       Reviewed and updated as needed this visit by Provider         ROS:  Constitutional, HEENT, cardiovascular, pulmonary, gi and gu systems are negative, except as otherwise noted.    OBJECTIVE:     BP (!) 158/92  Pulse 90  Temp 98.7  F (37.1  C) (Tympanic)  Ht 5' 2\" (1.575 m)  Wt 228 lb 6.4 oz (103.6 kg)  SpO2 97%  Breastfeeding? No  BMI 41.77 kg/m2  Body mass index is 41.77 kg/(m^2).  GENERAL: healthy, alert and no distress  RESP: lungs clear to auscultation - no rales, rhonchi or wheezes  CV: regular rate and rhythm, normal S1 S2, no S3 or S4, no murmur, click or rub, no peripheral edema and peripheral pulses strong    Diagnostic Test Results:  Previous lumbar spine MRI reviewed.   MR LUMBAR SPINE WITHOUT CONTRAST  10/11/2016 6:36 PM     HISTORY: Low back pain with tingling, numbness and weakness for two  years. No specific injury.     TECHNIQUE: Sagittal T1 and T2 and STIR, axial proton " density and T2  images.     COMPARISON: 3/12/2015.     FINDINGS: Plain films dated 11/28/2014 confirm five functional lumbar  vertebral segments. The caudal thecal sac contents appear  intrinsically normal. There is a mild grade 1 degenerative  anterolisthesis of L4 on L5, and alignment in the sagittal view is  otherwise normal. There are a few small scattered Schmorl's nodes,  especially involving superior endplates of L1 and L2. No  acute-appearing bony abnormalities.     T11-T12: Signal loss, minimal disc bulging and no disc protrusion or  central or foraminal stenosis.     T12-L1: Early signal loss, minimal disc bulging and no disc protrusion  or central or foraminal stenosis. No change since prior exam.     L1-L2: Signal loss, mild disc bulging and no disc protrusion or  central or foraminal stenosis. Posterior facets are normal, and this  level is unchanged.     L2-L3: Signal loss without disc bulge. There is a central annular  fissure. No disc protrusion or central or foraminal stenosis.  Posterior facets are normal, and this level is unchanged.     L3-L4: Signal loss without disc space narrowing. Minimal disc bulging  and no disc protrusion or central or foraminal stenosis. Posterior  facets are normal, and this level is unchanged.     L4-L5: Signal loss, moderate disc space narrowing, marked posterior  facet degenerative changes with grade 1 anterolisthesis and no disc  protrusion or central stenosis. At least mild left foraminal  narrowing, and the right foramen is unremarkable. No change since the  prior exam.     L5-S1: Signal loss and minimal posterior disc space narrowing with  minimal posterior disc bulging and no disc protrusion or central or  foraminal stenosis. Posterior facets are normal, and this level is  unchanged.         IMPRESSION:   1. No significant changes since 3/12/2015.  2. Multilevel early degenerative disc disease without disc protrusion  or significant central stenosis.  3. Grade 1  degenerative anterolisthesis L4 on L5. At least mild left  foraminal stenosis at this level. No significant foraminal stenosis  elsewhere.     MIKE DE ANDA MD    ASSESSMENT/PLAN:     Jessica was seen today for back pain.    Diagnoses and all orders for this visit:    DDD (degenerative disc disease), lumbar  FMLA forms completed for intermittent leave of absence.     Chronic bilateral low back pain without sciatica  -     Refill: cyclobenzaprine (FLEXERIL) 10 MG tablet; Take 0.5-1 tablets (5-10 mg) by mouth 3 times daily as needed for muscle spasms    Hypertension goal BP (blood pressure) < 140/90  No BP meds started.   -     Contact patient for PGEN study      Schedule a Physical Exam at earliest convenience.       Marybeth Silver MD  [facility]

## 2018-05-16 NOTE — MR AVS SNAPSHOT
After Visit Summary   5/16/2018    Jessica Jay    MRN: [id number]           Patient Information     Date Of Birth          1960        Visit Information        Provider Department      5/16/2018 5:30 PM Marybeth Silver MD [facility]        Today's Diagnoses     DDD (degenerative disc disease), lumbar    -  1    Chronic bilateral low back pain without sciatica        Hypertension goal BP (blood pressure) < 140/90           Follow-ups after your visit        Follow-up notes from your care team     Return for Physical Exam at earliest convenience.      Who to contact     Normal or non-critical lab and imaging results will be communicated to you by [employer], letter or phone within 4 business days after the clinic has received the results. If you do not hear from us within 7 days, please contact the clinic through [employer] or phone. If you have a critical or abnormal lab result, we will notify you by phone as soon as possible.  Submit refill requests through [employer] or call your pharmacy and they will forward the refill request to us. Please allow 3 business days for your refill to be completed.          If you need to speak with a  for additional information , please call: [phone]             Additional Information About Your Visit        WOO SportsharFlexenclosure Information     [employer] gives you secure access to your electronic health record. If you see a primary care provider, you can also send messages to your care team and make appointments. If you have questions, please call your primary care clinic.  If you do not have a primary care provider, please call [phone] and they will assist you.        Care EveryWhere ID     This is your Care EveryWhere ID. This could be used by other organizations to access your [facility] medical records  [id number]        Your Vitals Were     Pulse Temperature Height Pulse Oximetry Breastfeeding? BMI (Body Mass Index)    90 98.7  F (37.1  " C) (Tympanic) 5' 2\" (1.575 m) 97% No 41.77 kg/m2       Blood Pressure from Last 3 Encounters:   05/16/18 176/84   03/07/18 136/89   12/12/17 152/78    Weight from Last 3 Encounters:   05/16/18 228 lb 6.4 oz (103.6 kg)   03/07/18 232 lb 3.2 oz (105.3 kg)   12/12/17 252 lb (114.3 kg)              We Performed the Following     Contact patient for PGEN study          Today's Medication Changes          These changes are accurate as of 5/16/18  5:44 PM.  If you have any questions, ask your nurse or doctor.               Stop taking these medicines if you haven't already. Please contact your care team if you have questions.     omeprazole 20 MG tablet   Stopped by:  Marybeth Silver MD                Where to get your medicines      These medications were sent to [facility] [address] [place] [address]  AT [address] [facility] [address]     Phone:  [phone]     cyclobenzaprine 10 MG tablet                Primary Care Provider Office Phone # Fax #    Marybeth Silver [phone] [address] [place] [address]        Equal Access to Services     [facility] AH: Hadii aad ku hadasho Soomaali, waaxda luqadaha, qaybta kaalmada adeegyada, waxay cuco hayenma barrera . So [facility] [phone].    ATENCIÓN: Si habla español, tiene a hatfield disposición servicios gratuitos de asistencia lingüística. Llame al [phone].    We comply with applicable federal civil rights laws and [place] laws. We do not discriminate on the basis of race, color, national origin, age, disability, sex, sexual orientation, or gender identity.            Thank you!     Thank you for choosing [facility]  for your care. Our goal is always to provide you with excellent care. Hearing back from our patients is one way we can continue to improve our services. Please take a few minutes to complete the written survey that you may receive in " the mail after your visit with us. Thank you!             Your Updated Medication List - Protect others around you: Learn how to safely use, store and throw away your medicines at www.disposemymeds.org.          This list is accurate as of 5/16/18  5:44 PM.  Always use your most recent med list.                   Brand Name Dispense Instructions for use Diagnosis    albuterol 108 (90 Base) MCG/ACT Inhaler    PROAIR HFA/PROVENTIL HFA/VENTOLIN HFA    1 Inhaler    Inhale 2 puffs into the lungs every 6 hours as needed    Acute bronchitis, unspecified organism       buPROPion 150 MG 12 hr tablet    WELLBUTRIN SR    180 tablet    Take 1 tablet (150 mg) by mouth 2 times daily    Moderate episode of recurrent major depressive disorder (H), Tobacco abuse       cyclobenzaprine 10 MG tablet    FLEXERIL    45 tablet    Take 0.5-1 tablets (5-10 mg) by mouth 3 times daily as needed for muscle spasms    Chronic bilateral low back pain without sciatica       diclofenac 1 % Gel topical gel    VOLTAREN    100 g    Apply 4 grams to knees  four times daily using enclosed dosing card.    Bilateral chronic knee pain       DULoxetine 60 MG EC capsule    CYMBALTA    90 capsule    Take 1 capsule (60 mg) by mouth daily    Chronic bilateral low back pain without sciatica, Moderate episode of recurrent major depressive disorder (H)       HYDROcodone-acetaminophen 5-325 MG per tablet    NORCO    15 tablet    Take 1 tablet by mouth every 6 hours as needed for moderate to severe pain Max of 2 tabs per day, use sparingly. OK to fill and begin on 10/16/2017.  Last refill was 1/11/17 for #15    Lumbar radicular pain       order for DME     1 Device    Equipment being ordered: Dynaflex foot plate, 23.5 Right    Arthritis of first metatarsophalangeal joint

## 2018-05-17 ENCOUNTER — TELEPHONE (OUTPATIENT)
Dept: NURSING | Facility: CLINIC | Age: 58
End: 2018-05-17

## 2018-05-17 ASSESSMENT — PATIENT HEALTH QUESTIONNAIRE - PHQ9: SUM OF ALL RESPONSES TO PHQ QUESTIONS 1-9: 10

## 2018-05-17 ASSESSMENT — ANXIETY QUESTIONNAIRES: GAD7 TOTAL SCORE: 9

## 2018-05-18 ENCOUNTER — MYC MEDICAL ADVICE (OUTPATIENT)
Dept: FAMILY MEDICINE | Facility: CLINIC | Age: 58
End: 2018-05-18

## 2018-05-18 NOTE — TELEPHONE ENCOUNTER
Reviewed PGen Blood Pressure Study and she agreed to be evaluated as a possible enrollee.  Visit #1 scheduled for Tuesday, May 29th, 2018 at 5PM at the [facility] in [place] with Taryn Angel CNP.

## 2018-05-28 ENCOUNTER — NURSE TRIAGE (OUTPATIENT)
Dept: NURSING | Facility: CLINIC | Age: 58
End: 2018-05-28

## 2018-05-29 ENCOUNTER — ALLIED HEALTH/NURSE VISIT (OUTPATIENT)
Dept: NURSING | Facility: CLINIC | Age: 58
End: 2018-05-29
Payer: COMMERCIAL

## 2018-05-29 VITALS
HEART RATE: 72 BPM | WEIGHT: 222.8 LBS | HEIGHT: 61 IN | SYSTOLIC BLOOD PRESSURE: 150 MMHG | BODY MASS INDEX: 42.06 KG/M2 | DIASTOLIC BLOOD PRESSURE: 92 MMHG

## 2018-05-29 DIAGNOSIS — I10 HYPERTENSION GOAL BP (BLOOD PRESSURE) < 140/90: Primary | ICD-10-CM

## 2018-05-29 PROCEDURE — 99207 ZZC NO CHARGE NURSE ONLY: CPT

## 2018-05-29 NOTE — PATIENT INSTRUCTIONS
[facility]   Visit 1 patient information    Medication will be prescribed in about 2 weeks. Check email for survey. Return to clinic about 4 weeks from now (2 weeks after starting medication).    Thank you for your interest in the [facility] hypertension research study. In two weeks, your hypertension medication will be prescribed through a free follow-up virtual encounter (via phone or through [employer]).    In the coming days you will be contacted by a research team member to schedule your next office visit. After it is scheduled, be sure to let the research coordinator know as soon as possible if you cannot make it so that the visit can be rescheduled for you.      Please contact the research coordinator if you receive care for your high blood pressure outside of your study visits.    If you have any questions, please contact the research coordinator at [phone]. If you experience any symptoms please call the [employer] 24/7 triage  number at [phone]. If your phone number, email or address changes please alert the research coordinator.      In the meantime, we ask that you complete the online surveys emailed out to you, if completing online, or mailed out to you, if completing paper surveys, before your next visit.    Lifestyle changes that can help control high blood pressure:  Even though [facility] to test effectiveness of genetically guided medications for managing high blood pressure, there are several things you can do to ensure your blood pressure stays in good control:    Maintain a healthy weight (BMI<26). A modest amount of weight loss can be helpful    Limit salt intake to under 2400mg daily    Follow the DASH diet (lean meats, low salt, whole grains, lots of fruits/vegies)    Stay active, try to get in 30 minutes of exercise daily.    Manage your daily stress.    Do not smoke cigarettes (or cut back)    Limit alcohol (2 drinks/day for men, 1 drink/day for women)

## 2018-05-29 NOTE — PROGRESS NOTES
"The following section is to be completed by [place] Pharmacist/ PGEN provider:      PGEN study visit 1 (or Research lab visit 1)  : Potential New Onset HTN  5/29/2018     This research participant has responded to study outreach efforts and has expressed interest in PGEN study.  she has self reported that she satisfies the following inclusion and exclusion criteria:    Inclusion criteria:  1) New diagnosis of high blood pressure but not yet on blood pressure medication or   2) Existing hypertension not controlled with one CLASS of blood pressure medication and  3) Age between 30 and 80 and  4) BMI between 19-50      Today's bmi is Body mass index is 41.75 kg/(m^2).    Exclusion Criteria:    1) All patients taking more than 1 class of hypertensive medications  2) Heart disease  (Coronary artery disease)  3) Chronic kidney disease  4) Vascular disease / Peripheral artery disease/ Pulmonary HTN   5) High blood pressure due to other underlying condition (i.e. Secondary HTN)  6) Systolic BP > 169 OR  DBP >109      Smoking: quit smoking July 4th, 2017  Alcohol consumption estimated 6 times per year  Exercise walking \"hopefully every day\" and will start biking again soon   Following a DASH diet currently ?No, currently on weight watcher, not much salt added to foods, tries to limit packaged foods due to sodium    Update  Medications, and Allergies: Done      Current Outpatient Prescriptions on File Prior to Visit:  albuterol (PROAIR HFA, PROVENTIL HFA, VENTOLIN HFA) 108 (90 BASE) MCG/ACT inhaler Inhale 2 puffs into the lungs every 6 hours as needed   buPROPion (WELLBUTRIN SR) 150 MG 12 hr tablet Take 1 tablet (150 mg) by mouth 2 times daily   cyclobenzaprine (FLEXERIL) 10 MG tablet Take 0.5-1 tablets (5-10 mg) by mouth 3 times daily as needed for muscle spasms   diclofenac (VOLTAREN) 1 % GEL topical gel Apply 4 grams to knees  four times daily using enclosed dosing card.   DULoxetine (CYMBALTA) 60 MG EC capsule Take 1 " "capsule (60 mg) by mouth daily   HYDROcodone-acetaminophen (NORCO) 5-325 MG per tablet Take 1 tablet by mouth every 6 hours as needed for moderate to severe pain Max of 2 tabs per day, use sparingly. OK to fill and begin on 10/16/2017.  Last refill was 1/11/17 for #15   order for DME Equipment being ordered: Secure [employer]af[employer] foot plate, 23.5 Right     No current facility-administered medications on file prior to visit.     Last Basic Metabolic Panel:  Lab Results   Component Value Date     09/04/2012      Lab Results   Component Value Date    POTASSIUM 4.6 09/04/2012     Lab Results   Component Value Date    CHLORIDE 101 09/04/2012     Lab Results   Component Value Date    ALCIDES 10.2 09/04/2012     Lab Results   Component Value Date    CO2 25 09/04/2012     Lab Results   Component Value Date    BUN 11 09/04/2012     Lab Results   Component Value Date    CR 0.77 09/04/2012     Lab Results   Component Value Date    GLC 92 09/04/2012        A baseline potassium, creatinine, BUN, GFR has been done within past 12 months (at [place] or accessible via Care Everywhere)        43.5\" waist circumference            BP (!) 150/92 (BP Location: Left arm, Patient Position: Sitting, Cuff Size: Adult Large)  Pulse 72  Ht 5' 1.25\" (1.556 m)  Wt 222 lb 12.8 oz (101.1 kg)  BMI 41.75 kg/m2  Estimated body mass index is 41.75 kg/(m^2) as calculated from the following:    Height as of this encounter: 5' 1.25\" (1.556 m).    Weight as of this encounter: 222 lb 12.8 oz (101.1 kg).      Is the manual confirmatory BP reading as recorded in the vitals section today within the study parameters for enrollment? Yes  >140/90 for ages 30-59 and diabetics  >150/90 for age >60  Exclude patients with: Systolic BP > 169 OR  DBP >109     Please complete \"PGEN Hypertension Study\" flow sheet : Done    Which arm is this potential patient being considered for enrollment into ? New Onset HTN     Patient has signed the consent form and HIPPAA form: Done. "     Cheek swabs (genetic profile test) was obtained per protocol and will be provided to clinic lab today : Done     Bar code associated with genetic test kit is : [id number]     Results will be available to the research team in 2-3 weeks and patient will be advised of the treatment plan, based on the randomization process, at that time (or at 4 weeks for patients in the uncontrolled HTN group)      Our research team will reach out to patient to schedule a follow up visit.    Patient was counseled regarding the lifestyle changes (listed below)  to help with BP management Done  Lifestyle changes that can help control high blood pressure:  Even though PGEN is a study to test effectiveness of genetically guided medications for managing high blood pressure, there are several things you can do to ensure your blood pressure stays in good control:    Maintain a healthy weight (BMI<26). A modest amount of weight loss can be helpful    Limit salt intake to under 2400mg daily    Follow the DASH diet (lean meats, low salt, whole grains, lots of fruits/vegies)    Stay active, try to get in 30 minutes of exercise daily.    Manage your daily stress.    Do not smoke cigarettes (or cut back)    Limit alcohol (2 drinks/day for men, 1 drink/day for women)  Was AVS  provided to patient with the relevant <dot>PGENPI... dot phrase pulled into patient instructions: yes    Patient was given an opportunity to ask questions.  Patient verbalized understanding of this plan and is agreeable to continuing with this research study : yes    Emily Zuniga RN    [facility] provider notes and plan:  Based on the above and chart review the patient is noted to have:New Onset HTN    Will patient be enrolled into PGEN study today?:  yes    I have added the diagnosis of hypertension to the problem list with the appropriate JNC8 target blood pressure:  yes           PGEN research team will reach out to patient and arrange future research visit follow-up   See avs  for more information about this study and today's visit.     Adam Martinez MD

## 2018-05-29 NOTE — MR AVS SNAPSHOT
After Visit Summary   5/29/2018    Jessica Jay    MRN: [id number]           Patient Information     Date Of Birth          1960        Visit Information        Provider Department      5/29/2018 4:00 PM [facility] Dan[facility] Morrow Instructions    [facility] Hypertension Study   Visit 1 patient information    Medication will be prescribed in about 2 weeks. Check email for survey. Return to clinic about 4 weeks from now (2 weeks after starting medication).    Thank you for your interest in the [facility] hypertension research study. In two weeks, your hypertension medication will be prescribed through a free follow-up virtual encounter (via phone or through [employer]).    In the coming days you will be contacted by a research team member to schedule your next office visit. After it is scheduled, be sure to let the research coordinator know as soon as possible if you cannot make it so that the visit can be rescheduled for you.      Please contact the research coordinator if you receive care for your high blood pressure outside of your study visits.    If you have any questions, please contact the research coordinator at [phone]. If you experience any symptoms please call the [facility] 24/7 triage  number at [phone]. If your phone number, email or address changes please alert the research coordinator.      In the meantime, we ask that you complete the online surveys emailed out to you, if completing online, or mailed out to you, if completing paper surveys, before your next visit.    Lifestyle changes that can help control high blood pressure:  Even though [facility] is a study to test effectiveness of genetically guided medications for managing high blood pressure, there are several things you can do to ensure your blood pressure stays in good control:    Maintain a healthy weight (BMI<26). A modest amount of weight loss can be helpful    Limit salt intake to under 2400mg  "daily    Follow the DASH diet (lean meats, low salt, whole grains, lots of fruits/vegies)    Stay active, try to get in 30 minutes of exercise daily.    Manage your daily stress.    Do not smoke cigarettes (or cut back)    Limit alcohol (2 drinks/day for men, 1 drink/day for women)                  Follow-ups after your visit        Follow-up notes from your care team     Return in about 4 weeks (around 6/26/2018) for PGen Visit.      Who to contact     If you have questions or need follow up information about today's clinic visit or your schedule please contact [facility] [place] directly at [phone].  Normal or non-critical lab and imaging results will be communicated to you by [employer], letter or phone within 4 business days after the clinic has received the results. If you do not hear from us within 7 days, please contact the clinic through webtidet or phone. If you have a critical or abnormal lab result, we will notify you by phone as soon as possible.  Submit refill requests through [employer] or call your pharmacy and they will forward the refill request to us. Please allow 3 business days for your refill to be completed.          Additional Information About Your Visit        [employer] Information     [employer] gives you secure access to your electronic health record. If you see a primary care provider, you can also send messages to your care team and make appointments. If you have questions, please call your primary care clinic.  If you do not have a primary care provider, please call [phone] and they will assist you.        Care EveryWhere ID     This is your Care EveryWhere ID. This could be used by other organizations to access your [place] medical records  [id number]        Your Vitals Were     Pulse Height BMI (Body Mass Index)             72 5' 1.25\" (1.556 m) 41.75 kg/m2          Blood Pressure from Last 3 Encounters:   05/29/18 (!) 150/92   05/16/18 (!) 158/92   03/07/18 136/89    Weight " from Last 3 Encounters:   05/29/18 222 lb 12.8 oz (101.1 kg)   05/16/18 228 lb 6.4 oz (103.6 kg)   03/07/18 232 lb 3.2 oz (105.3 kg)              Today, you had the following     No orders found for display       Primary Care Provider Office Phone # Fax #    Marybeth Silver [phone]       [address]  [place] [address]        Equal Access to Services     [facility] : Hadii aad ku hadasho Soomaali, waaxda luqadaha, qaybta kaalmada adeegyada, waxay idiin hayaan ademukesh barrera . So [facility] [phone].    ATENCIÓN: Si habla español, tiene a hatfield disposición servicios gratuitos de asistencia lingüística. [facility] [phone].    We comply with applicable federal civil rights laws and [place] laws. We do not discriminate on the basis of race, color, national origin, age, disability, sex, sexual orientation, or gender identity.            Thank you!     Thank you for choosing [facility]  for your care. Our goal is always to provide you with excellent care. Hearing back from our patients is one way we can continue to improve our services. Please take a few minutes to complete the written survey that you may receive in the mail after your visit with us. Thank you!             Your Updated Medication List - Protect others around you: Learn how to safely use, store and throw away your medicines at www.disposemymeds.org.          This list is accurate as of 5/29/18  4:36 PM.  Always use your most recent med list.                   Brand Name Dispense Instructions for use Diagnosis    albuterol 108 (90 Base) MCG/ACT Inhaler    PROAIR HFA/PROVENTIL HFA/VENTOLIN HFA    1 Inhaler    Inhale 2 puffs into the lungs every 6 hours as needed    Acute bronchitis, unspecified organism       buPROPion 150 MG 12 hr tablet    WELLBUTRIN SR    180 tablet    Take 1 tablet (150 mg) by mouth 2 times daily    Moderate episode of recurrent major depressive disorder (H), Tobacco abuse        cyclobenzaprine 10 MG tablet    FLEXERIL    45 tablet    Take 0.5-1 tablets (5-10 mg) by mouth 3 times daily as needed for muscle spasms    Chronic bilateral low back pain without sciatica       diclofenac 1 % Gel topical gel    VOLTAREN    100 g    Apply 4 grams to knees  four times daily using enclosed dosing card.    Bilateral chronic knee pain       DULoxetine 60 MG EC capsule    CYMBALTA    90 capsule    Take 1 capsule (60 mg) by mouth daily    Chronic bilateral low back pain without sciatica, Moderate episode of recurrent major depressive disorder (H)       HYDROcodone-acetaminophen 5-325 MG per tablet    NORCO    15 tablet    Take 1 tablet by mouth every 6 hours as needed for moderate to severe pain Max of 2 tabs per day, use sparingly. OK to fill and begin on 10/16/2017.  Last refill was 1/11/17 for #15    Lumbar radicular pain       order for DME     1 Device    Equipment being ordered: Dynaflex foot plate, 23.5 Right    Arthritis of first metatarsophalangeal joint

## 2018-06-10 PROBLEM — I10 HYPERTENSION GOAL BP (BLOOD PRESSURE) < 140/90: Status: ACTIVE | Noted: 2018-06-10

## 2018-06-11 ENCOUNTER — MYC MEDICAL ADVICE (OUTPATIENT)
Dept: FAMILY MEDICINE | Facility: CLINIC | Age: 58
End: 2018-06-11

## 2018-06-11 DIAGNOSIS — K21.9 GASTROESOPHAGEAL REFLUX DISEASE, ESOPHAGITIS PRESENCE NOT SPECIFIED: Primary | ICD-10-CM

## 2018-06-12 ENCOUNTER — TELEPHONE (OUTPATIENT)
Dept: FAMILY MEDICINE | Facility: CLINIC | Age: 58
End: 2018-06-12

## 2018-06-12 NOTE — LETTER
[facility]  [address]  [place] [address]  [phone]        June 12, 2018    Jessica Jay  [address] [address]              Dear Jessica Jay    This is to remind you that your mammogram is due.  Please call our office at [phone] to have orders placed for you, from there we can assist you in scheduling your appointment.         Sincerely,      [facility]

## 2018-06-12 NOTE — TELEPHONE ENCOUNTER
Panel Management Review      Patient has the following on her problem list: None      Composite cancer screening  Chart review shows that this patient is due/due soon for the following Mammogram  Summary:    Patient is due/failing the following:   MAMMOGRAM    Action needed:   Patient needs office visit for mammogram.    Type of outreach:    Sent letter.    Questions for provider review:    None                                                                                                                                    Lora Sanches MA       Chart routed to Care Team .

## 2018-06-14 ENCOUNTER — TELEPHONE (OUTPATIENT)
Dept: FAMILY MEDICINE | Facility: CLINIC | Age: 58
End: 2018-06-14

## 2018-06-14 NOTE — TELEPHONE ENCOUNTER
Patient brought in application for disability parking certificate for DDD. Form placed in Dr. Silver's in box

## 2018-06-18 ENCOUNTER — TRANSFERRED RECORDS (OUTPATIENT)
Dept: HEALTH INFORMATION MANAGEMENT | Facility: CLINIC | Age: 58
End: 2018-06-18

## 2018-06-20 ENCOUNTER — TELEPHONE (OUTPATIENT)
Dept: FAMILY MEDICINE | Facility: CLINIC | Age: 58
End: 2018-06-20

## 2018-06-20 DIAGNOSIS — I10 HYPERTENSION GOAL BP (BLOOD PRESSURE) < 140/90: Primary | ICD-10-CM

## 2018-06-20 RX ORDER — METOPROLOL SUCCINATE 25 MG/1
25 TABLET, EXTENDED RELEASE ORAL DAILY
Qty: 30 TABLET | Refills: 1 | Status: SHIPPED | OUTPATIENT
Start: 2018-06-20 | End: 2018-08-10

## 2018-06-20 NOTE — TELEPHONE ENCOUNTER
Patient was due to be prescribed medication on June 12th based on genetic recommendations. The preferred pharmacy is [facility] in [place].    As a reminder, please place the standing order in addition to prescribing medication.

## 2018-06-20 NOTE — TELEPHONE ENCOUNTER
Hi    Your patient in currently enrolled in the PGEN hypertension study being done at [facility].      She  has been randomized to the genetically guided group.  That result is in the media tab or is in the process of being scanned in.     Please do NOT share any of the genetic information with the patient.  Please also do NOT share which group she has been randomized to.    I have placed a standing order as well as the initial blood pressure medication as indicated by our study protocol. You can refer to today's orders for more details.       If you do NOT agree with the standing order, please route back to me with the changes you would like to see and I can then modify the order to reflect your adjustment based on clinical judgement.     More details about this study can be found by going to [email or web address].  Or typing <dot>[email or web address]>  in Epic     Study team:   Please reach out and advise prescription for metoprolol XL 25mg daily was ordered.    Please advise patient of these common side effects potentially associated with this prescription : bradycardia; fatigue.    Pharmacist will also provide more medication related education.    Please also arrange follow-up per protocol.   Please also advise that our study team advises all patients to establish/maintain an ongoing primary care relationship since that is so important to help better manage her ongoing health care needs.      Adam Martinez MD  PGen study co-      Christine Duke PA-C  PGen study co-investigator

## 2018-06-21 NOTE — TELEPHONE ENCOUNTER
Patient advised of medication. She picked up medication and will begin taking it on June 21st. Her second PGen visit is scheduled for July 10th at [facility].

## 2018-06-22 ENCOUNTER — TELEPHONE (OUTPATIENT)
Dept: FAMILY MEDICINE | Facility: CLINIC | Age: 58
End: 2018-06-22

## 2018-06-22 DIAGNOSIS — F33.1 MODERATE EPISODE OF RECURRENT MAJOR DEPRESSIVE DISORDER (H): ICD-10-CM

## 2018-06-22 DIAGNOSIS — M54.50 CHRONIC BILATERAL LOW BACK PAIN WITHOUT SCIATICA: ICD-10-CM

## 2018-06-22 DIAGNOSIS — G89.29 CHRONIC BILATERAL LOW BACK PAIN WITHOUT SCIATICA: ICD-10-CM

## 2018-06-25 RX ORDER — DULOXETIN HYDROCHLORIDE 60 MG/1
CAPSULE, DELAYED RELEASE ORAL
Qty: 30 CAPSULE | Refills: 0 | Status: SHIPPED | OUTPATIENT
Start: 2018-06-25 | End: 2018-07-23

## 2018-06-25 NOTE — TELEPHONE ENCOUNTER
"Seen 5/16/18. Patient is enrolled in PGEN. Mood wasn't discussed, PHQ9 was high and OV says patient is \"due for physical at earliest convenience.\" Ginna sent.   Cesia Peters RN   "

## 2018-06-25 NOTE — TELEPHONE ENCOUNTER
"Requested Prescriptions   Pending Prescriptions Disp Refills     DULoxetine (CYMBALTA) 60 MG EC capsule [Pharmacy Med Name: DULOXETINE DR 60MG CAPSULES]  Last Written Prescription Date:  4/10/2017  Last Fill Quantity: 90 capsule,  # refills: 3   Last office visit: 10/16/2017 with prescribing provider:  MARIUM Chapman   Future Office Visit:   Next 5 appointments (look out 90 days)     Jul 10, 2018  9:00 AM CDT   Nurse [facility] with Be [facility] Provider   [facility]    [address] [address]   [phone]               90 capsule 0     Sig: TAKE 1 CAPSULE(60 MG) BY MOUTH DAILY    Serotonin-Norepinephrine Reuptake Inhibitors  Failed    6/22/2018  5:19 PM       Failed - Blood pressure under 140/90 in past 12 months    BP Readings from Last 3 Encounters:   05/29/18 (!) 150/92   05/16/18 (!) 158/92   03/07/18 136/89          Failed - PHQ-9 score of less than 5 in past 6 months    Please review last PHQ-9 score.     PHQ-9 SCORE 1/17/2017 3/7/2018 5/16/2018   Total Score - - -   Total Score 5 11 10     GABRIELA-7 SCORE 3/7/2018 3/7/2018 5/16/2018   Total Score 11 11 9          Passed - Patient is age 18 or older       Passed - No active pregnancy on record       Passed - No positive pregnancy test in past 12 months       Passed - Recent (6 mo) or future (30 days) visit within the authorizing provider's specialty    Patient had office visit in the last 6 months or has a visit in the next 30 days with authorizing provider or within the authorizing provider's specialty.  See \"Patient Info\" tab in inbasket, or \"Choose Columns\" in Meds & Orders section of the refill encounter.              "

## 2018-07-02 NOTE — TELEPHONE ENCOUNTER
Patient has a new PCP at the [facility].  Called patient and asked her to make sure her pharmacy sends any of her refills to her new PCP.  Patient understood.      No more refills because patient has a new PCP.    Monika Olivia

## 2018-07-10 ENCOUNTER — ALLIED HEALTH/NURSE VISIT (OUTPATIENT)
Dept: NURSING | Facility: CLINIC | Age: 58
End: 2018-07-10
Payer: COMMERCIAL

## 2018-07-10 VITALS
HEART RATE: 51 BPM | SYSTOLIC BLOOD PRESSURE: 142 MMHG | WEIGHT: 226.6 LBS | BODY MASS INDEX: 42.47 KG/M2 | DIASTOLIC BLOOD PRESSURE: 84 MMHG

## 2018-07-10 DIAGNOSIS — I10 HYPERTENSION GOAL BP (BLOOD PRESSURE) < 140/90: Primary | ICD-10-CM

## 2018-07-10 LAB
ANION GAP SERPL CALCULATED.3IONS-SCNC: 5 MMOL/L (ref 3–14)
BUN SERPL-MCNC: 10 MG/DL (ref 7–30)
CALCIUM SERPL-MCNC: 9.5 MG/DL (ref 8.5–10.1)
CHLORIDE SERPL-SCNC: 105 MMOL/L (ref 94–109)
CO2 SERPL-SCNC: 27 MMOL/L (ref 20–32)
CREAT SERPL-MCNC: 0.75 MG/DL (ref 0.52–1.04)
GFR SERPL CREATININE-BSD FRML MDRD: 80 ML/MIN/1.7M2
GLUCOSE SERPL-MCNC: 103 MG/DL (ref 70–99)
POTASSIUM SERPL-SCNC: ABNORMAL MMOL/L (ref 3.4–5.3)
SODIUM SERPL-SCNC: 137 MMOL/L (ref 133–144)

## 2018-07-10 PROCEDURE — 80048 BASIC METABOLIC PNL TOTAL CA: CPT | Performed by: PHYSICIAN ASSISTANT

## 2018-07-10 PROCEDURE — 36415 COLL VENOUS BLD VENIPUNCTURE: CPT | Performed by: PHYSICIAN ASSISTANT

## 2018-07-10 PROCEDURE — 99207 ZZC NO CHARGE NURSE ONLY: CPT

## 2018-07-10 RX ORDER — AMLODIPINE BESYLATE 5 MG/1
5 TABLET ORAL DAILY
Qty: 30 TABLET | Refills: 1 | Status: SHIPPED | OUTPATIENT
Start: 2018-07-10 | End: 2018-08-10

## 2018-07-10 NOTE — PROGRESS NOTES
PGEN study visit  NEW HYPERTENSION diagnosis visit 2    SUBJECTIVE:  Jessica is here for 2nd PGEN research study visit. Please refer to research tab in the header and today's flow sheet for further details. Patient had new onset hypertension at enrollment and has a goal of <  140/90 (per Problem list target chosen by PCP)     Prior research note reviewed. Patient is on blood pressure medication(s) at this time.  she has been taking metoprolol XL 25mg QD and is tolerating the medication well.  Was taking in the AM and was very tired at work - switched to taking at night, and now no problems.    Current diet & lifestyle: no changes  Smoking: none  Alcohol consumption very little  Other pertinent history stressed over possible weight gain     BP Readings from Last 3 Encounters:   07/10/18 142/84   05/29/18 (!) 150/92   05/16/18 (!) 158/92       Current Outpatient Prescriptions   Medication Sig Dispense Refill     amLODIPine (NORVASC) 5 MG tablet Take 1 tablet (5 mg) by mouth daily 30 tablet 1     albuterol (PROAIR HFA, PROVENTIL HFA, VENTOLIN HFA) 108 (90 BASE) MCG/ACT inhaler Inhale 2 puffs into the lungs every 6 hours as needed 1 Inhaler 1     buPROPion (WELLBUTRIN SR) 150 MG 12 hr tablet Take 1 tablet (150 mg) by mouth 2 times daily 180 tablet 3     cyclobenzaprine (FLEXERIL) 10 MG tablet Take 0.5-1 tablets (5-10 mg) by mouth 3 times daily as needed for muscle spasms 45 tablet 0     diclofenac (VOLTAREN) 1 % GEL topical gel Apply 4 grams to knees  four times daily using enclosed dosing card. 100 g 1     DULoxetine (CYMBALTA) 60 MG EC capsule TAKE 1 CAPSULE(60 MG) BY MOUTH DAILY 30 capsule 0     HYDROcodone-acetaminophen (NORCO) 5-325 MG per tablet Take 1 tablet by mouth every 6 hours as needed for moderate to severe pain Max of 2 tabs per day, use sparingly. OK to fill and begin on 10/16/2017.  Last refill was 1/11/17 for #15 (Patient not taking: Reported on 7/10/2018) 15 tablet 0     metoprolol succinate  (TOPROL-XL) 25 MG 24 hr tablet Take 1 tablet (25 mg) by mouth daily 30 tablet 1     omeprazole (PRILOSEC) 20 MG CR capsule Take 1 capsule (20 mg) by mouth daily 30 capsule 1     order for DME Equipment being ordered: [facility] [employer] foot plate, 23.5 Right 1 Device 0     Potassium   Date Value Ref Range Status   09/04/2012 4.6 3.4 - 5.3 mmol/L Final     Creatinine   Date Value Ref Range Status   09/04/2012 0.77 0.52 - 1.04 mg/dL Final     Urea Nitrogen   Date Value Ref Range Status   09/04/2012 11 7 - 30 mg/dL Final     GFR Estimate   Date Value Ref Range Status   09/04/2012 79 >60 mL/min/1.7m2 Final      A baseline potassium, creatinine, BUN, GFR has not been done within past 12 months      OBJECTIVE:  Patient in in no apparent distress and able to provide full history for today's encounter. she denies pain or any current illness   Vitals: /84  Pulse 51  Wt 226 lb 9.6 oz (102.8 kg)  BMI 42.47 kg/m2  Today's BP completed using cuff size: regular on left side  arm.      Pulse Readings from Last 1 Encounters:   07/10/18 51     Is pulse 55 or greater? - No    BMI= Body mass index is 42.47 kg/(m^2).  See PGEN flow sheet for other exam findings.       ASSESSMENT/PLAN:   Blood pressure reading today is not at the provider specified goal of <140/90.      PGEN Flowsheet has been  'filed' ) for this visit (this saves flowsheet data)      1.  Based on PGEN RN HTN MGMT standing order the patient will be advised to decrease metoprolol to 12.5mg (due to low heart rate) and ADD 5mg amlodipine QD.     2.  We will be checking a metabolic lab panel today.  BMP last done 7/21/17 - need updated for within 1 year    3.  Follow up instructions include:     Next PGen visit in 4 weeks.    See avs for more details.  Full research packet also provided to patient previously  Our research team will reach out to patient to schedule follow up visit as well.     Patient was counseled regarding the lifestyle changes (listed below)  to help with  BP management Yes  Lifestyle changes that can help control high blood pressure:  Even though PGEN is a study to test effectiveness of genetically guided medications for managing high blood pressure, there are several things you can do to ensure your blood pressure stays in good control:    Maintain a healthy weight (BMI<26). A modest amount of weight loss can be helpful    Limit salt intake to under 2400mg daily    Follow the DASH diet (lean meats, low salt, whole grains, lots of fruits/vegies)    Stay active, try to get in 30 minutes of exercise daily.    Manage your daily stress.    Do not smoke cigarettes (or cut back)    Limit alcohol (2 drinks/day for men, 1 drink/day for women)  Was AVS  provided to patient with the relevant <dot>PGENPI dot phrase pulled into patient instructions Yes    Emily Zuniga RN

## 2018-07-10 NOTE — PATIENT INSTRUCTIONS
[facility] Hypertension Study  Visit 2     Decrease metoprolol to half tablet (12.5mg) once daily. START amlodipine 5mg once daily. Medications can be taken at same time. Next visit in 4 weeks.    Thank you for your continued participation in the hypertension study!  Please continue taking your hypertension medications as directed. Your next visit will be in four weeks and will be scheduled for you in the coming days. After it is scheduled, be sure to let the research coordinator know as soon as possible if you cannot make it so that the visit can be rescheduled for you.     Please contact the research coordinator if you receive care for your hypertension outside of your study visits.    If you have any questions, please contact the research coordinator at [phone]. If you experience any symptoms please call the [facility] 24/7 triage number at [phone]. If your phone number, email or address changes please alert the research coordinator.     In the meantime, we ask that you complete the online surveys emailed out to  you, if completing online, or mailed out to you, if completing paper surveys,  before your next visit.    Lifestyle changes that can help control high blood pressure:  Even though PGEN is a study to test effectiveness of genetically guided medications for managing high blood pressure, there are several things you can do to ensure your blood pressure stays in good control:    Maintain a healthy weight (BMI<26). A modest amount of weight loss can be helpful    Limit salt intake to under 2400mg daily    Follow the DASH diet (lean meats, low salt, whole grains, lots of fruits/vegies)    Stay active, try to get in 30 minutes of exercise daily.    Manage your daily stress.    Do not smoke cigarettes (or cut back)    Limit alcohol (2 drinks/day for men, 1 drink/day for women)

## 2018-07-10 NOTE — MR AVS SNAPSHOT
After Visit Summary   7/10/2018    Jessica Jay    MRN: [id number]           Patient Information     Date Of Birth          1960        Visit Information        Provider Department      7/10/2018 9:00 AM Provider, Neil Grady [facility] [place]        Today's Diagnoses     Hypertension goal BP (blood pressure) < 140/90    -  1      Care Instructions    [facility] Hypertension Study  Visit 2     Decrease metoprolol to half tablet (12.5mg) once daily. START amlodipine 5mg once daily. Medications can be taken at same time. Next visit in 4 weeks.    Thank you for your continued participation in the hypertension study!  Please continue taking your hypertension medications as directed. Your next visit will be in four weeks and will be scheduled for you in the coming days. After it is scheduled, be sure to let the research coordinator know as soon as possible if you cannot make it so that the visit can be rescheduled for you.     Please contact the research coordinator if you receive care for your hypertension outside of your study visits.    If you have any questions, please contact the research coordinator at [phone]. If you experience any symptoms please call the [place] 24/7 triage number at [phone]. If your phone number, email or address changes please alert the research coordinator.     In the meantime, we ask that you complete the online surveys emailed out to  you, if completing online, or mailed out to you, if completing paper surveys,  before your next visit.    Lifestyle changes that can help control high blood pressure:  Even though [facility] to test effectiveness of genetically guided medications for managing high blood pressure, there are several things you can do to ensure your blood pressure stays in good control:    Maintain a healthy weight (BMI<26). A modest amount of weight loss can be helpful    Limit salt intake to under 2400mg daily    Follow the DASH diet (lean  meats, low salt, whole grains, lots of fruits/vegies)    Stay active, try to get in 30 minutes of exercise daily.    Manage your daily stress.    Do not smoke cigarettes (or cut back)    Limit alcohol (2 drinks/day for men, 1 drink/day for women)            Follow-ups after your visit        Follow-up notes from your care team     Return in about 4 weeks (around 8/7/2018) for PGen Visit.      Future tests that were ordered for you today     Open Future Orders        Priority Expected Expires Ordered    **Basic metabolic panel FUTURE anytime Routine 7/10/2018 7/10/2019 7/10/2018            Who to contact     If you have questions or need follow up information about today's clinic visit or your schedule please contact [facility] [place] directly at [phone].  Normal or non-critical lab and imaging results will be communicated to you by MyChart, letter or phone within 4 business days after the clinic has received the results. If you do not hear from us within 7 days, please contact the clinic through [employer] or phone. If you have a critical or abnormal lab result, we will notify you by phone as soon as possible.  Submit refill requests through [employer] or call your pharmacy and they will forward the refill request to us. Please allow 3 business days for your refill to be completed.          Additional Information About Your Visit        [employer] Information     [employer] gives you secure access to your electronic health record. If you see a primary care provider, you can also send messages to your care team and make appointments. If you have questions, please call your primary care clinic.  If you do not have a primary care provider, please call [phone] and they will assist you.        Care EveryWhere ID     This is your Care EveryWhere ID. This could be used by other organizations to access your [place] medical records  [id number]        Your Vitals Were     Pulse BMI (Body Mass Index)                51  42.47 kg/m2           Blood Pressure from Last 3 Encounters:   07/10/18 142/84   05/29/18 (!) 150/92   05/16/18 (!) 158/92    Weight from Last 3 Encounters:   07/10/18 226 lb 9.6 oz (102.8 kg)   05/29/18 222 lb 12.8 oz (101.1 kg)   05/16/18 228 lb 6.4 oz (103.6 kg)                 Today's Medication Changes          These changes are accurate as of 7/10/18  9:28 AM.  If you have any questions, ask your nurse or doctor.               Start taking these medicines.        Dose/Directions    amLODIPine 5 MG tablet   Commonly known as:  NORVASC   Used for:  Hypertension goal BP (blood pressure) < 140/90        Dose:  5 mg   Take 1 tablet (5 mg) by mouth daily   Quantity:  30 tablet   Refills:  1            Where to get your medicines      These medications were sent to [facility] [address] [place] [address]  AT [address] [facility] [address]     Phone:  [phone]     amLODIPine 5 MG tablet                Primary Care Provider Office Phone # Fax #    Marybeth Silver [phone]       [address] [facility] [address] [facility] [address]        Equal Access to Services     ANAID VENEGAS AH: Hadii danielle ku hadasho Soomaali, waaxda luqadaha, qaybta kaalmada adeegyada, waxay idiin hayenma warner. So [facility] [phone].    ATENCIÓN: Si habla español, tiene a hatfield disposición servicios gratuitos de asistencia lingüística. [facility] [phone].    We comply with applicable federal civil rights laws and [place] laws. We do not discriminate on the basis of race, color, national origin, age, disability, sex, sexual orientation, or gender identity.            Thank you!     Thank you for choosing [facility]  for your care. Our goal is always to provide you with excellent care. Hearing back from our patients is one way we can continue to improve our services. Please take a few minutes to complete the written survey that you may receive in the  mail after your visit with us. Thank you!             Your Updated Medication List - Protect others around you: Learn how to safely use, store and throw away your medicines at www.disposemymeds.org.          This list is accurate as of 7/10/18  9:28 AM.  Always use your most recent med list.                   Brand Name Dispense Instructions for use Diagnosis    albuterol 108 (90 Base) MCG/ACT Inhaler    PROAIR HFA/PROVENTIL HFA/VENTOLIN HFA    1 Inhaler    Inhale 2 puffs into the lungs every 6 hours as needed    Acute bronchitis, unspecified organism       amLODIPine 5 MG tablet    NORVASC    30 tablet    Take 1 tablet (5 mg) by mouth daily    Hypertension goal BP (blood pressure) < 140/90       buPROPion 150 MG 12 hr tablet    WELLBUTRIN SR    180 tablet    Take 1 tablet (150 mg) by mouth 2 times daily    Moderate episode of recurrent major depressive disorder (H), Tobacco abuse       cyclobenzaprine 10 MG tablet    FLEXERIL    45 tablet    Take 0.5-1 tablets (5-10 mg) by mouth 3 times daily as needed for muscle spasms    Chronic bilateral low back pain without sciatica       diclofenac 1 % Gel topical gel    VOLTAREN    100 g    Apply 4 grams to knees  four times daily using enclosed dosing card.    Bilateral chronic knee pain       DULoxetine 60 MG EC capsule    CYMBALTA    30 capsule    TAKE 1 CAPSULE(60 MG) BY MOUTH DAILY    Chronic bilateral low back pain without sciatica, Moderate episode of recurrent major depressive disorder (H)       HYDROcodone-acetaminophen 5-325 MG per tablet    NORCO    15 tablet    Take 1 tablet by mouth every 6 hours as needed for moderate to severe pain Max of 2 tabs per day, use sparingly. OK to fill and begin on 10/16/2017.  Last refill was 1/11/17 for #15    Lumbar radicular pain       metoprolol succinate 25 MG 24 hr tablet    TOPROL-XL    30 tablet    Take 1 tablet (25 mg) by mouth daily    Hypertension goal BP (blood pressure) < 140/90       omeprazole 20 MG CR capsule     priLOSEC    30 capsule    Take 1 capsule (20 mg) by mouth daily    Gastroesophageal reflux disease, esophagitis presence not specified       order for DME     1 Device    Equipment being ordered: Dynaflex foot plate, 23.5 Right    Arthritis of first metatarsophalangeal joint

## 2018-07-23 DIAGNOSIS — F33.1 MODERATE EPISODE OF RECURRENT MAJOR DEPRESSIVE DISORDER (H): ICD-10-CM

## 2018-07-23 DIAGNOSIS — M54.50 CHRONIC BILATERAL LOW BACK PAIN WITHOUT SCIATICA: ICD-10-CM

## 2018-07-23 DIAGNOSIS — G89.29 CHRONIC BILATERAL LOW BACK PAIN WITHOUT SCIATICA: ICD-10-CM

## 2018-07-23 NOTE — TELEPHONE ENCOUNTER
"Requested Prescriptions   Pending Prescriptions Disp Refills     DULoxetine (CYMBALTA) 60 MG EC capsule  Last Written Prescription Date:  6/25/2018  Last Fill Quantity: 30 ,  # refills: 0   Last office visit: 10/16/2017 with prescribing provider:  MARIUM Chapman   Future Office Visit:     30 capsule 0    Serotonin-Norepinephrine Reuptake Inhibitors  Failed    7/23/2018  2:06 PM       Failed - Blood pressure under 140/90 in past 12 months    BP Readings from Last 3 Encounters:   07/10/18 142/84   05/29/18 (!) 150/92   05/16/18 (!) 158/92          Failed - PHQ-9 score of less than 5 in past 6 months    Please review last PHQ-9 score.   PHQ-9 SCORE 1/17/2017 3/7/2018 5/16/2018   Total Score - - -   Total Score 5 11 10     GABRIELA-7 SCORE 3/7/2018 3/7/2018 5/16/2018   Total Score 11 11 9          Passed - Patient is age 18 or older       Passed - No active pregnancy on record       Passed - No positive pregnancy test in past 12 months       Passed - Recent (6 mo) or future (30 days) visit within the authorizing provider's specialty    Patient had office visit in the last 6 months or has a visit in the next 30 days with authorizing provider or within the authorizing provider's specialty.  See \"Patient Info\" tab in inbasket, or \"Choose Columns\" in Meds & Orders section of the refill encounter.              "

## 2018-07-24 NOTE — TELEPHONE ENCOUNTER
Previous prescribed left 6 months ago and patient is now being seen at [facility] by Dr. Silver.   Cesia Peters RN

## 2018-07-25 RX ORDER — DULOXETIN HYDROCHLORIDE 60 MG/1
CAPSULE, DELAYED RELEASE ORAL
Qty: 90 CAPSULE | Refills: 3 | Status: SHIPPED | OUTPATIENT
Start: 2018-07-25 | End: 2019-07-21

## 2018-07-25 NOTE — TELEPHONE ENCOUNTER
Routing refill request to provider for review/approval because:  Prescribed by different provider.

## 2018-08-03 DIAGNOSIS — K21.9 GASTROESOPHAGEAL REFLUX DISEASE, ESOPHAGITIS PRESENCE NOT SPECIFIED: ICD-10-CM

## 2018-08-03 NOTE — TELEPHONE ENCOUNTER
"Requested Prescriptions   Pending Prescriptions Disp Refills     omeprazole (PRILOSEC) 20 MG CR capsule [Pharmacy Med Name: OMEPRAZOLE 20MG CAPSULES] 30 capsule 0    Last Written Prescription Date:  7/8/18  Last Fill Quantity: 30,  # refills: 0   Last office visit: 5/16/2018 with prescribing provider:  5/16/18 Adrianne   Future Office Visit:   Next 5 appointments (look out 90 days)     Aug 10, 2018  2:30 PM CDT   [facility]    [address] [facility] [address]   [phone]                Sig: TAKE 1 CAPSULE(20 MG) BY MOUTH DAILY    PPI Protocol Passed    8/3/2018  5:06 AM       Passed - Not on Clopidogrel (unless Pantoprazole ordered)       Passed - No diagnosis of osteoporosis on record       Passed - Recent (12 mo) or future (30 days) visit within the authorizing provider's specialty    Patient had office visit in the last 12 months or has a visit in the next 30 days with authorizing provider or within the authorizing provider's specialty.  See \"Patient Info\" tab in inbasket, or \"Choose Columns\" in Meds & Orders section of the refill encounter.           Passed - Patient is age 18 or older       Passed - No active pregnacy on record       Passed - No positive pregnancy test in past 12 months        "

## 2018-08-05 NOTE — TELEPHONE ENCOUNTER
Medication is being filled for 1 time refill only due to:  Patient needs to be seen because needs annual exam and mammogram.   Please schedule.  Aundrea Yu RN

## 2018-08-06 ENCOUNTER — MYC MEDICAL ADVICE (OUTPATIENT)
Dept: FAMILY MEDICINE | Facility: CLINIC | Age: 58
End: 2018-08-06

## 2018-08-09 ENCOUNTER — TELEPHONE (OUTPATIENT)
Dept: FAMILY MEDICINE | Facility: CLINIC | Age: 58
End: 2018-08-09

## 2018-08-09 NOTE — TELEPHONE ENCOUNTER
Patient is on Depression Remission List.    PHQ9 is due between 7/7/18 and 11/7/18.    Please call/mychart patient to complete phq9. Soheila Carrillo MA

## 2018-08-10 ENCOUNTER — OFFICE VISIT (OUTPATIENT)
Dept: FAMILY MEDICINE | Facility: CLINIC | Age: 58
End: 2018-08-10
Payer: COMMERCIAL

## 2018-08-10 VITALS
BODY MASS INDEX: 42.44 KG/M2 | WEIGHT: 224.8 LBS | HEART RATE: 71 BPM | SYSTOLIC BLOOD PRESSURE: 132 MMHG | DIASTOLIC BLOOD PRESSURE: 75 MMHG | HEIGHT: 61 IN

## 2018-08-10 DIAGNOSIS — I10 HYPERTENSION GOAL BP (BLOOD PRESSURE) < 140/90: Primary | ICD-10-CM

## 2018-08-10 PROCEDURE — 99213 OFFICE O/P EST LOW 20 MIN: CPT | Performed by: FAMILY MEDICINE

## 2018-08-10 RX ORDER — METOPROLOL SUCCINATE 25 MG/1
12.5 TABLET, EXTENDED RELEASE ORAL DAILY
Qty: 30 TABLET | Refills: 1 | Status: SHIPPED | OUTPATIENT
Start: 2018-08-10 | End: 2018-09-06

## 2018-08-10 RX ORDER — AMLODIPINE BESYLATE 5 MG/1
5 TABLET ORAL DAILY
Qty: 30 TABLET | Refills: 1 | Status: SHIPPED | OUTPATIENT
Start: 2018-08-10 | End: 2018-09-05

## 2018-08-10 NOTE — MR AVS SNAPSHOT
After Visit Summary   8/10/2018    Jessica Jay    MRN: [id number]           Patient Information     Date Of Birth          1960        Visit Information        Provider Department      8/10/2018 2:30 PM Marybeth Silver MD [facility]        Today's Diagnoses     Hypertension goal BP (blood pressure) < 140/90    -  1      Care Instructions    Continue your current medications as prescribed. No changes to your medications today.     [facility] Hypertension Study   Visit 3     Thank you for your continued participation in the hypertension study!  Please continue taking your hypertension medication as directed. Your next visit will be in four weeks and will be scheduled for you in the coming days. After scheduled, be sure to let the research coordinator know as soon as possible if you cannot make it so that the visit can be rescheduled for you.     Please contact the research coordinator if you receive care for your hypertension outside of your study visits.    If you have any questions, please contact the research coordinator at [phone]. If you experience any symptoms please call the [place] 24/7 triage number at [phone]. If your phone number, email or address changes please alert the research coordinator.     In the meantime, we ask that you complete the online surveys emailed out to you, if completing online, or mailed out to you, if completing paper surveys, before your next visit.            Follow-ups after your visit        Follow-up notes from your care team     Return in about 1 month (around 9/10/2018) for Follow up.      Your next 10 appointments already scheduled     Aug 13, 2018  2:00 PM CDT   MyChart Physical Adult with MD [facility] ([facility])    [address] [facility]  [place] [address]   [phone]            Aug 23, 2018  3:30 PM CDT   Screening Mammogram with LLMA1   [facility]  "([facility])    [address] [facility] [address] [phone]           Do NOT use body powder, lotions, perfume or deodorant the day of the exam.  If your last mammogram was not done at [place], please bring your mammogram films. We will need the name of your provider to send a copy of your report.  A mammogram may be covered on an annual or biannual basis, please check with your insurance company.              Who to contact     Normal or non-critical lab and imaging results will be communicated to you by [employer], letter or phone within 4 business days after the clinic has received the results. If you do not hear from us within 7 days, please contact the clinic through [employer] or phone. If you have a critical or abnormal lab result, we will notify you by phone as soon as possible.  Submit refill requests through [employer] or call your pharmacy and they will forward the refill request to us. Please allow 3 business days for your refill to be completed.          If you need to speak with a  for additional information , please call: [phone]             Additional Information About Your Visit        [employer] Information     [employer] gives you secure access to your electronic health record. If you see a primary care provider, you can also send messages to your care team and make appointments. If you have questions, please call your primary care clinic.  If you do not have a primary care provider, please call [phone] and they will assist you.        Care EveryWhere ID     This is your Care EveryWhere ID. This could be used by other organizations to access your [place] medical records  [id number]        Your Vitals Were     Pulse Height BMI (Body Mass Index)             71 5' 1.14\" (1.553 m) 42.28 kg/m2          Blood Pressure from Last 3 Encounters:   08/10/18 132/75   07/10/18 142/84   05/29/18 (!) 150/92    Weight from Last 3 Encounters:   08/10/18 224 lb 12.8 " oz (102 kg)   07/10/18 226 lb 9.6 oz (102.8 kg)   05/29/18 222 lb 12.8 oz (101.1 kg)              Today, you had the following     No orders found for display         Today's Medication Changes          These changes are accurate as of 8/10/18  3:13 PM.  If you have any questions, ask your nurse or doctor.               These medicines have changed or have updated prescriptions.        Dose/Directions    metoprolol succinate 25 MG 24 hr tablet   Commonly known as:  TOPROL-XL   This may have changed:  how much to take   Used for:  Hypertension goal BP (blood pressure) < 140/90        Dose:  12.5 mg   Take 0.5 tablets (12.5 mg) by mouth daily   Quantity:  30 tablet   Refills:  1            Where to get your medicines      These medications were sent to [facility] [address] [address]  AT [address] , [facility] [address]     Phone:  [phone]     amLODIPine 5 MG tablet    metoprolol succinate 25 MG 24 hr tablet                Primary Care Provider Office Phone # Fax #    Marybeth Silver [phone]       [address] [facility] [address]  [place] [address]        Equal Access to Services     ANAID VENEGAS AH: Hadii danielle ku hadasho Soomaali, waaxda luqadaha, qaybta kaalmada adeegyada, waxay jermainein hayfreedomn ellyn warner. So [facility] [phone].    ATENCIÓN: Si habla español, tiene a hatfield disposición servicios gratuitos de asistencia lingüística. Llame al [phone].    We comply with applicable federal civil rights laws and [place] laws. We do not discriminate on the basis of race, color, national origin, age, disability, sex, sexual orientation, or gender identity.            Thank you!     Thank you for choosing [facility]  for your care. Our goal is always to provide you with excellent care. Hearing back from our patients is one way we can continue to improve our services. Please take a few minutes to complete the written survey  that you may receive in the mail after your visit with us. Thank you!             Your Updated Medication List - Protect others around you: Learn how to safely use, store and throw away your medicines at www.Brandfolderemymeds.org.          This list is accurate as of 8/10/18  3:13 PM.  Always use your most recent med list.                   Brand Name Dispense Instructions for use Diagnosis    albuterol 108 (90 Base) MCG/ACT inhaler    PROAIR HFA/PROVENTIL HFA/VENTOLIN HFA    1 Inhaler    Inhale 2 puffs into the lungs every 6 hours as needed    Acute bronchitis, unspecified organism       amLODIPine 5 MG tablet    NORVASC    30 tablet    Take 1 tablet (5 mg) by mouth daily    Hypertension goal BP (blood pressure) < 140/90       buPROPion 150 MG 12 hr tablet    WELLBUTRIN SR    180 tablet    Take 1 tablet (150 mg) by mouth 2 times daily    Moderate episode of recurrent major depressive disorder (H), Tobacco abuse       cyclobenzaprine 10 MG tablet    FLEXERIL    45 tablet    Take 0.5-1 tablets (5-10 mg) by mouth 3 times daily as needed for muscle spasms    Chronic bilateral low back pain without sciatica       diclofenac 1 % Gel topical gel    VOLTAREN    100 g    Apply 4 grams to knees  four times daily using enclosed dosing card.    Bilateral chronic knee pain       DULoxetine 60 MG EC capsule    CYMBALTA    90 capsule    TAKE 1 CAPSULE(60 MG) BY MOUTH DAILY    Chronic bilateral low back pain without sciatica, Moderate episode of recurrent major depressive disorder (H)       HYDROcodone-acetaminophen 5-325 MG per tablet    NORCO    15 tablet    Take 1 tablet by mouth every 6 hours as needed for moderate to severe pain Max of 2 tabs per day, use sparingly. OK to fill and begin on 10/16/2017.  Last refill was 1/11/17 for #15    Lumbar radicular pain       metoprolol succinate 25 MG 24 hr tablet    TOPROL-XL    30 tablet    Take 0.5 tablets (12.5 mg) by mouth daily    Hypertension goal BP (blood pressure) < 140/90        omeprazole 20 MG CR capsule    priLOSEC    30 capsule    TAKE 1 CAPSULE(20 MG) BY MOUTH DAILY    Gastroesophageal reflux disease, esophagitis presence not specified       order for DME     1 Device    Equipment being ordered: Arasaflex foot plate, 23.5 Right    Arthritis of first metatarsophalangeal joint

## 2018-08-10 NOTE — PROGRESS NOTES
PGEN study visit  NEW HYPERTENSION diagnosis visit 3    SUBJECTIVE:  Jessica is here for 3rd PGEN research study visit. Please refer to research tab in the header and today's flow sheet for further details. Patient had new onset hypertension at enrollment and has a goal of <  140/90 (per Problem list target chosen by PCP)     Prior research note reviewed. Patient is on blood pressure medication(s) at this time.  she has been taking Metoprolol 12.5 mg and Amlodipine 5 mg and is tolerating the medication well.      Current diet & lifestyle: Unchanged  Smoking: Yes, started up again about a month  Alcohol consumption No  Other pertinent history metoprolol dose was decreased to 12.5 mg at the last office visit due to concerns of bradycardia, Amlodipine was added. Started smoking a month ago, plans to visit with PCP next week to discuss smoking cessation management.     BP Readings from Last 3 Encounters:   08/10/18 132/75   07/10/18 142/84   05/29/18 (!) 150/92       Current Outpatient Prescriptions   Medication Sig Dispense Refill     albuterol (PROAIR HFA, PROVENTIL HFA, VENTOLIN HFA) 108 (90 BASE) MCG/ACT inhaler Inhale 2 puffs into the lungs every 6 hours as needed 1 Inhaler 1     amLODIPine (NORVASC) 5 MG tablet Take 1 tablet (5 mg) by mouth daily 30 tablet 1     buPROPion (WELLBUTRIN SR) 150 MG 12 hr tablet Take 1 tablet (150 mg) by mouth 2 times daily 180 tablet 3     cyclobenzaprine (FLEXERIL) 10 MG tablet Take 0.5-1 tablets (5-10 mg) by mouth 3 times daily as needed for muscle spasms 45 tablet 0     diclofenac (VOLTAREN) 1 % GEL topical gel Apply 4 grams to knees  four times daily using enclosed dosing card. 100 g 1     DULoxetine (CYMBALTA) 60 MG EC capsule TAKE 1 CAPSULE(60 MG) BY MOUTH DAILY 90 capsule 3     metoprolol succinate (TOPROL-XL) 25 MG 24 hr tablet Take 0.5 tablets (12.5 mg) by mouth daily 30 tablet 1     omeprazole (PRILOSEC) 20 MG CR capsule TAKE 1 CAPSULE(20 MG) BY MOUTH DAILY 30 capsule 0      "HYDROcodone-acetaminophen (NORCO) 5-325 MG per tablet Take 1 tablet by mouth every 6 hours as needed for moderate to severe pain Max of 2 tabs per day, use sparingly. OK to fill and begin on 10/16/2017.  Last refill was 1/11/17 for #15 (Patient not taking: Reported on 7/10/2018) 15 tablet 0     order for DME Equipment being ordered: Dynaflex foot plate, 23.5 Right 1 Device 0     [DISCONTINUED] amLODIPine (NORVASC) 5 MG tablet Take 1 tablet (5 mg) by mouth daily 30 tablet 1     [DISCONTINUED] metoprolol succinate (TOPROL-XL) 25 MG 24 hr tablet Take 1 tablet (25 mg) by mouth daily 30 tablet 1     Potassium   Date Value Ref Range Status   07/10/2018 Unsatisfactory specimen - hemolyzed 3.4 - 5.3 mmol/L Final     Comment:     Specimen slightly hemolyzed, potassium may be falsely elevated     Creatinine   Date Value Ref Range Status   07/10/2018 0.75 0.52 - 1.04 mg/dL Final     Urea Nitrogen   Date Value Ref Range Status   07/10/2018 10 7 - 30 mg/dL Final     GFR Estimate   Date Value Ref Range Status   07/10/2018 80 >60 mL/min/1.7m2 Final     Comment:     Non  GFR Calc      A baseline potassium, creatinine, BUN, GFR has been done within past 12 months      OBJECTIVE:  Patient in in no apparent distress and able to provide full history for today's encounter. she denies pain or any current illness   Vitals: /75 (BP Location: Right arm, Patient Position: Sitting, Cuff Size: Adult Regular)  Pulse 71  Ht 5' 1.14\" (1.553 m)  Wt 224 lb 12.8 oz (102 kg)  BMI 42.28 kg/m2  Today's BP completed using cuff size: regular on right side arm.      Pulse Readings from Last 1 Encounters:   08/10/18 71     Is pulse 55 or greater? - Yes    BMI= Body mass index is 42.28 kg/(m^2).  See PGEN flow sheet for other exam findings.       ASSESSMENT/PLAN:   Blood pressure reading today is at the provider specified goal of <140/90.      PGEN Flowsheet has been  'filed' ) for this visit (this saves flowsheet data)      1.  " Based on PGEN RN HTN MGMT standing order the patient will be advised continue current medication regimen unchanged.     2.  We will not be checking a metabolic lab panel today.      3.  Follow up instructions include:     Next Provider visit: Follow up in 1 month..    See avs for more details.  Full research packet also provided to patient previously  Our research team will reach out to patient to schedule follow up visit as well.     Patient was counseled regarding the lifestyle changes (listed below)  to help with BP management Yes  Lifestyle changes that can help control high blood pressure:  Even though PGEN is a study to test effectiveness of genetically guided medications for managing high blood pressure, there are several things you can do to ensure your blood pressure stays in good control:    Maintain a healthy weight (BMI<26). A modest amount of weight loss can be helpful    Limit salt intake to under 2400mg daily    Follow the DASH diet (lean meats, low salt, whole grains, lots of fruits/vegies)    Stay active, try to get in 30 minutes of exercise daily.    Manage your daily stress.    Do not smoke cigarettes (or cut back)    Limit alcohol (2 drinks/day for men, 1 drink/day for women)  Was AVS  provided to patient with the relevant <dot>PGENPI dot phrase pulled into patient instructions Yes    Marybeth Silver MD

## 2018-08-10 NOTE — PATIENT INSTRUCTIONS
Continue your current medications as prescribed. No changes to your medications today.     [facility]   Visit 3     Thank you for your continued participation in the hypertension study!  Please continue taking your hypertension medication as directed. Your next visit will be in four weeks and will be scheduled for you in the coming days. After scheduled, be sure to let the research coordinator know as soon as possible if you cannot make it so that the visit can be rescheduled for you.     Please contact the research coordinator if you receive care for your hypertension outside of your study visits.    If you have any questions, please contact the research coordinator at [phone]. If you experience any symptoms please call the [employer] 24/7 triage number at [phone]. If your phone number, email or address changes please alert the research coordinator.     In the meantime, we ask that you complete the online surveys emailed out to you, if completing online, or mailed out to you, if completing paper surveys, before your next visit.

## 2018-08-13 ENCOUNTER — OFFICE VISIT (OUTPATIENT)
Dept: FAMILY MEDICINE | Facility: CLINIC | Age: 58
End: 2018-08-13
Payer: COMMERCIAL

## 2018-08-13 VITALS
WEIGHT: 226.6 LBS | HEIGHT: 61 IN | RESPIRATION RATE: 16 BRPM | OXYGEN SATURATION: 99 % | HEART RATE: 82 BPM | DIASTOLIC BLOOD PRESSURE: 80 MMHG | SYSTOLIC BLOOD PRESSURE: 132 MMHG | BODY MASS INDEX: 42.78 KG/M2 | TEMPERATURE: 97.8 F

## 2018-08-13 DIAGNOSIS — K21.9 GASTROESOPHAGEAL REFLUX DISEASE, ESOPHAGITIS PRESENCE NOT SPECIFIED: ICD-10-CM

## 2018-08-13 DIAGNOSIS — Z13.1 SCREENING FOR DIABETES MELLITUS: ICD-10-CM

## 2018-08-13 DIAGNOSIS — Z13.220 LIPID SCREENING: ICD-10-CM

## 2018-08-13 DIAGNOSIS — I10 HYPERTENSION GOAL BP (BLOOD PRESSURE) < 140/90: ICD-10-CM

## 2018-08-13 DIAGNOSIS — Z00.00 ROUTINE GENERAL MEDICAL EXAMINATION AT A HEALTH CARE FACILITY: Primary | ICD-10-CM

## 2018-08-13 DIAGNOSIS — F17.200 TOBACCO DEPENDENCE: ICD-10-CM

## 2018-08-13 DIAGNOSIS — G89.29 CHRONIC MIDLINE LOW BACK PAIN WITHOUT SCIATICA: ICD-10-CM

## 2018-08-13 DIAGNOSIS — M54.50 CHRONIC MIDLINE LOW BACK PAIN WITHOUT SCIATICA: ICD-10-CM

## 2018-08-13 DIAGNOSIS — Z12.31 VISIT FOR SCREENING MAMMOGRAM: ICD-10-CM

## 2018-08-13 DIAGNOSIS — Z11.4 SCREENING FOR HIV (HUMAN IMMUNODEFICIENCY VIRUS): ICD-10-CM

## 2018-08-13 DIAGNOSIS — R13.10 DYSPHAGIA, UNSPECIFIED TYPE: ICD-10-CM

## 2018-08-13 LAB
CHOLEST SERPL-MCNC: 208 MG/DL
GLUCOSE SERPL-MCNC: 86 MG/DL (ref 70–99)
HDLC SERPL-MCNC: 53 MG/DL
LDLC SERPL CALC-MCNC: 130 MG/DL
NONHDLC SERPL-MCNC: 155 MG/DL
TRIGL SERPL-MCNC: 123 MG/DL

## 2018-08-13 PROCEDURE — 36415 COLL VENOUS BLD VENIPUNCTURE: CPT | Performed by: FAMILY MEDICINE

## 2018-08-13 PROCEDURE — 82947 ASSAY GLUCOSE BLOOD QUANT: CPT | Performed by: FAMILY MEDICINE

## 2018-08-13 PROCEDURE — 99214 OFFICE O/P EST MOD 30 MIN: CPT | Mod: 25 | Performed by: FAMILY MEDICINE

## 2018-08-13 PROCEDURE — 80061 LIPID PANEL: CPT | Performed by: FAMILY MEDICINE

## 2018-08-13 PROCEDURE — 99396 PREV VISIT EST AGE 40-64: CPT | Performed by: FAMILY MEDICINE

## 2018-08-13 PROCEDURE — 40000358 ZZHCL STATISTIC DRUG SCREEN MULTIPLE (METRO): Performed by: FAMILY MEDICINE

## 2018-08-13 PROCEDURE — 80307 DRUG TEST PRSMV CHEM ANLYZR: CPT | Performed by: FAMILY MEDICINE

## 2018-08-13 PROCEDURE — 87389 HIV-1 AG W/HIV-1&-2 AB AG IA: CPT | Performed by: FAMILY MEDICINE

## 2018-08-13 ASSESSMENT — PAIN SCALES - GENERAL: PAINLEVEL: NO PAIN (0)

## 2018-08-13 NOTE — MR AVS SNAPSHOT
After Visit Summary   8/13/2018    Jessica Jay    MRN: [id number]           Patient Information     Date Of Birth          1960        Visit Information        Provider Department      8/13/2018 2:00 PM Marybeth Silver MD [facility]        Today's Diagnoses     Routine general medical examination at a health care facility    -  1    Hypertension goal BP (blood pressure) < 140/90        Visit for screening mammogram        Screening for HIV (human immunodeficiency virus)        Other chronic pain        Screening for diabetes mellitus        Lipid screening        Tobacco dependence        Dysphagia, unspecified type        Gastroesophageal reflux disease, esophagitis presence not specified          Care Instructions      Preventive Health Recommendations  Female Ages 50 - 64    Yearly exam: See your health care provider every year in order to  o Review health changes.   o Discuss preventive care.    o Review your medicines if your doctor has prescribed any.      Get a Pap test every three years (unless you have an abnormal result and your provider advises testing more often).    If you get Pap tests with HPV test, you only need to test every 5 years, unless you have an abnormal result.     You do not need a Pap test if your uterus was removed (hysterectomy) and you have not had cancer.    You should be tested each year for STDs (sexually transmitted diseases) if you're at risk.     Have a mammogram every 1 to 2 years.    Have a colonoscopy at age 50, or have a yearly FIT test (stool test). These exams screen for colon cancer.      Have a cholesterol test every 5 years, or more often if advised.    Have a diabetes test (fasting glucose) every three years. If you are at risk for diabetes, you should have this test more often.     If you are at risk for osteoporosis (brittle bone disease), think about having a bone density scan (DEXA).    Shots: Get a flu shot each year. Get a  tetanus shot every 10 years.    Nutrition:     Eat at least 5 servings of fruits and vegetables each day.    Eat whole-grain bread, whole-wheat pasta and brown rice instead of white grains and rice.    Get adequate Calcium and Vitamin D.     Lifestyle    Exercise at least 150 minutes a week (30 minutes a day, 5 days a week). This will help you control your weight and prevent disease.    Limit alcohol to one drink per day.    No smoking.     Wear sunscreen to prevent skin cancer.     See your dentist every six months for an exam and cleaning.    See your eye doctor every 1 to 2 years.            Follow-ups after your visit        Additional Services     GASTROENTEROLOGY ADULT REF PROCEDURE ONLY       Last Lab Result: Creatinine (mg/dL)       Date                     Value                 07/10/2018               0.75             ----------  Body mass index is 42.61 kg/(m^2).     Needed:  No  Language:  English    Patient will be contacted to schedule procedure.     Please be aware that coverage of these services is subject to the terms and limitations of your health insurance plan.  Call member services at your health plan with any benefit or coverage questions.  Any procedures must be performed at a [place] facility OR coordinated by your clinic's referral office.    Please bring the following with you to your appointment:    (1) Any X-Rays, CTs or MRIs which have been performed.  Contact the facility where they were done to arrange for  prior to your scheduled appointment.    (2) List of current medications   (3) This referral request   (4) Any documents/labs given to you for this referral                  Follow-up notes from your care team     Return in about 3 months (around 11/13/2018) for Follow up.      Your next 10 appointments already scheduled     Aug 23, 2018  3:30 PM CDT   Screening Mammogram with LLMA1   [facility]    [address]  [place] [address]   [phone]           Do NOT use body powder, lotions, perfume or deodorant the day of the exam.  If your last mammogram was not done at [facility], please bring your mammogram films. We will need the name of your provider to send a copy of your report.  A mammogram may be covered on an annual or biannual basis, please check with your insurance company.              Future tests that were ordered for you today     Open Future Orders        Priority Expected Expires Ordered    GASTROENTEROLOGY ADULT REF PROCEDURE ONLY Routine  12/13/2018 8/13/2018    MA SCREENING DIGITAL BILAT - Future  (s+30) Routine  8/13/2019 8/13/2018            Who to contact     Normal or non-critical lab and imaging results will be communicated to you by [employer], letter or phone within 4 business days after the clinic has received the results. If you do not hear from us within 7 days, please contact the clinic through [email or web address] or phone. If you have a critical or abnormal lab result, we will notify you by phone as soon as possible.  Submit refill requests through [employer] or call your pharmacy and they will forward the refill request to us. Please allow 3 business days for your refill to be completed.          If you need to speak with a  for additional information , please call: [phone]             Additional Information About Your Visit        [employer] Information     [employer] gives you secure access to your electronic health record. If you see a primary care provider, you can also send messages to your care team and make appointments. If you have questions, please call your primary care clinic.  If you do not have a primary care provider, please call [phone] and they will assist you.        Care EveryWhere ID     This is your Care EveryWhere ID. This could be used by other organizations to access your [facility] medical records  [id number]        Your Vitals Were     Pulse Temperature  "Respirations Height Pulse Oximetry BMI (Body Mass Index)    82 97.8  F (36.6  C) (Oral) 16 5' 1.15\" (1.553 m) 99% 42.61 kg/m2       Blood Pressure from Last 3 Encounters:   08/13/18 132/80   08/10/18 132/75   07/10/18 142/84    Weight from Last 3 Encounters:   08/13/18 226 lb 9.6 oz (102.8 kg)   08/10/18 224 lb 12.8 oz (102 kg)   07/10/18 226 lb 9.6 oz (102.8 kg)              We Performed the Following     Drug screen urine     Glucose     HIV Screening     Lipid panel reflex to direct LDL Fasting          Today's Medication Changes          These changes are accurate as of 8/13/18  2:50 PM.  If you have any questions, ask your nurse or doctor.               Start taking these medicines.        Dose/Directions    varenicline 0.5 MG X 11 & 1 MG X 42 tablet   Commonly known as:  CHANTIX STARTING MONTH JONO   Used for:  Tobacco dependence   Started by:  Marybeth Silver MD        Take 0.5 mg tab daily for 3 days, then 0.5 mg tab twice daily for 4 days, then 1 mg twice daily.   Quantity:  53 tablet   Refills:  0            Where to get your medicines      These medications were sent to [facility] [address] [place] [address]  AT [address] [address]     Phone:  [phone]     varenicline 0.5 MG X 11 & 1 MG X 42 tablet                Primary Care Provider Office Phone # Fax #    Marybeth Silver [phone]       [address] [place] [address]        Equal Access to Services     [facility] AH: Hadii danielle Graff, waaxda luqadaha, qaybta kaalmamakayla victoria. So [facility] [phone].    ATENCIÓN: Si habla español, tiene a hatfield disposición servicios gratuitos de asistencia lingüística. Llame al [phone].    We comply with applicable federal civil rights laws and [place] laws. We do not discriminate on the basis of race, color, national origin, age, disability, sex, sexual " orientation, or gender identity.            Thank you!     Thank you for choosing [facility]  for your care. Our goal is always to provide you with excellent care. Hearing back from our patients is one way we can continue to improve our services. Please take a few minutes to complete the written survey that you may receive in the mail after your visit with us. Thank you!             Your Updated Medication List - Protect others around you: Learn how to safely use, store and throw away your medicines at www.disposemymeds.org.          This list is accurate as of 8/13/18  2:50 PM.  Always use your most recent med list.                   Brand Name Dispense Instructions for use Diagnosis    amLODIPine 5 MG tablet    NORVASC    30 tablet    Take 1 tablet (5 mg) by mouth daily    Hypertension goal BP (blood pressure) < 140/90       buPROPion 150 MG 12 hr tablet    WELLBUTRIN SR    180 tablet    Take 1 tablet (150 mg) by mouth 2 times daily    Moderate episode of recurrent major depressive disorder (H), Tobacco abuse       cyclobenzaprine 10 MG tablet    FLEXERIL    45 tablet    Take 0.5-1 tablets (5-10 mg) by mouth 3 times daily as needed for muscle spasms    Chronic bilateral low back pain without sciatica       diclofenac 1 % Gel topical gel    VOLTAREN    100 g    Apply 4 grams to knees  four times daily using enclosed dosing card.    Bilateral chronic knee pain       DULoxetine 60 MG EC capsule    CYMBALTA    90 capsule    TAKE 1 CAPSULE(60 MG) BY MOUTH DAILY    Chronic bilateral low back pain without sciatica, Moderate episode of recurrent major depressive disorder (H)       HYDROcodone-acetaminophen 5-325 MG per tablet    NORCO    15 tablet    Take 1 tablet by mouth every 6 hours as needed for moderate to severe pain Max of 2 tabs per day, use sparingly. OK to fill and begin on 10/16/2017.  Last refill was 1/11/17 for #15    Lumbar radicular pain       metoprolol succinate 25 MG 24 hr tablet     TOPROL-XL    30 tablet    Take 0.5 tablets (12.5 mg) by mouth daily    Hypertension goal BP (blood pressure) < 140/90       omeprazole 20 MG CR capsule    priLOSEC    30 capsule    TAKE 1 CAPSULE(20 MG) BY MOUTH DAILY    Gastroesophageal reflux disease, esophagitis presence not specified       order for DME     1 Device    Equipment being ordered: [employer] foot plate, 23.5 Right    Arthritis of first metatarsophalangeal joint       varenicline 0.5 MG X 11 & 1 MG X 42 tablet    CHANTIX STARTING MONTH JONO    53 tablet    Take 0.5 mg tab daily for 3 days, then 0.5 mg tab twice daily for 4 days, then 1 mg twice daily.    Tobacco dependence

## 2018-08-13 NOTE — PROGRESS NOTES
SUBJECTIVE:   CC: Jessica Jay is an 58 year old woman who presents for preventive health visit.       Answers for HPI/ROS submitted by the patient on 8/7/2018   Annual Exam:  Getting at least 3 servings of Calcium per day:: NO  Bi-annual eye exam:: Yes  Dental care twice a year:: Yes  Sleep apnea or symptoms of sleep apnea:: Sleep apnea  Diet:: Regular (no restrictions)  Frequency of exercise:: 6-7 days/week  Taking medications regularly:: Yes  Additional concerns today:: No  PHQ-2 Score: 1  Duration of exercise:: 45-60 minutes      Healthy Habits:    Do you get at least three servings of calcium containing foods daily (dairy, green leafy vegetables, etc.)? No    Amount of exercise or daily activities, outside of work: 3-6 day(s) per week    Problems taking medications regularly No    Medication side effects: No    Have you had an eye exam in the past two years? yes    Do you see a dentist twice per year? yes    Do you have sleep apnea, excessive snoring or daytime drowsiness?yes        Reports worsening GERD symptoms. Now having dysphagia as well despite taking a PI, Omeprazole.   Denies having any weight loss or night sweats.   Wt Readings from Last 4 Encounters:   08/13/18 226 lb 9.6 oz (102.8 kg)   08/10/18 224 lb 12.8 oz (102 kg)   07/10/18 226 lb 9.6 oz (102.8 kg)   05/29/18 222 lb 12.8 oz (101.1 kg)     HYPERTENSION - Patient has a history of HTN , currently enrolled in the PGEN study. Tolerating low dose Metoprolol and Amlodipine well. Denies any symptoms referable to elevated blood pressure. Specifically denies chest pain, palpitations, dyspnea, orthopnea, PND or peripheral edema. Blood pressure readings have been in normal range.     Patient states that she was recently undergoing stress and started up smoking again, now up to 1 PPD. States that she has been unable to quit on her own so far, states that in the past she has been successful with Chantix.   Would like to try it again.                                                                                                                                                                                      .  Patient informed that anything we discuss that is not related to preventative medicine, may be billed for; patient verbalizes understanding.      Today's PHQ-2 Score:   PHQ-2 ( 1999 [employer]) 8/7/2018 5/16/2018   Q1: Little interest or pleasure in doing things 0 1   Q2: Feeling down, depressed or hopeless 1 2   PHQ-2 Score 1 3   Q1: Little interest or pleasure in doing things Not at all -   Q2: Feeling down, depressed or hopeless Several days -   PHQ-2 Score 1 -       Abuse: Current or Past(Physical, Sexual or Emotional)- No  Do you feel safe in your environment - Yes    Social History   Substance Use Topics     Smoking status: Former Smoker     Packs/day: 0.80     Years: 30.00     Types: Cigarettes     Quit date: 7/4/2017     Smokeless tobacco: Never Used     Alcohol use 0.0 oz/week      Comment: 6 drinks a year     If you drink alcohol do you typically have >3 drinks per day or >7 drinks per week? No                     Reviewed orders with patient.  Reviewed health maintenance and updated orders accordingly - Yes  BP Readings from Last 3 Encounters:   08/13/18 132/80   08/10/18 132/75   07/10/18 142/84    Wt Readings from Last 3 Encounters:   08/13/18 226 lb 9.6 oz (102.8 kg)   08/10/18 224 lb 12.8 oz (102 kg)   07/10/18 226 lb 9.6 oz (102.8 kg)                  Patient Active Problem List   Diagnosis     Depression, major     GERD (gastroesophageal reflux disease)     Migraines     CARDIOVASCULAR SCREENING; LDL GOAL LESS THAN 160     Disc herniation     DDD (degenerative disc disease), lumbar     DDD (degenerative disc disease), cervical     Neck pain     Headache     Dermoid cyst of brain (H)     Chronic daily headache     Dizziness - light-headed     Tobacco abuse     ELYSSA (obstructive sleep apnea)     Spondylolisthesis, grade 1     Chronic low back  pain     Brain lipoma     Vulvar dermatitis     History of colonic polyps     Gastroesophageal reflux disease without esophagitis     overweight with BMI >40     Hypertension goal BP (blood pressure) < 140/90     Past Surgical History:   Procedure Laterality Date     ARTHROSCOPY SHOULDER  1990    Right/spurs     C FOOT/TOES SURGERY PROC UNLISTED  1975    Toe fracture, left     CARPAL TUNNEL RELEASE RT/LT  1990    Left     COLONOSCOPY  2016     CRANIOTOMY, EXCISE TUMOR COMPLEX, COMBINED  5/9/2011    Procedure:COMBINED CRANIOTOMY, EXCISE TUMOR COMPLEX; 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 Father      Cancer Maternal Grandmother      Cerebrovascular Disease Paternal Grandmother      Breast Cancer Paternal Grandmother      Neurologic Disorder Sister      migraine     Cancer Brother      Cancer - colorectal Brother      Colon Cancer Brother          Current Outpatient Prescriptions   Medication Sig Dispense Refill     amLODIPine (NORVASC) 5 MG tablet Take 1 tablet (5 mg) by mouth daily 30 tablet 1     buPROPion (WELLBUTRIN SR) 150 MG 12 hr tablet Take 1 tablet (150 mg) by mouth 2 times daily 180 tablet 3     cyclobenzaprine (FLEXERIL) 10 MG tablet Take 0.5-1 tablets (5-10 mg) by mouth 3 times daily as needed for muscle spasms 45 tablet 0     diclofenac (VOLTAREN) 1 % GEL topical gel Apply 4 grams to knees  four times daily using enclosed dosing card. 100 g 1     DULoxetine (CYMBALTA) 60 MG EC capsule TAKE 1 CAPSULE(60 MG) BY MOUTH DAILY 90 capsule 3      HYDROcodone-acetaminophen (NORCO) 5-325 MG per tablet Take 1 tablet by mouth every 6 hours as needed for moderate to severe pain Max of 2 tabs per day, use sparingly. OK to fill and begin on 10/16/2017.  Last refill was 1/11/17 for #15 15 tablet 0     metoprolol succinate (TOPROL-XL) 25 MG 24 hr tablet Take 0.5 tablets (12.5 mg) by mouth daily 30 tablet 1     omeprazole (PRILOSEC) 20 MG CR capsule TAKE 1 CAPSULE(20 MG) BY MOUTH DAILY 30 capsule 0     order for DME Equipment being ordered: [employer] foot plate, 23.5 Right 1 Device 0     varenicline (CHANTIX STARTING MONTH JONO) 0.5 MG X 11 & 1 MG X 42 tablet Take 0.5 mg tab daily for 3 days, then 0.5 mg tab twice daily for 4 days, then 1 mg twice daily. 53 tablet 0     Allergies   Allergen Reactions     Buspar [Buspirone Hcl] Rash       Patient over age 50, mutual decision to screen reflected in health maintenance.    Pertinent mammograms are reviewed under the imaging tab.  History of abnormal Pap smear:   NO - age 30- 65 PAP every 3 years recommended  Last 3 Pap and HPV Results:   PAP / HPV Latest Ref Rng & Units 9/16/2016 11/15/2012 11/18/2009   PAP - NIL NIL NIL   HPV 16 DNA NEG Negative - -   HPV 18 DNA NEG Negative - -   OTHER HR HPV NEG Negative - -     PAP / HPV Latest Ref Rng & Units 9/16/2016 11/15/2012 11/18/2009   PAP - NIL NIL NIL   HPV 16 DNA NEG Negative - -   HPV 18 DNA NEG Negative - -   OTHER HR HPV NEG Negative - -     Reviewed and updated as needed this visit by clinical staff  Tobacco  Allergies  Meds  Med Hx  Surg Hx  Fam Hx  Soc Hx        Reviewed and updated as needed this visit by Provider  Tobacco            ROS:  CONSTITUTIONAL: NEGATIVE for fever, chills, change in weight  INTEGUMENTARY/SKIN: NEGATIVE for worrisome rashes, moles or lesions  EYES: NEGATIVE for vision changes or irritation  ENT: NEGATIVE for ear, mouth and throat problems  RESP: NEGATIVE for significant cough or SOB  BREAST: NEGATIVE for masses, tenderness or  "discharge  CV: NEGATIVE for chest pain, palpitations or peripheral edema  GI: NEGATIVE for nausea, abdominal pain, heartburn, or change in bowel habits  : NEGATIVE for unusual urinary or vaginal symptoms. No vaginal bleeding.  MUSCULOSKELETAL: NEGATIVE for significant arthralgias or myalgia  NEURO: NEGATIVE for weakness, dizziness or paresthesias  PSYCHIATRIC: NEGATIVE for changes in mood or affect     OBJECTIVE:   /80  Pulse 82  Temp 97.8  F (36.6  C) (Oral)  Resp 16  Ht 5' 1.15\" (1.553 m)  Wt 226 lb 9.6 oz (102.8 kg)  SpO2 99%  BMI 42.61 kg/m2  EXAM:  GENERAL APPEARANCE: healthy, alert and no distress  EYES: Eyes grossly normal to inspection, PERRL and conjunctivae and sclerae normal  HENT: ear canals and TM's normal, nose and mouth without ulcers or lesions, oropharynx clear and oral mucous membranes moist  NECK: no adenopathy, no asymmetry, masses, or scars and thyroid normal to palpation  RESP: lungs clear to auscultation - no rales, rhonchi or wheezes  BREAST: normal without masses, tenderness or nipple discharge and no palpable axillary masses or adenopathy  CV: regular rate and rhythm, normal S1 S2, no S3 or S4, no murmur, click or rub, no peripheral edema and peripheral pulses strong  ABDOMEN: soft, nontender, no hepatosplenomegaly, no masses and bowel sounds normal  MS: no musculoskeletal defects are noted and gait is age appropriate without ataxia  SKIN: no suspicious lesions or rashes  NEURO: Normal strength and tone, sensory exam grossly normal, mentation intact and speech normal  PSYCH: mentation appears normal and affect normal/bright    Diagnostic Test Results:  See orders below    ASSESSMENT/PLAN:   Jessica was seen today for physical.    Diagnoses and all orders for this visit:    Routine general medical examination at a health care facility    Hypertension goal BP (blood pressure) < 140/90, controlled  Enrolled in the PGEN study.  Continue current management.    Visit for screening " "mammogram  -     MA SCREENING DIGITAL BILAT - Future  (s+30); Future    Screening for diabetes mellitus  -     Glucose    Screening for HIV (human immunodeficiency virus)  -     HIV Screening    Chronic midline low back pain without sciatica  Takes Hydrocodone occasionally for pain relief.  -     Drug screen urine    Lipid screening  -     Lipid panel reflex to direct LDL Fasting    Tobacco dependence  -     varenicline (CHANTIX STARTING MONTH JONO) 0.5 MG X 11 & 1 MG X 42 tablet; Take 0.5 mg tab daily for 3 days, then 0.5 mg tab twice daily for 4 days, then 1 mg twice daily.    Dysphagia, unspecified type  -     GASTROENTEROLOGY ADULT REF PROCEDURE ONLY; Future    Gastroesophageal reflux disease, esophagitis presence not specified  -     GASTROENTEROLOGY ADULT REF PROCEDURE ONLY; Future          COUNSELING:   Reviewed preventive health counseling, as reflected in patient instructions       Regular exercise       Healthy diet/nutrition    BP Readings from Last 1 Encounters:   08/13/18 132/80     Estimated body mass index is 42.61 kg/(m^2) as calculated from the following:    Height as of this encounter: 5' 1.15\" (1.553 m).    Weight as of this encounter: 226 lb 9.6 oz (102.8 kg).      Weight management plan: Discussed healthy diet and exercise guidelines and patient will follow up in 12 months in clinic to re-evaluate.     reports that she quit smoking about 13 months ago. Her smoking use included Cigarettes. She has a 24.00 pack-year smoking history. She has never used smokeless tobacco.  Tobacco Cessation Action Plan: Pharmacotherapies : Chantix    Counseling Resources:  ATP IV Guidelines  Pooled Cohorts Equation Calculator  Breast Cancer Risk Calculator  FRAX Risk Assessment  ICSI Preventive Guidelines  Dietary Guidelines for Americans, 2010  USDA's MyPlate  ASA Prophylaxis  Lung CA Screening    Follow up in 3 months, sooner if needed.  .   Annually for a Physical.       Marybeth Silver MD  [facility] " [place]

## 2018-08-14 LAB
ACETAMINOPHEN QUAL: NEGATIVE
AMANTADINE: NEGATIVE
AMITRIPTYLINE QUAL: NEGATIVE
AMOXAPINE: NEGATIVE
AMPHETAMINES QUAL: NEGATIVE
ATROPINE: NEGATIVE
BENZODIAZ UR QL: NEGATIVE
CAFFEINE QUAL: POSITIVE
CANNABINOIDS UR QL SCN: NEGATIVE
CARBAMAZEPINE QUAL: NEGATIVE
CHLORPHENIRAMINE: NEGATIVE
CHLORPROMAZINE: NEGATIVE
CITALOPRAM QUAL: NEGATIVE
CLOMIPRAMINE QUAL: NEGATIVE
COCAINE QUAL: NEGATIVE
COCAINE UR QL: NEGATIVE
CODEINE QUAL: NEGATIVE
DESIPRAMINE QUAL: NEGATIVE
DEXTROMETHORPHAN: NEGATIVE
DIPHENHYDRAMINE: NEGATIVE
DOXEPIN/METABOLITE: NEGATIVE
DOXYLAMINE: NEGATIVE
EPHEDRINE OR PSEUDO: NEGATIVE
FENTANYL QUAL: NEGATIVE
FLUOXETINE AND METAB: NEGATIVE
HIV 1+2 AB+HIV1 P24 AG SERPL QL IA: NONREACTIVE
HYDROCODONE QUAL: NEGATIVE
HYDROMORPHONE QUAL: NEGATIVE
IBUPROFEN QUAL: NEGATIVE
IMIPRAMINE QUAL: NEGATIVE
LAMOTRIGINE QUAL: NEGATIVE
LOXAPINE: NEGATIVE
MAPROTYLINE: NEGATIVE
MDMA QUAL: NEGATIVE
MEPERIDINE QUAL: NEGATIVE
METHAMPHETAMINE: NEGATIVE
METHODONE QUAL: NEGATIVE
MORPHINE QUAL: NEGATIVE
NICOTINE: POSITIVE
NORTRIPTYLINE QUAL: NEGATIVE
OLANZAPINE QUAL: NEGATIVE
OPIATES UR QL SCN: NEGATIVE
OXYCODONE QUAL: NEGATIVE
PENTAZOCINE: NEGATIVE
PHENCYCLIDINE QUAL: NEGATIVE
PHENMETRAZINE: NEGATIVE
PHENTERMINE: NEGATIVE
PHENYLBUTAZONE: NEGATIVE
PHENYLPROPANOLAMINE: NEGATIVE
PROPOXPHENE QUAL: NEGATIVE
PROPRANOLOL QUAL: NEGATIVE
PYRILAMINE: NEGATIVE
SALICYLATE QUAL: NEGATIVE
THEOBROMINE: POSITIVE
TOPIRAMATE QUAL: NEGATIVE
TRIMIPRAMINE QUAL: NEGATIVE
VENLAFAXINE QUAL: NEGATIVE

## 2018-08-23 DIAGNOSIS — Z12.31 VISIT FOR SCREENING MAMMOGRAM: ICD-10-CM

## 2018-08-23 PROCEDURE — 77067 SCR MAMMO BI INCL CAD: CPT | Mod: TC

## 2018-09-02 DIAGNOSIS — K21.9 GASTROESOPHAGEAL REFLUX DISEASE, ESOPHAGITIS PRESENCE NOT SPECIFIED: ICD-10-CM

## 2018-09-04 NOTE — TELEPHONE ENCOUNTER
"Requested Prescriptions   Pending Prescriptions Disp Refills     omeprazole (PRILOSEC) 20 MG CR capsule [Pharmacy Med Name: OMEPRAZOLE 20MG CAPSULES] 30 capsule 0    Last Written Prescription Date:  8-6-18  Last Fill Quantity: 30,  # refills: 0   Last office visit: 8/13/2018 with prescribing provider:  8-13-18   Future Office Visit:   Next 5 appointments (look out 90 days)     Sep 05, 2018  4:30 PM CDT   [facility]    [address] [address]   [phone]                Sig: TAKE 1 CAPSULE(20 MG) BY MOUTH DAILY    PPI Protocol Passed    9/2/2018  8:48 AM       Passed - Not on Clopidogrel (unless Pantoprazole ordered)       Passed - No diagnosis of osteoporosis on record       Passed - Recent (12 mo) or future (30 days) visit within the authorizing provider's specialty    Patient had office visit in the last 12 months or has a visit in the next 30 days with authorizing provider or within the authorizing provider's specialty.  See \"Patient Info\" tab in inbasket, or \"Choose Columns\" in Meds & Orders section of the refill encounter.           Passed - Patient is age 18 or older       Passed - No active pregnacy on record       Passed - No positive pregnancy test in past 12 months        "

## 2018-09-05 ENCOUNTER — TELEPHONE (OUTPATIENT)
Dept: OTOLARYNGOLOGY | Facility: CLINIC | Age: 58
End: 2018-09-05

## 2018-09-05 ENCOUNTER — OFFICE VISIT (OUTPATIENT)
Dept: FAMILY MEDICINE | Facility: CLINIC | Age: 58
End: 2018-09-05
Payer: COMMERCIAL

## 2018-09-05 VITALS
HEART RATE: 82 BPM | TEMPERATURE: 98 F | SYSTOLIC BLOOD PRESSURE: 125 MMHG | OXYGEN SATURATION: 97 % | DIASTOLIC BLOOD PRESSURE: 76 MMHG | BODY MASS INDEX: 43.39 KG/M2 | HEIGHT: 61 IN | WEIGHT: 229.8 LBS

## 2018-09-05 DIAGNOSIS — Z00.6 RESEARCH STUDY PATIENT: Primary | ICD-10-CM

## 2018-09-05 DIAGNOSIS — I10 HYPERTENSION GOAL BP (BLOOD PRESSURE) < 140/90: ICD-10-CM

## 2018-09-05 PROCEDURE — 99213 OFFICE O/P EST LOW 20 MIN: CPT | Performed by: FAMILY MEDICINE

## 2018-09-05 RX ORDER — AMLODIPINE BESYLATE 5 MG/1
5 TABLET ORAL DAILY
Qty: 90 TABLET | Refills: 0 | Status: SHIPPED | OUTPATIENT
Start: 2018-09-05 | End: 2018-12-05

## 2018-09-05 NOTE — TELEPHONE ENCOUNTER
Controlled Substance Refill Request for ALPRAZolam (XANAX) 0.5 MG tablet   Problem List Complete:  No     PROVIDER TO CONSIDER COMPLETION OF PROBLEM LIST AND OVERVIEW/CONTROLLED SUBSTANCE AGREEMENT    Last Written Prescription Date:  07/18/2017  Last Fill Quantity: 4,   # refills: 0    [facility] Office Visit with [facility] care provider: 08/13/2018    Future Office visit:   Next 5 appointments (look out 90 days)     Sep 05, 2018  4:30 PM CDT   SHORT with Marybeth Silver MD   [facility] [place] ([facility])    [address]  [place] [address]   [phone]                  Controlled substance agreement on file: No.     Processing:  unknown   checked in past 3 months?  No, route to RN

## 2018-09-05 NOTE — PROGRESS NOTES
PGEN study visit  NEW HYPERTENSION diagnosis visit 4    SUBJECTIVE:  Jessica is here for 4th [facility] research study visit. Please refer to research tab in the header and today's flow sheet for further details. Patient had new onset hypertension at enrollment and has a goal of <  140/90 (per Problem list target chosen by PCP)     Prior research note reviewed. Patient is on blood pressure medication(s) at this time.  she has been taking Metoprolol 12.5 mg and Amlodipine 5 mg and is tolerating the medication well.      Current diet & lifestyle: Limit Na intake; trying 12,000 steps/day (5miles/day)  Smoking: Quit 2 days  Alcohol consumption: Rarely  Other pertinent history: constipation, 2 BMs/month- to follow with PCP to address.      BP Readings from Last 3 Encounters:   09/05/18 125/76   08/13/18 132/80   08/10/18 132/75       Current Outpatient Prescriptions   Medication Sig Dispense Refill     amLODIPine (NORVASC) 5 MG tablet Take 1 tablet (5 mg) by mouth daily 90 tablet 0     buPROPion (WELLBUTRIN SR) 150 MG 12 hr tablet Take 1 tablet (150 mg) by mouth 2 times daily 180 tablet 3     cyclobenzaprine (FLEXERIL) 10 MG tablet Take 0.5-1 tablets (5-10 mg) by mouth 3 times daily as needed for muscle spasms 45 tablet 0     diclofenac (VOLTAREN) 1 % GEL topical gel Apply 4 grams to knees  four times daily using enclosed dosing card. 100 g 1     DULoxetine (CYMBALTA) 60 MG EC capsule TAKE 1 CAPSULE(60 MG) BY MOUTH DAILY 90 capsule 3     metoprolol succinate (TOPROL-XL) 25 MG 24 hr tablet Take 0.5 tablets (12.5 mg) by mouth daily 30 tablet 1     omeprazole (PRILOSEC) 20 MG CR capsule TAKE 1 CAPSULE(20 MG) BY MOUTH DAILY 90 capsule 0     order for DME Equipment being ordered: [employer] foot plate, 23.5 Right 1 Device 0     varenicline (CHANTIX STARTING MONTH JONO) 0.5 MG X 11 & 1 MG X 42 tablet Take 0.5 mg tab daily for 3 days, then 0.5 mg tab twice daily for 4 days, then 1 mg twice daily. 53 tablet 0      "HYDROcodone-acetaminophen (NORCO) 5-325 MG per tablet Take 1 tablet by mouth every 6 hours as needed for moderate to severe pain Max of 2 tabs per day, use sparingly. OK to fill and begin on 10/16/2017.  Last refill was 1/11/17 for #15 15 tablet 0     Potassium   Date Value Ref Range Status   07/10/2018 Unsatisfactory specimen - hemolyzed 3.4 - 5.3 mmol/L Final     Comment:     Specimen slightly hemolyzed, potassium may be falsely elevated     Creatinine   Date Value Ref Range Status   07/10/2018 0.75 0.52 - 1.04 mg/dL Final     Urea Nitrogen   Date Value Ref Range Status   07/10/2018 10 7 - 30 mg/dL Final     GFR Estimate   Date Value Ref Range Status   07/10/2018 80 >60 mL/min/1.7m2 Final     Comment:     Non  GFR Calc      A baseline potassium, creatinine, BUN, GFR has been done within past 12 months      OBJECTIVE:  Patient in in no apparent distress and able to provide full history for today's encounter. she denies pain or any current illness   Vitals: /76  Pulse 82  Temp 98  F (36.7  C) (Tympanic)  Ht 5' 1\" (1.549 m)  Wt 229 lb 12.8 oz (104.2 kg)  SpO2 97%  Breastfeeding? No  BMI 43.42 kg/m2  Today's BP completed using cuff size: regular on left side  arm.      Pulse Readings from Last 1 Encounters:   09/05/18 82     Is pulse 55 or greater? - Yes    BMI= Body mass index is 43.42 kg/(m^2).  See PGEN flow sheet for other exam findings.       ASSESSMENT/PLAN:   Blood pressure reading today is at the provider specified goal of <140/90.      PGEN Flowsheet has been  'filed' ) for this visit (this saves flowsheet data)      1.  Based on PGEN RN HTN MGMT standing order the patient will be advised continue current medication regimen unchanged.     2.  We will not be checking a metabolic lab panel today.      3.  Follow up instructions include:     Next Provider visit: Follow up in 1 month..    See avs for more details.  Full research packet also provided to patient previously  Our research " team will reach out to patient to schedule follow up visit as well.     Patient was counseled regarding the lifestyle changes (listed below)  to help with BP management Yes  Lifestyle changes that can help control high blood pressure:  Even though PGEN is a study to test effectiveness of genetically guided medications for managing high blood pressure, there are several things you can do to ensure your blood pressure stays in good control:    Maintain a healthy weight (BMI<26). A modest amount of weight loss can be helpful    Limit salt intake to under 2400mg daily    Follow the DASH diet (lean meats, low salt, whole grains, lots of fruits/vegies)    Stay active, try to get in 30 minutes of exercise daily.    Manage your daily stress.    Do not smoke cigarettes (or cut back)    Limit alcohol (2 drinks/day for men, 1 drink/day for women)  Was AVS  provided to patient with the relevant <dot>PGENPI dot phrase pulled into patient instructions Yes    Marybeth Silver MD

## 2018-09-05 NOTE — MR AVS SNAPSHOT
After Visit Summary   9/5/2018    Jessica Jay    MRN: [id number]           Patient Information     Date Of Birth          1960        Visit Information        Provider Department      9/5/2018 4:30 PM Marybeth Silver MD [facility] Diagnoses     Research study patient    -  1    Hypertension goal BP (blood pressure) < 140/90          Care Instructions    PGen Hypertension Study   Visit 4     Thank you for your continued participation in the hypertension study!  Please continue taking your hypertension medications as directed. Your next visit will be in one and a half months and will be scheduled for you in the coming days.     After it is scheduled, be sure to let the research coordinator know as soon as possible if you cannot make it so that the visit can be rescheduled for you.     Please contact the research coordinator if you receive care for your hypertension outside of your study visits.    If you have any questions, please contact the research coordinator at [phone]. If you experience any symptoms please call the [place] 24/7 triage number at [phone]. If your phone number, email or address changes please alert the research coordinator.     In the meantime, we ask that you complete the online surveys emailed out to you, if completing online, or mailed out to you, if completing paper surveys, before your next visit.            Follow-ups after your visit        Follow-up notes from your care team     Return in about 1 month (around 10/5/2018) for Follow up.      Who to contact     Normal or non-critical lab and imaging results will be communicated to you by MyChart, letter or phone within 4 business days after the clinic has received the results. If you do not hear from us within 7 days, please contact the clinic through MyChart or phone. If you have a critical or abnormal lab result, we will notify you by phone as soon as possible.  Submit  "refill requests through [employer] or call your pharmacy and they will forward the refill request to us. Please allow 3 business days for your refill to be completed.          If you need to speak with a  for additional information , please call: [phone]             Additional Information About Your Visit        [employer] Information     [employer] gives you secure access to your electronic health record. If you see a primary care provider, you can also send messages to your care team and make appointments. If you have questions, please call your primary care clinic.  If you do not have a primary care provider, please call [phone] and they will assist you.        Care EveryWhere ID     This is your Care EveryWhere ID. This could be used by other organizations to access your [facility] medical records  [id number]        Your Vitals Were     Pulse Temperature Height Pulse Oximetry Breastfeeding? BMI (Body Mass Index)    82 98  F (36.7  C) (Tympanic) 5' 1\" (1.549 m) 97% No 43.42 kg/m2       Blood Pressure from Last 3 Encounters:   09/05/18 125/76   08/13/18 132/80   08/10/18 132/75    Weight from Last 3 Encounters:   09/05/18 229 lb 12.8 oz (104.2 kg)   08/13/18 226 lb 9.6 oz (102.8 kg)   08/10/18 224 lb 12.8 oz (102 kg)              Today, you had the following     No orders found for display         Where to get your medicines      These medications were sent to [id number] - [place] [address] [facility] [place] [address] [address] [address]     Phone:  [phone]     amLODIPine 5 MG tablet          Primary Care Provider Office Phone # Fax #    Marybeth Silver [phone] [address] [address]        Equal Access to Services     ANAID VENEGAS : Mike fregoso Soemilia, waaxda luqadaha, qaybta kaalmaarlen carrizales, makayla warner. [facility] [phone].    ATENCIÓN: Si habla " español, tiene a hatfield disposición servicios gratuitos de asistencia lingüística. Sav brandon [phone].    We comply with applicable federal civil rights laws and [place] laws. We do not discriminate on the basis of race, color, national origin, age, disability, sex, sexual orientation, or gender identity.            Thank you!     Thank you for choosing [facility]  for your care. Our goal is always to provide you with excellent care. Hearing back from our patients is one way we can continue to improve our services. Please take a few minutes to complete the written survey that you may receive in the mail after your visit with us. Thank you!             Your Updated Medication List - Protect others around you: Learn how to safely use, store and throw away your medicines at www.disposemymeds.org.          This list is accurate as of 9/5/18  4:57 PM.  Always use your most recent med list.                   Brand Name Dispense Instructions for use Diagnosis    amLODIPine 5 MG tablet    NORVASC    90 tablet    Take 1 tablet (5 mg) by mouth daily    Hypertension goal BP (blood pressure) < 140/90, Research study patient       buPROPion 150 MG 12 hr tablet    WELLBUTRIN SR    180 tablet    Take 1 tablet (150 mg) by mouth 2 times daily    Moderate episode of recurrent major depressive disorder (H), Tobacco abuse       cyclobenzaprine 10 MG tablet    FLEXERIL    45 tablet    Take 0.5-1 tablets (5-10 mg) by mouth 3 times daily as needed for muscle spasms    Chronic bilateral low back pain without sciatica       diclofenac 1 % Gel topical gel    VOLTAREN    100 g    Apply 4 grams to knees  four times daily using enclosed dosing card.    Bilateral chronic knee pain       DULoxetine 60 MG EC capsule    CYMBALTA    90 capsule    TAKE 1 CAPSULE(60 MG) BY MOUTH DAILY    Chronic bilateral low back pain without sciatica, Moderate episode of recurrent major depressive disorder (H)       HYDROcodone-acetaminophen 5-325 MG  per tablet    NORCO    15 tablet    Take 1 tablet by mouth every 6 hours as needed for moderate to severe pain Max of 2 tabs per day, use sparingly. OK to fill and begin on 10/16/2017.  Last refill was 1/11/17 for #15    Lumbar radicular pain       metoprolol succinate 25 MG 24 hr tablet    TOPROL-XL    30 tablet    Take 0.5 tablets (12.5 mg) by mouth daily    Hypertension goal BP (blood pressure) < 140/90       omeprazole 20 MG CR capsule    priLOSEC    90 capsule    TAKE 1 CAPSULE(20 MG) BY MOUTH DAILY    Gastroesophageal reflux disease, esophagitis presence not specified       order for DME     1 Device    Equipment being ordered: Dynaflex foot plate, 23.5 Right    Arthritis of first metatarsophalangeal joint       varenicline 0.5 MG X 11 & 1 MG X 42 tablet    CHANTIX STARTING MONTH JONO    53 tablet    Take 0.5 mg tab daily for 3 days, then 0.5 mg tab twice daily for 4 days, then 1 mg twice daily.    Tobacco dependence

## 2018-09-05 NOTE — PATIENT INSTRUCTIONS
[facility] Hypertension Study   Visit 4     Thank you for your continued participation in the hypertension study!  Please continue taking your hypertension medications as directed. Your next visit will be in one and a half months and will be scheduled for you in the coming days.     After it is scheduled, be sure to let the research coordinator know as soon as possible if you cannot make it so that the visit can be rescheduled for you.     Please contact the research coordinator if you receive care for your hypertension outside of your study visits.    If you have any questions, please contact the research coordinator at [phone]. If you experience any symptoms please call the [place] 24/7 triage number at [phone]. If your phone number, email or address changes please alert the research coordinator.     In the meantime, we ask that you complete the online surveys emailed out to you, if completing online, or mailed out to you, if completing paper surveys, before your next visit.

## 2018-09-06 RX ORDER — METOPROLOL SUCCINATE 25 MG/1
12.5 TABLET, EXTENDED RELEASE ORAL DAILY
Qty: 90 TABLET | Refills: 1 | Status: SHIPPED | OUTPATIENT
Start: 2018-09-06 | End: 2018-12-05

## 2018-09-07 ENCOUNTER — TRANSFERRED RECORDS (OUTPATIENT)
Dept: HEALTH INFORMATION MANAGEMENT | Facility: CLINIC | Age: 58
End: 2018-09-07

## 2018-09-19 DIAGNOSIS — F17.200 TOBACCO DEPENDENCE: ICD-10-CM

## 2018-09-19 NOTE — TELEPHONE ENCOUNTER
"Requested Prescriptions   Pending Prescriptions Disp Refills     CHANTIX STARTING MONTH JONO 0.5 MG X 11 & 1 MG X 42 tablet [Pharmacy Med Name: CHANTIX STARTING MONTH JONO 53'S] 53 tablet 0     Sig: FOLLOW PACKAGE DIRECTIONS    Partial Cholinergic Nicotinic Agonist Agents Passed    9/19/2018  4:54 PM       Passed - Blood pressure under 140/90 in past 12 months    BP Readings from Last 3 Encounters:   09/05/18 125/76   08/13/18 132/80   08/10/18 132/75                Passed - Recent (12 mo) or future (30 days) visit within the authorizing provider's specialty    Patient had office visit in the last 12 months or has a visit in the next 30 days with authorizing provider or within the authorizing provider's specialty.  See \"Patient Info\" tab in inbasket, or \"Choose Columns\" in Meds & Orders section of the refill encounter.           Passed - Patient is 18 years of age or older       Passed - Patient is not pregnant       Passed - No positive pregnancy test on file in past 12 months        Last Written Prescription Date:  8/13/18  Last Fill Quantity: 53,  # refills: 0   Last office visit: 9/5/2018 with prescribing provider:  Adrianne   Future Office Visit:      "

## 2018-09-19 NOTE — TELEPHONE ENCOUNTER
Prescription approved per [facility] Refill Protocol.  Daiana Hein RN on 9/19/2018 at 5:11 PM

## 2018-10-17 ENCOUNTER — OFFICE VISIT (OUTPATIENT)
Dept: FAMILY MEDICINE | Facility: CLINIC | Age: 58
End: 2018-10-17
Payer: COMMERCIAL

## 2018-10-17 VITALS — DIASTOLIC BLOOD PRESSURE: 68 MMHG | HEART RATE: 94 BPM | SYSTOLIC BLOOD PRESSURE: 134 MMHG | TEMPERATURE: 97.7 F

## 2018-10-17 DIAGNOSIS — Z00.6 EXAMINATION OF PARTICIPANT IN CLINICAL TRIAL: Primary | ICD-10-CM

## 2018-10-17 DIAGNOSIS — I10 HYPERTENSION GOAL BP (BLOOD PRESSURE) < 140/90: ICD-10-CM

## 2018-10-17 PROCEDURE — 99213 OFFICE O/P EST LOW 20 MIN: CPT | Performed by: FAMILY MEDICINE

## 2018-10-17 NOTE — PROGRESS NOTES
PGEN study visit  NEW HYPERTENSION diagnosis visit 5    SUBJECTIVE:  Jessica is here for 5th PGEN research study visit. Please refer to research tab in the header and today's flow sheet for further details. Patient had new onset hypertension at enrollment and has a goal of <  140/90 (per Problem list target chosen by PCP)     Prior research note reviewed. Patient is on blood pressure medication(s) at this time.  she has been taking Toprol-XL 12.5 mg and Amlodipine 5 mg/day and is tolerating the medication well.      Current diet & lifestyle: healthy diet and walking for exercise  Smoking: Quit, 10/1/2018- Congratulated patient  Alcohol consumption.  None  Other pertinent history undergoing stress- boyfriend left a year ago.     BP Readings from Last 3 Encounters:   10/17/18 134/68   09/05/18 125/76   08/13/18 132/80       Current Outpatient Prescriptions   Medication Sig Dispense Refill     amLODIPine (NORVASC) 5 MG tablet Take 1 tablet (5 mg) by mouth daily 90 tablet 0     buPROPion (WELLBUTRIN SR) 150 MG 12 hr tablet Take 1 tablet (150 mg) by mouth 2 times daily 180 tablet 3     CHANTIX STARTING MONTH JONO 0.5 MG X 11 & 1 MG X 42 tablet FOLLOW PACKAGE DIRECTIONS 53 tablet 0     cyclobenzaprine (FLEXERIL) 10 MG tablet Take 0.5-1 tablets (5-10 mg) by mouth 3 times daily as needed for muscle spasms 45 tablet 0     diclofenac (VOLTAREN) 1 % GEL topical gel Apply 4 grams to knees  four times daily using enclosed dosing card. 100 g 1     DULoxetine (CYMBALTA) 60 MG EC capsule TAKE 1 CAPSULE(60 MG) BY MOUTH DAILY 90 capsule 3     HYDROcodone-acetaminophen (NORCO) 5-325 MG per tablet Take 1 tablet by mouth every 6 hours as needed for moderate to severe pain Max of 2 tabs per day, use sparingly. OK to fill and begin on 10/16/2017.  Last refill was 1/11/17 for #15 15 tablet 0     metoprolol succinate (TOPROL-XL) 25 MG 24 hr tablet Take 0.5 tablets (12.5 mg) by mouth daily 90 tablet 1     omeprazole (PRILOSEC) 20 MG CR  capsule TAKE 1 CAPSULE(20 MG) BY MOUTH DAILY 90 capsule 0     order for DME Equipment being ordered: [employer] foot plate, 23.5 Right 1 Device 0     Potassium   Date Value Ref Range Status   07/10/2018 Unsatisfactory specimen - hemolyzed 3.4 - 5.3 mmol/L Final     Comment:     Specimen slightly hemolyzed, potassium may be falsely elevated     Creatinine   Date Value Ref Range Status   07/10/2018 0.75 0.52 - 1.04 mg/dL Final     Urea Nitrogen   Date Value Ref Range Status   07/10/2018 10 7 - 30 mg/dL Final     GFR Estimate   Date Value Ref Range Status   07/10/2018 80 >60 mL/min/1.7m2 Final     Comment:     Non  GFR Calc      A baseline potassium, creatinine, BUN, GFR has been done within past 12 months      OBJECTIVE:  Patient in in no apparent distress and able to provide full history for today's encounter. she denies pain or any current illness   Vitals: /68  Pulse 94  Temp 97.7  F (36.5  C) (Tympanic)  Breastfeeding? No  Today's BP completed using cuff size: regular on left side  arm.      Pulse Readings from Last 1 Encounters:   10/17/18 94     Is pulse 55 or greater? - Yes    BMI= There is no height or weight on file to calculate BMI.  See PGEN flow sheet for other exam findings.         ASSESSMENT/PLAN:   Blood pressure reading today is at the provider specified goal of <140/90.      PGEN Flowsheet has been  'filed' ) for this visit (this saves flowsheet data)      1.  Based on PGEN RN HTN MGMT standing order the patient will be advised continue current medication regimen unchanged.     2.  We will not be checking a metabolic lab panel today.      3.  Follow up instructions include:     Next Provider visit: Follow up in 1 month..    See avs for more details.  Full research packet also provided to patient previously  Our research team will reach out to patient to schedule follow up visit as well.     Patient was counseled regarding the lifestyle changes (listed below)  to help with BP  management Yes  Lifestyle changes that can help control high blood pressure:  Even though PGEN is a study to test effectiveness of genetically guided medications for managing high blood pressure, there are several things you can do to ensure your blood pressure stays in good control:    Maintain a healthy weight (BMI<26). A modest amount of weight loss can be helpful    Limit salt intake to under 2400mg daily    Follow the DASH diet (lean meats, low salt, whole grains, lots of fruits/vegies)    Stay active, try to get in 30 minutes of exercise daily.    Manage your daily stress.    Do not smoke cigarettes (or cut back)    Limit alcohol (2 drinks/day for men, 1 drink/day for women)  Was AVS  provided to patient with the relevant <dot>PGENPI dot phrase pulled into patient instructions Yes    Marybeth Silver MD

## 2018-10-17 NOTE — PATIENT INSTRUCTIONS
[facility] Hypertension Study  Visit 5    Thank you for your continued participation in the hypertension study!  Please continue taking your hypertension medications as directed. Your next visit will be in one and a half months and will be scheduled for you in the coming days. After it is scheduled, be sure to let the research coordinator know as soon as possible if you cannot make it so that the visit can be rescheduled for you.     Please contact the research coordinator if you receive care for your hypertension outside of your study visits.    If you have any questions, please contact the research coordinator at [phone]. If you experience any symptoms please call the [facility] 24/7 triage number at [phone]. If your phone number, email or address changes please alert the research coordinator.     In the meantime, we ask that you complete the online surveys emailed out to you, if completing online, or mailed out to you, if completing paper surveys, before your next visit.

## 2018-10-17 NOTE — MR AVS SNAPSHOT
After Visit Summary   10/17/2018    Jessica Jay    MRN: [id number]           Patient Information     Date Of Birth          1960        Visit Information        Provider Department      10/17/2018 6:00 PM Marybeth Silver MD [facility]        Today's Diagnoses     Examination of participant in clinical trial    -  1    Hypertension goal BP (blood pressure) < 140/90          Care Instructions    PGen Hypertension Study  Visit 5    Thank you for your continued participation in the hypertension study!  Please continue taking your hypertension medications as directed. Your next visit will be in one and a half months and will be scheduled for you in the coming days. After it is scheduled, be sure to let the research coordinator know as soon as possible if you cannot make it so that the visit can be rescheduled for you.     Please contact the research coordinator if you receive care for your hypertension outside of your study visits.    If you have any questions, please contact the research coordinator at [phone]. If you experience any symptoms please call the [place] 24/7 triage number at [phone]. If your phone number, email or address changes please alert the research coordinator.     In the meantime, we ask that you complete the online surveys emailed out to you, if completing online, or mailed out to you, if completing paper surveys, before your next visit.            Follow-ups after your visit        Who to contact     Normal or non-critical lab and imaging results will be communicated to you by MyChart, letter or phone within 4 business days after the clinic has received the results. If you do not hear from us within 7 days, please contact the clinic through MyChart or phone. If you have a critical or abnormal lab result, we will notify you by phone as soon as possible.  Submit refill requests through [employer] or call your pharmacy and they will forward the refill  request to us. Please allow 3 business days for your refill to be completed.          If you need to speak with a  for additional information , please call: [phone]             Additional Information About Your Visit        MyChart Information     [employer] gives you secure access to your electronic health record. If you see a primary care provider, you can also send messages to your care team and make appointments. If you have questions, please call your primary care clinic.  If you do not have a primary care provider, please call [phone] and they will assist you.        Care EveryWhere ID     This is your Care EveryWhere ID. This could be used by other organizations to access your [place] medical records  [id number]        Your Vitals Were     Pulse Temperature Breastfeeding?             94 97.7  F (36.5  C) (Tympanic) No          Blood Pressure from Last 3 Encounters:   10/17/18 134/68   09/05/18 125/76   08/13/18 132/80    Weight from Last 3 Encounters:   09/05/18 229 lb 12.8 oz (104.2 kg)   08/13/18 226 lb 9.6 oz (102.8 kg)   08/10/18 224 lb 12.8 oz (102 kg)              Today, you had the following     No orders found for display       Primary Care Provider Office Phone # Fax #    Marybeth Silver [phone]       [address] [place] [address]        Equal Access to Services     [facility]: Hadii aad ku hadasho Soomaali, waaxda luqadaha, qaybta kaalmada adeegyada, makayla barrera . So [facility] [phone].    ATENCIÓN: Si habla español, tiene a hatfield disposición servicios gratuitos de asistencia lingüística. Llame al [phone].    We comply with applicable federal civil rights laws and [place] laws. We do not discriminate on the basis of race, color, national origin, age, disability, sex, sexual orientation, or gender identity.            Thank you!     Thank you for choosing [facility]  for your care. Our goal is  always to provide you with excellent care. Hearing back from our patients is one way we can continue to improve our services. Please take a few minutes to complete the written survey that you may receive in the mail after your visit with us. Thank you!             Your Updated Medication List - Protect others around you: Learn how to safely use, store and throw away your medicines at www.disposemymeds.org.          This list is accurate as of 10/17/18  6:28 PM.  Always use your most recent med list.                   Brand Name Dispense Instructions for use Diagnosis    amLODIPine 5 MG tablet    NORVASC    90 tablet    Take 1 tablet (5 mg) by mouth daily    Hypertension goal BP (blood pressure) < 140/90, Research study patient       buPROPion 150 MG 12 hr tablet    WELLBUTRIN SR    180 tablet    Take 1 tablet (150 mg) by mouth 2 times daily    Moderate episode of recurrent major depressive disorder (H), Tobacco abuse       CHANTIX STARTING MONTH JONO 0.5 MG X 11 & 1 MG X 42 tablet   Generic drug:  varenicline     53 tablet    FOLLOW PACKAGE DIRECTIONS    Tobacco dependence       cyclobenzaprine 10 MG tablet    FLEXERIL    45 tablet    Take 0.5-1 tablets (5-10 mg) by mouth 3 times daily as needed for muscle spasms    Chronic bilateral low back pain without sciatica       diclofenac 1 % Gel topical gel    VOLTAREN    100 g    Apply 4 grams to knees  four times daily using enclosed dosing card.    Bilateral chronic knee pain       DULoxetine 60 MG EC capsule    CYMBALTA    90 capsule    TAKE 1 CAPSULE(60 MG) BY MOUTH DAILY    Chronic bilateral low back pain without sciatica, Moderate episode of recurrent major depressive disorder (H)       HYDROcodone-acetaminophen 5-325 MG per tablet    NORCO    15 tablet    Take 1 tablet by mouth every 6 hours as needed for moderate to severe pain Max of 2 tabs per day, use sparingly. OK to fill and begin on 10/16/2017.  Last refill was 1/11/17 for #15    Lumbar radicular pain        metoprolol succinate 25 MG 24 hr tablet    TOPROL-XL    90 tablet    Take 0.5 tablets (12.5 mg) by mouth daily    Hypertension goal BP (blood pressure) < 140/90       omeprazole 20 MG CR capsule    priLOSEC    90 capsule    TAKE 1 CAPSULE(20 MG) BY MOUTH DAILY    Gastroesophageal reflux disease, esophagitis presence not specified       order for DME     1 Device    Equipment being ordered: [employer] foot plate, 23.5 Right    Arthritis of first metatarsophalangeal joint

## 2018-11-13 ENCOUNTER — MYC MEDICAL ADVICE (OUTPATIENT)
Dept: FAMILY MEDICINE | Facility: CLINIC | Age: 58
End: 2018-11-13

## 2018-11-13 NOTE — TELEPHONE ENCOUNTER
Please see [employer] message and advise if Evisit should be offered or office visit is needed.     Pat Romero RN, BSN, PHN

## 2018-11-13 NOTE — TELEPHONE ENCOUNTER
My chart states inactive now.  Left message on voice mail for patient to call clinic. [phone]  Per Dr Silver patient needs to schedule OV to have pain evaluated.

## 2018-11-14 NOTE — TELEPHONE ENCOUNTER
Called and spoke with patient, notified her of information below.  Appointment scheduled with Dr. Silver on Friday.    Daiana Hein, RN, BSN

## 2018-11-16 ENCOUNTER — TRANSFERRED RECORDS (OUTPATIENT)
Dept: HEALTH INFORMATION MANAGEMENT | Facility: CLINIC | Age: 58
End: 2018-11-16

## 2018-11-19 PROBLEM — K22.70 BARRETT'S ESOPHAGUS: Status: ACTIVE | Noted: 2018-11-19

## 2018-11-28 ENCOUNTER — TELEPHONE (OUTPATIENT)
Dept: FAMILY MEDICINE | Facility: CLINIC | Age: 58
End: 2018-11-28

## 2018-11-28 NOTE — TELEPHONE ENCOUNTER
Pt called to cancel her appt on 11-28.  Transportation issues.  She would like to add this cancelled appt issue to the appt on 12-5.  Please call pt to discuss.    Caller informed that calls received after 3pm may not be returned same day.      Thank you

## 2018-11-29 NOTE — TELEPHONE ENCOUNTER
Patient scheduled to be seen for severe pain right lower back/hip 11/28/18, ok to add issue to 12/5/18 appointment?

## 2018-11-29 NOTE — TELEPHONE ENCOUNTER
Informed patient they need to be separate office visits. Patient will think about what she can do and call back. Will just keep Pgen study visit scheduled

## 2018-12-03 DIAGNOSIS — K21.9 GASTROESOPHAGEAL REFLUX DISEASE, ESOPHAGITIS PRESENCE NOT SPECIFIED: ICD-10-CM

## 2018-12-04 NOTE — TELEPHONE ENCOUNTER
"Requested Prescriptions   Pending Prescriptions Disp Refills     omeprazole (PRILOSEC) 20 MG DR capsule [Pharmacy Med Name: OMEPRAZOLE 20MG CAPSULES] 90 capsule 0    Last Written Prescription Date:  9/5/18  Last Fill Quantity: 90,  # refills: 0   Last office visit: 10/17/2018 with prescribing provider:  10/17/18  Adrianne   Future Office Visit:   Next 5 appointments (look out 90 days)     Dec 05, 2018  5:30 PM CST   [facility]    [address] [facility] [address]   [phone]                Sig: TAKE 1 CAPSULE(20 MG) BY MOUTH DAILY    PPI Protocol Passed    12/3/2018  5:59 PM       Passed - Not on Clopidogrel (unless Pantoprazole ordered)       Passed - No diagnosis of osteoporosis on record       Passed - Recent (12 mo) or future (30 days) visit within the authorizing provider's specialty    Patient had office visit in the last 12 months or has a visit in the next 30 days with authorizing provider or within the authorizing provider's specialty.  See \"Patient Info\" tab in inbasket, or \"Choose Columns\" in Meds & Orders section of the refill encounter.             Passed - Patient is age 18 or older       Passed - No active pregnacy on record       Passed - No positive pregnancy test in past 12 months        "

## 2018-12-05 ENCOUNTER — OFFICE VISIT (OUTPATIENT)
Dept: FAMILY MEDICINE | Facility: CLINIC | Age: 58
End: 2018-12-05
Payer: COMMERCIAL

## 2018-12-05 DIAGNOSIS — Z00.6 RESEARCH STUDY PATIENT: Primary | ICD-10-CM

## 2018-12-05 DIAGNOSIS — I10 HYPERTENSION GOAL BP (BLOOD PRESSURE) < 140/90: ICD-10-CM

## 2018-12-05 PROCEDURE — 99213 OFFICE O/P EST LOW 20 MIN: CPT | Performed by: FAMILY MEDICINE

## 2018-12-05 RX ORDER — AMLODIPINE BESYLATE 5 MG/1
5 TABLET ORAL DAILY
Qty: 90 TABLET | Refills: 0 | Status: SHIPPED | OUTPATIENT
Start: 2018-12-05 | End: 2019-02-26

## 2018-12-05 RX ORDER — METOPROLOL SUCCINATE 25 MG/1
12.5 TABLET, EXTENDED RELEASE ORAL DAILY
Qty: 90 TABLET | Refills: 1 | Status: SHIPPED | OUTPATIENT
Start: 2018-12-05 | End: 2019-02-26

## 2018-12-05 NOTE — PROGRESS NOTES
PGEN study visit  NEW HYPERTENSION diagnosis visit 6    SUBJECTIVE:  Jessica is here for 6th PGEN research study visit. Please refer to research tab in the header and today's flow sheet for further details. Patient had new onset hypertension at enrollment and has a goal of <  140/90 (per Problem list target chosen by PCP)     Prior research note reviewed. Patient is on blood pressure medication(s) at this time.  she has been taking Metoprolol succinate 12.5 mg/day and Amlodipine 5 mg/day and is tolerating the medication well.      Current diet & lifestyle: sedentary  Smoking: No  Alcohol consumption No    Other pertinent history recently has been under stress lately- failed a work related exam around [employer]. Worried about losing her job. Has gained weight about 11 lbs in 3 months.     Wt Readings from Last 4 Encounters:   12/05/18 240 lb (108.9 kg)   09/05/18 229 lb 12.8 oz (104.2 kg)   08/13/18 226 lb 9.6 oz (102.8 kg)   08/10/18 224 lb 12.8 oz (102 kg)          BP Readings from Last 3 Encounters:   12/05/18 136/88   10/17/18 134/68   09/05/18 125/76       Current Outpatient Prescriptions   Medication Sig Dispense Refill     amLODIPine (NORVASC) 5 MG tablet Take 1 tablet (5 mg) by mouth daily 90 tablet 0     buPROPion (WELLBUTRIN SR) 150 MG 12 hr tablet TAKE 1 TABLET(150 MG) BY MOUTH TWICE DAILY 180 tablet 3     CHANTIX STARTING MONTH JONO 0.5 MG X 11 & 1 MG X 42 tablet FOLLOW PACKAGE DIRECTIONS 53 tablet 0     cyclobenzaprine (FLEXERIL) 10 MG tablet Take 0.5-1 tablets (5-10 mg) by mouth 3 times daily as needed for muscle spasms 45 tablet 0     diclofenac (VOLTAREN) 1 % GEL topical gel Apply 4 grams to knees  four times daily using enclosed dosing card. 100 g 1     DULoxetine (CYMBALTA) 60 MG EC capsule TAKE 1 CAPSULE(60 MG) BY MOUTH DAILY 90 capsule 3     HYDROcodone-acetaminophen (NORCO) 5-325 MG per tablet Take 1 tablet by mouth every 6 hours as needed for moderate to severe pain Max of 2 tabs per day, use  sparingly. OK to fill and begin on 10/16/2017.  Last refill was 1/11/17 for #15 15 tablet 0     metoprolol succinate (TOPROL-XL) 25 MG 24 hr tablet Take 0.5 tablets (12.5 mg) by mouth daily 90 tablet 1     omeprazole (PRILOSEC) 20 MG DR capsule TAKE 1 CAPSULE(20 MG) BY MOUTH DAILY 90 capsule 0     order for DME Equipment being ordered: [employer] foot plate, 23.5 Right 1 Device 0     Potassium   Date Value Ref Range Status   07/10/2018 Unsatisfactory specimen - hemolyzed 3.4 - 5.3 mmol/L Final     Comment:     Specimen slightly hemolyzed, potassium may be falsely elevated     Creatinine   Date Value Ref Range Status   07/10/2018 0.75 0.52 - 1.04 mg/dL Final     Urea Nitrogen   Date Value Ref Range Status   07/10/2018 10 7 - 30 mg/dL Final     GFR Estimate   Date Value Ref Range Status   07/10/2018 80 >60 mL/min/1.7m2 Final     Comment:     Non  GFR Calc      A baseline potassium, creatinine, BUN, GFR has been done within past 12 months      OBJECTIVE:  Patient in in no apparent distress and able to provide full history for today's encounter. she denies pain or any current illness   Vitals: /88  Pulse 96  Resp 18  Wt 240 lb (108.9 kg)  SpO2 100%  BMI 45.35 kg/m2  Today's BP completed using cuff size: regular on left side  arm.      Pulse Readings from Last 1 Encounters:   12/05/18 96     Is pulse 55 or greater? - Yes    BMI= Body mass index is 45.35 kg/(m^2).  See PGEN flow sheet for other exam findings.       ASSESSMENT/PLAN:   Blood pressure reading today is at the provider specified goal of <140/90.      PGEN Flowsheet has been  'filed' ) for this visit (this saves flowsheet data)      1.  Based on PGEN RN HTN MGMT standing order the patient will be advised continue current medication regimen unchanged.     2.  We will not be checking a metabolic lab panel today.      3.  Follow up instructions include:     Next Provider visit: Follow up in 3 month..    See avs for more details.  Full  research packet also provided to patient previously  Our research team will reach out to patient to schedule follow up visit as well.     Patient was counseled regarding the lifestyle changes (listed below)  to help with BP management Yes  Lifestyle changes that can help control high blood pressure:  Even though PGEN is a study to test effectiveness of genetically guided medications for managing high blood pressure, there are several things you can do to ensure your blood pressure stays in good control:    Maintain a healthy weight (BMI<26). A modest amount of weight loss can be helpful    Limit salt intake to under 2400mg daily    Follow the DASH diet (lean meats, low salt, whole grains, lots of fruits/vegies)    Stay active, try to get in 30 minutes of exercise daily.    Manage your daily stress.    Do not smoke cigarettes (or cut back)    Limit alcohol (2 drinks/day for men, 1 drink/day for women)    Was AVS  provided to patient with the relevant <dot>PGENPI dot phrase pulled into patient instructions Yes    Marybeth Silver MD

## 2018-12-05 NOTE — MR AVS SNAPSHOT
After Visit Summary   12/5/2018    Jessica Jay    MRN: [id number]           Patient Information     Date Of Birth          1960        Visit Information        Provider Department      12/5/2018 5:30 PM Marybeth Silver MD [facility] Diagn[facility]     Research study patient    -  1    Hypertension goal BP (blood pressure) < 140/90          Care Instructions    PGen Hypertension Study  Visit 6     Thank you for your continued participation in the hypertension study!  Please continue taking your hypertension medications as directed. Your next visit will be in three months and will be scheduled for you in the coming days. After it is scheduled, be sure to let the research coordinator know as soon as possible if you cannot make it so that the visit can be rescheduled for you.     Please contact the research coordinator if you receive care for your hypertension outside of your study visits.    If you have any questions, please contact the research coordinator at [phone]. If you experience any symptoms please call the [facility] 24/7 triage number at [phone]. If your phone number, email or address changes please alert the research coordinator.     In the meantime, we ask that you complete the online surveys emailed out to you, if completing online, or mailed out to you, if completing paper surveys, before your next visit.            Follow-ups after your visit        Follow-up notes from your care team     Return in about 3 months (around 3/5/2019).      Who to contact     Normal or non-critical lab and imaging results will be communicated to you by MyChart, letter or phone within 4 business days after the clinic has received the results. If you do not hear from us within 7 days, please contact the clinic through MyChart or phone. If you have a critical or abnormal lab result, we will notify you by phone as soon as possible.  Submit refill requests through  "[employer] or call your pharmacy and they will forward the refill request to us. Please allow 3 business days for your refill to be completed.          If you need to speak with a  for additional information , please call: [phone]             Additional Information About Your Visit        MyChart Information     [employer] lets you send messages to your doctor, view your test results, renew your prescriptions, schedule appointments and more. To sign up, go to [email or web address] . Click on \"Log in\" on the left side of the screen, which will take you to the Welcome page. Then click on \"Sign up Now\" on the right side of the page.     You will be asked to enter the access code listed below, as well as some personal information. Please follow the directions to create your username and password.     Your access code is: [email or web address]  Expires: 2019 10:21 AM     Your access code will  in 90 days. If you need help or a new code, please call your [facility] clinic or [phone].        Care EveryWhere ID     This is your Care EveryWhere ID. This could be used by other organizations to access your [facility] medical records  [id number]        Your Vitals Were     Pulse Respirations Pulse Oximetry BMI (Body Mass Index)          96 18 100% 45.35 kg/m2         Blood Pressure from Last 3 Encounters:   18 136/88   10/17/18 134/68   18 125/76    Weight from Last 3 Encounters:   18 240 lb (108.9 kg)   18 229 lb 12.8 oz (104.2 kg)   18 226 lb 9.6 oz (102.8 kg)              Today, you had the following     No orders found for display         Where to get your medicines      [id number] - [place], [place] - [address] AT [facility]  [address] [place] [address] [place] [address]     Phone:  [phone]     amLODIPine 5 MG tablet    metoprolol succinate ER 25 MG 24 hr tablet          Primary Care Provider Office " Phone # Fax #    Marybeth Silver [phone]       [address]  [place] [address]        Equal Access to Services     SORAIDATARAN THEO : Hadsly danielle eli keyo Soemilia, waaxda luqadaha, qaybta kaalmada all, makayla shore lajamesjulio cesar warner. So [facility] [phone].    ATENCIÓN: Si habla español, tiene a hatfield disposición servicios gratuitos de asistencia lingüística. Llame al [phone].    We comply with applicable federal civil rights laws and [place] laws. We do not discriminate on the basis of race, color, national origin, age, disability, sex, sexual orientation, or gender identity.            Thank you!     Thank you for choosing [facility]  for your care. Our goal is always to provide you with excellent care. Hearing back from our patients is one way we can continue to improve our services. Please take a few minutes to complete the written survey that you may receive in the mail after your visit with us. Thank you!             Your Updated Medication List - Protect others around you: Learn how to safely use, store and throw away your medicines at www.disposemymeds.org.          This list is accurate as of 12/5/18  6:07 PM.  Always use your most recent med list.                   Brand Name Dispense Instructions for use Diagnosis    amLODIPine 5 MG tablet    NORVASC    90 tablet    Take 1 tablet (5 mg) by mouth daily    Hypertension goal BP (blood pressure) < 140/90, Research study patient       buPROPion 150 MG 12 hr tablet    WELLBUTRIN SR    180 tablet    TAKE 1 TABLET(150 MG) BY MOUTH TWICE DAILY    Moderate episode of recurrent major depressive disorder (H), Tobacco abuse       CHANTIX STARTING MONTH JONO 0.5 MG X 11 & 1 MG X 42 tablet   Generic drug:  varenicline     53 tablet    FOLLOW PACKAGE DIRECTIONS    Tobacco dependence       cyclobenzaprine 10 MG tablet    FLEXERIL    45 tablet    Take 0.5-1 tablets (5-10 mg) by mouth 3 times daily as needed for  muscle spasms    Chronic bilateral low back pain without sciatica       diclofenac 1 % topical gel    VOLTAREN    100 g    Apply 4 grams to knees  four times daily using enclosed dosing card.    Bilateral chronic knee pain       DULoxetine 60 MG capsule    CYMBALTA    90 capsule    TAKE 1 CAPSULE(60 MG) BY MOUTH DAILY    Chronic bilateral low back pain without sciatica, Moderate episode of recurrent major depressive disorder (H)       HYDROcodone-acetaminophen 5-325 MG tablet    NORCO    15 tablet    Take 1 tablet by mouth every 6 hours as needed for moderate to severe pain Max of 2 tabs per day, use sparingly. OK to fill and begin on 10/16/2017.  Last refill was 1/11/17 for #15    Lumbar radicular pain       metoprolol succinate ER 25 MG 24 hr tablet    TOPROL-XL    90 tablet    Take 0.5 tablets (12.5 mg) by mouth daily    Hypertension goal BP (blood pressure) < 140/90       omeprazole 20 MG DR capsule    priLOSEC    90 capsule    TAKE 1 CAPSULE(20 MG) BY MOUTH DAILY    Gastroesophageal reflux disease, esophagitis presence not specified       order for DME     1 Device    Equipment being ordered: [employer] foot plate, 23.5 Right    Arthritis of first metatarsophalangeal joint

## 2018-12-06 VITALS
DIASTOLIC BLOOD PRESSURE: 88 MMHG | HEART RATE: 96 BPM | BODY MASS INDEX: 45.35 KG/M2 | WEIGHT: 240 LBS | OXYGEN SATURATION: 100 % | RESPIRATION RATE: 18 BRPM | SYSTOLIC BLOOD PRESSURE: 136 MMHG

## 2018-12-06 NOTE — PATIENT INSTRUCTIONS
[facility] Hypertension Study  Visit 6     Thank you for your continued participation in the hypertension study!  Please continue taking your hypertension medications as directed. Your next visit will be in three months and will be scheduled for you in the coming days. After it is scheduled, be sure to let the research coordinator know as soon as possible if you cannot make it so that the visit can be rescheduled for you.     Please contact the research coordinator if you receive care for your hypertension outside of your study visits.    If you have any questions, please contact the research coordinator at [phone]. If you experience any symptoms please call the [employer] 24/7 triage number at [phone]. If your phone number, email or address changes please alert the research coordinator.     In the meantime, we ask that you complete the online surveys emailed out to you, if completing online, or mailed out to you, if completing paper surveys, before your next visit.

## 2019-02-04 ENCOUNTER — TRANSFERRED RECORDS (OUTPATIENT)
Dept: HEALTH INFORMATION MANAGEMENT | Facility: CLINIC | Age: 59
End: 2019-02-04

## 2019-02-05 ENCOUNTER — OFFICE VISIT (OUTPATIENT)
Dept: FAMILY MEDICINE | Facility: CLINIC | Age: 59
End: 2019-02-05
Payer: COMMERCIAL

## 2019-02-05 VITALS
HEART RATE: 93 BPM | SYSTOLIC BLOOD PRESSURE: 145 MMHG | HEIGHT: 61 IN | DIASTOLIC BLOOD PRESSURE: 84 MMHG | OXYGEN SATURATION: 97 % | BODY MASS INDEX: 46.07 KG/M2 | WEIGHT: 244 LBS | TEMPERATURE: 98.4 F

## 2019-02-05 DIAGNOSIS — L03.116 CELLULITIS OF LEFT LOWER EXTREMITY: Primary | ICD-10-CM

## 2019-02-05 PROCEDURE — 99214 OFFICE O/P EST MOD 30 MIN: CPT | Performed by: FAMILY MEDICINE

## 2019-02-05 RX ORDER — DICLOFENAC SODIUM 75 MG/1
75 TABLET, DELAYED RELEASE ORAL 2 TIMES DAILY
Qty: 30 TABLET | Refills: 0 | Status: SHIPPED | OUTPATIENT
Start: 2019-02-05 | End: 2020-03-19

## 2019-02-05 RX ORDER — CEPHALEXIN 500 MG/1
500 CAPSULE ORAL 4 TIMES DAILY
COMMUNITY
Start: 2019-02-04 | End: 2019-02-26

## 2019-02-05 ASSESSMENT — MIFFLIN-ST. JEOR: SCORE: 1624.16

## 2019-02-05 NOTE — PATIENT INSTRUCTIONS
Patient Education     Discharge Instructions for Cellulitis  You have been diagnosed with cellulitis. This is an infection in the deepest layer of the skin. In some cases, the infection also affects the muscle. Cellulitis is caused by bacteria. The bacteria can enter the body through broken skin. This can happen with a cut, scratch, animal bite, or an insect bite that has been scratched. You may have been treated in the hospital with antibiotics and fluids. You will likely be given a prescription for antibiotics to take at home. This sheet will help you take care of yourself at home.  Home care  When you are home:    Take the prescribed antibiotic medicine you are given as directed until it is gone. Take it even if you feel better. It treats the infection and stops it from returning. Not taking all the medicine can make future infections hard to treat.    Keep the infected area clean.    When possible, raise the infected area above the level of your heart. This helps keep swelling down.    Talk with your healthcare provider if you are in pain. Ask what kind of over-the-counter medicine you can take for pain.    Apply clean bandages as advised.    Take your temperature once a day for a week.    Wash your hands often to prevent spreading the infection.  In the future, wash your hands before and after you touch cuts, scratches, or bandages. This will help prevent infection.   When to call your healthcare provider  Call your healthcare provider immediately if you have any of the following:    Difficulty or pain when moving the joints above or below the infected area    Discharge or pus draining from the area    Fever of 100.4 F (38 C) or higher, or as directed by your healthcare provider    Pain that gets worse in or around the infected     Redness that gets worse in or around the infected area, particularly if the area of redness expands to a wider area    Shaking chills    Swelling of the infected area    Vomiting    Date Last Reviewed: 8/1/2016 2000-2018 [employer]. [address] [place] [address], [place] [address]. All rights reserved. This information is not intended as a substitute for professional medical care. Always follow your healthcare professional's instructions.

## 2019-02-05 NOTE — PROGRESS NOTES
SUBJECTIVE:   Jessica Jay is a 58 year old female who presents to clinic today for the following health issues:      ED/UC Followup:    Facility:  [facility]  Date of visit: 2/4/19  Reason for visit: Leg pain  Current Status: no change, still painful        Recent ER records reviewed.   Patient seen for cellulitis of the left lower extremity. No clear etiology.   Was started on antibiotics- Keflex 500 mg QID x 10 days and given Naproxen for pain relief. States that the Naproxen is ineffective for pain relief.   Her mom is present in the clinic and is concerned that it's more swollen today than it was yesterday.   Denies having any fever, chills. No recent known cuts. No shortness of breath/new onset dyspnea on exertion.   No prior known history of a DVT. No recent history of long distance travel/surgery.     Problem list and histories reviewed & adjusted, as indicated.  Additional history: as documented    Patient Active Problem List   Diagnosis     Depression, major     GERD (gastroesophageal reflux disease)     Migraines     CARDIOVASCULAR SCREENING; LDL GOAL LESS THAN 160     Disc herniation     DDD (degenerative disc disease), lumbar     DDD (degenerative disc disease), cervical     Neck pain     Headache     Dermoid cyst of brain (H)     Chronic daily headache     Dizziness - light-headed     Tobacco abuse     ELYSSA (obstructive sleep apnea)     Spondylolisthesis, grade 1     Chronic low back pain     Brain lipoma     Vulvar dermatitis     History of colonic polyps     Gastroesophageal reflux disease without esophagitis     overweight with BMI >40     Hypertension goal BP (blood pressure) < 140/90     Traylor's esophagus determined by endoscopy     Past Surgical History:   Procedure Laterality Date     ARTHROSCOPY SHOULDER  1990    Right/spurs     C FOOT/TOES SURGERY PROC UNLISTED  1975    Toe fracture, left     CARPAL TUNNEL RELEASE RT/LT  1990    Left     COLONOSCOPY  2016     CRANIOTOMY, EXCISE TUMOR  COMPLEX, COMBINED  2011    Procedure:COMBINED CRANIOTOMY, EXCISE TUMOR COMPLEX; 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; Surgeon:LEE ANN PAULA; Location:[facility] OR     ROTATOR CUFF REPAIR RT/LT  2009     Left       Social History     Tobacco Use     Smoking status: Former Smoker     Packs/day: 0.80     Years: 30.00     Pack years: 24.00     Types: Cigarettes     Last attempt to quit: 2017     Years since quittin.5     Smokeless tobacco: Never Used   Substance Use Topics     Alcohol use: Yes     Alcohol/week: 0.0 oz     Comment: 6 drinks a year     Family History   Problem Relation Age of Onset     Hypertension Mother      C.A.D. Father      Cancer Maternal Grandmother      Cerebrovascular Disease Paternal Grandmother      Breast Cancer Paternal Grandmother      Neurologic Disorder Sister         migraine     Cancer Brother      Cancer - colorectal Brother      Colon Cancer Brother          Current Outpatient Medications   Medication Sig Dispense Refill     cephALEXin (KEFLEX) 500 MG capsule Take 500 mg by mouth 4 times daily       diclofenac (VOLTAREN) 75 MG EC tablet Take 1 tablet (75 mg) by mouth 2 times daily (Take food) 30 tablet 0     amLODIPine (NORVASC) 5 MG tablet Take 1 tablet (5 mg) by mouth daily 90 tablet 0     buPROPion (WELLBUTRIN SR) 150 MG 12 hr tablet TAKE 1 TABLET(150 MG) BY MOUTH TWICE DAILY 180 tablet 3     CHANTIX STARTING MONTH JONO 0.5 MG X 11 & 1 MG X 42 tablet FOLLOW PACKAGE DIRECTIONS 53 tablet 0     cyclobenzaprine (FLEXERIL) 10 MG tablet Take 0.5-1 tablets (5-10 mg) by mouth 3 times daily as needed for muscle spasms 45 tablet 0     diclofenac (VOLTAREN) 1 % GEL topical gel Apply 4 grams to knees  four times daily using enclosed dosing card. 100 g 1     DULoxetine (CYMBALTA) 60 MG EC capsule TAKE 1 CAPSULE(60 MG) BY MOUTH DAILY 90 capsule 3     HYDROcodone-acetaminophen (NORCO) 5-325 MG per tablet Take 1 tablet by mouth every 6 hours as needed for moderate  "to severe pain Max of 2 tabs per day, use sparingly. OK to fill and begin on 10/16/2017.  Last refill was 1/11/17 for #15 15 tablet 0     metoprolol succinate ER (TOPROL-XL) 25 MG 24 hr tablet Take 0.5 tablets (12.5 mg) by mouth daily 90 tablet 1     omeprazole (PRILOSEC) 20 MG DR capsule TAKE 1 CAPSULE(20 MG) BY MOUTH DAILY 90 capsule 0     order for DME Equipment being ordered: [employer] foot plate, 23.5 Right 1 Device 0     Allergies   Allergen Reactions     Buspar [Buspirone Hcl] Rash       Reviewed and updated as needed this visit by clinical staff       Reviewed and updated as needed this visit by Provider         ROS:  Constitutional, HEENT, cardiovascular, pulmonary, gi and gu systems are negative, except as otherwise noted.    OBJECTIVE:     /84   Pulse 93   Temp 98.4  F (36.9  C) (Oral)   Ht 1.549 m (5' 1\")   Wt 110.7 kg (244 lb)   SpO2 97%   BMI 46.10 kg/m    Body mass index is 46.1 kg/m .  GENERAL: healthy, alert and no distress  MS: no gross musculoskeletal defects noted, no edema  LEFT LEG: Swollen, warm erythematous left lower extremity up to the mid calf with calf tenderness. No cyanosis. Pedal pulse present.     Diagnostic Test Results:  See orders below    ASSESSMENT/PLAN:     Jessica was seen today for hospital f/u.    Diagnoses and all orders for this visit:    Cellulitis of left lower extremity  -     US Lower Extremity Venous Duplex Left; Future  -     Continue Keflex 500 mg QID x 10 days as prescribed from the ER  -     Start: diclofenac (VOLTAREN) 75 MG EC tablet; Take 1 tablet (75 mg) by mouth 2 times daily (Take food) as needed for pain.   Keep leg elevated to keep the swelling down. Keep infected area clean and dry and discussed good hand hygiene.     Patient education and Handout with home care instructions given       Follow up if symptoms fail to improve in 1 week or worsen.      The patient was in agreement with the plan today and had no questions or concerns prior to " leaving the clinic.        Marybeth Silver MD  [facility]

## 2019-02-08 ENCOUNTER — ANCILLARY PROCEDURE (OUTPATIENT)
Dept: ULTRASOUND IMAGING | Facility: CLINIC | Age: 59
End: 2019-02-08
Attending: FAMILY MEDICINE
Payer: COMMERCIAL

## 2019-02-08 DIAGNOSIS — L03.116 CELLULITIS OF LEFT LOWER EXTREMITY: ICD-10-CM

## 2019-02-08 PROCEDURE — 93971 EXTREMITY STUDY: CPT | Mod: LT

## 2019-02-11 ENCOUNTER — TELEPHONE (OUTPATIENT)
Dept: FAMILY MEDICINE | Facility: CLINIC | Age: 59
End: 2019-02-11

## 2019-02-11 NOTE — TELEPHONE ENCOUNTER
Patient states her left leg infection is not getting better.  Should she come back in to be seen?  Please advise.  Ok to leave a message.  Thank You.

## 2019-02-11 NOTE — TELEPHONE ENCOUNTER
Spoke with patient and RN scheduled soonest opening appointment with PCP (10:00 am tomorrow 2/12).     Per office visit notes from 2/5, patient was to follow up if symptoms failed to improve in 1 week or worsen.  Patient stated nothing has changed.   Patient does not report any streaking or fevers but is concerned about the swelling compared to her other leg.     RN advised keeping leg elevated.    Pat Romero RN, BSN, PHN

## 2019-02-12 ENCOUNTER — TELEPHONE (OUTPATIENT)
Dept: FAMILY MEDICINE | Facility: CLINIC | Age: 59
End: 2019-02-12

## 2019-02-12 ENCOUNTER — OFFICE VISIT (OUTPATIENT)
Dept: FAMILY MEDICINE | Facility: CLINIC | Age: 59
End: 2019-02-12
Payer: COMMERCIAL

## 2019-02-12 VITALS
WEIGHT: 244 LBS | DIASTOLIC BLOOD PRESSURE: 81 MMHG | OXYGEN SATURATION: 98 % | BODY MASS INDEX: 46.07 KG/M2 | HEART RATE: 91 BPM | SYSTOLIC BLOOD PRESSURE: 126 MMHG | TEMPERATURE: 97.2 F | HEIGHT: 61 IN

## 2019-02-12 DIAGNOSIS — D33.2 DERMOID CYST OF BRAIN (H): ICD-10-CM

## 2019-02-12 DIAGNOSIS — F33.1 MODERATE EPISODE OF RECURRENT MAJOR DEPRESSIVE DISORDER (H): ICD-10-CM

## 2019-02-12 DIAGNOSIS — E66.01 MORBID OBESITY DUE TO EXCESS CALORIES (H): ICD-10-CM

## 2019-02-12 DIAGNOSIS — I87.2 VENOUS (PERIPHERAL) INSUFFICIENCY: ICD-10-CM

## 2019-02-12 DIAGNOSIS — L03.116 LEFT LEG CELLULITIS: Primary | ICD-10-CM

## 2019-02-12 PROCEDURE — 99214 OFFICE O/P EST MOD 30 MIN: CPT | Performed by: FAMILY MEDICINE

## 2019-02-12 RX ORDER — DOXYCYCLINE 100 MG/1
100 CAPSULE ORAL 2 TIMES DAILY
Qty: 20 CAPSULE | Refills: 0 | Status: SHIPPED | OUTPATIENT
Start: 2019-02-12 | End: 2019-02-26

## 2019-02-12 ASSESSMENT — ANXIETY QUESTIONNAIRES
5. BEING SO RESTLESS THAT IT IS HARD TO SIT STILL: NOT AT ALL
GAD7 TOTAL SCORE: 12
6. BECOMING EASILY ANNOYED OR IRRITABLE: NEARLY EVERY DAY
2. NOT BEING ABLE TO STOP OR CONTROL WORRYING: MORE THAN HALF THE DAYS
1. FEELING NERVOUS, ANXIOUS, OR ON EDGE: MORE THAN HALF THE DAYS
3. WORRYING TOO MUCH ABOUT DIFFERENT THINGS: MORE THAN HALF THE DAYS
IF YOU CHECKED OFF ANY PROBLEMS ON THIS QUESTIONNAIRE, HOW DIFFICULT HAVE THESE PROBLEMS MADE IT FOR YOU TO DO YOUR WORK, TAKE CARE OF THINGS AT HOME, OR GET ALONG WITH OTHER PEOPLE: SOMEWHAT DIFFICULT
7. FEELING AFRAID AS IF SOMETHING AWFUL MIGHT HAPPEN: NOT AT ALL

## 2019-02-12 ASSESSMENT — PATIENT HEALTH QUESTIONNAIRE - PHQ9
SUM OF ALL RESPONSES TO PHQ QUESTIONS 1-9: 11
5. POOR APPETITE OR OVEREATING: NEARLY EVERY DAY

## 2019-02-12 ASSESSMENT — MIFFLIN-ST. JEOR: SCORE: 1624.16

## 2019-02-12 NOTE — TELEPHONE ENCOUNTER
Patient states her work still has not received the forms the need to be faxed. Please fax to, [phone].    Thank you.

## 2019-02-12 NOTE — PROGRESS NOTES
SUBJECTIVE:   Jessica Jay is a 58 year old female who presents to clinic today for the following health issues:      Cellulitis of Left Lower Extremity- Follow Up  Is still taking the antibiotic and Voltaren as directed, reporting some improvements but not completely gone.  Continued redness, pain, and swelling.  Leg is warm to the touch  Requesting disability forms be completed for this health concern.   Is also in need of a letter to return to work;  would like to return 2/18 as long as symptoms do improve by then.       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     Depression- states that her symptoms were stable on her current medications until recently when she failed a test 3 times for her current job position. States that her symptoms have worsened.   States that she will need to re-apply for another job.     - PHQ-9/GABRIELA 7 completed, see above/Epic for details    GABRIELA-7   [employer], 2002; Used with Permission) 2/12/2019   1. Feeling nervous, anxious, or on edge 2   2. Not being able to stop or control worrying 2   3. Worrying too much about different things 2   4. Trouble relaxing 3   5. Being so restless that it is hard to sit still 0   6. Becoming easily annoyed or irritable 3   7. Feeling afraid, as if something awful might happen 0   GABRIELA-7 Total Score 12   If you checked any problems, how difficult have they made it for you to do your work, take care of things at home, or get along with other people? Somewhat difficult     PHQ-9 ([employer]) 2/12/2019   1.  Little interest or pleasure in doing things 0   2.  Feeling down, depressed, or hopeless 3   3.  Trouble falling or staying asleep, or sleeping too much 3   4.  Feeling tired or having little energy 3   5.  Poor appetite or overeating 0   6.  Feeling bad about yourself 2   7.  Trouble concentrating 0   8.  Moving slowly or restless 0   9.  Suicidal or self-harm thoughts 0   PHQ-9 Total Score 11    Difficulty at work, home, or with people Not difficult at all         Problem list and histories reviewed & adjusted, as indicated.  Additional history: as documented    Patient Active Problem List   Diagnosis     Depression, major     GERD (gastroesophageal reflux disease)     Migraines     CARDIOVASCULAR SCREENING; LDL GOAL LESS THAN 160     Disc herniation     DDD (degenerative disc disease), lumbar     DDD (degenerative disc disease), cervical     Neck pain     Headache     Dermoid cyst of brain (H)     Chronic daily headache     Dizziness - light-headed     Tobacco abuse     ELYSSA (obstructive sleep apnea)     Spondylolisthesis, grade 1     Chronic low back pain     Brain lipoma     Vulvar dermatitis     History of colonic polyps     Gastroesophageal reflux disease without esophagitis     overweight with BMI >40     Hypertension goal BP (blood pressure) < 140/90     Traylor's esophagus determined by endoscopy     Past Surgical History:   Procedure Laterality Date     ARTHROSCOPY SHOULDER      Right/spurs     C FOOT/TOES SURGERY PROC UNLISTED      Toe fracture, left     CARPAL TUNNEL RELEASE RT/LT      Left     COLONOSCOPY       CRANIOTOMY, EXCISE TUMOR COMPLEX, COMBINED  2011    Procedure:COMBINED CRANIOTOMY, EXCISE TUMOR COMPLEX; 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; Surgeon:LEE ANN PAULA; Location:[facility] OR     ROTATOR CUFF REPAIR RT/LT       Left       Social History     Tobacco Use     Smoking status: Former Smoker     Packs/day: 0.80     Years: 30.00     Pack years: 24.00     Types: Cigarettes     Last attempt to quit: 2017     Years since quittin.6     Smokeless tobacco: Never Used   Substance Use Topics     Alcohol use: Yes     Alcohol/week: 0.0 oz     Comment: 6 drinks a year     Family History   Problem Relation Age of Onset     Hypertension Mother      C.A.D. Father      Cancer Maternal Grandmother      Cerebrovascular Disease Paternal  Grandmother      Breast Cancer Paternal Grandmother      Neurologic Disorder Sister         migraine     Cancer Brother      Cancer - colorectal Brother      Colon Cancer Brother          Current Outpatient Medications   Medication Sig Dispense Refill     amLODIPine (NORVASC) 5 MG tablet Take 1 tablet (5 mg) by mouth daily 90 tablet 0     buPROPion (WELLBUTRIN SR) 150 MG 12 hr tablet TAKE 1 TABLET(150 MG) BY MOUTH TWICE DAILY 180 tablet 3     cephALEXin (KEFLEX) 500 MG capsule Take 500 mg by mouth 4 times daily       CHANTIX STARTING MONTH JONO 0.5 MG X 11 & 1 MG X 42 tablet FOLLOW PACKAGE DIRECTIONS 53 tablet 0     cyclobenzaprine (FLEXERIL) 10 MG tablet Take 0.5-1 tablets (5-10 mg) by mouth 3 times daily as needed for muscle spasms 45 tablet 0     diclofenac (VOLTAREN) 1 % GEL topical gel Apply 4 grams to knees  four times daily using enclosed dosing card. 100 g 1     diclofenac (VOLTAREN) 75 MG EC tablet Take 1 tablet (75 mg) by mouth 2 times daily (Take food) 30 tablet 0     doxycycline hyclate (VIBRAMYCIN) 100 MG capsule Take 1 capsule (100 mg) by mouth 2 times daily for 10 days 20 capsule 0     DULoxetine (CYMBALTA) 60 MG EC capsule TAKE 1 CAPSULE(60 MG) BY MOUTH DAILY 90 capsule 3     HYDROcodone-acetaminophen (NORCO) 5-325 MG per tablet Take 1 tablet by mouth every 6 hours as needed for moderate to severe pain Max of 2 tabs per day, use sparingly. OK to fill and begin on 10/16/2017.  Last refill was 1/11/17 for #15 15 tablet 0     metoprolol succinate ER (TOPROL-XL) 25 MG 24 hr tablet Take 0.5 tablets (12.5 mg) by mouth daily 90 tablet 1     omeprazole (PRILOSEC) 20 MG DR capsule TAKE 1 CAPSULE(20 MG) BY MOUTH DAILY 90 capsule 0     order for DME Equipment being ordered: Milabraaflex foot plate, 23.5 Right 1 Device 0     Allergies   Allergen Reactions     Buspar [Buspirone Hcl] Rash       Reviewed and updated as needed this visit by clinical staff  Tobacco  Allergies  Meds       Reviewed and updated as needed  "this visit by Provider         ROS:  Constitutional, HEENT, cardiovascular, pulmonary, gi and gu systems are negative, except as otherwise noted.    OBJECTIVE:     /81   Pulse 91   Temp 97.2  F (36.2  C) (Tympanic)   Ht 1.549 m (5' 1\")   Wt 110.7 kg (244 lb)   SpO2 98%   BMI 46.10 kg/m    Body mass index is 46.1 kg/m .  GENERAL: healthy, alert and no distress  SKIN: left leg is erythematous, swollen and warm to touch, erythema is receding compared to previous exam albeit slowly.     Diagnostic Test Results:  none     ASSESSMENT/PLAN:     Jessica was seen today for cellulitis.    Diagnoses and all orders for this visit:    Left leg cellulitis- treatment failure after completing 10 day course of Keflex. Suspect slow progress is due to venous insufficiency.   -     Start: doxycycline hyclate (VIBRAMYCIN) 100 MG capsule; Take 1 capsule (100 mg) by mouth 2 times daily for 10 days  - keep leg elevated. Good skin hygiene.     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   States that she feels it's situational and is worried about her job    Morbid obesity due to excess calories (H)  Weight management plan: Discussed healthy diet and exercise guidelines         Follow up if symptoms fail to improve in 1 week or worsen.      The patient was in agreement with the plan today and had no questions or concerns prior to leaving the clinic.    Follow up in a month- med check    Marybeth Silver MD  [facility]  "

## 2019-02-13 ENCOUNTER — ANCILLARY PROCEDURE (OUTPATIENT)
Dept: ULTRASOUND IMAGING | Facility: CLINIC | Age: 59
End: 2019-02-13
Attending: FAMILY MEDICINE
Payer: COMMERCIAL

## 2019-02-13 DIAGNOSIS — I87.2 VENOUS (PERIPHERAL) INSUFFICIENCY: ICD-10-CM

## 2019-02-13 PROCEDURE — 93970 EXTREMITY STUDY: CPT | Performed by: INTERNAL MEDICINE

## 2019-02-13 ASSESSMENT — ANXIETY QUESTIONNAIRES: GAD7 TOTAL SCORE: 12

## 2019-02-18 NOTE — TELEPHONE ENCOUNTER
Patient is requesting late appt only, after 5:30. No openings late this week. She is now asking for a return call to discuss leg symptoms. [phone]

## 2019-02-18 NOTE — TELEPHONE ENCOUNTER
VM left on patients phone advising to call clinic back with questions.   [phone].    Noted patient was seen on 2/12 by PCP and advised to follow up in 1 week if symptoms failed to improve.    Pat Romero, RN, BSN, PHN

## 2019-02-19 NOTE — TELEPHONE ENCOUNTER
Left message on voice mail for patient to call clinic. [phone]  Patient does have appt 2/26/18.  Conchis Napoles RN

## 2019-02-19 NOTE — TELEPHONE ENCOUNTER
Patient returned call, she can only make a 5:00 or later appointment. She states that her leg is feeling a bit better now so she will continue her Rx and call for an appointment if needed. Reminder to go to  if leg gets worse.

## 2019-02-19 NOTE — TELEPHONE ENCOUNTER
Left message on voice mail for patient to call clinic. [phone]  Tentatively scheduled patient for 3 pm today with Dr Silver.  Asked patient to call back to confirm appointment or reschedule for another day.

## 2019-02-19 NOTE — TELEPHONE ENCOUNTER
Patient left a message on TC voice mail, she still has enough medication until Saturday, however her leg is still red. No openings on 's schedule, she is asking if she should be seen? (Normal leg US result in basket also)

## 2019-02-24 ENCOUNTER — MYC MEDICAL ADVICE (OUTPATIENT)
Dept: FAMILY MEDICINE | Facility: CLINIC | Age: 59
End: 2019-02-24

## 2019-02-24 DIAGNOSIS — L03.116 LEFT LEG CELLULITIS: Primary | ICD-10-CM

## 2019-02-26 ENCOUNTER — OFFICE VISIT (OUTPATIENT)
Dept: FAMILY MEDICINE | Facility: CLINIC | Age: 59
End: 2019-02-26
Payer: COMMERCIAL

## 2019-02-26 VITALS
OXYGEN SATURATION: 99 % | WEIGHT: 248.2 LBS | HEART RATE: 86 BPM | TEMPERATURE: 98.1 F | SYSTOLIC BLOOD PRESSURE: 136 MMHG | BODY MASS INDEX: 46.86 KG/M2 | DIASTOLIC BLOOD PRESSURE: 87 MMHG | HEIGHT: 61 IN

## 2019-02-26 DIAGNOSIS — I10 HYPERTENSION GOAL BP (BLOOD PRESSURE) < 140/90: ICD-10-CM

## 2019-02-26 DIAGNOSIS — Z00.6 RESEARCH STUDY PATIENT: ICD-10-CM

## 2019-02-26 PROCEDURE — 99213 OFFICE O/P EST LOW 20 MIN: CPT | Performed by: FAMILY MEDICINE

## 2019-02-26 RX ORDER — SULFAMETHOXAZOLE/TRIMETHOPRIM 800-160 MG
1 TABLET ORAL 2 TIMES DAILY
Qty: 20 TABLET | Refills: 0 | Status: SHIPPED | OUTPATIENT
Start: 2019-02-26 | End: 2019-03-08

## 2019-02-26 RX ORDER — AMLODIPINE BESYLATE 5 MG/1
5 TABLET ORAL DAILY
Qty: 90 TABLET | Refills: 1 | Status: SHIPPED | OUTPATIENT
Start: 2019-02-26 | End: 2019-10-25

## 2019-02-26 RX ORDER — METOPROLOL SUCCINATE 25 MG/1
12.5 TABLET, EXTENDED RELEASE ORAL DAILY
Qty: 90 TABLET | Refills: 1 | Status: SHIPPED | OUTPATIENT
Start: 2019-02-26 | End: 2020-02-28

## 2019-02-26 ASSESSMENT — MIFFLIN-ST. JEOR: SCORE: 1643.21

## 2019-02-27 NOTE — PATIENT INSTRUCTIONS
[facility] Hypertension Study  Visit 7     Thank you for your continued participation in the hypertension study!  Please continue taking your hypertension medications as directed. Your next visit will be in four months and will be scheduled for you in the coming days. After it is scheduled, be sure to let the research coordinator know as soon as possible if you cannot make it so that the visit can be rescheduled for you.     Please contact the research coordinator if you receive care for your hypertension outside of your study visits.    If you have any questions, please contact the research coordinator at [phone]. If you experience any symptoms please call the [employer] 24/7 triage number at [phone]. If your phone number, email or address changes please alert the research coordinator.     In the meantime, we ask that you complete the online surveys emailed out to you, if completing online, or mailed out to you, if completing paper surveys, before your next visit.

## 2019-02-27 NOTE — PROGRESS NOTES
PGEN study visit  NEW HYPERTENSION diagnosis visit 7    SUBJECTIVE:  Jessica is here for 7th PGEN research study visit. Please refer to research tab in the header and today's flow sheet for further details. Patient had new onset hypertension at enrollment and has a goal of <  140/90 (per Problem list target chosen by PCP)     Prior research note reviewed. Patient is on blood pressure medication(s) at this time.  she has been taking Amlodipine 5 mg/day and  Metoprolol 25 mg/day and is tolerating the medication well.      Current diet & lifestyle: Sedentary lifestyle lately as she's been dealing with a left lower leg cellulitis that failed initial treatment  Smoking: Quit in 2018  Alcohol consumption. Rarely  Other pertinent history: patient worried about potential job loss; has not found alternative work yet.  She's been also  dealing with a left lower leg cellulitis that failed initial treatment, yet to try different antibiotic.        BP Readings from Last 3 Encounters:   02/26/19 136/87   02/12/19 126/81   02/05/19 145/84       Current Outpatient Medications   Medication Sig Dispense Refill     amLODIPine (NORVASC) 5 MG tablet Take 1 tablet (5 mg) by mouth daily 90 tablet 0     buPROPion (WELLBUTRIN SR) 150 MG 12 hr tablet TAKE 1 TABLET(150 MG) BY MOUTH TWICE DAILY 180 tablet 3     cyclobenzaprine (FLEXERIL) 10 MG tablet Take 0.5-1 tablets (5-10 mg) by mouth 3 times daily as needed for muscle spasms 45 tablet 0     diclofenac (VOLTAREN) 1 % GEL topical gel Apply 4 grams to knees  four times daily using enclosed dosing card. 100 g 1     diclofenac (VOLTAREN) 75 MG EC tablet Take 1 tablet (75 mg) by mouth 2 times daily (Take food) 30 tablet 0     DULoxetine (CYMBALTA) 60 MG EC capsule TAKE 1 CAPSULE(60 MG) BY MOUTH DAILY 90 capsule 3     metoprolol succinate ER (TOPROL-XL) 25 MG 24 hr tablet Take 0.5 tablets (12.5 mg) by mouth daily 90 tablet 1     omeprazole (PRILOSEC) 20 MG DR capsule TAKE 1 CAPSULE(20 MG) BY  "MOUTH DAILY 90 capsule 0     order for DME Equipment being ordered: Dynaflex foot plate, 23.5 Right 1 Device 0     sulfamethoxazole-trimethoprim (BACTRIM DS/SEPTRA DS) 800-160 MG tablet Take 1 tablet by mouth 2 times daily for 10 days 20 tablet 0     Potassium   Date Value Ref Range Status   07/10/2018 Unsatisfactory specimen - hemolyzed 3.4 - 5.3 mmol/L Final     Comment:     Specimen slightly hemolyzed, potassium may be falsely elevated     Creatinine   Date Value Ref Range Status   07/10/2018 0.75 0.52 - 1.04 mg/dL Final     Urea Nitrogen   Date Value Ref Range Status   07/10/2018 10 7 - 30 mg/dL Final     GFR Estimate   Date Value Ref Range Status   07/10/2018 80 >60 mL/min/1.7m2 Final     Comment:     Non  GFR Calc      A baseline potassium, creatinine, BUN, GFR has been done within past 12 months      OBJECTIVE:  Patient in in no apparent distress and able to provide full history for today's encounter. she denies pain or any current illness   Vitals: /87   Pulse 86   Temp 98.1  F (36.7  C) (Tympanic)   Ht 1.549 m (5' 1\")   Wt 112.6 kg (248 lb 3.2 oz)   SpO2 99%   BMI 46.90 kg/m    Today's BP completed using cuff size: regular on left side  arm.        Pulse Readings from Last 1 Encounters:   02/26/19 86     Is pulse 55 or greater? - Yes    BMI= Body mass index is 46.9 kg/m .  See PGEN flow sheet for other exam findings.       ASSESSMENT/PLAN:   Blood pressure reading today is at the provider specified goal of <140/90.      PGEN Flowsheet has been  'filed' ) for this visit (this saves flowsheet data)      1.  Based on PGEMELISSA CANCHOLA HTN MGMT standing order the patient will be advised continue current medication regimen unchanged.     2.  We will not be checking a metabolic lab panel today.      3.  Follow up instructions include:     Next Provider visit: Follow up in 1 month..    See avs for more details.  Full research packet also provided to patient previously  Our research team will " reach out to patient to schedule follow up visit as well.     Patient was counseled regarding the lifestyle changes (listed below)  to help with BP management Yes  Lifestyle changes that can help control high blood pressure:  Even though PGEN is a study to test effectiveness of genetically guided medications for managing high blood pressure, there are several things you can do to ensure your blood pressure stays in good control:    Maintain a healthy weight (BMI<26). A modest amount of weight loss can be helpful    Limit salt intake to under 2400mg daily    Follow the DASH diet (lean meats, low salt, whole grains, lots of fruits/vegies)    Stay active, try to get in 30 minutes of exercise daily.    Manage your daily stress.    Do not smoke cigarettes (or cut back)    Limit alcohol (2 drinks/day for men, 1 drink/day for women)  Was AVS  provided to patient with the relevant <dot>PGENPI dot phrase pulled into patient instructions Yes    Marybeth Silver MD

## 2019-03-07 DIAGNOSIS — K21.9 GASTROESOPHAGEAL REFLUX DISEASE, ESOPHAGITIS PRESENCE NOT SPECIFIED: ICD-10-CM

## 2019-03-08 NOTE — TELEPHONE ENCOUNTER
"Requested Prescriptions   Pending Prescriptions Disp Refills     omeprazole (PRILOSEC) 20 MG DR capsule [Pharmacy Med Name: OMEPRAZOLE 20MG CAPSULES] 90 capsule 0    Last Written Prescription Date:  12/4/18  Last Fill Quantity: 90,  # refills: 0   Last office visit: 2/26/2019 with prescribing provider:  Adrianne   Future Office Visit:   Sig: TAKE 1 CAPSULE(20 MG) BY MOUTH DAILY    PPI Protocol Passed - 3/7/2019  6:02 PM       Passed - Not on Clopidogrel (unless Pantoprazole ordered)       Passed - No diagnosis of osteoporosis on record       Passed - Recent (12 mo) or future (30 days) visit within the authorizing provider's specialty    Patient had office visit in the last 12 months or has a visit in the next 30 days with authorizing provider or within the authorizing provider's specialty.  See \"Patient Info\" tab in inbasket, or \"Choose Columns\" in Meds & Orders section of the refill encounter.             Passed - Medication is active on med list       Passed - Patient is age 18 or older       Passed - No active pregnacy on record       Passed - No positive pregnancy test in past 12 months        "

## 2019-06-01 DIAGNOSIS — K21.9 GASTROESOPHAGEAL REFLUX DISEASE, ESOPHAGITIS PRESENCE NOT SPECIFIED: ICD-10-CM

## 2019-06-03 NOTE — TELEPHONE ENCOUNTER
"Requested Prescriptions   Pending Prescriptions Disp Refills     omeprazole (PRILOSEC) 20 MG DR capsule [Pharmacy Med Name: OMEPRAZOLE 20MG CAPSULES]  Last Written Prescription Date:  03/08/19  Last Fill Quantity: 90,  # refills: 0   Last office visit: 2/26/2019 with prescribing provider:  ERICA Silver   Future Office Visit:     90 capsule 0     Sig: TAKE 1 CAPSULE(20 MG) BY MOUTH DAILY       PPI Protocol Passed - 6/1/2019  9:13 PM        Passed - Not on Clopidogrel (unless Pantoprazole ordered)        Passed - No diagnosis of osteoporosis on record        Passed - Recent (12 mo) or future (30 days) visit within the authorizing provider's specialty     Patient had office visit in the last 12 months or has a visit in the next 30 days with authorizing provider or within the authorizing provider's specialty.  See \"Patient Info\" tab in inbasket, or \"Choose Columns\" in Meds & Orders section of the refill encounter.              Passed - Medication is active on med list        Passed - Patient is age 18 or older        Passed - No active pregnacy on record        Passed - No positive pregnancy test in past 12 months          "

## 2019-07-21 DIAGNOSIS — G89.29 CHRONIC BILATERAL LOW BACK PAIN WITHOUT SCIATICA: ICD-10-CM

## 2019-07-21 DIAGNOSIS — F33.1 MODERATE EPISODE OF RECURRENT MAJOR DEPRESSIVE DISORDER (H): ICD-10-CM

## 2019-07-21 DIAGNOSIS — M54.50 CHRONIC BILATERAL LOW BACK PAIN WITHOUT SCIATICA: ICD-10-CM

## 2019-07-22 NOTE — TELEPHONE ENCOUNTER
"Requested Prescriptions   Pending Prescriptions Disp Refills     DULoxetine (CYMBALTA) 60 MG capsule [Pharmacy Med Name: DULOXETINE DR 60MG CAPSULES] 90 capsule 0     Sig: TAKE ONE CAPSULE BY MOUTH DAILY       Serotonin-Norepinephrine Reuptake Inhibitors  Failed - 7/21/2019 11:39 AM        Failed - PHQ-9 score of less than 5 in past 6 months     Please review last PHQ-9 score.      Last Written Prescription Date:  6-22-19  Last Fill Quantity: 90,  # refills: 0   Last office visit: 2/26/2019 with prescribing provider:  2-26-19   Future Office Visit:       Passed - Blood pressure under 140/90 in past 12 months     BP Readings from Last 3 Encounters:   02/26/19 136/87   02/12/19 126/81   02/05/19 145/84                 Passed - Medication is active on med list        Passed - Patient is age 18 or older        Passed - No active pregnancy on record        Passed - No positive pregnancy test in past 12 months        Passed - Recent (6 mo) or future (30 days) visit within the authorizing provider's specialty     Patient had office visit in the last 6 months or has a visit in the next 30 days with authorizing provider or within the authorizing provider's specialty.  See \"Patient Info\" tab in inbasket, or \"Choose Columns\" in Meds & Orders section of the refill encounter.            "

## 2019-07-29 NOTE — TELEPHONE ENCOUNTER
No response through [employer].    Left message for patient to call back pod 2 line.([phone])

## 2019-07-30 ASSESSMENT — ANXIETY QUESTIONNAIRES
1. FEELING NERVOUS, ANXIOUS, OR ON EDGE: NOT AT ALL
2. NOT BEING ABLE TO STOP OR CONTROL WORRYING: NOT AT ALL
7. FEELING AFRAID AS IF SOMETHING AWFUL MIGHT HAPPEN: SEVERAL DAYS
GAD7 TOTAL SCORE: 3
3. WORRYING TOO MUCH ABOUT DIFFERENT THINGS: NOT AT ALL
5. BEING SO RESTLESS THAT IT IS HARD TO SIT STILL: NOT AT ALL
6. BECOMING EASILY ANNOYED OR IRRITABLE: MORE THAN HALF THE DAYS
IF YOU CHECKED OFF ANY PROBLEMS ON THIS QUESTIONNAIRE, HOW DIFFICULT HAVE THESE PROBLEMS MADE IT FOR YOU TO DO YOUR WORK, TAKE CARE OF THINGS AT HOME, OR GET ALONG WITH OTHER PEOPLE: SOMEWHAT DIFFICULT

## 2019-07-30 ASSESSMENT — PATIENT HEALTH QUESTIONNAIRE - PHQ9
SUM OF ALL RESPONSES TO PHQ QUESTIONS 1-9: 11
5. POOR APPETITE OR OVEREATING: NOT AT ALL

## 2019-07-30 NOTE — TELEPHONE ENCOUNTER
Routing refill request to provider for review/approval because:  Patient needs to be seen because:  No return calls from patient,   Can med be refused with reason patient needs to call clinic? Or only 1 month given with reminder appt due?  Conchis Napoles RN

## 2019-07-30 NOTE — TELEPHONE ENCOUNTER
Currently has a lot of stress at work. PHQ/ GABRIELA completed by phone.     GABRIELA-7 SCORE 5/16/2018 2/12/2019 7/30/2019   Total Score 9 12 3     PHQ-9 SCORE 8/14/2018 2/12/2019 7/30/2019   PHQ-9 Total Score - - -   PHQ-9 Total Score MyChart 7 (Mild depression) - -   PHQ-9 Total Score 7 11 11         Due for follow up- appt made  8/13

## 2019-07-31 ASSESSMENT — ANXIETY QUESTIONNAIRES: GAD7 TOTAL SCORE: 3

## 2019-08-01 RX ORDER — DULOXETIN HYDROCHLORIDE 60 MG/1
CAPSULE, DELAYED RELEASE ORAL
Qty: 90 CAPSULE | Refills: 0 | Status: SHIPPED | OUTPATIENT
Start: 2019-08-01 | End: 2019-10-24

## 2019-08-01 NOTE — TELEPHONE ENCOUNTER
Noted.   Continue current dose. Rx sent.   Would patient be agreeable to go for Therapy in addition to meds?

## 2019-08-06 NOTE — TELEPHONE ENCOUNTER
Message left on VM to return call to clinic at [phone] or [phone].    Pat Romero RN BSN PHN

## 2019-08-07 NOTE — TELEPHONE ENCOUNTER
Message left on VM to return call to clinic at [phone] or [phone].    Pat Romero RN BSN PHN

## 2019-08-12 NOTE — TELEPHONE ENCOUNTER
Message left on VM to return call to clinic at [phone] or [phone].    3rd attempt by RN's to reach patient with no call backs.     Routing to PCP to advise if letter should be sent to patients home?    Pat Romero, RN BSN PHN

## 2019-08-13 ENCOUNTER — OFFICE VISIT (OUTPATIENT)
Dept: FAMILY MEDICINE | Facility: CLINIC | Age: 59
End: 2019-08-13

## 2019-08-13 VITALS
DIASTOLIC BLOOD PRESSURE: 80 MMHG | RESPIRATION RATE: 24 BRPM | WEIGHT: 268.2 LBS | TEMPERATURE: 97.2 F | SYSTOLIC BLOOD PRESSURE: 138 MMHG | OXYGEN SATURATION: 98 % | BODY MASS INDEX: 50.68 KG/M2 | HEART RATE: 85 BPM

## 2019-08-13 DIAGNOSIS — F33.1 MODERATE EPISODE OF RECURRENT MAJOR DEPRESSIVE DISORDER (H): ICD-10-CM

## 2019-08-13 DIAGNOSIS — F41.1 GENERALIZED ANXIETY DISORDER: Primary | ICD-10-CM

## 2019-08-13 DIAGNOSIS — I10 HYPERTENSION GOAL BP (BLOOD PRESSURE) < 140/90: ICD-10-CM

## 2019-08-13 PROCEDURE — 99214 OFFICE O/P EST MOD 30 MIN: CPT | Performed by: FAMILY MEDICINE

## 2019-08-13 RX ORDER — MIRTAZAPINE 15 MG/1
15 TABLET, FILM COATED ORAL AT BEDTIME
Qty: 30 TABLET | Refills: 0 | Status: SHIPPED | OUTPATIENT
Start: 2019-08-13 | End: 2019-09-09

## 2019-08-13 ASSESSMENT — ANXIETY QUESTIONNAIRES
7. FEELING AFRAID AS IF SOMETHING AWFUL MIGHT HAPPEN: NOT AT ALL
3. WORRYING TOO MUCH ABOUT DIFFERENT THINGS: NOT AT ALL
6. BECOMING EASILY ANNOYED OR IRRITABLE: SEVERAL DAYS
IF YOU CHECKED OFF ANY PROBLEMS ON THIS QUESTIONNAIRE, HOW DIFFICULT HAVE THESE PROBLEMS MADE IT FOR YOU TO DO YOUR WORK, TAKE CARE OF THINGS AT HOME, OR GET ALONG WITH OTHER PEOPLE: NOT DIFFICULT AT ALL
1. FEELING NERVOUS, ANXIOUS, OR ON EDGE: SEVERAL DAYS
2. NOT BEING ABLE TO STOP OR CONTROL WORRYING: NOT AT ALL
5. BEING SO RESTLESS THAT IT IS HARD TO SIT STILL: NOT AT ALL
GAD7 TOTAL SCORE: 5

## 2019-08-13 ASSESSMENT — PATIENT HEALTH QUESTIONNAIRE - PHQ9
5. POOR APPETITE OR OVEREATING: NEARLY EVERY DAY
SUM OF ALL RESPONSES TO PHQ QUESTIONS 1-9: 11

## 2019-08-13 NOTE — PROGRESS NOTES
Subjective     Jessica Jay is a 59 year old female who presents to clinic today for the following health issues:    HPI   Depression and Anxiety Follow-Up    How are you doing with your depression since your last visit? Worsened; afraid she will lose her job again    How are you doing with your anxiety since your last visit?  Worsened     Are you having other symptoms that might be associated with depression or anxiety? Yes:  Trouble sleeping. States that she has not slept in 31 hours.     Have you had a significant life event? OTHER: employment - not performing well at work, does not get along with Supervisor.     Do you have any concerns with your use of alcohol or other drugs? No        Finding Wellbutrin expensive and wishes to discontinue it.     Reports that she has had some suicidal ideations but no plans to carry them out. States that her mom is still alive and will never do anything to harm herself or others.       PHQ-9 ([employer]) 8/13/2019   1.  Little interest or pleasure in doing things 1   2.  Feeling down, depressed, or hopeless 0   3.  Trouble falling or staying asleep, or sleeping too much 3   4.  Feeling tired or having little energy 3   5.  Poor appetite or overeating 0   6.  Feeling bad about yourself 3   7.  Trouble concentrating 0   8.  Moving slowly or restless 0   9.  Suicidal or self-harm thoughts 1   PHQ-9 Total Score 11   Difficulty at work, home, or with people Somewhat difficult     GABRIELA-7   [employer], 2002; Used with Permission) 8/13/2019   1. Feeling nervous, anxious, or on edge 1   2. Not being able to stop or control worrying 0   3. Worrying too much about different things 0   4. Trouble relaxing 3   5. Being so restless that it is hard to sit still 0   6. Becoming easily annoyed or irritable 1   7. Feeling afraid, as if something awful might happen 0   GABRIELA-7 Total Score 5   If you checked any problems, how difficult have they made it for you to do your work, take care of things  at home, or get along with other people? Not difficult at all       Social History     Tobacco Use     Smoking status: Former Smoker     Packs/day: 0.80     Years: 30.00     Pack years: 24.00     Types: Cigarettes     Last attempt to quit: 2017     Years since quittin.1     Smokeless tobacco: Never Used   Substance Use Topics     Alcohol use: Yes     Alcohol/week: 0.0 oz     Comment: 6 drinks a year     Drug use: No     PHQ 2019   PHQ-9 Total Score 11 11 11   Q9: Thoughts of better off dead/self-harm past 2 weeks Not at all Not at all Several days     GABRIELA-7 SCORE 2019   Total Score 12 3 5     No flowsheet data found.  No flowsheet data found.  In the past two weeks have you had thoughts of suicide or self-harm?  Yes  In the past two weeks have you thought of a plan or intent to harm yourself? No.  Do you have concerns about your personal safety or the safety of others?   No    Suicide Assessment Five-step Evaluation and Treatment (SAFE-T)      How many servings of fruits and vegetables do you eat daily?  0-1    On average, how many sweetened beverages do you drink each day (soda, juice, sweet tea, etc)?   1    How many days per week do you miss taking your medication? 0            Patient Active Problem List   Diagnosis     Depression, major     GERD (gastroesophageal reflux disease)     Migraines     CARDIOVASCULAR SCREENING; LDL GOAL LESS THAN 160     Disc herniation     DDD (degenerative disc disease), lumbar     DDD (degenerative disc disease), cervical     Neck pain     Headache     Dermoid cyst of brain (H)     Chronic daily headache     Dizziness - light-headed     Tobacco abuse     ELYSSA (obstructive sleep apnea)     Spondylolisthesis, grade 1     Chronic low back pain     Brain lipoma     Vulvar dermatitis     History of colonic polyps     Gastroesophageal reflux disease without esophagitis     overweight with BMI >40     Hypertension goal BP (blood  pressure) < 140/90     Traylor's esophagus determined by endoscopy     Past Surgical History:   Procedure Laterality Date     ARTHROSCOPY SHOULDER      Right/spurs     C FOOT/TOES SURGERY PROC UNLISTED      Toe fracture, left     CARPAL TUNNEL RELEASE RT/LT      Left     COLONOSCOPY  2016     CRANIOTOMY, EXCISE TUMOR COMPLEX, COMBINED  2011    Procedure:COMBINED CRANIOTOMY, EXCISE TUMOR COMPLEX; 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 Father      Cancer Maternal Grandmother      Cerebrovascular Disease Paternal Grandmother      Breast Cancer Paternal Grandmother      Neurologic Disorder Sister         migraine     Cancer Brother      Cancer - colorectal Brother      Colon Cancer Brother          Current Outpatient Medications   Medication Sig Dispense Refill     amLODIPine (NORVASC) 5 MG tablet Take 1 tablet (5 mg) by mouth daily 90 tablet 1     diclofenac (VOLTAREN) 1 % GEL topical gel Apply 4 grams to knees  four times daily using enclosed dosing card. 100 g 1     DULoxetine (CYMBALTA) 60 MG capsule TAKE ONE CAPSULE BY MOUTH DAILY 90 capsule 0     metoprolol succinate ER (TOPROL-XL) 25 MG 24 hr tablet Take 0.5 tablets (12.5 mg) by mouth daily 90 tablet 1     mirtazapine (REMERON) 15 MG tablet Take 1 tablet (15 mg) by mouth At Bedtime 30 tablet 0     omeprazole (PRILOSEC) 20 MG DR capsule TAKE 1 CAPSULE(20 MG) BY MOUTH DAILY 90 capsule 0     cyclobenzaprine  (FLEXERIL) 10 MG tablet Take 0.5-1 tablets (5-10 mg) by mouth 3 times daily as needed for muscle spasms (Patient not taking: Reported on 8/13/2019) 45 tablet 0     diclofenac (VOLTAREN) 75 MG EC tablet Take 1 tablet (75 mg) by mouth 2 times daily (Take food) 30 tablet 0     order for DME Equipment being ordered: Dynaflex foot plate, 23.5 Right 1 Device 0     Allergies   Allergen Reactions     Buspar [Buspirone Hcl] Rash         Reviewed and updated as needed this visit by Provider         Review of Systems   ROS COMP: Constitutional, HEENT, cardiovascular, pulmonary, gi and gu systems are negative, except as otherwise noted.      Objective    /80   Pulse 85   Temp 97.2  F (36.2  C) (Tympanic)   Resp 24   Wt 121.7 kg (268 lb 3.2 oz)   SpO2 98%   BMI 50.68 kg/m    Body mass index is 50.68 kg/m .  Physical Exam   GENERAL: healthy, alert and no distress  PSYCH: mentation appears normal, affect flat, anxious, judgement and insight intact and appearance well groomed    Diagnostic Test Results:  none         Assessment & Plan     Jessica was seen today for depression and anxiety.    Diagnoses and all orders for this visit:    Generalized anxiety disorder, uncontrolled  -     PHQ-9/GABRIELA 7 completed, see above/Epic for details    -     Taper to discontinue Wellbutrin  -     Start: mirtazapine (REMERON) 15 MG tablet; Take 1 tablet (15 mg) by mouth At Bedtime. Will monitor weight gain. Weight management plan discussed.   -     MENTAL HEALTH REFERRAL  - Adult; Outpatient Treatment; Individual/Couples/Family/Group Therapy/Health Psychology; [facility] [place]: [facility] [phone]; We will contact you to schedule the appointment or please call with any questions    Moderate episode of recurrent major depressive disorder (H), uncontrolled  -     PHQ-9/GABRIELA 7 completed, see above/Epic for details    -     Taper to discontinue Wellbutrin  -     Start: mirtazapine (REMERON) 15 MG tablet; Take 1 tablet (15 mg) by  "mouth At Bedtime. Will monitor weight gain. Weight management plan discussed.   -     MENTAL HEALTH REFERRAL  - Adult; Outpatient Treatment; Individual/Couples/Family/Group Therapy/Health Psychology; [facility] [place]: [facility] [phone]; We will contact you to schedule the appointment or please call with any questions  -    Verbal Safety Contract- patient has no plans to harm herself.   -     Depression Self care plan discussed, see AVS for details.     Hypertension goal BP (blood pressure) < 140/90, stable  Repeat BP at the end of the visit was within goal.   Continue current management.       BMI:   Estimated body mass index is 50.68 kg/m  as calculated from the following:    Height as of 2/26/19: 1.549 m (5' 1\").    Weight as of this encounter: 121.7 kg (268 lb 3.2 oz).   Weight management plan: Discussed healthy diet and exercise guidelines      Return in about 1 month (around 9/13/2019) for Follow up, sooner if needed..    Marybeth Silver MD  [facility]      "

## 2019-08-14 ASSESSMENT — ANXIETY QUESTIONNAIRES: GAD7 TOTAL SCORE: 5

## 2019-09-01 DIAGNOSIS — K21.9 GASTROESOPHAGEAL REFLUX DISEASE, ESOPHAGITIS PRESENCE NOT SPECIFIED: ICD-10-CM

## 2019-09-09 DIAGNOSIS — F41.1 GENERALIZED ANXIETY DISORDER: ICD-10-CM

## 2019-09-09 DIAGNOSIS — F33.1 MODERATE EPISODE OF RECURRENT MAJOR DEPRESSIVE DISORDER (H): ICD-10-CM

## 2019-09-09 NOTE — TELEPHONE ENCOUNTER
"Requested Prescriptions   Pending Prescriptions Disp Refills     mirtazapine (REMERON) 15 MG tablet [Pharmacy Med Name: MIRTAZAPINE 15MG TABLETS] 30 tablet 0     Sig: TAKE 1 TABLET(15 MG) BY MOUTH AT BEDTIME  Last Written Prescription Date:  8/13/19  Last Fill Quantity: 30,  # refills: 0   Last office visit: 8/13/2019 with prescribing provider:  Adrianne  Future Office Visit:         Atypical Antidepressants Protocol Failed - 9/9/2019  6:25 PM        Failed - Patient has PHQ-9 score less than 5 in past 6 months.     Please review last PHQ-9 score.           Passed - Medication active on med list        Passed - Patient is age 18 or older        Passed - No active pregnancy on record        Passed - No positive pregnancy test in past 12 mos        Passed - Recent (6 mo) or future (30 days) visit within the authorizing provider's specialty     Patient had office visit in the last 6 months or has a visit in the next 30 days with authorizing provider or within the authorizing provider's specialty.  See \"Patient Info\" tab in inbasket, or \"Choose Columns\" in Meds & Orders section of the refill encounter.            "

## 2019-09-10 NOTE — TELEPHONE ENCOUNTER
Patient due for 1 month follow up appointment.     RN spoke with patient.     Patient has been having issues with left shoulder - patient believes it to be bursitis wondering if she can have a Cortizone shot at follow up from 8/13/19? (all in 1 appointment)    Next opening (not SD slot) is 9/23.    Please advise on florentin refill and if Cortizone shot can be given at follow up appointment?    Pat Waldron, RN, BSN, PHN

## 2019-09-11 RX ORDER — MIRTAZAPINE 15 MG/1
TABLET, FILM COATED ORAL
Qty: 30 TABLET | Refills: 0 | Status: SHIPPED | OUTPATIENT
Start: 2019-09-11 | End: 2019-10-24

## 2019-09-11 NOTE — TELEPHONE ENCOUNTER
Please do PHQ-9/GABRIELA 7 prior to refill.   Ask if she is tolerating the Mirtazapine.   We can evaluate her shoulder pain at her next visit and determine if a shot is a appropriate, otherwise can also go to Ortho (walk-in)

## 2019-10-23 DIAGNOSIS — Z00.6 RESEARCH STUDY PATIENT: ICD-10-CM

## 2019-10-23 DIAGNOSIS — I10 HYPERTENSION GOAL BP (BLOOD PRESSURE) < 140/90: ICD-10-CM

## 2019-10-23 NOTE — TELEPHONE ENCOUNTER
Reason for call:  Medication   If this is a refill request, has the caller requested the refill from the pharmacy already? Yes  Will the patient be using a [facility]? No  Name of the pharmacy and phone number for the current request: [facility]   [phone] (Phone) [phone] (Fax)  Name of the medication requested: amLODIPine 50 mg    Other request: Patient stated that she has answered the questions that were sent to her for her refill and would like to know when her prescription would be sent to the pharmacy     Phone number to reach patient:  Cell number on file:    Telephone Information:   Mobile [phone]       Best Time:  anytime    Can we leave a detailed message on this number?  YES

## 2019-10-23 NOTE — TELEPHONE ENCOUNTER
Left message on voice mail for patient to call clinic.  Need clarification, no refill requests for amlodipine, PHQ-9 questionnaire completed  [phone]

## 2019-10-24 DIAGNOSIS — F33.1 MODERATE EPISODE OF RECURRENT MAJOR DEPRESSIVE DISORDER (H): ICD-10-CM

## 2019-10-24 DIAGNOSIS — M54.50 CHRONIC BILATERAL LOW BACK PAIN WITHOUT SCIATICA: ICD-10-CM

## 2019-10-24 DIAGNOSIS — F41.1 GENERALIZED ANXIETY DISORDER: ICD-10-CM

## 2019-10-24 DIAGNOSIS — G89.29 CHRONIC BILATERAL LOW BACK PAIN WITHOUT SCIATICA: ICD-10-CM

## 2019-10-24 NOTE — TELEPHONE ENCOUNTER
Left message on voice mail for patient to call clinic. [phone]    Carmencita Franco RN BSN

## 2019-10-24 NOTE — TELEPHONE ENCOUNTER
"Requested Prescriptions   Pending Prescriptions Disp Refills     DULoxetine (CYMBALTA) 60 MG capsule [Pharmacy Med Name: DULOXETINE DR 60MG CAPSULES] 90 capsule 0     Sig: TAKE 1 CAPSULE BY MOUTH DAILY   Last Written Prescription Date:  9-24-19  Last Fill Quantity: 90,  # refills: 0   Last office visit: 8/13/2019 with prescribing provider:  8-13-19   Future Office Visit:      Serotonin-Norepinephrine Reuptake Inhibitors  Passed - 10/24/2019  5:10 AM        Passed - Blood pressure under 140/90 in past 12 months     BP Readings from Last 3 Encounters:   08/13/19 138/80   02/26/19 136/87   02/12/19 126/81                 Passed - PHQ-9 score of less than 5 in past 6 months     Please review last PHQ-9 score.           Passed - Medication is active on med list        Passed - Patient is age 18 or older        Passed - No active pregnancy on record        Passed - No positive pregnancy test in past 12 months        Passed - Recent (6 mo) or future (30 days) visit within the authorizing provider's specialty     Patient had office visit in the last 6 months or has a visit in the next 30 days with authorizing provider or within the authorizing provider's specialty.  See \"Patient Info\" tab in inbasket, or \"Choose Columns\" in Meds & Orders section of the refill encounter.            "

## 2019-10-25 NOTE — TELEPHONE ENCOUNTER
Routing refill request to provider for review/approval because:  Patient needs to be seen because:  Last OV 8/19/19 and was instructed to return for follow up in 1 month, however has not done so.     Last refills written on 8/1/19 & 9/11/19.     Carmencita Franco RN BSN

## 2019-10-25 NOTE — TELEPHONE ENCOUNTER
Routing refill request to provider for review/approval because:  Labs not current:  See Below    BP Readings from Last 3 Encounters:   08/13/19 138/80   02/26/19 136/87   02/12/19 126/81     Sodium   Date Value Ref Range Status   07/10/2018 137 133 - 144 mmol/L Final     Potassium   Date Value Ref Range Status   07/10/2018 Unsatisfactory specimen - hemolyzed 3.4 - 5.3 mmol/L Final     Comment:     Specimen slightly hemolyzed, potassium may be falsely elevated       Last OV 8/13/19.    Last written 2/26/19.     Carmencita Franco RN BSN

## 2019-10-27 DIAGNOSIS — Z00.6 RESEARCH STUDY PATIENT: ICD-10-CM

## 2019-10-27 DIAGNOSIS — I10 HYPERTENSION GOAL BP (BLOOD PRESSURE) < 140/90: ICD-10-CM

## 2019-10-28 RX ORDER — AMLODIPINE BESYLATE 5 MG/1
5 TABLET ORAL DAILY
Qty: 90 TABLET | Refills: 3 | Status: SHIPPED | OUTPATIENT
Start: 2019-10-28 | End: 2020-03-19

## 2019-10-28 RX ORDER — MIRTAZAPINE 15 MG/1
TABLET, FILM COATED ORAL
Qty: 30 TABLET | Refills: 0 | Status: SHIPPED | OUTPATIENT
Start: 2019-10-28 | End: 2019-11-27

## 2019-10-28 RX ORDER — DULOXETIN HYDROCHLORIDE 60 MG/1
CAPSULE, DELAYED RELEASE ORAL
Qty: 30 CAPSULE | Refills: 0 | Status: SHIPPED | OUTPATIENT
Start: 2019-10-28 | End: 2019-11-27

## 2019-10-28 NOTE — TELEPHONE ENCOUNTER
"Requested Prescriptions   Pending Prescriptions Disp Refills     amLODIPine (NORVASC) 5 MG tablet [Pharmacy Med Name: AMLODIPINE BESYLATE 5MG TABLETS]  Last Written Prescription Date:  2/26/19  Last Fill Quantity: 90,  # refills: 1   Last office visit: 8/13/2019 with prescribing provider:  Adrianne   Future Office Visit:     90 tablet 0     Sig: TAKE 1 TABLET(5 MG) BY MOUTH DAILY       Calcium Channel Blockers Protocol  Failed - 10/27/2019  5:28 PM        Failed - Normal serum creatinine on file in past 12 months     Recent Labs   Lab Test 07/10/18  1006   CR 0.75             Passed - Blood pressure under 140/90 in past 12 months     BP Readings from Last 3 Encounters:   08/13/19 138/80   02/26/19 136/87   02/12/19 126/81                 Passed - Recent (12 mo) or future (30 days) visit within the authorizing provider's specialty     Patient has had an office visit with the authorizing provider or a provider within the authorizing providers department within the previous 12 mos or has a future within next 30 days. See \"Patient Info\" tab in inbasket, or \"Choose Columns\" in Meds & Orders section of the refill encounter.              Passed - Medication is active on med list        Passed - Patient is age 18 or older        Passed - No active pregnancy on record        Passed - No positive pregnancy test in past 12 months          "

## 2019-10-29 RX ORDER — AMLODIPINE BESYLATE 5 MG/1
TABLET ORAL
Qty: 90 TABLET | Refills: 0 | OUTPATIENT
Start: 2019-10-29

## 2019-11-27 DIAGNOSIS — F41.1 GENERALIZED ANXIETY DISORDER: ICD-10-CM

## 2019-11-27 DIAGNOSIS — F33.1 MODERATE EPISODE OF RECURRENT MAJOR DEPRESSIVE DISORDER (H): ICD-10-CM

## 2019-11-27 DIAGNOSIS — M54.50 CHRONIC BILATERAL LOW BACK PAIN WITHOUT SCIATICA: ICD-10-CM

## 2019-11-27 DIAGNOSIS — G89.29 CHRONIC BILATERAL LOW BACK PAIN WITHOUT SCIATICA: ICD-10-CM

## 2019-11-27 RX ORDER — DULOXETIN HYDROCHLORIDE 60 MG/1
CAPSULE, DELAYED RELEASE ORAL
Qty: 30 CAPSULE | Refills: 0 | Status: SHIPPED | OUTPATIENT
Start: 2019-11-27 | End: 2019-12-27

## 2019-11-27 RX ORDER — MIRTAZAPINE 15 MG/1
TABLET, FILM COATED ORAL
Qty: 30 TABLET | Refills: 0 | Status: SHIPPED | OUTPATIENT
Start: 2019-11-27 | End: 2019-12-27

## 2019-11-27 NOTE — TELEPHONE ENCOUNTER
Routing refill request to provider for review/approval because:  Ginna given x1 and patient did not follow up, please advise.     Last written on 10/28/19 (both Cymbalta & Remeron).     Last OV 9/13/19 with plan to follow up in 1 month.     No appointment has been schedule.     Pended for 1 month with appointment reminder.     Carmencita Franco RN BSN

## 2019-11-27 NOTE — TELEPHONE ENCOUNTER
Requested Prescriptions   Pending Prescriptions Disp Refills     mirtazapine (REMERON) 15 MG tablet 30 tablet 0     Sig: TAKE 1 TABLET(15 MG) BY MOUTH AT BEDTIME   Last Written Prescription Date:  10-28-19  Last Fill Quantity: 30,  # refills: 0   Last office visit: 8/13/2019 with prescribing provider:  8-13-19   Future Office Visit:      There is no refill protocol information for this order        DULoxetine (CYMBALTA) 60 MG capsule 30 capsule 0     Sig: TAKE 1 CAPSULE BY MOUTH DAILY   Last Written Prescription Date:  10-28-19  Last Fill Quantity: 30,  # refills: 0   Last office visit: 8/13/2019 with prescribing provider:  8-13-19   Future Office Visit:      There is no refill protocol information for this order

## 2019-12-24 DIAGNOSIS — F33.1 MODERATE EPISODE OF RECURRENT MAJOR DEPRESSIVE DISORDER (H): ICD-10-CM

## 2019-12-24 DIAGNOSIS — M54.50 CHRONIC BILATERAL LOW BACK PAIN WITHOUT SCIATICA: ICD-10-CM

## 2019-12-24 DIAGNOSIS — F41.1 GENERALIZED ANXIETY DISORDER: ICD-10-CM

## 2019-12-24 DIAGNOSIS — G89.29 CHRONIC BILATERAL LOW BACK PAIN WITHOUT SCIATICA: ICD-10-CM

## 2019-12-24 NOTE — TELEPHONE ENCOUNTER
"Requested Prescriptions   Pending Prescriptions Disp Refills     DULoxetine (CYMBALTA) 60 MG capsule [Pharmacy Med Name: DULOXETINE DR 60MG CAPSULES]  Last Written Prescription Date:  11/27/19  Last Fill Quantity: 30,  # refills: 0   Last office visit: 8/13/2019 with prescribing provider:  Adrianne   Future Office Visit:     30 capsule 0     Sig: TAKE ONE CAPSULE BY MOUTH DAILY       Serotonin-Norepinephrine Reuptake Inhibitors  Passed - 12/24/2019 11:13 AM        Passed - Blood pressure under 140/90 in past 12 months     BP Readings from Last 3 Encounters:   08/13/19 138/80   02/26/19 136/87   02/12/19 126/81                 Passed - PHQ-9 score of less than 5 in past 6 months     Please review last PHQ-9 score.           Passed - Medication is active on med list        Passed - Patient is age 18 or older        Passed - No active pregnancy on record        Passed - No positive pregnancy test in past 12 months        Passed - Recent (6 mo) or future (30 days) visit within the authorizing provider's specialty     Patient had office visit in the last 6 months or has a visit in the next 30 days with authorizing provider or within the authorizing provider's specialty.  See \"Patient Info\" tab in inbasket, or \"Choose Columns\" in Meds & Orders section of the refill encounter.            mirtazapine (REMERON) 15 MG tablet [Pharmacy Med Name: MIRTAZAPINE 15MG TABLETS]  Last Written Prescription Date:  11/27/19  Last Fill Quantity: 30,  # refills: 0   Last office visit: 8/13/2019 with prescribing provider:  Adrianne   Future Office Visit:     30 tablet 0     Sig: TAKE 1 TABLET BY MOUTH EVERY NIGHT AT BEDTIME       Atypical Antidepressants Protocol Passed - 12/24/2019 11:13 AM        Passed - Patient has PHQ-9 score less than 5 in past 6 months.     Please review last PHQ-9 score.           Passed - Medication active on med list        Passed - Patient is age 18 or older        Passed - No active pregnancy on record        " "Passed - No positive pregnancy test in past 12 mos        Passed - Recent (6 mo) or future (30 days) visit within the authorizing provider's specialty     Patient had office visit in the last 6 months or has a visit in the next 30 days with authorizing provider or within the authorizing provider's specialty.  See \"Patient Info\" tab in inbasket, or \"Choose Columns\" in Meds & Orders section of the refill encounter.              "

## 2019-12-27 NOTE — TELEPHONE ENCOUNTER
Routing refill request to provider for review/approval because:  Ginna given x2 and patient did not follow up, please advise    Last OV with Dr. Silver: 9/13/19 with advised 1 month F/U.    No appt scheduled. Pended for #30 with reminder.    Daiana Hein, RN, BSN

## 2019-12-29 RX ORDER — MIRTAZAPINE 15 MG/1
TABLET, FILM COATED ORAL
Qty: 30 TABLET | Refills: 0 | Status: SHIPPED | OUTPATIENT
Start: 2019-12-29 | End: 2020-01-27

## 2019-12-29 RX ORDER — DULOXETIN HYDROCHLORIDE 60 MG/1
CAPSULE, DELAYED RELEASE ORAL
Qty: 30 CAPSULE | Refills: 0 | Status: SHIPPED | OUTPATIENT
Start: 2019-12-29 | End: 2020-01-28

## 2020-01-20 ENCOUNTER — OFFICE VISIT (OUTPATIENT)
Dept: FAMILY MEDICINE | Facility: CLINIC | Age: 60
End: 2020-01-20
Payer: COMMERCIAL

## 2020-01-20 VITALS
RESPIRATION RATE: 18 BRPM | OXYGEN SATURATION: 98 % | WEIGHT: 286 LBS | SYSTOLIC BLOOD PRESSURE: 135 MMHG | TEMPERATURE: 98.5 F | HEART RATE: 82 BPM | BODY MASS INDEX: 54.04 KG/M2 | DIASTOLIC BLOOD PRESSURE: 84 MMHG

## 2020-01-20 DIAGNOSIS — K13.79 LESION OF HARD PALATE: Primary | ICD-10-CM

## 2020-01-20 PROCEDURE — 99213 OFFICE O/P EST LOW 20 MIN: CPT | Performed by: PHYSICIAN ASSISTANT

## 2020-01-20 NOTE — PROGRESS NOTES
Subjective     Jessica Jay is a 59 year old female who presents to clinic today for the following health issues:    HPI   Rash      Duration: x1 wk    Description  Location: sore on upper mouth  Itching: no    Intensity:  moderate    Accompanying signs and symptoms: hurts when eating, bleed once    History (similar episodes/previous evaluation): None    Precipitating or alleviating factors:  New exposures:  None  Recent travel: no      Therapies tried and outcome: none    Had noted a lesion on her hard palate for 1 week. Not actively bleeding.   No obvious trauma.  No weight changes. No fevers.   Patient Active Problem List   Diagnosis     Depression, major     GERD (gastroesophageal reflux disease)     Migraines     CARDIOVASCULAR SCREENING; LDL GOAL LESS THAN 160     Disc herniation     DDD (degenerative disc disease), lumbar     DDD (degenerative disc disease), cervical     Neck pain     Headache     Dermoid cyst of brain (H)     Chronic daily headache     Dizziness - light-headed     Tobacco abuse     ELYSSA (obstructive sleep apnea)     Spondylolisthesis, grade 1     Chronic low back pain     Brain lipoma     Vulvar dermatitis     History of colonic polyps     Gastroesophageal reflux disease without esophagitis     overweight with BMI >40     Hypertension goal BP (blood pressure) < 140/90     Traylor's esophagus determined by endoscopy     Past Surgical History:   Procedure Laterality Date     ARTHROSCOPY SHOULDER  1990    Right/spurs     C FOOT/TOES SURGERY PROC UNLISTED  1975    Toe fracture, left     CARPAL TUNNEL RELEASE RT/LT  1990    Left     COLONOSCOPY  2016     CRANIOTOMY, EXCISE TUMOR COMPLEX, COMBINED  5/9/2011    Procedure:COMBINED CRANIOTOMY, EXCISE TUMOR COMPLEX; 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; Surgeon:LEE ANN PAULA; Location:[facility]     ROTATOR CUFF REPAIR RT/LT  2009     Left       Social History     Tobacco Use     Smoking status: Former Smoker      Packs/day: 0.80     Years: 30.00     Pack years: 24.00     Types: Cigarettes     Last attempt to quit: 2017     Years since quittin.5     Smokeless tobacco: Never Used   Substance Use Topics     Alcohol use: Yes     Alcohol/week: 0.0 standard drinks     Comment: 6 drinks a year     Family History   Problem Relation Age of Onset     Hypertension Mother      C.A.D. Father      Cancer Maternal Grandmother      Cerebrovascular Disease Paternal Grandmother      Breast Cancer Paternal Grandmother      Neurologic Disorder Sister         migraine     Cancer Brother      Cancer - colorectal Brother      Colon Cancer Brother          Current Outpatient Medications   Medication Sig Dispense Refill     amLODIPine (NORVASC) 5 MG tablet Take 1 tablet (5 mg) by mouth daily 90 tablet 3     DULoxetine (CYMBALTA) 60 MG capsule TAKE ONE CAPSULE BY MOUTH DAILY--OVERDUE for appt, please schedule prior to refills. Thanks 30 capsule 0     metoprolol succinate ER (TOPROL-XL) 25 MG 24 hr tablet Take 0.5 tablets (12.5 mg) by mouth daily 90 tablet 1     mirtazapine (REMERON) 15 MG tablet TAKE 1 TABLET BY MOUTH EVERY NIGHT AT BEDTIME--OVERDUE for appt, please schedule prior to refills. Thanks 30 tablet 0     omeprazole (PRILOSEC) 20 MG DR capsule TAKE 1 CAPSULE(20 MG) BY MOUTH DAILY 90 capsule 1     cyclobenzaprine (FLEXERIL) 10 MG tablet Take 0.5-1 tablets (5-10 mg) by mouth 3 times daily as needed for muscle spasms (Patient not taking: Reported on 2019) 45 tablet 0     diclofenac (VOLTAREN) 1 % GEL topical gel Apply 4 grams to knees  four times daily using enclosed dosing card. 100 g 1     diclofenac (VOLTAREN) 75 MG EC tablet Take 1 tablet (75 mg) by mouth 2 times daily (Take food) 30 tablet 0     order for DME Equipment being ordered: [employer] foot plate, 23.5 Right 1 Device 0     Allergies   Allergen Reactions     Buspar [Buspirone Hcl] Rash     Recent Labs   Lab Test 18  1357 07/10/18  1006 17  4899  05/06/16  0856 09/04/12  1307   *  --  125* 131*  --    HDL 53  --  50 55  --    TRIG 123  --  121 115  --    ALT  --   --   --   --  39   CR  --  0.75  --   --  0.77   GFRESTIMATED  --  80  --   --  79   GFRESTBLACK  --  >90  --   --  >90   POTASSIUM  --  Unsatisfactory specimen - hemolyzed  --   --  4.6   TSH  --   --  3.22  --   --       BP Readings from Last 3 Encounters:   01/20/20 135/84   08/13/19 138/80   02/26/19 136/87    Wt Readings from Last 3 Encounters:   01/20/20 129.7 kg (286 lb)   08/13/19 121.7 kg (268 lb 3.2 oz)   02/26/19 112.6 kg (248 lb 3.2 oz)                      Reviewed and updated as needed this visit by Provider         Review of Systems   ROS COMP: Constitutional, HEENT, cardiovascular, pulmonary, GI, , musculoskeletal, neuro, skin, endocrine and psych systems are negative, except as otherwise noted.      Objective    /84   Pulse 82   Temp 98.5  F (36.9  C) (Tympanic)   Resp 18   Wt 129.7 kg (286 lb)   SpO2 98%   BMI 54.04 kg/m    Body mass index is 54.04 kg/m .  Physical Exam     CHEST:chest clear to IPPA, no tachypnea, retractions or cyanosis and S1, S2 normal, no murmur, no gallop, rate regular.  1 cm annular lesion on her hard palat . Two small tender papules just posterior on the soft palate  No lymphadenopathy.    Jessica was seen today for derm problem.    Diagnoses and all orders for this visit:    Lesion of hard palate  -     OTOLARYNGOLOGY REFERRAL      Advised supportive and symptomatic treatment.  Follow up with Provider - if condition persists or worsens.   work on lifestyle modification

## 2020-01-21 ENCOUNTER — TELEPHONE (OUTPATIENT)
Dept: FAMILY MEDICINE | Facility: CLINIC | Age: 60
End: 2020-01-21

## 2020-01-21 NOTE — TELEPHONE ENCOUNTER
Spoke to patient, she is going to check with her insurance as to where she can go. [employer] phone number given for contact to fax orders.

## 2020-01-21 NOTE — TELEPHONE ENCOUNTER
Patient states she cannot get in to see the [facility] until February and wonders if you can get her in someone else sooner.  Please call.    Thank you.

## 2020-01-23 DIAGNOSIS — F41.1 GENERALIZED ANXIETY DISORDER: ICD-10-CM

## 2020-01-23 DIAGNOSIS — F33.1 MODERATE EPISODE OF RECURRENT MAJOR DEPRESSIVE DISORDER (H): ICD-10-CM

## 2020-01-24 NOTE — TELEPHONE ENCOUNTER
Patient seen by provider for derm problem on 1/20/20.    Please advise on refill.   Ginna given x1.     Pat Waldron, RN, BSN, PHN

## 2020-01-24 NOTE — TELEPHONE ENCOUNTER
"Requested Prescriptions   Pending Prescriptions Disp Refills     mirtazapine (REMERON) 15 MG tablet [Pharmacy Med Name: MIRTAZAPINE 15MG TABLETS] 30 tablet 0     Sig: TAKE 1 TABLET BY MOUTH EVERY NIGHT AT BEDTIME  Last Written Prescription Date: 1/23/20  Last Fill Quantity: 30,  # refills: 0   Last office visit: 1/20/2020 with prescribing provider:  LILI Lopez  Future Office Visit:         Atypical Antidepressants Protocol Passed - 1/23/2020  5:11 PM        Passed - Patient has PHQ-9 score less than 5 in past 6 months.     Please review last PHQ-9 score.           Passed - Medication active on med list        Passed - Patient is age 18 or older        Passed - No active pregnancy on record        Passed - No positive pregnancy test in past 12 mos        Passed - Recent (6 mo) or future (30 days) visit within the authorizing provider's specialty     Patient had office visit in the last 6 months or has a visit in the next 30 days with authorizing provider or within the authorizing provider's specialty.  See \"Patient Info\" tab in inbasket, or \"Choose Columns\" in Meds & Orders section of the refill encounter.          //30  "

## 2020-01-27 RX ORDER — MIRTAZAPINE 15 MG/1
TABLET, FILM COATED ORAL
Qty: 30 TABLET | Refills: 0 | Status: SHIPPED | OUTPATIENT
Start: 2020-01-27 | End: 2020-03-04

## 2020-01-28 ENCOUNTER — OFFICE VISIT (OUTPATIENT)
Dept: OTOLARYNGOLOGY | Facility: CLINIC | Age: 60
End: 2020-01-28
Payer: COMMERCIAL

## 2020-01-28 VITALS
OXYGEN SATURATION: 100 % | HEART RATE: 113 BPM | DIASTOLIC BLOOD PRESSURE: 108 MMHG | WEIGHT: 286 LBS | SYSTOLIC BLOOD PRESSURE: 169 MMHG | BODY MASS INDEX: 54 KG/M2 | HEIGHT: 61 IN

## 2020-01-28 DIAGNOSIS — K13.79 LESION OF HARD PALATE: Primary | ICD-10-CM

## 2020-01-28 PROCEDURE — 42100 BIOPSY ROOF OF MOUTH: CPT | Performed by: OTOLARYNGOLOGY

## 2020-01-28 PROCEDURE — 88305 TISSUE EXAM BY PATHOLOGIST: CPT | Performed by: OTOLARYNGOLOGY

## 2020-01-28 PROCEDURE — 99243 OFF/OP CNSLTJ NEW/EST LOW 30: CPT | Mod: 25 | Performed by: OTOLARYNGOLOGY

## 2020-01-28 ASSESSMENT — MIFFLIN-ST. JEOR: SCORE: 1809.67

## 2020-01-28 NOTE — PROGRESS NOTES
I am seeing this patient in consultation for lesion of the hard palate at the request of the provider Wolf Lopez.    Chief Complaint - oral lesion    History of Present Illness - Jessica Jay is a 59 year old female presents with a lesion on the hard palate. The patient has noticed for approximately 2.5 weeks. It has been changing in size, growing. It is painful. One episode of bleeding. It is difficult to eat. former smoker, quit 1 year ago. No lumps or swollen glands in the neck. Hasn't tried anything.     Past Medical History -   Patient Active Problem List   Diagnosis     Depression, major     GERD (gastroesophageal reflux disease)     Migraines     CARDIOVASCULAR SCREENING; LDL GOAL LESS THAN 160     Disc herniation     DDD (degenerative disc disease), lumbar     DDD (degenerative disc disease), cervical     Neck pain     Headache     Dermoid cyst of brain (H)     Chronic daily headache     Dizziness - light-headed     Tobacco abuse     ELYSSA (obstructive sleep apnea)     Spondylolisthesis, grade 1     Chronic low back pain     Brain lipoma     Vulvar dermatitis     History of colonic polyps     Gastroesophageal reflux disease without esophagitis     overweight with BMI >40     Hypertension goal BP (blood pressure) < 140/90     Traylor's esophagus determined by endoscopy       Current Medications -   Current Outpatient Medications:      amLODIPine (NORVASC) 5 MG tablet, Take 1 tablet (5 mg) by mouth daily, Disp: 90 tablet, Rfl: 3     cyclobenzaprine (FLEXERIL) 10 MG tablet, Take 0.5-1 tablets (5-10 mg) by mouth 3 times daily as needed for muscle spasms (Patient not taking: Reported on 8/13/2019), Disp: 45 tablet, Rfl: 0     diclofenac (VOLTAREN) 1 % GEL topical gel, Apply 4 grams to knees  four times daily using enclosed dosing card., Disp: 100 g, Rfl: 1     diclofenac (VOLTAREN) 75 MG EC tablet, Take 1 tablet (75 mg) by mouth 2 times daily (Take food), Disp: 30 tablet, Rfl: 0     DULoxetine (CYMBALTA) 60 MG  capsule, TAKE ONE CAPSULE BY MOUTH DAILY--OVERDUE for appt, please schedule prior to refills. Thanks, Disp: 30 capsule, Rfl: 0     metoprolol succinate ER (TOPROL-XL) 25 MG 24 hr tablet, Take 0.5 tablets (12.5 mg) by mouth daily, Disp: 90 tablet, Rfl: 1     mirtazapine (REMERON) 15 MG tablet, TAKE 1 TABLET BY MOUTH EVERY NIGHT AT BEDTIME, Disp: 30 tablet, Rfl: 0     omeprazole (PRILOSEC) 20 MG DR capsule, TAKE 1 CAPSULE(20 MG) BY MOUTH DAILY, Disp: 90 capsule, Rfl: 1     order for DME, Equipment being ordered: [employer] foot plate, 23.5 Right, Disp: 1 Device, Rfl: 0    Allergies -   Allergies   Allergen Reactions     Buspar [Buspirone Hcl] Rash       Social History -   Social History     Socioeconomic History     Marital status: Single     Spouse name: Not on file     Number of children: 0     Years of education: 18     Highest education level: Not on file   Occupational History     Occupation:      Employer: [employer]   Social Needs     Financial resource strain: Not on file     Food insecurity:     Worry: Not on file     Inability: Not on file     Transportation needs:     Medical: Not on file     Non-medical: Not on file   Tobacco Use     Smoking status: Former Smoker     Packs/day: 0.80     Years: 30.00     Pack years: 24.00     Types: Cigarettes     Last attempt to quit: 2017     Years since quittin.5     Smokeless tobacco: Never Used   Substance and Sexual Activity     Alcohol use: Yes     Alcohol/week: 0.0 standard drinks     Comment: 6 drinks a year     Drug use: No     Sexual activity: Not Currently     Birth control/protection: None   Lifestyle     Physical activity:     Days per week: Not on file     Minutes per session: Not on file     Stress: Not on file   Relationships     Social connections:     Talks on phone: Not on file     Gets together: Not on file     Attends Zoroastrianism service: Not on file     Active member of club or organization: Not on file     Attends meetings  "of clubs or organizations: Not on file     Relationship status: Not on file     Intimate partner violence:     Fear of current or ex partner: Not on file     Emotionally abused: Not on file     Physically abused: Not on file     Forced sexual activity: Not on file   Other Topics Concern      Service No     Blood Transfusions No     Caffeine Concern Yes     Comment: Coffee     Occupational Exposure No     Hobby Hazards No     Sleep Concern Yes     Comment: feels she sleeps to much     Stress Concern No     Weight Concern Yes     Comment: cannot lose     Special Diet No     Back Care No     Exercise Yes     Comment: Walk on treadmill, swim     Bike Helmet Not Asked     Seat Belt Yes     Self-Exams No     Parent/sibling w/ CABG, MI or angioplasty before 65F 55M? No   Social History Narrative    Process death claims       Family History -   Family History   Problem Relation Age of Onset     Hypertension Mother      C.A.D. Father      Cancer Maternal Grandmother      Cerebrovascular Disease Paternal Grandmother      Breast Cancer Paternal Grandmother      Neurologic Disorder Sister         migraine     Cancer Brother      Cancer - colorectal Brother      Colon Cancer Brother        Review of Systems - As per HPI and PMHx, otherwise 7 system review of the head and neck negative.    Physical Exam  BP (!) 169/108   Pulse 113   Ht 1.549 m (5' 1\")   Wt 129.7 kg (286 lb)   SpO2 100%   BMI 54.04 kg/m    General - The patient is in no distress. Alert and oriented x3, answers questions and cooperates with examination appropriately.   Voice and Breathing - The patient was breathing comfortably without the use of accessory muscles. There was no wheezing, stridor, or stertor.  The patients voice was clear and strong.  Eyes - Extraocular movements intact. Sclera were not icteric or injected, conjunctiva were pink and moist.  Neurologic - Cranial nerves II-XII are grossly intact. Specifically, the facial nerve is intact, " House-Brackmann grade 1 of 6.   Mouth - Examination of the oral cavity showed a pedunculated hard palate lesion measuring about1.5-2 cm in the midline. It bleeds somewhat easily. No other lesions noted. The tongue was mobile and protrudes midline.   Oropharynx - The walls of the oropharynx were smooth, symmetric, and had no lesions or ulcerations. The uvula was midline and the palate raised symmetrically.   Neck -  Palpation of the occipital, submental, submandibular, internal jugular chain, and supraclavicular nodes did not demonstrate any abnormal lymph nodes or masses. The parotid glands were without masses. Palpation of the thyroid was soft and smooth, with no nodules or goiter appreciated.  The trachea was midline.    Procedure:    I explained the risk, benefits, and alteratives to oral excisional biopsy. The patient agreed and wished to proceed. I injected the lesion with 2% lidocaine, 1:100,000 epinephrine. I used a scissors to excise the lesion and transected it at its attachment to the hard palate. I placed the specimen in formalin. Hemostasis was achieved with a small amount of silver nitrate cautery.     A/P - Jessica Jay is a 59 year old female with a lesion on the hard palate. It is likely a granuloma or epilus. It seems benign. This was removed in clinic. I'll call her with results.       Sarthak Gross MD  Otolaryngology  [facility]

## 2020-01-28 NOTE — PATIENT INSTRUCTIONS
General Scheduling Information  To schedule your CT/MRI scan, please contact [place] [facility] at [phone] [address] [place] [address]    To schedule your Surgery, please contact our Specialty Schedulers at [phone]    ENT Clinic Locations Clinic Hours Telephone Number     [facility]  [address] [place] [address]   Tuesday:       8:00am -- 4:00pm    Wednesday:  8:00am - 4:00pm   To schedule an appointment with   Dr. Gross,   please contact our   Specialty Scheduling Department at:     [phone]       [facility]  [address] [place] [address]   Friday:          8:00am - 4:00pm         Urgent Care Locations Clinic Hours Telephone Numbers     [facility]  [address] [place] [address]     Monday-Friday:     11:00pm - 9:00pm    Saturday-Sunday:  9:00am - 5:00pm   [phone]     [facility]  [address] [place] [address]     Monday-Friday:      5:00pm - 9:00pm     Saturday-Sunday:  9:00am - 5:00pm   [phone]

## 2020-01-28 NOTE — LETTER
1/28/2020         RE: Jessica Jay  [address] [facility] [address]        Dear Colleague,    Thank you for referring your patient, Jessica Jay, to the [facility]. Please see a copy of my visit note below.    I am seeing this patient in consultation for lesion of the hard palate at the request of the provider Wolf Lopez.    Chief Complaint - oral lesion    History of Present Illness - Jessica Jay is a 59 year old female presents with a lesion on the hard palate. The patient has noticed for approximately 2.5 weeks. It has been changing in size, growing. It is painful. One episode of bleeding. It is difficult to eat. former smoker, quit 1 year ago. No lumps or swollen glands in the neck. Hasn't tried anything.     Past Medical History -   Patient Active Problem List   Diagnosis     Depression, major     GERD (gastroesophageal reflux disease)     Migraines     CARDIOVASCULAR SCREENING; LDL GOAL LESS THAN 160     Disc herniation     DDD (degenerative disc disease), lumbar     DDD (degenerative disc disease), cervical     Neck pain     Headache     Dermoid cyst of brain (H)     Chronic daily headache     Dizziness - light-headed     Tobacco abuse     ELYSSA (obstructive sleep apnea)     Spondylolisthesis, grade 1     Chronic low back pain     Brain lipoma     Vulvar dermatitis     History of colonic polyps     Gastroesophageal reflux disease without esophagitis     overweight with BMI >40     Hypertension goal BP (blood pressure) < 140/90     Traylor's esophagus determined by endoscopy       Current Medications -   Current Outpatient Medications:      amLODIPine (NORVASC) 5 MG tablet, Take 1 tablet (5 mg) by mouth daily, Disp: 90 tablet, Rfl: 3     cyclobenzaprine (FLEXERIL) 10 MG tablet, Take 0.5-1 tablets (5-10 mg) by mouth 3 times daily as needed for muscle spasms (Patient not taking: Reported on 8/13/2019), Disp: 45 tablet, Rfl: 0     diclofenac (VOLTAREN) 1 % GEL topical  gel, Apply 4 grams to knees  four times daily using enclosed dosing card., Disp: 100 g, Rfl: 1     diclofenac (VOLTAREN) 75 MG EC tablet, Take 1 tablet (75 mg) by mouth 2 times daily (Take food), Disp: 30 tablet, Rfl: 0     DULoxetine (CYMBALTA) 60 MG capsule, TAKE ONE CAPSULE BY MOUTH DAILY--OVERDUE for appt, please schedule prior to refills. Thanks, Disp: 30 capsule, Rfl: 0     metoprolol succinate ER (TOPROL-XL) 25 MG 24 hr tablet, Take 0.5 tablets (12.5 mg) by mouth daily, Disp: 90 tablet, Rfl: 1     mirtazapine (REMERON) 15 MG tablet, TAKE 1 TABLET BY MOUTH EVERY NIGHT AT BEDTIME, Disp: 30 tablet, Rfl: 0     omeprazole (PRILOSEC) 20 MG DR capsule, TAKE 1 CAPSULE(20 MG) BY MOUTH DAILY, Disp: 90 capsule, Rfl: 1     order for DME, Equipment being ordered: [employer] foot plate, 23.5 Right, Disp: 1 Device, Rfl: 0    Allergies -   Allergies   Allergen Reactions     Buspar [Buspirone Hcl] Rash       Social History -   Social History     Socioeconomic History     Marital status: Single     Spouse name: Not on file     Number of children: 0     Years of education: 18     Highest education level: Not on file   Occupational History     Occupation:      Employer: [employer]   Social Needs     Financial resource strain: Not on file     Food insecurity:     Worry: Not on file     Inability: Not on file     Transportation needs:     Medical: Not on file     Non-medical: Not on file   Tobacco Use     Smoking status: Former Smoker     Packs/day: 0.80     Years: 30.00     Pack years: 24.00     Types: Cigarettes     Last attempt to quit: 2017     Years since quittin.5     Smokeless tobacco: Never Used   Substance and Sexual Activity     Alcohol use: Yes     Alcohol/week: 0.0 standard drinks     Comment: 6 drinks a year     Drug use: No     Sexual activity: Not Currently     Birth control/protection: None   Lifestyle     Physical activity:     Days per week: Not on file     Minutes per session: Not on  "file     Stress: Not on file   Relationships     Social connections:     Talks on phone: Not on file     Gets together: Not on file     Attends Episcopal service: Not on file     Active member of club or organization: Not on file     Attends meetings of clubs or organizations: Not on file     Relationship status: Not on file     Intimate partner violence:     Fear of current or ex partner: Not on file     Emotionally abused: Not on file     Physically abused: Not on file     Forced sexual activity: Not on file   Other Topics Concern      Service No     Blood Transfusions No     Caffeine Concern Yes     Comment: Coffee     Occupational Exposure No     Hobby Hazards No     Sleep Concern Yes     Comment: feels she sleeps to much     Stress Concern No     Weight Concern Yes     Comment: cannot lose     Special Diet No     Back Care No     Exercise Yes     Comment: Walk on treadmill, swim     Bike Helmet Not Asked     Seat Belt Yes     Self-Exams No     Parent/sibling w/ CABG, MI or angioplasty before 65F 55M? No   Social History Narrative    Process death claims       Family History -   Family History   Problem Relation Age of Onset     Hypertension Mother      C.A.D. Father      Cancer Maternal Grandmother      Cerebrovascular Disease Paternal Grandmother      Breast Cancer Paternal Grandmother      Neurologic Disorder Sister         migraine     Cancer Brother      Cancer - colorectal Brother      Colon Cancer Brother        Review of Systems - As per HPI and PMHx, otherwise 7 system review of the head and neck negative.    Physical Exam  BP (!) 169/108   Pulse 113   Ht 1.549 m (5' 1\")   Wt 129.7 kg (286 lb)   SpO2 100%   BMI 54.04 kg/m     General - The patient is in no distress. Alert and oriented x3, answers questions and cooperates with examination appropriately.   Voice and Breathing - The patient was breathing comfortably without the use of accessory muscles. There was no wheezing, stridor, or " stertor.  The patients voice was clear and strong.  Eyes - Extraocular movements intact. Sclera were not icteric or injected, conjunctiva were pink and moist.  Neurologic - Cranial nerves II-XII are grossly intact. Specifically, the facial nerve is intact, House-Brackmann grade 1 of 6.   Mouth - Examination of the oral cavity showed a pedunculated hard palate lesion measuring about1.5-2 cm in the midline. It bleeds somewhat easily. No other lesions noted. The tongue was mobile and protrudes midline.   Oropharynx - The walls of the oropharynx were smooth, symmetric, and had no lesions or ulcerations. The uvula was midline and the palate raised symmetrically.   Neck -  Palpation of the occipital, submental, submandibular, internal jugular chain, and supraclavicular nodes did not demonstrate any abnormal lymph nodes or masses. The parotid glands were without masses. Palpation of the thyroid was soft and smooth, with no nodules or goiter appreciated.  The trachea was midline.    Procedure:    I explained the risk, benefits, and alteratives to oral excisional biopsy. The patient agreed and wished to proceed. I injected the lesion with 2% lidocaine, 1:100,000 epinephrine. I used a scissors to excise the lesion and transected it at its attachment to the hard palate. I placed the specimen in formalin. Hemostasis was achieved with a small amount of silver nitrate cautery.     A/P - Jessica Jay is a 59 year old female with a lesion on the hard palate. It is likely a granuloma or epilus. It seems benign. This was removed in clinic. I'll call her with results.       Sarthak Gross MD  Otolaryngology  [facility]      Again, thank you for allowing me to participate in the care of your patient.        Sincerely,        Sarthak Gross MD

## 2020-01-29 ENCOUNTER — PATIENT OUTREACH (OUTPATIENT)
Dept: CARE COORDINATION | Facility: CLINIC | Age: 60
End: 2020-01-29

## 2020-01-29 ASSESSMENT — ACTIVITIES OF DAILY LIVING (ADL): DEPENDENT_IADLS:: INDEPENDENT

## 2020-01-29 NOTE — PROGRESS NOTES
Clinic Care Coordination Contact    Clinic Care Coordination Contact  OUTREACH    Referral Information:  Referral Source: Specialist    Primary Diagnosis: Psychosocial    Chief Complaint   Patient presents with     Clinic Care Coordination - Initial     sw        Universal Utilization: No concerns yet will have concerns if she loses her insurance and does not find additional insurance  Clinic Utilization  Difficulty keeping appointments:: No  Compliance Concerns: No  No-Show Concerns: No  No PCP office visit in Past Year: No  Utilization    Last refreshed: 1/29/2020  9:11 AM:  Hospital Admissions 0           Last refreshed: 1/29/2020  9:11 AM:  ED Visits 0           Last refreshed: 1/29/2020  9:11 AM:  No Show Count (past year) 0              Current as of: 1/29/2020  9:11 AM              Clinical Concerns:  Current Medical Concerns: Not discussed as focus was on trying to get insurance and prescription coverage  Current Behavioral Concerns: Not discussed as focus was on trying to get insurance and prescription coverage  Education Provided to patient: NA     Health Maintenance Reviewed: Due/Overdue   Health Maintenance Due   Topic Date Due     URINE DRUG SCREEN  1960     ADVANCE CARE PLANNING  1960     ZOSTER IMMUNIZATION (1 of 2) 08/12/2010     PREVENTIVE CARE VISIT  08/13/2019     LIPID  08/13/2019     INFLUENZA VACCINE (1) 09/01/2019       Clinical Pathway: None    Medication Management:  Patient's only question was trying to afford medications and she does have notations in my chart asking providers if she cannot afford her medications which are the better ones did not take.    Functional Status:  Dependent ADLs:: Independent  Dependent IADLs:: Independent  Bed or wheelchair confined:: No  Mobility Status: Independent    Living Situation:  Current living arrangement:: I live alone  Type of residence:: Private home - no stairs    Lifestyle & Psychosocial Needs:                 Denominational or spiritual  beliefs that impact treatment:: No  Mental health DX:: Yes  Mental health DX how managed:: Medication  Mental health management concern (GOAL):: No   Socioeconomic History     Marital status: Single     Spouse name: Not on file     Number of children: 0     Years of education: 18     Highest education level: Not on file   Occupational History     Occupation:      Employer: [employer]     Tobacco Use     Smoking status: Former Smoker     Packs/day: 0.80     Years: 30.00     Pack years: 24.00     Types: Cigarettes     Last attempt to quit: 2017     Years since quittin.5     Smokeless tobacco: Never Used   Substance and Sexual Activity     Alcohol use: Yes     Alcohol/week: 0.0 standard drinks     Comment: 6 drinks a year     Drug use: No     Sexual activity: Not Currently     Birth control/protection: None          Patient is not currently working and she will lose insurance as of .  If she gets unemployment she will be able to afford her prescriptions if they are not too expensive.  She had [facility] to [facility] to check on insurance and they told her she did not qualify.  Patient is going to check through to see if she qualifies for insurance.[employer].  Her hope is that she will get a job soon and that her unemployment will help her out yet she has not heard back from an appointment yet.     Resources and Interventions:  Current Resources:      Community Resources: None  Supplies used at home:: None  Equipment Currently Used at Home: none    Advance Care Plan/Directive  Advanced Care Plans/Directives on file:: Yes  Type Advanced Care Plans/Directives: Advanced Directive - On File    Referrals Placed: Other ([employer])     Goals:   Goals        General    Financial Wellbeing (pt-stated)     Notes - Note created  2020  4:03 PM by Bell Brito LSW    Goal Statement: I need to find insurance or support for my medical prescriptions.  Date Goal set: 2020  Date to Achieve By:  2/28/20  Patient expressed understanding of goal: yes  Action steps to achieve this goal:  1. I will check with [employer] for insurance coverage.                   Barriers: Short timeframe of needing assistance and limited to no income.  Strengths: Willingness to seek out resources  Patient/Caregiver understanding: Yes    Outreach Frequency: weekly      Plan: Patient to check on insurance options through [employer].   to send complex care plan and introduction letter via my chart and call next week.  On next phone call will attempt to complete assessment and additional resources such as prescription assistance program.    JAQUELIN Mensah, [facility] - Care Coordinator   [facility]  1/29/2020   4:09 PM  [phone]

## 2020-01-29 NOTE — LETTER
[facility] CARE COORDINATION - [facility]  [address] [place] [address]  [phone]    January 29, 2020    Jessica Jay  [address] [address]      Dear Jessica,    I am a clinic care coordinator who works with Marybeth Silver MD at [facility]. I wanted to thank you for spending the time to talk with me.  I wanted to introduce myself and provide you with my contact information so that you can call me with questions or concerns about your health care. Below is a description of clinic care coordination and how I can further assist you.     The clinic care coordinator is a registered nurse and/or  who understand the health care system. The goal of clinic care coordination is to help you manage your health and improve access to the [facility] system in the most efficient manner. The registered nurse can assist you in meeting your health care goals by providing education, coordinating services, and strengthening the communication among your providers. The  can assist you with financial, behavioral, psychosocial, chemical dependency, counseling, and/or psychiatric resources.    Please feel free to contact me at [phone], with any questions or concerns. We at [facility] are focused on providing you with the highest-quality healthcare experience possible and that all starts with you.     Sincerely,     JAQUELIN Mensah

## 2020-01-29 NOTE — LETTER
[facility]  Complex Care Plan  About Me:    Patient Name:  Jessica Jay    YOB: 1960  Age:         59 year old   [facility] MRN:    [id number] Telephone Information:  Home Phone [phone]   Mobile [phone]       Address:  [address] [facility] [address] Email address:  [email or web address]      Emergency Contact(s)    Name Relationship Lgl Grd Work Phone Home Phone Mobile Phone   1. ILIANA JAY Mother   [phone]   2. NOAH TOBIAS Sister  [phone]            Primary language:  English     needed? No   [place] [facility]:  [phone] op. 1  Other communication barriers:    Preferred Method of Communication:  Portia  Current living arrangement: I live alone  Mobility Status/ Medical Equipment: Independent    Health Maintenance  Health Maintenance Reviewed: Due/Overdue   Health Maintenance Due   Topic Date Due     URINE DRUG SCREEN  1960     ADVANCE CARE PLANNING  1960     ZOSTER IMMUNIZATION (1 of 2) 08/12/2010     PREVENTIVE CARE VISIT  08/13/2019     LIPID  08/13/2019     INFLUENZA VACCINE (1) 09/01/2019         My Access Plan  Medical Emergency 911   Primary Clinic Line [facility] [phone]   24 Hour Appointment Line [phone] or  [phone]) (toll-free)   24 Hour Nurse Line [phone] (toll-free)   Preferred Urgent Care Other   Preferred Hospital [facility]  [phone]   Preferred Pharmacy [id number] [address] AT [facility]     Behavioral Health Crisis Line The National Suicide Prevention Lifeline at 1-202.961.5402 or 911             My Care Team Members  Patient Care Team       Relationship Specialty Notifications Start End    Marybeth Silver MD PCP - General Family Practice  5/16/18     Phone: [phone] Fax: [phone]         [address] [facility] [address]     Marybeth Silver MD Assigned PCP   4/15/18     Phone: [phone] Fax: [phone]         [address] [place] [address]    Bell Brito LSW Lead Care Coordinator Primary Care - [facility] Admissions 1/29/20     Phone: [phone] Fax: [phone]                My Care Plans  Self Management and Treatment Plan  Goals and (Comments)  Goals        General    Financial Wellbeing (pt-stated)     Notes - Note created  1/29/2020  4:03 PM by Bell Brito LSW    Goal Statement: I need to find insurance or support for my medical prescriptions.  Date Goal set: 1/29/2020  Date to Achieve By: 2/28/20  Patient expressed understanding of goal: yes  Action steps to achieve this goal:  1. I will check with [employer] for insurance coverage.                    Action Plans on File:            Depression          Advance Care Plans/Directives Type:   Type Advanced Care Plans/Directives: Advanced Directive - On File    My Medical and Care Information  Problem List   Patient Active Problem List   Diagnosis     Depression, major     GERD (gastroesophageal reflux disease)     Migraines     CARDIOVASCULAR SCREENING; LDL GOAL LESS THAN 160     Disc herniation     DDD (degenerative disc disease), lumbar     DDD (degenerative disc disease), cervical     Neck pain     Headache     Dermoid cyst of brain (H)     Chronic daily headache     Dizziness - light-headed     Tobacco abuse     ELYSSA (obstructive sleep apnea)     Spondylolisthesis, grade 1     Chronic low back pain     Brain lipoma     Vulvar dermatitis     History of colonic polyps     Gastroesophageal reflux disease without esophagitis     overweight with BMI >40     Hypertension goal BP (blood pressure) < 140/90     Traylor's esophagus determined by endoscopy      Current Medications and Allergies:  Allergies as of 1/29/2020   Reviewed by Brenna Guerra CMA on 1/20/2020    Severity Noted Reaction Type Reactions   Buspar [buspirone Hcl] Not Specified 11/18/2009    Rash    Medications - This is your record. Keep this with you and show to your community pharmacist(s) and physician(s) at each visit.      Instructions for use   amLODIPine (NORVASC) 5 MG tablet Take 1 tablet (5 mg) by mouth daily   cyclobenzaprine (FLEXERIL) 10 MG tablet Take 0.5-1 tablets (5-10 mg) by mouth 3 times daily as needed for muscle spasms    Patient not taking: Reported on 8/13/2019   diclofenac (VOLTAREN) 1 % GEL topical gel Apply 4 grams to knees  four times daily using enclosed dosing card.   diclofenac (VOLTAREN) 75 MG EC tablet Take 1 tablet (75 mg) by mouth 2 times daily (Take food)   DULoxetine (CYMBALTA) 60 MG capsule TAKE ONE CAPSULE BY MOUTH DAILY--OVERDUE for appt, please schedule prior to refills. Thanks   metoprolol succinate ER (TOPROL-XL) 25 MG 24 hr tablet Take 0.5 tablets (12.5 mg) by mouth daily   mirtazapine (REMERON) 15 MG tablet TAKE 1 TABLET BY MOUTH EVERY NIGHT AT BEDTIME   omeprazole (PRILOSEC) 20 MG DR capsule TAKE 1 CAPSULE(20 MG) BY MOUTH DAILY   order for DME Equipment being ordered: Dynaflex foot plate, 23.5 Right       Care Coordination Start Date: 1/29/2020   Frequency of Care Coordination: weekly   Form Last Updated: 01/29/2020

## 2020-02-02 LAB — COPATH REPORT: NORMAL

## 2020-02-07 ENCOUNTER — PATIENT OUTREACH (OUTPATIENT)
Dept: CARE COORDINATION | Facility: CLINIC | Age: 60
End: 2020-02-07

## 2020-02-07 NOTE — PROGRESS NOTES
Clinic Care Coordination Contact  [facility]/Voicemail       Clinical Data: Care Coordinator Outreach  Outreach attempted x 1.  Left message on patient's voicemail with call back information and requested return call.  Plan: Care Coordinator will try to reach patient again in 3-5 business days.    JAQUELIN Mensah, [facility] - Care Coordinator   [facility]  2/7/2020   9:21 AM  [phone]

## 2020-02-12 NOTE — PROGRESS NOTES
Clinic Care Coordination Contact    Follow Up Progress Note      Assessment: Patient reports that she is working with the temp employer regarding getting insurance.    Goals addressed this encounter:   Goals Addressed                 This Visit's Progress      Financial Wellbeing (pt-stated)   50%     Goal Statement: I need to find insurance or support for my medical prescriptions.  Date Goal set: 1/29/2020  Date to Achieve By: 2/28/20  Patient expressed understanding of goal: yes  Action steps to achieve this goal:  1. I will [facility] with [facility] for insurance coverage.                    Intervention/Education provided during outreach: Patient will be going back to work shortly and she needed to check with the temporary employer to see about her insurance coverage.  Her plan is to call them and then to call  back.     Outreach Frequency: weekly    Plan:   Patient to check on insurance coverage and then call  back.  Care Coordinator will follow up in 1 week if patient does not return phone call.    JAQUELIN Mensah, [facility] - Care Coordinator   [facility]  2/12/2020   1:19 PM  [phone]

## 2020-02-19 ENCOUNTER — PATIENT OUTREACH (OUTPATIENT)
Dept: CARE COORDINATION | Facility: CLINIC | Age: 60
End: 2020-02-19

## 2020-02-19 NOTE — PROGRESS NOTES
Clinic Care Coordination Contact  [facility]/Voicemail    Patient had left a message after last out reach to note that she does have benefits through work.     Clinical Data: Care Coordinator Outreach  Outreach attempted x 1.  Left message on patient's voicemail with call back information and requested return call.  Plan:  Care Coordinator will try to reach patient again in 4-6 business days.    JAQUELIN Mensah, [place] [facility] - Care Coordinator   [facility]  2/19/2020   9:53 AM  [phone]

## 2020-02-24 ENCOUNTER — HEALTH MAINTENANCE LETTER (OUTPATIENT)
Age: 60
End: 2020-02-24

## 2020-02-24 NOTE — PROGRESS NOTES
Clinic Care Coordination Contact    Follow Up Progress Note      Assessment: Patient reports that she has insurance in place and has been able to get her prescriptions.  She does want to find out if she qualifies for [facility] as that would be cheaper and better coverage than the work coverage.    Goals addressed this encounter:   Goals Addressed                 This Visit's Progress      Financial Wellbeing (pt-stated)   90%     Goal Statement: I need to find insurance or support for my medical prescriptions.  Date Goal set: 1/29/2020  Date to Achieve By: 4/28/20  Patient expressed understanding of goal: yes  Action steps to achieve this goal:  1. I will check with [facility] for insurance coverage.       2/24/2020 - extend target date.  Has insurance through work and wants to check into [employer]               Intervention/Education provided during outreach: Patient is working on applying for [place] care as this would be better coverage and cheaper coverage then work if she qualifies.  She did lose her aunt recently and this is bringing on some grief.  She would like to work through that grief before continuing on any other areas.  She denied any current needs right now and said she was doing okay.     Outreach Frequency:  monthly    Plan:   Patient to check into [place] care.  Care Coordinator will follow up in 1 month.    JAQUELIN Mensah, [facility] - Care Coordinator   [facility]  2/24/2020   1:31 PM  [phone]

## 2020-02-26 DIAGNOSIS — Z00.6 RESEARCH STUDY PATIENT: ICD-10-CM

## 2020-02-26 DIAGNOSIS — I10 HYPERTENSION GOAL BP (BLOOD PRESSURE) < 140/90: ICD-10-CM

## 2020-02-27 NOTE — TELEPHONE ENCOUNTER
"Requested Prescriptions   Pending Prescriptions Disp Refills     metoprolol succinate ER (TOPROL-XL) 25 MG 24 hr tablet [Pharmacy Med Name: METOPROLOL ER SUCCINATE 25MG TABS] 90 tablet 1     Sig: TAKE 1/2 TABLET BY MOUTH DAILY   Last Written Prescription Date:  02/26/19  Last Fill Quantity: 90,  # refills: 1   Last office visit: 1/20/2020 with prescribing provider:  CLEMENCIA Lopez   Future Office Visit:        Beta-Blockers Protocol Failed - 2/26/2020  8:04 PM        Failed - Blood pressure under 140/90 in past 12 months     BP Readings from Last 3 Encounters:   01/28/20 (!) 169/108   01/20/20 135/84   08/13/19 138/80                 Passed - Patient is age 6 or older        Passed - Recent (12 mo) or future (30 days) visit within the authorizing provider's specialty     Patient has had an office visit with the authorizing provider or a provider within the authorizing providers department within the previous 12 mos or has a future within next 30 days. See \"Patient Info\" tab in inbasket, or \"Choose Columns\" in Meds & Orders section of the refill encounter.              Passed - Medication is active on med list          "

## 2020-02-28 NOTE — TELEPHONE ENCOUNTER
Routing refill request to provider for review/approval because:  Labs out of range:  bp  Pt due for bp recheck 4/20/20    Med pended for 90 day supply with reminder

## 2020-03-02 DIAGNOSIS — K21.9 GASTROESOPHAGEAL REFLUX DISEASE, ESOPHAGITIS PRESENCE NOT SPECIFIED: ICD-10-CM

## 2020-03-02 DIAGNOSIS — F33.1 MODERATE EPISODE OF RECURRENT MAJOR DEPRESSIVE DISORDER (H): ICD-10-CM

## 2020-03-02 DIAGNOSIS — F41.1 GENERALIZED ANXIETY DISORDER: ICD-10-CM

## 2020-03-02 RX ORDER — METOPROLOL SUCCINATE 25 MG/1
TABLET, EXTENDED RELEASE ORAL
Qty: 45 TABLET | Refills: 0 | Status: SHIPPED | OUTPATIENT
Start: 2020-03-02 | End: 2020-03-19

## 2020-03-03 NOTE — TELEPHONE ENCOUNTER
"Requested Prescriptions   Pending Prescriptions Disp Refills     mirtazapine (REMERON) 15 MG tablet [Pharmacy Med Name: MIRTAZAPINE 15MG TABLETS] 30 tablet 0     Sig: TAKE 1 TABLET BY MOUTH EVERY NIGHT AT BEDTIME   Last Written Prescription Date:  01/27/20  Last Fill Quantity: 30,  # refills: 0   Last office visit: 1/20/2020 with prescribing provider:  CLEMENCIA Lopez   Future Office Visit:   Next 5 appointments (look out 90 days)    Mar 17, 2020  6:30 PM CDT  PHYSICAL with Marybeth Silver MD  [place]) [address] [address]  [phone]             Atypical Antidepressants Protocol Passed - 3/2/2020  8:46 PM        Passed - Patient has PHQ-9 score less than 5 in past 6 months.     Please review last PHQ-9 score.           Passed - Medication active on med list        Passed - Patient is age 18 or older        Passed - No active pregnancy on record        Passed - No positive pregnancy test in past 12 mos        Passed - Recent (6 mo) or future (30 days) visit within the authorizing provider's specialty     Patient had office visit in the last 6 months or has a visit in the next 30 days with authorizing provider or within the authorizing provider's specialty.  See \"Patient Info\" tab in inbasket, or \"Choose Columns\" in Meds & Orders section of the refill encounter.            omeprazole (PRILOSEC) 20 MG DR capsule [Pharmacy Med Name: OMEPRAZOLE 20MG CAPSULES] 90 capsule 1     Sig: TAKE 1 CAPSULE(20 MG) BY MOUTH DAILY   Last Written Prescription Date:  09/03/19  Last Fill Quantity: 90,  # refills: 1   Last office visit: 1/20/2020 with prescribing provider:  CLEMENCIA Lopez   Future Office Visit:   Next 5 appointments (look out 90 days)    Mar 17, 2020  6:30 PM CDT  PH[facility] [address] [address]  [phone]             PPI Protocol Passed - 3/2/2020  8:46 PM " "       Passed - Not on Clopidogrel (unless Pantoprazole ordered)        Passed - No diagnosis of osteoporosis on record        Passed - Recent (12 mo) or future (30 days) visit within the authorizing provider's specialty     Patient has had an office visit with the authorizing provider or a provider within the authorizing providers department within the previous 12 mos or has a future within next 30 days. See \"Patient Info\" tab in inbasket, or \"Choose Columns\" in Meds & Orders section of the refill encounter.              Passed - Medication is active on med list        Passed - Patient is age 18 or older        Passed - No active pregnacy on record        Passed - No positive pregnancy test in past 12 months          "

## 2020-03-04 RX ORDER — MIRTAZAPINE 15 MG/1
TABLET, FILM COATED ORAL
Qty: 90 TABLET | Refills: 0 | Status: SHIPPED | OUTPATIENT
Start: 2020-03-04 | End: 2020-06-04

## 2020-03-04 NOTE — TELEPHONE ENCOUNTER
Routing refill request to provider for review/approval because:  Drug interaction warning    Pended for approval.

## 2020-03-17 ENCOUNTER — E-VISIT (OUTPATIENT)
Dept: FAMILY MEDICINE | Facility: CLINIC | Age: 60
End: 2020-03-17

## 2020-03-17 DIAGNOSIS — Z53.9 DIAGNOSIS NOT YET DEFINED: Primary | ICD-10-CM

## 2020-03-19 ENCOUNTER — OFFICE VISIT (OUTPATIENT)
Dept: FAMILY MEDICINE | Facility: CLINIC | Age: 60
End: 2020-03-19
Payer: COMMERCIAL

## 2020-03-19 VITALS
HEART RATE: 100 BPM | HEIGHT: 61 IN | SYSTOLIC BLOOD PRESSURE: 138 MMHG | TEMPERATURE: 98.1 F | RESPIRATION RATE: 22 BRPM | DIASTOLIC BLOOD PRESSURE: 80 MMHG | BODY MASS INDEX: 53.43 KG/M2 | WEIGHT: 283 LBS | OXYGEN SATURATION: 97 %

## 2020-03-19 DIAGNOSIS — Z13.220 LIPID SCREENING: ICD-10-CM

## 2020-03-19 DIAGNOSIS — M65.341 TRIGGER RING FINGER OF RIGHT HAND: ICD-10-CM

## 2020-03-19 DIAGNOSIS — F32.A ANXIETY AND DEPRESSION: ICD-10-CM

## 2020-03-19 DIAGNOSIS — I10 HYPERTENSION GOAL BP (BLOOD PRESSURE) < 140/90: Primary | ICD-10-CM

## 2020-03-19 DIAGNOSIS — R60.0 BILATERAL EDEMA OF LOWER EXTREMITY: ICD-10-CM

## 2020-03-19 DIAGNOSIS — F41.9 ANXIETY AND DEPRESSION: ICD-10-CM

## 2020-03-19 DIAGNOSIS — E66.01 MORBID OBESITY DUE TO EXCESS CALORIES (H): ICD-10-CM

## 2020-03-19 LAB
ALBUMIN SERPL-MCNC: 3.5 G/DL (ref 3.4–5)
ALP SERPL-CCNC: 139 U/L (ref 40–150)
ALT SERPL W P-5'-P-CCNC: 98 U/L (ref 0–50)
ANION GAP SERPL CALCULATED.3IONS-SCNC: 7 MMOL/L (ref 3–14)
AST SERPL W P-5'-P-CCNC: 74 U/L (ref 0–45)
BILIRUB SERPL-MCNC: 0.3 MG/DL (ref 0.2–1.3)
BUN SERPL-MCNC: 14 MG/DL (ref 7–30)
CALCIUM SERPL-MCNC: 9.3 MG/DL (ref 8.5–10.1)
CHLORIDE SERPL-SCNC: 106 MMOL/L (ref 94–109)
CO2 SERPL-SCNC: 24 MMOL/L (ref 20–32)
CREAT SERPL-MCNC: 0.7 MG/DL (ref 0.52–1.04)
ERYTHROCYTE [DISTWIDTH] IN BLOOD BY AUTOMATED COUNT: 14.5 % (ref 10–15)
GFR SERPL CREATININE-BSD FRML MDRD: >90 ML/MIN/{1.73_M2}
GLUCOSE SERPL-MCNC: 147 MG/DL (ref 70–99)
HCT VFR BLD AUTO: 43.1 % (ref 35–47)
HGB BLD-MCNC: 13.7 G/DL (ref 11.7–15.7)
MCH RBC QN AUTO: 25.6 PG (ref 26.5–33)
MCHC RBC AUTO-ENTMCNC: 31.8 G/DL (ref 31.5–36.5)
MCV RBC AUTO: 80 FL (ref 78–100)
NT-PROBNP SERPL-MCNC: 62 PG/ML (ref 0–125)
PLATELET # BLD AUTO: 363 10E9/L (ref 150–450)
POTASSIUM SERPL-SCNC: 4.4 MMOL/L (ref 3.4–5.3)
PROT SERPL-MCNC: 8.2 G/DL (ref 6.8–8.8)
RBC # BLD AUTO: 5.36 10E12/L (ref 3.8–5.2)
SODIUM SERPL-SCNC: 137 MMOL/L (ref 133–144)
T4 FREE SERPL-MCNC: 0.9 NG/DL (ref 0.76–1.46)
TSH SERPL DL<=0.005 MIU/L-ACNC: 6.79 MU/L (ref 0.4–4)
WBC # BLD AUTO: 7.1 10E9/L (ref 4–11)

## 2020-03-19 PROCEDURE — 36415 COLL VENOUS BLD VENIPUNCTURE: CPT | Performed by: FAMILY MEDICINE

## 2020-03-19 PROCEDURE — 84443 ASSAY THYROID STIM HORMONE: CPT | Performed by: FAMILY MEDICINE

## 2020-03-19 PROCEDURE — 80053 COMPREHEN METABOLIC PANEL: CPT | Performed by: FAMILY MEDICINE

## 2020-03-19 PROCEDURE — 84439 ASSAY OF FREE THYROXINE: CPT | Performed by: FAMILY MEDICINE

## 2020-03-19 PROCEDURE — 83880 ASSAY OF NATRIURETIC PEPTIDE: CPT | Performed by: FAMILY MEDICINE

## 2020-03-19 PROCEDURE — 99214 OFFICE O/P EST MOD 30 MIN: CPT | Performed by: FAMILY MEDICINE

## 2020-03-19 PROCEDURE — 85027 COMPLETE CBC AUTOMATED: CPT | Performed by: FAMILY MEDICINE

## 2020-03-19 RX ORDER — FUROSEMIDE 20 MG
20 TABLET ORAL 2 TIMES DAILY
Qty: 60 TABLET | Refills: 1 | Status: SHIPPED | OUTPATIENT
Start: 2020-03-19 | End: 2020-05-20

## 2020-03-19 RX ORDER — METOPROLOL SUCCINATE 25 MG/1
25 TABLET, EXTENDED RELEASE ORAL DAILY
Qty: 90 TABLET | Refills: 3 | Status: SHIPPED | OUTPATIENT
Start: 2020-03-19 | End: 2021-03-29

## 2020-03-19 ASSESSMENT — ANXIETY QUESTIONNAIRES
3. WORRYING TOO MUCH ABOUT DIFFERENT THINGS: MORE THAN HALF THE DAYS
GAD7 TOTAL SCORE: 12
6. BECOMING EASILY ANNOYED OR IRRITABLE: NEARLY EVERY DAY
5. BEING SO RESTLESS THAT IT IS HARD TO SIT STILL: SEVERAL DAYS
7. FEELING AFRAID AS IF SOMETHING AWFUL MIGHT HAPPEN: NOT AT ALL
1. FEELING NERVOUS, ANXIOUS, OR ON EDGE: NOT AT ALL
2. NOT BEING ABLE TO STOP OR CONTROL WORRYING: NEARLY EVERY DAY

## 2020-03-19 ASSESSMENT — PATIENT HEALTH QUESTIONNAIRE - PHQ9
SUM OF ALL RESPONSES TO PHQ QUESTIONS 1-9: 11
5. POOR APPETITE OR OVEREATING: NEARLY EVERY DAY

## 2020-03-19 ASSESSMENT — MIFFLIN-ST. JEOR: SCORE: 1796.06

## 2020-03-19 NOTE — PROGRESS NOTES
Subjective     Jessica Jay is a 59 year old female who presents to clinic today for the following health issues:    HPI   Hypertension Follow-up    Currently on Metoprolol 12.5 mg/day and Amlodipine 5 mg/day  Blood pressure remains elevated despite taking medications daily.     BP Readings from Last 3 Encounters:   03/19/20 138/80   01/28/20 (!) 169/108   01/20/20 135/84       Do you check your blood pressure regularly outside of the clinic? No     Are you following a low salt diet? Yes    Are your blood pressures ever more than 140 on the top number (systolic) OR more   than 90 on the bottom number (diastolic), for example 140/90? NA      How many servings of fruits and vegetables do you eat daily?  unsure    On average, how many sweetened beverages do you drink each day (Examples: soda, juice, sweet tea, etc.  Do NOT count diet or artificially sweetened beverages)?   0    How many days per week do you exercise enough to make your heart beat faster? 0    How many minutes a day do you exercise enough to make your heart beat faster? 0    How many days per week do you miss taking your medication? 0      Edema x 1 month  Reporting swelling in feet, ankles, knees, and fingers associated with some dyspnea on exertion but denies having any orthopnea or PND.   Having some pain in right hand with the swelling along with a trigger finger of the right ring finger.   States that she had previous carpal tunnel surgery.   Has noticed some improvements in swelling over the past x 2 weeks with dietary changes.       DEPRESSION - Patient has a long history of Depression of moderate severity requiring medication for control with recent symptoms being gradually worsening due to recent COVID-19 pandemic and potential loss of job but felt she was doing better before then. States that she was already struggling with job related concernes throughout the year. Current symptoms of depression include -  Last PHQ-9 3/19/2020   1.   Little interest or pleasure in doing things 3   2.  Feeling down, depressed, or hopeless 3   3.  Trouble falling or staying asleep, or sleeping too much 2   4.  Feeling tired or having little energy 2   5.  Poor appetite or overeating 0   6.  Feeling bad about yourself 0   7.  Trouble concentrating 0   8.  Moving slowly or restless 0   Q9: Thoughts of better off dead/self-harm past 2 weeks 1   PHQ-9 Total Score 11   Difficulty at work, home, or with people Somewhat difficult     GABRIELA-7  3/19/2020   1. Feeling nervous, anxious, or on edge 0   2. Not being able to stop or control worrying 3   3. Worrying too much about different things 2   4. Trouble relaxing 3   5. Being so restless that it is hard to sit still 1   6. Becoming easily annoyed or irritable 3   7. Feeling afraid, as if something awful might happen 0   GABRIELA-7 Total Score 12   If you checked any problems, how difficult have they made it for you to do your work, take care of things at home, or get along with other people? -     In the past two weeks have you had thoughts of suicide or self-harm?  Yes  In the past two weeks have you thought of a plan or intent to harm yourself? No.  Do you have concerns about your personal safety or the safety of others?   No      HEALTH CARE MAINTENANCE: Due for a lipid panel.     Patient Active Problem List   Diagnosis     Depression, major     GERD (gastroesophageal reflux disease)     Migraines     CARDIOVASCULAR SCREENING; LDL GOAL LESS THAN 160     Disc herniation     DDD (degenerative disc disease), lumbar     DDD (degenerative disc disease), cervical     Neck pain     Headache     Chronic daily headache     Dizziness - light-headed     Tobacco abuse     ELYSSA (obstructive sleep apnea)     Spondylolisthesis, grade 1     Chronic low back pain     Brain lipoma     Vulvar dermatitis     History of colonic polyps     Gastroesophageal reflux disease without esophagitis     overweight with BMI >40     Hypertension goal BP  (blood pressure) < 140/90     Traylor's esophagus determined by endoscopy     Past Surgical History:   Procedure Laterality Date     ARTHROSCOPY SHOULDER      Right/spurs     C FOOT/TOES SURGERY PROC UNLISTED      Toe fracture, left     CARPAL TUNNEL RELEASE RT/LT      Left     COLONOSCOPY  2016     CRANIOTOMY, EXCISE TUMOR COMPLEX, COMBINED  2011    Procedure:COMBINED CRANIOTOMY, EXCISE TUMOR COMPLEX; 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; Surgeon:LEE ANN PAULA; Location:[facility] OR     ROTATOR CUFF REPAIR RT/LT       Left       Social History     Tobacco Use     Smoking status: Former Smoker     Packs/day: 0.80     Years: 30.00     Pack years: 24.00     Types: Cigarettes     Last attempt to quit: 2017     Years since quittin.7     Smokeless tobacco: Never Used   Substance Use Topics     Alcohol use: Yes     Alcohol/week: 0.0 standard drinks     Comment: 6 drinks a year     Family History   Problem Relation Age of Onset     Hypertension Mother      C.A.D. Father      Cancer Maternal Grandmother      Cerebrovascular Disease Paternal Grandmother      Breast Cancer Paternal Grandmother      Neurologic Disorder Sister         migraine     Cancer Brother      Cancer - colorectal Brother      Colon Cancer Brother          Current Outpatient Medications   Medication Sig Dispense Refill     cyclobenzaprine (FLEXERIL) 10 MG tablet Take 0.5-1 tablets (5-10 mg) by mouth 3 times daily as needed for muscle spasms 45 tablet 0     DULoxetine (CYMBALTA) 60 MG capsule TAKE ONE CAPSULE BY MOUTH DAILY 90 capsule 1     furosemide (LASIX) 20 MG tablet Take 1 tablet (20 mg) by mouth 2 times daily 60 tablet 1     metoprolol succinate ER (TOPROL-XL) 25 MG 24 hr tablet Take 1 tablet (25 mg) by mouth daily 90 tablet 3     mirtazapine (REMERON) 15 MG tablet TAKE 1 TABLET BY MOUTH EVERY NIGHT AT BEDTIME 90 tablet 0     Allergies   Allergen Reactions     Buspar [Buspirone Hcl] Rash  "        Reviewed and updated as needed this visit by Provider         Review of Systems   ROS COMP: Constitutional, HEENT, cardiovascular, pulmonary, gi and gu systems are negative, except as otherwise noted.      Objective    /80   Pulse 100   Temp 98.1  F (36.7  C) (Tympanic)   Resp 22   Ht 1.549 m (5' 1\")   Wt 128.4 kg (283 lb)   SpO2 97%   Breastfeeding No   BMI 53.47 kg/m    Body mass index is 53.47 kg/m .  Physical Exam   GENERAL: healthy, alert and no distress  EYES: Eyes grossly normal to inspection, PERRL and conjunctivae and sclerae normal  HENT: ear canals and TM's normal, nose and mouth without ulcers or lesions  NECK: no adenopathy, no asymmetry, masses, or scars and thyroid normal to palpation  RESP: lungs clear to auscultation - no rales, rhonchi or wheezes  CV: regular rate and rhythm, normal S1 S2, no S3 or S4, no murmur, click or rub, no peripheral edema and peripheral pulses strong  PSYCH: mentation appears normal, affect flat, judgement and insight intact and appearance disheveled  LOWER EXTREMITIES: obese with 1 + edema. Pedal pulse present. No cyanosis.   MS: Trigger finger of the right ring finger.         Diagnostic Test Results:  Labs reviewed in Epic  Labs drawn and in process.          Assessment & Plan     Jessica was seen today for hypertension.    Diagnoses and all orders for this visit:    Hypertension goal BP (blood pressure) < 140/90; uncontrolled  -  Discontinue Amlodipine     -  Increase dose: metoprolol succinate ER (TOPROL-XL) 25 MG 24 hr tablet; Take 1 tablet (25 mg) by mouth daily  -     Start a diuretic: furosemide (LASIX) 20 MG tablet; Take 1 tablet (20 mg) by mouth 2 times daily  -     Comprehensive metabolic panel    Bilateral edema of lower extremity- will further evaluate for underlying etiologies  -     Start: furosemide (LASIX) 20 MG tablet; Take 1 tablet (20 mg) by mouth 2 times daily  -     TSH with free T4 reflex  -     BNP-N terminal pro  -     CBC " "with platelets    Lipid screening  -     Lipid Profile; Future    Trigger ring finger of right hand  -     ORTHOPEDICS ADULT REFERRAL  -     In the interim recommended chalino taping.     Anxiety and depression, slightly worsened due to recent COVID-19 pandemic and potential job loss   -  PHQ-9/GABRIELA 7 completed, see above/Epic for details   - MENTAL HEALTH REFERRAL  - Adult; Outpatient Treatment; Individual/Couples/Family/Group Therapy/Health Psychology; [facility]: [facility] [phone]; We will contact you to schedule the appointment or please call with any questions    Morbid obesity due to excess calories (H)  Weight management plan: Discussed healthy diet and exercise guidelines       BMI:   Estimated body mass index is 53.47 kg/m  as calculated from the following:    Height as of this encounter: 1.549 m (5' 1\").    Weight as of this encounter: 128.4 kg (283 lb).   Weight management plan: Discussed healthy diet and exercise guidelines        Return in about 1 month (around 4/19/2020) for Medication check, Virtual Visit..    Marybeth Silver MD  [facility] KIRSTIE      "

## 2020-03-19 NOTE — PATIENT INSTRUCTIONS
Patient Education     Leg Swelling in Both Legs    Swelling of the feet, ankles, and legs is called edema. It is caused by excess fluid that has collected in the tissues. Extra fluid in the body settles in the lowest part because of gravity. This is why the legs and feet are most affected.  Some of the causes for edema include:    Disease of the heart like congestive heart failure    Standing or sitting for long periods of time    Infection of the feet or legs    Blood pooling in the veins of your legs (venous insufficiency)    Dilated veins in your lower leg (varicose veins)    Garters or other clothing that is tight on your legs. This will cause blood to pool in your legs because the clothing limits blood flow.    Some medicines such as hormones like birth control pills, some blood pressure medicines like calcium channel blockers (amlodipine) and steroids, some antidepressants like MAO inhibitors and tricyclics    Menstrual periods that cause you to retain fluids    Many types of renal disease    Liver failure or cirrhosis    Pregnancy, some swelling is normal, but a sudden increase in leg swelling or weight gain can be a sign of a dangerous complication of pregnancy    Poor nutrition    Thyroid disease  Medical treatment will depend on what is causing the swelling in your legs. Your healthcare provider may prescribe water pills (diuretics) to get rid of the extra fluid.  Home care  Follow these guidelines when caring for yourself at home:    Don't wear clothing like garters that is tight on your legs.    Keep your legs up while lying or sitting.    If infection, injury, or recent surgery is causing the swelling, stay off your legs as much as possible until symptoms get better.    If your healthcare provider says that your leg swelling is caused by venous insufficiency or varicose veins, don't sit or  one place for long periods of time. Take breaks and walk about every few hours. Brisk walking is a good  exercise. It helps circulate the blood that has collected in your leg. Talk with your provider about using support stockings to stop daytime leg swelling.    If your provider says that heart disease is causing your leg swelling, follow a low-salt diet to stop extra fluid from staying in your body. You may also need medicine.  Follow-up care  Follow up with your healthcare provider, or as advised.  When to seek medical advice  Call your healthcare provider right away if any of these occur:    New shortness of breath or chest pain    Shortness of breath or chest pain that gets worse    Swelling in both legs or ankles that gets worse    Swelling of the abdomen    Redness, warmth, or swelling in one leg    Fever of 100.4 F (38 C) or higher, or as directed by your healthcare provider    Yellow color to your skin or eyes    Rapid, unexplained weight gain    Having to sleep upright or use an increased number of pillows  Date Last Reviewed: 3/31/2016    5885-6031 [employer]. [address] [place] [address], [place] [address]. All rights reserved. This information is not intended as a substitute for professional medical care. Always follow your healthcare professional's instructions.

## 2020-03-20 ASSESSMENT — ANXIETY QUESTIONNAIRES: GAD7 TOTAL SCORE: 12

## 2020-03-24 ENCOUNTER — PATIENT OUTREACH (OUTPATIENT)
Dept: CARE COORDINATION | Facility: CLINIC | Age: 60
End: 2020-03-24

## 2020-03-24 DIAGNOSIS — Z13.1 SCREENING FOR DIABETES MELLITUS: ICD-10-CM

## 2020-03-24 DIAGNOSIS — R74.8 ELEVATED LIVER ENZYMES: Primary | ICD-10-CM

## 2020-03-24 DIAGNOSIS — E03.8 SUBCLINICAL HYPOTHYROIDISM: ICD-10-CM

## 2020-03-24 ASSESSMENT — ACTIVITIES OF DAILY LIVING (ADL): DEPENDENT_IADLS:: INDEPENDENT

## 2020-03-24 NOTE — PROGRESS NOTES
Clinic Care Coordination Contact    Follow Up Progress Note      Assessment: pt has been approved for [employer] and will get a card soon.  Insurance will start 4-1-2020.      Pt is noticing an increase in her depression with the social distancing.       Goals addressed this encounter:   Goals Addressed                 This Visit's Progress      Financial Wellbeing (pt-stated)   90%     Goal Statement: I need to find insurance or support for my medical prescriptions.  Date Goal set: 1/29/2020  Date to Achieve By: 4/28/20  Patient expressed understanding of goal: yes  Action steps to achieve this goal:  1. I will check with [facility] for insurance coverage.       2/24/2020 - extend target date.  Has insurance through work and wants to check into [employer]               Intervention/Education provided during outreach: discussed her medications and insurance.  She feels if she can get through the next week she will be ok.  She is not working and is going to be getting unemployment.  Her concern is with credit cards.  Discussed calling to see if she can adjust her payment plan through this period of social distancing and not working.       Pt said that her depression has increased with limited contacts.  She is not suicidal.  Discussed the crisis lines and options.  Provided her with the [facility] crisis line as well as the text 542887 number.      Pt had no other questions or concerns.      Outreach Frequency: monthly    Plan:   Pt to reach out to crisis lines or texting options.  Pt to call credit card company for options.   Care Coordinator will follow up in one month.     JAQUELIN Mensah, [facility] - Care Coordinator   [facility]  3/24/2020   1:13 PM  [phone]

## 2020-03-25 ENCOUNTER — MYC MEDICAL ADVICE (OUTPATIENT)
Dept: FAMILY MEDICINE | Facility: CLINIC | Age: 60
End: 2020-03-25

## 2020-04-20 ENCOUNTER — VIRTUAL VISIT (OUTPATIENT)
Dept: PSYCHOLOGY | Facility: CLINIC | Age: 60
End: 2020-04-20
Attending: FAMILY MEDICINE

## 2020-04-20 DIAGNOSIS — F32.A DEPRESSION: Primary | ICD-10-CM

## 2020-04-20 PROCEDURE — 99207 ZZC NO BILLABLE SERVICE THIS VISIT: CPT | Mod: 95 | Performed by: COUNSELOR

## 2020-04-20 ASSESSMENT — ANXIETY QUESTIONNAIRES
1. FEELING NERVOUS, ANXIOUS, OR ON EDGE: NOT AT ALL
GAD7 TOTAL SCORE: 8
6. BECOMING EASILY ANNOYED OR IRRITABLE: SEVERAL DAYS
7. FEELING AFRAID AS IF SOMETHING AWFUL MIGHT HAPPEN: NOT AT ALL
3. WORRYING TOO MUCH ABOUT DIFFERENT THINGS: NOT AT ALL
2. NOT BEING ABLE TO STOP OR CONTROL WORRYING: SEVERAL DAYS
IF YOU CHECKED OFF ANY PROBLEMS ON THIS QUESTIONNAIRE, HOW DIFFICULT HAVE THESE PROBLEMS MADE IT FOR YOU TO DO YOUR WORK, TAKE CARE OF THINGS AT HOME, OR GET ALONG WITH OTHER PEOPLE: SOMEWHAT DIFFICULT
5. BEING SO RESTLESS THAT IT IS HARD TO SIT STILL: NEARLY EVERY DAY

## 2020-04-20 ASSESSMENT — PATIENT HEALTH QUESTIONNAIRE - PHQ9
5. POOR APPETITE OR OVEREATING: NEARLY EVERY DAY
SUM OF ALL RESPONSES TO PHQ QUESTIONS 1-9: 10

## 2020-04-20 NOTE — PROGRESS NOTES
"The patient has been notified of the following:      \"We have found that certain health care needs can be provided without the need for a face to face visit.  This service lets us provide the care you need with a phone conversation.       I will have full access to your [facility] medical record during this entire phone call.   I will be taking notes for your medical record.      Since this is like an office visit, we will bill your insurance company for this service.       There are potential benefits and risks of telephone visits (e.g. limits to patient confidentiality) that differ from in-person visits.?  Confidentiality still applies for telephone services, and nobody will record the visit.  It is important to be in a quiet, private space that is free of distractions (including cell phone or other devices) during the visit.??      If during the course of the call I believe a telephone visit is not appropriate, you will not be charged for this service\"     Consent has been obtained for this service by care team member: Yes                    Progress Note - Initial Session    Client Name:  Jessica Jay Date: 4/20/20         Service Type: Individual     Session Start Time: 12:30 pm  Session End Time: 1:15     Session Length: 45 min    Session #: 1    Attendees: Client attended alone       DATA:  Diagnostic Assessment in progress.  Unable to complete documentation at the conclusion of the first session due to time dedicated to explaining limits of confidentiality, completing assessments, and rapport building.  Client identifies experiencing depression, anxiety, and barriers with physical health that impact mental health.       Interactive Complexity: No  Crisis: No    Intervention:  CBT: Assessed for safety and assisted in safety plan development    ASSESSMENT:  Mental Status Assessment:  Appearance:   n/a - telephone   Eye Contact:   n/a - telephone   Psychomotor Behavior: n/a - telephone " "  Attitude:   Cooperative   Orientation:   All  Speech   Rate / Production: Normal/ Responsive   Volume:  Normal   Mood:    Depressed   Affect:    Appropriate   Thought Content:  Clear   Thought Form:  Coherent  Logical   Insight:    Good       Safety Issues and Plan for Safety and Risk Management:     Patient denies current fears or concerns for personal safety.  Patient reports the following current or recent suicidal ideation or behaviors: Client reports one instance of passive SI in past 2 weeks (\"I'd rather be dead\"), but denies planning or intent. Commits to safety in session and identified the following safety plan: .connecting with friends, puzzle, michelle, cleaning, thinking about important people (mom, friends)  Patient denies current or recent homicidal ideation or behaviors.  Patient denies current or recent self injurious behavior or ideation.  Patient denies other safety concerns.  A safety and risk management plan has been developed including: Patient consented to co-developed safety plan.  Safety and risk management plan was completed.  Patient agreed to use safety plan should any safety concerns arise.  A copy was given to the patient.       Diagnostic Criteria:   - Diminished interest or pleasure in all, or almost all, activities.    - Significant weight gainincrease in appetite.    - Fatigue or loss of energy.    - Recurrent thoughts of death (not just fear of dying), recurrent suicidal ideation without a specific plan, or a suicide attempt or a specific plan for committing suicide.       DSM5 Diagnoses: (Sustained by DSM5 Criteria Listed Above)  Diagnoses: 311 (F32.9) Unspecified Depressive Disorder   Psychosocial & Contextual Factors: -  WHODAS 2.0 (12 item):   WHODAS 2.0 Total Score 4/20/2020   Total Score 28       Collateral Reports Completed:  Completed Diagnostic Assessment to be routed to PCP.        PLAN: (Homework, other):  Follow-up appointment scheduled to complete Diagnostic Interview. "     Malissa Membreno MA, [facility]

## 2020-04-21 ASSESSMENT — ANXIETY QUESTIONNAIRES: GAD7 TOTAL SCORE: 8

## 2020-04-27 ENCOUNTER — PATIENT OUTREACH (OUTPATIENT)
Dept: CARE COORDINATION | Facility: CLINIC | Age: 60
End: 2020-04-27

## 2020-04-27 SDOH — ECONOMIC STABILITY: FOOD INSECURITY: WITHIN THE PAST 12 MONTHS, YOU WORRIED THAT YOUR FOOD WOULD RUN OUT BEFORE YOU GOT MONEY TO BUY MORE.: NEVER TRUE

## 2020-04-27 SDOH — ECONOMIC STABILITY: FOOD INSECURITY: WITHIN THE PAST 12 MONTHS, THE FOOD YOU BOUGHT JUST DIDN'T LAST AND YOU DIDN'T HAVE MONEY TO GET MORE.: NEVER TRUE

## 2020-04-27 SDOH — ECONOMIC STABILITY: INCOME INSECURITY: HOW HARD IS IT FOR YOU TO PAY FOR THE VERY BASICS LIKE FOOD, HOUSING, MEDICAL CARE, AND HEATING?: NOT VERY HARD

## 2020-04-27 SDOH — HEALTH STABILITY: PHYSICAL HEALTH: ON AVERAGE, HOW MANY DAYS PER WEEK DO YOU ENGAGE IN MODERATE TO STRENUOUS EXERCISE (LIKE A BRISK WALK)?: 0 DAYS

## 2020-04-27 SDOH — ECONOMIC STABILITY: TRANSPORTATION INSECURITY
IN THE PAST 12 MONTHS, HAS LACK OF TRANSPORTATION KEPT YOU FROM MEETINGS, WORK, OR FROM GETTING THINGS NEEDED FOR DAILY LIVING?: NO

## 2020-04-27 SDOH — ECONOMIC STABILITY: TRANSPORTATION INSECURITY
IN THE PAST 12 MONTHS, HAS THE LACK OF TRANSPORTATION KEPT YOU FROM MEDICAL APPOINTMENTS OR FROM GETTING MEDICATIONS?: NO

## 2020-04-27 SDOH — HEALTH STABILITY: PHYSICAL HEALTH: ON AVERAGE, HOW MANY MINUTES DO YOU ENGAGE IN EXERCISE AT THIS LEVEL?: 0 MIN

## 2020-04-27 ASSESSMENT — ACTIVITIES OF DAILY LIVING (ADL): DEPENDENT_IADLS:: INDEPENDENT

## 2020-04-27 NOTE — LETTER
[facility]  Complex Care Plan  About Me:    Patient Name:  Jessica Jay    YOB: 1960  Age:         59 year old   [facility] MRN:    [id number] Telephone Information:  Home Phone [phone]   Mobile [phone]       Address:  [address] [address] Email address:  [email or web address]      Emergency Contact(s)    Name Relationship Lgl Grd Work Phone Home Phone Mobile Phone   1. ILIANA JAY Mother   [phone]   2. NOAH TOBIAS Sister  [phone]            Primary language:  English     needed? No   [facility] [employer] [facility]:  [phone] op. 1  Other communication barriers: Glasses  Preferred Method of Communication:  Portia  Current living arrangement: I live alone  Mobility Status/ Medical Equipment: Independent    Health Maintenance  Health Maintenance Reviewed: Due/Overdue   Health Maintenance Due   Topic Date Due     URINE DRUG SCREEN  1960     ADVANCE CARE PLANNING  1960     ZOSTER IMMUNIZATION (1 of 2) 08/12/2010     PREVENTIVE CARE VISIT  08/13/2019     LIPID  08/13/2019     INFLUENZA VACCINE (1) 09/01/2019     Please talk with your provider about what is needed or can continue to wait.        My Access Plan  Medical Emergency 911   Primary Clinic Line [place] [phone]   24 Hour Appointment Line [phone] or  [phone] ([phone]) (toll-free)   24 Hour Nurse Line [phone] (toll-free)   Preferred Urgent Care Other   Preferred Hospital [facility]  [phone]   Preferred Pharmacy [id number] - [place], [place] - [address] AT Counts include 234 beds at the [facility]     Behavioral Health Crisis Line The National Suicide Prevention Lifeline at 1-936.165.4616 or 911             My Care Team Members  Patient Care Team       Relationship Specialty Notifications Start End    Marybeth Silver MD PCP - General Family Practice  5/16/18      Phone: [phone] Fax: [phone]         [address] [place] [address]    Marybeth Silver MD Assigned PCP   4/15/18     Phone: [phone] Fax: [phone]         [address] [address] [place] [address]    Bell Brito LSW Lead Care Coordinator Primary Care - [facility] Admissions 1/29/20     Phone: [phone] Fax: [phone]                My Care Plans  Self Management and Treatment Plan  Goals and (Comments)  Goals        General    Being Active (pt-stated)     Notes - Note created  4/27/2020  9:32 AM by Bell Brito LSW    Goal Statement: I want to increase my activity to help improve my weight and back pain.  I will walk 10 minutes 3 days/week.  Date Goal set: 4/27/2020   Barriers: Depression, back pain, social distancing to some extent  Strengths: Understand that increasing my exercise will help with managing my weight and back pain  Date to Achieve By: 7/26/20   Patient expressed understanding of goal: Yes  Action steps to achieve this goal:  1. I will walk in one direction for 5 minutes and then return for a total of 10 minutes  2. I will not get discouraged if I cannot make it the 5 minutes in one direction before having to turn around  3. I will celebrate my successes      Mental Health Management (pt-stated)     Notes - Note edited  4/27/2020  9:27 AM by Bell Brito LSW    Goal Statement: I will work on finding two techniques to help me reduce my depression, in the next month.  Date Goal set: 4/27/2020  Barriers: Social distancing, depression  Strengths: Family, friends, and understanding that a needs some help with my depression  Date to Achieve By: 5/27/20   Patient expressed understanding of goal: Yes  Action steps to achieve this goal:  1. I will search for some mindfulness videos to help with depression  2. I will look at the [facility]anne website for resources  3. I will work with counseling to learn new tools as well               Action Plans on File:            Depression           Advance Care Plans/Directives Type:   Type Advanced Care Plans/Directives: Advanced Directive - On File    My Medical and Care Information  Problem List   Patient Active Problem List   Diagnosis     Depression, major     GERD (gastroesophageal reflux disease)     Migraines     CARDIOVASCULAR SCREENING; LDL GOAL LESS THAN 160     Disc herniation     DDD (degenerative disc disease), lumbar     DDD (degenerative disc disease), cervical     Neck pain     Headache     Chronic daily headache     Dizziness - light-headed     Tobacco abuse     ELYSSA (obstructive sleep apnea)     Spondylolisthesis, grade 1     Chronic low back pain     Brain lipoma     Vulvar dermatitis     History of colonic polyps     Gastroesophageal reflux disease without esophagitis     overweight with BMI >40     Hypertension goal BP (blood pressure) < 140/90     Traylor's esophagus determined by endoscopy      Current Medications and Allergies:  Allergies as of 4/27/2020   Reviewed by Lora Sanches MA on 3/19/2020    Severity  Noted  Reaction Type  Reactions    Buspar [buspirone Hcl]  Not Specified  11/18/2009     Rash    Medications - This is your record. Keep this with you and show to your community pharmacist(s) and physician(s) at each visit.      Instructions for use    DULoxetine (CYMBALTA) 60 MG capsule  TAKE ONE CAPSULE BY MOUTH DAILY    furosemide (LASIX) 20 MG tablet  Take 1 tablet (20 mg) by mouth 2 times daily    metoprolol succinate ER (TOPROL-XL) 25 MG 24 hr tablet  Take 1 tablet (25 mg) by mouth daily    mirtazapine (REMERON) 15 MG tablet  TAKE 1 TABLET BY MOUTH EVERY NIGHT AT BEDTIME    omeprazole (PRILOSEC) 20 MG DR capsule  Take 20 mg by mouth daily          Care Coordination Start Date: 1/29/2020   Frequency of Care Coordination: monthly   Form Last Updated: 04/27/2020

## 2020-04-27 NOTE — LETTER
Lisette Menendez,     Thank you so much for talking with me today.  Sorry I woke you up.  Below are the reminders of the resources and options we discussed.  As always feel free to give me a call at any time.    [email or web address]  This is the local [facility] web page.  Across the top are links to their support, education/awareness, and activities.      Do a search for Mindfulness depression videos.  You get a lot of different types of support videos and options.      Sincerely,     Bell Brito, EDUIN  [facility] - Care Coordination  [facility]   [phone]

## 2020-04-27 NOTE — PROGRESS NOTES
Clinic Care Coordination Contact    Follow Up Progress Note      Assessment: Patient reports that her new insurance is working well for her.  She has no further questions or concerns with insurance at this time.    Discussed her depression and she does note that this has gotten worse since the social isolation and distancing occurred.    Goals addressed this encounter:   Goals Addressed                 This Visit's Progress      Being Active (pt-stated)        Goal Statement: I want to increase my activity to help improve my weight and back pain.  I will walk 10 minutes 3 days/week.  Date Goal set: 4/27/2020   Barriers: Depression, back pain, social distancing to some extent  Strengths: Understand that increasing my exercise will help with managing my weight and back pain  Date to Achieve By: 7/26/20   Patient expressed understanding of goal: Yes  Action steps to achieve this goal:  1. I will walk in one direction for 5 minutes and then return for a total of 10 minutes  2. I will not get discouraged if I cannot make it the 5 minutes in one direction before having to turn around  3. I will celebrate my successes        COMPLETED: Financial Wellbeing (pt-stated)        Goal Statement: I need to find insurance or support for my medical prescriptions.  Date Goal set: 1/29/2020  Date to Achieve By: 4/28/20  Patient expressed understanding of goal: yes  Action steps to achieve this goal:  1. I will check with [facility] for insurance coverage.       2/24/2020 - extend target date.  Has insurance through work and wants to check into [employer]          Mental Health Management (pt-stated)        Goal Statement: I will work on finding two techniques to help me reduce my depression, in the next month.  Date Goal set: 4/27/2020  Barriers: Social distancing, depression  Strengths: Family, friends, and understanding that a needs some help with my depression  Date to Achieve By: 5/27/20   Patient expressed understanding of goal:  Yes  Action steps to achieve this goal:  1. I will search for some mindfulness videos to help with depression  2. I will look at the [employer]anne website for resources  3. I will work with counseling to learn new tools as well          mental health management is goal #1, Being active is goal #2     Intervention/Education provided during outreach: Discussed a couple options to help patient and created some goals.  She notes that since she is not able to be around others she feels more isolated and this is increasing her depression.  We talked about some options such as some of the groups on Facebook and also looking at some mindfulness videos.  We also reviewed [facility] is a good resource as well.  She is not getting any exercise at this time and we decided together that may be trying to slowly increase her exercise outside would be a good way to help in multiple areas.  Plan was set to start out with walking 5 minutes away from home and then 5 minutes back.  Told her that if at 5 minutes is too much in 1 way she can stop earlier interim back that is just the goal we want to get to at this time.  Patient voiced understanding with both goals and in agreement.     Outreach Frequency: monthly    Plan:   Patient to continue with counseling.  Patient to review complex care plan and resources sent.  Care Coordinator will follow up in 1 month.    JAQUELIN Mensah, [facility] - Care Coordinator   [facility]  4/27/2020   9:46 AM  [phone]

## 2020-05-04 ENCOUNTER — OFFICE VISIT (OUTPATIENT)
Dept: ORTHOPEDICS | Facility: CLINIC | Age: 60
End: 2020-05-04
Payer: COMMERCIAL

## 2020-05-04 VITALS
BODY MASS INDEX: 53.6 KG/M2 | WEIGHT: 283.9 LBS | HEIGHT: 61 IN | DIASTOLIC BLOOD PRESSURE: 93 MMHG | HEART RATE: 41 BPM | SYSTOLIC BLOOD PRESSURE: 162 MMHG

## 2020-05-04 DIAGNOSIS — M65.351 TRIGGER FINGER, RIGHT LITTLE FINGER: Primary | ICD-10-CM

## 2020-05-04 PROCEDURE — 20550 NJX 1 TENDON SHEATH/LIGAMENT: CPT | Mod: F9 | Performed by: ORTHOPAEDIC SURGERY

## 2020-05-04 PROCEDURE — 99243 OFF/OP CNSLTJ NEW/EST LOW 30: CPT | Mod: 25 | Performed by: ORTHOPAEDIC SURGERY

## 2020-05-04 RX ORDER — LIDOCAINE HYDROCHLORIDE 10 MG/ML
0.5 INJECTION, SOLUTION INFILTRATION; PERINEURAL
Status: DISCONTINUED | OUTPATIENT
Start: 2020-05-04 | End: 2024-05-21

## 2020-05-04 RX ORDER — TRIAMCINOLONE ACETONIDE 40 MG/ML
20 INJECTION, SUSPENSION INTRA-ARTICULAR; INTRAMUSCULAR
Status: DISCONTINUED | OUTPATIENT
Start: 2020-05-04 | End: 2024-05-21

## 2020-05-04 RX ADMIN — LIDOCAINE HYDROCHLORIDE 0.5 ML: 10 INJECTION, SOLUTION INFILTRATION; PERINEURAL at 10:21

## 2020-05-04 RX ADMIN — TRIAMCINOLONE ACETONIDE 20 MG: 40 INJECTION, SUSPENSION INTRA-ARTICULAR; INTRAMUSCULAR at 10:21

## 2020-05-04 ASSESSMENT — PAIN SCALES - GENERAL: PAINLEVEL: SEVERE PAIN (7)

## 2020-05-04 ASSESSMENT — MIFFLIN-ST. JEOR: SCORE: 1800.14

## 2020-05-04 NOTE — PROGRESS NOTES
CHIEF COMPLAINT:   Chief Complaint   Patient presents with     Right Little Finger - Pain     Onset: about 2 months ago. Patient notes she woke up and had pain in the pinky finger. She usually gets a burning feeling, like bees stinging her constantly. She does have some locking. If she works it enough it will open. No tx.      Jessica Jay is seen today in the [facility] for evaluation of right small finger pain at the request of Marybeth Leon        HISTORY:  Jessica Jay is a 59 year old female , Right -hand dominant who is seen in consultation at the request of Marybeth Silver,for Right hand burning/stinging pain and catching x 2 months.  The symptoms have been constant. No treatments.  Has not tried a a splint, NSAIDs, physical therapy, injections and activity modification.  she has numbness and tingling in the small finger at times.      History of right carpal tunnel release in the past years ago, still gets sore.    Suspected cause: Due to unknown factors.    Pain severity: 7/10  Pain quality: sharp and burning  Frequency of symptoms: are constant.  Usual level of work activity: unemployed      Other PMH:  has a past medical history of Cellulitis (9/26/2010), Degenerative disc disease, Depression, major, Depressive disorder, Dermoid cyst of brain (H), GERD (gastroesophageal reflux disease), LBP (low back pain), Migraines, Panic attacks, Seasonal allergies, and Sleep apnoea.  Patient Active Problem List    Diagnosis Date Noted     Traylor's esophagus determined by endoscopy 11/19/2018     Priority: Medium     Hypertension goal BP (blood pressure) < 140/90 06/10/2018     Priority: Medium     overweight with BMI >40 09/16/2016     Priority: Medium     Gastroesophageal reflux disease without esophagitis 05/20/2016     Priority: Medium     History of colonic polyps 04/13/2015     Priority: Medium     Vulvar dermatitis 07/17/2012     Priority: Medium     Brain  lipoma 06/28/2012     Priority: Medium     Spondylolisthesis, grade 1 05/24/2012     Priority: Medium     Chronic low back pain 05/24/2012     Priority: Medium     Diagnosis updated by automated process. Provider to review and confirm.       ELYSSA (obstructive sleep apnea) 08/22/2011     Priority: Medium     Tobacco abuse 05/06/2011     Priority: Medium     Chronic daily headache 02/15/2011     Priority: Medium     Dizziness - light-headed 02/15/2011     Priority: Medium     Problem list name updated by automated process. Provider to review and confirm       Neck pain 01/28/2011     Priority: Medium     Headache 01/28/2011     Priority: Medium     Problem list name updated by automated process. Provider to review       DDD (degenerative disc disease), cervical 12/19/2010     Priority: Medium     Disc herniation 10/08/2010     Priority: Medium     DDD (degenerative disc disease), lumbar 10/08/2010     Priority: Medium     Sees Dr. Cuevas, spine.  I am willing to do vicodin for flare ups which patient states occurs 3-4 times/year.  She is interested in pain management program.       CARDIOVASCULAR SCREENING; LDL GOAL LESS THAN 160 05/23/2010     Priority: Medium     Depression, major      Priority: Medium     Has been on wellbutrin, prozac.  Citalopram at 60 mg gave side effects, better at 40mg.  Dr. Lebron, psychiatry [facility].       GERD (gastroesophageal reflux disease)      Priority: Medium     Migraines      Priority: Medium       Surgical Hx:  has a past surgical history that includes Arthroscopy shoulder (1990); FOOT/TOES SURGERY PROC UNLISTED (1975); rotator cuff repair rt/lt (2009); carpal tunnel release rt/lt (1990); Craniotomy, excise tumor complex, combined (5/9/2011); and colonoscopy (2016).    Medications:   Current Outpatient Medications:      DULoxetine (CYMBALTA) 60 MG capsule, TAKE ONE CAPSULE BY MOUTH DAILY, Disp: 90 capsule, Rfl: 1     furosemide (LASIX) 20 MG tablet, Take 1 tablet (20  "mg) by mouth 2 times daily, Disp: 60 tablet, Rfl: 1     metoprolol succinate ER (TOPROL-XL) 25 MG 24 hr tablet, Take 1 tablet (25 mg) by mouth daily, Disp: 90 tablet, Rfl: 3     mirtazapine (REMERON) 15 MG tablet, TAKE 1 TABLET BY MOUTH EVERY NIGHT AT BEDTIME, Disp: 90 tablet, Rfl: 0     omeprazole (PRILOSEC) 20 MG DR capsule, Take 20 mg by mouth daily, Disp: , Rfl:     Allergies:   Allergies   Allergen Reactions     Buspar [Buspirone Hcl] Rash       Social Hx: unemployed.  reports that she quit smoking about 2 years ago. Her smoking use included cigarettes. She has a 24.00 pack-year smoking history. She has never used smokeless tobacco. She reports current alcohol use. She reports that she does not use drugs.    Family Hx: family history includes Breast Cancer in her paternal grandmother; C.A.D. in her father; Cancer in her brother and maternal grandmother; Cancer - colorectal in her brother; Cerebrovascular Disease in her paternal grandmother; Colon Cancer in her brother; Hypertension in her mother; Neurologic Disorder in her sister.. Negative for bleeding/clotting disorders. Negative for adverse anesthesia reactions.    REVIEW OF SYSTEMS: 10 point ROS neg other than the symptoms noted above in the HPI and PMH. Notables include  CONSTITUTIONAL:NEGATIVE for fever, chills, change in weight  INTEGUMENTARY/SKIN: NEGATIVE for worrisome rashes, moles or lesions  MUSCULOSKELETAL:See HPI above  Neurology: see HPI above.      EXAM:  BP (!) 162/93   Pulse (!) 41   Ht 1.549 m (5' 1\")   Wt 128.8 kg (283 lb 14.4 oz)   BMI 53.64 kg/m      GENERAL APPEARANCE: healthy, alert and no distress   GAIT: NORMAL  SKIN: no suspicious lesions or rashes  RESPIRATORY: No increased work of breathing.  NEURO:  strength: decreased, thenar fasiculations:negative;  Sensation intact in  all digits, reflexes normal in upper extremities.   PSYCH:  mentation appears normal and affect normal/bright, not anxious.    MUSCULOSKELETAL:    RIGHT " HAND/FINGERS:   Skin intact. Normal wear pattern, color and tone.   No clubbing or nail pitting.  Range of Motion All Normal    Good capillary refill to all fingers. 2+ radial pulse.    Triggering noted: none. Crepitus at right small finger A1 pulley  Tenderness over A1 pulley of right small finger.    No observable or palpable masses of the fingers or palm or wrist.  No observable or palpable cords or nodules of the fingers or palm.      Intact EPL, FPL, FDP, EDC, wrist flexion/extension, biceps/triceps  Intact sensation to light touch in median, radial and ulnar nerve distributions.  Nontender to palpation medial epicondyle, ulnar nerve at the elbow. Negative tinels ulnar nerve at the elbow.        XRAYS: none indicated.    ASSESSMENT: 60yo RHD female with right small trigger finger.    PLAN:     We talked about the options: taping the PIP, splinting the PIP joint, corticosteroid injections, hand therapy and surgery. I explained the risks and benefits of each, including recurrence. I have explained the nature of the surgical procedure, the risks and recovery time with the patient.  * At this time, patient would like to proceed with: right small trigger finger.  * A Right small finger  trigger injection was offered and patient elected to proceed. Tolerated well without apparent complication. See procedure note below.  * an oval-8 finger splint was provided  * Return to clinic as needed.    Gemma to follow up with Primary Care provider regarding elevated blood pressure.      Ki May M.D., M.S.  Dept. of Orthopaedic Surgery  [facility]    Hand / Upper Extremity Injection/Arthrocentesis: R small A1    Date/Time: 5/4/2020 10:21 AM  Performed by: Ken Arzate PA  Authorized by: Ki May MD     Indications:  Pain  Needle Size:  25 G  Guidance: landmark    Approach:  Volar  Condition: trigger finger    Location:  Small finger    Site:  R small A1  Medications:  0.5 mL lidocaine 1 %; 20  mg triamcinolone 40 MG/ML  Outcome:  Tolerated well, no immediate complications  Procedure discussed: discussed risks, benefits, and alternatives    Consent Given by:  Patient  Prep: patient was prepped and draped in usual sterile fashion

## 2020-05-04 NOTE — LETTER
5/4/2020         RE: Jessica Jay  [address] [place] [address]        Dear Colleague,    Thank you for referring your patient, Jessica Jay, to the [facility]. Please see a copy of my visit note below.    CHIEF COMPLAINT:   Chief Complaint   Patient presents with     Right Little Finger - Pain     Onset: about 2 months ago. Patient notes she woke up and had pain in the pinky finger. She usually gets a burning feeling, like bees stinging her constantly. She does have some locking. If she works it enough it will open. No tx.      Jessica Jay is seen today in the [facility] for evaluation of right small finger pain at the request of Marybeth Leon        HISTORY:  Jessica Jay is a 59 year old female , Right -hand dominant who is seen in consultation at the request of Marybeth Silver,for Right hand burning/stinging pain and catching x 2 months.  The symptoms have been constant. No treatments.  Has not tried a a splint, NSAIDs, physical therapy, injections and activity modification.  she has numbness and tingling in the small finger at times.      History of right carpal tunnel release in the past years ago, still gets sore.    Suspected cause: Due to unknown factors.    Pain severity: 7/10  Pain quality: sharp and burning  Frequency of symptoms: are constant.  Usual level of work activity: unemployed      Other PMH:  has a past medical history of Cellulitis (9/26/2010), Degenerative disc disease, Depression, major, Depressive disorder, Dermoid cyst of brain (H), GERD (gastroesophageal reflux disease), LBP (low back pain), Migraines, Panic attacks, Seasonal allergies, and Sleep apnoea.  Patient Active Problem List    Diagnosis Date Noted     Traylor's esophagus determined by endoscopy 11/19/2018     Priority: Medium     Hypertension goal BP (blood pressure) < 140/90 06/10/2018     Priority: Medium      overweight with BMI >40 09/16/2016     Priority: Medium     Gastroesophageal reflux disease without esophagitis 05/20/2016     Priority: Medium     History of colonic polyps 04/13/2015     Priority: Medium     Vulvar dermatitis 07/17/2012     Priority: Medium     Brain lipoma 06/28/2012     Priority: Medium     Spondylolisthesis, grade 1 05/24/2012     Priority: Medium     Chronic low back pain 05/24/2012     Priority: Medium     Diagnosis updated by automated process. Provider to review and confirm.       ELYSSA (obstructive sleep apnea) 08/22/2011     Priority: Medium     Tobacco abuse 05/06/2011     Priority: Medium     Chronic daily headache 02/15/2011     Priority: Medium     Dizziness - light-headed 02/15/2011     Priority: Medium     Problem list name updated by automated process. Provider to review and confirm       Neck pain 01/28/2011     Priority: Medium     Headache 01/28/2011     Priority: Medium     Problem list name updated by automated process. Provider to review       DDD (degenerative disc disease), cervical 12/19/2010     Priority: Medium     Disc herniation 10/08/2010     Priority: Medium     DDD (degenerative disc disease), lumbar 10/08/2010     Priority: Medium     Sees Dr. Cuevas, spine.  I am willing to do vicodin for flare ups which patient states occurs 3-4 times/year.  She is interested in pain management program.       CARDIOVASCULAR SCREENING; LDL GOAL LESS THAN 160 05/23/2010     Priority: Medium     Depression, major      Priority: Medium     Has been on wellbutrin, prozac.  Citalopram at 60 mg gave side effects, better at 40mg.  Dr. Lebron, psychiatry [facility].       GERD (gastroesophageal reflux disease)      Priority: Medium     Migraines      Priority: Medium       Surgical Hx:  has a past surgical history that includes Arthroscopy shoulder (1990); FOOT/TOES SURGERY PROC UNLISTED (1975); rotator cuff repair rt/lt (2009); carpal tunnel release rt/lt (1990); Craniotomy,  "excise tumor complex, combined (5/9/2011); and colonoscopy (2016).    Medications:   Current Outpatient Medications:      DULoxetine (CYMBALTA) 60 MG capsule, TAKE ONE CAPSULE BY MOUTH DAILY, Disp: 90 capsule, Rfl: 1     furosemide (LASIX) 20 MG tablet, Take 1 tablet (20 mg) by mouth 2 times daily, Disp: 60 tablet, Rfl: 1     metoprolol succinate ER (TOPROL-XL) 25 MG 24 hr tablet, Take 1 tablet (25 mg) by mouth daily, Disp: 90 tablet, Rfl: 3     mirtazapine (REMERON) 15 MG tablet, TAKE 1 TABLET BY MOUTH EVERY NIGHT AT BEDTIME, Disp: 90 tablet, Rfl: 0     omeprazole (PRILOSEC) 20 MG DR capsule, Take 20 mg by mouth daily, Disp: , Rfl:     Allergies:   Allergies   Allergen Reactions     Buspar [Buspirone Hcl] Rash       Social Hx: unemployed.  reports that she quit smoking about 2 years ago. Her smoking use included cigarettes. She has a 24.00 pack-year smoking history. She has never used smokeless tobacco. She reports current alcohol use. She reports that she does not use drugs.    Family Hx: family history includes Breast Cancer in her paternal grandmother; C.A.D. in her father; Cancer in her brother and maternal grandmother; Cancer - colorectal in her brother; Cerebrovascular Disease in her paternal grandmother; Colon Cancer in her brother; Hypertension in her mother; Neurologic Disorder in her sister.. Negative for bleeding/clotting disorders. Negative for adverse anesthesia reactions.    REVIEW OF SYSTEMS: 10 point ROS neg other than the symptoms noted above in the HPI and PMH. Notables include  CONSTITUTIONAL:NEGATIVE for fever, chills, change in weight  INTEGUMENTARY/SKIN: NEGATIVE for worrisome rashes, moles or lesions  MUSCULOSKELETAL:See HPI above  Neurology: see HPI above.      EXAM:  BP (!) 162/93   Pulse (!) 41   Ht 1.549 m (5' 1\")   Wt 128.8 kg (283 lb 14.4 oz)   BMI 53.64 kg/m      GENERAL APPEARANCE: healthy, alert and no distress   GAIT: NORMAL  SKIN: no suspicious lesions or rashes  RESPIRATORY: " No increased work of breathing.  NEURO:  strength: decreased, thenar fasiculations:negative;  Sensation intact in  all digits, reflexes normal in upper extremities.   PSYCH:  mentation appears normal and affect normal/bright, not anxious.    MUSCULOSKELETAL:    RIGHT HAND/FINGERS:   Skin intact. Normal wear pattern, color and tone.   No clubbing or nail pitting.  Range of Motion All Normal    Good capillary refill to all fingers. 2+ radial pulse.    Triggering noted: none. Crepitus at right small finger A1 pulley  Tenderness over A1 pulley of right small finger.    No observable or palpable masses of the fingers or palm or wrist.  No observable or palpable cords or nodules of the fingers or palm.      Intact EPL, FPL, FDP, EDC, wrist flexion/extension, biceps/triceps  Intact sensation to light touch in median, radial and ulnar nerve distributions.  Nontender to palpation medial epicondyle, ulnar nerve at the elbow. Negative tinels ulnar nerve at the elbow.        XRAYS: none indicated.    ASSESSMENT: 58yo RHD female with right small trigger finger.    PLAN:     We talked about the options: taping the PIP, splinting the PIP joint, corticosteroid injections, hand therapy and surgery. I explained the risks and benefits of each, including recurrence. I have explained the nature of the surgical procedure, the risks and recovery time with the patient.  * At this time, patient would like to proceed with: right small trigger finger.  * A Right small finger  trigger injection was offered and patient elected to proceed. Tolerated well without apparent complication. See procedure note below.  * an oval-8 finger splint was provided  * Return to clinic as needed.    Gemma to follow up with Primary Care provider regarding elevated blood pressure.      Ki May M.D., M.S.  Dept. of Orthopaedic Surgery  [facility]    Hand / Upper Extremity Injection/Arthrocentesis: R small A1    Date/Time: 5/4/2020 10:21  AM  Performed by: Ken Arzate PA  Authorized by: Ki May MD     Indications:  Pain  Needle Size:  25 G  Guidance: landmark    Approach:  Volar  Condition: trigger finger    Location:  Small finger    Site:  R small A1  Medications:  0.5 mL lidocaine 1 %; 20 mg triamcinolone 40 MG/ML  Outcome:  Tolerated well, no immediate complications  Procedure discussed: discussed risks, benefits, and alternatives    Consent Given by:  Patient  Prep: patient was prepped and draped in usual sterile fashion            Again, thank you for allowing me to participate in the care of your patient.        Sincerely,        Ki May MD

## 2020-05-18 DIAGNOSIS — I10 HYPERTENSION GOAL BP (BLOOD PRESSURE) < 140/90: ICD-10-CM

## 2020-05-18 DIAGNOSIS — R60.0 BILATERAL EDEMA OF LOWER EXTREMITY: ICD-10-CM

## 2020-05-20 NOTE — TELEPHONE ENCOUNTER
Routing refill request to provider for review/approval because:  Labs out of range:    BP Readings from Last 3 Encounters:   05/04/20 (!) 162/93   03/19/20 138/80   01/28/20 (!) 169/108     Pended for approval

## 2020-05-22 RX ORDER — FUROSEMIDE 20 MG
TABLET ORAL
Qty: 60 TABLET | Refills: 1 | Status: SHIPPED | OUTPATIENT
Start: 2020-05-22 | End: 2020-07-26

## 2020-05-27 ENCOUNTER — PATIENT OUTREACH (OUTPATIENT)
Dept: CARE COORDINATION | Facility: CLINIC | Age: 60
End: 2020-05-27

## 2020-05-27 NOTE — PROGRESS NOTES
Clinic Care Coordination Contact    Follow Up Progress Note      Assessment: Pt working on adjusting her thoughts about being active and socializing.  She is doing spring tours in her housing development.  With the walking the other day it is causing increased pain.  She is trying to find     Goals addressed this encounter:   Goals Addressed                 This Visit's Progress      #1  Mental Health Management (pt-stated)   On track     Goal Statement: I will work on finding two techniques to help me reduce my depression, in the next month.  Date Goal set: 4/27/2020  Barriers: Social distancing, depression  Strengths: Family, friends, and understanding that a needs some help with my depression  Date to Achieve By: 5/27/20   Patient expressed understanding of goal: Yes  Action steps to achieve this goal:  1. I will search for some mindfulness videos to help with depression  2. I will look at the [facility] website for resources  3. I will work with counseling to learn new tools as well          #2 Being Active (pt-stated)   On track     Goal Statement: I want to increase my activity to help improve my weight and back pain.  I will walk 10 minutes 3 days/week.  Date Goal set: 4/27/2020   Barriers: Depression, back pain, social distancing to some extent  Strengths: Understand that increasing my exercise will help with managing my weight and back pain  Date to Achieve By: 7/26/20   Patient expressed understanding of goal: Yes  Action steps to achieve this goal:  1. I will walk in one direction for 5 minutes and then return for a total of 10 minutes  2. I will not get discouraged if I cannot make it the 5 minutes in one direction before having to turn around  3. I will celebrate my successes             Intervention/Education provided during outreach: Discussed the importance of finding that balance between a good amount of exercise and over exercising.  Patient recognizes that walking for an hour around her neighborhood was  too much and is causing increased pain.    She is working on finding ways to find enjoyment in her life.  She is trying to reframe what is enjoyable and taking the place of previous activities that she cannot do because of the COVID-19.  Reducing her depression has been a focus which is leading her to trying to find new enjoyable activities.     Outreach Frequency: monthly    Plan:   Patient to continue to increase increase her exercises slowly.  Patient to continue to find new activities that help with reducing the depression.  Care Coordinator will follow up in about 6 weeks as  will be out of the office in a month.    JAQUELIN Mensah, [facility] - Care Coordinator   [facility]  5/27/2020   11:56 AM  [phone]

## 2020-06-03 DIAGNOSIS — F41.1 GENERALIZED ANXIETY DISORDER: ICD-10-CM

## 2020-06-03 DIAGNOSIS — F33.1 MODERATE EPISODE OF RECURRENT MAJOR DEPRESSIVE DISORDER (H): ICD-10-CM

## 2020-06-04 RX ORDER — MIRTAZAPINE 15 MG/1
TABLET, FILM COATED ORAL
Qty: 90 TABLET | Refills: 0 | Status: SHIPPED | OUTPATIENT
Start: 2020-06-04 | End: 2020-09-04

## 2020-06-04 NOTE — TELEPHONE ENCOUNTER
Routing refill request to provider for review/approval because:  Drug interaction warning    Last Written Prescription Date:  3-4-20  Last Fill Quantity: 90,  # refills: 0   Last office visit: 3/19/2020 with prescribing provider:  Adrianne Perrin Office Visit:

## 2020-07-10 ENCOUNTER — PATIENT OUTREACH (OUTPATIENT)
Dept: CARE COORDINATION | Facility: CLINIC | Age: 60
End: 2020-07-10

## 2020-07-10 NOTE — PROGRESS NOTES
Clinic Care Coordination Contact    Follow Up Progress Note      Assessment: Patient reports struggling with her depression due to not working and the continued COVID-19 pandemic restrictions.  She has some activities and things she can do yet they are very limited and not helping decrease the depression.    Goals addressed this encounter:   Goals Addressed                 This Visit's Progress      #1  Mental Health Management (pt-stated)   50%     Goal Statement: I will work on finding two techniques to help me reduce my depression, in the next month.  Date Goal set: 4/27/2020  Barriers: Social distancing, depression  Strengths: Family, friends, and understanding that a needs some help with my depression  Date to Achieve By: 1/6/21   Patient expressed understanding of goal: Yes  Action steps to achieve this goal:  1. I will search for some mindfulness videos to help with depression  2. I will look at the Maryanne website for resources  3. I will work with counseling to learn new tools as well          #2 Being Active (pt-stated)   10%     Goal Statement: I want to increase my activity to help improve my weight and back pain.  I will walk 10 minutes 3 days/week.  Date Goal set: 4/27/2020   Barriers: Depression, back pain, social distancing to some extent  Strengths: Understand that increasing my exercise will help with managing my weight and back pain  Date to Achieve By: 1/6/21   Patient expressed understanding of goal: Yes  Action steps to achieve this goal:  1. I will walk in one direction for 5 minutes and then return for a total of 10 minutes  2. I will not get discouraged if I cannot make it the 5 minutes in one direction before having to turn around  3. I will celebrate my successes             Intervention/Education provided during outreach: We talked at length about a way to try and find alternative ways to connect with people.  She has her birthday coming up next month and had an event plan to go to that has  now been canceled.  She enjoys a historical events so we talked about contacting the [place] historical society and maybe the neighboring [facility] historical societies to see if there is anything going on that week of her birthday or the month of her birthday.  Reviewed that going out to some of those things is not always a bad thing if you can protect herself during those events.  Patient agreed that she can do those steps and she continually said she did need to find something to do to celebrate her birthday.    Patient notes that she is not sleeping well and her depression is interfering in multiple areas.  She feels that if she can get a job this will be a big factor in improving her depression.  She does have a couple jobs she is applied for that she would like to get.  She also is planning to do some of the senses visits once they are able to complete those.  Talked about ways to work on trying to get answers regarding those jobs she has applied for a wants to apply for them patient will make some phone calls Monday.    Encourage patient to continue to work on finding ways to manage her depression.  Attempted to talk about exercise and helping the depression.  She is not making much progress on her goal at the current time with exercising.  She indicates her depression and the heat and humidity as factors.     Outreach Frequency: monthly    Plan:   Patient to work on planning a birthday event.  Patient to work on finding a job or something she can do in place of the job that helps with managing her depression.  Encourage patient to call out to  if she needs.  Care Coordinator will follow up in 1 month.    JAQUELIN Mensah, [facility] - Care Coordinator   [facility]  7/10/2020   11:06 AM  [phone]

## 2020-07-23 DIAGNOSIS — R60.0 BILATERAL EDEMA OF LOWER EXTREMITY: ICD-10-CM

## 2020-07-23 DIAGNOSIS — I10 HYPERTENSION GOAL BP (BLOOD PRESSURE) < 140/90: ICD-10-CM

## 2020-07-27 RX ORDER — FUROSEMIDE 20 MG
TABLET ORAL
Qty: 60 TABLET | Refills: 0 | Status: SHIPPED | OUTPATIENT
Start: 2020-07-27 | End: 2020-09-04

## 2020-07-27 NOTE — TELEPHONE ENCOUNTER
"Routing refill request to provider for review/approval because:  Labs out of range:  bp  Patient needs to be seen because:   Return in about 1 month (around 4/19/2020) for Medication check, Virtual Visit..    Medication pended for approval, 30 day supply with reminder.         Requested Prescriptions   Pending Prescriptions Disp Refills     furosemide (LASIX) 20 MG tablet [Pharmacy Med Name: FUROSEMIDE 20MG TABLETS] 60 tablet 1     Sig: TAKE 1 TABLET(20 MG) BY MOUTH TWICE DAILY       Diuretics (Including Combos) Protocol Failed - 7/23/2020 12:51 PM        Failed - Blood pressure under 140/90 in past 12 months     BP Readings from Last 3 Encounters:   05/04/20 (!) 162/93   03/19/20 138/80   01/28/20 (!) 169/108                 Passed - Recent (12 mo) or future (30 days) visit within the authorizing provider's specialty     Patient has had an office visit with the authorizing provider or a provider within the authorizing providers department within the previous 12 mos or has a future within next 30 days. See \"Patient Info\" tab in inbasket, or \"Choose Columns\" in Meds & Orders section of the refill encounter.              Passed - Medication is active on med list        Passed - Patient is age 18 or older        Passed - No active pregancy on record        Passed - Normal serum creatinine on file in past 12 months     Recent Labs   Lab Test 03/19/20  0951   CR 0.70              Passed - Normal serum potassium on file in past 12 months     Recent Labs   Lab Test 03/19/20  0951   POTASSIUM 4.4                    Passed - Normal serum sodium on file in past 12 months     Recent Labs   Lab Test 03/19/20  0951                 Passed - No positive pregnancy test in past 12 months             "

## 2020-08-07 DIAGNOSIS — M54.50 CHRONIC BILATERAL LOW BACK PAIN WITHOUT SCIATICA: ICD-10-CM

## 2020-08-07 DIAGNOSIS — G89.29 CHRONIC BILATERAL LOW BACK PAIN WITHOUT SCIATICA: ICD-10-CM

## 2020-08-07 DIAGNOSIS — F33.1 MODERATE EPISODE OF RECURRENT MAJOR DEPRESSIVE DISORDER (H): ICD-10-CM

## 2020-08-10 NOTE — TELEPHONE ENCOUNTER
Routing refill request to provider for review/approval because:  BP Readings from Last 3 Encounters:   05/04/20 (!) 162/93   03/19/20 138/80   01/28/20 (!) 169/108     PHQ-9 score:    PHQ 4/20/2020   PHQ-9 Total Score 10   Q9: Thoughts of better off dead/self-harm past 2 weeks Not at all

## 2020-08-11 ENCOUNTER — PATIENT OUTREACH (OUTPATIENT)
Dept: NURSING | Facility: CLINIC | Age: 60
End: 2020-08-11
Payer: COMMERCIAL

## 2020-08-11 RX ORDER — DULOXETIN HYDROCHLORIDE 60 MG/1
CAPSULE, DELAYED RELEASE ORAL
Qty: 90 CAPSULE | Refills: 1 | Status: SHIPPED | OUTPATIENT
Start: 2020-08-11 | End: 2020-09-18

## 2020-08-11 NOTE — PROGRESS NOTES
Clinic Care Coordination Contact    Follow Up Progress Note      Assessment: Patient reported that all of her plans for her birthday have been altered.  When she has told family what she has plans to do then other activities or events popup that she ends up having to change her plans.  She notes this is increasing her depression and today she was planning to clean her home.    Goals addressed this encounter:   Goals Addressed                 This Visit's Progress      #1  Mental Health Management (pt-stated)   40%     Goal Statement: I will work on finding two techniques to help me reduce my depression, in the next month.  Date Goal set: 4/27/2020  Barriers: Social distancing, depression  Strengths: Family, friends, and understanding that a needs some help with my depression  Date to Achieve By: 1/6/21   Patient expressed understanding of goal: Yes  Action steps to achieve this goal:  1. I will search for some mindfulness videos to help with depression  2. I will look at the Maryanne website for resources  3. I will work with counseling to learn new tools as well          #2 Being Active (pt-stated)   10%     Goal Statement: I want to increase my activity to help improve my weight and back pain.  I will walk 10 minutes 3 days/week.  Date Goal set: 4/27/2020   Barriers: Depression, back pain, social distancing to some extent  Strengths: Understand that increasing my exercise will help with managing my weight and back pain  Date to Achieve By: 1/6/21   Patient expressed understanding of goal: Yes  Action steps to achieve this goal:  1. I will walk in one direction for 5 minutes and then return for a total of 10 minutes  2. I will not get discouraged if I cannot make it the 5 minutes in one direction before having to turn around  3. I will celebrate my successes             Intervention/Education provided during outreach: We talked about ways she can do events or activities that she finds gildardo in.  She said she still plans  to celebrate her birthday yet is not going to tell family until that day so that there is no way for something to pop up to alter her plans.  We talked about taking today and not clean and get out of the house and go somewhere.  Talked about some different [place] she can go to as she likes being outside in nature.  She was given permission and encouragement to not clean and just take today to get outside and go somewhere.  We talked about how going to some of those places is still safe and she can wear a mask if she is around other people outside.  We also discussed getting a picnic lunch to have while she is outside.  Patient thought this was a good idea and would help.  She chuckled saying the cleaning will be there when I get back.    Patient has not found any tasks or tools to help with her depression.  She notes that her depression is getting a little worse with everything going on.  Patient felt that getting out of the house today will help and she is in agreement.     Outreach Frequency: monthly    Plan:   Patient to get out of the house today and go somewhere and do something just for her that she enjoys.  Care Coordinator will follow up in 1 month.    JAQUELIN Mensah, [facility] - Care Coordinator   [facility]  8/11/2020   9:08 AM  [phone]

## 2020-09-02 DIAGNOSIS — I10 HYPERTENSION GOAL BP (BLOOD PRESSURE) < 140/90: ICD-10-CM

## 2020-09-02 DIAGNOSIS — K21.9 GASTROESOPHAGEAL REFLUX DISEASE, ESOPHAGITIS PRESENCE NOT SPECIFIED: Primary | ICD-10-CM

## 2020-09-02 DIAGNOSIS — F33.1 MODERATE EPISODE OF RECURRENT MAJOR DEPRESSIVE DISORDER (H): ICD-10-CM

## 2020-09-02 DIAGNOSIS — F41.1 GENERALIZED ANXIETY DISORDER: ICD-10-CM

## 2020-09-02 DIAGNOSIS — R60.0 BILATERAL EDEMA OF LOWER EXTREMITY: ICD-10-CM

## 2020-09-04 ENCOUNTER — NURSE TRIAGE (OUTPATIENT)
Dept: NURSING | Facility: CLINIC | Age: 60
End: 2020-09-04

## 2020-09-04 RX ORDER — FUROSEMIDE 20 MG
TABLET ORAL
Qty: 60 TABLET | Refills: 0 | Status: SHIPPED | OUTPATIENT
Start: 2020-09-04 | End: 2020-09-18

## 2020-09-04 RX ORDER — MIRTAZAPINE 15 MG/1
TABLET, FILM COATED ORAL
Qty: 30 TABLET | Refills: 0 | Status: SHIPPED | OUTPATIENT
Start: 2020-09-04 | End: 2020-09-18

## 2020-09-04 NOTE — TELEPHONE ENCOUNTER
Noted. I understand that patient is upset but she is overdue for a med check visit. Hence the short refills until she's seen. Her concerns would easily be taken care of during a med check visit, this can be done Virtual (Tele or Video).  Refill x 1 month completed.

## 2020-09-04 NOTE — TELEPHONE ENCOUNTER
"Pt c/o \"always takes a week for prescriptions to be filled\". Pt states \"really getting tired of this routine\". Pt states she will run out of pills tomorrow. Pt states \"if I can't pick them up today I am never picking them up again\". Pt states \" really, really upset\".   "

## 2020-09-04 NOTE — TELEPHONE ENCOUNTER
"Routing refill request to provider for review/approval because:  Labs out of range:  Bp, phq9  PHQ 10/19/2019 3/19/2020 4/20/2020   PHQ-9 Total Score 4 11 10   Q9: Thoughts of better off dead/self-harm past 2 weeks Not at all Several days Not at all     Omeprazole listed historical  Patient needs to be seen because:  Due for follow up from 3/19/20    Medication pended for approval, 30 day supply with reminder.                 Requested Prescriptions   Pending Prescriptions Disp Refills     omeprazole (PRILOSEC) 20 MG DR capsule [Pharmacy Med Name: OMEPRAZOLE 20MG CAPSULES] 90 capsule      Sig: TAKE 1 CAPSULE(20 MG) BY MOUTH DAILY       PPI Protocol Passed - 9/3/2020  7:49 AM        Passed - Not on Clopidogrel (unless Pantoprazole ordered)        Passed - No diagnosis of osteoporosis on record        Passed - Recent (12 mo) or future (30 days) visit within the authorizing provider's specialty     Patient has had an office visit with the authorizing provider or a provider within the authorizing providers department within the previous 12 mos or has a future within next 30 days. See \"Patient Info\" tab in inbasket, or \"Choose Columns\" in Meds & Orders section of the refill encounter.              Passed - Medication is active on med list        Passed - Patient is age 18 or older        Passed - No active pregnacy on record        Passed - No positive pregnancy test in past 12 months           furosemide (LASIX) 20 MG tablet [Pharmacy Med Name: FUROSEMIDE 20MG TABLETS] 60 tablet 0     Sig: TAKE 1 TABLET(20 MG) BY MOUTH TWICE DAILY       Diuretics (Including Combos) Protocol Failed - 9/3/2020  7:49 AM        Failed - Blood pressure under 140/90 in past 12 months     BP Readings from Last 3 Encounters:   05/04/20 (!) 162/93   03/19/20 138/80   01/28/20 (!) 169/108                 Passed - Recent (12 mo) or future (30 days) visit within the authorizing provider's specialty     Patient has had an office visit with the " "authorizing provider or a provider within the authorizing providers department within the previous 12 mos or has a future within next 30 days. See \"Patient Info\" tab in inbasket, or \"Choose Columns\" in Meds & Orders section of the refill encounter.              Passed - Medication is active on med list        Passed - Patient is age 18 or older        Passed - No active pregancy on record        Passed - Normal serum creatinine on file in past 12 months     Recent Labs   Lab Test 03/19/20  0951   CR 0.70              Passed - Normal serum potassium on file in past 12 months     Recent Labs   Lab Test 03/19/20  0951   POTASSIUM 4.4                    Passed - Normal serum sodium on file in past 12 months     Recent Labs   Lab Test 03/19/20  0951                 Passed - No positive pregnancy test in past 12 months           mirtazapine (REMERON) 15 MG tablet [Pharmacy Med Name: MIRTAZAPINE 15MG TABLETS] 90 tablet 0     Sig: TAKE 1 TABLET BY MOUTH EVERY NIGHT AT BEDTIME       Atypical Antidepressants Protocol Failed - 9/3/2020  7:49 AM        Failed - Patient has PHQ-9 score less than 5 in past 6 months.     Please review last PHQ-9 score.           Passed - Medication active on med list        Passed - Patient is age 18 or older        Passed - No active pregnancy on record        Passed - No positive pregnancy test in past 12 mos        Passed - Recent (6 mo) or future (30 days) visit within the authorizing provider's specialty     Patient had office visit in the last 6 months or has a visit in the next 30 days with authorizing provider or within the authorizing provider's specialty.  See \"Patient Info\" tab in inbasket, or \"Choose Columns\" in Meds & Orders section of the refill encounter.                 "

## 2020-09-08 ENCOUNTER — PATIENT OUTREACH (OUTPATIENT)
Dept: CARE COORDINATION | Facility: CLINIC | Age: 60
End: 2020-09-08

## 2020-09-08 NOTE — PROGRESS NOTES
Clinic Care Coordination Contact    Follow Up Progress Note      Assessment: Patient reports that her depression is a little bit better and she has been out walking 3 times a week up to a mile or little more.  She does note that she is getting more concerned about finances since she is going to be running out of unemployment soon and she has not found a job.    Goals addressed this encounter:   Goals Addressed                 This Visit's Progress      #1  Mental Health Management (pt-stated)   50%     Goal Statement: I will work on finding two techniques to help me reduce my depression, in the next month.  Date Goal set: 4/27/2020  Barriers: Social distancing, depression  Strengths: Family, friends, and understanding that a needs some help with my depression  Date to Achieve By: 1/6/21   Patient expressed understanding of goal: Yes  Action steps to achieve this goal:  1. I will search for some mindfulness videos to help with depression  2. I will look at the Maryanne website for resources  3. I will work with counseling to learn new tools as well          #2 Being Active (pt-stated)   100%     Goal Statement: I want to increase my activity to help improve my weight and back pain.  I will walk at least 10 minutes 3 days/week or more and maintain this for 3 consecutive months.  Date Goal set: 4/27/2020   Barriers: Depression, back pain, social distancing to some extent  Strengths: Understand that increasing my exercise will help with managing my weight and back pain  Date to Achieve By: 1/6/21   Patient expressed understanding of goal: Yes  Action steps to achieve this goal:  1. I will walk in one direction for 5 minutes and then return for a total of 10 minutes  2. I will not get discouraged if I cannot make it the 5 minutes in one direction before having to turn around  3. I will celebrate my successes             Intervention/Education provided during outreach: Patient said the day that she got out of the house and  went to [employer] in [place] was a good day.  She also is getting out walking with her mom 3 days a week.  She will walk in her mom will use a scooter and they go a mile to a mile and a half each time they go.  Patient noted that she lost about 5 pounds and also recognizes that it helps with her stress and depression.    Patient has been applying through multiple different avenues yet has not found any job.  She is starting to worry about finances.  She is a little concerned about what jobs she may find as she does not want to have to limit her contact with her mom yet will if she needs to.     Outreach Frequency: monthly    Plan:   Patient to continue to walk at least 3 times a day at least for 10 minutes.  Patient to continue to look for a job through the avenues she is currently using.  Care Coordinator will follow up in 1 month.    JAQUELIN Mesnah, [facility] - Care Coordinator   [facility]  9/8/2020   12:56 PM  [phone]

## 2020-09-08 NOTE — LETTER
It was a pleasure to speak with you.  I would like to provide you with the enclosed information for your records.  As part of care coordination, we are developing care plans to assist in accomplishing your health care goals.  When we speak next, please feel free to let me know if you want to add or change any information on your care plans.    As always, feel free to contact me if you have any questions or concerns.  I look forward to working with you in the effort to achieve your health care and wellness goals .        Sincerely,      Bell Brito, [facility]  Clinic Care Coordination  [phone]      [facility]  Complex Care Plan  About Me:    Patient Name:  Jessica Jay    YOB: 1960  Age:         60 year old   [facility] MRN:    [id number] Telephone Information:  Home Phone [phone]   Mobile [phone]       Address:  [address] [place] [id number] Email address:  [email or web address]      Emergency Contact(s)    Name Relationship Lgl Grd Work Phone Home Phone Mobile Phone   1. ILIANA JAY Mother   [phone]   2. NOAH TOBIAS Sister  [phone]            Primary language:  English     needed? No   [facility] [employer]:  [phone] op. 1  Other communication barriers: Glasses  Preferred Method of Communication:  Portia  Current living arrangement:    Mobility Status/ Medical Equipment:      Health Maintenance  Health Maintenance Reviewed: Due/Overdue   Health Maintenance Due   Topic Date Due     URINE DRUG SCREEN  1960     ADVANCE CARE PLANNING  1960     ZOSTER IMMUNIZATION (1 of 2) 08/12/2010     PREVENTIVE CARE VISIT  08/13/2019     LIPID  08/13/2019     INFLUENZA VACCINE (1) 09/01/2020     MAMMO SCREENING  08/23/2020     Please talk with your provider about what is needed or can continue to wait.        My Access Plan  Medical Emergency 911   Primary Clinic Line [facility]   [phone]   24 Hour Appointment Line [phone] or  [phone] ([phone]) (toll-free)   24 Hour Nurse Line [phone] (toll-free)   Preferred Urgent Care     Preferred Hospital     Preferred Pharmacy [id number] - [place] [id number]  AT [facility]     Behavioral Health Crisis Line The National Suicide Prevention Lifeline at 1-672.406.7750 or 911             My Care Team Members  Patient Care Team       Relationship Specialty Notifications Start End    Marybeth Silver MD PCP - General Family Practice  5/16/18     Phone: [phone] Fax: [phone]         [address] [address] [id number]    Marybeth Silver MD Assigned PCP   4/15/18     Phone: [phone] Fax: [phone]         [address] [address] [address]    Bell Brito LSW Lead Care Coordinator Primary Care - [facility] Admissions 1/29/20     Phone: [phone] Fax: [phone]                My Care Plans  Self Management and Treatment Plan  Goals and (Comments)  Goals        General    #1  Mental Health Management (pt-stated)     Notes - Note edited  7/10/2020 10:39 AM by Bell Brito LSW    Goal Statement: I will work on finding two techniques to help me reduce my depression, in the next month.  Date Goal set: 4/27/2020  Barriers: Social distancing, depression  Strengths: Family, friends, and understanding that a needs some help with my depression  Date to Achieve By: 1/6/21   Patient expressed understanding of goal: Yes  Action steps to achieve this goal:  1. I will search for some mindfulness videos to help with depression  2. I will look at the Maryanne website for resources  3. I will work with counseling to learn new tools as well        #2 Being Active (pt-stated)     Notes - Note edited  9/8/2020 12:46 PM by Bell Brito LSW    Goal Statement: I want to increase my activity to help improve my weight and back pain.  I will walk at least 10 minutes 3 days/week or more  and maintain this for 3 consecutive months.  Date Goal set: 4/27/2020   Barriers: Depression, back pain, social distancing to some extent  Strengths: Understand that increasing my exercise will help with managing my weight and back pain  Date to Achieve By: 1/6/21   Patient expressed understanding of goal: Yes  Action steps to achieve this goal:  1. I will walk in one direction for 5 minutes and then return for a total of 10 minutes  2. I will not get discouraged if I cannot make it the 5 minutes in one direction before having to turn around  3. I will celebrate my successes             Action Plans on File:            Depression          Advance Care Plans/Directives Type:        My Medical and Care Information  Problem List   Patient Active Problem List   Diagnosis     Depression, major     GERD (gastroesophageal reflux disease)     Migraines     CARDIOVASCULAR SCREENING; LDL GOAL LESS THAN 160     Disc herniation     DDD (degenerative disc disease), lumbar     DDD (degenerative disc disease), cervical     Neck pain     Headache     Chronic daily headache     Dizziness - light-headed     Tobacco abuse     ELYSSA (obstructive sleep apnea)     Spondylolisthesis, grade 1     Chronic low back pain     Brain lipoma     Vulvar dermatitis     History of colonic polyps     Gastroesophageal reflux disease without esophagitis     overweight with BMI >40     Hypertension goal BP (blood pressure) < 140/90     Traylor's esophagus determined by endoscopy      Current Medications and Allergies:  Allergies as of 9/8/2020   Reviewed by Luz Amor CMA on 5/4/2020    Severity  Noted  Reaction Type  Reactions    Buspar [buspirone Hcl]  Not Specified  11/18/2009     Rash    Medications - This is your record. Keep this with you and show to your community pharmacist(s) and physician(s) at each visit.      Instructions for use    DULoxetine (CYMBALTA) 60 MG capsule  TAKE 1 CAPSULE BY MOUTH DAILY    furosemide (LASIX) 20 MG tablet  TAKE  1 TABLET(20 MG) BY MOUTH TWICE DAILY    metoprolol succinate ER (TOPROL-XL) 25 MG 24 hr tablet  Take 1 tablet (25 mg) by mouth daily    mirtazapine (REMERON) 15 MG tablet  TAKE 1 TABLET BY MOUTH EVERY NIGHT AT BEDTIME    omeprazole (PRILOSEC) 20 MG DR capsule  TAKE 1 CAPSULE(20 MG) BY MOUTH DAILY          Care Coordination Start Date: 1/29/2020   Frequency of Care Coordination: monthly   Form Last Updated: 09/08/2020

## 2020-09-18 ENCOUNTER — VIRTUAL VISIT (OUTPATIENT)
Dept: FAMILY MEDICINE | Facility: CLINIC | Age: 60
End: 2020-09-18
Payer: COMMERCIAL

## 2020-09-18 DIAGNOSIS — R60.0 BILATERAL EDEMA OF LOWER EXTREMITY: ICD-10-CM

## 2020-09-18 DIAGNOSIS — F33.1 MODERATE EPISODE OF RECURRENT MAJOR DEPRESSIVE DISORDER (H): ICD-10-CM

## 2020-09-18 DIAGNOSIS — F41.1 GENERALIZED ANXIETY DISORDER: ICD-10-CM

## 2020-09-18 DIAGNOSIS — K21.9 GASTROESOPHAGEAL REFLUX DISEASE, ESOPHAGITIS PRESENCE NOT SPECIFIED: ICD-10-CM

## 2020-09-18 DIAGNOSIS — I10 HYPERTENSION GOAL BP (BLOOD PRESSURE) < 140/90: ICD-10-CM

## 2020-09-18 PROCEDURE — 99214 OFFICE O/P EST MOD 30 MIN: CPT | Mod: 95 | Performed by: FAMILY MEDICINE

## 2020-09-18 RX ORDER — MIRTAZAPINE 15 MG/1
15 TABLET, FILM COATED ORAL AT BEDTIME
Qty: 90 TABLET | Refills: 1 | Status: SHIPPED | OUTPATIENT
Start: 2020-09-18 | End: 2020-10-22

## 2020-09-18 RX ORDER — FUROSEMIDE 20 MG
TABLET ORAL
Qty: 180 TABLET | Refills: 1 | Status: SHIPPED | OUTPATIENT
Start: 2020-09-18 | End: 2021-03-29

## 2020-09-18 RX ORDER — DULOXETIN HYDROCHLORIDE 60 MG/1
CAPSULE, DELAYED RELEASE ORAL
Qty: 90 CAPSULE | Refills: 1 | Status: SHIPPED | OUTPATIENT
Start: 2020-09-18 | End: 2020-12-16

## 2020-09-18 ASSESSMENT — ANXIETY QUESTIONNAIRES
GAD7 TOTAL SCORE: 5
3. WORRYING TOO MUCH ABOUT DIFFERENT THINGS: NEARLY EVERY DAY
IF YOU CHECKED OFF ANY PROBLEMS ON THIS QUESTIONNAIRE, HOW DIFFICULT HAVE THESE PROBLEMS MADE IT FOR YOU TO DO YOUR WORK, TAKE CARE OF THINGS AT HOME, OR GET ALONG WITH OTHER PEOPLE: NOT DIFFICULT AT ALL
7. FEELING AFRAID AS IF SOMETHING AWFUL MIGHT HAPPEN: NOT AT ALL
6. BECOMING EASILY ANNOYED OR IRRITABLE: SEVERAL DAYS
2. NOT BEING ABLE TO STOP OR CONTROL WORRYING: SEVERAL DAYS
5. BEING SO RESTLESS THAT IT IS HARD TO SIT STILL: NOT AT ALL
1. FEELING NERVOUS, ANXIOUS, OR ON EDGE: NOT AT ALL

## 2020-09-18 ASSESSMENT — PATIENT HEALTH QUESTIONNAIRE - PHQ9
5. POOR APPETITE OR OVEREATING: NOT AT ALL
SUM OF ALL RESPONSES TO PHQ QUESTIONS 1-9: 7

## 2020-09-18 NOTE — PROGRESS NOTES
"Jessica Jay is a 60 year old female who is being evaluated via a billable telephone visit.      The patient has been notified of following:     \"This telephone visit will be conducted via a call between you and your physician/provider. We have found that certain health care needs can be provided without the need for a physical exam.  This service lets us provide the care you need with a short phone conversation.  If a prescription is necessary we can send it directly to your pharmacy.  If lab work is needed we can place an order for that and you can then stop by our lab to have the test done at a later time.    Telephone visits are billed at different rates depending on your insurance coverage. During this emergency period, for some insurers they may be billed the same as an in-person visit.  Please reach out to your insurance provider with any questions.    If during the course of the call the physician/provider feels a telephone visit is not appropriate, you will not be charged for this service.\"    Patient has given verbal consent for Telephone visit?  Yes    What phone number would you like to be contacted at? [phone]    How would you like to obtain your AVS? Portia Grier     Jessica Jay is a 60 year old female who presents via phone visit today for the following health issues:    HPI    Hypertension Follow-up    Currently on Metoprolol 25 mg/day + Lasix 20 mg BID- tolerating well.   Not checking BPs at home.   Last office BPs-    BP Readings from Last 3 Encounters:   05/04/20 (!) 162/93   03/19/20 138/80   01/28/20 (!) 169/108       Do you check your blood pressure regularly outside of the clinic? No     Are you following a low salt diet? Yes    Are your blood pressures ever more than 140 on the top number (systolic) OR more   than 90 on the bottom number (diastolic), for example 140/90? Not sure    Depression and Anxiety Follow-Up  Currently on Cymbalta 60 mg/day + Mirtazapine 15 " mg/day- tolerating well.     How are you doing with your depression since your last visit? Improved     How are you doing with your anxiety since your last visit?  No change    Are you having other symptoms that might be associated with depression or anxiety? Yes:  stress    Have you had a significant life event? Job Concerns     Do you have any concerns with your use of alcohol or other drugs? No    Social History     Tobacco Use     Smoking status: Former Smoker     Packs/day: 0.80     Years: 30.00     Pack years: 24.00     Types: Cigarettes     Last attempt to quit: 7/4/2017     Years since quitting: 3.2     Smokeless tobacco: Never Used   Substance Use Topics     Alcohol use: Yes     Alcohol/week: 0.0 standard drinks     Comment: 6 drinks a year     Drug use: No     PHQ 3/19/2020 4/20/2020 9/18/2020   PHQ-9 Total Score 11 10 7   Q9: Thoughts of better off dead/self-harm past 2 weeks Several days Not at all Not at all     GABRIELA-7 SCORE 3/19/2020 4/20/2020 9/18/2020   Total Score - - -   Total Score 12 8 5     Last PHQ-9 9/18/2020   1.  Little interest or pleasure in doing things 0   2.  Feeling down, depressed, or hopeless 2   3.  Trouble falling or staying asleep, or sleeping too much 3   4.  Feeling tired or having little energy 2   5.  Poor appetite or overeating 0   6.  Feeling bad about yourself 0   7.  Trouble concentrating 0   8.  Moving slowly or restless 0   Q9: Thoughts of better off dead/self-harm past 2 weeks 0   PHQ-9 Total Score 7   Difficulty at work, home, or with people Not difficult at all     GABRIELA-7  9/18/2020   1. Feeling nervous, anxious, or on edge 0   2. Not being able to stop or control worrying 1   3. Worrying too much about different things 3   4. Trouble relaxing 0   5. Being so restless that it is hard to sit still 0   6. Becoming easily annoyed or irritable 1   7. Feeling afraid, as if something awful might happen 0   GABRIELA-7 Total Score 5   If you checked any problems, how difficult have  they made it for you to do your work, take care of things at home, or get along with other people? Not difficult at all       Suicide Assessment Five-step Evaluation and Treatment (SAFE-T)      How many servings of fruits and vegetables do you eat daily?  0-1    On average, how many sweetened beverages do you drink each day (Examples: soda, juice, sweet tea, etc.  Do NOT count diet or artificially sweetened beverages)?   0    How many days per week do you exercise enough to make your heart beat faster? 3 or less    How many minutes a day do you exercise enough to make your heart beat faster? 30 - 60    How many days per week do you miss taking your medication? 0    Medication Followup of Omeprazole (prilosec) 20 mg    Taking Medication as prescribed: yes    Side Effects:  None    Medication Helping Symptoms:  yes              Review of Systems   Constitutional, HEENT, cardiovascular, pulmonary, gi and gu systems are negative, except as otherwise noted.       Objective          Vitals:  No vitals were obtained today due to virtual visit.    healthy, alert and no distress  PSYCH: Alert and oriented times 3; coherent speech, normal   rate and volume, able to articulate logical thoughts, able   to abstract reason, no tangential thoughts, no hallucinations   or delusions  Her affect is normal  RESP: No cough, no audible wheezing, able to talk in full sentences  Remainder of exam unable to be completed due to telephone visits            Assessment/Plan:    Assessment & Plan     Jessica was seen today for recheck medication.    Diagnoses and all orders for this visit:    Moderate episode of recurrent major depressive disorder (H), stable  -     PHQ-9/GABRIELA 7 completed, see above/Epic for details     -    Refill: DULoxetine (CYMBALTA) 60 MG capsule; TAKE 1 CAPSULE BY MOUTH DAILY  -    Refill: mirtazapine (REMERON) 15 MG tablet; Take 1 tablet (15 mg) by mouth At Bedtime    Generalized anxiety disorder, stable  -  PHQ-9/GABRIELA 7  completed, see above/Epic for details      -  Refill: mirtazapine (REMERON) 15 MG tablet; Take 1 tablet (15 mg) by mouth At Bedtime    Hypertension goal BP (blood pressure) < 140/90, control unknown at this time  -   Continue current management until next BP check.  -    Refill: furosemide (LASIX) 20 MG tablet; TAKE 1 TABLET(20 MG) BY MOUTH TWICE DAILY    Bilateral edema of lower extremity  -    Refill:  furosemide (LASIX) 20 MG tablet; TAKE 1 TABLET(20 MG) BY MOUTH TWICE DAILY    Gastroesophageal reflux disease, esophagitis presence not specified  -     Refill; omeprazole (PRILOSEC) 20 MG DR capsule; Take 1 capsule (20 mg) by mouth daily          Return in about 1 month (around 10/18/2020) for a Physical Exam at your earliest convenience..    Marybeth Silver MD  [facility]    Phone call duration:  22 minutes

## 2020-09-19 ASSESSMENT — ANXIETY QUESTIONNAIRES: GAD7 TOTAL SCORE: 5

## 2020-10-15 ENCOUNTER — PATIENT OUTREACH (OUTPATIENT)
Dept: NURSING | Facility: CLINIC | Age: 60
End: 2020-10-15
Payer: COMMERCIAL

## 2020-10-15 NOTE — PROGRESS NOTES
Clinic Care Coordination Contact    Follow Up Progress Note      Assessment: Patient reports no real changes in her situation with work.  She is still not found a job and it is causing some challenges with her depression.  Patient is also noting leg pain which is limiting her ability to walk as well as her sleep.    Goals addressed this encounter:   Goals Addressed                 This Visit's Progress      #1  Mental Health Management (pt-stated)   50%     Goal Statement: I will work on finding two techniques to help me reduce my depression, in the next month.  Date Goal set: 4/27/2020  Barriers: Social distancing, depression  Strengths: Family, friends, and understanding that a needs some help with my depression  Date to Achieve By: 1/6/21   Patient expressed understanding of goal: Yes  Action steps to achieve this goal:  1. I will search for some mindfulness videos to help with depression  2. I will look at the [employer] website for resources  3. I will work with counseling to learn new tools as well          #2 Being Active (pt-stated)   80%     Goal Statement: I want to increase my activity to help improve my weight and back pain.  I will walk at least 10 minutes 3 days/week or more and maintain this for 3 consecutive months.  Date Goal set: 4/27/2020   Barriers: Depression, back pain, social distancing to some extent  Strengths: Understand that increasing my exercise will help with managing my weight and back pain  Date to Achieve By: 1/6/21   Patient expressed understanding of goal: Yes  Action steps to achieve this goal:  1. I will walk in one direction for 5 minutes and then return for a total of 10 minutes  2. I will not get discouraged if I cannot make it the 5 minutes in one direction before having to turn around  3. I will celebrate my successes             Intervention/Education provided during outreach: Patient does have an appointment with her provider next week to follow-up with the leg pain.    We  discussed options for help in finding a job.  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  She recognizes that there may be some challenges with the Covid and the economy changes.    Encourage patient to continue to work on finding mindfulness or other tools to help deal with her depression.     Outreach Frequency: monthly    Plan:   Patient to attend appointment.  Patient to continue to look for work and practice on increasing her tools to help with depression.  Care Coordinator will follow up in 1 month.    JAQUELIN Mensah, [facility] - Care Coordinator   [facility]  10/15/2020   10:58 AM  [phone]

## 2020-10-22 ENCOUNTER — OFFICE VISIT (OUTPATIENT)
Dept: FAMILY MEDICINE | Facility: CLINIC | Age: 60
End: 2020-10-22
Payer: COMMERCIAL

## 2020-10-22 VITALS
TEMPERATURE: 97.8 F | HEIGHT: 61 IN | HEART RATE: 62 BPM | SYSTOLIC BLOOD PRESSURE: 141 MMHG | RESPIRATION RATE: 22 BRPM | WEIGHT: 293 LBS | OXYGEN SATURATION: 97 % | BODY MASS INDEX: 55.32 KG/M2 | DIASTOLIC BLOOD PRESSURE: 85 MMHG

## 2020-10-22 DIAGNOSIS — Z12.31 ENCOUNTER FOR SCREENING MAMMOGRAM FOR BREAST CANCER: ICD-10-CM

## 2020-10-22 DIAGNOSIS — F41.1 GENERALIZED ANXIETY DISORDER: ICD-10-CM

## 2020-10-22 DIAGNOSIS — M51.369 DDD (DEGENERATIVE DISC DISEASE), LUMBAR: ICD-10-CM

## 2020-10-22 DIAGNOSIS — Z00.00 ROUTINE GENERAL MEDICAL EXAMINATION AT A HEALTH CARE FACILITY: Primary | ICD-10-CM

## 2020-10-22 DIAGNOSIS — E78.5 HYPERLIPIDEMIA LDL GOAL <130: ICD-10-CM

## 2020-10-22 DIAGNOSIS — F51.05 INSOMNIA SECONDARY TO DEPRESSION WITH ANXIETY: ICD-10-CM

## 2020-10-22 DIAGNOSIS — Z59.9 FINANCIAL DIFFICULTIES: ICD-10-CM

## 2020-10-22 DIAGNOSIS — F33.1 MODERATE EPISODE OF RECURRENT MAJOR DEPRESSIVE DISORDER (H): ICD-10-CM

## 2020-10-22 DIAGNOSIS — F41.8 INSOMNIA SECONDARY TO DEPRESSION WITH ANXIETY: ICD-10-CM

## 2020-10-22 DIAGNOSIS — Z87.891 PERSONAL HISTORY OF TOBACCO USE: ICD-10-CM

## 2020-10-22 LAB
ALBUMIN SERPL-MCNC: 3.7 G/DL (ref 3.4–5)
ALP SERPL-CCNC: 140 U/L (ref 40–150)
ALT SERPL W P-5'-P-CCNC: 200 U/L (ref 0–50)
ANION GAP SERPL CALCULATED.3IONS-SCNC: 7 MMOL/L (ref 3–14)
AST SERPL W P-5'-P-CCNC: 217 U/L (ref 0–45)
BILIRUB SERPL-MCNC: 0.4 MG/DL (ref 0.2–1.3)
BUN SERPL-MCNC: 11 MG/DL (ref 7–30)
CALCIUM SERPL-MCNC: 9.1 MG/DL (ref 8.5–10.1)
CHLORIDE SERPL-SCNC: 103 MMOL/L (ref 94–109)
CHOLEST SERPL-MCNC: 227 MG/DL
CO2 SERPL-SCNC: 28 MMOL/L (ref 20–32)
CREAT SERPL-MCNC: 0.69 MG/DL (ref 0.52–1.04)
ERYTHROCYTE [DISTWIDTH] IN BLOOD BY AUTOMATED COUNT: 15.3 % (ref 10–15)
GFR SERPL CREATININE-BSD FRML MDRD: >90 ML/MIN/{1.73_M2}
GLUCOSE SERPL-MCNC: 167 MG/DL (ref 70–99)
HCT VFR BLD AUTO: 43.5 % (ref 35–47)
HDLC SERPL-MCNC: 47 MG/DL
HGB BLD-MCNC: 13.8 G/DL (ref 11.7–15.7)
LDLC SERPL CALC-MCNC: 158 MG/DL
MCH RBC QN AUTO: 25.4 PG (ref 26.5–33)
MCHC RBC AUTO-ENTMCNC: 31.7 G/DL (ref 31.5–36.5)
MCV RBC AUTO: 80 FL (ref 78–100)
NONHDLC SERPL-MCNC: 180 MG/DL
PLATELET # BLD AUTO: 356 10E9/L (ref 150–450)
POTASSIUM SERPL-SCNC: 4 MMOL/L (ref 3.4–5.3)
PROT SERPL-MCNC: 8.1 G/DL (ref 6.8–8.8)
RBC # BLD AUTO: 5.44 10E12/L (ref 3.8–5.2)
SODIUM SERPL-SCNC: 138 MMOL/L (ref 133–144)
T4 FREE SERPL-MCNC: 0.97 NG/DL (ref 0.76–1.46)
TRIGL SERPL-MCNC: 112 MG/DL
TSH SERPL DL<=0.005 MIU/L-ACNC: 5.38 MU/L (ref 0.4–4)
WBC # BLD AUTO: 5.7 10E9/L (ref 4–11)

## 2020-10-22 PROCEDURE — G0296 VISIT TO DETERM LDCT ELIG: HCPCS | Performed by: FAMILY MEDICINE

## 2020-10-22 PROCEDURE — 36415 COLL VENOUS BLD VENIPUNCTURE: CPT | Performed by: FAMILY MEDICINE

## 2020-10-22 PROCEDURE — 80053 COMPREHEN METABOLIC PANEL: CPT | Performed by: FAMILY MEDICINE

## 2020-10-22 PROCEDURE — 80061 LIPID PANEL: CPT | Performed by: FAMILY MEDICINE

## 2020-10-22 PROCEDURE — 99214 OFFICE O/P EST MOD 30 MIN: CPT | Mod: 25 | Performed by: FAMILY MEDICINE

## 2020-10-22 PROCEDURE — 85027 COMPLETE CBC AUTOMATED: CPT | Performed by: FAMILY MEDICINE

## 2020-10-22 PROCEDURE — 84439 ASSAY OF FREE THYROXINE: CPT | Performed by: FAMILY MEDICINE

## 2020-10-22 PROCEDURE — 99396 PREV VISIT EST AGE 40-64: CPT | Performed by: FAMILY MEDICINE

## 2020-10-22 PROCEDURE — 84443 ASSAY THYROID STIM HORMONE: CPT | Performed by: FAMILY MEDICINE

## 2020-10-22 RX ORDER — CYCLOBENZAPRINE HCL 5 MG
5 TABLET ORAL 3 TIMES DAILY PRN
Qty: 42 TABLET | Refills: 0 | Status: SHIPPED | OUTPATIENT
Start: 2020-10-22 | End: 2021-06-22

## 2020-10-22 RX ORDER — TRAZODONE HYDROCHLORIDE 50 MG/1
50 TABLET, FILM COATED ORAL AT BEDTIME
Qty: 30 TABLET | Refills: 0 | Status: SHIPPED | OUTPATIENT
Start: 2020-10-22 | End: 2020-11-17

## 2020-10-22 SDOH — ECONOMIC STABILITY - INCOME SECURITY: PROBLEM RELATED TO HOUSING AND ECONOMIC CIRCUMSTANCES, UNSPECIFIED: Z59.9

## 2020-10-22 ASSESSMENT — PATIENT HEALTH QUESTIONNAIRE - PHQ9
SUM OF ALL RESPONSES TO PHQ QUESTIONS 1-9: 19
5. POOR APPETITE OR OVEREATING: SEVERAL DAYS

## 2020-10-22 ASSESSMENT — ANXIETY QUESTIONNAIRES
5. BEING SO RESTLESS THAT IT IS HARD TO SIT STILL: SEVERAL DAYS
2. NOT BEING ABLE TO STOP OR CONTROL WORRYING: NEARLY EVERY DAY
IF YOU CHECKED OFF ANY PROBLEMS ON THIS QUESTIONNAIRE, HOW DIFFICULT HAVE THESE PROBLEMS MADE IT FOR YOU TO DO YOUR WORK, TAKE CARE OF THINGS AT HOME, OR GET ALONG WITH OTHER PEOPLE: SOMEWHAT DIFFICULT
1. FEELING NERVOUS, ANXIOUS, OR ON EDGE: MORE THAN HALF THE DAYS
GAD7 TOTAL SCORE: 12
3. WORRYING TOO MUCH ABOUT DIFFERENT THINGS: NEARLY EVERY DAY
6. BECOMING EASILY ANNOYED OR IRRITABLE: MORE THAN HALF THE DAYS
7. FEELING AFRAID AS IF SOMETHING AWFUL MIGHT HAPPEN: NOT AT ALL

## 2020-10-22 ASSESSMENT — MIFFLIN-ST. JEOR: SCORE: 1842.77

## 2020-10-22 NOTE — PROGRESS NOTES
Lung Cancer Screening Shared Decision Making Visit     Jessica Jay is eligible for lung cancer screening on the basis of the information provided in my signed lung cancer screening order.     I have discussed with patient the risks and benefits of screening for lung cancer with low-dose CT.     The risks include:  radiation exposure: one low dose chest CT has as much ionizing radiation as about 15 chest x-rays or 6 months of background radiation living in [place]    false positives: 96% of positive findings/nodules are NOT cancer, but some might still require additional diagnostic evaluation, including biopsy  over-diagnosis: some slow growing cancers that might never have been clinically significant will be detected and treated unnecessarily     The benefit of early detection of lung cancer is contingent upon adherence to annual screening or more frequent follow up if indicated.     Furthermore, reaping the benefits of screening requires Jessica Jay to be willing and physically able to undergo diagnostic procedures, if indicated. Although no specific guide is available for determining severity of comorbidities, it is reasonable to withhold screening in patients who have greater mortality risk from other diseases.     We did discuss that the only way to prevent lung cancer is to not smoke. Smoking cessation counseling was not given.      I did not offer risk estimation using a calculator such as this one:    ShouldIScreen

## 2020-10-22 NOTE — LETTER
October 28, 2020      Jessica Jay  [address] [address]        Dear ,    We are writing to inform you of your test results.    Your recent labs showed -      Thyroid function test (TSH) was slightly elevated, follow up lab test (free T4) was normal- this means that your thyroid function is slightly underactive but not to the extent of needing medication (subclinical hypothyroidism)- will monitor. Repeat labs in 4 weeks.     Cholesterol was higher than it was 2 years ago. I strongly encourage you to continue efforts to eat a well balanced heart healthy diet and exercise regularly.      Complete Metabolic Panel (panel that checks liver function, kidney function and electrolytes) was normal except that your liver enzymes were elevated. I suspect this could be due to a fatty liver but will need to further evaluate. Glucose was also elevated, concerning for new onset diabetes.  Please schedule a follow up visit to discuss these lab abnormalities, discuss next steps and check for diabetes with an A1C  test done during the office visit. This will check your blood glucose average over the past 3 months.      Please schedule an office visit at your earliest convenience.     Resulted Orders   Lipid panel reflex to direct LDL Fasting   Result Value Ref Range    Cholesterol 227 (H) <200 mg/dL      Comment:      Desirable:       <200 mg/dl    Triglycerides 112 <150 mg/dL    HDL Cholesterol 47 (L) >49 mg/dL    LDL Cholesterol Calculated 158 (H) <100 mg/dL      Comment:      Above desirable:  100-129 mg/dl  Borderline High:  130-159 mg/dL  High:             160-189 mg/dL  Very high:       >189 mg/dl      Non HDL Cholesterol 180 (H) <130 mg/dL      Comment:      Above Desirable:  130-159 mg/dl  Borderline high:  160-189 mg/dl  High:             190-219 mg/dl  Very high:       >219 mg/dl     CBC with platelets   Result Value Ref Range    WBC 5.7 4.0 - 11.0 10e9/L    RBC Count 5.44 (H) 3.8 - 5.2  10e12/L    Hemoglobin 13.8 11.7 - 15.7 g/dL    Hematocrit 43.5 35.0 - 47.0 %    MCV 80 78 - 100 fl    MCH 25.4 (L) 26.5 - 33.0 pg    MCHC 31.7 31.5 - 36.5 g/dL    RDW 15.3 (H) 10.0 - 15.0 %    Platelet Count 356 150 - 450 10e9/L   TSH with free T4 reflex   Result Value Ref Range    TSH 5.38 (H) 0.40 - 4.00 mU/L   Comprehensive metabolic panel   Result Value Ref Range    Sodium 138 133 - 144 mmol/L    Potassium 4.0 3.4 - 5.3 mmol/L    Chloride 103 94 - 109 mmol/L    Carbon Dioxide 28 20 - 32 mmol/L    Anion Gap 7 3 - 14 mmol/L    Glucose 167 (H) 70 - 99 mg/dL    Urea Nitrogen 11 7 - 30 mg/dL    Creatinine 0.69 0.52 - 1.04 mg/dL    GFR Estimate >90 >60 mL/min/[1.73_m2]      Comment:      Non  GFR Calc  Starting 12/18/2018, serum creatinine based estimated GFR (eGFR) will be   calculated using the Chronic Kidney Disease Epidemiology Collaboration   (CKD-EPI) equation.      GFR Estimate If Black >90 >60 mL/min/[1.73_m2]      Comment:       GFR Calc  Starting 12/18/2018, serum creatinine based estimated GFR (eGFR) will be   calculated using the Chronic Kidney Disease Epidemiology Collaboration   (CKD-EPI) equation.      Calcium 9.1 8.5 - 10.1 mg/dL    Bilirubin Total 0.4 0.2 - 1.3 mg/dL    Albumin 3.7 3.4 - 5.0 g/dL    Protein Total 8.1 6.8 - 8.8 g/dL    Alkaline Phosphatase 140 40 - 150 U/L     (H) 0 - 50 U/L     (H) 0 - 45 U/L   T4 free   Result Value Ref Range    T4 Free 0.97 0.76 - 1.46 ng/dL       If you have any questions or concerns, please call the clinic at the number listed above.       Sincerely,        Marybeth Silver MD/libia
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
## 2020-10-23 ENCOUNTER — PATIENT OUTREACH (OUTPATIENT)
Dept: NURSING | Facility: CLINIC | Age: 60
End: 2020-10-23
Payer: COMMERCIAL

## 2020-10-23 ASSESSMENT — ANXIETY QUESTIONNAIRES: GAD7 TOTAL SCORE: 12

## 2020-10-23 NOTE — PROGRESS NOTES
Clinic Care Coordination Contact    Follow Up Progress Note      Assessment: Call placed to patient as PCP put in another referral for financial concerns.  Upon call patient was distracted and you could hear some concern in her voice.    Goals addressed this encounter:   Goals Addressed                 This Visit's Progress      #1  Mental Health Management (pt-stated)   50%     Goal Statement: I will work on finding two techniques to help me reduce my depression, in the next month.  Date Goal set: 4/27/2020  Barriers: Social distancing, depression  Strengths: Family, friends, and understanding that a needs some help with my depression  Date to Achieve By: 1/6/21   Patient expressed understanding of goal: Yes  Action steps to achieve this goal:  1. I will search for some mindfulness videos to help with depression  2. I will look at the [employer] website for resources  3. I will work with counseling to learn new tools as well          #2 Being Active (pt-stated)   80%     Goal Statement: I want to increase my activity to help improve my weight and back pain.  I will walk at least 10 minutes 3 days/week or more and maintain this for 3 consecutive months.  Date Goal set: 4/27/2020   Barriers: Depression, back pain, social distancing to some extent  Strengths: Understand that increasing my exercise will help with managing my weight and back pain  Date to Achieve By: 1/6/21   Patient expressed understanding of goal: Yes  Action steps to achieve this goal:  1. I will walk in one direction for 5 minutes and then return for a total of 10 minutes  2. I will not get discouraged if I cannot make it the 5 minutes in one direction before having to turn around  3. I will celebrate my successes             Intervention/Education provided during outreach: Patient has not returned to work yet and she is worried about making payments to her mom for the home.  Her mom borrowed her the money to buy the home and she is paying her mom back.   This creates a challenge with trying to apply for supports when it is not a bank loan or her rent to a facility or apartment.    Patient said they had gotten work today that her brother was found passed out.  He is currently at the hospital and her sister is bringing her mom to the hospital.  She is waiting for a phone call so she can update others as to what is going on.    Offered empathy to patient for all she is going through.  Encouraged her to practice good self-care for herself.     Outreach Frequency: monthly    Plan:   Patient to work on good self-care to the stressful time.  Care Coordinator will follow up in 3-5 business days.    JAQUELIN Mensah, [facility] - Care Coordinator   [facility]  10/23/2020   3:52 PM  [phone]

## 2020-10-26 ENCOUNTER — ANCILLARY PROCEDURE (OUTPATIENT)
Dept: CT IMAGING | Facility: CLINIC | Age: 60
End: 2020-10-26
Attending: FAMILY MEDICINE
Payer: COMMERCIAL

## 2020-10-26 DIAGNOSIS — Z87.891 PERSONAL HISTORY OF TOBACCO USE: ICD-10-CM

## 2020-10-26 PROCEDURE — G0297 LDCT FOR LUNG CA SCREEN: HCPCS | Mod: TC | Performed by: RADIOLOGY

## 2020-10-27 ENCOUNTER — PATIENT OUTREACH (OUTPATIENT)
Dept: NURSING | Facility: CLINIC | Age: 60
End: 2020-10-27
Payer: COMMERCIAL
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
## 2020-10-29 ENCOUNTER — OFFICE VISIT (OUTPATIENT)
Dept: NEUROSURGERY | Facility: CLINIC | Age: 60
End: 2020-10-29
Attending: FAMILY MEDICINE
Payer: COMMERCIAL

## 2020-10-29 VITALS
OXYGEN SATURATION: 98 % | BODY MASS INDEX: 55.32 KG/M2 | WEIGHT: 293 LBS | HEIGHT: 61 IN | DIASTOLIC BLOOD PRESSURE: 96 MMHG | SYSTOLIC BLOOD PRESSURE: 165 MMHG | HEART RATE: 97 BPM

## 2020-10-29 DIAGNOSIS — M54.16 LUMBAR RADICULOPATHY: Primary | ICD-10-CM

## 2020-10-29 DIAGNOSIS — M51.369 DDD (DEGENERATIVE DISC DISEASE), LUMBAR: ICD-10-CM

## 2020-10-29 PROCEDURE — 99214 OFFICE O/P EST MOD 30 MIN: CPT | Performed by: NURSE PRACTITIONER

## 2020-10-29 ASSESSMENT — MIFFLIN-ST. JEOR: SCORE: 1840.96

## 2020-10-29 ASSESSMENT — PAIN SCALES - GENERAL: PAINLEVEL: SEVERE PAIN (6)

## 2020-10-29 NOTE — LETTER
10/29/2020         RE: Jessica Jay  [address] [facility] [address]        Dear Colleague,    Thank you for referring your patient, Jessica Jay, to [facility]. Please see a copy of my visit note below.    Dr. Rock Julian  [facility] Neurosurgery Clinic Visit      CC: low back and leg pain    Primary care Provider: Marybeth Silver      Reason For Visit:   I was asked by Dr. Silver to consult on the patient for lumbar DDD.      HPI: Jessica Jay is a 60 year old female who presents for evaluation of chronic low back pain that recently increased about 1 month ago after going on a walk, but she denies any falls or injuries at that time. Pain is located in the low back and radiates to bilateral hips and along the lateral aspect of left > right thighs. She notes paresthesias in the left thigh as well. Denies weakness. Describes the pain as a dull ache. Pain is worsened with activity, prolonged sitting, or lying on her side. Tried Flexeril, stretching, Tylenol, and Ibuprofen, but these modalities do not provide lasting relief. She also tried PT and injections few years ago, but nothing recent. No past spine surgeries. Denies any falls, foot drop, coordination/gait changes, or bladder/bowel incontinence.     Current pain: 6/10   At worst: 10/10    Past Medical History:   Diagnosis Date     Cellulitis 9/26/2010    [facility]     Degenerative disc disease     cervical     Depression, major      Depressive disorder      Dermoid cyst of brain (H)      GERD (gastroesophageal reflux disease)      LBP (low back pain)      Migraines      Panic attacks      Seasonal allergies      Sleep apnoea     Wears C-PAP       Past Medical History reviewed with patient during visit.    Past Surgical History:   Procedure Laterality Date     ARTHROSCOPY SHOULDER  1990    Right/spurs     C FOOT/TOES SURGERY PROC UNLISTED  1975    Toe fracture, left     CARPAL TUNNEL  RELEASE RT/LT  1990    Left     COLONOSCOPY  2016     CRANIOTOMY, EXCISE TUMOR COMPLEX, COMBINED  5/9/2011    Procedure:COMBINED CRANIOTOMY, EXCISE TUMOR COMPLEX; 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; Surgeon:LEE ANN PAULA; Location:[facility]     ROTATOR CUFF REPAIR RT/LT  2009     Left     Past Surgical History reviewed with patient during visit.    Current Outpatient Medications   Medication     cyclobenzaprine (FLEXERIL) 5 MG tablet     DULoxetine (CYMBALTA) 60 MG capsule     furosemide (LASIX) 20 MG tablet     metoprolol succinate ER (TOPROL-XL) 25 MG 24 hr tablet     omeprazole (PRILOSEC) 20 MG DR capsule     traZODone (DESYREL) 50 MG tablet     Current Facility-Administered Medications   Medication     lidocaine 1 % injection 0.5 mL     triamcinolone (KENALOG-40) injection 20 mg       Allergies   Allergen Reactions     Buspar [Buspirone Hcl] Rash       Social History     Socioeconomic History     Marital status: Single     Spouse name: Not on file     Number of children: 0     Years of education: 18     Highest education level: Not on file   Occupational History     Occupation:      Employer: [employer]   Social Needs     Financial resource strain: Not very hard     Food insecurity     Worry: Never true     Inability: Never true     Transportation needs     Medical: No     Non-medical: No   Tobacco Use     Smoking status: Former Smoker     Packs/day: 0.80     Years: 30.00     Pack years: 24.00     Types: Cigarettes     Quit date: 7/4/2017     Years since quitting: 3.3     Smokeless tobacco: Never Used   Substance and Sexual Activity     Alcohol use: Yes     Alcohol/week: 0.0 standard drinks     Comment: 6 drinks a year     Drug use: No     Sexual activity: Not Currently     Birth control/protection: None   Lifestyle     Physical activity     Days per week: 0 days     Minutes per session: 0 min     Stress: Not on file   Relationships     Social connections      "Talks on phone: Not on file     Gets together: Not on file     Attends [employer] service: Not on file     Active member of club or organization: Not on file     Attends meetings of clubs or organizations: Not on file     Relationship status: Not on file     Intimate partner violence     Fear of current or ex partner: Not on file     Emotionally abused: Not on file     Physically abused: Not on file     Forced sexual activity: Not on file   Other Topics Concern      Service No     Blood Transfusions No     Caffeine Concern Yes     Comment: Coffee     Occupational Exposure No     Hobby Hazards No     Sleep Concern Yes     Comment: feels she sleeps to much     Stress Concern No     Weight Concern Yes     Comment: cannot lose     Special Diet No     Back Care No     Exercise Yes     Comment: Walk on treadmill, swim     Bike Helmet Not Asked     Seat Belt Yes     Self-Exams No     Parent/sibling w/ CABG, MI or angioplasty before 65F 55M? No   Social History Narrative    Process death claims       Family History   Problem Relation Age of Onset     Hypertension Mother      C.A.D. Father      Cancer Maternal Grandmother      Cerebrovascular Disease Paternal Grandmother      Breast Cancer Paternal Grandmother      Neurologic Disorder Sister         migraine     Cancer Brother      Cancer - colorectal Brother      Colon Cancer Brother          ROS: 10 point ROS neg other than the symptoms noted above in the HPI.    Vital Signs: BP (!) 165/96   Pulse 97   Ht 5' 1\" (1.549 m)   Wt 294 lb (133.4 kg)   SpO2 98%   BMI 55.55 kg/m      Examination:  Constitutional:  Alert, well nourished, NAD.  HEENT: Normocephalic, atraumatic.   Pulmonary:  Without shortness of breath, normal effort.   Lymph: No lymphadenopathy to low back or LE.   Integumentary: Skin is free of rashes or lesions.   Cardiovascular:  No pitting edema of BLE.      Neurological:  Awake  Alert  Oriented x 3  Speech clear  Cranial nerves II - XII grossly " intact  PERRL  EOMI  Face symmetric  Tongue midline  Motor exam   Hip Flexor:                 Right: 5/5  Left:  5/5  Quadriceps:               Right:  5/5  Left:  5/5  Hamstrings:               Right:  5/5  Left:  5/5  Gastroc Soleus:         Right:  5/5  Left:  5/5  Tib/Ant:                      Right:  5/5  Left:  5/5  EHL:                          Right:  5/5  Left:  5/5         Sensation normal to bilateral upper and lower extremities.    Reflexes are 2+ in the patellar and Achilles. There is no clonus. Downgoing Babinski.    Musculoskeletal:  Gait: Able to stand from a seated position. Normal non-antalgic, non-myelopathic gait. Able to heel/toe walk without loss of balance.    Lumbar examination reveals no tenderness of the spine or paraspinous muscles.  Hip height is symmetrical. Negative SI joint, sciatic notch or greater trochanteric tenderness to palpation bilaterally.  Straight leg raise is negative bilaterally.      Imaging:   MRI of the lumbar spine from 2016 was reviewed in the office today. Reveals grade 1 anterolisthesis at L4-5 with mild left foraminal stenosis.    Assessment/Plan:   Jessica Jay is a 60 year old female who presents for evaluation of chronic low back pain that recently increased about 1 month ago after going on a walk, but she denies any falls or injuries at that time. Pain is located in the low back and radiates to bilateral hips and along the lateral aspect of left > right thighs. She notes paresthesias in the left thigh as well. Denies weakness. Tried Flexeril, stretching, Tylenol, and Ibuprofen, but these modalities do not provide lasting relief. She also tried PT and injections few years ago, but nothing recent. Given her new symptoms and most recent MRI is from 2016, I would recommend obtaining an updated lumbar spine MRI for further evaluation. I will call with the results and next steps. Advised patient to call our clinic if symptoms persist, change, or worsen. Patient  voiced understanding and agreement with this plan.       Estephanie Guerrero, RODNEY  [facility]  [address] [place] [id number]  Tel [phone]  Pager [phone]        Again, thank you for allowing me to participate in the care of your patient.        Sincerely,        Estephanie Guerrero, NP

## 2020-10-29 NOTE — NURSING NOTE
"October 29, 2020 2:43 PM   Jessica Jay is a 60 year old female who presents for:    Chief Complaint   Patient presents with     Consult     Lumbar     Initial Vitals: BP (!) 165/96   Pulse 97   Ht 5' 1\" (1.549 m)   Wt 294 lb (133.4 kg)   SpO2 98%   BMI 55.55 kg/m   Estimated body mass index is 55.55 kg/m  as calculated from the following:    Height as of this encounter: 5' 1\" (1.549 m).    Weight as of this encounter: 294 lb (133.4 kg). Body surface area is 2.4 meters squared.  Severe Pain (6) Comment: Data Unavailable     Pharm[facility] entered into [facility]: [id number] - [place] [id number] AT [facility]    Clinical concerns: Jessica Jay is here today for Lumbar disc disease.  July Neal MA  "

## 2020-10-29 NOTE — PROGRESS NOTES
Dr. Rock NICHOLE [facility] Neurosurgery Clinic Visit      CC: low back and leg pain    Primary care Provider: Marybeth Silver      Reason For Visit:   I was asked by Dr. Silver to consult on the patient for lumbar DDD.      HPI: Jessica Jay is a 60 year old female who presents for evaluation of chronic low back pain that recently increased about 1 month ago after going on a walk, but she denies any falls or injuries at that time. Pain is located in the low back and radiates to bilateral hips and along the lateral aspect of left > right thighs. She notes paresthesias in the left thigh as well. Denies weakness. Describes the pain as a dull ache. Pain is worsened with activity, prolonged sitting, or lying on her side. Tried Flexeril, stretching, Tylenol, and Ibuprofen, but these modalities do not provide lasting relief. She also tried PT and injections few years ago, but nothing recent. No past spine surgeries. Denies any falls, foot drop, coordination/gait changes, or bladder/bowel incontinence.     Current pain: 6/10   At worst: 10/10    Past Medical History:   Diagnosis Date     Cellulitis 9/26/2010    [facility]     Degenerative disc disease     cervical     Depression, major      Depressive disorder      Dermoid cyst of brain (H)      GERD (gastroesophageal reflux disease)      LBP (low back pain)      Migraines      Panic attacks      Seasonal allergies      Sleep apnoea     Wears C-PAP       Past Medical History reviewed with patient during visit.    Past Surgical History:   Procedure Laterality Date     ARTHROSCOPY SHOULDER  1990    Right/spurs     C FOOT/TOES SURGERY PROC UNLISTED  1975    Toe fracture, left     CARPAL TUNNEL RELEASE RT/LT  1990    Left     COLONOSCOPY  2016     CRANIOTOMY, EXCISE TUMOR COMPLEX, COMBINED  5/9/2011    Procedure:COMBINED CRANIOTOMY, EXCISE TUMOR COMPLEX; 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; Surgeon:LEE ANN PAULA; Location:[facility]      ROTATOR CUFF REPAIR RT/LT  2009     Left     Past Surgical History reviewed with patient during visit.    Current Outpatient Medications   Medication     cyclobenzaprine (FLEXERIL) 5 MG tablet     DULoxetine (CYMBALTA) 60 MG capsule     furosemide (LASIX) 20 MG tablet     metoprolol succinate ER (TOPROL-XL) 25 MG 24 hr tablet     omeprazole (PRILOSEC) 20 MG DR capsule     traZODone (DESYREL) 50 MG tablet     Current Facility-Administered Medications   Medication     lidocaine 1 % injection 0.5 mL     triamcinolone (KENALOG-40) injection 20 mg       Allergies   Allergen Reactions     Buspar [Buspirone Hcl] Rash       Social History     Socioeconomic History     Marital status: Single     Spouse name: Not on file     Number of children: 0     Years of education: 18     Highest education level: Not on file   Occupational History     Occupation:      Employer: [employer]   Social Needs     Financial resource strain: Not very hard     Food insecurity     Worry: Never true     Inability: Never true     Transportation needs     Medical: No     Non-medical: No   Tobacco Use     Smoking status: Former Smoker     Packs/day: 0.80     Years: 30.00     Pack years: 24.00     Types: Cigarettes     Quit date: 7/4/2017     Years since quitting: 3.3     Smokeless tobacco: Never Used   Substance and Sexual Activity     Alcohol use: Yes     Alcohol/week: 0.0 standard drinks     Comment: 6 drinks a year     Drug use: No     Sexual activity: Not Currently     Birth control/protection: None   Lifestyle     Physical activity     Days per week: 0 days     Minutes per session: 0 min     Stress: Not on file   Relationships     Social connections     Talks on phone: Not on file     Gets together: Not on file     Attends Gnosticist service: Not on file     Active member of club or organization: Not on file     Attends meetings of clubs or organizations: Not on file     Relationship status: Not on file     Intimate  "partner violence     Fear of current or ex partner: Not on file     Emotionally abused: Not on file     Physically abused: Not on file     Forced sexual activity: Not on file   Other Topics Concern      Service No     Blood Transfusions No     Caffeine Concern Yes     Comment: Coffee     Occupational Exposure No     Hobby Hazards No     Sleep Concern Yes     Comment: feels she sleeps to much     Stress Concern No     Weight Concern Yes     Comment: cannot lose     Special Diet No     Back Care No     Exercise Yes     Comment: Walk on treadmill, swim     Bike Helmet Not Asked     Seat Belt Yes     Self-Exams No     Parent/sibling w/ CABG, MI or angioplasty before 65F 55M? No   Social History Narrative    Process death claims       Family History   Problem Relation Age of Onset     Hypertension Mother      C.A.D. Father      Cancer Maternal Grandmother      Cerebrovascular Disease Paternal Grandmother      Breast Cancer Paternal Grandmother      Neurologic Disorder Sister         migraine     Cancer Brother      Cancer - colorectal Brother      Colon Cancer Brother          ROS: 10 point ROS neg other than the symptoms noted above in the HPI.    Vital Signs: BP (!) 165/96   Pulse 97   Ht 5' 1\" (1.549 m)   Wt 294 lb (133.4 kg)   SpO2 98%   BMI 55.55 kg/m      Examination:  Constitutional:  Alert, well nourished, NAD.  HEENT: Normocephalic, atraumatic.   Pulmonary:  Without shortness of breath, normal effort.   Lymph: No lymphadenopathy to low back or LE.   Integumentary: Skin is free of rashes or lesions.   Cardiovascular:  No pitting edema of BLE.      Neurological:  Awake  Alert  Oriented x 3  Speech clear  Cranial nerves II - XII grossly intact  PERRL  EOMI  Face symmetric  Tongue midline  Motor exam   Hip Flexor:                 Right: 5/5  Left:  5/5  Quadriceps:               Right:  5/5  Left:  5/5  Hamstrings:               Right:  5/5  Left:  5/5  Gastroc Soleus:         Right:  5/5  Left:  " 5/5  Tib/Ant:                      Right:  5/5  Left:  5/5  EHL:                          Right:  5/5  Left:  5/5         Sensation normal to bilateral upper and lower extremities.    Reflexes are 2+ in the patellar and Achilles. There is no clonus. Downgoing Babinski.    Musculoskeletal:  Gait: Able to stand from a seated position. Normal non-antalgic, non-myelopathic gait. Able to heel/toe walk without loss of balance.    Lumbar examination reveals no tenderness of the spine or paraspinous muscles.  Hip height is symmetrical. Negative SI joint, sciatic notch or greater trochanteric tenderness to palpation bilaterally.  Straight leg raise is negative bilaterally.      Imaging:   MRI of the lumbar spine from 2016 was reviewed in the office today. Reveals grade 1 anterolisthesis at L4-5 with mild left foraminal stenosis.    Assessment/Plan:   Jessica Jay is a 60 year old female who presents for evaluation of chronic low back pain that recently increased about 1 month ago after going on a walk, but she denies any falls or injuries at that time. Pain is located in the low back and radiates to bilateral hips and along the lateral aspect of left > right thighs. She notes paresthesias in the left thigh as well. Denies weakness. Tried Flexeril, stretching, Tylenol, and Ibuprofen, but these modalities do not provide lasting relief. She also tried PT and injections few years ago, but nothing recent. Given her new symptoms and most recent MRI is from 2016, I would recommend obtaining an updated lumbar spine MRI for further evaluation. I will call with the results and next steps. Advised patient to call our clinic if symptoms persist, change, or worsen. Patient voiced understanding and agreement with this plan.       Estephanie Guerrero CNP  [facility]surgery  [address] [address]  Tel [phone]  Pager [phone]

## 2020-10-29 NOTE — PATIENT INSTRUCTIONS
Order for lumbar spine MRI. I will call you with the results.     Please call our clinic if symptoms persist, change, or worsen at any time.    Estephanie Guerrero CNP  [facility] Neurosurgery  [address] [address]  Tel [phone]  Pager [phone]

## 2020-11-01 ENCOUNTER — HOSPITAL ENCOUNTER (OUTPATIENT)
Dept: MRI IMAGING | Facility: CLINIC | Age: 60
Discharge: HOME | End: 2020-11-01
Attending: NURSE PRACTITIONER | Admitting: NURSE PRACTITIONER
Payer: COMMERCIAL

## 2020-11-01 DIAGNOSIS — M51.369 DDD (DEGENERATIVE DISC DISEASE), LUMBAR: ICD-10-CM

## 2020-11-01 DIAGNOSIS — M54.16 LUMBAR RADICULOPATHY: ICD-10-CM

## 2020-11-01 PROCEDURE — 72148 MRI LUMBAR SPINE W/O DYE: CPT

## 2020-11-02 ENCOUNTER — TELEPHONE (OUTPATIENT)
Dept: NEUROSURGERY | Facility: CLINIC | Age: 60
End: 2020-11-02

## 2020-11-02 DIAGNOSIS — M54.16 LUMBAR RADICULOPATHY: ICD-10-CM

## 2020-11-02 DIAGNOSIS — M51.369 DDD (DEGENERATIVE DISC DISEASE), LUMBAR: Primary | ICD-10-CM

## 2020-11-02 NOTE — TELEPHONE ENCOUNTER
Reviewed recent MRI of lumbar spine . Reveals left central L3-4 disc protrusion with moderate left lateral recess stenosis, abutting the L4 nerve root. Recommend PT and left L3-4 KIARA. Discussed with patient and she voiced agreement. Advised patient to call our clinic if symptoms persist, change, or worsen after these interventions. Patient voiced understanding and agreement with this plan.        Estephanie Guerrero CNP  [facility]surgery  [address] [address]  Tel [phone]  Pager [phone]

## 2020-11-06 ENCOUNTER — RADIOLOGY INJECTION OFFICE VISIT (OUTPATIENT)
Dept: PALLIATIVE MEDICINE | Facility: CLINIC | Age: 60
End: 2020-11-06
Payer: COMMERCIAL

## 2020-11-06 ENCOUNTER — ANCILLARY PROCEDURE (OUTPATIENT)
Dept: RADIOLOGY | Facility: CLINIC | Age: 60
End: 2020-11-06
Attending: PAIN MEDICINE
Payer: COMMERCIAL

## 2020-11-06 VITALS
DIASTOLIC BLOOD PRESSURE: 101 MMHG | SYSTOLIC BLOOD PRESSURE: 163 MMHG | RESPIRATION RATE: 16 BRPM | HEART RATE: 50 BPM | OXYGEN SATURATION: 99 %

## 2020-11-06 DIAGNOSIS — M51.369 DDD (DEGENERATIVE DISC DISEASE), LUMBAR: ICD-10-CM

## 2020-11-06 DIAGNOSIS — M54.16 LUMBAR RADICULOPATHY: ICD-10-CM

## 2020-11-06 PROCEDURE — 64483 NJX AA&/STRD TFRM EPI L/S 1: CPT | Mod: LT | Performed by: PAIN MEDICINE

## 2020-11-06 ASSESSMENT — PAIN SCALES - GENERAL: PAINLEVEL: MODERATE PAIN (5)

## 2020-11-06 NOTE — NURSING NOTE
Pre-procedure Intake    Have you been fasting? NA    If yes, for how long? No     Are you taking a prescribed blood thinner such as coumadin, Plavix, Xarelto?    No    If yes, when did you take your last dose? No     Do you take aspirin?  No    If cervical procedure, have you held aspirin for 6 days?   No     Do you have any allergies to contrast dye, iodine, steroid and/or numbing medications?  NO    Are you currently taking antibiotics or have an active infection?  NO    Have you had a fever/elevated temperature within the past week? NO    Are you currently taking oral steroids? NO    Do you have a ? Yes       Are you pregnant or breastfeeding?  NO    Are the vital signs normal?  Yes  Gela Naranjo MA

## 2020-11-06 NOTE — PROGRESS NOTES
Pre procedure Diagnosis: lumbar radiculopathy, lumbar degenerative disc disease   Post procedure Diagnosis: Same  Procedure performed: lumbar transforaminal epidural steroid injection at left 3-4, fluoroscopically guided, contrast controlled  Anesthesia: none  Complications: none  Operators: Ata Dean MD     Indications:   Jessica Jay is a 60 year old female.  They have a history of left LBP radiating to her ant thigh.  Exam shows neg slump and they have tried conservative treatment including meds/pt.    MRI   T12-L1: Normal disc height. Mild disc T2 signal loss. Mild posterior  annular bulge. Normal facets. No spinal canal  or neural foraminal  stenosis.     L1-L2: Mild disc height and T2 signal loss. Mild posterior annular  bulge.  Normal facets. No spinal canal or neural foraminal stenosis.     L2-L3: Normal disc height. Mild disc T2 signal loss. Minimal posterior  annular bulge. Normal facets.  No spinal canal or neural foraminal  stenosis.     L3-4: Moderate disc height and T2 signal loss. Mild-to-moderate left  central disc protrusion. Mild bilateral facet arthropathy.  Mild-to-moderate left lateral recess stenosis. Posterior displacement  of the left L4 nerve root. No central spinal canal stenosis. No neural  foraminal stenosis.     L4-5: 5 mm degenerative anterolisthesis. Mild-to-moderate disc height  and T2 signal loss. Mild unroofing of the disc. Moderate bilateral  facet arthropathy and thickening of ligamenta flava. Mild edema in the  right facet. No spinal canal or neural foraminal stenosis.     L5-S1: Mild-to-moderate disc height and T2 signal loss. Mild posterior  annular bulge. Moderate right and mild left facet arthropathy. Mild  edema in the right facet. No spinal canal or neural  foraminal  stenosis.                                                                      IMPRESSION:  1.  Moderate bilateral L4-5 and right L5-S1 facet arthropathy slightly  progressed compared to the prior  exam.  2.  New mild edema in the right L4-5 and L5-S1 facets.  3.  Unchanged 5 mm L4-5 degenerative anterolisthesis.  4.  Interval development of a mild-to-moderate left central L3-4 disc  protrusion which abuts and distorts the left L4 nerve root.   Options/alternatives, benefits and risks were discussed with the patient including bleeding, infection, tissue trauma, numbness, weakness, paralysis, spinal cord injury, radiation exposure, headache and reaction to medications. Questions were answered to her satisfaction and she agrees to proceed. Voluntary informed consent was obtained and signed.     Vitals were reviewed: Yes  There were no vitals taken for this visit.  Allergies were reviewed:  Yes   Medications were reviewed:  Yes   Pre-procedure pain score: 5/10    Procedure:  After getting informed consent, patient was brought into the procedure suite and was placed in a prone position on the procedure table.   A Pause for the Cause was performed.  Patient was prepped and draped in sterile fashion.     After identifying the left L3-4 neuroforamen, the C-arm was rotated to a left lateral oblique angle.  A total of 5ml of Lidocaine 1% was used to anesthetize the skin and the needle track at a skin entry site coaxial with the fluoroscopy beam, and overriding the superior aspect of the neuroforamen.  A 22 gauge 3.5 inch spinal needle was advanced under intermittent fluoroscopy until it entered the foramen superiorly.    The position was then inspected from anteroposterior and lateral views, and the needle adjusted appropriately.  A total of 1ml of Omnipaque-300 was injected, confirming appropriate position, with spread into the nerve root sheath and the epidural space, with no intravascular uptake. 9ml was wasted    Then, after repeated negative aspiration, a combination of Decadron 10 mg, 0.25% bupivacaine 2 ml, diluted with 3ml of normal saline was injected.     Hemostasis was achieved, the area was cleaned, and  bandaids were placed when appropriate.  The patient tolerated the procedure well, and was taken to the recovery room.    Images were saved to PACS.    Post-procedure pain score: 0/10  Follow-up includes:   -f/u phone call in one week  -f/u with referring provider    Ata Dean MD  [facility] [place]

## 2020-11-06 NOTE — PATIENT INSTRUCTIONS
[facility]   Procedure Discharge Instructions    Today you saw: Dr. Ata Dean      You had an:  Lumbar Epidural steroid injection            If you were holding your blood thinning medication, please restart taking it: N/A    Be cautious when walking. Numbness and/or weakness in the lower extremities may occur for up to 6-8 hours after the procedure due to effect of the local anesthetic    Do not drive for 6 hours. The effect of the local anesthetic could slow your reflexes.     You may resume your regular activities after 24 hours    Avoid strenuous activity for the first 24 hours    You may shower, however avoid swimming, tub baths or hot tubs for 24 hours following your procedure    You may have a mild to moderate increase in pain for several days following the injection.    It may take up to 14 days for the steroid medication to start working although you may feel the effect as early as a few days after the procedure.       You may use ice packs for 10-15 minutes, 3 to 4 times a day at the injection site for comfort    Do not use heat to painful areas for 6 to 8 hours. This will give the local anesthetic time to wear off and prevent you from accidentally burning your skin.     Unless you have been directed to avoid the use of anti-inflammatory medications (NSAIDS), you may use medications such as ibuprofen, Aleve or Tylenol for pain control if needed.     Possible side effects of steroids that you may experience include flushing, elevated blood pressure, increased appetite, mild headaches and restlessness.  All of these symptoms will get better with time.    If you experience any of the following, call the Pain Clinic during work hours (Mon-Friday 8-4:30 pm) at [phone] or the Provider Line after hours at [phone]:  -Fever over 100 degree F  -Swelling, bleeding, redness, drainage, warmth at the injection site  -Progressive weakness or numbness in your legs   -Loss of  bowel or bladder function  -Unusual new onset of pain that is not improving

## 2020-11-06 NOTE — NURSING NOTE
Discharge Information    IV Discontiued Time:  NA    Amount of Fluid Infused:  NA    Discharge Criteria = When patient returns to baseline or as per MD order    Consciousness:  Pt is fully awake    Circulation:  BP +/- 20% of pre-procedure level    Respiration:  Patient is able to breathe deeply    O2 Sat:  Patient is able to maintain O2 Sat >92% on room air    Activity:  Moves 4 extremities on command    Ambulation:  Patient is able to stand and walk or stand and pivot into wheelchair    Dressing:  Clean/dry or No Dressing    Notes:   Discharge instructions and AVS given to patient    Patient meets criteria for discharge?  YES    Admitted to PCU?  No    Responsible adult present to accompany patient home?  Yes    Signature/Title:    Francisco Casper RN  RN Care Coordinator  [facility]

## 2020-11-11 ENCOUNTER — THERAPY VISIT (OUTPATIENT)
Dept: PHYSICAL THERAPY | Facility: CLINIC | Age: 60
End: 2020-11-11
Attending: NURSE PRACTITIONER
Payer: COMMERCIAL

## 2020-11-11 DIAGNOSIS — M51.369 DDD (DEGENERATIVE DISC DISEASE), LUMBAR: ICD-10-CM

## 2020-11-11 DIAGNOSIS — G89.29 CHRONIC BILATERAL LOW BACK PAIN WITH LEFT-SIDED SCIATICA: ICD-10-CM

## 2020-11-11 DIAGNOSIS — M54.16 LUMBAR RADICULOPATHY: ICD-10-CM

## 2020-11-11 DIAGNOSIS — M54.42 CHRONIC BILATERAL LOW BACK PAIN WITH LEFT-SIDED SCIATICA: ICD-10-CM

## 2020-11-11 PROCEDURE — 97162 PT EVAL MOD COMPLEX 30 MIN: CPT | Mod: GP | Performed by: PHYSICAL THERAPIST

## 2020-11-11 PROCEDURE — 97110 THERAPEUTIC EXERCISES: CPT | Mod: GP | Performed by: PHYSICAL THERAPIST

## 2020-11-11 NOTE — PROGRESS NOTES
"[facility] Initial Evaluation  Subjective:  The history is provided by the patient. No  was used.   Therapist Generated HPI Evaluation  Problem details: Pt describes lengthy h/o back issues.  Current episode started 09/2020 simply while walking.  Started with back pain and then started \"spreading to my hips.\"  Tingling and numbness added into L lateral thigh all the way to the foot.  See neurosurgery notes 10/29/2020 for add'l history.  Pt has since had MRI 11/01/2020, referral to PT 11/02/2020, and L L3-4 epidural injection 11/06/2020.  Pt reports she is doing better since injection.  No as much pain down the leg but still quite sore at the back.         Type of problem:  Lumbar.    This is a chronic condition.  Condition occurred with:  Insidious onset.  Where condition occurred: for unknown reasons.  Patient reports pain:  Lumbar spine right, lumbar spine left and central lumbar spine.  Pain quality: \"like someone hit me really hard\" and is constant.  Pain timing: no particular pattern re: time of day.  Progression since onset: \"In some ways, I feel like I'm getting worse because the pain is more constant.\"  Exacerbated by: standing, housework, sitting.  Relieved by: lying down, heat.  Imaging testing: see chart.    Barriers include:  None as reported by patient.    Patient Health History  Jessica Jay being seen for low back, hips.       Problem occurred: walking   Pain is reported as 4/10 on pain scale.  General health as reported by patient is good.  Pertinent medical history includes: depression, high blood pressure, overweight and sleep disorder/apnea.   Red flags:  None as reported by patient.   Other medical allergies details: see chart.       Current medications:  Anti-depressants, high blood pressure medication, pain medication and sleep medication.    Current occupation is N/A.                   Pt states her goals are to \"not stop all the time because of the " "pain.\"                    Objective:  System         Lumbar/SI Evaluation  ROM:      Strength: TA MMT 1/5  Lumbar Myotomes:    T12-L3 (Hip Flex):  Left: 4    Right: 4  L2-4 (Quads):  Left:  4    Right:  4  L4 (Ankle DF):  Left:  4    Right:  4  L5 (Great Toe Ext): Left: 4    Right: 4   S1 (Toe Raise):  Left: 4    Right: 4  Lumbar DTR's:    L4 (Quad):  Left:  1   Right:  1  S1 (Achilles):  Left:  1   Right:  1    Lumbar Dermtomes:          L3 Left:  Normal-light touch     L3 Right:  Normal-light touch  L4 Left:  Hypo-light touch       L4 Right:  Normal-light touch  L5 Left:  Normal-light touch     L5 Right:  Normal-light touch  S1 Left:  Normal-light touch     S1 Right:  Normal-light touch  Neural Tension/Mobility:    Left side:  SLR positive.   Right side:   SLR positive.  Lumbar Palpation:  Palpation (lumbar): diffuse tenderness across the low back without precision.        Lumbar Provocation:    Left positive with:  PROM hip  Right positive with: PROM hip    SI joint/Sacrum:    Positive Dewayne, thigh thrust, ULISES Miramontes Lumbar Evaluation    Posture:  Sitting: fair  Standing: fair  Lordosis: Accentuated  Lateral Shift: no  Correction of Posture: no effect    Movement Loss:  Flexion (Flex): mod and pain  Extension (EXT): mod and pain  Side Rupert R (SG R): mod and pain  Side Glide L (SG L): mod and pain  Test Movements:  FIS: During: increases  After: worse  Pretest Movements: across low back  Repeat FIS: During: increases  After: worse    EIS: During: increases  After: worse    Repeat EIS: During: increases  After: worse        SGIS R: During: increases  After: worse      SGIS L: During: increases  After: worse        Conclusion: other                                         ROS    Assessment/Plan:    Patient is a 60 year old female with lumbar complaints.    Patient has the following significant findings with corresponding treatment plan.              "   Diagnosis 1:  Chronic back pain  Pain -  hot/cold therapy  Decreased ROM/flexibility - manual therapy and therapeutic exercise  Decreased strength - therapeutic exercise and therapeutic activities  Decreased function - therapeutic activities  Impaired posture - neuro re-education  Pleased that since injection pt is noting reduced distal discomfort but still battling widespread pain and lack of directional preference on exam    Therapy Evaluation Codes:   1) History comprised of:   Personal factors that impact the plan of care:      None and Time since onset of symptoms.    Comorbidity factors that impact the plan of care are:      Depression and Overweight.     Medications impacting care: Anti-depressant and Pain.  2) Examination of Body Systems comprised of:   Body structures and functions that impact the plan of care:      Lumbar spine.   Activity limitations that impact the plan of care are:      Bending, Sitting and Standing.  3) Clinical presentation characteristics are:   Evolving/Changing.  4) Decision-Making    Moderate complexity using standardized patient assessment instrument and/or measureable assessment of functional outcome.  Cumulative Therapy Evaluation is: Moderate complexity.    Previous and current functional limitations:  (See Goal Flow Sheet for this information)    Short term and Long term goals: (See Goal Flow Sheet for this information)     Communication ability:  Patient appears to be able to clearly communicate and understand verbal and written communication and follow directions correctly.  Treatment Explanation - The following has been discussed with the patient:   RX ordered/plan of care  Anticipated outcomes  Possible risks and side effects  This patient would benefit from PT intervention to resume normal activities.   Rehab potential is fair to good.    Frequency:  1 X week, once daily  Duration:  for 6 weeks  Frequency and duration subject to change based upon symptomatic response  to injection  Discharge Plan:  Achieve all LTG.  Independent in home treatment program.  Reach maximal therapeutic benefit.    Please refer to the daily flowsheet for treatment today, total treatment time and time spent performing 1:1 timed codes.

## 2020-11-12 NOTE — TELEPHONE ENCOUNTER
A user error has taken place: encounter opened in error, closed for administrative reasons.  
12-Nov-2020 23:11

## 2020-11-17 ENCOUNTER — VIRTUAL VISIT (OUTPATIENT)
Dept: PSYCHIATRY | Facility: CLINIC | Age: 60
End: 2020-11-17
Attending: FAMILY MEDICINE
Payer: COMMERCIAL

## 2020-11-17 DIAGNOSIS — G47.00 INSOMNIA, UNSPECIFIED TYPE: Primary | ICD-10-CM

## 2020-11-17 DIAGNOSIS — F33.41 RECURRENT MAJOR DEPRESSIVE DISORDER, IN PARTIAL REMISSION (H): ICD-10-CM

## 2020-11-17 DIAGNOSIS — F41.1 GAD (GENERALIZED ANXIETY DISORDER): ICD-10-CM

## 2020-11-17 PROCEDURE — 90792 PSYCH DIAG EVAL W/MED SRVCS: CPT | Mod: 95 | Performed by: PSYCHIATRY & NEUROLOGY

## 2020-11-17 RX ORDER — QUETIAPINE FUMARATE 25 MG/1
25 TABLET, FILM COATED ORAL
Qty: 60 TABLET | Refills: 0 | Status: SHIPPED | OUTPATIENT
Start: 2020-11-17 | End: 2020-12-09 | Stop reason: SINTOL

## 2020-11-17 ASSESSMENT — ANXIETY QUESTIONNAIRES
2. NOT BEING ABLE TO STOP OR CONTROL WORRYING: NOT AT ALL
GAD7 TOTAL SCORE: 8
3. WORRYING TOO MUCH ABOUT DIFFERENT THINGS: NEARLY EVERY DAY
GAD7 TOTAL SCORE: 8
4. TROUBLE RELAXING: NEARLY EVERY DAY
GAD7 TOTAL SCORE: 8
5. BEING SO RESTLESS THAT IT IS HARD TO SIT STILL: SEVERAL DAYS
7. FEELING AFRAID AS IF SOMETHING AWFUL MIGHT HAPPEN: NOT AT ALL
7. FEELING AFRAID AS IF SOMETHING AWFUL MIGHT HAPPEN: NOT AT ALL
1. FEELING NERVOUS, ANXIOUS, OR ON EDGE: NOT AT ALL
6. BECOMING EASILY ANNOYED OR IRRITABLE: SEVERAL DAYS

## 2020-11-17 ASSESSMENT — PATIENT HEALTH QUESTIONNAIRE - PHQ9
SUM OF ALL RESPONSES TO PHQ QUESTIONS 1-9: 16
10. IF YOU CHECKED OFF ANY PROBLEMS, HOW DIFFICULT HAVE THESE PROBLEMS MADE IT FOR YOU TO DO YOUR WORK, TAKE CARE OF THINGS AT HOME, OR GET ALONG WITH OTHER PEOPLE: VERY DIFFICULT
SUM OF ALL RESPONSES TO PHQ QUESTIONS 1-9: 16

## 2020-11-17 NOTE — Clinical Note
Please call this patient to get them scheduled for a follow-up visit in 4-6 weeks. Can use acute slot if necessary. Please schedule with me and the [facility]. Thanks!

## 2020-11-17 NOTE — PROGRESS NOTES
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     Community Resources:    National Suicide Prevention Lifeline: 313.596.2449 (TTY: 549.875.6667). Call anytime for help.  (www.suicidepreventionlifeline.org)  National [employer] on Mental Illness (www.jerry.org): 716.777.5864 or 895-460-9136.   Mental Health Association (www.mentalhealth.org): 286.452.1971 or 442-990-0164.  [place] Crisis Text Line: Text [place] to 227332  Suicide LifeLine Chat: [employer]Neokinetics.org/chat    Administrative Billing:   Phone Call/Video Duration: 47 Minutes  Start: 1:59p  Stop: 2:46p    Complexity of Care: Patient with multiple psychiatric diagnoses and multiple medication changes adding to complexity of care.    Patient Status:  Patient will continue to be seen for ongoing consultation and stabilization.    Signed:   Caren Denise DO  [facility]

## 2020-11-17 NOTE — PATIENT INSTRUCTIONS
Treatment Plan:    Continue duloxetine 60 mg daily for mood, anxiety, pain     Discontinue trazodone     Start quetiapine/Seroquel 25-50 mg at bedtime as needed for sleep. Can break a tablet in half if 25 mg too sedating.     Consider starting individual therapy again if symptoms worsen or do not improve.     Continue all other medications as reviewed per electronic medical record today.     Safety plan reviewed. To the Emergency Department as needed or call after hours crisis line at [phone] or [phone]. [place] Crisis Text Line: Text [place] to 906143  or  Suicide LifeLine Chat: [email or web address]/chat    Schedule an appointment with me in 4-6 weeks or sooner as needed.  Call [facility] at [phone] to schedule.    Follow up with primary care provider as planned or sooner if needed for acute medical concerns.    Call the psychiatric nurse line with medication questions or concerns at [phone].    Guerrilla RFt may be used to communicate with your provider, but this is not intended to be used for emergencies.    Patient Education:  Will need to closely monitor weight, lipids, and cholesterol if staying on quetiapine for more than 1-2 months. Hopefully it will just be needed a short period of time to get you back on the right track!     Community Resources:    National Suicide Prevention Lifeline: 737.635.1471 (TTY: 519.127.8934). Call anytime for help.  (www.suicidepreventionlifeline.org)  National [place] on Mental Illness (www.jerry.org): 885.921.9944 or 601-925-0382.   Mental Health Association (www.mentalhealth.org): 908.887.9937 or 434-282-0911.  [place] Crisis Text [place]: Text [place] to 776776  Suicide LifeLine Chat: [email or web address]/chat

## 2020-11-18 ENCOUNTER — THERAPY VISIT (OUTPATIENT)
Dept: PHYSICAL THERAPY | Facility: CLINIC | Age: 60
End: 2020-11-18
Payer: COMMERCIAL

## 2020-11-18 ENCOUNTER — ANCILLARY PROCEDURE (OUTPATIENT)
Dept: MAMMOGRAPHY | Facility: CLINIC | Age: 60
End: 2020-11-18
Attending: FAMILY MEDICINE
Payer: COMMERCIAL

## 2020-11-18 DIAGNOSIS — Z12.31 ENCOUNTER FOR SCREENING MAMMOGRAM FOR BREAST CANCER: ICD-10-CM

## 2020-11-18 DIAGNOSIS — M54.42 CHRONIC BILATERAL LOW BACK PAIN WITH LEFT-SIDED SCIATICA: ICD-10-CM

## 2020-11-18 DIAGNOSIS — G89.29 CHRONIC BILATERAL LOW BACK PAIN WITH LEFT-SIDED SCIATICA: ICD-10-CM

## 2020-11-18 PROCEDURE — 97110 THERAPEUTIC EXERCISES: CPT | Mod: GP | Performed by: PHYSICAL THERAPIST

## 2020-11-18 PROCEDURE — 77067 SCR MAMMO BI INCL CAD: CPT | Mod: TC | Performed by: RADIOLOGY

## 2020-11-18 PROCEDURE — 97112 NEUROMUSCULAR REEDUCATION: CPT | Mod: GP | Performed by: PHYSICAL THERAPIST

## 2020-11-18 ASSESSMENT — PATIENT HEALTH QUESTIONNAIRE - PHQ9: SUM OF ALL RESPONSES TO PHQ QUESTIONS 1-9: 16

## 2020-11-18 ASSESSMENT — ANXIETY QUESTIONNAIRES: GAD7 TOTAL SCORE: 8

## 2020-11-19 ENCOUNTER — PATIENT OUTREACH (OUTPATIENT)
Dept: CARE COORDINATION | Facility: CLINIC | Age: 60
End: 2020-11-19

## 2020-11-19 NOTE — PROGRESS NOTES
Clinic Care Coordination Contact    Pt said it was not a good time to talk.  Will call back in 3-5 business days.     JAQUELIN Mensah, [facility] - Care Coordinator   [facility]  11/19/2020   10:43 AM  [phone]

## 2020-11-24 ENCOUNTER — PATIENT OUTREACH (OUTPATIENT)
Dept: NURSING | Facility: CLINIC | Age: 60
End: 2020-11-24
Payer: COMMERCIAL

## 2020-11-24 NOTE — PROGRESS NOTES
Clinic Care Coordination Contact    Follow Up Progress Note      Assessment: Patient is reporting an increase in depression.  She has not been able to find a job and is worried about what will happen when her unemployment runs out.  She also noted that they are increasing the association fees where she lives.    Goals addressed this encounter:   Goals Addressed                 This Visit's Progress      #1  Mental Health Management (pt-stated)   40%     Goal Statement: I will work on finding two techniques to help me reduce my depression, in the next month.  Date Goal set: 4/27/2020  Barriers: Social distancing, depression  Strengths: Family, friends, and understanding that a needs some help with my depression  Date to Achieve By: 1/6/21   Patient expressed understanding of goal: Yes  Action steps to achieve this goal:  1. I will search for some mindfulness videos to help with depression  2. I will look at the [facility] website for resources  3. I will work with counseling to learn new tools as well          #2 Being Active (pt-stated)   60%     Goal Statement: I want to increase my activity to help improve my weight and back pain.  I will walk at least 10 minutes 3 days/week or more and maintain this for 3 consecutive months.  Date Goal set: 4/27/2020   Barriers: Depression, back pain, social distancing to some extent  Strengths: Understand that increasing my exercise will help with managing my weight and back pain  Date to Achieve By: 1/6/21   Patient expressed understanding of goal: Yes  Action steps to achieve this goal:  1. I will walk in one direction for 5 minutes and then return for a total of 10 minutes  2. I will not get discouraged if I cannot make it the 5 minutes in one direction before having to turn around  3. I will celebrate my successes             Intervention/Education provided during outreach: Asked patient what  can do to assist with keeping her from going further into depression.  She  "said it was \"a little late\" for that.  Attempted to problem solve or come up with suggestions and patient unwilling to really discuss.  She does not feel there is a ways out other than finding a job or continuation and benefits.   patient does have a therapist and a psychiatrist and has appointments in the middle of December.  Encourage patient to try and work through her increased depression.    Patient has been searching for other jobs and interviewing yet not having any luck.  She understands the job market is a struggle out there yet it adds increased worry.  Reviewed that there may be some other resources out there and at this time she feels she has applied for what was appropriate.  Encourage patient to continue to keep her ears open for any changes in programs.   to notify patient if any new programs are identified as a support with this return to increased shutdown.     Outreach Frequency: monthly    Plan:   Patient to continue with appointments.   to reach out if any new programs of support are identified.  Care Coordinator will follow up in about 1 month (after Rony).    JAQUELIN Mensah, [facility] - Care Coordinator   [facility]  11/24/2020   10:38 AM  [phone]    "

## 2020-11-25 ENCOUNTER — THERAPY VISIT (OUTPATIENT)
Dept: PHYSICAL THERAPY | Facility: CLINIC | Age: 60
End: 2020-11-25
Payer: COMMERCIAL

## 2020-11-25 DIAGNOSIS — G89.29 CHRONIC BILATERAL LOW BACK PAIN WITH LEFT-SIDED SCIATICA: ICD-10-CM

## 2020-11-25 DIAGNOSIS — M54.42 CHRONIC BILATERAL LOW BACK PAIN WITH LEFT-SIDED SCIATICA: ICD-10-CM

## 2020-11-25 PROCEDURE — 97110 THERAPEUTIC EXERCISES: CPT | Mod: GP | Performed by: PHYSICAL THERAPIST

## 2020-11-25 PROCEDURE — 97530 THERAPEUTIC ACTIVITIES: CPT | Mod: GP | Performed by: PHYSICAL THERAPIST

## 2020-11-25 NOTE — PROGRESS NOTES
"Subjective:  HPI  Physical Exam  Oswestry Score: 10 %                 Objective:  System    Physical Exam    General     ROS    Assessment/Plan:    SUBJECTIVE  Subjective: Pt reports feeling \"a lot better.\"  Feels can move much easier and really only has problems in sidelying; even sidelying isn't as bad as it used to be.  Can still be challenged by time on feet and needs to \"take breaks.\"   Current Pain level: 3/10   Changes in function:  Yes (See Goal flowsheet attached for changes in current functional level)     Adverse reaction to treatment or activity:  None    OBJECTIVE  Objective: Increased discomfort standing lumbar flexion \"only if I go a titch too far.\"  Tolerating supine without difficulty.     ASSESSMENT  Jessica continues to require intervention to meet STG and LTG's: PT  Patient is progressing as expected.  Response to therapy has shown an improvement in  flexibility  Progress made towards STG/LTG?  Yes (See Goal flowsheet attached for updates on achievement of STG and LTG)    PLAN  Current treatment program is being advanced to more complex exercises.  Pt progressing well.  F/u via video in about three weeks, sooner if needed.    PTA/ATC plan:  N/A    Please refer to the daily flowsheet for treatment today, total treatment time and time spent performing 1:1 timed codes.        "

## 2020-12-08 ENCOUNTER — TELEPHONE (OUTPATIENT)
Dept: PSYCHIATRY | Facility: CLINIC | Age: 60
End: 2020-12-08

## 2020-12-08 DIAGNOSIS — F41.1 GAD (GENERALIZED ANXIETY DISORDER): ICD-10-CM

## 2020-12-08 DIAGNOSIS — G47.00 INSOMNIA, UNSPECIFIED TYPE: Primary | ICD-10-CM

## 2020-12-08 NOTE — TELEPHONE ENCOUNTER
Reason for call:  Received call from Patient reporting a symptom    Symptom or request: Patient stated she have not slept, and she have been itching really bad on her arms and back.     Duration (how long have symptoms been present): 48 hours     Have you been treated for this before? No    Additional comments: The patient stated the symptoms occurred from taking the prescribed medication of Quetiapine    Phone Number patient can be reached at:  [phone]    Best Time:  Now    Can we leave a detailed message on this number:  Yes     Call taken on 12/8/2020 at 8:56 AM by Dipesh Colindres

## 2020-12-09 RX ORDER — HYDROXYZINE HYDROCHLORIDE 25 MG/1
TABLET, FILM COATED ORAL
Qty: 90 TABLET | Refills: 1 | Status: SHIPPED | OUTPATIENT
Start: 2020-12-09 | End: 2021-04-05

## 2020-12-09 NOTE — TELEPHONE ENCOUNTER
I called Jessica Jay and relayed recommendations from provider. Patient education on the use of Hydroxyzine done.

## 2020-12-09 NOTE — TELEPHONE ENCOUNTER
Last 11/17/20  Next 12/16/20    I spoke to Jessica Jay today. She stated her back and left arm have been itching since starting Seroquel 11/17/20. She was sleeping with Seroquel but the itching became too severe she stopped it three days ago. She was not able to sleep the at all the first two nights but last night she did get a broken 6-7 hours. Her arm no longer itches but her back is still somewhat itchy.     Patient denied new detergent or new skin products. She has been using a topical Benadryl cream on the itchy areas. Denies rash/hives but stated she has made scars on her arm from itching it so hard.     She no longer wishes to take Seroquel.          Summary of Impression and Treatment Plan from 11/17/20 by Dr. Denise:  Impression:  Jessica Jay is a 61 yo female with past hx of depression, anxiety, insomnia, and alcohol dependence (no problems since 1980s) who presents today for psychiatric evaluation. Pt feels depression under decent control and at baseline but that the anxiety and insomnia has been quite distressing and debilitating. Trazodone has not been that effective so we will discontinue that in lieu of starting Seroquel. Has also failed trials with mirtazapine and gabapentin. Could still trial doxepin or hydroxyzine.      Elevated liver enzymes  Room to increase duloxetine if necessary in future if mood takes a dip/sleep doesn't improve with other med trials.   Monitor weight and metabolic status closely. Discussed need to monitor lipid panel and fasting glucose if tolerates med well and continues. Due to Covid-19 surge, no need to go in for labs more urgently.     Medication side effects and alternatives reviewed. Health promotion activities recommended and reviewed today. All questions addressed. Education and counseling completed regarding risks and benefits of medications and psychotherapy options. Recommend therapy for additional support.      Treatment Plan:    Continue duloxetine 60  mg daily for mood, anxiety, pain     Discontinue trazodone     Start quetiapine/Seroquel 25-50 mg at bedtime as needed for sleep. Can break a tablet in half if 25 mg too sedating.     Consider starting individual therapy again if symptoms worsen or do not improve.     Continue all other medications as reviewed per electronic medical record today.     Safety plan reviewed. To the Emergency Department as needed or call after hours crisis line at [phone] or [phone]. [place] Crisis Text Line: Text [place] to 136736  or  Suicide LifeLine Chat: suicidepreventionlifeline.org/chat    Schedule an appointment with me in 4-6 weeks or sooner as needed.  Call [facility] at [phone] to schedule.

## 2020-12-13 ENCOUNTER — HEALTH MAINTENANCE LETTER (OUTPATIENT)
Age: 60
End: 2020-12-13

## 2020-12-16 ENCOUNTER — THERAPY VISIT (OUTPATIENT)
Dept: PHYSICAL THERAPY | Facility: CLINIC | Age: 60
End: 2020-12-16
Payer: COMMERCIAL

## 2020-12-16 ENCOUNTER — VIRTUAL VISIT (OUTPATIENT)
Dept: PSYCHOLOGY | Facility: CLINIC | Age: 60
End: 2020-12-16
Payer: COMMERCIAL

## 2020-12-16 DIAGNOSIS — G47.00 INSOMNIA, UNSPECIFIED TYPE: ICD-10-CM

## 2020-12-16 DIAGNOSIS — G47.33 OSA (OBSTRUCTIVE SLEEP APNEA): ICD-10-CM

## 2020-12-16 DIAGNOSIS — M54.42 CHRONIC BILATERAL LOW BACK PAIN WITH LEFT-SIDED SCIATICA: ICD-10-CM

## 2020-12-16 DIAGNOSIS — F33.1 MODERATE RECURRENT MAJOR DEPRESSION (H): ICD-10-CM

## 2020-12-16 DIAGNOSIS — F41.1 GAD (GENERALIZED ANXIETY DISORDER): ICD-10-CM

## 2020-12-16 DIAGNOSIS — F41.9 ANXIETY: Primary | ICD-10-CM

## 2020-12-16 DIAGNOSIS — G89.29 CHRONIC BILATERAL LOW BACK PAIN WITH LEFT-SIDED SCIATICA: ICD-10-CM

## 2020-12-16 DIAGNOSIS — F33.1 MODERATE EPISODE OF RECURRENT MAJOR DEPRESSIVE DISORDER (H): Primary | ICD-10-CM

## 2020-12-16 PROCEDURE — 90832 PSYTX W PT 30 MINUTES: CPT | Mod: 95 | Performed by: PSYCHOLOGIST

## 2020-12-16 PROCEDURE — 99214 OFFICE O/P EST MOD 30 MIN: CPT | Mod: 95 | Performed by: PSYCHIATRY & NEUROLOGY

## 2020-12-16 PROCEDURE — 97110 THERAPEUTIC EXERCISES: CPT | Mod: 95 | Performed by: PHYSICAL THERAPIST

## 2020-12-16 RX ORDER — DULOXETIN HYDROCHLORIDE 30 MG/1
90 CAPSULE, DELAYED RELEASE ORAL DAILY
Qty: 90 CAPSULE | Refills: 2 | Status: SHIPPED | OUTPATIENT
Start: 2020-12-16 | End: 2021-06-08

## 2020-12-16 NOTE — Clinical Note
Just FYI. No action needed.  Increased her duloxetine.  She continues to struggle with insomnia.  Hydroxyzine starting to be more helpful, but I ordered referral for sleep evaluation since she has not had her CPAP settings checked in quite some time.    -[facility], [place], operated by [facility]

## 2020-12-16 NOTE — PROGRESS NOTES
"Jessica Jay is a 60 year old female who is being evaluated via a billable telephone visit.      The patient has been notified of following:     \"This telephone visit will be conducted via a call between you and your physician/provider. We have found that certain health care needs can be provided without the need for a physical exam.  This service lets us provide the care you need with a short phone conversation.  If a prescription is necessary we can send it directly to your pharmacy.  If lab work is needed we can place an order for that and you can then stop by our lab to have the test done at a later time.    Telephone visits are billed at different rates depending on your insurance coverage. During this emergency period, for some insurers they may be billed the same as an in-person visit.  Please reach out to your insurance provider with any questions.    If during the course of the call the physician/provider feels a telephone visit is not appropriate, you will not be charged for this service.\"    Patient has given verbal consent for Telephone visit?  Yes    What phone number would you like to be contacted at? See Epic    How would you like to obtain your AVS? [facility]        Outpatient Psychiatric Progress Note    Name: Jessica Jay   : 1960                    Primary Care Provider: Marybeth Silver MD   Therapist: None     PHQ-9 scores:  PHQ-9 SCORE 10/22/2020 2020 2020   PHQ-9 Total Score - - -   PHQ-9 Total Score [facility] - - 16 (Moderately severe depression)   PHQ-9 Total Score 19 16 16       GABRIELA-7 scores:  GABRIELA-7 SCORE 10/22/2020 2020 2020   Total Score - - 8 (mild anxiety)   Total Score 12 8 8       Patient Identification:  Patient is a 60 year old, single  White American female  who presents for return visit with me.  Patient is currently unemployed. Patient attended the phone/video session alone. Patient prefers to be called: \"Gemma\".    Interim History:  I last saw " "Jessica Jay for outpatient psychiatry Consultation on 11/17/20. During that appointment, we:    Continue duloxetine 60 mg daily for mood, anxiety, pain     Discontinue trazodone     Start quetiapine/Seroquel 25-50 mg at bedtime as needed for sleep. Can break a tablet in half if 25 mg too sedating.     Consider starting individual therapy again if symptoms worsen or do not improve.     Pt called since last visit due to intense itching likely from Seroquel. Seroquel stopped and hydroxyzine started PRN.     12/16: Pt reports overall still struggling quite a bit with her sxs, particularly sleep. Seroquel made her start to itch almost immediately after starting. Itching has drastically improved but she still has a couple areas that will bother her now and again. Hydroxyzine partially helpful. Took two tabs last night, \"fell right to sleep\" and then slept through the night. She is a little more hopeful about hydroxyzine. A lot of losses have occurred in the month of December so \"it is a hard month.\" Will be tough to not be at [employer] services at a [employer] due to Covid-19. 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 Rony is usually a busy time and she is typically very involved. Will be hard to not see everyone. \"Trying to get through day by day.\" Reports she has not had a sleep study in quite some time and would likely benefit from a new study. Uses CPAP machine but has not had settings reviewed in many many years. No SI or thoughts of suicide. No drugs. Rare alcohol use.     Per [facility], Dr. Jose Cardona, during today's team-based visit:  \"Sleeping still isn't what I'd like it to be.\" She noted having a few nights she has had 4 hours or less a night. She noted that she still feels itchiness but it is not as bad as it was with the quetiapine. She continues looking for work which is weighing on her. She has historically worked office jobs/.      Psychiatric ROS:  Jessica Jay " reports mood has been: a little worse  Anxiety has been: a little worse  Sleep has been: still poor, see above  Breann sxs: None  Psychosis sxs: None  ADHD/ADD sxs: None  PTSD sxs: None  PHQ9 and GAD7 scores were reviewed today if completed.   Medication side effects: Yes: Itching from Seroquel, see above  Current stressors include: Symptoms and Unemployed/looking for a job  Coping mechanisms and supports include: Family and Hobbies    Current medications include:   Current Outpatient Medications   Medication Sig     cyclobenzaprine (FLEXERIL) 5 MG tablet Take 1 tablet (5 mg) by mouth 3 times daily as needed for muscle spasms     DULoxetine (CYMBALTA) 60 MG capsule TAKE 1 CAPSULE BY MOUTH DAILY     furosemide (LASIX) 20 MG tablet TAKE 1 TABLET(20 MG) BY MOUTH TWICE DAILY     hydrOXYzine (ATARAX) 25 MG tablet Take 25-50 mg twice a day as needed for anxiety. Can also take  mg at bedtime for sleep/itching. Don't exceed 300 mg daily.     metoprolol succinate ER (TOPROL-XL) 25 MG 24 hr tablet Take 1 tablet (25 mg) by mouth daily     omeprazole (PRILOSEC) 20 MG DR capsule Take 1 capsule (20 mg) by mouth daily     Current Facility-Administered Medications   Medication     lidocaine 1 % injection 0.5 mL     triamcinolone (KENALOG-40) injection 20 mg       Past Medical/Surgical History:  Past Medical History:   Diagnosis Date     Cellulitis 9/26/2010    [facility]     Degenerative disc disease     cervical     Depression, major      Depressive disorder      Dermoid cyst of brain (H)      GERD (gastroesophageal reflux disease)      LBP (low back pain)      Migraines      Panic attacks      Seasonal allergies      Sleep apnoea     Wears C-PAP      has a past medical history of Cellulitis (9/26/2010), Degenerative disc disease, Depression, major, Depressive disorder, Dermoid cyst of brain (H), GERD (gastroesophageal reflux disease), LBP (low back pain), Migraines, Panic attacks, Seasonal allergies, and Sleep  apnoea.    Social History:  Reviewed. No changes to social history except as noted above in HPI.    Vital Signs:   None. This is phone/video visit.     Labs:  Most recent laboratory results reviewed and the pertinent results include:   TSH   Date Value Ref Range Status   10/22/2020 5.38 (H) 0.40 - 4.00 mU/L Final     Last Comprehensive Metabolic Panel:  Sodium   Date Value Ref Range Status   10/22/2020 138 133 - 144 mmol/L Final     Potassium   Date Value Ref Range Status   10/22/2020 4.0 3.4 - 5.3 mmol/L Final     Chloride   Date Value Ref Range Status   10/22/2020 103 94 - 109 mmol/L Final     Carbon Dioxide   Date Value Ref Range Status   10/22/2020 28 20 - 32 mmol/L Final     Anion Gap   Date Value Ref Range Status   10/22/2020 7 3 - 14 mmol/L Final     Glucose   Date Value Ref Range Status   10/22/2020 167 (H) 70 - 99 mg/dL Final     Urea Nitrogen   Date Value Ref Range Status   10/22/2020 11 7 - 30 mg/dL Final     Creatinine   Date Value Ref Range Status   10/22/2020 0.69 0.52 - 1.04 mg/dL Final     GFR Estimate   Date Value Ref Range Status   10/22/2020 >90 >60 mL/min/[1.73_m2] Final     Comment:     Non  GFR Calc  Starting 12/18/2018, serum creatinine based estimated GFR (eGFR) will be   calculated using the Chronic Kidney Disease Epidemiology Collaboration   (CKD-EPI) equation.       Calcium   Date Value Ref Range Status   10/22/2020 9.1 8.5 - 10.1 mg/dL Final     Bilirubin Total   Date Value Ref Range Status   10/22/2020 0.4 0.2 - 1.3 mg/dL Final     Alkaline Phosphatase   Date Value Ref Range Status   10/22/2020 140 40 - 150 U/L Final     ALT   Date Value Ref Range Status   10/22/2020 200 (H) 0 - 50 U/L Final     AST   Date Value Ref Range Status   10/22/2020 217 (H) 0 - 45 U/L Final     Recent Labs   Lab Test 10/22/20  1147 08/13/18  1357   CHOL 227* 208*   HDL 47* 53   * 130*   TRIG 112 123     Lab Results   Component Value Date    WBC 5.7 10/22/2020     Lab Results   Component  Value Date    RBC 5.44 10/22/2020     Lab Results   Component Value Date    HGB 13.8 10/22/2020     Lab Results   Component Value Date    HCT 43.5 10/22/2020     No components found for: MCT  Lab Results   Component Value Date    MCV 80 10/22/2020     Lab Results   Component Value Date    MCH 25.4 10/22/2020     Lab Results   Component Value Date    MCHC 31.7 10/22/2020     Lab Results   Component Value Date    RDW 15.3 10/22/2020     Lab Results   Component Value Date     10/22/2020       Review of Systems:  10 systems (general, cardiovascular, respiratory, eyes, ENT, endocrine, GI, , M/S, neurological) were reviewed. Most pertinent finding(s) is/are: Intermittent migraines, intermittent GI upset, chronic pain. The remaining systems are all unremarkable.    Mental Status Examination (limited as this is by phone/video):  Attitude:  cooperative, pleasant  Oriented to:  person, place, time, and situation  Attention Span and Concentration:  normal  Speech:  clear, coherent, regular rate, rhythm, and volume  Language: intact  Mood:  Not that great  Affect:  A little subdued and frustrated  Associations:  no loose associations  Thought Process:  logical, linear and goal oriented  Thought Content:  no evidence of suicidal ideation or homicidal ideation, no evidence of psychotic thought, no auditory hallucinations present and no visual hallucinations present  Recent and Remote Memory:  Intact to interview. Not formally assessed. No amnesia.  Fund of Knowledge: appropriate  Insight:  good  Judgment:  intact, adequate for safety  Impulse Control:  intact    Suicide Risk Assessment:  Today Jessica Jay reports no suicidal lideation. Based on all available evidence including the factors cited above, Jessica Jay does not appear to be at imminent risk for self-harm, does not meet criteria for a 72-hr hold, and therefore remains appropriate for ongoing outpatient level of care.  A thorough assessment of risk  factors related to suicide and self-harm have been reviewed and are noted above. The patient convincingly denies suicidality on several occasions. Local community safety resources printed and reviewed for patient to use if needed. There was no deceit detected, and the patient presented in a manner that was believable.     DSM5 Diagnosis:  MDD, Recurrent, Moderate  Insomnia, Unspecified  GABRIELA  Alcohol Use Disorder, In Sustained Remission (since about 1985)    Medical comorbidities include:   Patient Active Problem List    Diagnosis Date Noted     Bilateral edema of lower extremity 09/18/2020     Priority: Medium     Generalized anxiety disorder 09/18/2020     Priority: Medium     Traylor's esophagus determined by endoscopy 11/19/2018     Priority: Medium     Hypertension goal BP (blood pressure) < 140/90 06/10/2018     Priority: Medium     overweight with BMI >40 09/16/2016     Priority: Medium     Gastroesophageal reflux disease without esophagitis 05/20/2016     Priority: Medium     History of colonic polyps 04/13/2015     Priority: Medium     Vulvar dermatitis 07/17/2012     Priority: Medium     Brain lipoma 06/28/2012     Priority: Medium     Spondylolisthesis, grade 1 05/24/2012     Priority: Medium     Chronic low back pain 05/24/2012     Priority: Medium     Diagnosis updated by automated process. Provider to review and confirm.       ELYSSA (obstructive sleep apnea) 08/22/2011     Priority: Medium     Tobacco abuse 05/06/2011     Priority: Medium     Chronic daily headache 02/15/2011     Priority: Medium     Dizziness - light-headed 02/15/2011     Priority: Medium     Problem list name updated by automated process. Provider to review and confirm       Neck pain 01/28/2011     Priority: Medium     Headache 01/28/2011     Priority: Medium     Problem list name updated by automated process. Provider to review       DDD (degenerative disc disease), cervical 12/19/2010     Priority: Medium     Disc herniation  10/08/2010     Priority: Medium     DDD (degenerative disc disease), lumbar 10/08/2010     Priority: Medium     Sees Dr. Cuevas, spine.  I am willing to do vicodin for flare ups which patient states occurs 3-4 times/year.  She is interested in pain management program.       CARDIOVASCULAR SCREENING; LDL GOAL LESS THAN 160 05/23/2010     Priority: Medium     Depression, major      Priority: Medium     Has been on wellbutrin, prozac.  Citalopram at 60 mg gave side effects, better at 40mg.  Dr. Lebron, psychiatry [facility].       GERD (gastroesophageal reflux disease)      Priority: Medium     Migraines      Priority: Medium       Psychosocial & Contextual Factors:  See HPI    Assessment:  Jessica Jay reports continued issues with sleep.  Unfortunately she had a sensitivity to Seroquel and it caused severe itching all over.  Seroquel was added to her allergy list.  We started hydroxyzine between last appointment and today's appointment in hopes of getting her sleeping better.  It was not incredibly effective until last night when she took a little higher dose.  She took 50 mg of hydroxyzine last night and slept through the night after falling asleep quite easily.  Encouraged her to continue the medication.  She can take up to 100 mg at bedtime.  Discussed risk of morning sedation.  Could still consider a trial with doxepin.  I also strongly encouraged her to be assessed by sleep medicine.  She has a diagnosis of sleep apnea and has not had her CPAP settings looked at for years.  It is very possible her sleep apnea condition has changed and she is not sleeping well because of this.  She also continues to have multiple uncontrolled medical conditions.  I recommend she continue to seek optimal control of these conditions, i.e. high blood pressure, obesity, pain.  I do believe all of these are affecting her mental health and sleep.  Her mood has been a little worse lately.  Her anxiety is also been a little  bit higher.  Along with continuing hydroxyzine, she is agreeable to a dose increase of her duloxetine to see if this is further helpful for mood and anxiety.    Medication side effects and alternatives were reviewed. Health promotion activities recommended and reviewed today. All questions addressed. Education and counseling completed regarding risks and benefits of medications and psychotherapy options. Recommend therapy for additional support.     Treatment Plan:    Continue hydroxyzine: Can take 25-50 mg up to twice daily as needed for anxiety.  Can take  mg at bedtime for sleep/itching.  Do not exceed 300 mg total daily.      Discontinued Seroquel due to itching. Added to your allergy list.     Increase duloxetine/Cymbalta to 90 mg daily for mood and anxiety    Continue to pursue optimal treatment of all of your medical conditions    Sleep medicine referral placed today.  I recommend optimal treatment of your sleep apnea and updated evaluation/CPAP settings.    Continue all other medications as reviewed per electronic medical record today.     Safety plan reviewed. To the Emergency Department as needed or call after hours crisis line at [phone] or [phone]. [place] Crisis Text Line. Text [place] to 492123 or Suicide LifeLine Chat: suicidepreventionlifeline.org/chat    I recommend individual psychotherapy.    Schedule an appointment with me in 6-8 weeks or sooner as needed. Call [facility] at [phone] to schedule if someone does not reach out to you within 2-3 days.     Follow up with primary care provider as planned or for acute medical concerns.    Call the psychiatric nurse line with medication questions or concerns at [phone].    Jimuboxhart may be used to communicate with your provider, but this is not intended to be used for emergencies.    Administrative Billing:   Phone Call/Video Duration: 19 Minutes  Start: 1:53p  Stop: 2:12p    Time spent with patient was 19 minutes  and greater than 50% of time or 10 minutes was spent in counseling and coordination of care regarding above diagnoses and treatment plan. Patient with multiple psychiatric diagnoses and multiple medication changes adding to complexity of care.    Patient Status:  Patient will continue to be seen for ongoing consultation and stabilization.    Signed:   Caren Denise DO  [facility] Psychiatry

## 2020-12-16 NOTE — PROGRESS NOTES
"Collaborative Care Psychiatry Service ([facility])  December 16, 2020    Behavioral Health Clinician Progress Note    Patient Name: Jessica Jay is a 60 year old female who is being evaluated via a telephone visit.      The patient has been notified of the following:     \"We have found that certain health care needs can be provided without the need for a face to face visit.  This service lets us provide the care you need with a short phone conversation.      I will have full access to your [place] medical record during this entire phone call.   I will be taking notes for your medical record.     Since this is like an office visit, we will bill your insurance company for this service.  Please check with your medical insurance if this type of telephone visit/virtual care is covered.  You may be responsible for the cost of this service if insurance coverage is denied.      There are potential benefits and risks of telephone visits (e.g. limits to patient confidentiality) that differ from in-person visits.?  Confidentiality still applies for telephone services, and nobody will record the visit.  It is important to be in a quiet, private space that is free of distractions (including cell phone or other devices) during the visit.??     If during the course of the call I believe a telephone visit is not appropriate, you will not be charged for this service\"    Consent has been obtained for this service by care team member: yes.    Mode of Communication:  Video Conference via Webshoz    As the provider I attest to compliance with applicable laws and regulations related to telemedicine.         Service Type:  Individual      Session Start Time: 01:15pm  Session End Time: 01:45pm      Session Length: 16 - 37      Attendees: Patient    Visit Activities (Refresh list every visit): [facility] Only    Diagnostic Assessment Date: 11/17/2020 by Dr. Denise  See Flowsheets for today's PHQ-9 and GABRIELA-7 " "results  Previous PHQ-9:   PHQ-9 SCORE 10/22/2020 11/17/2020 11/17/2020   PHQ-9 Total Score - - -   PHQ-9 Total Score MyChart - - 16 (Moderately severe depression)   PHQ-9 Total Score 19 16 16       Previous GABRIELA-7:   GABRIELA-7 SCORE 10/22/2020 11/17/2020 11/17/2020   Total Score - - 8 (mild anxiety)   Total Score 12 8 8       WHODAS 2.0 Total Score 4/20/2020 11/17/2020   Total Score 28 33   Total Score MyChart - 33        DATA  Extended Session (60+ minutes): No  Interactive Complexity: No  Crisis: No    Medication Compliance:  Yes      Chemical Use Review:   Substance Use: Chemical use reviewed, no active concerns identified      Tobacco Use: No current tobacco use.      Current Stressors / Issues:  \"Sleeping still isn't what I'd like it to be.\" She noted having a few nights she has had 4 hours or less a night. She noted that she still feels itchiness but it is not as bad as it was with the quetiapine. She continues looking for work which is weighing on her. She has historically worked office jobs/.       Changes in Health Issues:   None reported    Assessment: Current Emotional / Mental Status (status of significant symptoms):  Risk status (Self / Other harm or suicidal ideation)  Patient has had a history of suicide attempts: in glo high; cut wrists. was hospitalized in early 2000's after sent to ER from work for fear of SI  Patient denies current fears or concerns for personal safety.  Patient denies current or recent suicidal ideation or behaviors.  Patient denies current or recent homicidal ideation or behaviors.  Patient denies current or recent self injurious behavior or ideation.  Patient denies other safety concerns.  A safety and risk management plan has not been developed at this time, however patient was encouraged to call [facility] should there be a change in any of these risk factors.    Appearance:   Unable to assess over the phone   Eye Contact:   Unable to assess over the phone " "  Psychomotor Behavior: Unable to assess over the phone   Attitude:   Cooperative   Orientation:   All  Speech   Rate / Production: Normal    Volume:  Normal   Mood:    Anxious  Depressed  \"It's kind of ok\"  Affect:    Unable to assess over the phone   Thought Content:  Clear   Thought Form:  Coherent  Logical   Insight:    Fair     Diagnoses:  1. Anxiety    2. Moderate recurrent major depression (H)        Collateral Reports Completed:  Communicated with: Dr. Denise    Plan: (Homework, other):  Patient was given information about behavioral services and encouraged to schedule a follow up appointment with the clinic [facility] in conjunction with next [facility] appointment.  She was also given information about mental health symptoms and treatment options .  CD Recommendations: No indications of CD issues.      Jose Cardona PsyD, LP      Navarro Cardona PsyD  December 16, 2020  "

## 2020-12-16 NOTE — PROGRESS NOTES
Subjective:  HPI  Physical Exam                    Objective:  System    Physical Exam    General     ROS    Assessment/Plan:    DISCHARGE REPORT    Progress reporting period is from 11/11/2020 to 12/16/2020.       SUBJECTIVE  Subjective: Pt reports she is quite pleased with progress.  Nothing more than a little discomfort in the back and even that took hours of standing to come on.    Current Pain level: (not rated numerically; see above).     Initial Pain level: 4/10.   Changes in function:  Yes (See Goal flowsheet attached for changes in current functional level)  Adverse reaction to treatment or activity: None    OBJECTIVE  Objective: Via phone, pt noted no increased discomfort standing lumbar AROM.     ASSESSMENT/PLAN  Updated problem list and treatment plan: Diagnosis 1:  LBP -- see plan below  STG/LTGs have been met or progress has been made towards goals:  Yes (See Goal flow sheet completed today.)  Assessment of Progress: The patient's condition is improving.  Self Management Plans:  Patient has been instructed in a home treatment program.  I have re-evaluated this patient and find that the nature, scope, duration and intensity of the therapy is appropriate for the medical condition of the patient.  Jessica continues to require the following intervention to meet STG and LTG's:  PT intervention is no longer required to meet STG/LTG.    Recommendations:  Given progress, pt agrees discharge to [facility] but will let me know if there are further issues.    Please refer to the daily flowsheet for treatment today, total treatment time and time spent performing 1:1 timed codes.

## 2020-12-16 NOTE — Clinical Note
Please call this patient to get them scheduled for a follow-up visit in 6-8 weeks. Please schedule with me and the [facility]. Thanks!

## 2020-12-17 NOTE — PATIENT INSTRUCTIONS
Treatment Plan:    Continue hydroxyzine: Can take 25-50 mg up to twice daily as needed for anxiety.  Can take  mg at bedtime for sleep/itching.  Do not exceed 300 mg total daily.      Discontinued Seroquel due to itching. Added to your allergy list.     Increase duloxetine/Cymbalta to 90 mg daily for mood and anxiety    Continue to pursue optimal treatment of all of your medical conditions    Sleep medicine referral placed today.  I recommend optimal treatment of your sleep apnea and updated evaluation/CPAP settings.    Continue all other medications as reviewed per electronic medical record today.     Safety plan reviewed. To the Emergency Department as needed or call after hours crisis line at [phone] or [phone]. [place] Crisis Text Line. Text [place] to 016828 or Suicide LifeLine Chat: suicidepreventionlifeline.org/chat    I recommend individual psychotherapy.    Schedule an appointment with me in 6-8 weeks or sooner as needed. Call [facility] at [phone] to schedule if someone does not reach out to you within 2-3 days.     Follow up with primary care provider as planned or for acute medical concerns.    Call the psychiatric nurse line with medication questions or concerns at [phone].    MyChart may be used to communicate with your provider, but this is not intended to be used for emergencies.

## 2020-12-19 ENCOUNTER — TELEPHONE (OUTPATIENT)
Dept: PSYCHIATRY | Facility: CLINIC | Age: 60
End: 2020-12-19

## 2020-12-19 NOTE — TELEPHONE ENCOUNTER
Plan does not cover this medication. Please call plan at [phone] to initiate prior authorization or call/fax pharmacy to change medication at [phone]. Patient ID # is [id number].        Cheikh Winston  [facility]

## 2020-12-21 NOTE — TELEPHONE ENCOUNTER
Prior Authorization Approval    Authorization Effective Date: 12/20/2020  Authorization Expiration Date: 12/21/2021  Medication: DULoxetine (CYMBALTA) 30 MG capsule  Approved Dose/Quantity:   Reference #: [id number]   Insurance Company: [employer] - Phone [phone] Fax [phone]  Expected CoPay:       CoPay Card Available:      Foundation Assistance Needed:    Which Pharmacy is filling the prescription (Not needed for infusion/clinic administered): [facility] #[id number] [address]  AT [facility]  Pharmacy Notified: Yes  Patient Notified: Yes, **Instructed pharmacy to notify patient when script is ready to /ship.**

## 2020-12-21 NOTE — TELEPHONE ENCOUNTER
PA Initiation    Medication: DULoxetine (CYMBALTA) 30 MG capsule  Insurance Company: [employer] - Phone [phone] Fax [phone]  Pharmacy Filling the Rx: [facility] #[id number] [place] [address] AT [facility]  Filling Pharmacy Phone: [phone]  Filling Pharmacy Fax: [phone]  Start Date: 12/21/2020

## 2020-12-21 NOTE — TELEPHONE ENCOUNTER
Routing to PA team.    Tosha Calix, RN    Nurse Liaison  [facility]

## 2020-12-29 ENCOUNTER — PATIENT OUTREACH (OUTPATIENT)
Dept: FAMILY MEDICINE | Facility: CLINIC | Age: 60
End: 2020-12-29
Payer: COMMERCIAL

## 2020-12-29 NOTE — LETTER
It was a pleasure to speak with you.  I would like to provide you with the enclosed information for your records.  As part of care coordination, we are developing care plans to assist in accomplishing your health care goals.  When we speak next, please feel free to let me know if you want to add or change any information on your care plans.    As always, feel free to contact me if you have any questions or concerns.  I look forward to working with you in the effort to achieve your health care and wellness goals .        Sincerely,      Bell Brito, [facility]  Clinic Care Coordination  [phone]      [facility]  Complex Care Plan  About Me:    Patient Name:  Jessica Jay    YOB: 1960  Age:         60 year old   [place] MRN:    [id number] Telephone Information:  Home Phone [phone]   Mobile [phone]       Address:  [address] [place] [address] Email address:  [email or web address]      Emergency Contact(s)    Name Relationship Lgl Grd Work Phone Home Phone Mobile Phone   1. ILIANA JAY Mother   [phone]   2. NOAH TOBIAS Sister  [phone]            Primary language:  English     needed? No   [place] [employer]:  [phone] op. 1  Other communication barriers: Glasses  Preferred Method of Communication:  Portia  Current living arrangement:    Mobility Status/ Medical Equipment:      Health Maintenance  Health Maintenance Reviewed: Due/Overdue   Health Maintenance Due   Topic Date Due     URINE DRUG SCREEN  1960     ZOSTER IMMUNIZATION (1 of 2) 08/12/2010     INFLUENZA VACCINE (1) 09/01/2020     Please talk with your provider about what is needed or can continue to wait.        My Access Plan  Medical Emergency 911   Primary Clinic Line [facility] [phone]   24 Hour Appointment Line [phone] or  [phone]) (toll-free)   24 Hour Nurse Line  [phone] (toll-free)   Preferred Urgent Care     Preferred Hospital     Preferred Pharmacy [facility] #[id number] [facility] [address] AT [facility]     Behavioral Health Crisis Line The National Suicide Prevention Lifeline at 1-538.417.2714 or 91             My Care Team Members  Patient Care Team       Relationship Specialty Notifications Start End    Marybeth Silver MD PCP - General Family Practice  5/16/18     Phone: [phone] Fax: [phone]         [address] [place] [address]    Marybeth Silver MD Assigned PCP   4/15/18     Phone: [phone] Fax: [phone] [address] [place] [address]    Bell Brito LSW Lead Care Coordinator Primary Care - CC Admissions 1/29/20     Phone: [phone] Fax: [phone]        Sarthak Gross MD Assigned Surgical Provider   10/23/20     Phone: [phone] Fax: [phone] [address] [facility] [address]    Ki May MD Assigned Musculoskeletal Provider   10/23/20     Phone: [phone] Fax: [phone]         [address] [facility] [address]    Caren Denise DO Assigned Behavioral Health Provider   11/29/20     Phone: [phone] Fax: [phone]         [facility] [address] [place] [facility] [address]            My Care Plans  Self Management and Treatment Plan  Goals and (Comments)  Goals        General    #1  Mental Health Management (pt-stated)     Notes - Note edited  7/10/2020 10:39 AM by Bell Brito LSW    Goal Statement: I will work on finding two techniques to help me reduce my depression, in the next month.  Date Goal set: 4/27/2020  Barriers: Social distancing, depression  Strengths: Family, friends, and understanding that a needs some help with my depression  Date to Achieve By: 1/6/21   Patient expressed understanding of goal: Yes  Action steps to achieve this goal:  1. I will search for some mindfulness videos to help with  depression  2. I will look at the Maryanne website for resources  3. I will work with counseling to learn new tools as well        #2 Being Active (pt-stated)     Notes - Note edited  12/29/2020  9:55 AM by Bell Brito LSW    Goal Statement: I want to increase my activity to help improve my weight and back pain.  I will walk at least 10 minutes 3 days/week or more and maintain this for 3 consecutive months.  Date Goal set: 4/27/2020   Barriers: Depression, back pain, social distancing to some extent  Strengths: Understand that increasing my exercise will help with managing my weight and back pain  Date to Achieve By: 4/30/21   Patient expressed understanding of goal: Yes  Action steps to achieve this goal:  1. I will walk in one direction for 5 minutes and then return for a total of 10 minutes  2. I will not get discouraged if I cannot make it the 5 minutes in one direction before having to turn around  3. I will celebrate my successes             Action Plans on File:            Depression          Advance Care Plans/Directives Type:        My Medical and Care Information  Problem List   Patient Active Problem List   Diagnosis     Depression, major     GERD (gastroesophageal reflux disease)     Migraines     CARDIOVASCULAR SCREENING; LDL GOAL LESS THAN 160     Disc herniation     DDD (degenerative disc disease), lumbar     DDD (degenerative disc disease), cervical     Neck pain     Headache     Chronic daily headache     Dizziness - light-headed     Tobacco abuse     ELYSSA (obstructive sleep apnea)     Spondylolisthesis, grade 1     Chronic low back pain     Brain lipoma     Vulvar dermatitis     History of colonic polyps     Gastroesophageal reflux disease without esophagitis     overweight with BMI >40     Hypertension goal BP (blood pressure) < 140/90     Traylor's esophagus determined by endoscopy     Bilateral edema of lower extremity     Generalized anxiety disorder      Current Medications and Allergies:   Allergies as of 12/29/2020  Reviewed by Caren Denise DO on 12/16/2020   Severity Noted Reaction Type Reactions   Buspar [buspirone Hcl] Not Specified 11/18/2009    Rash   Seroquel [quetiapine] Not Specified 12/16/2020   Allergy Or Hypersensitivity Itching   Medications - This is your record. Keep this with you and show to your community pharmacist(s) and physician(s) at each visit.     Instructions for use   cyclobenzaprine (FLEXERIL) 5 MG tablet Take 1 tablet (5 mg) by mouth 3 times daily as needed for muscle spasms   DULoxetine (CYMBALTA) 30 MG capsule Take 3 capsules (90 mg) by mouth daily   furosemide (LASIX) 20 MG tablet TAKE 1 TABLET(20 MG) BY MOUTH TWICE DAILY   hydrOXYzine (ATARAX) 25 MG tablet Take 25-50 mg twice a day as needed for anxiety. Can also take  mg at bedtime for sleep/itching. Don't exceed 300 mg daily.   metoprolol succinate ER (TOPROL-XL) 25 MG 24 hr tablet Take 1 tablet (25 mg) by mouth daily   omeprazole (PRILOSEC) 20 MG DR capsule Take 1 capsule (20 mg) by mouth daily         Care Coordination Start Date: 1/29/2020   Frequency of Care Coordination: monthly   Form Last Updated: 12/29/2020

## 2020-12-29 NOTE — PROGRESS NOTES
Clinic Care Coordination Contact    Follow Up Progress Note      Assessment: pt reporting some improvement in her mood as family members recover from complicated health conditions.      She is sending out about 3 resume's per week.  She is not getting replies.     Goals addressed this encounter:   Goals Addressed                 This Visit's Progress      #1  Mental Health Management (pt-stated)   40%     Goal Statement: I will work on finding two techniques to help me reduce my depression, in the next month.  Date Goal set: 4/27/2020  Barriers: Social distancing, depression  Strengths: Family, friends, and understanding that a needs some help with my depression  Date to Achieve By: 1/6/21   Patient expressed understanding of goal: Yes  Action steps to achieve this goal:  1. I will search for some mindfulness videos to help with depression  2. I will look at the Maryanne website for resources  3. I will work with counseling to learn new tools as well          #2 Being Active (pt-stated)   50%     Goal Statement: I want to increase my activity to help improve my weight and back pain.  I will walk at least 10 minutes 3 days/week or more and maintain this for 3 consecutive months.  Date Goal set: 4/27/2020   Barriers: Depression, back pain, social distancing to some extent  Strengths: Understand that increasing my exercise will help with managing my weight and back pain  Date to Achieve By: 4/30/21   Patient expressed understanding of goal: Yes  Action steps to achieve this goal:  1. I will walk in one direction for 5 minutes and then return for a total of 10 minutes  2. I will not get discouraged if I cannot make it the 5 minutes in one direction before having to turn around  3. I will celebrate my successes             Intervention/Education provided during outreach: pt is reporting that with the health improvements of family she is feeling better. Her sister and niece from [place] will be visiting and this is lifting  her spirits.     Pt is not getting out walking as much so when she does she is very sore.  Discussed doing some videos for yoga or David Chi as options to help.  Reviewed how this can also help the depression and anxiety.       Outreach Frequency: monthly    Plan:   Pt to continue to find exercises she can do inside to help with weight and mood.   Care Coordinator will follow up in one month.      Sending updated complex care plan via [employer].     JAQUELIN Mensah, [facility] - Care Coordinator   [facility]  12/29/2020   10:10 AM  [phone]

## 2021-01-05 PROBLEM — R60.0 BILATERAL EDEMA OF LOWER EXTREMITY: Status: ACTIVE | Noted: 2020-09-18

## 2021-01-05 PROBLEM — R60.0 BILATERAL EDEMA OF LOWER EXTREMITY: Chronic | Status: ACTIVE | Noted: 2020-09-18

## 2021-01-05 PROBLEM — I10 HYPERTENSION GOAL BP (BLOOD PRESSURE) < 140/90: Chronic | Status: ACTIVE | Noted: 2018-06-10

## 2021-01-05 PROBLEM — G25.81 RESTLESS LEGS SYNDROME (RLS): Status: ACTIVE | Noted: 2021-01-05

## 2021-01-05 PROBLEM — F41.1 GENERALIZED ANXIETY DISORDER: Chronic | Status: ACTIVE | Noted: 2020-09-18

## 2021-02-01 ENCOUNTER — PATIENT OUTREACH (OUTPATIENT)
Dept: CARE COORDINATION | Facility: CLINIC | Age: 61
End: 2021-02-01

## 2021-02-01 NOTE — PROGRESS NOTES
Clinic Care Coordination Contact    Follow Up Progress Note      Assessment: pt called as she was let go and was feeling very down about herself.    She started a new job and received word that they were letting her go and listed a few things.  She talked about having little training and needing to learn the job herself at times.  She was feeling worthless yet denied any thoughts of self harm or taking her life.    Goals addressed this encounter:   Goals Addressed                 This Visit's Progress      #1  Mental Health Management (pt-stated)   40%     Goal Statement: I will work on finding two techniques to help me reduce my depression, in the next month.  Date Goal set: 4/27/2020  Barriers: Social distancing, depression  Strengths: Family, friends, and understanding that a needs some help with my depression  Date to Achieve By: 1/6/21   Patient expressed understanding of goal: Yes  Action steps to achieve this goal:  1. I will search for some mindfulness videos to help with depression  2. I will look at the Maryanne website for resources  3. I will work with counseling to learn new tools as well          #2 Being Active (pt-stated)   50%     Goal Statement: I want to increase my activity to help improve my weight and back pain.  I will walk at least 10 minutes 3 days/week or more and maintain this for 3 consecutive months.  Date Goal set: 4/27/2020   Barriers: Depression, back pain, social distancing to some extent  Strengths: Understand that increasing my exercise will help with managing my weight and back pain  Date to Achieve By: 4/30/21   Patient expressed understanding of goal: Yes  Action steps to achieve this goal:  1. I will walk in one direction for 5 minutes and then return for a total of 10 minutes  2. I will not get discouraged if I cannot make it the 5 minutes in one direction before having to turn around  3. I will celebrate my successes             Intervention/Education provided during outreach:  listened and offered empathy.  Reviewed options and she will be reaching out to the employment agency she used as well as unemployment.  Provided her with the following resource:     [place] Department of Human Rights  [phone]  o Discrimination complaints in the areas of employment, housing, public accommodation s   Encouraged pt to take time to write down notes about her training, feedback, and support to be prepared for her meetings/phone calls.     Pt did not feel counseling would be able to help her.  Encouraged her to consider calling them. Stressed the importance of doing good self care and positive acknowledgement statements.     Outreach Frequency: monthly    Plan:   Pt to practice good self care.  Pt to write down her experience with work so she has the notes when calling people.   Care Coordinator will follow up in 5-7 business days.     JAQUELIN Mensah, [place] [place] [facility] - Care Coordinator   [facility]  2/1/2021   1:05 PM  [phone]

## 2021-02-10 ENCOUNTER — PATIENT OUTREACH (OUTPATIENT)
Dept: FAMILY MEDICINE | Facility: CLINIC | Age: 61
End: 2021-02-10
Payer: COMMERCIAL

## 2021-02-10 NOTE — PROGRESS NOTES
Clinic Care Coordination Contact    Follow Up Progress Note      Assessment: pt reporting that she is feeling a bit better.  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 She is still uncertain about unemployment yet will apply.     Pt talked about past therapy and always having an issue with delving into what happened to her as a child.  She said she has no memory of anything that happened before age 6.       Goals addressed this encounter:   Goals Addressed                 This Visit's Progress      #1  Mental Health Management (pt-stated)   40%     Goal Statement: I will work on finding two techniques to help me reduce my depression, in the next month.  Date Goal set: 4/27/2020  Barriers: Social distancing, depression  Strengths: Family, friends, and understanding that a needs some help with my depression  Date to Achieve By: 8/9/21   Patient expressed understanding of goal: Yes  Action steps to achieve this goal:  1. I will search for some mindfulness videos to help with depression  2. I will look at the Maryanne website for resources  3. I will work with counseling to learn new tools as well          #2 Being Active (pt-stated)   50%     Goal Statement: I want to increase my activity to help improve my weight and back pain.  I will walk at least 10 minutes 3 days/week or more and maintain this for 3 consecutive months.  Date Goal set: 4/27/2020   Barriers: Depression, back pain, social distancing to some extent  Strengths: Understand that increasing my exercise will help with managing my weight and back pain  Date to Achieve By: 4/30/21   Patient expressed understanding of goal: Yes  Action steps to achieve this goal:  1. I will walk in one direction for 5 minutes and then return for a total of 10 minutes  2. I will not get discouraged if I cannot make it the 5 minutes in one direction before having to turn around  3. I will celebrate my successes             Intervention/Education provided during  outreach: encouraged pt to be kind to herself and do activities  that help her feel good.     Encouraged continued work on applying for unemployment.      Outreach Frequency: monthly    Plan:   Pt to apply for unemployment. Pt to continue to work on positive self talk and self calming activities.   Care Coordinator will follow up in one month.     JAQUELIN Mensah, [place] - Care Coordinator   [facility]  2/10/2021   10:36 AM  [phone]

## 2021-03-11 ENCOUNTER — PATIENT OUTREACH (OUTPATIENT)
Dept: FAMILY MEDICINE | Facility: CLINIC | Age: 61
End: 2021-03-11
Payer: COMMERCIAL

## 2021-03-11 NOTE — PROGRESS NOTES
Clinic Care Coordination Contact    Follow Up Progress Note      Assessment: pt reporting that her depression is not improved.  She is feeling loss of motivation and connections.  She is able to get unemployment which helps.  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      Goals addressed this encounter:   Goals Addressed                 This Visit's Progress      #1  Mental Health Management (pt-stated)   50%     Goal Statement: I will work on finding two techniques to help me reduce my depression, in the next month.  Date Goal set: 4/27/2020  Barriers: Social distancing, depression  Strengths: Family, friends, and understanding that a needs some help with my depression  Date to Achieve By: 8/9/21   Patient expressed understanding of goal: Yes  Action steps to achieve this goal:  1. I will search for some mindfulness videos to help with depression  2. I will look at the Maryanne website for resources  3. I will work with counseling to learn new tools as well          #2 Being Active (pt-stated)   50%     Goal Statement: I want to increase my activity to help improve my weight and back pain.  I will walk at least 10 minutes 3 days/week or more and maintain this for 3 consecutive months.  Date Goal set: 4/27/2020   Barriers: Depression, back pain, social distancing to some extent  Strengths: Understand that increasing my exercise will help with managing my weight and back pain  Date to Achieve By: 4/30/21   Patient expressed understanding of goal: Yes  Action steps to achieve this goal:  1. I will walk in one direction for 5 minutes and then return for a total of 10 minutes  2. I will not get discouraged if I cannot make it the 5 minutes in one direction before having to turn around  3. I will celebrate my successes             Intervention/Education provided during outreach: discussion on her depression and ways to improve it. Talked about journaling, volunteering by phone calls or distance  visits, planning some historical site visits that she can do.  Reviewed that planning can be lifting to the mood to plan fun things.     Encouraged pt to work on finding ways to socialize with others in safe ways to help her feel good about her self.       Outreach Frequency: monthly    Plan:   Pt to journal and look into planning some historical activities. p t to consider volunteering or calling people who need to socialize.   Care Coordinator will follow up in one month.     JAQUELIN Mensah, [place] [facility] - Care Coordinator   [facility]  3/11/2021   11:30 AM  [phone]

## 2021-03-25 ENCOUNTER — IMMUNIZATION (OUTPATIENT)
Dept: NURSING | Facility: CLINIC | Age: 61
End: 2021-03-25
Payer: COMMERCIAL

## 2021-03-25 PROCEDURE — 0001A PR COVID VAC PFIZER DIL RECON 30 MCG/0.3 ML IM: CPT

## 2021-03-25 PROCEDURE — 91300 PR COVID VAC PFIZER DIL RECON 30 MCG/0.3 ML IM: CPT

## 2021-04-05 ENCOUNTER — VIRTUAL VISIT (OUTPATIENT)
Dept: FAMILY MEDICINE | Facility: CLINIC | Age: 61
End: 2021-04-05
Payer: COMMERCIAL

## 2021-04-05 DIAGNOSIS — E03.8 SUBCLINICAL HYPOTHYROIDISM: ICD-10-CM

## 2021-04-05 DIAGNOSIS — Z13.1 SCREENING FOR DIABETES MELLITUS: ICD-10-CM

## 2021-04-05 DIAGNOSIS — R60.0 BILATERAL EDEMA OF LOWER EXTREMITY: ICD-10-CM

## 2021-04-05 DIAGNOSIS — F41.1 GAD (GENERALIZED ANXIETY DISORDER): ICD-10-CM

## 2021-04-05 DIAGNOSIS — E66.01 MORBID OBESITY DUE TO EXCESS CALORIES (H): ICD-10-CM

## 2021-04-05 DIAGNOSIS — I10 HYPERTENSION GOAL BP (BLOOD PRESSURE) < 140/90: Primary | ICD-10-CM

## 2021-04-05 PROCEDURE — 99214 OFFICE O/P EST MOD 30 MIN: CPT | Mod: 95 | Performed by: FAMILY MEDICINE

## 2021-04-05 RX ORDER — FUROSEMIDE 20 MG
20 TABLET ORAL 2 TIMES DAILY
Qty: 180 TABLET | Refills: 3 | Status: SHIPPED | OUTPATIENT
Start: 2021-04-05 | End: 2022-01-11

## 2021-04-05 RX ORDER — HYDROXYZINE HYDROCHLORIDE 25 MG/1
TABLET, FILM COATED ORAL
Qty: 90 TABLET | Refills: 1 | Status: SHIPPED | OUTPATIENT
Start: 2021-04-05 | End: 2023-12-08

## 2021-04-05 RX ORDER — METOPROLOL SUCCINATE 25 MG/1
25 TABLET, EXTENDED RELEASE ORAL DAILY
Qty: 90 TABLET | Refills: 3 | Status: SHIPPED | OUTPATIENT
Start: 2021-04-05 | End: 2022-01-11

## 2021-04-05 NOTE — PROGRESS NOTES
Gemma is a 60 year old who is being evaluated via a billable video visit.      How would you like to obtain your AVS? MyChart  If the video visit is dropped, the invitation should be resent by: Text to cell phone: [phone]  Will anyone else be joining your video visit? No    Video Start Time: 11:30 am    Assessment & Plan     Hypertension goal BP (blood pressure) < 140/90  - Refill: metoprolol succinate ER (TOPROL-XL) 25 MG 24 hr tablet  Dispense: 90 tablet; Refill: 3  - Refill: furosemide (LASIX) 20 MG tablet  Dispense: 180 tablet; Refill: 3    Bilateral edema of lower extremity  - Refill: furosemide (LASIX) 20 MG tablet  Dispense: 180 tablet; Refill: 3    Subclinical hypothyroidism  - TSH with free T4 reflex FUTURE anytime    Screening for diabetes mellitus  - **A1C FUTURE anytime    GABRIELA (generalized anxiety disorder)  - Refill: hydrOXYzine (ATARAX) 25 MG tablet  Dispense: 90 tablet; Refill: 1    Morbid obesity due to excess calories (H)  Weight management plan: Discussed healthy diet and exercise guidelines      Patient reports that the rash has since resolved. Recommended to schedule an office visit when rash returns and is in need for treatment.    Return in about 3 months (around 7/5/2021) for Medication check.    Marybeth Silevr MD  [facility] [place] Menendez is a 60 year old who presents for the following health issues     HPI     Hypertension Follow-up  Currently on Metoprolol 25 mg/day + Lasix 20 mg/day - tolerating well. No side effects reported.  States that she is trying to lose weight and her home BPs have been within normal limit lately.   Requesting a refill.    Do you check your blood pressure regularly outside of the clinic? Yes     Are you following a low salt diet? Yessometimes     Are your blood pressures ever more than 140 on the top number (systolic) OR more   than 90 on the bottom number (diastolic), for example 140/90? Yes      How many servings of fruits and  vegetables do you eat daily?  0-1    On average, how many sweetened beverages do you drink each day (Examples: soda, juice, sweet tea, etc.  Do NOT count diet or artificially sweetened beverages)?   0    How many days per week do you exercise enough to make your heart beat faster? 3 or less    How many minutes a day do you exercise enough to make your heart beat faster? 9 or less    How many days per week do you miss taking your medication? 0    Rash  Onset/Duration: x1 week - has since resolved.   Description  Location: front of neck   Character: small red bumps   Itching: moderate  Intensity:  moderate  Progression of Symptoms:  improving  Accompanying signs and symptoms:   Fever: no  Body aches or joint pain: no  Sore throat symptoms: no  Recent cold symptoms: no  History:           Previous episodes of similar rash: feels she will get similar rash every summer. Does not recall the name of rash.  New exposures:  None  Recent travel: no  Exposure to similar rash: no  Precipitating or alleviating factors: heat seems to bring on rash   Therapies tried and outcome: has used hydrocortisone cream in the past.  Is not currently trying any treatments    Previous labs revealed subclinical Hypothyroidism- due for follow up labs.   TSH   Date Value Ref Range Status   10/22/2020 5.38 (H) 0.40 - 4.00 mU/L Final     Due for screening for diabetes.       Depression and anxiety-  Patient is currently following with Psychiatry for medication management and Psychotherapy.   Requesting a refill on Hydroxyzine.     Review of Systems   Constitutional, HEENT, cardiovascular, pulmonary, gi and gu systems are negative, except as otherwise noted.      Objective          Vitals:  No vitals were obtained today due to virtual visit.    Physical Exam   GENERAL: Healthy, alert and no distress  EYES: Eyes grossly normal to inspection.  No discharge or erythema, or obvious scleral/conjunctival abnormalities.  RESP: No audible wheeze, cough, or  visible cyanosis.  No visible retractions or increased work of breathing.    SKIN: Visible skin clear. No significant rash, abnormal pigmentation or lesions.  NEURO: Cranial nerves grossly intact.  Mentation and speech appropriate for age.  PSYCH: Mentation appears normal, affect normal/bright, judgement and insight intact, normal speech and appearance well-groomed.            Video-Visit Details    Type of service:  Video Visit    Video End Time:11:55 am    Originating Location (pt. Location): Home    Distant Location (provider location):  [facility]     Platform used for Video Visit: Nando

## 2021-04-09 DIAGNOSIS — Z13.1 SCREENING FOR DIABETES MELLITUS: ICD-10-CM

## 2021-04-09 DIAGNOSIS — E03.8 SUBCLINICAL HYPOTHYROIDISM: ICD-10-CM

## 2021-04-09 LAB
HBA1C MFR BLD: 12.7 % (ref 0–5.6)
T4 FREE SERPL-MCNC: 1.01 NG/DL (ref 0.76–1.46)
TSH SERPL DL<=0.005 MIU/L-ACNC: 6.36 MU/L (ref 0.4–4)

## 2021-04-09 PROCEDURE — 84443 ASSAY THYROID STIM HORMONE: CPT | Performed by: FAMILY MEDICINE

## 2021-04-09 PROCEDURE — 36415 COLL VENOUS BLD VENIPUNCTURE: CPT | Performed by: FAMILY MEDICINE

## 2021-04-09 PROCEDURE — 83036 HEMOGLOBIN GLYCOSYLATED A1C: CPT | Performed by: FAMILY MEDICINE

## 2021-04-09 PROCEDURE — 84439 ASSAY OF FREE THYROXINE: CPT | Performed by: FAMILY MEDICINE

## 2021-04-14 ENCOUNTER — PATIENT OUTREACH (OUTPATIENT)
Dept: FAMILY MEDICINE | Facility: CLINIC | Age: 61
End: 2021-04-14
Payer: COMMERCIAL

## 2021-04-14 SDOH — SOCIAL STABILITY: SOCIAL NETWORK
DO YOU BELONG TO ANY CLUBS OR ORGANIZATIONS SUCH AS CHURCH GROUPS UNIONS, FRATERNAL OR ATHLETIC GROUPS, OR SCHOOL GROUPS?: NO

## 2021-04-14 SDOH — SOCIAL STABILITY: SOCIAL NETWORK: IN A TYPICAL WEEK, HOW MANY TIMES DO YOU TALK ON THE PHONE WITH FAMILY, FRIENDS, OR NEIGHBORS?: THREE TIMES A WEEK

## 2021-04-14 SDOH — SOCIAL STABILITY: SOCIAL NETWORK: HOW OFTEN DO YOU GET TOGETHER WITH FRIENDS OR RELATIVES?: TWICE A WEEK

## 2021-04-14 SDOH — SOCIAL STABILITY: SOCIAL INSECURITY
WITHIN THE LAST YEAR, HAVE YOU BEEN KICKED, HIT, SLAPPED, OR OTHERWISE PHYSICALLY HURT BY YOUR PARTNER OR EX-PARTNER?: NO

## 2021-04-14 SDOH — HEALTH STABILITY: MENTAL HEALTH
STRESS IS WHEN SOMEONE FEELS TENSE, NERVOUS, ANXIOUS, OR CAN'T SLEEP AT NIGHT BECAUSE THEIR MIND IS TROUBLED. HOW STRESSED ARE YOU?: RATHER MUCH

## 2021-04-14 SDOH — SOCIAL STABILITY: SOCIAL NETWORK: ARE YOU MARRIED, WIDOWED, DIVORCED, SEPARATED, NEVER MARRIED, OR LIVING WITH A PARTNER?: NEVER MARRIED

## 2021-04-14 SDOH — SOCIAL STABILITY: SOCIAL INSECURITY
WITHIN THE LAST YEAR, HAVE TO BEEN RAPED OR FORCED TO HAVE ANY KIND OF SEXUAL ACTIVITY BY YOUR PARTNER OR EX-PARTNER?: NO

## 2021-04-14 SDOH — SOCIAL STABILITY: SOCIAL NETWORK: HOW OFTEN DO YOU ATTENT MEETINGS OF THE CLUB OR ORGANIZATION YOU BELONG TO?: NEVER

## 2021-04-14 SDOH — SOCIAL STABILITY: SOCIAL INSECURITY: WITHIN THE LAST YEAR, HAVE YOU BEEN AFRAID OF YOUR PARTNER OR EX-PARTNER?: NO

## 2021-04-14 SDOH — SOCIAL STABILITY: SOCIAL INSECURITY: WITHIN THE LAST YEAR, HAVE YOU BEEN HUMILIATED OR EMOTIONALLY ABUSED IN OTHER WAYS BY YOUR PARTNER OR EX-PARTNER?: NO

## 2021-04-14 SDOH — SOCIAL STABILITY: SOCIAL NETWORK: HOW OFTEN DO YOU ATTEND CHURCH OR RELIGIOUS SERVICES?: MORE THAN 4 TIMES PER YEAR

## 2021-04-14 ASSESSMENT — ACTIVITIES OF DAILY LIVING (ADL): DEPENDENT_IADLS:: INDEPENDENT

## 2021-04-14 NOTE — PROGRESS NOTES
Clinic Care Coordination Contact    Clinic Care Coordination Contact  OUTREACH    Referral Information:  Referral Source: Specialist    Primary Diagnosis: Psychosocial    Chief Complaint   Patient presents with     Clinic Care Coordination - Follow-up     sw cc        [place] Utilization: no concerns  Clinic Utilization  Difficulty keeping appointments:: No  Compliance Concerns: No  No-Show Concerns: No  No PCP office visit in Past Year: No  Utilization    Last refreshed: 2021  3:04 PM: Hospital Admissions 0           Last refreshed: 2021  3:04 PM: ED Visits 0           Last refreshed: 2021  3:04 PM: No Show Count (past year) 2              Current as of: 2021  3:04 PM              Clinical Concerns:  Current Medical Concerns:  Pt has an appointment to discuss her thyroid and diabetes, her last a1c was 12.7. she is not wanting to be on any medications and uncertain how much she wants to alter her diet.  Discussed some of the symptoms of thyroid disorder.     Current Behavioral Concerns: pt continue with depression.  She was informed that if her thyroid is not functioning properly this can also affect her mood as well as her blood sugars.     Education Provided to patient: Reviewed that pt may get a referral to a diabetic educator.  She immediately said she is not wanting to eat just veggies or limit meat/potatoes, carbs.  Reviewed that if she does not want medications then diet and exercise are the next approach. Reviewed some of the side effects such as vision changes, neuropathy, slow healing, etc.  Pt was resistive to any education or talk about diabetes management.      Pt did say that she promised her mom that she would not commit suicide as long as her mom was alive.  Pt was asked if this would be a potential concern if her mom was .  She said it would yet did not want to talk about anything.  She did say she was not planning any self harm or SI at this time.    Pain  Pain  (GOAL):: Yes  Type: Chronic (>3mo)  Health Maintenance Reviewed: Due/Overdue   Health Maintenance Due   Topic Date Due     URINE DRUG SCREEN  Never done     ZOSTER IMMUNIZATION (1 of 2) Never done     INFLUENZA VACCINE (1) 09/01/2020     COVID-19 Vaccine (2 - Pfizer 2-dose series) 04/15/2021     HPV TEST  09/16/2021     PAP  09/16/2021       Clinical Pathway: None    Medication Management:  No questions or concerns.  She is resistive to take diabetic medications.      Functional Status:  Dependent ADLs:: Independent  Dependent IADLs:: Independent  Bed or wheelchair confined:: No  Mobility Status: Independent  Fallen 2 or more times in the past year?: No  Any fall with injury in the past year?: No    Living Situation:  Current living arrangement:: I live alone  Type of residence:: Private home - no stairs    Lifestyle & Psychosocial Needs:  Lifestyle     Physical activity     Days per week: 0 days     Minutes per session: 0 min     Stress: Rather much     Social Needs     Financial resource strain: Not very hard     Food insecurity     Worry: Never true     Inability: Never true     Transportation needs     Medical: No     Non-medical: No     Diet:: Regular  Inadequate nutrition (GOAL):: No  Tube Feeding: No  Inadequate activity/exercise (GOAL):: No  Significant changes in sleep pattern (GOAL): No  Transportation means:: Regular car     Oriental orthodox or spiritual beliefs that impact treatment:: No  Mental health DX:: Yes  Mental health DX how managed:: Medication, Outpatient Counseling  Mental health management concern (GOAL):: Yes  Chemical Dependency Status: No Current Concerns  Informal Support system:: Family, Friends   Socioeconomic History     Marital status: Single     Spouse name: Not on file     Number of children: 0     Years of education: 18     Highest education level: Not on file   Occupational History     Occupation:      Employer: [employer]   Relationships     Social connections      Talks on phone: Three times a week     Gets together: Twice a week     Attends Faith service: More than 4 times per year     Active member of club or organization: No     Attends meetings of clubs or organizations: Never     Relationship status: Never      Intimate partner violence     Fear of current or ex partner: No     Emotionally abused: No     Physically abused: No     Forced sexual activity: No     Tobacco Use     Smoking status: Former Smoker     Packs/day: 0.80     Years: 30.00     Pack years: 24.00     Types: Cigarettes     Quit date: 7/4/2017     Years since quitting: 3.7     Smokeless tobacco: Never Used   Substance and Sexual Activity     Alcohol use: Yes     Alcohol/week: 0.0 standard drinks     Comment: 6 drinks a year     Drug use: No     Sexual activity: Not Currently     Birth control/protection: None          Pt to follow up with her PCP regarding her   TSH and A1C.      Pt has not found work yet and is not sure why she can not get hired where she worked before.  She did not pass the employment test and noted that she has always failed at written tests, yet can pass if they are read to her.  She is not wanting to pursue any testing.      Resources and Interventions:  Current Resources:      Community Resources: None  Supplies used at home:: None  Equipment Currently Used at Home: none  Employment Status: employment seeking)   )    Advance Care Plan/Directive  Advanced Care Plans/Directives on file:: Yes  Type Advanced Care Plans/Directives: Advanced Directive - On File    Referrals Placed: Mental Health     Goals:   Goals        General    #1  Mental Health Management (pt-stated)     Notes - Note edited  2/10/2021 10:19 AM by Bell Brito LSW    Goal Statement: I will work on finding two techniques to help me reduce my depression, in the next month.  Date Goal set: 4/27/2020  Barriers: Social distancing, depression  Strengths: Family, friends, and understanding that a needs some  help with my depression  Date to Achieve By: 8/9/21   Patient expressed understanding of goal: Yes  Action steps to achieve this goal:  1. I will search for some mindfulness videos to help with depression  2. I will look at the Maryanne website for resources  3. I will work with counseling to learn new tools as well        #2 Being Active (pt-stated)     Notes - Note edited  4/14/2021 11:45 AM by Bell Brito LSW    Goal Statement: I want to increase my activity to help improve my weight and back pain.  I will walk at least 10 minutes 3 days/week or more and maintain this for 3 consecutive months.  Date Goal set: 4/27/2020   Barriers: Depression, back pain, social distancing to some extent  Strengths: Understand that increasing my exercise will help with managing my weight and back pain  Date to Achieve By: 7-  Patient expressed understanding of goal: Yes  Action steps to achieve this goal:  1. I will walk in one direction for 5 minutes and then return for a total of 10 minutes  2. I will not get discouraged if I cannot make it the 5 minutes in one direction before having to turn around  3. I will celebrate my successes            Patient/Caregiver understanding: work on increasing her exercises.  See provider about her TSH and A1C as these can also be affecting her mood.     Outreach Frequency: monthly  Future Appointments              Tomorrow FZ COVID VACCINE 21 DAY PFIZER [facility]    In 5 days Marybeth Silver MD [facility] [place]          Plan: pt to attend appt's as scheduled.  Pt to work on increasing her exercise.  SW to send updated complex care plan via Cydan. Will call in about one month.     JAQUELIN Mensah, [facility] - Care Coordinator   [facility]  4/14/2021   1:13 PM  [phone]

## 2021-04-14 NOTE — LETTER
It was a pleasure to speak with you.  I would like to provide you with the enclosed information for your records.  As part of care coordination, we are developing care plans to assist in accomplishing your health care goals.  When we speak next, please feel free to let me know if you want to add or change any information on your care plans.    As always, feel free to contact me if you have any questions or concerns.  I look forward to working with you in the effort to achieve your health care and wellness goals .        Sincerely,      Bell Brito, [facility]  Clinic Care Coordination  [phone]      Critical access hospital  Complex Care Plan  About Me:    Patient Name:  Jessica Jay    YOB: 1960  Age:         60 year old   [place] MRN:    [id number] Telephone Information:  Home Phone [phone]   Mobile [phone]       Address:  [address] [place] [id number] Email address:  [email or web address]      Emergency Contact(s)    Name Relationship Lgl Grd Work Phone Home Phone Mobile Phone   1. ILIANA JAY Mother   [phone]   2. NOAH TOBIAS Sister  [phone]            Primary language:  English     needed? No   [place] [employer] [facility]:  [phone] op. 1  Other communication barriers: Glasses  Preferred Method of Communication:  Portia  Current living arrangement: I live alone  Mobility Status/ Medical Equipment: Independent    Health Maintenance  Health Maintenance Reviewed: Due/Overdue   Health Maintenance Due   Topic Date Due     URINE DRUG SCREEN  Never done     ZOSTER IMMUNIZATION (1 of 2) Never done     INFLUENZA VACCINE (1) 09/01/2020     COVID-19 Vaccine (2 - Pfizer 2-dose series) 04/15/2021     HPV TEST  09/16/2021     PAP  09/16/2021     Please talk with your provider about what is needed or can continue to wait.        My Access Plan  Medical Emergency 911   Primary Clinic [facility]  [phone]   24 Hour Appointment Line [phone] or  [phone] ([phone]) (toll-free)   24 Hour Nurse Line [phone] (toll-free)   Preferred Urgent Care Other   Preferred [facility]  [phone]   Preferred Pharmacy [id number] [place] [id number]  AT [facility]     Behavioral Health Crisis Line The National Suicide Prevention Lifeline at 1-433.818.9271 or 915             My Care Team Members  Patient Care Team       Relationship Specialty Notifications Start End    Marybeth Silver MD PCP - General Family Practice  5/16/18     Phone: [phone] Fax: [phone] [address] [place] [place] [address]    Marybeth Silver MD Assigned PCP   4/15/18     Phone: [phone] Fax: [phone] [address] [place] [address]    Bell Brito LSW Lead Care Coordinator Primary Care - CC Admissions 1/29/20     Phone: [phone] Fax: [phone]        Sarthak Gross MD Assigned Surgical Provider   10/23/20     Phone: [phone] Fax: [phone] [address] [facility] [address]    Ki May MD Assigned Musculoskeletal Provider   10/23/20     Phone: [phone] Fax: [phone]         [address] [facility] [address]    Caren Denise DO Assigned Behavioral Health Provider   11/22/20     Phone: [phone] Fax: [phone]         [address] [place] [address]            My Care Plans  Self Management and Treatment Plan  Goals and (Comments)  Goals        General    #1  Mental Health Management (pt-stated)     Notes - Note edited  2/10/2021 10:19 AM by Bell Brito LSW    Goal Statement: I will work on finding two techniques to help me reduce my depression, in the next month.  Date Goal set: 4/27/2020  Barriers: Social distancing, depression  Strengths: Family, friends, and understanding that a needs some help with my depression  Date to Achieve By:  8/9/21   Patient expressed understanding of goal: Yes  Action steps to achieve this goal:  1. I will search for some mindfulness videos to help with depression  2. I will look at the Maryanne website for resources  3. I will work with counseling to learn new tools as well        #2 Being Active (pt-stated)     Notes - Note edited  4/14/2021 11:45 AM by Bell Brito LSW    Goal Statement: I want to increase my activity to help improve my weight and back pain.  I will walk at least 10 minutes 3 days/week or more and maintain this for 3 consecutive months.  Date Goal set: 4/27/2020   Barriers: Depression, back pain, social distancing to some extent  Strengths: Understand that increasing my exercise will help with managing my weight and back pain  Date to Achieve By: 7-  Patient expressed understanding of goal: Yes  Action steps to achieve this goal:  1. I will walk in one direction for 5 minutes and then return for a total of 10 minutes  2. I will not get discouraged if I cannot make it the 5 minutes in one direction before having to turn around  3. I will celebrate my successes             Action Plans on File:            Depression          Advance Care Plans/Directives Type:   Type Advanced Care Plans/Directives: Advanced Directive - On File    My Medical and Care Information  Problem List   Patient Active Problem List   Diagnosis     Depression, major     Migraines     CARDIOVASCULAR SCREENING; LDL GOAL LESS THAN 160     Disc herniation     DDD (degenerative disc disease), lumbar     DDD (degenerative disc disease), cervical     Neck pain     Chronic daily headache     Tobacco abuse     ELYSSA (obstructive sleep apnea)- severe (AHI 49)     Spondylolisthesis, grade 1     Chronic low back pain     Brain lipoma     History of colonic polyps     Gastroesophageal reflux disease without esophagitis     Overweight with BMI >50     Hypertension goal BP (blood pressure) < 140/90     Traylor's esophagus determined by  endoscopy     Bilateral edema of lower extremity     Generalized anxiety disorder     Restless legs syndrome (RLS)      Current Medications and Allergies:       Allergies   Allergen Reactions     Buspar [Buspirone Hcl] Rash     Seroquel [Quetiapine] Itching         Current Outpatient Medications:      cyclobenzaprine (FLEXERIL) 5 MG tablet, Take 1 tablet (5 mg) by mouth 3 times daily as needed for muscle spasms, Disp: 42 tablet, Rfl: 0     DULoxetine (CYMBALTA) 30 MG capsule, Take 3 capsules (90 mg) by mouth daily, Disp: 90 capsule, Rfl: 2     furosemide (LASIX) 20 MG tablet, Take 1 tablet (20 mg) by mouth 2 times daily, Disp: 180 tablet, Rfl: 3     hydrOXYzine (ATARAX) 25 MG tablet, Take 25-50 mg twice a day as needed for anxiety. Can also take  mg at bedtime for sleep/itching. Don't exceed 300 mg daily., Disp: 90 tablet, Rfl: 1     metoprolol succinate ER (TOPROL-XL) 25 MG 24 hr tablet, Take 1 tablet (25 mg) by mouth daily, Disp: 90 tablet, Rfl: 3     omeprazole (PRILOSEC) 20 MG DR capsule, TAKE 1 CAPSULE(20 MG) BY MOUTH DAILY, Disp: 90 capsule, Rfl: 1    Current Facility-Administered Medications:      lidocaine 1 % injection 0.5 mL, 0.5 mL, , , Ki May MD, 0.5 mL at 05/04/20 1021     triamcinolone (KENALOG-40) injection 20 mg, 20 mg, , , Ki May MD, 20 mg at 05/04/20 1021      Care Coordination Start Date: 1/29/2020   Frequency of Care Coordination: monthly   Form Last Updated: 04/14/2021

## 2021-04-15 ENCOUNTER — IMMUNIZATION (OUTPATIENT)
Dept: NURSING | Facility: CLINIC | Age: 61
End: 2021-04-15
Attending: FAMILY MEDICINE
Payer: COMMERCIAL

## 2021-04-15 PROCEDURE — 0002A PR COVID VAC PFIZER DIL RECON 30 MCG/0.3 ML IM: CPT

## 2021-04-15 PROCEDURE — 91300 PR COVID VAC PFIZER DIL RECON 30 MCG/0.3 ML IM: CPT

## 2021-04-19 ENCOUNTER — VIRTUAL VISIT (OUTPATIENT)
Dept: FAMILY MEDICINE | Facility: CLINIC | Age: 61
End: 2021-04-19
Payer: COMMERCIAL

## 2021-04-19 DIAGNOSIS — F33.1 MODERATE RECURRENT MAJOR DEPRESSION (H): ICD-10-CM

## 2021-04-19 DIAGNOSIS — F41.1 GAD (GENERALIZED ANXIETY DISORDER): ICD-10-CM

## 2021-04-19 DIAGNOSIS — E11.65 TYPE 2 DIABETES MELLITUS WITH HYPERGLYCEMIA, WITHOUT LONG-TERM CURRENT USE OF INSULIN (H): Primary | ICD-10-CM

## 2021-04-19 DIAGNOSIS — E78.5 HYPERLIPIDEMIA LDL GOAL <100: ICD-10-CM

## 2021-04-19 DIAGNOSIS — R74.8 ELEVATED LIVER ENZYMES: ICD-10-CM

## 2021-04-19 DIAGNOSIS — I10 HYPERTENSION GOAL BP (BLOOD PRESSURE) < 140/90: ICD-10-CM

## 2021-04-19 PROBLEM — E11.9 DIABETES MELLITUS, TYPE 2 (H): Status: ACTIVE | Noted: 2021-04-19

## 2021-04-19 PROCEDURE — 99214 OFFICE O/P EST MOD 30 MIN: CPT | Mod: 95 | Performed by: FAMILY MEDICINE

## 2021-04-19 RX ORDER — ATORVASTATIN CALCIUM 40 MG/1
40 TABLET, FILM COATED ORAL DAILY
Qty: 90 TABLET | Refills: 3 | Status: SHIPPED | OUTPATIENT
Start: 2021-04-19 | End: 2022-01-11

## 2021-04-19 RX ORDER — LISINOPRIL 10 MG/1
10 TABLET ORAL DAILY
Qty: 30 TABLET | Refills: 1 | Status: SHIPPED | OUTPATIENT
Start: 2021-04-19 | End: 2021-06-15

## 2021-04-19 ASSESSMENT — ANXIETY QUESTIONNAIRES
6. BECOMING EASILY ANNOYED OR IRRITABLE: SEVERAL DAYS
7. FEELING AFRAID AS IF SOMETHING AWFUL MIGHT HAPPEN: NOT AT ALL
2. NOT BEING ABLE TO STOP OR CONTROL WORRYING: SEVERAL DAYS
3. WORRYING TOO MUCH ABOUT DIFFERENT THINGS: SEVERAL DAYS
IF YOU CHECKED OFF ANY PROBLEMS ON THIS QUESTIONNAIRE, HOW DIFFICULT HAVE THESE PROBLEMS MADE IT FOR YOU TO DO YOUR WORK, TAKE CARE OF THINGS AT HOME, OR GET ALONG WITH OTHER PEOPLE: SOMEWHAT DIFFICULT
GAD7 TOTAL SCORE: 9
1. FEELING NERVOUS, ANXIOUS, OR ON EDGE: NOT AT ALL
5. BEING SO RESTLESS THAT IT IS HARD TO SIT STILL: NEARLY EVERY DAY

## 2021-04-19 ASSESSMENT — PATIENT HEALTH QUESTIONNAIRE - PHQ9
SUM OF ALL RESPONSES TO PHQ QUESTIONS 1-9: 8
5. POOR APPETITE OR OVEREATING: NEARLY EVERY DAY

## 2021-04-19 NOTE — PROGRESS NOTES
Gemma is a 60 year old who is being evaluated via a billable video visit.      How would you like to obtain your AVS? MyChart  If the video visit is dropped, the invitation should be resent by: Text to cell phone: [phone]  Will anyone else be joining your video visit? No    Video Start Time: 9:15 am    Assessment & Plan     Type 2 diabetes mellitus with hyperglycemia, without long-term current use of insulin (H), newly diagnosed.   Recent A1C 12.7  - Basic Diabetic Education given and goals discussed.   - Pooled Cohorts Equation Calculator:  Risk factor: 16.1 %  Patient is at elevated 10-year risk for ASCVD. Recommended to start a high-intensity statin.  - Start: aspirin (ASA) 81 MG EC tablet  Dispense: 100 tablet; Refill: 3  - Start: metFORMIN (GLUCOPHAGE) 1000 MG tablet  Dispense: 180 tablet; Refill: 3  - Start: Albumin Random Urine Quantitative with Creat Ratio  - AMBULATORY ADULT DIABETES EDUCATOR REFERRAL  - EYE ADULT REFERRAL  - blood glucose monitoring (NO BRAND SPECIFIED) meter device kit  Dispense: 1 kit; Refill: 0  - blood glucose (NO BRAND SPECIFIED) test strip  Dispense: 100 strip; Refill: 0  - blood glucose (NO BRAND SPECIFIED) lancets standard  Dispense: 100 lancet; Refill: 3    Hyperlipidemia LDL goal <100, uncontrolled  - Start high- intensity statin: Atorvastatin (LIPITOR) 40 MG tablet  Dispense: 90 tablet; Refill: 3    Hypertension goal BP (blood pressure) < 140/90, uncontrolled  Patient reported BP of 128/103  Will add an ACE to current regimen improve BP control and for renal protection.  - Start: lisinopril (ZESTRIL) 10 MG tablet  Dispense: 30 tablet; Refill: 1  -     Elevated liver enzymes  Suspect WARNER. Obtain Liver Ultrasound.   - US Abdomen Limited  - Hepatic panel (Albumin, ALT, AST, Bili, Alk Phos, TP)    Moderate recurrent major depression, stable  -  PHQ-9/GABRIELA 7 completed, see below/Epic for details    -  Continue following with Psychiatrist and Psychotherapist.       GABRIELA (generalized  anxiety disorder), stable  - PHQ-9/GABRIELA 7 completed, see below /Epic for details    - Continue following with Psychiatrist and Psychotherapist.       Patient seems very overwhelmed with the new diagnosis and multiple other co morbidities that she is dealing with.   Recommend close follow up- follow up in 1 week.     Return in about 1 week (around 4/26/2021) for Follow up- Diabetes.    Marybeth Silver MD  [facility] KIRSTIE Menendez is a 60 year old who presents for the following health issues       HPI         Diabetes New Diagnosis  Recently noted to have an elevated blood glucose on her Metabolic Panel-  Component      Latest Ref Rng & Units 10/22/2020   Sodium      133 - 144 mmol/L 138     Follow up A1C was  Component      Latest Ref Rng & Units 4/9/2021   Hemoglobin A1C      0 - 5.6 % 12.7 (H)       How often are you checking your blood sugar? Not at all    What concerns do you have today about your diabetes? None     Do you have any of these symptoms? (Select all that apply)  No numbness or tingling in feet.  No redness, sores or blisters on feet.  No complaints of excessive thirst.  No reports of blurry vision.  No significant changes to weight.      BP Readings from Last 2 Encounters:   11/06/20 (!) 163/101   10/29/20 (!) 165/96     Hemoglobin A1C (%)   Date Value   04/09/2021 12.7 (H)     LDL Cholesterol Calculated (mg/dL)   Date Value   10/22/2020 158 (H)   08/13/2018 130 (H)       Hypertension Follow-up  Patient reports BP today of 128/103.   Currently on Metoprolol 25 mg/day and Lasix 20 mg BID    Do you check your blood pressure regularly outside of the clinic? Yes     Are you following a low salt diet? Yes    Are your blood pressures ever more than 140 on the top number (systolic) OR more   than 90 on the bottom number (diastolic), for example 140/90? Yes    Depression and Anxiety Follow-Up  Following with Psychiatry and Psychotherapist.     How are you doing with your  depression since your last visit? No change    How are you doing with your anxiety since your last visit?  No change    Are you having other symptoms that might be associated with depression or anxiety? Yes:  Feeling overwhelmed.     Have you had a significant life event? Job Concerns     Do you have any concerns with your use of alcohol or other drugs? No    Social History     Tobacco Use     Smoking status: Former Smoker     Packs/day: 0.80     Years: 30.00     Pack years: 24.00     Types: Cigarettes     Quit date: 7/4/2017     Years since quitting: 3.7     Smokeless tobacco: Never Used   Substance Use Topics     Alcohol use: Yes     Alcohol/week: 0.0 standard drinks     Comment: 6 drinks a year     Drug use: No     PHQ 11/17/2020 11/17/2020 4/19/2021   PHQ-9 Total Score 16 16 8   Q9: Thoughts of better off dead/self-harm past 2 weeks Not at all Not at all Not at all     GABRIELA-7 SCORE 11/17/2020 11/17/2020 4/19/2021   Total Score - 8 (mild anxiety) -   Total Score 8 8 9     Last PHQ-9 4/19/2021   1.  Little interest or pleasure in doing things 1   2.  Feeling down, depressed, or hopeless 1   3.  Trouble falling or staying asleep, or sleeping too much 2   4.  Feeling tired or having little energy 3   5.  Poor appetite or overeating 0   6.  Feeling bad about yourself 1   7.  Trouble concentrating 0   8.  Moving slowly or restless 0   Q9: Thoughts of better off dead/self-harm past 2 weeks 0   PHQ-9 Total Score 8   Difficulty at work, home, or with people Somewhat difficult     GABRIELA-7  4/19/2021   1. Feeling nervous, anxious, or on edge 0   2. Not being able to stop or control worrying 1   3. Worrying too much about different things 1   4. Trouble relaxing 3   5. Being so restless that it is hard to sit still 3   6. Becoming easily annoyed or irritable 1   7. Feeling afraid, as if something awful might happen 0   GABRIELA-7 Total Score 9   If you checked any problems, how difficult have they made it for you to do your work,  take care of things at home, or get along with other people? Somewhat difficult       Suicide Assessment Five-step Evaluation and Treatment (SAFE-T)        How many servings of fruits and vegetables do you eat daily?  0-1    On average, how many sweetened beverages do you drink each day (Examples: soda, juice, sweet tea, etc.  Do NOT count diet or artificially sweetened beverages)?   0    How many days per week do you exercise enough to make your heart beat faster? 3 or less    How many minutes a day do you exercise enough to make your heart beat faster? 9 or less    How many days per week do you miss taking your medication? 0        Review of Systems   Constitutional, HEENT, cardiovascular, pulmonary, gi and gu systems are negative, except as otherwise noted.      Objective           Vitals:  No vitals were obtained today due to virtual visit.    Physical Exam   GENERAL: Healthy, alert and no distress  EYES: Eyes grossly normal to inspection.  No discharge or erythema, or obvious scleral/conjunctival abnormalities.  RESP: No audible wheeze, cough, or visible cyanosis.  No visible retractions or increased work of breathing.    SKIN: Visible skin clear. No significant rash, abnormal pigmentation or lesions.  NEURO: Cranial nerves grossly intact.  Mentation and speech appropriate for age.  PSYCH: mentation appears normal, anxious and appearance well groomed    DATA  Recent labs reviewed.   Component      Latest Ref Rng & Units 10/22/2020 4/9/2021   Sodium      133 - 144 mmol/L 138    Potassium      3.4 - 5.3 mmol/L 4.0    Chloride      94 - 109 mmol/L 103    Carbon Dioxide      20 - 32 mmol/L 28    Anion Gap      3 - 14 mmol/L 7    Glucose      70 - 99 mg/dL 167 (H)    Urea Nitrogen      7 - 30 mg/dL 11    Creatinine      0.52 - 1.04 mg/dL 0.69    GFR Estimate      >60 mL/min/1.73:m2 >90    GFR Estimate If Black      >60 mL/min/1.73:m2 >90    Calcium      8.5 - 10.1 mg/dL 9.1    Bilirubin Total      0.2 - 1.3 mg/dL  0.4    Albumin      3.4 - 5.0 g/dL 3.7    Protein Total      6.8 - 8.8 g/dL 8.1    Alkaline Phosphatase      40 - 150 U/L 140    ALT      0 - 50 U/L 200 (H)    AST      0 - 45 U/L 217 (H)    WBC      4.0 - 11.0 10e9/L 5.7    RBC Count      3.8 - 5.2 10e12/L 5.44 (H)    Hemoglobin      11.7 - 15.7 g/dL 13.8    Hematocrit      35.0 - 47.0 % 43.5    MCV      78 - 100 fl 80    MCH      26.5 - 33.0 pg 25.4 (L)    MCHC      31.5 - 36.5 g/dL 31.7    RDW      10.0 - 15.0 % 15.3 (H)    Platelet Count      150 - 450 10e9/L 356    Cholesterol      <200 mg/dL 227 (H)    Triglycerides      <150 mg/dL 112    HDL Cholesterol      >49 mg/dL 47 (L)    LDL Cholesterol Calculated      <100 mg/dL 158 (H)    Non HDL Cholesterol      <130 mg/dL 180 (H)    TSH      0.40 - 4.00 mU/L 5.38 (H) 6.36 (H)   T4 Free      0.76 - 1.46 ng/dL 0.97 1.01   Hemoglobin A1C      0 - 5.6 %  12.7 (H)               Video-Visit Details    Type of service:  Video Visit    Video End Time: 9:35 am    Originating Location (pt. Location): Home    Distant Location (provider location):  [facility] KIRSTIE     Platform used for Video Visit: Gary

## 2021-04-20 ENCOUNTER — TELEPHONE (OUTPATIENT)
Dept: FAMILY MEDICINE | Facility: CLINIC | Age: 61
End: 2021-04-20

## 2021-04-20 ASSESSMENT — ANXIETY QUESTIONNAIRES: GAD7 TOTAL SCORE: 9

## 2021-04-20 NOTE — TELEPHONE ENCOUNTER
Diabetes Education Scheduling Outreach #1:    Call to patient to schedule. Left message with phone number to call to schedule.    Plan for 2nd outreach attempt within 1 week.    Maryellen Stinson  [facility]  Diabetes and Nutrition Scheduling

## 2021-04-27 ENCOUNTER — OFFICE VISIT (OUTPATIENT)
Dept: FAMILY MEDICINE | Facility: CLINIC | Age: 61
End: 2021-04-27
Payer: COMMERCIAL

## 2021-04-27 ENCOUNTER — ANCILLARY PROCEDURE (OUTPATIENT)
Dept: ULTRASOUND IMAGING | Facility: CLINIC | Age: 61
End: 2021-04-27
Attending: FAMILY MEDICINE
Payer: COMMERCIAL

## 2021-04-27 VITALS
HEIGHT: 61 IN | HEART RATE: 95 BPM | BODY MASS INDEX: 50.79 KG/M2 | WEIGHT: 269 LBS | SYSTOLIC BLOOD PRESSURE: 138 MMHG | DIASTOLIC BLOOD PRESSURE: 82 MMHG | RESPIRATION RATE: 16 BRPM | OXYGEN SATURATION: 99 % | TEMPERATURE: 97.8 F

## 2021-04-27 DIAGNOSIS — R74.8 ELEVATED LIVER ENZYMES: ICD-10-CM

## 2021-04-27 DIAGNOSIS — E11.65 TYPE 2 DIABETES MELLITUS WITH HYPERGLYCEMIA, WITHOUT LONG-TERM CURRENT USE OF INSULIN (H): Primary | ICD-10-CM

## 2021-04-27 LAB
CREAT UR-MCNC: 28 MG/DL
MICROALBUMIN UR-MCNC: <5 MG/L
MICROALBUMIN/CREAT UR: NORMAL MG/G CR (ref 0–25)

## 2021-04-27 PROCEDURE — 99207 PR FOOT EXAM NO CHARGE: CPT | Performed by: FAMILY MEDICINE

## 2021-04-27 PROCEDURE — 99214 OFFICE O/P EST MOD 30 MIN: CPT | Performed by: FAMILY MEDICINE

## 2021-04-27 PROCEDURE — 82043 UR ALBUMIN QUANTITATIVE: CPT | Performed by: FAMILY MEDICINE

## 2021-04-27 RX ORDER — GLUCOSAMINE HCL/CHONDROITIN SU 500-400 MG
CAPSULE ORAL
Qty: 100 EACH | Refills: 3 | Status: SHIPPED | OUTPATIENT
Start: 2021-04-27

## 2021-04-27 RX ORDER — LIRAGLUTIDE 6 MG/ML
1.2 INJECTION SUBCUTANEOUS DAILY
Qty: 15 ML | Refills: 3 | Status: SHIPPED | OUTPATIENT
Start: 2021-04-27 | End: 2021-10-05

## 2021-04-27 ASSESSMENT — MIFFLIN-ST. JEOR: SCORE: 1727.56

## 2021-04-27 NOTE — PATIENT INSTRUCTIONS
Your target glucose levels are:     Pre-meal (fasting) :      Post-meal (2 hours after):  < 180    Bedtime:     90 -150

## 2021-04-27 NOTE — PROGRESS NOTES
Assessment & Plan     Type 2 diabetes mellitus with hyperglycemia, without long-term current use of insulin (H); uncontrolled. Newly diagnosed   - Continue Metformin 1000 mg BID   -  Add:  liraglutide (VICTOZA) 18 MG/3ML solution  Dispense: 15 mL; Refill: 3  - alcohol swab prep pads  Dispense: 100 each; Refill: 3  - FOOT EXAM  - Albumin Random Urine Quantitative with Creat Ratio  - Spent time educating patient about Diabetes, answering questions and on how to use her Glucose meter.      Return in about 1 month (around 5/27/2021) for Diabetes Follow Up with a HgbA1C prior to visit.    Marybeth Silver MD  [facility] KIRSTIE Menendez is a 60 year old who presents for the following health issues     HPI     Diabetes Follow-up  Patient overwhelmed by the Diabetes diagnosis.   Recently started all medications as prescribed and reports that she is tolerating them well.   Has a scheduled Diabetes Education Visit on 5/11      How often are you checking your blood sugar? Not yet- does not know how to use her blood sugar testing device     What concerns do you have today about your diabetes? Newly dx diabetes     Do you have any of these symptoms? (Select all that apply)  No numbness or tingling in feet.  No redness, sores or blisters on feet.  No complaints of excessive thirst.  No reports of blurry vision.  No significant changes to weight.    Have you had a diabetic eye exam in the last 12 months? No last eye exam x2 years ago         BP Readings from Last 2 Encounters:   04/27/21 138/82   11/06/20 (!) 163/101     Hemoglobin A1C (%)   Date Value   04/09/2021 12.7 (H)     LDL Cholesterol Calculated (mg/dL)   Date Value   10/22/2020 158 (H)   08/13/2018 130 (H)             Review of Systems   Constitutional, HEENT, cardiovascular, pulmonary, gi and gu systems are negative, except as otherwise noted.      Objective    /82   Pulse 95   Temp 97.8  F (36.6  C) (Tympanic)   Resp 16   Ht  "1.549 m (5' 1\")   Wt 122 kg (269 lb)   SpO2 99%   Breastfeeding No   BMI 50.83 kg/m    Body mass index is 50.83 kg/m .  Physical Exam   GENERAL: healthy, alert and no distress  RESP: lungs clear to auscultation - no rales, rhonchi or wheezes  CV: regular rate and rhythm, normal S1 S2, no S3 or S4, no murmur, click or rub, no peripheral edema and peripheral pulses strong  Diabetic foot exam: normal DP and PT pulses, no trophic changes or ulcerative lesions, normal sensory exam, normal monofilament exam and dry cracking heels    DATA  Recent labs reviewed.             "

## 2021-04-28 ENCOUNTER — MYC MEDICAL ADVICE (OUTPATIENT)
Dept: FAMILY MEDICINE | Facility: CLINIC | Age: 61
End: 2021-04-28

## 2021-05-10 ENCOUNTER — MYC MEDICAL ADVICE (OUTPATIENT)
Dept: FAMILY MEDICINE | Facility: CLINIC | Age: 61
End: 2021-05-10

## 2021-05-10 DIAGNOSIS — E11.65 TYPE 2 DIABETES MELLITUS WITH HYPERGLYCEMIA, WITHOUT LONG-TERM CURRENT USE OF INSULIN (H): Primary | ICD-10-CM

## 2021-05-10 NOTE — TELEPHONE ENCOUNTER
See patient's mychart request, needs pen needles sent to pharmacy.    Not on current med list, I christiane'd up possible Rx and routed to Dr. Silver to send.    Shena Paul RN  [facility]

## 2021-05-13 ENCOUNTER — MYC MEDICAL ADVICE (OUTPATIENT)
Dept: FAMILY MEDICINE | Facility: CLINIC | Age: 61
End: 2021-05-13

## 2021-05-13 DIAGNOSIS — E11.65 TYPE 2 DIABETES MELLITUS WITH HYPERGLYCEMIA, WITHOUT LONG-TERM CURRENT USE OF INSULIN (H): Primary | ICD-10-CM

## 2021-05-13 NOTE — TELEPHONE ENCOUNTER
[facility] ed [facility] patient having trouble with her glucometer and is worried about if the victoza is getting into her system.    Can she get scheduled any sooner than 6/10?   Apparently she has work issues and can only do 3:15 pm or later.    Shena Paul RN  [facility]

## 2021-05-14 ENCOUNTER — PATIENT OUTREACH (OUTPATIENT)
Dept: CARE COORDINATION | Facility: CLINIC | Age: 61
End: 2021-05-14

## 2021-05-14 NOTE — TELEPHONE ENCOUNTER
CDE appointments are currently full after 3pm.  Will check with MTM for sooner options.    Eri Lazcano MS, RD, LD, CDE

## 2021-05-14 NOTE — PROGRESS NOTES
Clinic Care Coordination Contact  [facility]/Voicemail       Clinical Data: Care Coordinator Outreach  Outreach attempted x 1.  Left message on patient's voicemail with call back information and requested return call.  Plan:  Care Coordinator will try to reach patient again in 9-11 business days.    JAQUELIN Mensah, [facility] - Care Coordinator   [facility]  5/14/2021   10:11 AM  [phone]

## 2021-05-17 NOTE — TELEPHONE ENCOUNTER
Our scheduling team will call to schedule the patient.    Gillian Yen PharmD  Medication Therapy Management Pharmacist

## 2021-05-17 NOTE — TELEPHONE ENCOUNTER
MTM scheduling:    MTM pharmacist referral is placed. Please contact pt to schedule. Due to high A1C of 12.7% over a month ago at diagnosis she needs to be seen ASAP. She was not started on insulin. Glucose was high (167) in October 2020, but no A1C completed until April 2021. No BG or ketone tests completed in April.    Pt has appt with diabetes educators in June.    Thank you!    Charmaine Hsu RN, [facility]

## 2021-05-19 ENCOUNTER — VIRTUAL VISIT (OUTPATIENT)
Dept: PHARMACY | Facility: CLINIC | Age: 61
End: 2021-05-19
Attending: FAMILY MEDICINE
Payer: COMMERCIAL

## 2021-05-19 DIAGNOSIS — R60.0 BILATERAL EDEMA OF LOWER EXTREMITY: ICD-10-CM

## 2021-05-19 DIAGNOSIS — E11.65 TYPE 2 DIABETES MELLITUS WITH HYPERGLYCEMIA, WITHOUT LONG-TERM CURRENT USE OF INSULIN (H): Primary | ICD-10-CM

## 2021-05-19 DIAGNOSIS — F33.1 MODERATE EPISODE OF RECURRENT MAJOR DEPRESSIVE DISORDER (H): ICD-10-CM

## 2021-05-19 DIAGNOSIS — M54.50 CHRONIC BILATERAL LOW BACK PAIN WITHOUT SCIATICA: ICD-10-CM

## 2021-05-19 DIAGNOSIS — G89.29 CHRONIC BILATERAL LOW BACK PAIN WITHOUT SCIATICA: ICD-10-CM

## 2021-05-19 DIAGNOSIS — F41.1 GENERALIZED ANXIETY DISORDER: ICD-10-CM

## 2021-05-19 DIAGNOSIS — Z13.6 CARDIOVASCULAR SCREENING; LDL GOAL LESS THAN 160: ICD-10-CM

## 2021-05-19 DIAGNOSIS — K21.9 GASTROESOPHAGEAL REFLUX DISEASE WITHOUT ESOPHAGITIS: ICD-10-CM

## 2021-05-19 DIAGNOSIS — I10 HYPERTENSION GOAL BP (BLOOD PRESSURE) < 140/90: ICD-10-CM

## 2021-05-19 PROCEDURE — 99607 MTMS BY PHARM ADDL 15 MIN: CPT | Performed by: PHARMACIST

## 2021-05-19 PROCEDURE — 99605 MTMS BY PHARM NP 15 MIN: CPT | Performed by: PHARMACIST

## 2021-05-19 RX ORDER — METFORMIN HCL 500 MG
1000 TABLET, EXTENDED RELEASE 24 HR ORAL 2 TIMES DAILY WITH MEALS
Qty: 360 TABLET | Refills: 3 | Status: SHIPPED | OUTPATIENT
Start: 2021-05-19 | End: 2022-01-11

## 2021-05-19 NOTE — PROGRESS NOTES
Medication Therapy Management (MTM) Encounter    ASSESSMENT:                            Medication Adherence/Access: No issues identified    Type 2 Diabetes:  Try taking 0.6 mg of Victoza today - alternate with 1.2 mg every other day to help build up a tolerance to nausea. Typically transient. Patient is also taking metformin immediate release - which may be contributing to nausea - changed to extended release metformin which is typically better tolerated. Sent a prescription to patient's pharmacy.     Reviewed meal portions and carbs with patient, sent some information on diet and intermittent fasting to patient. Encouraged her to keep her June appointment with diabetes education to review diet in more detail.     Hyperlipidemia: stable    Hypertension/Edema: stable    Depression/Back Pain: stable    Insomnia/Anxiety: stable    GERD: stable    PLAN:                            1. Try taking Victoza 0.6 mg one day and alternating with Victoza 1.2 mg every other day to help with nausea tolerance - once tolerating 1.2 mg dose, increase to 1.2 mg daily.    2. Switch from immediate release metformin to extended release metformin to help with nausea. I sent a prescription to patient's pharmacy.     3. I sent diet/carb information to patient.     Follow-up: Wednesday, May 26th at 3:30 PM    SUBJECTIVE/OBJECTIVE:                          Jessica Jay is a 60 year old female called for an initial visit. She was referred to me from Marybeth Silver.      Reason for visit: per referral - Diabetic Educator unavailable    Allergies/ADRs: Reviewed in chart  Tobacco: She reports that she quit smoking about 3 years ago. Her smoking use included cigarettes. She has a 24.00 pack-year smoking history. She has never used smokeless tobacco.  Alcohol: Less than 1 beverage / month  Caffeine: 1 cup/day of coffee    Past Medical History: Reviewed in chart    Medication Adherence/Access: no issues reported    Type 2 Diabetes:  Currently  taking Victoza 1.2 mg daily - just increased dose from 0.6 mg daily yesterday - felt like throwing up all day today, hasn't taken her shot today yet. With the 0.6 mg dose - had a little nausea the first day - gone by the second day. She was on the 0.6 mg daily dose of Victoza for 1 week. Patient is wondering what she should be eating. Currently having a bowl of cereal or eggs for breakfast. Trying to eat lunch at work, feels healthier than what she would eat at home. Today had a chicken breast with vegetables at work. Drinking water mostly throughout the day and having one cup of coffee daily.   Blood sugar monitoring: testing twice daily. Mornings: yesterday 195, today 127; yesterday when got home from work 163 before dinner. States since May 12th blood sugars have been below 200.   Symptoms of low blood sugar? Not going low  Eye exam: due  Foot exam: up to date  Aspirin: Taking 81mg daily and denies side effects  Statin: Yes: atorvastastin   ACEi/ARB: Yes: lisinopril.   Urine Albumin:   Lab Results   Component Value Date    UMALCR Unable to calculate due to low value 04/27/2021      Lab Results   Component Value Date    A1C 12.7 04/09/2021     No longer gets a flu vaccine - got very sick after the last two times. Has had the pneumonia vaccine and both COVID vaccines. Plans on getting Shingrix vaccine.     Hyperlipidemia: Current therapy includes atorvastatin 40 mg daily. Patient reports no significant myalgias or other side effects. Atorvastatin started after labs from 10/2020.   The 10-year ASCVD risk score (Monicamario alberto HAMILTON Jr., et al., 2013) is: 11.8%    Values used to calculate the score:      Age: 60 years      Sex: Female      Is Non- : No      Diabetic: Yes      Tobacco smoker: No      Systolic Blood Pressure: 138 mmHg      Is BP treated: Yes      HDL Cholesterol: 47 mg/dL      Total Cholesterol: 227 mg/dL  Recent Labs   Lab Test 10/22/20  1147 08/13/18  1357   CHOL 227* 208*   HDL 47* 53    * 130*   TRIG 112 123     Hypertension/Edema: Current medications include lisinopril 10 mg daily, metoprolol XL 25 mg daily and furosemide 20 mg twice daily.  Patient eating a low salt diet. Patient reports no current medication side effects. Water retention much improved with diuretic, no longer having problems with swollen ankles/feet.    BP Readings from Last 3 Encounters:   04/27/21 138/82   11/06/20 (!) 163/101   10/29/20 (!) 165/96     Potassium   Date Value Ref Range Status   10/22/2020 4.0 3.4 - 5.3 mmol/L Final     Depression/Back Pain: Patient is taking duloxetine 60 mg daily for depression and low back pain (nerve pain). Feels helping for both. She states working has helped her mood. Patient lost her job due to COVID last March, January found a new job - got fired - now working a new job - mood has been up and down. Better now that she is working again.     PHQ 11/17/2020 11/17/2020 4/19/2021   PHQ-9 Total Score 16 16 8   Q9: Thoughts of better off dead/self-harm past 2 weeks Not at all Not at all Not at all     Insomnia/Anxiety: taking hydroxyzine  mg at bedtime as needed - helps with sleep. Sleeping better lately since finding a job, prior to having a job, was using hydroxyzine nightly.     GERD: taking omeprazole 20 mg daily. Working well.   ----------------    I spent 37 minutes with this patient today. All changes were made via collaborative practice agreement with Marybeth Silver. A copy of the visit note was provided to the patient's primary care provider.    The patient was sent via [employer] a summary of these recommendations.     Mayela Plummer, PharmD  Medication Therapy Management     Telemedicine Visit Details  Type of service:  Telephone visit  Start Time: 3:30 PM  End Time: 4:07 PM  Originating Location (patient location): Home  Distant Location (provider location):  [facility]      Medication Therapy Recommendations  Diabetes mellitus, type 2 (H)     Current Medication: metFORMIN (GLUCOPHAGE-XR) 500 MG 24 hr tablet   Rationale: Undesirable effect - Adverse medication event - Safety   Recommendation: Change Medication - Change from immediate release metformin to extended release metformin. Slow taper of Vicotza as instructed to help with nausea.   Status: Accepted per CPA

## 2021-05-19 NOTE — PATIENT INSTRUCTIONS
Recommendations from today's MTM visit:                                                    MTM (medication therapy management) is a service provided by a clinical pharmacist designed to help you get the most of out of your medicines.   Today we reviewed what your medicines are for, how to know if they are working, that your medicines are safe and how to make your medicine regimen as easy as possible.      Mario Menendez,    It was a pleasure talking with you today! We discussed the followin. Try taking Victoza 0.6 mg one day and alternating with Victoza 1.2 mg every other day to help with nausea tolerance - once tolerating 1.2 mg dose, increase to 1.2 mg daily.    2. Switch from immediate release metformin to extended release metformin to help with nausea. I sent a prescription to your pharmacy.     3. I sent diet/carb information in the mail.     Follow-up: I will call you on Wednesday, May 26th at 3:30 PM    It was great to speak with you today.  I value your experience and would be very thankful for your time with providing feedback on our clinic survey. You may receive a survey via email or text message in the next few days.     To schedule another MTM appointment, please call the clinic directly or you may call the MTM scheduling line at [phone] or toll-free at [phone].     My Clinical Pharmacist's contact information:                                                      Please feel free to contact me with any questions or concerns you have.      Keep up the good work!!    Mayela Plummer, PharmD  [phone] in clinic on Tuesday and Wednesday

## 2021-05-26 ENCOUNTER — VIRTUAL VISIT (OUTPATIENT)
Dept: PHARMACY | Facility: CLINIC | Age: 61
End: 2021-05-26
Payer: COMMERCIAL

## 2021-05-26 DIAGNOSIS — E11.65 TYPE 2 DIABETES MELLITUS WITH HYPERGLYCEMIA, WITHOUT LONG-TERM CURRENT USE OF INSULIN (H): Primary | ICD-10-CM

## 2021-05-26 PROCEDURE — 99606 MTMS BY PHARM EST 15 MIN: CPT | Performed by: PHARMACIST

## 2021-05-26 NOTE — PROGRESS NOTES
Medication Therapy Management (MTM) Encounter    ASSESSMENT:                            Medication Adherence/Access: No issues identified    Type 2 Diabetes:  Blood sugars are improving - continue at current dose of Victoza for 1 month, then re-evaluate. Start taking extended release metformin as soon as done with the immediate release - this may help nausea.     PLAN:                            1. Continue at current dose of Victoza for 1 month, then re-evaluate.     2. Start taking extended release metformin as soon as you are done with the immediate release - this may help nausea.    Follow-up: Wednesday, June 23rd at 3:30 PM    SUBJECTIVE/OBJECTIVE:                          Jessica Jay is a 60 year old female called for a follow-up visit. She was referred to me from Marybeth Silver. Today's visit is a follow-up MTM visit from 5/19/2021.     Reason for visit: follow up MTM visit.    Tobacco: She reports that she quit smoking about 3 years ago. Her smoking use included cigarettes. She has a 24.00 pack-year smoking history. She has never used smokeless tobacco.  Alcohol: Less than 1 beverage / month    Medication Adherence/Access: no issues reported    Type 2 Diabetes:  Currently taking Victoza 1.2 mg daily, nausea is improved - getting nauseous about every 3rd day, drinks a sip or two of water and not as nauseous. Also taking metformin 1000 mg immediate release twice daily - she has the new prescription for extended release metformin, plans on starting it once current metformin is gone - has about 1 week left of her current metformin.    Blood sugar monitoring: testing twice daily, mornings and when she gets home from work around 3:30 PM. Morning blood sugars: 195, 175, 145, 137; after work readings: 163,129,121,107.    Symptoms of low blood sugar? Not going low  Eye exam: due  Foot exam: up to date  Aspirin: Taking 81mg daily and denies side effects  Statin: Yes: atorvastastin   ACEi/ARB: Yes: lisinopril.    Urine Albumin:   Lab Results   Component Value Date    UMALCR Unable to calculate due to low value 04/27/2021      Lab Results   Component Value Date    A1C 12.7 04/09/2021     No longer gets a flu vaccine - got very sick after the last two times. Has had the pneumonia vaccine and both COVID vaccines. Plans on getting Shingrix vaccine.   ----------------    I spent 20 minutes with this patient today. All changes were made via collaborative practice agreement with Marybeth Silver. A copy of the visit note was provided to the patient's primary care provider.    The patient was sent via [employer] a summary of these recommendations.     Mayela Plummer, PharmD  Medication Therapy Management     Telemedicine Visit Details  Type of service:  Telephone visit  Start Time: 3:30 PM  End Time: 3:50 PM  Originating Location (patient location): [place]  Distant Location (provider location):  [facility]

## 2021-06-01 NOTE — PROGRESS NOTES
Clinic Care Coordination Contact  [facility]/Voicemail       Clinical Data: Care Coordinator Outreach  Outreach attempted x 2, pt did return call after last outreach.  Left message on patient's voicemail with call back information and requested return call.  Plan: Care Coordinator will try to reach patient again in 9-11 business days.    JAQUELIN Mensah, [place] [facility] - Care Coordinator   [facility]  6/1/2021   3:46 PM  [phone]

## 2021-06-01 NOTE — PATIENT INSTRUCTIONS
Recommendations from today's MTM visit:                                                      Mario Menendez,    It was a pleasure talking with you! We discussed the followin. Continue at current dose of Victoza for 1 month, then re-evaluate.     2. Start taking extended release metformin as soon as you are done with the immediate release - this may help nausea.    Follow-up:  at 3:30 PM    It was great to speak with you today.  I value your experience and would be very thankful for your time with providing feedback on our clinic survey. You may receive a survey via email or text message in the next few days.     To schedule another MTM appointment, please call the clinic directly or you may call the MTM scheduling line at [phone] or toll-free at [phone].     My Clinical Pharmacist's contact information:                                                      Please feel free to contact me with any questions or concerns you have.      Keep up the good work!!    Mayela Plummer, PharmD  [phone] in clinic on Tuesday and Wednesday

## 2021-06-02 ENCOUNTER — TELEPHONE (OUTPATIENT)
Dept: PHARMACY | Facility: CLINIC | Age: 61
End: 2021-06-02

## 2021-06-02 NOTE — TELEPHONE ENCOUNTER
Patient called to let me know that about twice a week she is still getting nauseous. She has not switched to extended release metformin yet. She has 3 days left of immediate release metformin. She is currently on the 1.2 mg/day  dose of Victoza.     Recommend she change over to the extended release metformin. I will follow up with her in 1 week to see if improved.     Mayela Plummer, PharmD  Medication Therapy Management

## 2021-06-08 ENCOUNTER — VIRTUAL VISIT (OUTPATIENT)
Dept: PHARMACY | Facility: CLINIC | Age: 61
End: 2021-06-08
Payer: COMMERCIAL

## 2021-06-08 DIAGNOSIS — F33.1 MODERATE EPISODE OF RECURRENT MAJOR DEPRESSIVE DISORDER (H): ICD-10-CM

## 2021-06-08 DIAGNOSIS — E11.65 TYPE 2 DIABETES MELLITUS WITH HYPERGLYCEMIA, WITHOUT LONG-TERM CURRENT USE OF INSULIN (H): Primary | ICD-10-CM

## 2021-06-08 PROCEDURE — 99606 MTMS BY PHARM EST 15 MIN: CPT | Performed by: PHARMACIST

## 2021-06-08 RX ORDER — DULOXETIN HYDROCHLORIDE 30 MG/1
90 CAPSULE, DELAYED RELEASE ORAL DAILY
Qty: 90 CAPSULE | Refills: 1 | Status: SHIPPED | OUTPATIENT
Start: 2021-06-08 | End: 2021-08-10

## 2021-06-08 NOTE — PROGRESS NOTES
Medication Therapy Management (MTM) Encounter    ASSESSMENT:                            Medication Adherence/Access: No issues identified.    Type 2 Diabetes:  Improved - no longer nauseous with extended release metformin. Morning blood sugars slightly elevated - after work blood sugars at goal.    Depression: Improved - sent a prescription for duloxetine to patient's pharmacy. She has a follow up visit with her PCP June 22nd.     PLAN:                            1. Continue current drug therapy. Follow up in 2 weeks to see if Victoza dose should be increased.    2. Sent a prescription for duloxetine 90 mg daily to patient's pharmacy. Patient has f/u with PCP in 2 weeks.     Follow-up: Wednesday, June 23rd at 3:30 PM    SUBJECTIVE/OBJECTIVE:                          Jessica Jay is a 60 year old female called for a follow-up visit. She was referred to me from Marybeth Silver. Today's visit is a follow-up MTM visit from 5/26/2021.      Reason for visit: follow up MTM visit.    Tobacco: She reports that she quit smoking about 3 years ago. Her smoking use included cigarettes. She has a 24.00 pack-year smoking history. She has never used smokeless tobacco.  Alcohol: Less than 1 beverage / month    Medication Adherence/Access: no issues reported    Type 2 Diabetes:  Currently taking Victoza 1.2 mg daily and metformin XR 1000 mg twice daily. No longer nauseous since switching to extended release metformin taking with breakfast and lunch.     Blood sugar monitoring: testing twice daily, mornings and when she gets home from work around 3:30 PM. Morning blood sugars: 140-160's; after work readings: <100.    Symptoms of low blood sugar? Not going low  Eye exam: due  Foot exam: up to date  Aspirin: Taking 81mg daily and denies side effects  Statin: Yes: atorvastastin   ACEi/ARB: Yes: lisinopril.   Urine Albumin:   Lab Results   Component Value Date    UMALCR Unable to calculate due to low value 04/27/2021      Lab  Results   Component Value Date    A1C 12.7 04/09/2021     No longer gets a flu vaccine - got very sick after the last two times. Has had the pneumonia vaccine and both COVID vaccines. Plans on getting Shingrix vaccine.     Depression: Patient is taking duloxetine 90 mg daily for depression and low back pain (nerve pain). Feels helping for both. She states working has helped her mood. Patient lost her job due to COVID last March, January found a new job - got fired - now working a new job - mood has been up and down. Better now that she is working again. She increased her duloxetine dose back up to 90 mg daily 1.5 weeks ago. She feels her attitude is much improved. She needs a refill for duloxetine.     PHQ 11/17/2020 11/17/2020 4/19/2021   PHQ-9 Total Score 16 16 8   Q9: Thoughts of better off dead/self-harm past 2 weeks Not at all Not at all Not at all     ----------------    I spent 15 minutes with this patient today. All changes were made via collaborative practice agreement with Marybeth Silver. A copy of the visit note was provided to the patient's primary care provider.    The patient was sent via [employer] a summary of these recommendations.     Mayela Plummer, PharmD  Medication Therapy Management     Telemedicine Visit Details  Type of service:  Telephone visit  Start Time: 3:30 PM  End Time: 3:45 PM  Originating Location (patient location): [place]  Distant Location (provider location):  [facility]

## 2021-06-09 NOTE — PATIENT INSTRUCTIONS
Recommendations from today's MTM visit:                                                      Mario Menendez,    It was a pleasure talking with you today! We discussed the followin. Continue current drug therapy. Follow up in 2 weeks to see if Victoza dose should be increased.    2. I sent a prescription for duloxetine 90 mg daily to your pharmacy. Follow up with your doctor as scheduled.      Follow-up:  at 3:30 PM    It was great to speak with you today.  I value your experience and would be very thankful for your time with providing feedback on our clinic survey. You may receive a survey via email or text message in the next few days.     To schedule another MTM appointment, please call the clinic directly or you may call the MTM scheduling line at [phone] or toll-free at [phone].     My Clinical Pharmacist's contact information:                                                      Please feel free to contact me with any questions or concerns you have.      Keep up the good work!!    Mayela Plummer, PharmD  [phone] in clinic on Tuesday and Wednesday

## 2021-06-10 ENCOUNTER — PATIENT OUTREACH (OUTPATIENT)
Dept: EDUCATION SERVICES | Facility: CLINIC | Age: 61
End: 2021-06-10
Attending: FAMILY MEDICINE
Payer: COMMERCIAL

## 2021-06-10 DIAGNOSIS — E11.65 TYPE 2 DIABETES MELLITUS WITH HYPERGLYCEMIA, WITHOUT LONG-TERM CURRENT USE OF INSULIN (H): ICD-10-CM

## 2021-06-10 PROCEDURE — G0108 DIAB MANAGE TRN  PER INDIV: HCPCS | Performed by: DIETITIAN, REGISTERED

## 2021-06-10 NOTE — PROGRESS NOTES
"Diabetes Self-Management Education & Support    Presents for:  Telephone visit    SUBJECTIVE/OBJECTIVE:     Cultural Influences/Ethnic Background:  Other      Diabetes Symptoms & Complications:          Patient Problem List and Family Medical History reviewed for relevant medical history, current medical status, and diabetes risk factors.    Vitals:  There were no vitals taken for this visit.  Estimated body mass index is 50.83 kg/m  as calculated from the following:    Height as of 4/27/21: 1.549 m (5' 1\").    Weight as of 4/27/21: 122 kg (269 lb).   Last 3 BP:   BP Readings from Last 3 Encounters:   04/27/21 138/82   11/06/20 (!) 163/101   10/29/20 (!) 165/96       History   Smoking Status     Former Smoker     Packs/day: 0.80     Years: 30.00     Types: Cigarettes     Quit date: 7/4/2017   Smokeless Tobacco     Never Used       Labs:  Lab Results   Component Value Date    A1C 12.7 04/09/2021     Lab Results   Component Value Date     10/22/2020     Lab Results   Component Value Date     10/22/2020     HDL Cholesterol   Date Value Ref Range Status   10/22/2020 47 (L) >49 mg/dL Final   ]  GFR Estimate   Date Value Ref Range Status   10/22/2020 >90 >60 mL/min/[1.73_m2] Final     Comment:     Non  GFR Calc  Starting 12/18/2018, serum creatinine based estimated GFR (eGFR) will be   calculated using the Chronic Kidney Disease Epidemiology Collaboration   (CKD-EPI) equation.       GFR Estimate If Black   Date Value Ref Range Status   10/22/2020 >90 >60 mL/min/[1.73_m2] Final     Comment:      GFR Calc  Starting 12/18/2018, serum creatinine based estimated GFR (eGFR) will be   calculated using the Chronic Kidney Disease Epidemiology Collaboration   (CKD-EPI) equation.       Lab Results   Component Value Date    CR 0.69 10/22/2020     No results found for: MICROALBUMIN    Healthy Eating:  Healthy Eating Assessed Today: Yes  Breakfast: cereal usually.  peanut butter toast or " eggs, toast  Lunch: usually chicken with vegetables or peanut butter and jelly sandwich.  water or coffee black.  Dinner: pear or usually tends to snack discussed doing yogurt or cottage cheese  Snacks: fruits  Beverages: Water, Coffee    Being Active:  Being Active Assessed Today: Yes(she is going to try to get going again with her activity now that she if feeling better on the victoza.)    Monitoring:      Breakfast pp Lunch pp Supper  pp HS   3-Oliver 190         4-Oliver 140         5-Oliver          6-Oliver 133         7-Oliver 123         8-Oliver 163         9-Oliver 168         10-Oliver 110          #DIV/0! #DIV/0! #DIV/0! #DIV/0! #DIV/0! #DIV/0!             Taking Medications:  Diabetes Medication(s)     Biguanides       metFORMIN (GLUCOPHAGE-XR) 500 MG 24 hr tablet    Take 2 tablets (1,000 mg) by mouth 2 times daily (with meals)    Incretin Mimetic Agents (GLP-1 Receptor Agonists)       liraglutide (VICTOZA) 18 MG/3ML solution    Inject 1.2 mg Subcutaneous daily Start at 0.6 mg daily x 1 week then increase to 1.2 mg thereafter.               Problem Solving:     Not assessed            Reducing Risks:   not assessed    Healthy Coping:  Informal Support system:: Family, Friends  Patient Activation Measure Survey Score:  JAYDEN Score (Last Two) 1/6/2011   JAYDEN Raw Score 41   Activation Score 63.2   JAYDEN Level 3       Diabetes knowledge and skills assessment:   Patient is knowledgeable in diabetes management concepts related to: Healthy Eating, Being Active and Monitoring    Patient needs further education on the following diabetes management concepts: Healthy Eating, Being Active, Monitoring, Taking Medication, Problem Solving, Reducing Risks and Healthy Coping    Based on learning assessment above, most appropriate setting for further diabetes education would be: Individual setting.      INTERVENTIONS:    Education provided today on:  AADE Self-Care Behaviors:  Healthy Eating: carbohydrate counting, consistency in amount,  composition, and timing of food intake and label reading  Being Active: relationship to blood glucose and describe appropriate activity program  Monitoring: individual blood glucose targets  Taking Medication: when to take medications    Opportunities for ongoing education and support in diabetes-self management were discussed.    Pt verbalized understanding of concepts discussed and recommendations provided today.       Education Materials Provided:  [employer] Healthy Living with Diabetes Book and Carbohydrate Counting      ASSESSMENT:  New to diabetes.  She is [place] so is worried about noodles some.  She is going to work on only doing a fist size of noodles, rice or potatoes.  Will send her the book on carb counting.  Went over different items she can make: for herself.  She does not cook.  She doesnt not like vegetables.  She was able to come up with options that will work with her.  She is also going to work on actvity as well: walking the short block to start.          Patient's most recent   Lab Results   Component Value Date    A1C 12.7 04/09/2021    is not meeting goal of <8.0    PLAN  See Patient Instructions for co-developed, patient-stated behavior change goals.  AVS printed and provided to patient today. See Follow-Up section for recommended follow-up.      Time Spent: 60 minutes  Encounter Type: Individual    Any diabetes medication dose changes were made via the CDE Protocol and Collaborative Practice Agreement with the patient's primary care provider. A copy of this encounter was shared with the provider.

## 2021-06-10 NOTE — PATIENT INSTRUCTIONS
1.  Goal for blood sugars  premeals.    2.  Watch carbohydrate grams 45 at meals, 15-30 at snacks.    3.  Look in your book at the carbs.      Remember you have already made good changes and have lost weight!!  You are doing this well!

## 2021-06-13 DIAGNOSIS — I10 HYPERTENSION GOAL BP (BLOOD PRESSURE) < 140/90: ICD-10-CM

## 2021-06-15 RX ORDER — LISINOPRIL 10 MG/1
TABLET ORAL
Qty: 30 TABLET | Refills: 0 | Status: SHIPPED | OUTPATIENT
Start: 2021-06-15 | End: 2021-06-22

## 2021-06-15 NOTE — TELEPHONE ENCOUNTER
Medication is being filled for 1 time refill only due to:  Patient needs to be seen because due for follow up. reminder sent.   Conchis Napoles RN  [facility]

## 2021-06-16 DIAGNOSIS — E11.65 TYPE 2 DIABETES MELLITUS WITH HYPERGLYCEMIA, WITHOUT LONG-TERM CURRENT USE OF INSULIN (H): ICD-10-CM

## 2021-06-17 RX ORDER — BLOOD SUGAR DIAGNOSTIC
STRIP MISCELLANEOUS
Qty: 200 STRIP | Refills: 0 | Status: SHIPPED | OUTPATIENT
Start: 2021-06-17 | End: 2023-08-31

## 2021-06-17 NOTE — PROGRESS NOTES
Clinic Care Coordination Contact  [facility]/Voicemail       Clinical Data: Care Coordinator Outreach  Outreach attempted x 3.  Left message on patient's voicemail with call back information and requested return call.  Plan: Care Coordinator will send unable to contact letter with care coordinator contact information via Dr. TATTOFF. Care Coordinator will try to reach patient again in 1 month.    JAQUELIN Mensah, Certified Financial   [facility] - Care Coordinator   6/17/2021   3:25 PM  [phone]

## 2021-06-22 ENCOUNTER — OFFICE VISIT (OUTPATIENT)
Dept: FAMILY MEDICINE | Facility: CLINIC | Age: 61
End: 2021-06-22
Payer: COMMERCIAL

## 2021-06-22 VITALS
SYSTOLIC BLOOD PRESSURE: 120 MMHG | DIASTOLIC BLOOD PRESSURE: 84 MMHG | WEIGHT: 252.6 LBS | OXYGEN SATURATION: 96 % | RESPIRATION RATE: 16 BRPM | TEMPERATURE: 97.5 F | BODY MASS INDEX: 47.73 KG/M2 | HEART RATE: 93 BPM

## 2021-06-22 DIAGNOSIS — I10 HYPERTENSION GOAL BP (BLOOD PRESSURE) < 140/90: ICD-10-CM

## 2021-06-22 DIAGNOSIS — E11.65 TYPE 2 DIABETES MELLITUS WITH HYPERGLYCEMIA, WITHOUT LONG-TERM CURRENT USE OF INSULIN (H): Primary | ICD-10-CM

## 2021-06-22 LAB — HBA1C MFR BLD: 8.3 % (ref 0–5.6)

## 2021-06-22 PROCEDURE — 99214 OFFICE O/P EST MOD 30 MIN: CPT | Performed by: FAMILY MEDICINE

## 2021-06-22 PROCEDURE — 36415 COLL VENOUS BLD VENIPUNCTURE: CPT | Performed by: FAMILY MEDICINE

## 2021-06-22 PROCEDURE — 83036 HEMOGLOBIN GLYCOSYLATED A1C: CPT | Performed by: FAMILY MEDICINE

## 2021-06-22 RX ORDER — LISINOPRIL 10 MG/1
10 TABLET ORAL DAILY
Qty: 90 TABLET | Refills: 3 | Status: SHIPPED | OUTPATIENT
Start: 2021-06-22 | End: 2021-10-05

## 2021-06-22 NOTE — PROGRESS NOTES
Assessment & Plan     Type 2 diabetes mellitus with hyperglycemia, without long-term current use of insulin (H), much improved. A1C today 8.3 from 12.7  - Hemoglobin A1c  - Congratulated patient on the efforts made so far.   - Continue current management. No medication changes at this time.       Hypertension goal BP (blood pressure) < 140/90, controlled  - Refill: lisinopril (ZESTRIL) 10 MG tablet  Dispense: 90 tablet; Refill: 3      Return in about 3 months (around 9/22/2021) for Diabetes Follow Up with a HgbA1C prior to visit.    Marybeth Silver MD  [facility] KIRSTIE Menendez is a 60 year old who presents for the following health issues HPI       Diabetes Follow-up  On Metformin 1000 mg BID and Victoza 1.2 mg/day- tolerating well. No side effects reported.   How often are you checking your blood sugar? Two times daily  Blood sugar testing frequency justification:  Patient modifying lifestyle changes (diet, exercise) with blood sugars  What time of day are you checking your blood sugars (select all that apply)?  morning and afternoon  Have you had any blood sugars above 200?  No  Have you had any blood sugars below 70?  No    What symptoms do you notice when your blood sugar is low?  None    What concerns do you have today about your diabetes? None     Do you have any of these symptoms? (Select all that apply)  Excessive thirst and No numbness or tingling in feet.  No redness, sores or blisters on feet.  No complaints of excessive thirst.  No reports of blurry vision.  No significant changes to weight.    Have you had a diabetic eye exam in the last 12 months? No      BP Readings from Last 2 Encounters:   06/22/21 120/84   04/27/21 138/82     Hemoglobin A1C (%)   Date Value   06/22/2021 8.3 (H)   04/09/2021 12.7 (H)     LDL Cholesterol Calculated (mg/dL)   Date Value   10/22/2020 158 (H)   08/13/2018 130 (H)     Hypertension Follow-up      Do you check your blood pressure regularly  outside of the clinic? Yes     Are you following a low salt diet? Yes    Are your blood pressures ever more than 140 on the top number (systolic) OR more   than 90 on the bottom number (diastolic), for example 140/90? No        States that she's been making dietary changes and as a result has lost some weight.   Wt Readings from Last 4 Encounters:   06/22/21 114.6 kg (252 lb 9.6 oz)   04/27/21 122 kg (269 lb)   10/29/20 133.4 kg (294 lb)   10/22/20 133.5 kg (294 lb 6.4 oz)         Review of Systems   Constitutional, HEENT, cardiovascular, pulmonary, gi and gu systems are negative, except as otherwise noted.      Objective    /84   Pulse 93   Temp 97.5  F (36.4  C) (Tympanic)   Resp 16   Wt 114.6 kg (252 lb 9.6 oz)   SpO2 96%   BMI 47.73 kg/m    Body mass index is 47.73 kg/m .  Physical Exam   GENERAL: healthy, alert and no distress  RESP: lungs clear to auscultation - no rales, rhonchi or wheezes  CV: regular rate and rhythm, normal S1 S2, no S3 or S4, no murmur, click or rub, no peripheral edema and peripheral pulses strong  PSYCH: mentation appears normal, affect normal/bright    DATA  Reviewed and discussed with patient prior to discharge.  Results for orders placed or performed in visit on 06/22/21   Hemoglobin A1c     Status: Abnormal   Result Value Ref Range    Hemoglobin A1C 8.3 (H) 0 - 5.6 %

## 2021-06-23 ENCOUNTER — VIRTUAL VISIT (OUTPATIENT)
Dept: PHARMACY | Facility: CLINIC | Age: 61
End: 2021-06-23
Payer: COMMERCIAL

## 2021-06-23 DIAGNOSIS — E11.65 TYPE 2 DIABETES MELLITUS WITH HYPERGLYCEMIA, WITHOUT LONG-TERM CURRENT USE OF INSULIN (H): Primary | ICD-10-CM

## 2021-06-23 PROCEDURE — 99606 MTMS BY PHARM EST 15 MIN: CPT | Performed by: PHARMACIST

## 2021-06-23 NOTE — PROGRESS NOTES
Medication Therapy Management (MTM) Encounter    ASSESSMENT:                            Medication Adherence/Access: No issues identified    Type 2 Diabetes: improving - A1C much improved. Will continue at current dose of Victoza and follow up in 1 month.     PLAN:                            1. Continue at current dose of Victoza for 1 month, then re-evaluate.    Follow-up: Wednesday, July 24th, 2021 at 3:30PM    SUBJECTIVE/OBJECTIVE:                          Jessica Jay is a 60 year old female called for a follow-up visit. She was referred to me from Marybeth Leon.  Today's visit is a follow-up MTM visit from 06/08/2021      Reason for visit: MTM follow-up visit.     Tobacco: She reports that she quit smoking about 3 years ago. Her smoking use included cigarettes. She has a 24.00 pack-year smoking history. She has never used smokeless tobacco.  Alcohol: Less than 1 beverage / month    Medication Adherence/Access: no issues reported    Type 2 Diabetes:  Currently taking Victoza 1.2 mg daily and metformin XR 1000 mg twice daily. Tolerating medications well.      Blood sugar monitoring: patient has been checking her blood sugars, but left her glucometer in the car so she could not read them for us. Patient says past 2 days are mornings: 120,140 and when she gets home from work: 110,140.   Symptoms of low blood sugar? Not going low  Eye exam: due  Foot exam: up to date  Aspirin: Taking 81mg daily and denies side effects  Statin: Yes: atorvastastin   ACEi/ARB: Yes: lisinopril.   Urine Albumin:   Urine Albumin:      Lab Results   Component Value Date    UMALCR Unable to calculate due to low value 04/27/2021      Lab Results   Component Value Date    A1C 8.3 06/22/2021    A1C 12.7 04/09/2021     ----------------    I spent 13 minutes with this patient today. All changes were made via collaborative practice agreement with Marybeth Leon. A copy of the visit note was provided to the patient's primary  care provider.    The patient was sent via [employer] a summary of these recommendations.     Jordana Parra, 4th year Pharmacy Student  Mayela Plummer, PharmD  Medication Therapy Management     Telemedicine Visit Details  Type of service:  Telephone visit  Start Time: 3:43 PM  End Time: 3:56 PM  Originating Location (patient location): [place]  Distant Location (provider location):  [facility]

## 2021-06-28 ENCOUNTER — PATIENT OUTREACH (OUTPATIENT)
Dept: FAMILY MEDICINE | Facility: CLINIC | Age: 61
End: 2021-06-28
Payer: COMMERCIAL

## 2021-06-28 NOTE — PATIENT INSTRUCTIONS
Recommendations from today's MTM visit:                                                      Mario Menendez,    It was a pleasure talking with you! You're doing a great job!     We discussed the followin. Continue at your current dose of Victoza, we will re-evaluate in 1 month.     Follow-up: 2021 at 3:30PM    It was great to speak with you today.  I value your experience and would be very thankful for your time with providing feedback on our clinic survey. You may receive a survey via email or text message in the next few days.     To schedule another MTM appointment, please call the clinic directly or you may call the MTM scheduling line at [phone] or toll-free at [phone].     My Clinical Pharmacist's contact information:                                                      Please feel free to contact me with any questions or concerns you have.      Keep up the good work!!    Mayela Plummer, PharmD  [phone] in clinic on Tuesday and Wednesday

## 2021-06-28 NOTE — PROGRESS NOTES
Clinic Care Coordination Contact    Follow Up Progress Note      Assessment: Pt reporting that her mood is improving with working and getting out of her home.  She also was given a book called The [place] of the Mind by Laurel Gregorio. She noted how well it answered many of her questions.  She is also noting improvement in her A1C. She is also improving her diet and getting some walking in.       Patient noted that she is still experiencing some stiffness that she describes as a band that tightens around her hips when she walks.  We did talk about doing some stretching before she walks to see if that helps reduce some of the tightness.  She also brought up getting a video for yoga to see if that helps her as well.  Her walking is at the current time only occurring from her car into work and then back out to the car after work.  She does not feel she could do more than that right now and we talked about that being a good start and when she is able to do that without that increase in pain and stiffness then start adding additional walking.    Was not able to review medications as patient was very focused on what was improving.    Goals addressed this encounter:   Goals Addressed                 This Visit's Progress      #1  Mental Health Management (pt-stated)   60%     Goal Statement: I will work on finding two techniques to help me reduce my depression, in the next month.  Date Goal set: 4/27/2020  Barriers: Social distancing, depression  Strengths: Family, friends, and understanding that a needs some help with my depression  Date to Achieve By: 8/9/21   Patient expressed understanding of goal: Yes  Action steps to achieve this goal:  1. I will search for some mindfulness videos to help with depression  2. I will look at the Maryanne website for resources  3. I will work with counseling to learn new tools as well          #2 Being Active (pt-stated)   40%     Goal Statement: I want to increase my activity to help  improve my weight and back pain.  I will walk at least 10 minutes 3 days/week or more and maintain this for 3 consecutive months.  Date Goal set: 4/27/2020   Barriers: Depression, back pain, social distancing to some extent  Strengths: Understand that increasing my exercise will help with managing my weight and back pain  Date to Achieve By: 12/25/21   Patient expressed understanding of goal: Yes  Action steps to achieve this goal:  1. I will walk in one direction for 5 minutes and then return for a total of 10 minutes  2. I will not get discouraged if I cannot make it the 5 minutes in one direction before having to turn around  3. I will celebrate my successes             Intervention/Education provided during outreach: Throughout the call patient appeared to be in a much better mood and mental health than the last phone call.  She was joking and recognizing she was not as depressed.  She is noting that getting out of the house and then reading the book as to major factors in helping her.  Encouraged her to continue with what is going well.  Encouraged her to call  if she noticed that she plateaus on her depression or her weight loss.  She has lost 17 pounds in the last 2 months and 42 pounds in the last 8 months.  Encouraged her to continue and how this helps with her A1c as well as her depression.    Patient was able to identify multiple areas that went well in the last month which included her physical health, her mental health, and relationships with family members.  She also noted getting back to work and out of the house was a big positive for her.  She notes some areas she is a little frustrated with yet recognizes that getting out of the house and working is more beneficial than those frustrations.     Outreach Frequency: monthly    Plan:   Patient to continue doing the activities that are adding to the good feelings and positive changes.  Patient to note if she plateaus on that progress or  declines to call  so we can identify what changed.  Care Coordinator will follow up in 1 month.    JAQUELIN Mensah, Certified [facility] - Care Coordinator   6/28/2021   4:08 PM  [phone]

## 2021-07-22 ENCOUNTER — PATIENT OUTREACH (OUTPATIENT)
Dept: EDUCATION SERVICES | Facility: CLINIC | Age: 61
End: 2021-07-22
Payer: COMMERCIAL

## 2021-07-22 DIAGNOSIS — E11.65 TYPE 2 DIABETES MELLITUS WITH HYPERGLYCEMIA, WITHOUT LONG-TERM CURRENT USE OF INSULIN (H): Primary | ICD-10-CM

## 2021-07-22 PROCEDURE — G0108 DIAB MANAGE TRN  PER INDIV: HCPCS | Performed by: DIETITIAN, REGISTERED

## 2021-07-22 NOTE — PROGRESS NOTES
"Diabetes Self-Management Education & Support    Presents for: Individual review, telephone    SUBJECTIVE/OBJECTIVE:  Presents for: Individual review  Diabetes type: Type 2  How confident are you filling out medical forms by yourself:: Not Assessed  Other concerns:: Glasses  Cultural Influences/Ethnic Background:  Other      Diabetes Symptoms & Complications:          Patient Problem List and Family Medical History reviewed for relevant medical history, current medical status, and diabetes risk factors.    Vitals:  There were no vitals taken for this visit.  Estimated body mass index is 47.73 kg/m  as calculated from the following:    Height as of 4/27/21: 1.549 m (5' 1\").    Weight as of 6/22/21: 114.6 kg (252 lb 9.6 oz).   Last 3 BP:   BP Readings from Last 3 Encounters:   06/22/21 120/84   04/27/21 138/82   11/06/20 (!) 163/101       History   Smoking Status     Former Smoker     Packs/day: 0.80     Years: 30.00     Types: Cigarettes     Quit date: 7/4/2017   Smokeless Tobacco     Never Used       Labs:  Lab Results   Component Value Date    A1C 8.3 06/22/2021     Lab Results   Component Value Date     10/22/2020     Lab Results   Component Value Date     10/22/2020     HDL Cholesterol   Date Value Ref Range Status   10/22/2020 47 (L) >49 mg/dL Final   ]  GFR Estimate   Date Value Ref Range Status   10/22/2020 >90 >60 mL/min/[1.73_m2] Final     Comment:     Non  GFR Calc  Starting 12/18/2018, serum creatinine based estimated GFR (eGFR) will be   calculated using the Chronic Kidney Disease Epidemiology Collaboration   (CKD-EPI) equation.       GFR Estimate If Black   Date Value Ref Range Status   10/22/2020 >90 >60 mL/min/[1.73_m2] Final     Comment:      GFR Calc  Starting 12/18/2018, serum creatinine based estimated GFR (eGFR) will be   calculated using the Chronic Kidney Disease Epidemiology Collaboration   (CKD-EPI) equation.       Lab Results   Component Value Date "    CR 0.69 10/22/2020     No results found for: MICROALBUMIN    Healthy Eating:  Healthy Eating Assessed Today: Yes  Breakfast: usually bowl of cereal, 2 choices  Lunch: yogurt, or salad or ham and cheese pasta salad  Dinner: cottagecheese, watermelon or pasta once a week  Snacks: two string cheese  Beverages: Water, Coffee    Being Active:  Being Active Assessed Today: Yes (she is going to try to get going again with her activity now that she if feeling better on the victoza.)  Exercise:: Yes (walking at work, up and down the steps a lotm she is going to try to walk to work adn see if she is able to do it)    Monitoring:          Breakfast pp Lunch pp Supper  pp    15-Jul          16-Jul 17-Jul 101         18-Jul 19-Jul 20-Jul 21-Jul       154   22-Jul           #DIV/0! #DIV/0! #DIV/0! #DIV/0! #DIV/0! 154      Breakfast pp Lunch pp Supper  pp    7-Jul                 116    87                111                                 14-Jul          AVG 87 #DIV/0! #DIV/0! #DIV/0! #DIV/0! #DIV/0! 114         Taking Medications:  Diabetes Medication(s)     Biguanides       metFORMIN (GLUCOPHAGE-XR) 500 MG 24 hr tablet    Take 2 tablets (1,000 mg) by mouth 2 times daily (with meals)    Incretin Mimetic Agents (GLP-1 Receptor Agonists)       liraglutide (VICTOZA) 18 MG/3ML solution    Inject 1.2 mg Subcutaneous daily Start at 0.6 mg daily x 1 week then increase to 1.2 mg thereafter.               Problem Solving:   not assessed              Reducing Risks:   discussed    Healthy Coping:  Informal Support system:: Family, Friends  Patient Activation Measure Survey Score:  JAYDEN Score (Last Two) 1/6/2011   JAYDEN Raw Score 41   Activation Score 63.2   JAYDEN Level 3       Diabetes knowledge and skills assessment:   Patient is knowledgeable in diabetes management concepts related to: Healthy Eating, Being Active, Monitoring and Taking Medication    Patient needs further education on the  following diabetes management concepts: Healthy Eating, Being Active, Monitoring, Taking Medication, Problem Solving, Reducing Risks and Healthy Coping    Based on learning assessment above, most appropriate setting for further diabetes education would be: Individual setting.      INTERVENTIONS:    Education provided today on:  AADE Self-Care Behaviors:  Healthy Eating: carbohydrate counting, consistency in amount, composition, and timing of food intake and label reading  Being Active: relationship to blood glucose and describe appropriate activity program  Monitoring: individual blood glucose targets and frequency of monitoring  Risk reduction: went over    Opportunities for ongoing education and support in diabetes-self management were discussed.    Pt verbalized understanding of concepts discussed and recommendations provided today.       Education Materials Provided:  No new materials provided today      ASSESSMENT:  -wt at home: 240#  - doing well with meal plan and activity (working toward walking to work)  -blood sugars look good, no concerns there  -doing well with meal plan  -eye exam tomorrow, foot exam, a1c,         Patient's most recent   Lab Results   Component Value Date    A1C 8.3 06/22/2021    is not meeting goal of <8.0    PLAN  See Patient Instructions for co-developed, patient-stated behavior change goals.  AVS printed and provided to patient today. See Follow-Up section for recommended follow-up.      Time Spent: 30 minutes  Encounter Type: Individual    Any diabetes medication dose changes were made via the CDE Protocol and Collaborative Practice Agreement with the patient's primary care provider. A copy of this encounter was shared with the provider.

## 2021-07-23 ENCOUNTER — TRANSFERRED RECORDS (OUTPATIENT)
Dept: HEALTH INFORMATION MANAGEMENT | Facility: CLINIC | Age: 61
End: 2021-07-23

## 2021-07-23 LAB — RETINOPATHY: NEGATIVE

## 2021-07-27 ENCOUNTER — VIRTUAL VISIT (OUTPATIENT)
Dept: PHARMACY | Facility: CLINIC | Age: 61
End: 2021-07-27
Payer: COMMERCIAL

## 2021-07-27 DIAGNOSIS — E11.65 TYPE 2 DIABETES MELLITUS WITH HYPERGLYCEMIA, WITHOUT LONG-TERM CURRENT USE OF INSULIN (H): Primary | ICD-10-CM

## 2021-07-27 PROCEDURE — 99606 MTMS BY PHARM EST 15 MIN: CPT | Performed by: PHARMACIST

## 2021-07-27 RX ORDER — FLASH GLUCOSE SENSOR
1 KIT MISCELLANEOUS
Qty: 2 EACH | Refills: 11 | Status: SHIPPED | OUTPATIENT
Start: 2021-07-27 | End: 2021-10-12

## 2021-07-27 RX ORDER — FLASH GLUCOSE SCANNING READER
1 EACH MISCELLANEOUS ONCE
Qty: 1 EACH | Refills: 0 | Status: SHIPPED | OUTPATIENT
Start: 2021-07-27 | End: 2021-07-27

## 2021-07-27 NOTE — PROGRESS NOTES
Medication Therapy Management (MTM) Encounter    ASSESSMENT:                            Medication Adherence/Access: No issues identified    Type 2 Diabetes: blood sugars at goal, patient is continuing to work on diet exercise. Reviewed increasing Victoza dose to help with weight loss - patient prefers to work on diet first. Sent prescription for FreeStyle Lan to patient's pharmacy.    PLAN:                            1. Patient to continue to work on diet/exercise.     2. Sent prescription for CGM to patient's pharmacy.     Future considerations: Possibly consider increasing Victoza dose for improved weighloss/blood sugar control.      Follow-up: Tuesday, October 12th at 3:30 PM    SUBJECTIVE/OBJECTIVE:                          Jessica Jay is a 60 year old female called for a follow-up visit. She was referred to me from Marybeth Leon.  Today's visit is a follow-up MTM visit from 06/23/2021.     Reason for visit: follow up MTM visit.    Tobacco: She reports that she quit smoking about 4 years ago. Her smoking use included cigarettes. She has a 24.00 pack-year smoking history. She has never used smokeless tobacco.  Alcohol: Less than 1 beverage / month    Medication Adherence/Access: no issues reported    Type 2 Diabetes:  Currently taking Victoza 1.2 mg daily and metformin XR 1000 mg twice daily. Tolerating medications well.      Blood sugar monitoring: patient has been checking her blood sugars, range of blood sugars (typically checking before dinner) . She would like to switch to CGM.  Symptoms of low blood sugar? Not going low  Eye exam: up to date  Foot exam: up to date  Exercise: planning on starting to walk to work (about 1.5 miles one way).   Aspirin: Taking 81mg daily and denies side effects  Statin: Yes: atorvastastin   ACEi/ARB: Yes: lisinopril.   Urine Albumin:      Lab Results   Component Value Date    UMALCR Unable to calculate due to low value 04/27/2021      Lab Results   Component  Value Date    A1C 8.3 06/22/2021    A1C 12.7 04/09/2021     ----------------    I spent 15 minutes with this patient today. All changes were made via collaborative practice agreement with Marybeth Silver. A copy of the visit note was provided to the patient's primary care provider.    The patient was sent via [employer] a summary of these recommendations.     Mayela Plummer, PharmD  Medication Therapy Management     Telemedicine Visit Details  Type of service:  Telephone visit  Start Time: 3:30 PM  End Time: 3:45 PM  Originating Location (patient location): [place]  Distant Location (provider location):  [facility]     Medication Therapy Recommendations  No medication therapy recommendations to display

## 2021-07-28 NOTE — PATIENT INSTRUCTIONS
Recommendations from today's MTM visit:                                                      Mario Menendez,    It was a pleasure talking with you today! We discussed the followin. Continue to work on diet/exercise.     2. I sent a prescription for the FreeStyle Lan continuous glucose monitor to your pharmacy.     Follow-up:  at 3:30 PM    It was great to speak with you today.  I value your experience and would be very thankful for your time with providing feedback on our clinic survey. You may receive a survey via email or text message in the next few days.     To schedule another MTM appointment, please call the clinic directly or you may call the MTM scheduling line at [phone] or toll-free at [phone].     My Clinical Pharmacist's contact information:                                                      Please feel free to contact me with any questions or concerns you have.      Keep up the good work!!    Mayela Plummer, PharmD  [phone] in clinic on Tuesday and Wednesday

## 2021-07-29 ENCOUNTER — PATIENT OUTREACH (OUTPATIENT)
Dept: CARE COORDINATION | Facility: CLINIC | Age: 61
End: 2021-07-29

## 2021-07-29 NOTE — PROGRESS NOTES
Clinic Care Coordination Contact  [facility]/Voicemail       Clinical Data: Care Coordinator Outreach  Outreach attempted x 1.  Left message on patient's voicemail with call back information and requested return call.  Plan:  Care Coordinator will try to reach patient again in 8-10 business days.    JAQUELIN Mensah, Certified Financial SW  [facility] - Care Coordinator   7/29/2021   3:25 PM  [phone]

## 2021-08-05 ENCOUNTER — PATIENT OUTREACH (OUTPATIENT)
Dept: FAMILY MEDICINE | Facility: CLINIC | Age: 61
End: 2021-08-05
Payer: COMMERCIAL

## 2021-08-05 NOTE — PROGRESS NOTES
Clinic Care Coordination Contact    Follow Up Progress Note      Assessment: pt reporting that she is continuing to work on loosing weight.  She has lost 54 lbs with watching her food intake.  She continues to struggle with not eating pasta and breads.      Pt is working on increasing her exercise yet is not doing as much as she wants. She is tired after work and then wants to relax.     Pt has found reading helps her with her depression.  She is also planning some trips.     Patient is knowledgeable on medications and is adherent.  No financial concerns reported at this time.  Medication review was completed with the patient in the last week and there are no questions or concerns at this time.    Goals addressed this encounter:   Goals Addressed                    This Visit's Progress       #1  Mental Health Management (pt-stated)   60%      Goal Statement: I will work on finding two techniques to help me reduce my depression, in the next month.  Date Goal set: 4/27/2020  Barriers: Social distancing, depression  Strengths: Family, friends, and understanding that a needs some help with my depression  Date to Achieve By: 12/31/21  Patient expressed understanding of goal: Yes  Action steps to achieve this goal:  1. I will search for some mindfulness videos to help with depression  2. I will look at the Maryanne website for resources  3. I will work with counseling to learn new tools as well  4. I will explore trips and books that help me feel better           #2 Being Active (pt-stated)   30%      Goal Statement: I want to increase my activity to help improve my weight and back pain.  I will walk at least 10 minutes 3 days/week or more and maintain this for 3 consecutive months.  Date Goal set: 4/27/2020   Barriers: Depression, back pain, social distancing to some extent  Strengths: Understand that increasing my exercise will help with managing my weight and back pain  Date to Achieve By: 12/25/21   Patient expressed  understanding of goal: Yes  Action steps to achieve this goal:  1. I will walk in one direction for 5 minutes and then return for a total of 10 minutes  2. I will not get discouraged if I cannot make it the 5 minutes in one direction before having to turn around  3. I will celebrate my successes             Intervention/Education provided during outreach: talked through options to help with improving dietary intake.  We talked about making extra and portioning it out.  She is going to consider.      Encouraged pt to continue to find ways to help her depression.      Outreach Frequency: monthly    Plan:   Pt to find recipes that she likes that are diabetic friendly. Pt to continue to work on finding ways to work through her depression.    Care Coordinator will follow up in one month.     JAQUELIN Mensah, Certified Financial [facility] - Care Coordinator   8/5/2021   4:43 PM  [phone]

## 2021-08-08 DIAGNOSIS — F33.1 MODERATE EPISODE OF RECURRENT MAJOR DEPRESSIVE DISORDER (H): ICD-10-CM

## 2021-08-10 RX ORDER — DULOXETIN HYDROCHLORIDE 30 MG/1
CAPSULE, DELAYED RELEASE ORAL
Qty: 90 CAPSULE | Refills: 1 | Status: SHIPPED | OUTPATIENT
Start: 2021-08-10 | End: 2021-10-05

## 2021-08-10 NOTE — TELEPHONE ENCOUNTER
Routing refill request to provider for review/approval because:  PHQ-9 score:    PHQ 4/19/2021   PHQ-9 Total Score 8   Q9: Thoughts of better off dead/self-harm past 2 weeks Not at all

## 2021-08-12 ENCOUNTER — TELEPHONE (OUTPATIENT)
Dept: PHARMACY | Facility: CLINIC | Age: 61
End: 2021-08-12

## 2021-08-12 NOTE — TELEPHONE ENCOUNTER
Patient called to let me know that she had a low blood sugar of 50 yesterday afternoon, felt light headed and not well.     Her morning readings have been around 82 - going lower before dinner. Currently taking Victoza 1.2 mg daily and Metformin 1000 mg twice daily. She has lost around 56-58 lbs.     Asked her to stop her Victoza (she prefers to take pills vs injection) and continue to check blood sugars - follow up in 2 weeks.     Reviewed to have juice on hand to use if feeling low blood sugars.     Mayela Plummer, PharmD  Medication Therapy Management

## 2021-08-16 NOTE — TELEPHONE ENCOUNTER
Noted.   Thanks for the update.   Patient is due for Diabetes follow up visit with me in 9/2021 but can reschedule it for earlier if needed.

## 2021-08-23 ENCOUNTER — NURSE TRIAGE (OUTPATIENT)
Dept: NURSING | Facility: CLINIC | Age: 61
End: 2021-08-23

## 2021-08-23 ENCOUNTER — OFFICE VISIT (OUTPATIENT)
Dept: URGENT CARE | Facility: URGENT CARE | Age: 61
End: 2021-08-23
Payer: COMMERCIAL

## 2021-08-23 VITALS
SYSTOLIC BLOOD PRESSURE: 132 MMHG | WEIGHT: 246.8 LBS | BODY MASS INDEX: 46.63 KG/M2 | OXYGEN SATURATION: 96 % | DIASTOLIC BLOOD PRESSURE: 86 MMHG | TEMPERATURE: 98.2 F | HEART RATE: 85 BPM

## 2021-08-23 DIAGNOSIS — S91.209A TOENAIL AVULSION, INITIAL ENCOUNTER: Primary | ICD-10-CM

## 2021-08-23 PROCEDURE — 99213 OFFICE O/P EST LOW 20 MIN: CPT | Performed by: NURSE PRACTITIONER

## 2021-08-23 RX ORDER — CEPHALEXIN 500 MG/1
500 CAPSULE ORAL 3 TIMES DAILY
Qty: 21 CAPSULE | Refills: 0 | Status: SHIPPED | OUTPATIENT
Start: 2021-08-23 | End: 2021-08-30

## 2021-08-23 NOTE — PROGRESS NOTES
Jessica Jay is here today with a complaint of right foot big toe pain. Stubbed 4 days ago. Toenail is loose but not off, now some redness pain swelling around toe that is worsening. No fever  Concern for infection is diabetic  Doesn't take blood sugars  Area is painful is limping    Past Medical History:   Diagnosis Date     Cellulitis and abscess of foot, except toes 09/26/2010    [facility]     Complete rupture of rotator cuff 07/06/2009     Degenerative disc disease     cervical     Depression, major      Depressive disorder      Dizziness - light-headed 02/15/2011     GERD (gastroesophageal reflux disease)      LBP (low back pain)      Migraines      Panic attacks      Seasonal allergies      Sleep apnoea     Wears C-PAP     Vulvar dermatitis 07/17/2012     Current Outpatient Medications   Medication     aspirin (ASA) 81 MG EC tablet     atorvastatin (LIPITOR) 40 MG tablet     cephALEXin (KEFLEX) 500 MG capsule     DULoxetine (CYMBALTA) 30 MG capsule     furosemide (LASIX) 20 MG tablet     hydrOXYzine (ATARAX) 25 MG tablet     lisinopril (ZESTRIL) 10 MG tablet     metFORMIN (GLUCOPHAGE-XR) 500 MG 24 hr tablet     metoprolol succinate ER (TOPROL-XL) 25 MG 24 hr tablet     omeprazole (PRILOSEC) 20 MG DR capsule     ACCU-CHEK GUIDE test strip     alcohol swab prep pads     blood glucose (NO BRAND SPECIFIED) lancets standard     blood glucose monitoring (NO BRAND SPECIFIED) meter device kit     Continuous Blood Gluc Sensor (FREESTYLE MONY 14 DAY SENSOR) [facility]     insulin pen needle (32G X 4 MM) 32G X 4 MM miscellaneous     liraglutide (VICTOZA) 18 MG/3ML solution     Current Facility-Administered Medications   Medication     lidocaine 1 % injection 0.5 mL     triamcinolone (KENALOG-40) injection 20 mg        Allergies   Allergen Reactions     Buspar [Buspirone Hcl] Rash     Seroquel [Quetiapine] Itching       REVIEW OF SYSTEMS:  Musculoskeletal: negative  Constitutional:negative  Respiratory:  negative  Cardiovascular: negative  Gastrointestinal: negative       EXAM  /86   Pulse 85   Temp 98.2  F (36.8  C) (Oral)   Wt 111.9 kg (246 lb 12.8 oz)   SpO2 96%   BMI 46.63 kg/m     Gen: Alert, no distress  Gait; Antalgic   CV: S1 and S2 normal, no murmurs, clicks, gallops or rubs. Regular rate and rhythm.   Lungs: Chest is clear; no wheezes or rales. No edema or JVD.  Examination of the right side first toe  finds on inspection erythema surrounding loose toenail. Tender and swollen      Assessment:  (S93.453A) Toenail avulsion, initial encounter  (primary encounter diagnosis)    Plan: cephALEXin (KEFLEX) 500 MG capsule TID X 7 days (diabetic)  elevation, ice rest home care given  Advised to check bgm's    SONIA Seth CNP

## 2021-08-23 NOTE — TELEPHONE ENCOUNTER
Pt is calling.    Stubbed her toe last week. Toe hit the back of her other ankle.  Toenail is loose, but not ready to fall off. Half of the toenail is loose.   Pain is slightly better today.  Was throbbing yesterday.   Type 2 Diabetic. History of cellulitis more than once.  I advised her that she should make an appointment to be seen for tomorrow. [facility]      Reason for Disposition    [1] Has diabetes (diabetes mellitus) AND [2] small cut or scrape    Additional Information    Negative: [1] Major bleeding (e.g., spurting blood) AND [2] can't be stopped    Negative: Foot or ankle injury    Negative: Looks infected    Negative: Amputated toe    Negative: Skin is split open or gaping (or length > 1/2 inch or 12 mm)    Negative: [1] Bleeding AND [2] won't stop after 10 minutes of direct pressure (using correct technique)    Negative: [1] Dirt in the wound AND [2] not removed with 15 minutes of scrubbing    Negative: Sounds like a serious injury to the triager    Negative: [1] SEVERE pain AND [2] not improved 2 hours after pain medicine/ice packs    Negative: [1] MODERATE-SEVERE pain AND [2] blood present under the toenail    Negative: Looks like a broken bone (e.g., crooked or deformed)    Negative: Looks like a dislocated joint (e.g., crooked or deformed)    Negative: Toenail is completely torn off (toenail avulsion)    Negative: Base of toenail has popped out of the skin fold (nail base dislocation)    Negative: [1] Toe injury AND [2] bad limp or can't wear shoes/sandals    Negative: [1] No prior tetanus shots (or is not fully vaccinated) AND [2] any wound (e.g., cut, scrape)    Negative: [1] HIV positive or severe immunodeficiency (severely weak immune system) AND [2] DIRTY cut or scrape    Negative: [1] Last tetanus shot > 5 years ago AND [2] DIRTY cut or scrape    Negative: [1] Last tetanus shot > 10 years ago AND [2] CLEAN cut or scrape (e.g., object AND skin were  clean)    Protocols used: TOE INJURY-A-AH    Carmencita Cooley RN  [facility] Nurse Advisor  8/23/2021 at 4:21 PM

## 2021-08-23 NOTE — PATIENT INSTRUCTIONS
Patient Education     Detached Fingernail or Toenail  A detached nail is when the nail becomes  from the area underneath it (the nail bed). This often means losing all or part of the nail. An injury to your finger or toe is often the cause. It can also be caused by an infection or other skin diseases around or under the nail.   Sometimes there is a cut in the nail bed or a bone fracture under the nail. In most cases, the nail will grow back from the area under the cuticle (the matrix). A fingernail takes about 4 to 6 months to grow back. A toenail takes about 12 months to grow back. If the nail bed or matrix was damaged, the nail may grow back with a rough or abnormal shape. In some cases the nail may not grow back at all.     There may be damage or a cut to the nail bed. This may need to be repaired. Often this is done with stitches. If the nail is still in good condition, it might be cleaned and trimmed, then put back in place. This is done to help and protect the new nail as it grows back. It also prevents the nail bed from drying out.   Home care    Keep the injured part raised to reduce pain and swelling. This is important, especially in the first 48 hours.    Apply an ice pack for up to 20 minutes. Do this every 3 to 6 hours during the first 24 to 48 hours. Keep using ice packs to ease pain and swelling as needed. To make an ice pack, put ice cubes in a plastic bag that seals at the top. Wrap the bag in a clean, thin towel or cloth. Never put ice or an ice pack directly on the skin. The ice pack can be put right on a wrap, cast, or splint. As the ice melts, be careful not to get any wrap, cast, or splint wet.    The nail bed is moist, soft, and sensitive. It needs to be protected from injury for the first 7 to 10 days until it dries out and becomes hard. Keep it covered with a nonstick dressing or a bandage without adhesive.    When a dressing is placed on an exposed nail bed, it may stick and be  [FreeTextEntry1] : This note was written by Pat Scott on 01/10/2020 acting as scribe for Dr. Adolfo Najera M.D.\par \par I, Dr. Adolfo Najera M.D., have read and attest that all the information, medical decision making and discharge instructions within are true and accurate.  hard to remove if left in place more than 24 hours. Unless you were told otherwise, change dressings every 24 hours. If needed, soak the dressing off while holding it under warm running water. Then lightly pat the wound dry. Apply a layer of antibiotic ointment before putting on the new dressing or bandage. This will help keep it from sticking. Use a nonstick dressing with the antibiotic ointment under the outer dressing.    If an X-ray showed that you have a fracture, it will take about 4 weeks for this to heal. The injured part should be protected with a splint or tape while it is healing.    If you were given antibiotics to prevent infection, take them as directed until they are all gone.  Medicine    You can take over-the-counter medicine for pain, unless you were given a different pain medicine to use. Talk with your provider before using these medicines if you have chronic liver or kidney disease. Also talk with your provider if you ever had a stomach ulcer or digestive bleeding, or are taking blood-thinner medicines.    If you were given antibiotics, take them until they are done. It's important to finish the antibiotics even if the wound looks better. This is to make sure the infection has cleared.    Follow-up care  Follow up with your healthcare provider, or as advised. If X-rays were taken, you will be told of any new findings that may affect your care.   When to seek medical advice  Call your healthcare provider right away if any of these occur:    Pain or swelling increases    Redness around the nail    Pus (creamy white or yellow fluid) draining from the nail    Fever of 100.4 F (38 C) or higher, or as directed by your provider  Tiki last reviewed this educational content on 7/1/2019 2000-2021 [employer]. All rights reserved. This information is not intended as a substitute for professional medical care. Always follow your healthcare professional's instructions.

## 2021-08-24 ENCOUNTER — VIRTUAL VISIT (OUTPATIENT)
Dept: PHARMACY | Facility: CLINIC | Age: 61
End: 2021-08-24
Payer: COMMERCIAL

## 2021-08-24 DIAGNOSIS — R26.89 BALANCE PROBLEMS: ICD-10-CM

## 2021-08-24 DIAGNOSIS — E11.65 TYPE 2 DIABETES MELLITUS WITH HYPERGLYCEMIA, WITHOUT LONG-TERM CURRENT USE OF INSULIN (H): Primary | ICD-10-CM

## 2021-08-24 PROCEDURE — 99607 MTMS BY PHARM ADDL 15 MIN: CPT | Performed by: PHARMACIST

## 2021-08-24 PROCEDURE — 99606 MTMS BY PHARM EST 15 MIN: CPT | Performed by: PHARMACIST

## 2021-08-24 NOTE — PROGRESS NOTES
Medication Therapy Management (MTM) Encounter    ASSESSMENT:                            Medication Adherence/Access: No issues identified    Type 2 Diabetes: Patient is no longer experiencing hypoglycemic episodes. Asked patient to start checking blood sugars twice daily to determine if metformin is enough to keep her blood sugars at goal. Patient agreed.     Balance Problems: Unclear at this point if medications are causing patient's unsteadiness. Patient's med list was reviewed for medications that may possibly cause changes in balance. Patient takes duloxetine, lisinopril and metoprolol succinate in the morning which have risk for dizziness and/or vertigo. The patient may benefit from moving some medications to the evening to see if this improves her balance during the day (see Plan).    PLAN:                            1. Monitor your blood sugar at home twice daily (before breakfast and dinner).     2. Switch duloxetine and metoprolol to the evening. If you find you are having trouble sleeping - move the duloxetine back to morning.    Follow-up: Tuesday, September 7 at 3:30 for a telephone visit    SUBJECTIVE/OBJECTIVE:                          Jessica Jay is a 61 year old female called for a follow-up visit. She was referred to me from Dr. Silver.  Today's visit is a follow-up MTM visit from 7/27/2021.     Reason for visit: follow-up MTM visit, concern of hypoglycemia.    Allergies/ADRs: Reviewed in chart  Past Medical History: Reviewed in chart  Tobacco: She reports that she quit smoking about 4 years ago. Her smoking use included cigarettes. She has a 24.00 pack-year smoking history. She has never used smokeless tobacco.  Alcohol: Less than 1 beverage / month    Medication Adherence/Access: no issues reported    Type 2 Diabetes: Currently taking metformin 1000 mg twice daily. Patient was taking Victoza 1.2 mg daily until 8/12/21 when she was experiencing hypoglycemia (see 8/12/21 telephone encounter).  Patient reports numbers are good - but she has been forgetting to test. Patient has had issues with [employer] - working with . No longer feeling low blood sugars.   Eye exam: up to date  Foot exam: up to date  Exercise: planning on starting to walk to work (about 1.5 miles one way).   Aspirin: Taking 81mg daily and denies side effects  Statin: Yes: atorvastastin   ACEi/ARB: Yes: lisinopril.   Urine Albumin:      Lab Results   Component Value Date    UMALCR Unable to calculate due to low value 04/27/2021      Lab Results   Component Value Date    A1C 8.3 06/22/2021    A1C 12.7 04/09/2021     No longer gets a flu vaccine - got very sick after the last two times. Has had the pneumonia vaccine and both COVID vaccines. Plans on getting Shingrix vaccine.     Balance Problems: Patient recently lost her balance and stubbed her big toe. A few days later the patient cut two fingers on glass and provider recommended to start antibiotics. Patient is on Cephalexin 500 mg three times daily for 7 days and is tolerating well. Patient reports that she is having balance problems, has never been able to walk in a straight line and its getting worse. She is running into things and it seems worse than normal. Denies feeling dizzy/lightheaded.     BP Readings from Last 3 Encounters:   08/23/21 132/86   06/22/21 120/84   04/27/21 138/82     ----------------    I spent 13 minutes with this patient today. All changes were made via collaborative practice agreement with Marybeth Silver MD. A copy of the visit note was provided to the patient's primary care provider.    The patient was sent via [employer] a summary of these recommendations.     Komal Jacobs PharmD Student  Mayela Plummer, PharmD  Medication Therapy Management     Telemedicine Visit Details  Type of service:  Telephone visit  Start Time: 3:32  End Time: 3:45  Originating Location (patient location): [place]  Distant Location (provider location):  [facility]  [facility]     Medication Therapy Recommendations  Balance problems    Current Medication: DULoxetine (CYMBALTA) 30 MG capsule   Rationale: Undesirable effect - Adverse medication event - Safety   Recommendation: Change Administration Time   Status: Patient Agreed - Adherence/Education

## 2021-08-26 NOTE — PATIENT INSTRUCTIONS
Recommendations from today's MTM visit:                                                      Mario Menendez,    It was a pleasure talking with you! We discussed the followin. Monitor your blood sugar at home twice daily (before breakfast and dinner).     2. Switch duloxetine and metoprolol to the evening. If you find you are having trouble sleeping - move the duloxetine back to morning.    Follow-up:  at 3:30 for a telephone visit    It was great to speak with you today.  I value your experience and would be very thankful for your time with providing feedback on our clinic survey. You may receive a survey via email or text message in the next few days.     To schedule another MTM appointment, please call the clinic directly or you may call the MTM scheduling line at [phone] or toll-free at [phone].     My Clinical Pharmacist's contact information:                                                      Please feel free to contact me with any questions or concerns you have.      Keep up the good work!!    Mayela Plummer, PharmD  [phone] in clinic on Tuesday and Wednesday

## 2021-09-03 ENCOUNTER — PATIENT OUTREACH (OUTPATIENT)
Dept: CARE COORDINATION | Facility: CLINIC | Age: 61
End: 2021-09-03

## 2021-09-03 NOTE — PROGRESS NOTES
Clinic Care Coordination Contact  [facility]/Voicemail       Clinical Data: Care Coordinator Outreach  Outreach attempted x 1.  Left message on patient's voicemail with call back information and requested return call.  Plan:  Care Coordinator will try to reach patient again in 7-10 business days.    JAQUELIN Mensah, Certified Financial [facility] Primary Care - Care Coordinator   9/3/2021   3:23 PM  [phone]

## 2021-09-07 ENCOUNTER — VIRTUAL VISIT (OUTPATIENT)
Dept: PHARMACY | Facility: CLINIC | Age: 61
End: 2021-09-07
Payer: COMMERCIAL

## 2021-09-07 DIAGNOSIS — E11.65 TYPE 2 DIABETES MELLITUS WITH HYPERGLYCEMIA, WITHOUT LONG-TERM CURRENT USE OF INSULIN (H): Primary | ICD-10-CM

## 2021-09-07 DIAGNOSIS — R26.89 BALANCE PROBLEMS: ICD-10-CM

## 2021-09-07 PROCEDURE — 99606 MTMS BY PHARM EST 15 MIN: CPT | Performed by: PHARMACIST

## 2021-09-07 PROCEDURE — 99607 MTMS BY PHARM ADDL 15 MIN: CPT | Performed by: PHARMACIST

## 2021-09-07 NOTE — PATIENT INSTRUCTIONS
Recommendations from today's MTM visit:                                                      Maroi Menendez,    It was a pleasure talking with you today! We discussed the followin. Discuss your hip pain with your doctor.    2. Start taking lisinopril in the evening - continue to monitor balance.     Follow-up:  at 3:30 PM    It was great to speak with you today.  I value your experience and would be very thankful for your time with providing feedback on our clinic survey. You may receive a survey via email or text message in the next few days.     To schedule another MTM appointment, please call the clinic directly or you may call the MTM scheduling line at [phone] or toll-free at [phone].     My Clinical Pharmacist's contact information:                                                      Please feel free to contact me with any questions or concerns you have.      Keep up the good work!!    Mayela Plummer, PharmD  [phone] in clinic on Tuesday and Wednesday

## 2021-09-07 NOTE — PROGRESS NOTES
Medication Therapy Management (MTM) Encounter    ASSESSMENT:                            Medication Adherence/Access: No issues identified    Type 2 Diabetes: blood sugars 75% at goal. Patient has f/u with PCP in October - will get A1c lab rechecked at that visit. Patient to discuss weight loss and hip pain with PCP.     Balance Problems: much improved with switching duloxetine and metoprolol to the evening, balance problems not happening daily like they were. Asked patient to start taking lisinopril in the evening and continue to monitor balance.     PLAN:                            1. Patient to discuss hip pain with PCP.    2. Start taking lisinopril in the evening - continue to monitor balance.     Follow-up: Tuesday, October 12th at 3:30 PM    SUBJECTIVE/OBJECTIVE:                          Jessica Jay is a 61 year old female called for a follow-up visit. She was referred to me from Dr. Silver.  Today's visit is a follow-up MTM visit from 8/24/2021.     Reason for visit: follow up MTM visit.    Tobacco: She reports that she quit smoking about 4 years ago. Her smoking use included cigarettes. She has a 24.00 pack-year smoking history. She has never used smokeless tobacco.  Alcohol: Less than 1 beverage / month    Medication Adherence/Access: no issues reported    Type 2 Diabetes: Currently taking metformin 1000 mg twice daily.   Monitoring blood sugars a few times as follows:    Date FBG post-prandial dinner   8/31 129    9/3 174    9/4 125 137     Eye exam: up to date  Foot exam: up to date  Exercise: trying to walk more to improve weight loss - having a lot of hip pain with walking - this started before starting statin.    Aspirin: Taking 81mg daily and denies side effects  Statin: Yes: atorvastastin   ACEi/ARB: Yes: lisinopril.   Urine Albumin:      Lab Results   Component Value Date    UMALCR Unable to calculate due to low value 04/27/2021      Lab Results   Component Value Date    A1C 8.3 06/22/2021     A1C 12.7 04/09/2021     No longer gets a flu vaccine - got very sick after the last two times. Has had the pneumonia vaccine and both COVID vaccines. Plans on getting Shingrix vaccine.     Balance Problems: much improved with taking duloxetine and metoprolol in the evening. Has only had one balance problem incident since our last visit - patient was watering her plants and had problems with balance upon standing from a sitting position. States her sleeping is much improved as well.     BP Readings from Last 3 Encounters:   08/23/21 132/86   06/22/21 120/84   04/27/21 138/82     ----------------    I spent 20 minutes with this patient today. All changes were made via collaborative practice agreement with Marybeth Silver MD. A copy of the visit note was provided to the patient's primary care provider.    The patient was sent via [employer] a summary of these recommendations.     Mayela Plummer, PharmD  Medication Therapy Management     Telemedicine Visit Details  Type of service:  Telephone visit  Start Time: 3:33 PM  End Time: 3:53 PM  Originating Location (patient location): [place]  Distant Location (provider location):  [facility]     Medication Therapy Recommendations  Balance problems    Current Medication: lisinopril (ZESTRIL) 10 MG tablet   Rationale: Undesirable effect - Adverse medication event - Safety   Recommendation: Change Administration Time   Status: Patient Agreed - Adherence/Education

## 2021-09-09 ENCOUNTER — PATIENT OUTREACH (OUTPATIENT)
Dept: FAMILY MEDICINE | Facility: CLINIC | Age: 61
End: 2021-09-09
Payer: COMMERCIAL

## 2021-09-09 NOTE — PROGRESS NOTES
Clinic Care Coordination Contact    Follow Up Progress Note      Assessment: Pt reporting that she got new shoes which helps with some of the pain.  She will still follow up with her provider for lab, shots, and follow up with the pain.     She has had to reduce her walking due to pain in her hip.     Pt is noticing increased depression and not sure why.  It is not every day but every other or every third day.  Encouraged her to take the time to be calm and mindful of what is going on to help sort out what is causing the increase.  She is feeling like it is a combination of things such as the pain and tighter finances.     Medications were reviewed two days ago with [facility] pharmacy so not reviewed today.      Care Gaps:    Health Maintenance Due   Topic Date Due     URINE DRUG SCREEN  Never done     ZOSTER IMMUNIZATION (1 of 2) Never done     INFLUENZA VACCINE (1) 09/01/2021     HPV TEST  09/16/2021     PAP  09/16/2021     A1C  09/22/2021     LUNG CANCER SCREENING ANNUAL  10/26/2021       Scheduled to get her shingles shot at october appt.       Goals addressed this encounter:   Goals Addressed                    This Visit's Progress       #1  Mental Health Management (pt-stated)   50%      Goal Statement: I will work on finding two techniques to help me reduce my depression, in the next month.  Date Goal set: 4/27/2020  Barriers: Social distancing, depression  Strengths: Family, friends, and understanding that a needs some help with my depression  Date to Achieve By: 12/31/21  Patient expressed understanding of goal: Yes  Action steps to achieve this goal:  1. I will search for some mindfulness videos to help with depression  2. I will look at the Maryanne website for resources  3. I will work with counseling to learn new tools as well  4. I will explore trips and books that help me feel better           #2 Being Active (pt-stated)   30%      Goal Statement: I want to increase my activity to help improve my weight and  back pain.  I will walk at least 10 minutes 3 days/week or more and maintain this for 3 consecutive months.  Date Goal set: 4/27/2020   Barriers: Depression, back pain, social distancing to some extent  Strengths: Understand that increasing my exercise will help with managing my weight and back pain  Date to Achieve By: 12/25/21   Patient expressed understanding of goal: Yes  Action steps to achieve this goal:  1. I will walk in one direction for 5 minutes and then return for a total of 10 minutes  2. I will not get discouraged if I cannot make it the 5 minutes in one direction before having to turn around  3. I will celebrate my successes            Intervention/Education provided during outreach: listened to pt and encouraged her to continue to do what she can with exercise.  Encouraged her to find some things to do that she finds gildardo in.  She does participate in a variety of events at [employer].      Discussed spending some time with her finances to come up with a good plan to pay her bills/credit cards and not cut her budget too tight.       Outreach Frequency: monthly    Plan:   Pt to practice mindful ness to have a better understanding of what is increasing her depression.   Care Coordinator will follow up in about 4-5 weeks.     JAQUELIN Mensah, Certified Financial [facility] Primary Care - Care Coordinator   9/9/2021   4:12 PM  [phone]

## 2021-09-26 ENCOUNTER — HEALTH MAINTENANCE LETTER (OUTPATIENT)
Age: 61
End: 2021-09-26

## 2021-10-02 DIAGNOSIS — K21.9 GASTROESOPHAGEAL REFLUX DISEASE: ICD-10-CM

## 2021-10-05 ENCOUNTER — OFFICE VISIT (OUTPATIENT)
Dept: FAMILY MEDICINE | Facility: CLINIC | Age: 61
End: 2021-10-05
Payer: COMMERCIAL

## 2021-10-05 VITALS
DIASTOLIC BLOOD PRESSURE: 80 MMHG | BODY MASS INDEX: 46.29 KG/M2 | TEMPERATURE: 99 F | RESPIRATION RATE: 18 BRPM | SYSTOLIC BLOOD PRESSURE: 118 MMHG | HEART RATE: 86 BPM | OXYGEN SATURATION: 98 % | WEIGHT: 245 LBS

## 2021-10-05 DIAGNOSIS — I10 HYPERTENSION GOAL BP (BLOOD PRESSURE) < 140/90: ICD-10-CM

## 2021-10-05 DIAGNOSIS — E11.65 TYPE 2 DIABETES MELLITUS WITH HYPERGLYCEMIA, WITHOUT LONG-TERM CURRENT USE OF INSULIN (H): Primary | ICD-10-CM

## 2021-10-05 DIAGNOSIS — Z23 NEED FOR VACCINATION: ICD-10-CM

## 2021-10-05 DIAGNOSIS — R05.8 COUGH SECONDARY TO ANGIOTENSIN CONVERTING ENZYME INHIBITOR (ACE-I): ICD-10-CM

## 2021-10-05 DIAGNOSIS — Z87.891 PERSONAL HISTORY OF TOBACCO USE, PRESENTING HAZARDS TO HEALTH: ICD-10-CM

## 2021-10-05 DIAGNOSIS — T46.4X5A COUGH SECONDARY TO ANGIOTENSIN CONVERTING ENZYME INHIBITOR (ACE-I): ICD-10-CM

## 2021-10-05 DIAGNOSIS — E78.5 HYPERLIPIDEMIA LDL GOAL <100: ICD-10-CM

## 2021-10-05 LAB
HBA1C MFR BLD: 6.9 % (ref 0–5.6)
HOLD SPECIMEN: NORMAL

## 2021-10-05 PROCEDURE — 90471 IMMUNIZATION ADMIN: CPT | Performed by: FAMILY MEDICINE

## 2021-10-05 PROCEDURE — 83036 HEMOGLOBIN GLYCOSYLATED A1C: CPT | Performed by: FAMILY MEDICINE

## 2021-10-05 PROCEDURE — 99214 OFFICE O/P EST MOD 30 MIN: CPT | Mod: 25 | Performed by: FAMILY MEDICINE

## 2021-10-05 PROCEDURE — 90682 RIV4 VACC RECOMBINANT DNA IM: CPT | Performed by: FAMILY MEDICINE

## 2021-10-05 PROCEDURE — 90472 IMMUNIZATION ADMIN EACH ADD: CPT | Performed by: FAMILY MEDICINE

## 2021-10-05 PROCEDURE — 36415 COLL VENOUS BLD VENIPUNCTURE: CPT | Performed by: FAMILY MEDICINE

## 2021-10-05 PROCEDURE — 90750 HZV VACC RECOMBINANT IM: CPT | Performed by: FAMILY MEDICINE

## 2021-10-05 RX ORDER — LOSARTAN POTASSIUM 25 MG/1
25 TABLET ORAL DAILY
Qty: 30 TABLET | Refills: 1 | Status: SHIPPED | OUTPATIENT
Start: 2021-10-05 | End: 2021-12-08

## 2021-10-05 ASSESSMENT — PATIENT HEALTH QUESTIONNAIRE - PHQ9
5. POOR APPETITE OR OVEREATING: NEARLY EVERY DAY
SUM OF ALL RESPONSES TO PHQ QUESTIONS 1-9: 13

## 2021-10-05 ASSESSMENT — ANXIETY QUESTIONNAIRES
5. BEING SO RESTLESS THAT IT IS HARD TO SIT STILL: SEVERAL DAYS
6. BECOMING EASILY ANNOYED OR IRRITABLE: SEVERAL DAYS
1. FEELING NERVOUS, ANXIOUS, OR ON EDGE: NOT AT ALL
7. FEELING AFRAID AS IF SOMETHING AWFUL MIGHT HAPPEN: NOT AT ALL
2. NOT BEING ABLE TO STOP OR CONTROL WORRYING: NOT AT ALL
GAD7 TOTAL SCORE: 5
IF YOU CHECKED OFF ANY PROBLEMS ON THIS QUESTIONNAIRE, HOW DIFFICULT HAVE THESE PROBLEMS MADE IT FOR YOU TO DO YOUR WORK, TAKE CARE OF THINGS AT HOME, OR GET ALONG WITH OTHER PEOPLE: SOMEWHAT DIFFICULT
3. WORRYING TOO MUCH ABOUT DIFFERENT THINGS: NOT AT ALL

## 2021-10-05 NOTE — TELEPHONE ENCOUNTER
Prescription approved per [facility] Refill Protocol.   Please schedule-Return in about 3 months (around 9/22/2021) for Diabetes Follow Up with a HgbA1C prior to visit.

## 2021-10-05 NOTE — PROGRESS NOTES
"    Assessment & Plan     Type 2 diabetes mellitus with hyperglycemia, without long-term current use of insulin (H), controlled with an A1c of 6.9 today.   - Hemoglobin A1c  -Congratulated patient on her hard work toward glycemic control.   -Continue current management.  No refills needed at this time.    Cough secondary to angiotensin converting enzyme inhibitor (ACE-I)  -Discontinue ACE inhibitor- lisinopril and try an angiotensin receptor blocker- Losartan  -Start: losartan (COZAAR) 25 MG tablet  Dispense: 30 tablet; Refill: 1    Hypertension goal BP (blood pressure) < 140/90, controlled.  -  Discontinue Lisinopril due to cough and trial Losartan  - Start: Losartan (COZAAR) 25 MG tablet  Dispense: 30 tablet; Refill: 1    Hyperlipidemia LDL goal <100, on atorvastatin 40 mg daily.  Due for labs  - Lipid panel reflex to direct LDL Fasting  - Comprehensive metabolic panel (BMP + Alb, Alk Phos, ALT, AST, Total. Bili, TP)    Need for vaccination  - REVIEW OF HEALTH MAINTENANCE PROTOCOL ORDERS  - [place] RIV4 (FLUBLOK) VACCINE RECOMBINANT DNA PRSRV ANTIBIO FREE, IM ([id number])  - ZOSTER VACCINE RECOMBINANT ADJUVANTED IM NJX    Personal history of tobacco use, presenting hazards to health  - CT Chest Lung Cancer Scrn Low Dose wo         BMI:   Estimated body mass index is 46.29 kg/m  as calculated from the following:    Height as of 4/27/21: 1.549 m (5' 1\").    Weight as of this encounter: 111.1 kg (245 lb).   Weight management plan: Discussed healthy diet and exercise guidelines        Return in about 3 months (around 1/5/2022) for a Physical Exam at your earliest convenience., Diabetes Follow Up with a HgbA1C prior to visit.    Marybeth Silver MD  [facility] KIRSTIE Menendez is a 61 year old who presents for the following health issues     HPI     Would like to received shingles and flu shot today.   States insurance does cover shingles for her in clinic.     Diabetes Follow-up  Currently off Victoza " since August 2020.  Taking Metformin 2000 mg daily.  Tolerating well, no side effects reported.  How often are you checking your blood sugar? A few times a month  What time of day are you checking your blood sugars (select all that apply)?  Before and after meals  Have you had any blood sugars above 200?  No  Have you had any blood sugars below 70?  No    What symptoms do you notice when your blood sugar is low?  None    What concerns do you have today about your diabetes? None     Do you have any of these symptoms? (Select all that apply)  No numbness or tingling in feet.  No redness, sores or blisters on feet.  No complaints of excessive thirst.  No reports of blurry vision.  No significant changes to weight.      BP Readings from Last 2 Encounters:   10/05/21 118/80   08/23/21 132/86     Hemoglobin A1C (%)   Date Value   10/05/2021 6.9 (H)   06/22/2021 8.3 (H)   04/09/2021 12.7 (H)     LDL Cholesterol Calculated (mg/dL)   Date Value   10/22/2020 158 (H)   08/13/2018 130 (H)     HYPERLIPIDEMIA - Patient has a long history of significant Hyperlipidemia requiring medication for treatment with recent fair control. Patient reports no problems or side effects with the medication.   Last lipid panel-  Recent Labs   Lab Test 10/22/20  1147 08/13/18  1357   CHOL 227* 208*   HDL 47* 53   * 130*   TRIG 112 123         HYPERTENSION - Patient has longstanding history of HTN , currently denies any symptoms referable to elevated blood pressure. Specifically denies chest pain, palpitations, dyspnea, orthopnea, PND or peripheral edema. Blood pressure readings have been in normal range. Current medication regimen is as listed below. Patient reports a dry cough for the past couple of months. Feels like a throat tickle.  Denies having any congestion.  No shortness of breath.  No fever.      HEALTH CARE MAINTENANCE: Due for annual LDCT for lung cancer screening, ex-smoker.  30+ pack year.  Quit smoking 5 years ago.  Due for  immunizations and labs.    Review of Systems   Constitutional, HEENT, cardiovascular, pulmonary, gi and gu systems are negative, except as otherwise noted.      Objective    /80   Pulse 86   Temp 99  F (37.2  C) (Tympanic)   Resp 18   Wt 111.1 kg (245 lb)   SpO2 98%   Breastfeeding No   BMI 46.29 kg/m    Body mass index is 46.29 kg/m .  Physical Exam   GENERAL: healthy, alert and no distress  RESP: lungs clear to auscultation - no rales, rhonchi or wheezes  CV: regular rate and rhythm, normal S1 S2, no S3 or S4, no murmur, click or rub, no peripheral edema and peripheral pulses strong  PSYCH: mentation appears normal, affect normal/bright    DATA  Reviewed and discussed with patient prior to discharge.  Results for orders placed or performed in visit on 10/05/21   Hemoglobin A1c     Status: Abnormal   Result Value Ref Range    Hemoglobin A1C 6.9 (H) 0.0 - 5.6 %   Extra Red Top Tube     Status: None   Result Value Ref Range    Hold Specimen JIC    Extra Green Top (Lithium Heparin) Tube     Status: None   Result Value Ref Range    Hold Specimen JIC    Extra Purple Top Tube     Status: None   Result Value Ref Range    Hold Specimen JIC    Extra Tube     Status: None    Narrative    The following orders were created for panel order Extra Tube.  Procedure                               Abnormality         Status                     ---------                               -----------         ------                     Extra Red Top Tube[[id number]]                               Final result               Extra Green Top (Lithium...[[id number]]                      Final result               Extra Purple Top Tube[[id number]]                            Final result                 Please view results for these tests on the individual orders.

## 2021-10-06 ASSESSMENT — ANXIETY QUESTIONNAIRES: GAD7 TOTAL SCORE: 5

## 2021-10-12 ENCOUNTER — VIRTUAL VISIT (OUTPATIENT)
Dept: PHARMACY | Facility: CLINIC | Age: 61
End: 2021-10-12
Payer: COMMERCIAL

## 2021-10-12 DIAGNOSIS — I10 HYPERTENSION GOAL BP (BLOOD PRESSURE) < 140/90: ICD-10-CM

## 2021-10-12 DIAGNOSIS — R26.89 BALANCE PROBLEMS: Primary | ICD-10-CM

## 2021-10-12 DIAGNOSIS — R60.0 BILATERAL EDEMA OF LOWER EXTREMITY: ICD-10-CM

## 2021-10-12 DIAGNOSIS — E11.65 TYPE 2 DIABETES MELLITUS WITH HYPERGLYCEMIA, WITHOUT LONG-TERM CURRENT USE OF INSULIN (H): ICD-10-CM

## 2021-10-12 PROCEDURE — 99606 MTMS BY PHARM EST 15 MIN: CPT | Performed by: PHARMACIST

## 2021-10-12 PROCEDURE — 99607 MTMS BY PHARM ADDL 15 MIN: CPT | Performed by: PHARMACIST

## 2021-10-12 NOTE — PROGRESS NOTES
Medication Therapy Management (MTM) Encounter    ASSESSMENT:                            Medication Adherence/Access: No issues identified    Balance Problems: improved, recommend if notice any balance problems to try taking losartan in the evening. Patient plans on moving it to the evening.     Hypertension/Edema: try taking 2nd dose of furosemide in the afternoon around 2-4 PM to avoid keeping patient up at night.     Type 2 Diabetes:  improved    PLAN:                            1. Recommend taking 2nd dose of furosemide in the afternoon around 2-4 PM to avoid keeping patient up at night.     Follow-up: 6 months    SUBJECTIVE/OBJECTIVE:                          Jessica Jay is a 61 year old female called for a follow-up visit. She was referred to me from Dr. Silver.  Today's visit is a follow-up MTM visit from 9/7/2021.     Reason for visit: follow up MTM visit.    Tobacco: She reports that she has quit smoking. Her smoking use included cigarettes. She has a 24.00 pack-year smoking history. She has never used smokeless tobacco.  Alcohol: Less than 1 beverage / month    Medication Adherence/Access: no issues reported    Balance Problems: much improved with switching duloxetine and metoprolol to the evening. Patient got a new pair of shoes - better balance. No longer having balance problems, no longer taking lisinopril - this was switched to losartan by PCP recently due to having a cough. At our last visit had asked patient to take lisinopril in the evening to see if balance improved, then switched to losartan - she is taking losartan in the morning. States sleeping better with taking duloxetine at night, also not as tired during the day.     Hypertension/Edema: Current medications include losartan 25 mg daily, metoprolol XL 25 mg daily and furosemide 20 mg twice daily. Patient is taking her second dose of furosemide in the evening and noticing needing to pee often for about 2 hours after taking furosemide.  Patient eating a low salt diet. Water retention much improved with diuretic, no longer having problems with swollen ankles/feet.      BP Readings from Last 3 Encounters:   10/05/21 118/80   21 132/86   21 120/84     Potassium   Date Value Ref Range Status   10/22/2020 4.0 3.4 - 5.3 mmol/L Final     Type 2 Diabetes:  Currently taking metformin 1000 mg twice daily. Patient is not experiencing side effects.  Blood sugar monitorin time(s) daily. Ranges (patient reported): testing blood sugars in the morning (fasting) and when gets home after work before dinner. States highest readings 132-134, lowest readings 100-110.    Symptoms of low blood sugar? Not going low  Eye exam: up to date  Foot exam: up to date  Diet/Exercise: continues to work on diet  Aspirin: Taking 81mg daily and denies side effects  Statin: Yes - due for lipid panel this month  ACEi/ARB: Yes   Urine Albumin:   Lab Results   Component Value Date    UMALCR Unable to calculate due to low value 2021      Lab Results   Component Value Date    A1C 6.9 10/05/2021    A1C 8.3 2021    A1C 12.7 2021     No longer gets a flu vaccine - got very sick after the last two times. Has had the pneumonia vaccine, Shingrix and COVID vaccine.     ----------------    I spent 15 minutes with this patient today. All changes were made via collaborative practice agreement with Marybeth Silver MD. A copy of the visit note was provided to the patient's primary care provider.    The patient was sent via [employer] a summary of these recommendations.     Mayela Plummer, PharmD  Medication Therapy Management     Telemedicine Visit Details  Type of service:  Telephone visit  Start Time: 3:30 PM  End Time: 3:45 PM  Originating Location (patient location): Home  Distant Location (provider location):  [facility] [place]     Medication Therapy Recommendations  Bilateral edema of lower extremity    Current Medication: furosemide (LASIX) 20 MG  tablet   Rationale: Undesirable effect - Adverse medication event - Safety   Recommendation: Change Administration Time   Status: Patient Agreed - Adherence/Education

## 2021-10-13 ENCOUNTER — ANCILLARY PROCEDURE (OUTPATIENT)
Dept: CT IMAGING | Facility: CLINIC | Age: 61
End: 2021-10-13
Attending: FAMILY MEDICINE
Payer: COMMERCIAL

## 2021-10-13 DIAGNOSIS — Z87.891 PERSONAL HISTORY OF TOBACCO USE, PRESENTING HAZARDS TO HEALTH: ICD-10-CM

## 2021-10-13 PROCEDURE — 71271 CT THORAX LUNG CANCER SCR C-: CPT | Mod: TC | Performed by: RADIOLOGY

## 2021-10-13 NOTE — PATIENT INSTRUCTIONS
Recommendations from today's MTM visit:                                                      Mario Menendez,    It was a pleasure talking with you today! We discussed the followin. Recommend taking 2nd dose of furosemide in the afternoon around 2-4 PM to avoid keeping you up at night due to diuretic effect.     Follow-up: 6 months    It was great to speak with you today.  I value your experience and would be very thankful for your time with providing feedback on our clinic survey. You may receive a survey via email or text message in the next few days.     To schedule another MTM appointment, please call the clinic directly or you may call the MTM scheduling line at [phone] or toll-free at [phone].     My Clinical Pharmacist's contact information:                                                      Please feel free to contact me with any questions or concerns you have.      Keep up the good work!!    Mayela Plummer, PharmD  [phone] in clinic on Tuesday and Wednesday

## 2021-10-19 ENCOUNTER — PATIENT OUTREACH (OUTPATIENT)
Dept: FAMILY MEDICINE | Facility: CLINIC | Age: 61
End: 2021-10-19
Payer: COMMERCIAL

## 2021-10-19 NOTE — PROGRESS NOTES
Clinic Care Coordination Contact    Follow Up Progress Note      Assessment: pt reporting that her depression is worse.  She is recognizing the possible reasons and reaching out to a friend for a mentor and to assist with spiritual growth.  Discussed Warm lines, SADD light, vitamin D, and balanced diet as ways to also help with the depression.     Pt noted that she had always said she would not kill herself as long as her mother was alive.  This person noted above asked her and what about when your mom dies.  She said she realized she wants to live and promised she would not do anything to harm herself.     Pt has been able to get her A1C down to 6.9 in 6 months.  She was congratulated on this.     She is noticing hip pain that limits her walking.  She is going to send her PCP a [employer] message to inquire about options.     Care Gaps:    Health Maintenance Due   Topic Date Due     URINE DRUG SCREEN  Never done     HPV TEST  09/16/2021     PAP  09/16/2021     PREVENTIVE CARE VISIT  10/22/2021     BMP  10/22/2021     LIPID  10/22/2021       Care Gaps Last addressed on 10/5/21     Pt's medications were reviewed on 10/5/21 so not reviewed today.     Pt is voicing some financial struggles with utilities and food.  She had her car and house insurance due along with her property taxes.  Discussed resources and provided her with the ACCAP, Senior Linkage line, and [place] food helpline.     Goals addressed this encounter:   Goals Addressed                    This Visit's Progress       #1  Mental Health Management (pt-stated)   50%      Goal Statement: I will work on finding two techniques to help me reduce my depression, in the next 6 month.  Date Goal set: 4/27/2020  Barriers: Social distancing, depression  Strengths: Family, friends, and understanding that a needs some help with my depression  Date to Achieve By: 12/31/21  Patient expressed understanding of goal: Yes  Action steps to achieve this goal:  1. I will search for  some mindfulness videos to help with depression  2. I will look at the [facility] website for resources  3. I will work with counseling to learn new tools as well  4. I will explore trips and books that help me feel better           #2 Being Active (pt-stated)   30%      Goal Statement: I want to increase my activity to help improve my weight and back pain.  I will walk at least 10 minutes 3 days/week or more and maintain this for 3 consecutive months.  Date Goal set: 4/27/2020   Barriers: Depression, back pain, social distancing to some extent  Strengths: Understand that increasing my exercise will help with managing my weight and back pain  Date to Achieve By: 12/25/21   Patient expressed understanding of goal: Yes  Action steps to achieve this goal:  1. I will walk in one direction for 5 minutes and then return for a total of 10 minutes  2. I will not get discouraged if I cannot make it the 5 minutes in one direction before having to turn around  3. I will celebrate my successes            Intervention/Education provided during outreach: pt talked a lot about her relationships and which ones are safe to talk with about her depression.  She was able to identify some safe options and was provided with the warm lines. Encouraged pt to reach out and talk with people as needed.  She also noted that laughter helps her feel better.     Pt has a SADD light but does not use it much.  We talked about ways to increase it's use to help her out. Her financial situation also affects her depression and encouraged her to reach out to resources.       Outreach Frequency: monthly    Plan:   Pt to reach out to resources given. Pt will call the warm lines and utilize her SADD light more often.   Care Coordinator will follow up in one month.     JAQUELIN Mensah, Certified Financial [facility] Primary Care - Care Coordinator   10/19/2021   4:35 PM  [phone]

## 2021-10-23 ENCOUNTER — LAB (OUTPATIENT)
Dept: LAB | Facility: CLINIC | Age: 61
End: 2021-10-23
Payer: COMMERCIAL

## 2021-10-23 DIAGNOSIS — E78.5 HYPERLIPIDEMIA LDL GOAL <100: ICD-10-CM

## 2021-10-23 PROCEDURE — 36415 COLL VENOUS BLD VENIPUNCTURE: CPT

## 2021-10-23 PROCEDURE — 80061 LIPID PANEL: CPT

## 2021-10-23 PROCEDURE — 80053 COMPREHEN METABOLIC PANEL: CPT

## 2021-10-25 LAB
ALBUMIN SERPL-MCNC: 3.8 G/DL (ref 3.4–5)
ALP SERPL-CCNC: 79 U/L (ref 40–150)
ALT SERPL W P-5'-P-CCNC: 35 U/L (ref 0–50)
ANION GAP SERPL CALCULATED.3IONS-SCNC: 4 MMOL/L (ref 3–14)
AST SERPL W P-5'-P-CCNC: 24 U/L (ref 0–45)
BILIRUB SERPL-MCNC: 0.4 MG/DL (ref 0.2–1.3)
BUN SERPL-MCNC: 12 MG/DL (ref 7–30)
CALCIUM SERPL-MCNC: 9.7 MG/DL (ref 8.5–10.1)
CHLORIDE BLD-SCNC: 107 MMOL/L (ref 94–109)
CHOLEST SERPL-MCNC: 156 MG/DL
CO2 SERPL-SCNC: 27 MMOL/L (ref 20–32)
CREAT SERPL-MCNC: 0.92 MG/DL (ref 0.52–1.04)
FASTING STATUS PATIENT QL REPORTED: YES
GFR SERPL CREATININE-BSD FRML MDRD: 67 ML/MIN/1.73M2
GLUCOSE BLD-MCNC: 132 MG/DL (ref 70–99)
HDLC SERPL-MCNC: 50 MG/DL
LDLC SERPL CALC-MCNC: 86 MG/DL
NONHDLC SERPL-MCNC: 106 MG/DL
POTASSIUM BLD-SCNC: 4.5 MMOL/L (ref 3.4–5.3)
PROT SERPL-MCNC: 7.6 G/DL (ref 6.8–8.8)
SODIUM SERPL-SCNC: 138 MMOL/L (ref 133–144)
TRIGL SERPL-MCNC: 100 MG/DL

## 2021-11-22 ENCOUNTER — OFFICE VISIT (OUTPATIENT)
Dept: URGENT CARE | Facility: URGENT CARE | Age: 61
End: 2021-11-22
Payer: COMMERCIAL

## 2021-11-22 ENCOUNTER — ANCILLARY PROCEDURE (OUTPATIENT)
Dept: GENERAL RADIOLOGY | Facility: CLINIC | Age: 61
End: 2021-11-22
Attending: FAMILY MEDICINE
Payer: COMMERCIAL

## 2021-11-22 ENCOUNTER — PATIENT OUTREACH (OUTPATIENT)
Dept: FAMILY MEDICINE | Facility: CLINIC | Age: 61
End: 2021-11-22
Payer: COMMERCIAL

## 2021-11-22 VITALS
SYSTOLIC BLOOD PRESSURE: 140 MMHG | DIASTOLIC BLOOD PRESSURE: 85 MMHG | HEART RATE: 80 BPM | OXYGEN SATURATION: 100 % | TEMPERATURE: 97.5 F

## 2021-11-22 DIAGNOSIS — S89.92XA KNEE INJURY, LEFT, INITIAL ENCOUNTER: ICD-10-CM

## 2021-11-22 DIAGNOSIS — S89.92XA KNEE INJURY, LEFT, INITIAL ENCOUNTER: Primary | ICD-10-CM

## 2021-11-22 PROCEDURE — 73560 X-RAY EXAM OF KNEE 1 OR 2: CPT | Mod: LT | Performed by: RADIOLOGY

## 2021-11-22 PROCEDURE — 99213 OFFICE O/P EST LOW 20 MIN: CPT | Performed by: FAMILY MEDICINE

## 2021-11-22 RX ORDER — HYDROCODONE BITARTRATE AND ACETAMINOPHEN 5; 325 MG/1; MG/1
1 TABLET ORAL
Qty: 5 TABLET | Refills: 0 | Status: SHIPPED | OUTPATIENT
Start: 2021-11-22 | End: 2021-11-25

## 2021-11-22 NOTE — PROGRESS NOTES
Clinic Care Coordination Contact    Follow Up Progress Note      Assessment: pt noted an increase in depression.  She is wanting to have a man in her life and the one she currently is drawn to is not an option.  She is feeling she will be alone the rest of her life.     Pt hurt her knee and it swelled up more while at work.  She is going to go to the [facility].     meds were not reviewed so pt could go to urgent care.     Care Gaps:    Health Maintenance Due   Topic Date Due     URINE DRUG SCREEN  Never done     HPV TEST  09/16/2021     PAP  09/16/2021     COVID-19 Vaccine (3 - Booster for Pfizer series) 10/15/2021     PREVENTIVE CARE VISIT  10/22/2021     ZOSTER IMMUNIZATION (2 of 2) 11/30/2021       not reviewed so pt could go to urgent care    Goals addressed this encounter:   Goals Addressed                    This Visit's Progress       #1  Mental Health Management (pt-stated)   50%      Goal Statement: I will work on finding two techniques to help me reduce my depression, in the next 6 month.  Date Goal set: 4/27/2020  Barriers: Social distancing, depression  Strengths: Family, friends, and understanding that a needs some help with my depression  Date to Achieve By: 12/31/21  Patient expressed understanding of goal: Yes  Action steps to achieve this goal:  1. I will search for some mindfulness videos to help with depression  2. I will look at the [employer]anne website for resources  3. I will work with counseling to learn new tools as well  4. I will explore trips and books that help me feel better           #2 Being Active (pt-stated)   30%      Goal Statement: I want to increase my activity to help improve my weight and back pain.  I will walk at least 10 minutes 3 days/week or more and maintain this for 3 consecutive months.  Date Goal set: 4/27/2020   Barriers: Depression, back pain, social distancing to some extent  Strengths: Understand that increasing my exercise will help with managing my weight and  back pain  Date to Achieve By: 12/25/21   Patient expressed understanding of goal: Yes  Action steps to achieve this goal:  1. I will walk in one direction for 5 minutes and then return for a total of 10 minutes  2. I will not get discouraged if I cannot make it the 5 minutes in one direction before having to turn around  3. I will celebrate my successes            Intervention/Education provided during outreach: listened to pt and offered support.  Encouraged her to get her knee looked at before it gets worse.      Outreach Frequency: monthly    Plan:   Pt to go to urgent care. Morales end updated care plan.   Care Coordinator will follow up in one month    JAQUELIN Mensah, Certified Financial [facility] Primary Care - Care Coordinator   11/22/2021   4:07 PM  [phone]

## 2021-11-22 NOTE — LETTER
It was a pleasure to speak with you.  I would like to provide you with the enclosed information for your records.  As part of care coordination, we are developing care plans to assist in accomplishing your health care goals.  When we speak next, please feel free to let me know if you want to add or change any information on your care plans.    As always, feel free to contact me if you have any questions or concerns.  I look forward to working with you in the effort to achieve your health care and wellness goals .        Sincerely,      Bell Brito, [facility]  Clinic Care Coordination  [phone]    [facility]  Patient Centered Plan of Care  About Me:        Patient Name:  Jessica Jay    YOB: 1960  Age:         61 year old   [facility] MRN:    [id number] Telephone Information:  Home Phone [phone]   Mobile [phone]       Address:  [address] [place] [address] Email address:  [email or web address]      Emergency Contact(s)    Name Relationship Lgl Grd Work Phone Home Phone Mobile Phone   1. ILIANA JAY Mother   [phone]   2. NOAH TOBIAS Sister  [phone]            Primary language:  English     needed? No   [facility] [employer]:  [phone] op. 1  Other communication barriers:Glasses    Preferred Method of Communication:  Portia  Current living arrangement: I live alone    Mobility Status/ Medical Equipment: Independent        Health Maintenance  Health Maintenance Reviewed: Due/Overdue   Health Maintenance Due   Topic Date Due     URINE DRUG SCREEN  Never done     HPV TEST  09/16/2021     PAP  09/16/2021     COVID-19 Vaccine (3 - Booster for Pfizer series) 10/15/2021     PREVENTIVE CARE VISIT  10/22/2021     ZOSTER IMMUNIZATION (2 of 2) 11/30/2021           My Access Plan  Medical Emergency 911   Primary Clinic Line [facility] [place]  [phone]   24 Hour Appointment Line [phone]  or  [phone]) (toll-free)   24 Hour Nurse Line [phone] (toll-free)   Preferred Urgent Care Other     Preferred Hospital [facility]  [phone]     Preferred Pharmacy [id number] [place] [id number] [place]  AT [facility]     Behavioral Health Crisis Line The National Suicide Prevention Lifeline at 1-498.523.2315 or 911             My Care Team Members  Patient Care Team       Relationship Specialty Notifications Start End    Marybeth Silver MD PCP - General Family Practice  5/16/18     Phone: [phone] Fax: [phone] [address] [place] [address]    Marybeth Silver MD Assigned PCP   4/15/18     Phone: [phone] Fax: [phone] [address] [place] [address]    Bell Brito LSW Lead Care Coordinator Primary Care - CC Admissions 1/29/20     Phone: [phone] Fax: [phone]        Ki May MD Assigned Musculoskeletal Provider   10/23/20     Phone: [phone] Fax: [phone] [address] [place] [address]    Caren Denise DO Assigned Behavioral Health Provider   11/22/20     Phone: [phone] Fax: [phone] [address] [place] [facility] [address]    Mayela Plummer [facility] Pharmacist Pharmacist  5/19/21     Phone: [phone] Fax: [phone] [address] [facility] [place] [address]    Estephanie Malave NP Assigned Neuroscience Provider   7/16/21     Phone: [phone] Fax: [phone] [address] [place] [address]            My Care Plans  Self Management and Treatment Plan  Goals and (Comments)  Goals        General     #1  Mental Health Management (pt-stated)      Notes - Note edited  10/19/2021  3:55 PM by Bell Brito LSW     Goal Statement: I will work on finding two techniques to help me reduce my depression, in the next 6 month.  Date Goal set: 4/27/2020  Barriers: Social distancing,  depression  Strengths: Family, friends, and understanding that a needs some help with my depression  Date to Achieve By: 12/31/21  Patient expressed understanding of goal: Yes  Action steps to achieve this goal:  1. I will search for some mindfulness videos to help with depression  2. I will look at the [employer]anne website for resources  3. I will work with counseling to learn new tools as well  4. I will explore trips and books that help me feel better         #2 Being Active (pt-stated)      Notes - Note edited  6/28/2021  3:28 PM by Bell Brito LSW     Goal Statement: I want to increase my activity to help improve my weight and back pain.  I will walk at least 10 minutes 3 days/week or more and maintain this for 3 consecutive months.  Date Goal set: 4/27/2020   Barriers: Depression, back pain, social distancing to some extent  Strengths: Understand that increasing my exercise will help with managing my weight and back pain  Date to Achieve By: 12/25/21   Patient expressed understanding of goal: Yes  Action steps to achieve this goal:  1. I will walk in one direction for 5 minutes and then return for a total of 10 minutes  2. I will not get discouraged if I cannot make it the 5 minutes in one direction before having to turn around  3. I will celebrate my successes             Action Plans on File:            Depression          Advance Care Plans/Directives Type:   No data recorded    My Medical and Care Information  Problem List   Patient Active Problem List   Diagnosis     Depression, major     Migraines     CARDIOVASCULAR SCREENING; LDL GOAL LESS THAN 160     Disc herniation     DDD (degenerative disc disease), lumbar     DDD (degenerative disc disease), cervical     Neck pain     Chronic daily headache     Tobacco abuse     ELYSSA (obstructive sleep apnea)- severe (AHI 49)     Spondylolisthesis, grade 1     Chronic low back pain     Brain lipoma     History of colonic polyps     Gastroesophageal reflux disease  without esophagitis     Overweight with BMI >50     Hypertension goal BP (blood pressure) < 140/90     Traylor's esophagus determined by endoscopy     Bilateral edema of lower extremity     Generalized anxiety disorder     Restless legs syndrome (RLS)     Diabetes mellitus, type 2 (H)      Current Medications and Allergies:       Allergies   Allergen Reactions     Buspar [Buspirone Hcl] Rash     Lisinopril Cough     Seroquel [Quetiapine] Itching         Current Outpatient Medications:      ACCU-CHEK GUIDE test strip, USE TO TEST TWICE DAILY, Disp: 200 strip, Rfl: 0     alcohol swab prep pads, Use to swab area of injection/haley as directed., Disp: 100 each, Rfl: 3     aspirin (ASA) 81 MG EC tablet, Take 1 tablet (81 mg) by mouth daily, Disp: 100 tablet, Rfl: 3     atorvastatin (LIPITOR) 40 MG tablet, Take 1 tablet (40 mg) by mouth daily, Disp: 90 tablet, Rfl: 3     blood glucose (NO BRAND SPECIFIED) lancets standard, Use to test blood sugar 2 times daily or as directed., Disp: 100 lancet, Rfl: 3     blood glucose monitoring (NO BRAND SPECIFIED) meter device kit, Use to test blood sugar 2 times daily or as directed., Disp: 1 kit, Rfl: 0     DULoxetine (CYMBALTA) 30 MG capsule, TAKE 3 CAPSULES(90 MG) BY MOUTH DAILY, Disp: 270 capsule, Rfl: 1     furosemide (LASIX) 20 MG tablet, Take 1 tablet (20 mg) by mouth 2 times daily, Disp: 180 tablet, Rfl: 3     hydrOXYzine (ATARAX) 25 MG tablet, Take 25-50 mg twice a day as needed for anxiety. Can also take  mg at bedtime for sleep/itching. Don't exceed 300 mg daily., Disp: 90 tablet, Rfl: 1     insulin pen needle (32G X 4 MM) 32G X 4 MM miscellaneous, Use 1 pen needles daily or as directed with Victoza, Disp: 90 each, Rfl: 1     losartan (COZAAR) 25 MG tablet, Take 1 tablet (25 mg) by mouth daily, Disp: 30 tablet, Rfl: 1     metFORMIN (GLUCOPHAGE-XR) 500 MG 24 hr tablet, Take 2 tablets (1,000 mg) by mouth 2 times daily (with meals), Disp: 360 tablet, Rfl: 3      metoprolol succinate ER (TOPROL-XL) 25 MG 24 hr tablet, Take 1 tablet (25 mg) by mouth daily, Disp: 90 tablet, Rfl: 3     omeprazole (PRILOSEC) 20 MG DR capsule, TAKE 1 CAPSULE(20 MG) BY MOUTH DAILY, Disp: 90 capsule, Rfl: 0    Current Facility-Administered Medications:      lidocaine 1 % injection 0.5 mL, 0.5 mL, , , Ki May MD, 0.5 mL at 05/04/20 1021     triamcinolone (KENALOG-40) injection 20 mg, 20 mg, , , Ki May MD, 20 mg at 05/04/20 1021      Care Coordination Start Date: 1/29/2020   Frequency of Care Coordination: monthly     Form Last Updated: 11/22/2021

## 2021-11-23 NOTE — PROGRESS NOTES
ASSESSMENT & PLAN    Jessica was seen today for pain.    Diagnoses and all orders for this visit:    Primary osteoarthritis of left knee  -     Large Joint Injection/Arthocentesis: L knee joint    Knee injury, left, initial encounter  -     Orthopedic  Referral      Discussed nature of degenerative arthrosis of the knee. Discussed symptom treatment with over-the-counter medications, ice or heat, topical treatments, and rest if needed. Discussed use of compression or bracing for comfort. Discussed potential benefits of rehabilitation, to maintain or improve function at the knee. Discussed benefits of exercise and weight loss (if applicable) to reduce pressure at the knee. Discussed injection therapy. Also briefly discussed future consideration of referral to orthopedic surgery for further evaluation and discussion of additional treatment options.  We discussed consideration of MRI given this incident, but with x-ray findings, and clinical assessment, start with treatment of OA.  Steroid injection done today, for pain relief.  If persistent issues despite injection, considerations include additional x-ray with patellar view, vs possibly MRI.  Questions answered. Discussed signs and symptoms that may indicate more serious issues; the patient was instructed to seek appropriate care if noted. Gemma indicates understanding of these issues and agrees with the plan.        See Patient Instructions  Patient Instructions   Overall picture is most consistent with aggravation of underlying knee osteoarthritis.  Icing, over-the-counter medication as needed for soreness. We discussed in particular use of acetaminophen. Additional over-the-counter option may be diclofenac (Voltaren) gel.  May use compression for comfort/support if desired, not required.  Reviewed activity modification, rest as needed for soreness. Consider use of ambulatory aid such as crutches or cane if desired.  Steroid injection done today, for pain  relief. Anticipate this will start to provide benefit over the next few days, but may need up to 1 to 2 weeks for maximal benefit.  Monitor course for the next 2 weeks. If persistent issues, we discussed potential for additional imaging with MRI.    If you have any further questions for your physician or physician s care team you can call [phone] and use option 3 to leave a voice message. Calls received during business hours will be returned same day.        Injection Discharge Instructions      You may shower, however avoid swimming, tub baths or hot tubs for 24 hours following your procedure    You may have a mild to moderate increase in pain for several days following the injection.    It may take up to 14 days for the steroid medication to start working although you may feel the effect as early as a few days after the procedure.    You may use ice packs for 10-15 minutes, 3 to 4 times a day at the injection site for comfort    You may use anti-inflammatory medications (such as Ibuprofen or Aleve or Advil) if you are able to take safely, or acetaminophen (Tylenol) for pain control if necessary    If you were fasting, you may resume your normal diet and medications after the procedure    If you have diabetes, check your blood sugar more frequently than usual as your blood sugar may be higher than normal for 10-14 days following a steroid injection. Contact your doctor who manages your diabetes if your blood sugar is higher than usual      If you experience any of the following, call the clinic @ [phone]  - Fever over 100 degree F  - Swelling, bleeding, redness, drainage, warmth at the injection site  - New or worsening pain          Francisco Farris [facility]      CC: Rubio Husain      -----  Chief Complaint   Patient presents with     Left Knee - Pain       SUBJECTIVE  Jessica DIONISIO Jay is a/an 61 year old female who is seen in consultation at the  "request of  Rubio Husain M.D. for evaluation of left knee pain.   Patient states she was climbing a retaining wall in her yard and heard a cracking sound in her left knee, had instant pain, states pain is the same since she injured it pain has not improved, stayed the same, rates 9/10 when walking. 5/10 while seated, hard to sleep at night. Takes Hydrocodone as needed for pain, took this once this past week    The patient is seen by themselves.    Onset: 1 week(s) ago. Patient describes injury as climbing retaining wall  Location of Pain: left knee, medial knee and anterior knee  Worsened by: anything, walking, standing  Better with: rest   Treatments tried: ice, heat Aleve  Associated symptoms: buckling, feeling of instability, some swelling.    Orthopedic/Surgical history: YES - \"off and on problems over the years\" - pain with stairs   Social History/Occupation: works as      No family history pertinent to patient's problem today.     **  Incident with trying to climb onto wall.  + pain at rest. Pain is more anterolateral currently.  Limited flexion, can nearly fully extend.  No additional pop/snap/noise since incident.        REVIEW OF SYSTEMS:  Review of Systems   All other systems reviewed and are negative.        OBJECTIVE:  BP (!) 136/96   Ht 1.549 m (5' 1\")   Wt 109 kg (240 lb 3.2 oz)   BMI 45.39 kg/m     General: healthy, alert and in no distress  HEENT: no scleral icterus or conjunctival erythema  Skin: no suspicious lesions or rash. No jaundice.  CV: distal perfusion intact   Resp: normal respiratory effort without conversational dyspnea   Psych: normal mood and affect  Gait: antalgic  Neuro: Normal light sensory exam of  extremity       Left Knee exam    Inspection:   + moderate effusion   no ecchymosis    ROM:      Flexion  degrees       Extension 5-10 degrees actively, full passive       Range of motion limited by pain, effusion    Patellar Motion:      Crepitus noted in the " patellofemoral joint, mild       Some pain with patellar translation    Tender:      medial patellar border       lateral patellar border       medial joint line    Special Tests:      neg (-) Lachman       neg (-) anterior drawer       neg (-) posterior drawer       neg (-) varus       neg (-) valgus       + pain with forced extension    Evaluation of ipsilateral kinetic chain:   Able to raise straight leg, + pain present       RADIOLOGY:  I independently visualized and reviewed these images with the patient  Knee arthrosis present, with medial and PF compartment narrowing. No dedicated patellar view.      EXAM: XR KNEE LT 1/2 VW  LOCATION: [facility]  DATE/TIME: 11/22/2021 6:24 PM     INDICATION:  Knee injury, left, initial encounter.  COMPARISON: None.                                                                      IMPRESSION: No fracture or joint effusion. Moderate degenerative changes in the medial and patellofemoral compartments. Mild degenerative changes in the lateral compartment.      Large Joint Injection/Arthocentesis: L knee joint    Date/Time: 11/26/2021 5:09 PM  Performed by: Francisco Farris DO  Authorized by: Francisco Farris DO     Indications:  Pain and osteoarthritis  Needle Size:  25 G  Guidance: landmark guided    Location:  Knee      Medications:  40 mg triamcinolone 40 MG/ML  Outcome:  Tolerated well, no immediate complications  Procedure discussed: discussed risks, benefits, and alternatives    Consent Given by:  Patient  Prep: patient was prepped and draped in usual sterile fashion

## 2021-11-23 NOTE — PROGRESS NOTES
SUBJECTIVE:  Jessica Jay, a 61 year old female scheduled an appointment to discuss the following issues:  Knee injury, left, initial encounter  Was cleaning flower bed 3 days. Cracking noise/locked out. No similar issues.   Some swelling. alleve prn.  No wrapping. Ice/heat.   right knee overall ok.   left knee was ok 3 days ago.  Hip and back stable.   No fevers or chills or rashes.    Difficult to bend.   Seen ortho in past for OA knees.     Medical, social, surgical, and family histories reviewed.    ROS:  All other ROS negative    OBJECTIVE:  BP (!) 140/85   Pulse 80   Temp 97.5  F (36.4  C) (Tympanic)   SpO2 100%   EXAM:  GENERAL APPEARANCE: healthy, alert and no distress  MS: mild swelling left knee joint and some tenderness left lateral knee space. Pain with hyperflexion  SKIN: no suspicious lesions or rashes  NEURO: Normal strength and tone, sensory exam grossly normal, mentation intact and speech normal  PSYCH: mentation appears normal and affect normal/bright    ASSESSMENT / PLAN:  (S89.92XA) Knee injury, left, initial encounter  (primary encounter diagnosis)  Comment: likely strain. Possible meniscal  Plan: XR Knee Left 1/2 Views, Orthopedic          Referral, HYDROcodone-acetaminophen (NORCO)         5-325 MG tablet        Follow-up ortho if persists. vicodin prn bedtime to help with sleep. Tylenol and ice/heat. Brace given. Expected course and warning signs reviewed. Call/email with questions/concerns     Rubio Husain MD

## 2021-11-26 ENCOUNTER — OFFICE VISIT (OUTPATIENT)
Dept: ORTHOPEDICS | Facility: CLINIC | Age: 61
End: 2021-11-26
Attending: FAMILY MEDICINE
Payer: COMMERCIAL

## 2021-11-26 VITALS
HEIGHT: 61 IN | DIASTOLIC BLOOD PRESSURE: 96 MMHG | BODY MASS INDEX: 45.35 KG/M2 | WEIGHT: 240.2 LBS | SYSTOLIC BLOOD PRESSURE: 136 MMHG

## 2021-11-26 DIAGNOSIS — S89.92XA KNEE INJURY, LEFT, INITIAL ENCOUNTER: ICD-10-CM

## 2021-11-26 DIAGNOSIS — M17.12 PRIMARY OSTEOARTHRITIS OF LEFT KNEE: Primary | ICD-10-CM

## 2021-11-26 PROCEDURE — 99243 OFF/OP CNSLTJ NEW/EST LOW 30: CPT | Mod: 25 | Performed by: PEDIATRICS

## 2021-11-26 PROCEDURE — 20610 DRAIN/INJ JOINT/BURSA W/O US: CPT | Mod: LT | Performed by: PEDIATRICS

## 2021-11-26 RX ADMIN — TRIAMCINOLONE ACETONIDE 40 MG: 40 INJECTION, SUSPENSION INTRA-ARTICULAR; INTRAMUSCULAR at 17:09

## 2021-11-26 ASSESSMENT — MIFFLIN-ST. JEOR: SCORE: 1591.92

## 2021-11-26 NOTE — LETTER
11/26/2021         RE: Jessica Jay  [address] [place] [address]        Dear Colleague,    Thank you for referring your patient, Jessica Jay, to the [facility]. Please see a copy of my visit note below.    ASSESSMENT & PLAN    Jessica was seen today for pain.    Diagnoses and all orders for this visit:    Primary osteoarthritis of left knee  -     Large Joint Injection/Arthocentesis: L knee joint    Knee injury, left, initial encounter  -     Orthopedic  Referral      Discussed nature of degenerative arthrosis of the knee. Discussed symptom treatment with over-the-counter medications, ice or heat, topical treatments, and rest if needed. Discussed use of compression or bracing for comfort. Discussed potential benefits of rehabilitation, to maintain or improve function at the knee. Discussed benefits of exercise and weight loss (if applicable) to reduce pressure at the knee. Discussed injection therapy. Also briefly discussed future consideration of referral to orthopedic surgery for further evaluation and discussion of additional treatment options.  We discussed consideration of MRI given this incident, but with x-ray findings, and clinical assessment, start with treatment of OA.  Steroid injection done today, for pain relief.  If persistent issues despite injection, considerations include additional x-ray with patellar view, vs possibly MRI.  Questions answered. Discussed signs and symptoms that may indicate more serious issues; the patient was instructed to seek appropriate care if noted. Gemma indicates understanding of these issues and agrees with the plan.        See Patient Instructions  Patient Instructions   Overall picture is most consistent with aggravation of underlying knee osteoarthritis.  Icing, over-the-counter medication as needed for soreness. We discussed in particular use of acetaminophen. Additional over-the-counter option  may be diclofenac (Voltaren) gel.  May use compression for comfort/support if desired, not required.  Reviewed activity modification, rest as needed for soreness. Consider use of ambulatory aid such as crutches or cane if desired.  Steroid injection done today, for pain relief. Anticipate this will start to provide benefit over the next few days, but may need up to 1 to 2 weeks for maximal benefit.  Monitor course for the next 2 weeks. If persistent issues, we discussed potential for additional imaging with MRI.    If you have any further questions for your physician or physician s care team you can call [phone] and use option 3 to leave a voice message. Calls received during business hours will be returned same day.        Injection Discharge Instructions      You may shower, however avoid swimming, tub baths or hot tubs for 24 hours following your procedure    You may have a mild to moderate increase in pain for several days following the injection.    It may take up to 14 days for the steroid medication to start working although you may feel the effect as early as a few days after the procedure.    You may use ice packs for 10-15 minutes, 3 to 4 times a day at the injection site for comfort    You may use anti-inflammatory medications (such as Ibuprofen or Aleve or Advil) if you are able to take safely, or acetaminophen (Tylenol) for pain control if necessary    If you were fasting, you may resume your normal diet and medications after the procedure    If you have diabetes, check your blood sugar more frequently than usual as your blood sugar may be higher than normal for 10-14 days following a steroid injection. Contact your doctor who manages your diabetes if your blood sugar is higher than usual      If you experience any of the following, call the clinic @ [phone]  - Fever over 100 degree F  - Swelling, bleeding, redness, drainage, warmth at the injection site  - New or worsening pain          Francisco  "Jamarcus Farris DO  [facility]      CC: Rubio Husain      -----  Chief Complaint   Patient presents with     Left Knee - Pain       SUBJECTIVE  Jessica Jay is a/an 61 year old female who is seen in consultation at the request of  Rubio Husain M.D. for evaluation of left knee pain.   Patient states she was climbing a retaining wall in her yard and heard a cracking sound in her left knee, had instant pain, states pain is the same since she injured it pain has not improved, stayed the same, rates 9/10 when walking. 5/10 while seated, hard to sleep at night. Takes Hydrocodone as needed for pain, took this once this past week    The patient is seen by themselves.    Onset: 1 week(s) ago. Patient describes injury as climbing retaining wall  Location of Pain: left knee, medial knee and anterior knee  Worsened by: anything, walking, standing  Better with: rest   Treatments tried: ice, heat Aleve  Associated symptoms: buckling, feeling of instability, some swelling.    Orthopedic/Surgical history: YES - \"off and on problems over the years\" - pain with stairs   Social History/Occupation: works as      No family history pertinent to patient's problem today.     **  Incident with trying to climb onto wall.  + pain at rest. Pain is more anterolateral currently.  Limited flexion, can nearly fully extend.  No additional pop/snap/noise since incident.        REVIEW OF SYSTEMS:  Review of Systems   All other systems reviewed and are negative.        OBJECTIVE:  BP (!) 136/96   Ht 1.549 m (5' 1\")   Wt 109 kg (240 lb 3.2 oz)   BMI 45.39 kg/m     General: healthy, alert and in no distress  HEENT: no scleral icterus or conjunctival erythema  Skin: no suspicious lesions or rash. No jaundice.  CV: distal perfusion intact   Resp: normal respiratory effort without conversational dyspnea   Psych: normal mood and affect  Gait: antalgic  Neuro: Normal light sensory exam of  " extremity       Left Knee exam    Inspection:   + moderate effusion   no ecchymosis    ROM:      Flexion  degrees       Extension 5-10 degrees actively, full passive       Range of motion limited by pain, effusion    Patellar Motion:      Crepitus noted in the patellofemoral joint, mild       Some pain with patellar translation    Tender:      medial patellar border       lateral patellar border       medial joint line    Special Tests:      neg (-) Lachman       neg (-) anterior drawer       neg (-) posterior drawer       neg (-) varus       neg (-) valgus       + pain with forced extension    Evaluation of ipsilateral kinetic chain:   Able to raise straight leg, + pain present       RADIOLOGY:  I independently visualized and reviewed these images with the patient  Knee arthrosis present, with medial and PF compartment narrowing. No dedicated patellar view.      EXAM: XR KNEE LT 1/2 VW  LOCATION: [facility]  DATE/TIME: 11/22/2021 6:24 PM     INDICATION:  Knee injury, left, initial encounter.  COMPARISON: None.                                                                      IMPRESSION: No fracture or joint effusion. Moderate degenerative changes in the medial and patellofemoral compartments. Mild degenerative changes in the lateral compartment.      Large Joint Injection/Arthocentesis: L knee joint    Date/Time: 11/26/2021 5:09 PM  Performed by: Francisco Farris DO  Authorized by: Francisco Farris DO     Indications:  Pain and osteoarthritis  Needle Size:  25 G  Guidance: landmark guided    Location:  Knee      Medications:  40 mg triamcinolone 40 MG/ML  Outcome:  Tolerated well, no immediate complications  Procedure discussed: discussed risks, benefits, and alternatives    Consent Given by:  Patient  Prep: patient was prepped and draped in usual sterile fashion                Again, thank you for allowing me to participate in the care of your patient.         Sincerely,        Francisco Farris, DO

## 2021-11-26 NOTE — PATIENT INSTRUCTIONS
Overall picture is most consistent with aggravation of underlying knee osteoarthritis.  Icing, over-the-counter medication as needed for soreness. We discussed in particular use of acetaminophen. Additional over-the-counter option may be diclofenac (Voltaren) gel.  May use compression for comfort/support if desired, not required.  Reviewed activity modification, rest as needed for soreness. Consider use of ambulatory aid such as crutches or cane if desired.  Steroid injection done today, for pain relief. Anticipate this will start to provide benefit over the next few days, but may need up to 1 to 2 weeks for maximal benefit.  Monitor course for the next 2 weeks. If persistent issues, we discussed potential for additional imaging with MRI.    If you have any further questions for your physician or physician s care team you can call [phone] and use option 3 to leave a voice message. Calls received during business hours will be returned same day.        Injection Discharge Instructions      You may shower, however avoid swimming, tub baths or hot tubs for 24 hours following your procedure    You may have a mild to moderate increase in pain for several days following the injection.    It may take up to 14 days for the steroid medication to start working although you may feel the effect as early as a few days after the procedure.    You may use ice packs for 10-15 minutes, 3 to 4 times a day at the injection site for comfort    You may use anti-inflammatory medications (such as Ibuprofen or Aleve or Advil) if you are able to take safely, or acetaminophen (Tylenol) for pain control if necessary    If you were fasting, you may resume your normal diet and medications after the procedure    If you have diabetes, check your blood sugar more frequently than usual as your blood sugar may be higher than normal for 10-14 days following a steroid injection. Contact your doctor who manages your diabetes if your blood sugar is  higher than usual      If you experience any of the following, call the clinic @ [phone]  - Fever over 100 degree F  - Swelling, bleeding, redness, drainage, warmth at the injection site  - New or worsening pain

## 2021-11-27 RX ORDER — TRIAMCINOLONE ACETONIDE 40 MG/ML
40 INJECTION, SUSPENSION INTRA-ARTICULAR; INTRAMUSCULAR
Status: DISCONTINUED | OUTPATIENT
Start: 2021-11-26 | End: 2024-05-21

## 2021-12-02 ENCOUNTER — MYC MEDICAL ADVICE (OUTPATIENT)
Dept: FAMILY MEDICINE | Facility: CLINIC | Age: 61
End: 2021-12-02
Payer: COMMERCIAL

## 2021-12-07 ENCOUNTER — TELEPHONE (OUTPATIENT)
Dept: FAMILY MEDICINE | Facility: CLINIC | Age: 61
End: 2021-12-07
Payer: COMMERCIAL

## 2021-12-07 NOTE — TELEPHONE ENCOUNTER
Prior Authorization Retail Medication Request    Medication/Dose: (RENEWAL) - DULoxetine (CYMBALTA) 30 MG capsule  ICD code (if different than what is on RX):  Moderate episode of recurrent major depressive disorder (H) [F33.1]   Previously Tried and Failed:    Rationale:      Insurance Name:  [employer]  Insurance ID:  [id number]    Pharmacy Information (if different than what is on RX)  Name:  [facility] #[id number] [place] [id number] AT [facility]  Phone:  [phone]

## 2021-12-08 NOTE — TELEPHONE ENCOUNTER
Central Prior Authorization Team   Phone: [phone]    PA Initiation    Medication: (RENEWAL) - DULoxetine (CYMBALTA) 30 MG capsule  Insurance Company: [employer] - Phone [phone] Fax [phone]  Pharmacy Filling the Rx: [facility] #[id number] [facility] [address] AT [facility]  Filling Pharmacy Phone: [phone]  Filling Pharmacy Fax:    Start Date: 12/8/2021

## 2021-12-08 NOTE — TELEPHONE ENCOUNTER
Prior Authorization Approval    Authorization Effective Date: 12/7/2021  Authorization Expiration Date: 12/8/2022  Medication: (RENEWAL) - DULoxetine (CYMBALTA) 30 MG capsule  Approved Dose/Quantity:   Reference #:     Insurance Company: [employer] - Phone [phone] Fax [phone]  Expected CoPay:       CoPay Card Available:      Foundation Assistance Needed:    Which Pharmacy is filling the prescription (Not needed for infusion/clinic administered): [facility] #[id number] - [place] [id number] AT [facility]  Pharmacy Notified: Yes  Patient Notified: Yes

## 2021-12-21 ENCOUNTER — NURSE TRIAGE (OUTPATIENT)
Dept: FAMILY MEDICINE | Facility: CLINIC | Age: 61
End: 2021-12-21
Payer: COMMERCIAL

## 2021-12-21 NOTE — TELEPHONE ENCOUNTER
Patient calling in stating that she is feeling as if she is hypoglycemic. Says she was at work and felt shaky, light headed and weak then ate some sugar and felt better.     She still is experiencing the headache so is thinking she could still be hypoglycemic.     Patient has not taken the blood sugar yet because she says she didn't know if she should or not since she ate 30 min ago.     RN advised her to take her blood sugar. Patient did so while on the phone and got a reading of 110.     RN advised patient check the blood sugar again tonight before bed or sooner if developing hypoglycemic symptoms again.       Patients blood sugar monitoring supplies indicate to test blood sugar two times per day, patient relays having taken it every few days. RN strongly advised taking it two timer per day: once before breakfast and once before bedtime and record them, patient agreed with plan and will  test strips from her pharmacy as she is almost out.       Will route to PCP as FYI       Reason for Disposition    Low blood sugar prevention, questions about    Additional Information    Negative: Unconscious or difficult to awaken    Negative: Seizure occurs    Negative: Acting confused (e.g., disoriented, slurred speech)    Negative: Very weak (can't stand)    Negative: Sounds like a life-threatening emergency to the triager    Negative: Vomiting and signs of dehydration (e.g., very dry mouth, lightheaded, dark urine, etc.)    Negative: Low blood sugar symptoms persist > 30 minutes AND using low blood sugar Care Advice    Negative: Low blood glucose (< 70 mg/dL or 3.9 mmol/L) persists > 30 minutes AND using low blood sugar Care Advice    Negative: Patient sounds very sick or weak to the triager    Negative: Diabetes medication overdose (e.g., insulin error) and triager unable to answer question    Negative: Caller has URGENT medication or insulin pump question and triager unable to answer question    Negative: Low blood  sugar symptoms with no other adult present AND hasn't tried Care Advice    Negative: Low blood glucose (< 70 mg/dL or 3.9 mmol/L) with no other adult present AND hasn't tried Care Advice    Negative: Low blood glucose (< 70 mg/dL or 3.9 mmol/L) or symptomatic, now improved with Care Advice AND cause unknown    Negative: Patient wants to be seen    Negative: Morning (before breakfast) blood glucose < 80 mg/dL (4.4 mmol/L) and more than once in past week    Negative: Evening (after bedtime snack) blood glucose < 100 mg/dL (5.6 mmol/L) and more than once in past week    Negative: Caller has NON-URGENT medication or insulin pump question and triager unable to answer question    Negative: Blood glucose < 70 mg/dL (3.9 mmol/L) or symptomatic AND has other adult present    Protocols used: DIABETES - LOW BLOOD SUGAR-A-OH    MAGGY Bowling RN  [facility]

## 2021-12-23 ENCOUNTER — PATIENT OUTREACH (OUTPATIENT)
Dept: FAMILY MEDICINE | Facility: CLINIC | Age: 61
End: 2021-12-23
Payer: COMMERCIAL

## 2021-12-23 NOTE — PROGRESS NOTES
Clinic Care Coordination Contact    Follow Up Progress Note      Assessment: pt noted increase in depression is still there.  She generally has bad Decembers.  She is utilizing warm lines to talk with people.  Attempted to help her find things she can do to give her little breaks from the depression and she struggled to find any.  She did note that she is going to [employer] for [facility] and [place] day.  She finds comfort in [employer] and her supports there.  She is still struggling with being alone and the feeling of being alone the rest of her life. She is not finding any techniques to help her with her MH nor practicing any mindfulness to help.     Pt noted that her knee is feeling better yet she is not pushing it at all so limiting her actions.     Care Gaps:    Health Maintenance Due   Topic Date Due     URINE DRUG SCREEN  Never done     HPV TEST  09/16/2021     PAP  09/16/2021     PREVENTIVE CARE VISIT  10/22/2021     ZOSTER IMMUNIZATION (2 of 2) 11/30/2021     A1C  01/05/2022       Not discussed today.   Medications not discussed as focus was on her depression.     Goals addressed this encounter:   Goals Addressed                    This Visit's Progress       #1  Mental Health Management (pt-stated)   40%      Goal Statement: I will work on finding two techniques to help me reduce my depression, in the next 6 month.  Date Goal set: 4/27/2020  Barriers: Social distancing, depression  Strengths: Family, friends, and understanding that a needs some help with my depression  Date to Achieve By: 6/21/22   Patient expressed understanding of goal: Yes  Action steps to achieve this goal:  1. I will search for some mindfulness videos to help with depression  2. I will look at the [facility] website for resources  3. I will work with counseling to learn new tools as well  4. I will explore trips and books that help me feel better           #2 Being Active (pt-stated)   20%      Goal Statement: I want to increase my activity  to help improve my weight and back pain.  I will walk at least 10 minutes 3 days/week or more and maintain this for 3 consecutive months.  Date Goal set: 4/27/2020   Barriers: Depression, back pain, social distancing to some extent  Strengths: Understand that increasing my exercise will help with managing my weight and back pain  Date to Achieve By: 3/23/22   Patient expressed understanding of goal: Yes  Action steps to achieve this goal:  1. I will walk in one direction for 5 minutes and then return for a total of 10 minutes  2. I will not get discouraged if I cannot make it the 5 minutes in one direction before having to turn around  3. I will celebrate my successes            Intervention/Education provided during outreach: listened and encouraged her to reach out to supports and revisit some techniques.  Encouraged her to try and do activities for 15 minutes so she can get things done.  She had noted that she had not cleaned her home for 2 months and only got part of it completed.      Outreach Frequency: monthly    Plan:   Pt to work on focusing on tasks for 15 minute periods of time to get breaks from the depression.   Care Coordinator will follow up in two weeks.     JAQUELIN Mensah, Certified [employer] [facility] - Care Coordinator   12/23/2021   4:04 PM  [phone]

## 2022-01-02 DIAGNOSIS — K21.9 GASTROESOPHAGEAL REFLUX DISEASE: ICD-10-CM

## 2022-01-03 NOTE — TELEPHONE ENCOUNTER
Medication is being filled for 1 time refill only due to:  Patient needs to be seen because need appt.

## 2022-01-06 ENCOUNTER — PATIENT OUTREACH (OUTPATIENT)
Dept: CARE COORDINATION | Facility: CLINIC | Age: 62
End: 2022-01-06
Payer: COMMERCIAL

## 2022-01-06 NOTE — PROGRESS NOTES
Clinic Care Coordination Contact  [facility]/Voicemail       Clinical Data: Care Coordinator Outreach  Outreach attempted x 1.  Left message on patient's voicemail with call back information and requested return call.  Plan:  Care Coordinator will try to reach patient again in 8-10 business days.    JAQUELNI Mensah, Certified [employer] [facility] - Care Coordinator   1/6/2022   3:35 PM  [phone]

## 2022-01-11 ENCOUNTER — OFFICE VISIT (OUTPATIENT)
Dept: FAMILY MEDICINE | Facility: CLINIC | Age: 62
End: 2022-01-11
Payer: COMMERCIAL

## 2022-01-11 VITALS
SYSTOLIC BLOOD PRESSURE: 126 MMHG | OXYGEN SATURATION: 100 % | DIASTOLIC BLOOD PRESSURE: 86 MMHG | BODY MASS INDEX: 44.97 KG/M2 | WEIGHT: 238 LBS | HEART RATE: 75 BPM | TEMPERATURE: 98.4 F

## 2022-01-11 DIAGNOSIS — E78.5 HYPERLIPIDEMIA LDL GOAL <100: ICD-10-CM

## 2022-01-11 DIAGNOSIS — K21.9 GASTROESOPHAGEAL REFLUX DISEASE, UNSPECIFIED WHETHER ESOPHAGITIS PRESENT: ICD-10-CM

## 2022-01-11 DIAGNOSIS — Z12.31 ENCOUNTER FOR SCREENING MAMMOGRAM FOR BREAST CANCER: ICD-10-CM

## 2022-01-11 DIAGNOSIS — E66.01 MORBID OBESITY DUE TO EXCESS CALORIES (H): ICD-10-CM

## 2022-01-11 DIAGNOSIS — I10 HYPERTENSION GOAL BP (BLOOD PRESSURE) < 140/90: ICD-10-CM

## 2022-01-11 DIAGNOSIS — Z00.00 ROUTINE GENERAL MEDICAL EXAMINATION AT A HEALTH CARE FACILITY: Primary | ICD-10-CM

## 2022-01-11 DIAGNOSIS — Z12.4 SCREENING FOR CERVICAL CANCER: ICD-10-CM

## 2022-01-11 DIAGNOSIS — F33.1 MODERATE EPISODE OF RECURRENT MAJOR DEPRESSIVE DISORDER (H): ICD-10-CM

## 2022-01-11 DIAGNOSIS — E11.65 TYPE 2 DIABETES MELLITUS WITH HYPERGLYCEMIA, WITHOUT LONG-TERM CURRENT USE OF INSULIN (H): ICD-10-CM

## 2022-01-11 DIAGNOSIS — Z23 NEED FOR VACCINATION: ICD-10-CM

## 2022-01-11 LAB
HBA1C MFR BLD: 6.7 % (ref 0–5.6)
HOLD SPECIMEN: NORMAL
HOLD SPECIMEN: NORMAL

## 2022-01-11 PROCEDURE — 83036 HEMOGLOBIN GLYCOSYLATED A1C: CPT | Performed by: FAMILY MEDICINE

## 2022-01-11 PROCEDURE — 99396 PREV VISIT EST AGE 40-64: CPT | Mod: 25 | Performed by: FAMILY MEDICINE

## 2022-01-11 PROCEDURE — 87624 HPV HI-RISK TYP POOLED RSLT: CPT | Performed by: FAMILY MEDICINE

## 2022-01-11 PROCEDURE — 99214 OFFICE O/P EST MOD 30 MIN: CPT | Mod: 25 | Performed by: FAMILY MEDICINE

## 2022-01-11 PROCEDURE — G0145 SCR C/V CYTO,THINLAYER,RESCR: HCPCS | Performed by: FAMILY MEDICINE

## 2022-01-11 PROCEDURE — 96127 BRIEF EMOTIONAL/BEHAV ASSMT: CPT | Performed by: FAMILY MEDICINE

## 2022-01-11 PROCEDURE — 90471 IMMUNIZATION ADMIN: CPT | Performed by: FAMILY MEDICINE

## 2022-01-11 PROCEDURE — 90750 HZV VACC RECOMBINANT IM: CPT | Performed by: FAMILY MEDICINE

## 2022-01-11 PROCEDURE — 36415 COLL VENOUS BLD VENIPUNCTURE: CPT | Performed by: FAMILY MEDICINE

## 2022-01-11 RX ORDER — ATORVASTATIN CALCIUM 40 MG/1
40 TABLET, FILM COATED ORAL DAILY
Qty: 90 TABLET | Refills: 3 | Status: SHIPPED | OUTPATIENT
Start: 2022-01-11 | End: 2023-04-13

## 2022-01-11 RX ORDER — DULOXETIN HYDROCHLORIDE 30 MG/1
CAPSULE, DELAYED RELEASE ORAL
Qty: 270 CAPSULE | Refills: 1 | Status: SHIPPED | OUTPATIENT
Start: 2022-01-11 | End: 2022-02-28

## 2022-01-11 RX ORDER — FUROSEMIDE 20 MG
20 TABLET ORAL 2 TIMES DAILY
Qty: 180 TABLET | Refills: 3 | Status: SHIPPED | OUTPATIENT
Start: 2022-01-11 | End: 2023-02-01

## 2022-01-11 RX ORDER — BLOOD SUGAR DIAGNOSTIC
STRIP MISCELLANEOUS
Qty: 200 STRIP | Refills: 0 | Status: CANCELLED | OUTPATIENT
Start: 2022-01-11

## 2022-01-11 RX ORDER — METFORMIN HCL 500 MG
1000 TABLET, EXTENDED RELEASE 24 HR ORAL 2 TIMES DAILY WITH MEALS
Qty: 360 TABLET | Refills: 3 | Status: SHIPPED | OUTPATIENT
Start: 2022-01-11 | End: 2022-07-25

## 2022-01-11 RX ORDER — METOPROLOL SUCCINATE 25 MG/1
25 TABLET, EXTENDED RELEASE ORAL DAILY
Qty: 90 TABLET | Refills: 3 | Status: SHIPPED | OUTPATIENT
Start: 2022-01-11 | End: 2022-07-25

## 2022-01-11 RX ORDER — LOSARTAN POTASSIUM 25 MG/1
25 TABLET ORAL DAILY
Qty: 90 TABLET | Refills: 3 | Status: SHIPPED | OUTPATIENT
Start: 2022-01-11 | End: 2023-02-01

## 2022-01-11 ASSESSMENT — ANXIETY QUESTIONNAIRES
GAD7 TOTAL SCORE: 6
IF YOU CHECKED OFF ANY PROBLEMS ON THIS QUESTIONNAIRE, HOW DIFFICULT HAVE THESE PROBLEMS MADE IT FOR YOU TO DO YOUR WORK, TAKE CARE OF THINGS AT HOME, OR GET ALONG WITH OTHER PEOPLE: SOMEWHAT DIFFICULT
7. FEELING AFRAID AS IF SOMETHING AWFUL MIGHT HAPPEN: NOT AT ALL
2. NOT BEING ABLE TO STOP OR CONTROL WORRYING: SEVERAL DAYS
5. BEING SO RESTLESS THAT IT IS HARD TO SIT STILL: NOT AT ALL
3. WORRYING TOO MUCH ABOUT DIFFERENT THINGS: MORE THAN HALF THE DAYS
6. BECOMING EASILY ANNOYED OR IRRITABLE: NEARLY EVERY DAY
1. FEELING NERVOUS, ANXIOUS, OR ON EDGE: NOT AT ALL

## 2022-01-11 ASSESSMENT — PATIENT HEALTH QUESTIONNAIRE - PHQ9
5. POOR APPETITE OR OVEREATING: NOT AT ALL
10. IF YOU CHECKED OFF ANY PROBLEMS, HOW DIFFICULT HAVE THESE PROBLEMS MADE IT FOR YOU TO DO YOUR WORK, TAKE CARE OF THINGS AT HOME, OR GET ALONG WITH OTHER PEOPLE: NOT DIFFICULT AT ALL
SUM OF ALL RESPONSES TO PHQ QUESTIONS 1-9: 10
SUM OF ALL RESPONSES TO PHQ QUESTIONS 1-9: 10

## 2022-01-11 ASSESSMENT — ENCOUNTER SYMPTOMS
NAUSEA: 0
FEVER: 0
DIARRHEA: 0
ARTHRALGIAS: 1
PARESTHESIAS: 0
MYALGIAS: 0
PALPITATIONS: 0
HEMATOCHEZIA: 0
JOINT SWELLING: 0
COUGH: 0
EYE PAIN: 0
CONSTIPATION: 0
NERVOUS/ANXIOUS: 0
SORE THROAT: 0
SHORTNESS OF BREATH: 0
ABDOMINAL PAIN: 0
CHILLS: 0
DYSURIA: 0
HEARTBURN: 0
FREQUENCY: 0
DIZZINESS: 0
BREAST MASS: 0
HEADACHES: 1
HEMATURIA: 0
WEAKNESS: 0

## 2022-01-11 ASSESSMENT — PAIN SCALES - GENERAL: PAINLEVEL: NO PAIN (0)
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
## 2022-01-12 ASSESSMENT — PATIENT HEALTH QUESTIONNAIRE - PHQ9: SUM OF ALL RESPONSES TO PHQ QUESTIONS 1-9: 10

## 2022-01-12 ASSESSMENT — ANXIETY QUESTIONNAIRES: GAD7 TOTAL SCORE: 6

## 2022-01-12 NOTE — PROGRESS NOTES
Clinic Care Coordination Contact  [facility]/Voicemail       Clinical Data: Care Coordinator Outreach  Outreach attempted x 2, pt did return call after last vm.  Left message on patient's voicemail with call back information and requested return call.  Plan: Care Coordinator will try to reach patient again in 4-6 business days.    JAQUELIN Mensah, Certified [facility] - Care Coordinator   1/12/2022   3:59 PM  [phone]

## 2022-01-13 LAB
BKR LAB AP GYN ADEQUACY: NORMAL
BKR LAB AP GYN INTERPRETATION: NORMAL
BKR LAB AP HPV REFLEX: NORMAL
BKR LAB AP PREVIOUS ABNORMAL: NORMAL
PATH REPORT.COMMENTS IMP SPEC: NORMAL
PATH REPORT.COMMENTS IMP SPEC: NORMAL
PATH REPORT.RELEVANT HX SPEC: NORMAL

## 2022-01-17 LAB
HUMAN PAPILLOMA VIRUS 16 DNA: NEGATIVE
HUMAN PAPILLOMA VIRUS 18 DNA: NEGATIVE
HUMAN PAPILLOMA VIRUS FINAL DIAGNOSIS: NORMAL
HUMAN PAPILLOMA VIRUS OTHER HR: NEGATIVE

## 2022-01-18 NOTE — PROGRESS NOTES
Clinic Care Coordination Contact  [facility]/Voicemail       Clinical Data: Care Coordinator Outreach  Outreach attempted x 3.  Left message on patient's voicemail with call back information and requested return call.  Plan: Care Coordinator will send unable to contact letter with care coordinator contact information via [employer]. Care Coordinator will try to reach patient again in 1 month.    JAQUELIN Mensah, Certified [facility] - Care Coordinator   1/18/2022   3:53 PM  [phone]

## 2022-01-19 ENCOUNTER — PATIENT OUTREACH (OUTPATIENT)
Dept: FAMILY MEDICINE | Facility: CLINIC | Age: 62
End: 2022-01-19
Payer: COMMERCIAL

## 2022-01-19 NOTE — PROGRESS NOTES
Clinic Care Coordination Contact    Follow Up Progress Note      Assessment: pt reports that she is feeling better and less depression.  When Jainism and being around people she cares about didn't help, she scheduled counseling.  She will be going to another appointment.  congratulted her on her good self care.     She has not made progress on her goals other then the counseling.      Care Gaps:    Health Maintenance Due   Topic Date Due     URINE DRUG SCREEN  Never done       meds not reviewed due to reviewed within the last month.     She did note that she had checked with her MA for insurance coverage for counseling.  Noted that there was no coverage noted in her chart.  Encouraged her to go on [employer] and update her medical coverage.     Goals addressed this encounter:   Goals Addressed                    This Visit's Progress       #1  Mental Health Management (pt-stated)   40%      Goal Statement: I will work on finding two techniques to help me reduce my depression, in the next 6 month.  Date Goal set: 4/27/2020  Barriers: Social distancing, depression  Strengths: Family, friends, and understanding that a needs some help with my depression  Date to Achieve By: 6/21/22   Patient expressed understanding of goal: Yes  Action steps to achieve this goal:  1. I will search for some mindfulness videos to help with depression  2. I will look at the Maryanne website for resources  3. I will work with counseling to learn new tools as well  4. I will explore trips and books that help me feel better           #2 Being Active (pt-stated)   20%      Goal Statement: I want to increase my activity to help improve my weight and back pain.  I will walk at least 10 minutes 3 days/week or more and maintain this for 3 consecutive months.  Date Goal set: 4/27/2020   Barriers: Depression, back pain, social distancing to some extent  Strengths: Understand that increasing my exercise will help with managing my weight and back pain  Date  to Achieve By: 3/23/22   Patient expressed understanding of goal: Yes  Action steps to achieve this goal:  1. I will walk in one direction for 5 minutes and then return for a total of 10 minutes  2. I will not get discouraged if I cannot make it the 5 minutes in one direction before having to turn around  3. I will celebrate my successes            Intervention/Education provided during outreach: congratulated pt on her good self care.  Encouraged continued counseling.      Outreach Frequency: monthly    Plan:   Pt to continue with counseling and update her insurance in [facility].   Care Coordinator will follow up in one month.     JAQUELIN Mensah, Certified Financial [facility] Primary [facility] - Care Coordinator   1/19/2022   11:57 AM  [phone]

## 2022-02-02 ENCOUNTER — MYC MEDICAL ADVICE (OUTPATIENT)
Dept: FAMILY MEDICINE | Facility: CLINIC | Age: 62
End: 2022-02-02
Payer: COMMERCIAL

## 2022-02-02 DIAGNOSIS — S89.92XA KNEE INJURY, LEFT, INITIAL ENCOUNTER: ICD-10-CM

## 2022-02-02 NOTE — TELEPHONE ENCOUNTER
Routing refill request to provider for review/approval because:  Drug not on the [facility] refill protocol     Carmencita Franco RN BSN  [facility]

## 2022-02-03 RX ORDER — HYDROCODONE BITARTRATE AND ACETAMINOPHEN 5; 325 MG/1; MG/1
1 TABLET ORAL EVERY 6 HOURS PRN
Qty: 10 TABLET | Refills: 0 | OUTPATIENT
Start: 2022-02-03

## 2022-02-16 ENCOUNTER — PATIENT OUTREACH (OUTPATIENT)
Dept: CARE COORDINATION | Facility: CLINIC | Age: 62
End: 2022-02-16
Payer: COMMERCIAL

## 2022-02-16 NOTE — PROGRESS NOTES
Clinic Care Coordination Contact  [facility]/Voicemail       Clinical Data: Care Coordinator Outreach  Outreach attempted x 1.  Left message on patient's voicemail with call back information and requested return call.  Plan:  Care Coordinator will try to reach patient again in 4-6 business days.    JAQUELIN Mensah, Certified Financial SW  [facility] Primary Care - Care Coordinator   2/16/2022   3:41 PM  [phone]

## 2022-02-17 ENCOUNTER — VIRTUAL VISIT (OUTPATIENT)
Dept: EDUCATION SERVICES | Facility: CLINIC | Age: 62
End: 2022-02-17
Payer: COMMERCIAL

## 2022-02-17 DIAGNOSIS — E11.65 TYPE 2 DIABETES MELLITUS WITH HYPERGLYCEMIA, WITHOUT LONG-TERM CURRENT USE OF INSULIN (H): Primary | ICD-10-CM

## 2022-02-17 PROCEDURE — G0108 DIAB MANAGE TRN  PER INDIV: HCPCS | Mod: 95 | Performed by: DIETITIAN, REGISTERED

## 2022-02-17 NOTE — PROGRESS NOTES
"Diabetes Self-Management Education & Support    Presents for: Individual review via phone    SUBJECTIVE/OBJECTIVE:  Presents for: Individual review  Diabetes type: Type 2  How confident are you filling out medical forms by yourself:: Not Assessed  Other concerns:: Glasses  Cultural Influences/Ethnic Background:  Other      Diabetes Symptoms & Complications:          Patient Problem List and Family Medical History reviewed for relevant medical history, current medical status, and diabetes risk factors.    Vitals:  There were no vitals taken for this visit.  Estimated body mass index is 44.97 kg/m  as calculated from the following:    Height as of 11/26/21: 1.549 m (5' 1\").    Weight as of 1/11/22: 108 kg (238 lb).   Last 3 BP:   BP Readings from Last 3 Encounters:   01/11/22 126/86   11/26/21 (!) 136/96   11/22/21 (!) 140/85       History   Smoking Status     Former Smoker     Packs/day: 0.80     Years: 30.00     Types: Cigarettes   Smokeless Tobacco     Never Used     Comment: 1 PPD x 30. Quit 2017       Labs:  Lab Results   Component Value Date    A1C 6.7 01/11/2022    A1C 8.3 06/22/2021     Lab Results   Component Value Date     10/23/2021     10/22/2020     Lab Results   Component Value Date    LDL 86 10/23/2021     10/22/2020     HDL Cholesterol   Date Value Ref Range Status   10/22/2020 47 (L) >49 mg/dL Final     Direct Measure HDL   Date Value Ref Range Status   10/23/2021 50 >=50 mg/dL Final   ]  GFR Estimate   Date Value Ref Range Status   10/23/2021 67 >60 mL/min/1.73m2 Final     Comment:     As of July 11, 2021, eGFR is calculated by the CKD-EPI creatinine equation, without race adjustment. eGFR can be influenced by muscle mass, exercise, and diet. The reported eGFR is an estimation only and is only applicable if the renal function is stable.   10/22/2020 >90 >60 mL/min/[1.73_m2] Final     Comment:     Non  GFR Calc  Starting 12/18/2018, serum creatinine based estimated " GFR (eGFR) will be   calculated using the Chronic Kidney Disease Epidemiology Collaboration   (CKD-EPI) equation.       GFR Estimate If Black   Date Value Ref Range Status   10/22/2020 >90 >60 mL/min/[1.73_m2] Final     Comment:      GFR Calc  Starting 12/18/2018, serum creatinine based estimated GFR (eGFR) will be   calculated using the Chronic Kidney Disease Epidemiology Collaboration   (CKD-EPI) equation.       Lab Results   Component Value Date    CR 0.92 10/23/2021    CR 0.69 10/22/2020     No results found for: MICROALBUMIN    Healthy Eating:   breakfast- bowl of cereal with milk  Lunch: chili or meatloaf or ham sandwich, usually water, grapes or goldfish  Snack: goldfish, grapes, rice cakes  Supper: eggs scrambled or errands: grab a taco, or mcnugget  Snack: not snacking at night    Being Active:   not doing as much, waiting for it to get nice out. Walking around at work, doing some stairs at home.    Monitoring:   last a1c was 6.7%        Taking Medications:  Diabetes Medication(s)     Biguanides       metFORMIN (GLUCOPHAGE-XR) 500 MG 24 hr tablet    Take 2 tablets (1,000 mg) by mouth 2 times daily (with meals)               Problem Solving:   just found out that she is getting a new supervisor at work, very happy!              Reducing Risks:   not assessed    Healthy Coping:     Patient Activation Measure Survey Score:  JAYDEN Score (Last Two) 1/6/2011   JAYDEN Raw Score 41   Activation Score 63.2   JAYDEN Level 3       Diabetes knowledge and skills assessment:   Patient is knowledgeable in diabetes management concepts related to: Healthy Eating, Being Active, Monitoring and Taking Medication    Patient needs further education on the following diabetes management concepts: Healthy Eating, Being Active, Monitoring, Taking Medication, Problem Solving, Reducing Risks and Healthy Coping    Based on learning assessment above, most appropriate setting for further diabetes education would be: Individual  setting.      INTERVENTIONS:    Education provided today on:  AADE Self-Care Behaviors:  Healthy Eating: consistency in amount, composition, and timing of food intake  Being Active: relationship to blood glucose and describe appropriate activity program  Monitoring: log and interpret results and individual blood glucose targets  Taking Medication: action of prescribed medication and when to take medications    Opportunities for ongoing education and support in diabetes-self management were discussed.    Pt verbalized understanding of concepts discussed and recommendations provided today.       Education Materials Provided:  No new materials provided today      ASSESSMENT:  -a1c better!  -Still working on wt loss, has been slower  -once nice out start walking again, try to walk a bit at work  -work on new snack ideas        Patient's most recent   Lab Results   Component Value Date    A1C 6.7 01/11/2022    A1C 8.3 06/22/2021    is meeting goal of <7.0    PLAN  See Patient Instructions for co-developed, patient-stated behavior change goals.  AVS printed and provided to patient today. See Follow-Up section for recommended follow-up.      Time Spent: 42  minutes  Encounter Type: Individual    Any diabetes medication dose changes were made via the CDE Protocol and Collaborative Practice Agreement with the patient's primary care provider. A copy of this encounter was shared with the provider.

## 2022-02-22 ENCOUNTER — PATIENT OUTREACH (OUTPATIENT)
Dept: FAMILY MEDICINE | Facility: CLINIC | Age: 62
End: 2022-02-22
Payer: COMMERCIAL

## 2022-02-22 ASSESSMENT — ACTIVITIES OF DAILY LIVING (ADL): DEPENDENT_IADLS:: INDEPENDENT

## 2022-02-22 NOTE — PROGRESS NOTES
Clinic Care Coordination Contact    Follow Up Progress Note      Assessment: pt reporting improvement on her depression.  Her work supervisor is moving to a different department which is making her happy.  This supervisor reflected negativly on her mood and outlook. She is also reaching out to her  and friends.      Talked a lot about ways to help her find social connections.  Encouraged her to ask the  if there are any spiritual groups that she can check out.     Care Gaps:    Health Maintenance Due   Topic Date Due     URINE DRUG SCREEN  Never done       Provider directed to complete    Goals addressed this encounter:   Goals Addressed                    This Visit's Progress       #1  Mental Health Management (pt-stated)   50%      Goal Statement: I will work on finding two techniques to help me reduce my depression, in the next 6 month.  Date Goal set: 4/27/2020  Barriers: Social distancing, depression  Strengths: Family, friends, and understanding that a needs some help with my depression  Date to Achieve By: 6/21/22   Patient expressed understanding of goal: Yes  Action steps to achieve this goal:  1. I will search for some mindfulness videos to help with depression  2. I will look at the Maryanne website for resources  3. I will work with counseling to learn new tools as well  4. I will explore trips and books that help me feel better           #2 Being Active (pt-stated)   20%      Goal Statement: I want to increase my activity to help improve my weight and back pain.  I will walk at least 10 minutes 3 days/week or more and maintain this for 3 consecutive months.  Date Goal set: 4/27/2020   Barriers: Depression, back pain, social distancing to some extent  Strengths: Understand that increasing my exercise will help with managing my weight and back pain  Date to Achieve By: 3/23/22   Patient expressed understanding of goal: Yes  Action steps to achieve this goal:  1. I will walk in one direction for 5  minutes and then return for a total of 10 minutes  2. I will not get discouraged if I cannot make it the 5 minutes in one direction before having to turn around  3. I will celebrate my successes            Intervention/Education provided during outreach: Patient is sounding positive about the changes in her life and looking at options.  Discussed some things to consider and look at for low to no cost community outings. She will look into these and ask friends. She notes her trust is low and this causes challenges with going on events where she doesn't know anyone.     She will be going to counseling and seeing psychiatrist for supports on Monday.      Outreach Frequency: monthly    Plan:   Pt to attend appointments and discuss with  some socialization options that are safe.   Care Coordinator will follow up in one month.     JAQUELIN Mensah, Certified [facility] Primary [facility] - Care Coordinator   2/22/2022   4:24 PM  [phone]

## 2022-02-22 NOTE — LETTER
It was a pleasure to speak with you.  I would like to provide you with the enclosed information for your records.  As part of care coordination, we are developing care plans to assist in accomplishing your health care goals.  When we speak next, please feel free to let me know if you want to add or change any information on your care plans.    As always, feel free to contact me if you have any questions or concerns.  I look forward to working with you in the effort to achieve your health care and wellness goals .        Sincerely,      Bell Brito, [facility]  Clinic Care Coordination  [phone]    [facility]  Patient Centered Plan of Care  About Me:        Patient Name:  Jessica Jay    YOB: 1960  Age:         61 year old   [employer] MRN:    [id number] Telephone Information:  Home Phone [phone]   Mobile [phone]       Address:  [address] [place] [address] Email address:  [email or web address]      Emergency Contact(s)    Name Relationship Lgl Grd Work Phone Home Phone Mobile Phone   1. ILIANA JAY Mother   [phone]   2. NOAH TOBIAS Sister  [phone]            Primary language:  English     needed? No   [employer]:  [phone] op. 1  Other communication barriers:Glasses    Preferred Method of Communication:  Portia  Current living arrangement: I live alone    Mobility Status/ Medical Equipment: Independent        Health Maintenance  Health Maintenance Reviewed: Due/Overdue   Health Maintenance Due   Topic Date Due     URINE DRUG SCREEN  Never done           My Access Plan  Medical Emergency 911   Primary Clinic Line [facility] [phone]   24 Hour Appointment Line [phone] or  [phone]) (toll-free)   24 Hour Nurse Line [phone] (toll-free)   Preferred Urgent Care Other     Preferred [facility]  [phone]     Preferred  Pharmacy [id number] - [place], [place] - [address]     Behavioral Health Crisis Line The National Suicide Prevention Lifeline at 1-504.757.6927 or 911             My Care Team Members  Patient Care Team       Relationship Specialty Notifications Start End    Marybeth Silver MD PCP - General Family Practice  5/16/18     Phone: [phone] Fax: [phone]         [address] [place] [address]    Marybeth Silver MD Assigned PCP   4/15/18     Phone: [phone] Fax: [phone]         [address] [place] [address]    Bell Brito LSW Lead Care Coordinator Primary Care - CC Admissions 1/29/20     Phone: [phone] Fax: [phone]        Caren Denise DO Assigned Behavioral Health Provider   11/22/20     Phone: [phone] Fax: [phone] [address] [facility] [address]    Mayela Plummer [facility] Pharmacist Pharmacist  5/19/21     Phone: [phone] Fax: [phone] [address] [facility] [address]    Estephanie Malave NP Assigned Neuroscience Provider   7/16/21     Phone: [phone] Fax: [phone] [address] [place] [address]    Francisco Farris DO Assigned Musculoskeletal Provider   12/5/21     Phone: [phone] Fax: [phone]         [address] [place] [address]            My Care Plans  Self Management and Treatment Plan  Goals and (Comments)  Goals        General     #1  Mental Health Management (pt-stated)      Notes - Note edited  12/23/2021  3:41 PM by Bell Brito LSW     Goal Statement: I will work on finding two techniques to help me reduce my depression, in the next 6 month.  Date Goal set: 4/27/2020  Barriers: Social distancing, depression  Strengths: Family, friends, and understanding that a needs some help with my depression  Date to Achieve By: 6/21/22   Patient expressed understanding of goal: Yes  Action steps to achieve this  goal:  1. I will search for some mindfulness videos to help with depression  2. I will look at the Maryanne website for resources  3. I will work with counseling to learn new tools as well  4. I will explore trips and books that help me feel better         #2 Being Active (pt-stated)      Notes - Note edited  12/23/2021  3:40 PM by Bell Brito LSW     Goal Statement: I want to increase my activity to help improve my weight and back pain.  I will walk at least 10 minutes 3 days/week or more and maintain this for 3 consecutive months.  Date Goal set: 4/27/2020   Barriers: Depression, back pain, social distancing to some extent  Strengths: Understand that increasing my exercise will help with managing my weight and back pain  Date to Achieve By: 3/23/22   Patient expressed understanding of goal: Yes  Action steps to achieve this goal:  1. I will walk in one direction for 5 minutes and then return for a total of 10 minutes  2. I will not get discouraged if I cannot make it the 5 minutes in one direction before having to turn around  3. I will celebrate my successes             Action Plans on File:            Depression          Advance Care Plans/Directives Type:   Advanced Directive - On File      My Medical and Care Information  Problem List   Patient Active Problem List   Diagnosis     Depression, major     Migraines     CARDIOVASCULAR SCREENING; LDL GOAL LESS THAN 160     Disc herniation     DDD (degenerative disc disease), lumbar     DDD (degenerative disc disease), cervical     Neck pain     Chronic daily headache     Tobacco abuse     ELYSSA (obstructive sleep apnea)- severe (AHI 49)     Spondylolisthesis, grade 1     Chronic low back pain     Brain lipoma     History of colonic polyps     Gastroesophageal reflux disease without esophagitis     Overweight with BMI >50     Hypertension goal BP (blood pressure) < 140/90     Traylor's esophagus determined by endoscopy     Bilateral edema of lower extremity      Generalized anxiety disorder     Restless legs syndrome (RLS)     Diabetes mellitus, type 2 (H)      Current Medications and Allergies:       Allergies   Allergen Reactions     Buspar [Buspirone Hcl] Rash     Lisinopril Cough     Seroquel [Quetiapine] Itching         Current Outpatient Medications:      ACCU-CHEK GUIDE test strip, USE TO TEST TWICE DAILY, Disp: 200 strip, Rfl: 0     alcohol swab prep pads, Use to swab area of injection/haley as directed., Disp: 100 each, Rfl: 3     aspirin (ASA) 81 MG EC tablet, Take 1 tablet (81 mg) by mouth daily, Disp: 100 tablet, Rfl: 3     atorvastatin (LIPITOR) 40 MG tablet, Take 1 tablet (40 mg) by mouth daily, Disp: 90 tablet, Rfl: 3     blood glucose (NO BRAND SPECIFIED) lancets standard, Use to test blood sugar 2 times daily or as directed., Disp: 100 lancet, Rfl: 3     blood glucose monitoring (NO BRAND SPECIFIED) meter device kit, Use to test blood sugar 2 times daily or as directed., Disp: 1 kit, Rfl: 0     DULoxetine (CYMBALTA) 30 MG capsule, TAKE 3 CAPSULES(90 MG) BY MOUTH DAILY, Disp: 270 capsule, Rfl: 1     furosemide (LASIX) 20 MG tablet, Take 1 tablet (20 mg) by mouth 2 times daily, Disp: 180 tablet, Rfl: 3     hydrOXYzine (ATARAX) 25 MG tablet, Take 25-50 mg twice a day as needed for anxiety. Can also take  mg at bedtime for sleep/itching. Don't exceed 300 mg daily., Disp: 90 tablet, Rfl: 1     insulin pen needle (32G X 4 MM) 32G X 4 MM miscellaneous, Use 1 pen needles daily or as directed with Victoza, Disp: 90 each, Rfl: 1     losartan (COZAAR) 25 MG tablet, Take 1 tablet (25 mg) by mouth daily, Disp: 90 tablet, Rfl: 3     metFORMIN (GLUCOPHAGE-XR) 500 MG 24 hr tablet, Take 2 tablets (1,000 mg) by mouth 2 times daily (with meals), Disp: 360 tablet, Rfl: 3     metoprolol succinate ER (TOPROL-XL) 25 MG 24 hr tablet, Take 1 tablet (25 mg) by mouth daily, Disp: 90 tablet, Rfl: 3     omeprazole (PRILOSEC) 20 MG DR capsule, Take 1 capsule (20 mg) by mouth  daily, Disp: 90 capsule, Rfl: 1    Current Facility-Administered Medications:      lidocaine 1 % injection 0.5 mL, 0.5 mL, , , Ki May MD, 0.5 mL at 05/04/20 1021     triamcinolone (KENALOG-40) injection 20 mg, 20 mg, , , Ki May MD, 20 mg at 05/04/20 1021     triamcinolone (KENALOG-40) injection 40 mg, 40 mg, , , Francisco Farris, DO, 40 mg at 11/26/21 1709      Care Coordination Start Date: 1/29/2020   Frequency of Care Coordination: monthly     Form Last Updated: 02/22/2022

## 2022-02-28 ENCOUNTER — VIRTUAL VISIT (OUTPATIENT)
Dept: PSYCHOLOGY | Facility: CLINIC | Age: 62
End: 2022-02-28
Payer: COMMERCIAL

## 2022-02-28 ENCOUNTER — VIRTUAL VISIT (OUTPATIENT)
Dept: PSYCHIATRY | Facility: CLINIC | Age: 62
End: 2022-02-28
Attending: FAMILY MEDICINE
Payer: COMMERCIAL

## 2022-02-28 DIAGNOSIS — F41.1 GAD (GENERALIZED ANXIETY DISORDER): ICD-10-CM

## 2022-02-28 DIAGNOSIS — G47.33 OSA (OBSTRUCTIVE SLEEP APNEA): ICD-10-CM

## 2022-02-28 DIAGNOSIS — F33.1 MODERATE EPISODE OF RECURRENT MAJOR DEPRESSIVE DISORDER (H): Primary | ICD-10-CM

## 2022-02-28 DIAGNOSIS — G47.00 INSOMNIA, UNSPECIFIED TYPE: ICD-10-CM

## 2022-02-28 PROCEDURE — 99203 OFFICE O/P NEW LOW 30 MIN: CPT | Mod: 95 | Performed by: PSYCHIATRY & NEUROLOGY

## 2022-02-28 PROCEDURE — 90791 PSYCH DIAGNOSTIC EVALUATION: CPT | Mod: 95 | Performed by: PSYCHOLOGIST

## 2022-02-28 RX ORDER — DULOXETIN HYDROCHLORIDE 60 MG/1
120 CAPSULE, DELAYED RELEASE ORAL DAILY
Qty: 180 CAPSULE | Refills: 0 | Status: SHIPPED | OUTPATIENT
Start: 2022-02-28 | End: 2022-04-15

## 2022-02-28 ASSESSMENT — COLUMBIA-SUICIDE SEVERITY RATING SCALE - C-SSRS
5. HAVE YOU STARTED TO WORK OUT OR WORKED OUT THE DETAILS OF HOW TO KILL YOURSELF? DO YOU INTEND TO CARRY OUT THIS PLAN?: NO
4. HAVE YOU HAD THESE THOUGHTS AND HAD SOME INTENTION OF ACTING ON THEM?: NO
LETHALITY/MEDICAL DAMAGE CODE MOST LETHAL ACTUAL ATTEMPT: MINOR PHYSICAL DAMAGE
TOTAL  NUMBER OF ACTUAL ATTEMPTS LIFETIME: 4
LETHALITY/MEDICAL DAMAGE CODE FIRST ACTUAL ATTEMPT: MINOR PHYSICAL DAMAGE
LETHALITY/MEDICAL DAMAGE CODE MOST LETHAL POTENTIAL ATTEMPT: BEHAVIOR LIKELY TO RESULT IN DEATH DESPITE AVAILABLE MEDICAL CARE
4. HAVE YOU HAD THESE THOUGHTS AND HAD SOME INTENTION OF ACTING ON THEM?: NO
TOTAL  NUMBER OF INTERRUPTED ATTEMPTS LIFETIME: NO
TOTAL  NUMBER OF ABORTED OR SELF INTERRUPTED ATTEMPTS LIFETIME: YES
MOST LETHAL DATE: 59932
1. HAVE YOU WISHED YOU WERE DEAD OR WISHED YOU COULD GO TO SLEEP AND NOT WAKE UP?: YES
TOTAL  NUMBER OF ABORTED OR SELF INTERRUPTED ATTEMPTS PAST 3 MONTHS: NO
2. HAVE YOU ACTUALLY HAD ANY THOUGHTS OF KILLING YOURSELF?: YES
2. HAVE YOU ACTUALLY HAD ANY THOUGHTS OF KILLING YOURSELF?: YES
LETHALITY/MEDICAL DAMAGE CODE MOST RECENT POTENTIAL ATTEMPT: BEHAVIOR LIKELY TO RESULT IN DEATH DESPITE AVAILABLE MEDICAL CARE
1. IN THE PAST MONTH, HAVE YOU WISHED YOU WERE DEAD OR WISHED YOU COULD GO TO SLEEP AND NOT WAKE UP?: YES
6. HAVE YOU EVER DONE ANYTHING, STARTED TO DO ANYTHING, OR PREPARED TO DO ANYTHING TO END YOUR LIFE?: NO
ATTEMPT LIFETIME: YES
3. HAVE YOU BEEN THINKING ABOUT HOW YOU MIGHT KILL YOURSELF?: NO
ATTEMPT PAST THREE MONTHS: NO
TOTAL  NUMBER OF ABORTED OR SELF INTERRUPTED ATTEMPTS LIFETIME: 1
LETHALITY/MEDICAL DAMAGE CODE FIRST POTENTIAL ATTEMPT: BEHAVIOR LIKELY TO RESULT IN INJURY BUT NOT LIKELY TO CAUSE DEATH
LETHALITY/MEDICAL DAMAGE CODE MOST RECENT ACTUAL ATTEMPT: MINOR PHYSICAL DAMAGE
5. HAVE YOU STARTED TO WORK OUT OR WORKED OUT THE DETAILS OF HOW TO KILL YOURSELF? DO YOU INTEND TO CARRY OUT THIS PLAN?: YES
MOST RECENT DATE: 59932

## 2022-02-28 ASSESSMENT — PATIENT HEALTH QUESTIONNAIRE - PHQ9
10. IF YOU CHECKED OFF ANY PROBLEMS, HOW DIFFICULT HAVE THESE PROBLEMS MADE IT FOR YOU TO DO YOUR WORK, TAKE CARE OF THINGS AT HOME, OR GET ALONG WITH OTHER PEOPLE: SOMEWHAT DIFFICULT
SUM OF ALL RESPONSES TO PHQ QUESTIONS 1-9: 8
SUM OF ALL RESPONSES TO PHQ QUESTIONS 1-9: 8
10. IF YOU CHECKED OFF ANY PROBLEMS, HOW DIFFICULT HAVE THESE PROBLEMS MADE IT FOR YOU TO DO YOUR WORK, TAKE CARE OF THINGS AT HOME, OR GET ALONG WITH OTHER PEOPLE: SOMEWHAT DIFFICULT
SUM OF ALL RESPONSES TO PHQ QUESTIONS 1-9: 8
SUM OF ALL RESPONSES TO PHQ QUESTIONS 1-9: 8

## 2022-02-28 NOTE — PROGRESS NOTES
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 isolating/withdrawing , using drugs, using alcohol, can't stop crying, impulsive, reckless behaviors (acting without thinking):  , giving things away, saying good-bye, aggression, not taking care of myself, not taking care of my responsibilities, sleeping too much, not sleeping enough and increasing frequency and duration of dissociation    Situations: bullying:  , loss:  , anniversary of  , changes in symptoms:  , pain, relationship problems, financial stress and medical condition / diagnosis:     Step 2: Coping strategies - Things I can do to take my mind off of my problems without contacting another person (relaxation technique, physical activity):    Distress Tolerance Strategies:  relaxation activities:  , arts and crafts:  , play with my pet , listen to positive and upbeat music:  , sensory based activities/self-soothe with five senses:  , watch a funny movie:  , pray, read a book:  , change body temperature (ice pack/cold water) , paced breathing/progressive muscle relaxation and intense exercise:   for 2-3 minutes     Physical Activities: go for a walk, exercise:  , yoga, gardening, meditation, deep breathing and stretching     Focus on helpful thoughts:  " "\"This is temporary\", \"I will get through this\", \"It always passes\", \"Ride the wave\", think about happy memories:  , remind myself of what is important to me:   and self-compassion statements:    Step 3: People and social settings that provide distraction:   Name: Ary Phone: in my phone   Name: Radha Phone: in my phone   Name: Gustabo Phone: in my phone    movie theater, pet store/humane society, zoo, coffee shop, park, library, volunteering, community center, gym , Taoist, support group (i.e. twelve-step), school and work   Step 4: Remind myself of people and things that are important to me and worth living for:  friends  Step 5: When I am in crisis, I can ask these people to help me use my safety plan:   Name: Ary Phone: in my phone   Name: Radha Phone: in my phone   Name: Gustabo Phone: in my phone  Step 6: Making the environment safe:     remove alcohol, remove drugs, secure medications:  , dispose of old medications , remove access to firearms:  , remove things I could use to hurt myself:  , alternate routes:  , arrange transportation:   and be around others  Step 7: Professionals or agencies I can contact during a crisis:    Suicide Prevention Lifeline: 3-923-668-EKXL (5947)    Crisis Text Line Service (available 24 hours a day, 7 days a week): Text [place] to 139905    Local Crisis Services:     Crisis Resources    Refer to the resources below as needed.    Steps to care for yourself    1. If you are currently in counseling, call your counselor for an appointment  2. Call the local crisis resources below if needed.  3. Contact friends or family for support.  4. Get more exercise.  5. Do activities you enjoy.  6. Eat a well-balanced diet and drink plenty of fluids.  7. Rest as needed.  8. Limit alcohol and recreational drugs. These can worsen depression.    When to contact your primary care provider       You have thoughts of harming or killing yourself but have not made a plan to carry it out.    Your depression " gets in the way of daily activities.    You are often unable to sleep.    You need help cutting back on alcohol or recreational drugs.    When to call 911 or go to the Emergency Room     Get emergency help right away if you have any of the following:    You are planning to harm or kill yourself and you have a way to carry out the plan.     You have injured yourself or others. Or, you think you will.    You feel confused or are having trouble thinking or remembering.    You are having delusions (false beliefs).    You are hearing voices or seeing things that aren t there.    You are feeling psychotic (paranoid, fearful, restless, agitated, nervous, racing thoughts or speech)    Crisis Resources     These hotlines are for both adults and children. The and are open 24 hours a day, 7 days a week unless noted otherwise.    National Suicide Prevention Lifeline   4-042-637-TALK (7180)    Crisis Text Line    www.crisistextline.org  Text HOME to 186331 from anywhere in the [place], anytime, about any type of crisis. A live, trained crisis counselor will receive the text and respond quickly.    [place] Lifeline for LGBTQ Youth  A national crisis intervention and suicide lifeline for LGBTQ youth under 25. Provides a safe place to talk without judgement.   Call 1-953.662.2459; text START to 712028 or visit www.thetrevorproject.org to talk to a trained counselor.  For [facility] crisis numbers, visit the [facility] website at:  https://[place].gov/dhs/people-we-serve/adults/health-care/mental-health/resources/crisis-contacts.jsp    Call 911 or go to my nearest emergency department.   I helped develop this safety plan and agree to use it when needed.  I have been given a copy of this plan.      Client signature _________________________________________________________________  Today s date:  3/1/2022  Completed by Provider Name/ Credentials:  Jose Cardona PsyD, CK  March 1, 2022  Adapted from Safety Plan Template 2008 Doretha  Jose Peters is reprinted with the express permission of the authors.  No portion of the Safety Plan Template may be reproduced without the express, written permission.  You can contact the authors at [email or web address] or [email or web address]

## 2022-02-28 NOTE — PROGRESS NOTES
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 condition has changed and she is not sleeping well because of this.  She also continues to have multiple uncontrolled medical conditions.  I recommend she continue to seek optimal control of these conditions, i.e. high blood pressure, obesity, pain.  I do believe all of these are affecting her mental health and sleep.  Her mood has been a little worse lately.  Her anxiety is also been a little bit higher.  Along with continuing hydroxyzine, she is agreeable to a dose increase of her duloxetine to see if this is further helpful for mood and anxiety.    2/28/2022:  Patient last seen just over 1 year ago.  Duloxetine was increased at that time.  It was recommended patient get more optimal control over sleep, pain, and medical conditions.  We continue to recommend she follow-up with sleep medicine to have her CPAP machine looked at for optimal use.  We will continue to optimize her duloxetine since she has found this helpful with each dose increase.  Discussed a trial with doxylamine succinate over-the-counter for sleep.  Due to financial constraints, I will prescribe doxylamine even though it is available over-the-counter.  She will see us back for follow-up in about a month.  No problematic drug or alcohol use.  No acute safety concerns today.    3/15/2022:  Patient last  seen just over a year ago.  Suffering from worsening depression symptoms, increased anxiety in the context of grief and increased psychosocial stressors.  She has benefited with each dose increase of duloxetine and so we will continue to optimize that medication.  We will increase duloxetine to 120 mg daily.  Also struggles with chronic pain and so I am hopeful may be higher dose duloxetine will be slightly more helpful for this as well.  She is encouraged to follow-up with sleep medicine given her ongoing chronic sleep struggles and diagnosis of obstructive sleep apnea.  She will trial doxylamine succinate for sleep.  No acute safety concerns today.  No problematic drug or alcohol use.    Medication side effects and alternatives reviewed. Health promotion activities recommended and reviewed today. All questions addressed. Education and counseling completed regarding risks and benefits of medications and psychotherapy options. Recommend therapy for additional support.     Treatment Plan:    Increase duloxetine to 120 mg daily for pain, anxiety, mood. Can take 120 mg all in one dose daily or can split and take 60 mg twice daily.     Start doxylamine succinate 25 mg at bedtime as needed for sleep.     Sleep medicine referral placed to rule out sleep disorders.      Recommend individual psychotherapy.     Continue all other cares per primary care provider.     Continue all other medications as reviewed per electronic medical record today.     Safety plan reviewed. To the Emergency Department as needed or call after hours crisis line at [phone] or [phone]. [place] Crisis Text [place]: Text [place] to 681416  or  Suicide LifeLine Chat: suicidepreventionlifeline.org/chat    Schedule an appointment with me in 4-6 weeks or sooner as needed.  Call [facility] at [phone] to schedule.    Follow up with primary care provider as planned or sooner if needed for acute medical concerns.    Call the  "psychiatric nurse line with medication questions or concerns at [phone].    MyChart may be used to communicate with your provider, but this is not intended to be used for emergencies.    Patient Education:  Get Out of Your Mind & Into Your Life   By Ramírez Boo    The Happiness Trap: How to Stop Struggling and Start Living  By Gerald Reyes    The Reality Slap: Finding Peace & Fulfillment When Life Hurts  By Gerald Reyes    Things Might Go Terribly, Horribly Wrong: A Guide to Life Liberated from Anxiety  By Gilda Issa, PhD    Stress Less, Live More: How Acceptance and Commitment Therapy Can Help You Live a Busy Yet Balanced Life  By Dilshad Mendoza    Finding Life Beyond Trauma: Using Acceptance and Commitment Therapy to Heal From Post-Traumatic Stress and Trauma-Related Problems  By Luann Parker and Yi Milton    Other ACT Skills References     Gerald Reyes on [employer] - Demonstrates several different skills to deal with difficult thoughts and feelings     Book:  \"A Liberated Mind: How to Pivot Toward What Matters\" by Ramírez Boo     Psychological flexibility: How love turns pain into purpose  Tedx Talk by Dr. Boo discussing his struggle with anxiety and panic disorder which motivated him to develop ACT therapy (Acceptance and Commitment Therapy)     You Are Not Your Thoughts    Community Resources:    National Suicide Prevention Lifeline: 604.711.2140 (TTY: 330.392.4881). Call anytime for help.  (www.suicidepreventionlifeline.org)  National [place] on Mental Illness (www.jerry.org): 791.283.9806 or 814-359-9408.   Mental Health Association (www.mentalhealth.org): 405.133.6923 or 959-958-3753.  [place] Crisis Text [place]: Text [place] to 814669  Suicide LifeLine Chat: suicidepreXOS Digitalline.org/chat    Administrative Billing:   Phone Call/Video Duration: 18 Minutes  Start: 4:58p  Stop: 5:16p    Chart Review: 15 minutes    Patient Status:  Patient will continue to be seen for ongoing " consultation and stabilization.    Signed:   Caren Denise DO  [facility]    Disclaimer: This note consists of symbols derived from keyboarding, dictation and/or voice recognition software. As a result, there may be errors in the script that have gone undetected. Please consider this when interpreting information found in this chart.

## 2022-03-01 ASSESSMENT — PATIENT HEALTH QUESTIONNAIRE - PHQ9: SUM OF ALL RESPONSES TO PHQ QUESTIONS 1-9: 8

## 2022-03-01 NOTE — PATIENT INSTRUCTIONS
"    [facility] Collaborative Care Psychiatry Service                                       Jessica Jay     SAFETY PLAN:  Step 1: Warning signs / cues (Thoughts, images, mood, situation, behavior) that a crisis may be developing:    Thoughts: \"I don't matter\", \"People would be better off without me\", \"I'm a burden\", \"I can't do this anymore\", \"I just want this to end\" and \"Nothing makes it better\"    Images: obsessive thoughts of death or dying:      Thinking Processes: ruminations (can't stop thinking about my problems):  , racing thoughts, intrusive thoughts (bothersome, unwanted thoughts that come out of nowhere):  , highly critical and negative thoughts:  , disorganized thinking:   and paranoia:      Mood: worsening depression, hopelessness, helplessness, intense anger, intense worry, agitation, disinhibited (not caring about things or consequences) and mood swings    Behaviors: isolating/withdrawing , using drugs, using alcohol, can't stop crying, impulsive, reckless behaviors (acting without thinking):  , giving things away, saying good-bye, aggression, not taking care of myself, not taking care of my responsibilities, sleeping too much, not sleeping enough and increasing frequency and duration of dissociation    Situations: bullying:  , loss:  , anniversary of  , changes in symptoms:  , pain, relationship problems, financial stress and medical condition / diagnosis:     Step 2: Coping strategies - Things I can do to take my mind off of my problems without contacting another person (relaxation technique, physical activity):    Distress Tolerance Strategies:  relaxation activities:  , arts and crafts:  , play with my pet , listen to positive and upbeat music:  , sensory based activities/self-soothe with five senses:  , watch a funny movie:  , pray, read a book:  , change body temperature (ice pack/cold water) , paced breathing/progressive muscle relaxation and intense exercise:   for 2-3 minutes " "    Physical Activities: go for a walk, exercise:  , yoga, gardening, meditation, deep breathing and stretching     Focus on helpful thoughts:  \"This is temporary\", \"I will get through this\", \"It always passes\", \"Ride the wave\", think about happy memories:  , remind myself of what is important to me:   and self-compassion statements:    Step 3: People and social settings that provide distraction:   Name: Ary Phone: in my phone   Name: Radha Phone: in my phone   Name: Gustabo Phone: in my phone    movie theater, pet store/humane society, zoo, coffee shop, park, library, volunteering, community center, gym , [employer], support group (i.e. twelve-step), school and work   Step 4: Remind myself of people and things that are important to me and worth living for:  friends  Step 5: When I am in crisis, I can ask these people to help me use my safety plan:   Name: Ary Phone: in my phone   Name: Radha Phone: in my phone   Name: Gustabo Phone: in my phone  Step 6: Making the environment safe:     remove alcohol, remove drugs, secure medications:  , dispose of old medications , remove access to firearms:  , remove things I could use to hurt myself:  , alternate routes:  , arrange transportation:   and be around others  Step 7: Professionals or agencies I can contact during a crisis:    Suicide Prevention Lifeline: 6-074-666-TALK (3899)    Crisis Text Line Service (available 24 hours a day, 7 days a week): Text [place] to 310574    Local Crisis Services:     Crisis Resources    Refer to the resources below as needed.    Steps to care for yourself    1. If you are currently in counseling, call your counselor for an appointment  2. Call the local crisis resources below if needed.  3. Contact friends or family for support.  4. Get more exercise.  5. Do activities you enjoy.  6. Eat a well-balanced diet and drink plenty of fluids.  7. Rest as needed.  8. Limit alcohol and recreational drugs. These can worsen depression.    When to contact " your primary care provider       You have thoughts of harming or killing yourself but have not made a plan to carry it out.    Your depression gets in the way of daily activities.    You are often unable to sleep.    You need help cutting back on alcohol or recreational drugs.    When to call 911 or go to the Emergency Room     Get emergency help right away if you have any of the following:    You are planning to harm or kill yourself and you have a way to carry out the plan.     You have injured yourself or others. Or, you think you will.    You feel confused or are having trouble thinking or remembering.    You are having delusions (false beliefs).    You are hearing voices or seeing things that aren t there.    You are feeling psychotic (paranoid, fearful, restless, agitated, nervous, racing thoughts or speech)    Crisis Resources     These hotlines are for both adults and children. The and are open 24 hours a day, 7 days a week unless noted otherwise.    National Suicide Prevention Lifeline   0-946-169-TALK (0470)    Crisis Text Line    www.crisistextline.org  Text HOME to 810066 from anywhere in the [place], anytime, about any type of crisis. A live, trained crisis counselor will receive the text and respond quickly.    [place] Lifeline for LGBTQ Youth  A national crisis intervention and suicide lifeline for LGBTQ youth under 25. Provides a safe place to talk without judgement.   Call 1-778.938.1950; text START to 902415 or visit www.thetrevorproject.org to talk to a trained counselor.  For [facility] crisis numbers, visit the [facility] website at:  https://[place].gov/dhs/people-we-serve/adults/health-care/mental-health/resources/crisis-contacts.jsp    Call 911 or go to my nearest emergency department.   I helped develop this safety plan and agree to use it when needed.  I have been given a copy of this plan.      Client signature _________________________________________________________________  Today s date:   3/1/2022  Completed by Provider Name/ Credentials:  Jose Cardona PsyD, LP  March 1, 2022  Adapted from Safety Plan Template 2008 Doretha Trinidad and Albert Peters is reprinted with the express permission of the authors.  No portion of the Safety Plan Template may be reproduced without the express, written permission.  You can contact the authors at [email or web address] or [email or web address]

## 2022-03-07 ENCOUNTER — OFFICE VISIT (OUTPATIENT)
Dept: URGENT CARE | Facility: URGENT CARE | Age: 62
End: 2022-03-07
Payer: COMMERCIAL

## 2022-03-07 ENCOUNTER — NURSE TRIAGE (OUTPATIENT)
Dept: NURSING | Facility: CLINIC | Age: 62
End: 2022-03-07
Payer: COMMERCIAL

## 2022-03-07 ENCOUNTER — TELEPHONE (OUTPATIENT)
Dept: NURSING | Facility: CLINIC | Age: 62
End: 2022-03-07
Payer: COMMERCIAL

## 2022-03-07 VITALS
BODY MASS INDEX: 45.65 KG/M2 | DIASTOLIC BLOOD PRESSURE: 94 MMHG | OXYGEN SATURATION: 99 % | TEMPERATURE: 97.8 F | WEIGHT: 241.6 LBS | SYSTOLIC BLOOD PRESSURE: 156 MMHG | HEART RATE: 74 BPM

## 2022-03-07 DIAGNOSIS — J01.10 ACUTE NON-RECURRENT FRONTAL SINUSITIS: Primary | ICD-10-CM

## 2022-03-07 LAB
DEPRECATED S PYO AG THROAT QL EIA: NEGATIVE
FLUAV AG SPEC QL IA: NEGATIVE
FLUBV AG SPEC QL IA: NEGATIVE
GROUP A STREP BY PCR: NOT DETECTED

## 2022-03-07 PROCEDURE — U0005 INFEC AGEN DETEC AMPLI PROBE: HCPCS | Performed by: NURSE PRACTITIONER

## 2022-03-07 PROCEDURE — 87651 STREP A DNA AMP PROBE: CPT | Performed by: NURSE PRACTITIONER

## 2022-03-07 PROCEDURE — 87804 INFLUENZA ASSAY W/OPTIC: CPT | Performed by: NURSE PRACTITIONER

## 2022-03-07 PROCEDURE — U0003 INFECTIOUS AGENT DETECTION BY NUCLEIC ACID (DNA OR RNA); SEVERE ACUTE RESPIRATORY SYNDROME CORONAVIRUS 2 (SARS-COV-2) (CORONAVIRUS DISEASE [COVID-19]), AMPLIFIED PROBE TECHNIQUE, MAKING USE OF HIGH THROUGHPUT TECHNOLOGIES AS DESCRIBED BY CMS-2020-01-R: HCPCS | Performed by: NURSE PRACTITIONER

## 2022-03-07 PROCEDURE — 99213 OFFICE O/P EST LOW 20 MIN: CPT | Performed by: NURSE PRACTITIONER

## 2022-03-07 NOTE — PROGRESS NOTES
SUBJECTIVE:  Jessica Jay is a 61 year old female here with concerns about sinus infection.  She states onset of symptoms were 2 day(s) ago.  She has had frontal pressure. Course of illness is worsening. Severity moderate  Current and Associated symptoms: nasal congestion, rhinorrhea, cough , sore throat, facial pain/pressure, headache and post-nasal drainage  Predisposing factors include none.  Recent treatment has included: OTC meds  Home covid test negative    Past Medical History:   Diagnosis Date     Cellulitis and abscess of foot, except toes 09/26/2010    [facility]     Complete rupture of rotator cuff 07/06/2009     Degenerative disc disease     cervical     Depression, major      Depressive disorder      Dizziness - light-headed 02/15/2011     GERD (gastroesophageal reflux disease)      LBP (low back pain)      Migraines      Panic attacks      Seasonal allergies      Sleep apnoea     Wears C-PAP     Vulvar dermatitis 07/17/2012     Social History     Tobacco Use     Smoking status: Former Smoker     Packs/day: 0.80     Years: 30.00     Pack years: 24.00     Types: Cigarettes     Smokeless tobacco: Never Used     Tobacco comment: 1 PPD x 30. Quit 2017   Substance Use Topics     Alcohol use: Yes     Alcohol/week: 0.0 standard drinks     Comment: 6 drinks a year       ROS:  Review of systems negative except as stated above.    OBJECTIVE:  BP (!) 156/94   Pulse 74   Temp 97.8  F (36.6  C) (Oral)   Wt 109.6 kg (241 lb 9.6 oz)   SpO2 99%   BMI 45.65 kg/m    Exam:GENERAL APPEARANCE:  alert and no distress  EYES: EOMI,  PERRL, conjunctiva clear  HENT: TM's normal bilaterally, nasal turbinates erythematous, swollen, rhinorrhea yellow and oral mucous membranes moist, no erythema noted  NECK: supple, nontender, no lymphadenopathy  RESP: lungs clear to auscultation - no rales, rhonchi or wheezes  CV: regular rates and rhythm, normal S1 S2, no murmur noted  SKIN: no suspicious lesions or  rashes    ASSESSMENT:  (J01.10) Acute non-recurrent frontal sinusitis  (primary encounter diagnosis)    Plan: amoxicillin-clavulanate (AUGMENTIN) 875-125 MG tablet BID X 7 days  Home care and monitor symptoms.  Follow up with primary clinic if not improving    Gemma to follow up with Primary Care provider regarding elevated blood pressure.    SONIA Seth CNP

## 2022-03-07 NOTE — TELEPHONE ENCOUNTER
"Pt calling    Thinks she may have a sinus infection     Symptoms started late Friday night     Has a headache that she rates 3/10    Feels like there is a band around her eyes which she rates 6/10     Nose is \"lena runny\" but mainly stuffy and coughing a lot more today    Temperature taken last night which pt states was \"77 or whatever normal is\"      States it is difficult to breath but does feel like she gets a good enough breath breathing through her mouth    Able to talk in complete sentences and does not appear to be SOB during phone conversation    Has tried saline spray and dayquil    Pt advised to be seen today or tomorrow in office. Gave care advice per protocol but pt was in a hurry to eat before she had to start working again. Pt was transferred to scheduling and hung up before speaking with scheduling     Pt stated appointment needed to be scheduled @ 3:45 PM or later otherwise she won't be able to make it. Per scheduling, [facility] are all full.       COVID 19 Nurse Triage Plan/Patient Instructions    Please be aware that novel coronavirus (COVID-19) may be circulating in the community. If you develop symptoms such as fever, cough, or SOB or if you have concerns about the presence of another infection including coronavirus (COVID-19), please contact your health care provider or visit [email or web address].     Disposition/Instructions    In-Person Visit with provider recommended. Reference Visit Selection Guide.    Thank you for taking steps to prevent the spread of this virus.  o Limit your contact with others.  o Wear a simple mask to cover your cough.  o Wash your hands well and often.    Resources    [facility]: About COVID-19: [email or web address]    CDC: What to Do If You're Sick: www.cdc.gov/coronavirus/2019-ncov/about/steps-when-sick.html    CDC: Ending Home Isolation: " www.cdc.gov/coronavirus/2019-ncov/hcp/disposition-in-home-patients.html     CDC: Caring for Someone: www.cdc.gov/coronavirus/2019-ncov/if-you-are-sick/care-for-someone.html     [facility]: Interim Guidance for Hospital Discharge to Home: www.health.[facility].[place]/diseases/coronavirus/hcp/hospdischarge.pdf    [facility] clinical trials (COVID-19 research studies): [email or web address]/[email or web address]     Below are the COVID-19 hotlines at the [place] Department of Health ([facility]). Interpreters are available.   o For health questions: Call 292-000-0570 or 1-295.274.1947 (7 a.m. to 7 p.m.)  o For questions about schools and childcare: Call 025-596-0828 or 1-652.212.6412 (7 a.m. to 7 p.m.)         Additional Information    Negative: Sounds like a life-threatening emergency to the triager    Negative: Difficulty breathing, and not from stuffy nose (e.g., not relieved by cleaning out the nose)    Negative: SEVERE headache and has fever    Negative: Patient sounds very sick or weak to the triager    Negative: SEVERE sinus pain    Negative: Severe headache    Negative: Redness or swelling on the cheek, forehead, or around the eye    Negative: Fever > 103 F (39.4 C)    Negative: Fever > 101 F (38.3 C) and over 60 years of age    Negative: Fever > 100.0 F (37.8 C) and has diabetes mellitus or a weak immune system (e.g., HIV positive, cancer chemotherapy, organ transplant, splenectomy, chronic steroids)    Negative: Fever > 100.0 F (37.8 C) and bedridden (e.g., nursing home patient, stroke, chronic illness, recovering from surgery)    Negative: Fever present > 3 days (72 hours)    Negative: Fever returns after gone for over 24 hours and symptoms worse or not improved    Negative: Sinus pain (not just congestion) and fever    Negative: Earache    Negative: Sinus congestion (pressure, fullness) present > 10 days    Negative: Nasal discharge present > 10 days    Negative: Using nasal washes and pain medicine > 24 hours  and sinus pain (lower forehead, cheekbone, or eye) persists    Lots of coughing    Protocols used: SINUS PAIN AND CONGESTION-A-OH      Tova Lennon RN [facility] March 7, 2022 12:16 PM

## 2022-03-08 LAB — SARS-COV-2 RNA RESP QL NAA+PROBE: NEGATIVE

## 2022-03-15 NOTE — PATIENT INSTRUCTIONS
Treatment Plan:    Increase duloxetine to 120 mg daily for pain, anxiety, mood. Can take 120 mg all in one dose daily or can split and take 60 mg twice daily.     Start doxylamine succinate 25 mg at bedtime as needed for sleep.     Sleep medicine referral placed to rule out sleep disorders.      Recommend individual psychotherapy.     Continue all other cares per primary care provider.     Continue all other medications as reviewed per electronic medical record today.     Safety plan reviewed. To the Emergency Department as needed or call after hours crisis line at [phone] or [phone]. [place] Crisis Text [place]: Text [place] to 744614  or  Suicide LifeLine Chat: suicidepreventionNano Defense Solutionsline.org/chat    Schedule an appointment with me in 4-6 weeks or sooner as needed.  Call [facility] at [phone] to schedule.    Follow up with primary care provider as planned or sooner if needed for acute medical concerns.    Call the psychiatric nurse line with medication questions or concerns at [phone].    [employer] may be used to communicate with your provider, but this is not intended to be used for emergencies.    Patient Education:  Get Out of Your Mind & Into Your Life   By Ramírez Boo    The Happiness Trap: How to Stop Struggling and Start Living  By Gerald Reyes    The Reality Slap: Finding Peace & Fulfillment When Life Hurts  By Gerald Reyes    Things Might Go Terribly, Horribly Wrong: A Guide to Life Liberated from Anxiety  By Gilda Issa, PhD    Stress Less, Live More: How Acceptance and Commitment Therapy Can Help You Live a Busy Yet Balanced Life  By Dilshad Mendoza    Finding Life Beyond Trauma: Using Acceptance and Commitment Therapy to Heal From Post-Traumatic Stress and Trauma-Related Problems  By Luann Parker and Yi Milton    Other ACT Skills References     Gerald Reyes on YouTube - Demonstrates several different skills to deal with difficult thoughts and feelings    "  Book:  \"A Liberated Mind: How to Pivot Toward What Matters\" by Ramírez Boo     Psychological flexibility: How love turns pain into purpose  Tedx Talk by Dr. Boo discussing his struggle with anxiety and panic disorder which motivated him to develop ACT therapy (Acceptance and Commitment Therapy)     You Are Not Your Thoughts    Community Resources:    National Suicide Prevention Lifeline: 387.259.5275 (TTY: 607.514.5238). Call anytime for help.  (www.suicidepreventionlifeline.org)  National [place] on Mental Illness (www.jerry.org): 595.993.9271 or 157-152-7532.   Mental Health Association (www.mentalhealth.org): 997.111.5373 or 129-801-8863.  [place] Crisis Text Line: Text [place] to 965893  Suicide LifeLine Chat: suicidepreventionlifeline.org/chat    "

## 2022-03-22 ENCOUNTER — PATIENT OUTREACH (OUTPATIENT)
Dept: FAMILY MEDICINE | Facility: CLINIC | Age: 62
End: 2022-03-22
Payer: COMMERCIAL

## 2022-03-22 NOTE — PROGRESS NOTES
Clinic Care Coordination Contact    Follow Up Progress Note      Assessment: Patient noted some positives and negatives with work situations. She is looking for some places to go walking.  We talked about the [place] to look [place] walking paths. Also emailing them for maps.     Work has been going ok.      Care Gaps:    Health Maintenance Due   Topic Date Due     URINE DRUG SCREEN  Never done           Goals addressed this encounter:   Goals Addressed                    This Visit's Progress       #1  Mental Health Management (pt-stated)   50%      Goal Statement: I will work on finding two techniques to help me reduce my depression, in the next 6 month.  Date Goal set: 4/27/2020  Barriers: Social distancing, depression  Strengths: Family, friends, and understanding that a needs some help with my depression  Date to Achieve By: 6/21/22   Patient expressed understanding of goal: Yes  Action steps to achieve this goal:  1. I will search for some mindfulness videos to help with depression  2. I will look at the [place] website for resources  3. I will work with counseling to learn new tools as well  4. I will explore trips and books that help me feel better           #2 Being Active (pt-stated)   20%      Goal Statement: I want to increase my activity to help improve my weight and back pain.  I will walk at least 10 minutes 3 days/week or more and maintain this for 3 consecutive months.  Date Goal set: 4/27/2020   Barriers: Depression, back pain, social distancing to some extent  Strengths: Understand that increasing my exercise will help with managing my weight and back pain  Date to Achieve By: 9/18/22   Patient expressed understanding of goal: Yes  Action steps to achieve this goal:  1. I will walk in one direction for 5 minutes and then return for a total of 10 minutes  2. I will not get discouraged if I cannot make it the 5 minutes in one direction before having to turn around  3. I will celebrate my successes             Intervention/Education provided during outreach: listened and offered support.  Discussed and encouraged options.  Talked about the importance of exercise and planning for a good outcome.       Outreach Frequency: monthly        Plan:   Pt to work on planning some day trips where she can exercise in safe ways.   Care Coordinator will follow up in one month.    JAQUELIN Mensah, Certified [employer] [facility] Primary Care - Care Coordinator   3/22/2022   4:13 PM  [phone]

## 2022-04-15 ENCOUNTER — OFFICE VISIT (OUTPATIENT)
Dept: FAMILY MEDICINE | Facility: CLINIC | Age: 62
End: 2022-04-15
Payer: COMMERCIAL

## 2022-04-15 VITALS
TEMPERATURE: 97.7 F | WEIGHT: 239.2 LBS | BODY MASS INDEX: 45.2 KG/M2 | RESPIRATION RATE: 18 BRPM | SYSTOLIC BLOOD PRESSURE: 130 MMHG | OXYGEN SATURATION: 100 % | DIASTOLIC BLOOD PRESSURE: 72 MMHG | HEART RATE: 76 BPM

## 2022-04-15 DIAGNOSIS — L40.9 PSORIASIS: ICD-10-CM

## 2022-04-15 DIAGNOSIS — I10 HYPERTENSION GOAL BP (BLOOD PRESSURE) < 140/90: ICD-10-CM

## 2022-04-15 DIAGNOSIS — M54.50 INTERMITTENT LOW BACK PAIN: ICD-10-CM

## 2022-04-15 DIAGNOSIS — E11.65 TYPE 2 DIABETES MELLITUS WITH HYPERGLYCEMIA, WITHOUT LONG-TERM CURRENT USE OF INSULIN (H): Primary | ICD-10-CM

## 2022-04-15 DIAGNOSIS — F41.1 GAD (GENERALIZED ANXIETY DISORDER): ICD-10-CM

## 2022-04-15 DIAGNOSIS — F33.1 MODERATE EPISODE OF RECURRENT MAJOR DEPRESSIVE DISORDER (H): ICD-10-CM

## 2022-04-15 LAB
HBA1C MFR BLD: 6.7 % (ref 0–5.6)
HOLD SPECIMEN: NORMAL
HOLD SPECIMEN: NORMAL

## 2022-04-15 PROCEDURE — 96127 BRIEF EMOTIONAL/BEHAV ASSMT: CPT | Performed by: FAMILY MEDICINE

## 2022-04-15 PROCEDURE — 83036 HEMOGLOBIN GLYCOSYLATED A1C: CPT | Performed by: FAMILY MEDICINE

## 2022-04-15 PROCEDURE — 99207 PR FOOT EXAM NO CHARGE: CPT | Performed by: FAMILY MEDICINE

## 2022-04-15 PROCEDURE — 99214 OFFICE O/P EST MOD 30 MIN: CPT | Performed by: FAMILY MEDICINE

## 2022-04-15 PROCEDURE — 36415 COLL VENOUS BLD VENIPUNCTURE: CPT | Performed by: FAMILY MEDICINE

## 2022-04-15 RX ORDER — DULOXETIN HYDROCHLORIDE 60 MG/1
120 CAPSULE, DELAYED RELEASE ORAL DAILY
Qty: 180 CAPSULE | Refills: 1 | Status: SHIPPED | OUTPATIENT
Start: 2022-04-15 | End: 2022-06-22

## 2022-04-15 RX ORDER — CYCLOBENZAPRINE HCL 5 MG
5 TABLET ORAL 3 TIMES DAILY PRN
Qty: 42 TABLET | Refills: 0 | Status: SHIPPED | OUTPATIENT
Start: 2022-04-15

## 2022-04-15 RX ORDER — HYDROXYZINE HYDROCHLORIDE 25 MG/1
TABLET, FILM COATED ORAL
Qty: 90 TABLET | Refills: 1 | Status: CANCELLED | OUTPATIENT
Start: 2022-04-15

## 2022-04-15 RX ORDER — TRIAMCINOLONE ACETONIDE 1 MG/G
OINTMENT TOPICAL 2 TIMES DAILY
Qty: 80 G | Refills: 0 | Status: SHIPPED | OUTPATIENT
Start: 2022-04-15 | End: 2023-12-08

## 2022-04-15 RX ORDER — BLOOD SUGAR DIAGNOSTIC
STRIP MISCELLANEOUS
Qty: 200 STRIP | Refills: 0 | Status: CANCELLED | OUTPATIENT
Start: 2022-04-15

## 2022-04-15 RX ORDER — GLUCOSAMINE HCL/CHONDROITIN SU 500-400 MG
CAPSULE ORAL
Qty: 100 EACH | Refills: 3 | Status: CANCELLED | OUTPATIENT
Start: 2022-04-15

## 2022-04-15 ASSESSMENT — PATIENT HEALTH QUESTIONNAIRE - PHQ9
SUM OF ALL RESPONSES TO PHQ QUESTIONS 1-9: 9
10. IF YOU CHECKED OFF ANY PROBLEMS, HOW DIFFICULT HAVE THESE PROBLEMS MADE IT FOR YOU TO DO YOUR WORK, TAKE CARE OF THINGS AT HOME, OR GET ALONG WITH OTHER PEOPLE: SOMEWHAT DIFFICULT
SUM OF ALL RESPONSES TO PHQ QUESTIONS 1-9: 9

## 2022-04-15 NOTE — PROGRESS NOTES
Assessment & Plan     Type 2 diabetes mellitus with hyperglycemia, without long-term current use of insulin (H), controlled. A1c 6.7 today  - Hemoglobin A1c  - FOOT EXAM  - Albumin Random Urine Quantitative with Creat Ratio  - Continue current management. No refills needed at this time.     Hypertension goal BP (blood pressure) < 140/90, controlled  - Continue current management. No refills needed at this time.     GABRIELA (generalized anxiety disorder), stable   - PHQ-9/GABRIELA 7 completed, see below/Epic for details    - Refill: DULoxetine (CYMBALTA) 60 MG capsule  Dispense: 180 capsule; Refill: 1    Moderate episode of recurrent major depressive disorder (H), slight worsening due to recent job loss  - PHQ-9/GABRIELA 7 completed, see below/Epic for details     Continue current management and monitor closely.   - Refill: DULoxetine (CYMBALTA) 60 MG capsule  Dispense: 180 capsule; Refill: 1    Intermittent low back pain  - cyclobenzaprine (FLEXERIL) 5 MG tablet  Dispense: 42 tablet; Refill: 0    Psoriasis  - triamcinolone (KENALOG) 0.1 % external ointment  Dispense: 80 g; Refill: 0  - Adult Dermatology Referral        Return in about 3 months (around 7/15/2022) for Diabetes Follow Up with a HgbA1C prior to visit.    Marybeth Silver MD  [facility] KIRSTIE Menendez is a 61 year old who presents for the following health issues     History of Present Illness       Back Pain:  She presents for follow up of back pain. Patient's back pain is a chronic problem.  Location of back pain:  Right lower back, left lower back, right buttock, left buttock, right hip and left hip  Description of back pain: cramping, dull ache and stabbing  Back pain spreads: nowhere    Since patient first noticed back pain, pain is: always present, but gets better and worse  Does back pain interfere with her job:  No      Mental Health Follow-up:                    Today's PHQ-9         PHQ-9 Total Score: 9  PHQ-9 Q9 Thoughts of  better off dead/self-harm past 2 weeks :   (P) Not at all    How difficult have these problems made it for you to do your work, take care of things at home, or get along with other people: Somewhat difficult        Diabetes:   She presents for follow up of diabetes.  She is checking home blood glucose a few times a month. She checks blood glucose before meals.  Blood glucose is never over 200 and never under 70. She is aware of hypoglycemia symptoms including shakiness. She is concerned about other. She is having excessive thirst.         She eats 0-1 servings of fruits and vegetables daily.She consumes 0 sweetened beverage(s) daily.She exercises with enough effort to increase her heart rate 9 or less minutes per day.  She exercises with enough effort to increase her heart rate 3 or less days per week.   She is taking medications regularly.      Has had a history of intermittent low back pain over the years- muscle relaxants have been effective in the past.   Denies having any bowel or bladder incontinence. No focal weakness.      Reports that her depression and anxiety were stable but recently lost her job and feels down and disappointed.     Last PHQ-9 4/15/2022   1.  Little interest or pleasure in doing things 2   2.  Feeling down, depressed, or hopeless 3   3.  Trouble falling or staying asleep, or sleeping too much 2   4.  Feeling tired or having little energy 0   5.  Poor appetite or overeating 0   6.  Feeling bad about yourself 2   7.  Trouble concentrating 0   8.  Moving slowly or restless 0   Q9: Thoughts of better off dead/self-harm past 2 weeks 0   PHQ-9 Total Score 9   Difficulty at work, home, or with people -     GABRIELA-7  1/11/2022   1. Feeling nervous, anxious, or on edge 0   2. Not being able to stop or control worrying 1   3. Worrying too much about different things 2   4. Trouble relaxing 0   5. Being so restless that it is hard to sit still 0   6. Becoming easily annoyed or irritable 3   7. Feeling  afraid, as if something awful might happen 0   GABRIELA-7 Total Score 6   If you checked any problems, how difficult have they made it for you to do your work, take care of things at home, or get along with other people? Somewhat difficult     In the past two weeks have you had thoughts of suicide or self-harm?  No.    Do you have concerns about your personal safety or the safety of others?   No        Review of Systems   Constitutional, HEENT, cardiovascular, pulmonary, gi and gu systems are negative, except as otherwise noted.      Objective    /72   Pulse 76   Temp 97.7  F (36.5  C) (Tympanic)   Resp 18   Wt 108.5 kg (239 lb 3.2 oz)   SpO2 100%   Breastfeeding No   BMI 45.20 kg/m    Body mass index is 45.2 kg/m .  Physical Exam   GENERAL: healthy, alert and no distress  RESP: lungs clear to auscultation - no rales, rhonchi or wheezes  CV: regular rate and rhythm, normal S1 S2, no S3 or S4, no murmur, click or rub, no peripheral edema and peripheral pulses strong  PSYCH: mentation appears normal, affect normal/bright.  SKIN: Psoriatic plaques on both upper arms.     DATA  Reviewed and discussed with patient prior to discharge.  Results for orders placed or performed in visit on 04/15/22   Hemoglobin A1c     Status: Abnormal   Result Value Ref Range    Hemoglobin A1C 6.7 (H) 0.0 - 5.6 %   Extra Tube     Status: None (In process)    Narrative    The following orders were created for panel order Extra Tube.  Procedure                               Abnormality         Status                     ---------                               -----------         ------                     Extra Red Top Tube[[id number]]                               Final result               Extra Green Top (Lithium...[[id number]]                      Final result               Extra Purple Top Tube[[id number]]                                                         Please view results for these tests on the individual orders.   Extra Red  Top Tube     Status: None   Result Value Ref Range    Hold Specimen JIC    Extra Green Top (Lithium Heparin) Tube     Status: None   Result Value Ref Range    Hold Specimen JIC

## 2022-04-16 ASSESSMENT — PATIENT HEALTH QUESTIONNAIRE - PHQ9: SUM OF ALL RESPONSES TO PHQ QUESTIONS 1-9: 9

## 2022-04-21 ENCOUNTER — VIRTUAL VISIT (OUTPATIENT)
Dept: URGENT CARE | Facility: CLINIC | Age: 62
End: 2022-04-21
Payer: COMMERCIAL
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
## 2022-04-21 DIAGNOSIS — S89.91XA KNEE INJURY, RIGHT, INITIAL ENCOUNTER: ICD-10-CM

## 2022-04-21 DIAGNOSIS — S69.91XA INJURY OF FINGER OF RIGHT HAND, INITIAL ENCOUNTER: ICD-10-CM

## 2022-04-21 PROCEDURE — 99213 OFFICE O/P EST LOW 20 MIN: CPT | Mod: 95 | Performed by: PHYSICIAN ASSISTANT

## 2022-04-21 NOTE — PROGRESS NOTES
Gemma is a 61 year old who is being evaluated via a billable video visit.      Video Start Time: 3:29 PM    Assessment & Plan     Fall, initial encounter  - XR Knee Right 3 Views; Future  - XR Finger Right G/E 2 Views; Future    Injury of finger of right hand, initial encounter  - XR Knee Right 3 Views; Future    Knee injury, right, initial encounter  - XR Finger Right G/E 2 Views; Future    I will order xrays and follow up with results. I will have her chalino tape the finger, ice and elevate and follow up if symptoms are worsening or persisting.    Diane Wade PA-C  Virtual Urgent Care  [facility] VIRTUAL URGENT CARE    Subjective   Gemma is a 61 year old who presents for the following health issues : injury due to a fall    HPI - Patient reports that she fell at a gas station after pumping her gas on Tuesday. She injured her right index finger as well as her right knee. SHe says she is unsure how she injured the finger but that she landed on the knee. She is having swelling and bruising. SHe has not noted any evidence of redness or infection to the abrasions.     Review of Systems   Constitutional, HEENT, cardiovascular, pulmonary, gi and gu systems are negative, except as otherwise noted.      Objective           Vitals:  No vitals were obtained today due to virtual visit.    Physical Exam   GENERAL: alert and no distress  EYES: Eyes grossly normal to inspection.  No discharge or erythema, or obvious scleral/conjunctival abnormalities.  RESP: No audible wheeze, cough, or visible cyanosis.  No visible retractions or increased work of breathing.    MS: right index finger has swelling and mild bruising. SHe is unable to bend the finger due to swelling. The knee has swelling and significant bruising  SKIN: Visible skin clear. No significant rash, abnormal pigmentation or lesions.  NEURO: Cranial nerves grossly intact.  Mentation and speech appropriate for age.  PSYCH: Mentation appears normal, affect  normal/bright, judgement and insight intact, normal speech and appearance well-groomed.        Video-Visit Details    Type of service:  Video Visit    Video End Time:3:37 PM    Originating Location (pt. Location): Home    Distant Location (provider location):  [facility] URGENT [facility]     Platform used for Video Visit: [employer]

## 2022-04-22 ENCOUNTER — PATIENT OUTREACH (OUTPATIENT)
Dept: CARE COORDINATION | Facility: CLINIC | Age: 62
End: 2022-04-22

## 2022-04-22 ENCOUNTER — ANCILLARY PROCEDURE (OUTPATIENT)
Dept: GENERAL RADIOLOGY | Facility: CLINIC | Age: 62
End: 2022-04-22
Attending: PHYSICIAN ASSISTANT
Payer: COMMERCIAL

## 2022-04-22 DIAGNOSIS — S89.91XA KNEE INJURY, RIGHT, INITIAL ENCOUNTER: ICD-10-CM
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
## 2022-04-22 PROCEDURE — 73140 X-RAY EXAM OF FINGER(S): CPT | Mod: RT | Performed by: RADIOLOGY

## 2022-04-22 NOTE — PROGRESS NOTES
Clinic Care Coordination Contact  [facility]/Voicemail       Clinical Data: Care Coordinator Outreach  Outreach attempted x 1.  Left message on patient's voicemail with call back information and requested return call.  Plan:  Care Coordinator will try to reach patient again in 4-6 business days.    JAQUELIN Mensah, Certified Financial SW  [facility] Primary Care - Care Coordinator   4/22/2022   3:39 PM  [phone]

## 2022-04-26 ENCOUNTER — DOCUMENTATION ONLY (OUTPATIENT)
Dept: OTHER | Facility: CLINIC | Age: 62
End: 2022-04-26

## 2022-04-26 ENCOUNTER — PATIENT OUTREACH (OUTPATIENT)
Dept: FAMILY MEDICINE | Facility: CLINIC | Age: 62
End: 2022-04-26
Payer: COMMERCIAL

## 2022-04-26 SDOH — HEALTH STABILITY: PHYSICAL HEALTH: ON AVERAGE, HOW MANY MINUTES DO YOU ENGAGE IN EXERCISE AT THIS LEVEL?: 0 MIN

## 2022-04-26 SDOH — HEALTH STABILITY: PHYSICAL HEALTH: ON AVERAGE, HOW MANY DAYS PER WEEK DO YOU ENGAGE IN MODERATE TO STRENUOUS EXERCISE (LIKE A BRISK WALK)?: 0 DAYS

## 2022-04-26 SDOH — ECONOMIC STABILITY: FOOD INSECURITY: WITHIN THE PAST 12 MONTHS, YOU WORRIED THAT YOUR FOOD WOULD RUN OUT BEFORE YOU GOT MONEY TO BUY MORE.: NEVER TRUE

## 2022-04-26 SDOH — ECONOMIC STABILITY: FOOD INSECURITY: WITHIN THE PAST 12 MONTHS, THE FOOD YOU BOUGHT JUST DIDN'T LAST AND YOU DIDN'T HAVE MONEY TO GET MORE.: NEVER TRUE

## 2022-04-26 ASSESSMENT — SOCIAL DETERMINANTS OF HEALTH (SDOH): HOW HARD IS IT FOR YOU TO PAY FOR THE VERY BASICS LIKE FOOD, HOUSING, MEDICAL CARE, AND HEATING?: HARD

## 2022-04-26 ASSESSMENT — ACTIVITIES OF DAILY LIVING (ADL): DEPENDENT_IADLS:: INDEPENDENT

## 2022-04-26 NOTE — PROGRESS NOTES
"Clinic Care Coordination Contact    Clinic Care Coordination Contact  OUTREACH    Referral Information:  Referral Source: Specialist    Primary Diagnosis: Behavioral Health    Chief Complaint   Patient presents with     Clinic Care Coordination - Follow-up     [facility]        [place] Utilization: no concerns  Clinic Utilization  Difficulty keeping appointments:: No  Compliance Concerns: No  No-Show Concerns: No  No PCP office visit in Past Year: No  Utilization    Hospital Admissions  0             ED Visits  0             No Show Count (past year)  0                Current as of: 4/25/2022 11:06 PM              Clinical Concerns:  Current Medical Concerns:  Pt had a fall and injured her knee and hand.  This is limiting he ability to exercise and causing pain with walking at the current time.     Current Behavioral Concerns: pt is realizing she needs counseling related to recent developments.  She lost her job and with the stress of that and pain from fall she \"snapped at people at [employer]\" .  These issues are creating additional concerns with her MH and she would like to pursue counseling.  Her struggle is cost.     Education Provided to patient: discussed that  will send some resources via [employer] for sliding scale/low cost/free counseling options. Will also send warmlines and crisis lines for pt to consider.  Discussed calling her insurance to check on counseling coverage and whom is in network.      Will also note in the [employer] message about checking on [facility], [facility] and [facility]  as discussed during today's call.     Pain  Pain (GOAL):: Yes  Type: Chronic (>3mo)  Health Maintenance Reviewed: Due/Overdue   Health Maintenance Due   Topic Date Due     URINE DRUG SCREEN  Never done     MICROALBUMIN  04/27/2022       Clinical Pathway: None    Medication Management:  Medication review status: medications were reviewed 4/15/22 so were not discussed today.       Functional Status:  Dependent ADLs:: " Independent  Dependent IADLs:: Independent  Bed or wheelchair confined:: No  Mobility Status: Independent  Fallen 2 or more times in the past year?: No  Any fall with injury in the past year?: Yes    Living Situation:  Current living arrangement:: I live alone  Type of residence:: Private home - no stairs    Lifestyle & Psychosocial Needs:    Social Determinants of Health     Tobacco Use: Medium Risk     Smoking Tobacco Use: Former Smoker     Smokeless Tobacco Use: Never Used   Alcohol Use: Not on file   Financial Resource Strain: High Risk     Difficulty of Paying Living Expenses: Hard   Food Insecurity: No Food Insecurity     Worried About Running Out of Food in the Last Year: Never true     Ran Out of Food in the Last Year: Never true   Transportation Needs: Not on file   Physical Activity: Inactive     Days of Exercise per Week: 0 days     Minutes of Exercise per Session: 0 min   Stress: Not on file   Social Connections: Not on file   Intimate Partner Violence: Not At Risk     Fear of Current or Ex-Partner: No     Emotionally Abused: No     Physically Abused: No     Sexually Abused: No   Depression: At risk     PHQ-2 Score: 5   Housing Stability: Not on file     Diet:: Regular  Inadequate nutrition (GOAL):: No  Tube Feeding: No  Inadequate activity/exercise (GOAL):: No  Significant changes in sleep pattern (GOAL): No  Transportation means:: Regular car     Congregational or spiritual beliefs that impact treatment:: No  Mental health DX:: Yes  Mental health DX how managed:: Medication  Mental health management concern (GOAL):: Yes  Chemical Dependency Status: No Current Concerns  Informal Support system:: Family, Friends        Pt is noting increased concerns with food and gas for transportation.  She does get some unemployment but not enough to meet her expenses.  She is applying for SNAP and will send [place] food helpline Information.  Discussed that her insurance will pay for medical appointment transportation which  can help lesson the gas budget.        Resources and Interventions:  Current Resources:      Community Resources: None  Supplies Currently Used at Home: None  Equipment Currently Used at Home: none  Employment Status: employment seeking         Advance Care Plan/Directive  Advanced Care Plans/Directives on file:: Yes  Type Advanced Care Plans/Directives: Advanced Directive - On File    Referrals Placed: Mental Health, Transportation, Other  ([place] food helpline)         Goals:    Goals        General     #1  Mental Health Management (pt-stated)      Notes - Note edited  12/23/2021  3:41 PM by Bell Brito LSW     Goal Statement: I will work on finding two techniques to help me reduce my depression, in the next 6 month.  Date Goal set: 4/27/2020  Barriers: Social distancing, depression  Strengths: Family, friends, and understanding that a needs some help with my depression  Date to Achieve By: 6/21/22   Patient expressed understanding of goal: Yes  Action steps to achieve this goal:  1. I will search for some mindfulness videos to help with depression  2. I will look at the [facility] website for resources  3. I will work with counseling to learn new tools as well  4. I will explore trips and books that help me feel better           #2 Being Active (pt-stated)      Notes - Note edited  3/22/2022  3:38 PM by Bell Brito LSW     Goal Statement: I want to increase my activity to help improve my weight and back pain.  I will walk at least 10 minutes 3 days/week or more and maintain this for 3 consecutive months.  Date Goal set: 4/27/2020   Barriers: Depression, back pain, social distancing to some extent  Strengths: Understand that increasing my exercise will help with managing my weight and back pain  Date to Achieve By: 9/18/22   Patient expressed understanding of goal: Yes  Action steps to achieve this goal:  1. I will walk in one direction for 5 minutes and then return for a total of 10 minutes  2. I will not get  discouraged if I cannot make it the 5 minutes in one direction before having to turn around  3. I will celebrate my successes              Patient/Caregiver understanding: exercise goal on  Hold until her knee heals so she can walk better.     Outreach Frequency: monthly  Future Appointments              Today [facility] Me    In 1 week Adam Painting MD [facility]    In 1 month Valorie Camacho PA [facility]  SLEEP    In 2 months Marybeth Silver MD [facility] KIRSTIE Hathaway    In 3 months Shobha Donovan RD [facility]          Plan: will send resources via [employer].  Will call in about two weeks.     JAQUELIN Mensah, Certified Financial [facility] Primary Care - Care Coordinator   4/26/2022   2:00 PM  [phone]

## 2022-04-28 ASSESSMENT — PATIENT HEALTH QUESTIONNAIRE - PHQ9
10. IF YOU CHECKED OFF ANY PROBLEMS, HOW DIFFICULT HAVE THESE PROBLEMS MADE IT FOR YOU TO DO YOUR WORK, TAKE CARE OF THINGS AT HOME, OR GET ALONG WITH OTHER PEOPLE: SOMEWHAT DIFFICULT
SUM OF ALL RESPONSES TO PHQ QUESTIONS 1-9: 9
SUM OF ALL RESPONSES TO PHQ QUESTIONS 1-9: 9

## 2022-04-29 ENCOUNTER — OFFICE VISIT (OUTPATIENT)
Dept: FAMILY MEDICINE | Facility: CLINIC | Age: 62
End: 2022-04-29
Payer: COMMERCIAL

## 2022-04-29 ENCOUNTER — ANCILLARY PROCEDURE (OUTPATIENT)
Dept: GENERAL RADIOLOGY | Facility: CLINIC | Age: 62
End: 2022-04-29
Attending: PHYSICIAN ASSISTANT
Payer: COMMERCIAL

## 2022-04-29 VITALS
OXYGEN SATURATION: 100 % | BODY MASS INDEX: 44.39 KG/M2 | TEMPERATURE: 96.1 F | HEIGHT: 62 IN | SYSTOLIC BLOOD PRESSURE: 121 MMHG | HEART RATE: 76 BPM | DIASTOLIC BLOOD PRESSURE: 86 MMHG | RESPIRATION RATE: 14 BRPM | WEIGHT: 241.2 LBS

## 2022-04-29 DIAGNOSIS — M25.561 ACUTE PAIN OF RIGHT KNEE: Primary | ICD-10-CM

## 2022-04-29 DIAGNOSIS — F40.240 CLAUSTROPHOBIA: ICD-10-CM

## 2022-04-29 DIAGNOSIS — M79.644 PAIN OF FINGER OF RIGHT HAND: ICD-10-CM

## 2022-04-29 DIAGNOSIS — M25.561 ACUTE PAIN OF RIGHT KNEE: ICD-10-CM

## 2022-04-29 PROCEDURE — 99213 OFFICE O/P EST LOW 20 MIN: CPT | Performed by: PHYSICIAN ASSISTANT

## 2022-04-29 PROCEDURE — 73562 X-RAY EXAM OF KNEE 3: CPT | Mod: RT | Performed by: RADIOLOGY

## 2022-04-29 RX ORDER — LORAZEPAM 0.5 MG/1
0.5 TABLET ORAL EVERY 30 MIN PRN
Qty: 2 TABLET | Refills: 0 | Status: SHIPPED | OUTPATIENT
Start: 2022-04-29 | End: 2022-07-25

## 2022-04-29 ASSESSMENT — PAIN SCALES - GENERAL: PAINLEVEL: EXTREME PAIN (8)

## 2022-04-29 ASSESSMENT — PATIENT HEALTH QUESTIONNAIRE - PHQ9
SUM OF ALL RESPONSES TO PHQ QUESTIONS 1-9: 9
10. IF YOU CHECKED OFF ANY PROBLEMS, HOW DIFFICULT HAVE THESE PROBLEMS MADE IT FOR YOU TO DO YOUR WORK, TAKE CARE OF THINGS AT HOME, OR GET ALONG WITH OTHER PEOPLE: SOMEWHAT DIFFICULT

## 2022-04-29 NOTE — PATIENT INSTRUCTIONS
Maximilian Menendez,    Thank you for allowing [facility] to manage your care.    I ordered an MRI of your knee, please call diagnostic imaging [phone] to schedule your test.    I made an orthopedic referral, they will be calling in approximately 1 week to set up your appointment.  If you do not hear from them, please call the specialty number on your after visit summary.     Please allow 1-2 business days for our office to contact you in regards to your laboratory/radiological studies.  If not done so, I encourage you to login into [employer] ([email or web address] to review your results as well.       For your pain, please use Ibuprofen 400mg four times daily with food. Between ibuprofen doses, you may use Tylenol 650mg.     Max acetaminophen (Tylenol) 3,000mg/24 hours  Max ibuprofen 2,000mg/24 hours    If you have any questions or concerns, please feel free to call us at [phone]    Sincerely,    Marty Russell PA-C    Did you know?      You can schedule a video visit for follow-up appointments as well as future appointments for certain conditions.  Please see the below link.     [email or web address]    If you have not already done so,  I encourage you to sign up for [employer] ([email or web address]).  This will allow you to review your results, securely communicate with a provider, and schedule virtual visits as well.

## 2022-04-29 NOTE — PROGRESS NOTES
Assessment & Plan   Problem List Items Addressed This Visit    None     Visit Diagnoses     Acute pain of right knee    -  Primary    Relevant Orders    MR Knee Right w/o Contrast    Orthopedic  Referral    XR Knee Right 3 Views (Completed)    Pain of finger of right hand        Relevant Orders    XR Finger Right G/E 2 Views    Orthopedic  Referral    Claustrophobia        Relevant Medications    LORazepam (ATIVAN) 0.5 MG tablet         Patient is a 61-year-old female who presents for distal right index finger pain and right knee pain after a fall on 4/19/2022.  Recommend repeat x-ray of right hand and right knee today. No new bony abnormality, but previous finger film was suspect for fracture clinically patient is tender at this spot. Suspicion for local and systemic infection is low as patient is afebrile, vitals are normal, and localized exam findings.  Recommend finger splint, rest, and pain management with Tylenol and ibuprofen.  Patient's right anterior leg has diffuse ecchymosis, is tender to palpation over the LCL and patellar tendon, and patient has pain with walking.  X-rays are negative for fracture and suspecting a possible ligament or tendon injury due to the location of her pain. Occult tibial plateau fracture also possible. Recommend MRI for further evaluation and follow-up with orthopedics to address both injuries.  In the meantime patient was instructed to do supportive care with knee sleeve, ice, ibuprofen, Tylenol, elevation, and rest.    Complete history and physical exam as below. AF with normal VS.    DDx and Dx discussed with and explained to the pt to their satisfaction.  All questions were answered at this time. Pt expressed understanding of and agreement with this dx, tx, and plan. No further workup warranted and standard medication warnings given. I have given the patient a list of pertinent indications for re-evaluation. Will go to the Emergency Department if symptoms  worsen or new concerning symptoms arise. Patient left in no apparent distress.     LYDIA Vasquez-S2  [facility]    Return in about 1 week (around 5/6/2022) for a recheck of your symptoms if not improving with orthopedics or ER anytime if worsening.    LYDIA Renae  [facility] KIRSTIE Menendez is a 61 year old who presents for the following health issues.    History of Present Illness       Mental Health Follow-up:                    Today's PHQ-9         PHQ-9 Total Score: 9  PHQ-9 Q9 Thoughts of better off dead/self-harm past 2 weeks :   (P) Not at all    How difficult have these problems made it for you to do your work, take care of things at home, or get along with other people: Somewhat difficult        Reason for visit:  Fell on 4/19.  Pain is strong in leg and finger still hurts some.  Symptom onset:  1-2 weeks ago  Symptoms include:  Pain and bruise on leg, finger swollen still.  Symptom intensity:  Moderate  Symptom progression:  Staying the same  Had these symptoms before:  No  What makes it worse:  Walking  What makes it better:  Laying in bed.    She eats 0-1 servings of fruits and vegetables daily.She consumes 0 sweetened beverage(s) daily.She exercises with enough effort to increase her heart rate 9 or less minutes per day.  She exercises with enough effort to increase her heart rate 3 or less days per week.   She is taking medications regularly.     Patient presents with a follow-up from a fall on 4/19.  Patient injured her right index finger and right knee.  She reports her right index finger remains slightly swollen and bruised.  She reports pain with extension of the index finger.  Patient has significant bruising over the right anterior lower leg and bruising in the distal thigh that is painful to palpation.  Patient reports the pain is inhibiting her ability to walk and she reports pain with getting out of bed to use restroom.  She has been negative  and active lately due to the pain and spending a lot of time in bed and while elevating her legs.  Her pain is the worst lateral to the patella and just beneath the patella.  She reports chalino taping her index finger for 5 to 6 days which provided minimal relief.  Her knee pain is failed to improve.  She reports a waddling gait due to the pain and is unable to walk far distances.  Patient has no known history of anemia.      Review of Systems   Constitutional, HEENT, cardiovascular, pulmonary, gi and gu systems are negative, except as otherwise noted.      Objective    There were no vitals taken for this visit.  There is no height or weight on file to calculate BMI.  Physical Exam  Constitutional:       Appearance: Normal appearance. She is diaphoretic.      Comments: After the physical exam patient appears to be slightly diaphoretic on her back and beaded sweater on her forehead.  She reports significant pain during her knee exam.   HENT:      Head: Normocephalic.   Cardiovascular:      Rate and Rhythm: Normal rate and regular rhythm.   Pulmonary:      Effort: Pulmonary effort is normal. No respiratory distress.      Breath sounds: Normal breath sounds. No stridor. No wheezing.   Musculoskeletal:         General: Swelling, tenderness and signs of injury present. No deformity.      Right lower leg: Edema present.      Left lower leg: Edema present.      Comments: Right index finger: Patient has full range of motion with right index finger DIP extension and slightly resisted motion with flexion.  Minimal edema and mild bruising just distal to the nailbed on the right index finger.  Patient is tender to palpation over the area of bruising.  Negative for erythema, drainage, and pustules.  The other fingers have full range of motion and strength bilaterally.  Right knee: Swelling and purple ecchymosis noted over the anterior portion of the right leg from the distal thigh to the ankle.  Patient has slightly reduced  extension and moderately reduced flexion of the right knee.  Tenderness to palpation over the LCL and over the right patellar tendon.  Significant pain to the lateral aspect of the knee with varus stress negative for pain with valgus stress.  Patient is tender to palpation over the area of ecchymosis.  Left knee: Full range of motion, full strength, no overlying skin changes.  Legs: Patient is negative to tenderness to palpation over the calves bilaterally.  Negative Homans' sign bilaterally.  1+ edema noted over the dorsal surface of the feet bilaterally.   Skin:     General: Skin is warm.      Findings: Bruising present. No rash.      Comments: Koilonychia of the nails present on the hands bilaterally.   Neurological:      Mental Status: She is alert.   Psychiatric:         Mood and Affect: Mood normal.         Behavior: Behavior normal.         Thought Content: Thought content normal.        Results for orders placed or performed in visit on 04/29/22   XR Knee Right 3 Views     Status: None    Narrative    KNEE RIGHT THREE VIEW  DATE/TIME: 4/29/2022 9:50 AM    INDICATION: Acute pain of right knee.  COMPARISON: 4/22/2022      Impression    IMPRESSION: Anatomic alignment right knee. No acute displaced right  knee fracture. Unchanged mild medial and patellofemoral compartment  right knee osteoarthritis. No significant knee joint effusion.  Anterior knee soft tissue swelling.    BRYNN BARNES MD         SYSTEM ID:  [id number]   Results for orders placed or performed in visit on 04/29/22   XR Finger Right G/E 2 Views     Status: None    Narrative    RIGHT FINGER TWO OR MORE VIEWS  4/29/2022 9:49 AM    INDICATION: Pain of finger of right hand.    COMPARISON: 4/22/2022      Impression    IMPRESSION: Anatomic alignment right index finger. No acute displaced  right index finger fracture. No evidence for subacute healing index  finger fracture. Degenerative change right index finger, greatest at  the DIP joint. Chronic  nonunited ulnar styloid fracture. Mild index  finger soft tissue swelling.    BRYNN BARNES MD         SYSTEM ID:  [id number]       Staff Physician Assistant Attestation  I, Toro Russell, was present with the PA student who participated in the service and in the documentation of the note.  I have verified the history and personally performed the physical exam and medical decision making.  I agree with the assessment and plan of care as documented in the note.

## 2022-05-03 ENCOUNTER — OFFICE VISIT (OUTPATIENT)
Dept: DERMATOLOGY | Facility: CLINIC | Age: 62
End: 2022-05-03
Attending: FAMILY MEDICINE
Payer: COMMERCIAL

## 2022-05-03 DIAGNOSIS — L30.0 NUMMULAR DERMATITIS: Primary | ICD-10-CM

## 2022-05-03 PROCEDURE — 99243 OFF/OP CNSLTJ NEW/EST LOW 30: CPT | Performed by: DERMATOLOGY

## 2022-05-03 RX ORDER — CLOBETASOL PROPIONATE 0.5 MG/G
CREAM TOPICAL
Qty: 120 G | Refills: 3 | Status: SHIPPED | OUTPATIENT
Start: 2022-05-03

## 2022-05-03 NOTE — PROGRESS NOTES
Jessica Jay , a 61 year old year old female patient, I was asked to see by Dr. Silver for psoriasis.  Patient states this has been present for a while.  Patient reports the following symptoms:  none .  Patient reports the following previous treatments kenalog.  Patient reports the following modifying factors none.  Associated symptoms: none.  Patient has no other skin complaints today.  Remainder of the HPI, Meds, PMH, Allergies, FH, and SH was reviewed in chart.      Past Medical History:   Diagnosis Date     Cellulitis and abscess of foot, except toes 09/26/2010    [facility]     Complete rupture of rotator cuff 07/06/2009     Degenerative disc disease     cervical     Depression, major      Depressive disorder      Dizziness - light-headed 02/15/2011     GERD (gastroesophageal reflux disease)      LBP (low back pain)      Migraines      Panic attacks      Seasonal allergies      Sleep apnoea     Wears C-PAP     Vulvar dermatitis 07/17/2012       Past Surgical History:   Procedure Laterality Date     ARTHROSCOPY SHOULDER  1990    Right/spurs     CARPAL TUNNEL RELEASE RT/LT  1990    Left     COLONOSCOPY  2016     CRANIOTOMY, EXCISE TUMOR COMPLEX, COMBINED  5/9/2011    Procedure:COMBINED CRANIOTOMY, EXCISE TUMOR COMPLEX; 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; Surgeon:LEE ANN PAULA; Location:[facility] OR     ROTATOR CUFF REPAIR RT/LT  2009     Left     [facility] FOOT/TOES SURGERY PROC UNLISTED  1975    Toe fracture, left        Family History   Problem Relation Age of Onset     Hypertension Mother      C.A.D. Father      Cancer Maternal Grandmother      Cerebrovascular Disease Paternal Grandmother      Breast Cancer Paternal Grandmother      Neurologic Disorder Sister         migraine     Cancer Brother      Cancer - colorectal Brother      Colon Cancer Brother        Social History     Socioeconomic History     Marital status: Single     Spouse name: Not on file     Number of children: 0      Years of education: 18     Highest education level: Not on file   Occupational History     Occupation:      Employer: [employer]   Tobacco Use     Smoking status: Former Smoker     Packs/day: 0.80     Years: 30.00     Pack years: 24.00     Types: Cigarettes     Smokeless tobacco: Never Used     Tobacco comment: 1 PPD x 30. Quit 2017   Vaping Use     Vaping Use: Never used   Substance and Sexual Activity     Alcohol use: Yes     Comment: 6 drinks a year     Drug use: No     Sexual activity: Not Currently     Birth control/protection: None   Other Topics Concern      Service No     Blood Transfusions No     Caffeine Concern Yes     Comment: Coffee     Occupational Exposure No     Hobby Hazards No     Sleep Concern Yes     Comment: feels she sleeps to much     Stress Concern No     Weight Concern Yes     Comment: cannot lose     Special Diet No     Back Care No     Exercise Yes     Comment: Walk on treadmill, swim     Bike Helmet Not Asked     Seat Belt Yes     Self-Exams No     Parent/sibling w/ CABG, MI or angioplasty before 65F 55M? No   Social History Narrative    Process death claims     Social Determinants of Health     Financial Resource Strain: High Risk     Difficulty of Paying Living Expenses: Hard   Food Insecurity: No Food Insecurity     Worried About Running Out of Food in the Last Year: Never true     Ran Out of Food in the Last Year: Never true   Transportation Needs: Not on file   Physical Activity: Inactive     Days of Exercise per Week: 0 days     Minutes of Exercise per Session: 0 min   Stress: Not on file   Social Connections: Not on file   Intimate Partner Violence: Not At Risk     Fear of Current or Ex-Partner: No     Emotionally Abused: No     Physically Abused: No     Sexually Abused: No   Housing Stability: Not on file       Outpatient Encounter Medications as of 5/3/2022   Medication Sig Dispense Refill     ACCU-CHEK GUIDE test strip USE TO TEST TWICE DAILY  200 strip 0     alcohol swab prep pads Use to swab area of injection/haley as directed. 100 each 3     aspirin (ASA) 81 MG EC tablet Take 1 tablet (81 mg) by mouth daily 100 tablet 3     atorvastatin (LIPITOR) 40 MG tablet Take 1 tablet (40 mg) by mouth daily 90 tablet 3     blood glucose (NO BRAND SPECIFIED) lancets standard Use to test blood sugar 2 times daily or as directed. 100 lancet 3     blood glucose monitoring (NO BRAND SPECIFIED) meter device kit Use to test blood sugar 2 times daily or as directed. 1 kit 0     cyclobenzaprine (FLEXERIL) 5 MG tablet Take 1 tablet (5 mg) by mouth 3 times daily as needed for muscle spasms 42 tablet 0     doxylamine (UNISOM) 25 MG TABS tablet Take 1 tablet (25 mg) by mouth nightly as needed for sleep 30 tablet 1     DULoxetine (CYMBALTA) 60 MG capsule Take 2 capsules (120 mg) by mouth daily 180 capsule 1     furosemide (LASIX) 20 MG tablet Take 1 tablet (20 mg) by mouth 2 times daily 180 tablet 3     hydrOXYzine (ATARAX) 25 MG tablet Take 25-50 mg twice a day as needed for anxiety. Can also take  mg at bedtime for sleep/itching. Don't exceed 300 mg daily. 90 tablet 1     LORazepam (ATIVAN) 0.5 MG tablet Take 1 tablet (0.5 mg) by mouth every 30 minutes as needed (MRI claustrophobia) 2 tablet 0     losartan (COZAAR) 25 MG tablet Take 1 tablet (25 mg) by mouth daily 90 tablet 3     metFORMIN (GLUCOPHAGE-XR) 500 MG 24 hr tablet Take 2 tablets (1,000 mg) by mouth 2 times daily (with meals) 360 tablet 3     metoprolol succinate ER (TOPROL-XL) 25 MG 24 hr tablet Take 1 tablet (25 mg) by mouth daily 90 tablet 3     omeprazole (PRILOSEC) 20 MG DR capsule Take 1 capsule (20 mg) by mouth daily 90 capsule 1     triamcinolone (KENALOG) 0.1 % external ointment Apply topically 2 times daily To affected area x 2 weeks as needed 80 g 0     Facility-Administered Encounter Medications as of 5/3/2022   Medication Dose Route Frequency Provider Last Rate Last Admin     lidocaine 1 %  injection 0.5 mL  0.5 mL   Ki May MD   0.5 mL at 05/04/20 1021     triamcinolone (KENALOG-40) injection 20 mg  20 mg   Ki May MD   20 mg at 05/04/20 1021     triamcinolone (KENALOG-40) injection 40 mg  40 mg   PritienhFrancisco davalos, DO   40 mg at 11/26/21 1709             Review Of Systems  Skin: As above  Eyes: negative  Ears/Nose/Throat: negative  Respiratory: No shortness of breath, dyspnea on exertion, cough, or hemoptysis  Cardiovascular: negative  Gastrointestinal: negative  Genitourinary: negative  Musculoskeletal: negative  Neurologic: negative  Psychiatric: negative  Hematologic/Lymphatic/Immunologic: negative  Endocrine: negative      O:   NAD, WDWN, Alert & Oriented, Mood & Affect wnl, Vitals stable   Here today alone   General appearance janae ii   Vitals stable   Alert, oriented and in no acute distress   Nummular red scaly patches on arms      Eyes: Conjunctivae/lids:Normal     ENT: Lips, buccal mucosa, tongue: normal    MSK:Normal    Cardiovascular: peripheral edema none    Pulm: Breathing Normal    Neuro/Psych: Orientation:Normal; Mood/Affect:Normal      A/P:  1. Favor nummular derm  cloebtasol twice daily  Skin care discussed with patient   Emollient use discussed with patient   Return to clinic 2 weeks virtual   It was a pleasure speaking to Jessica Jay today.  Previous clinic  notes and pertinent laboratory tests were reviewed prior to Jessica Jay's visit.  Skin care regimen reviewed with patient: Eliminate harsh soaps, i.e. Dial, zest, irsih spring; Mild soaps such as Cetaphil or Dove sensitive skin, avoid hot or cold showers, aggressive use of emollients including vanicream, cetaphil or cerave discussed with patient.

## 2022-05-03 NOTE — LETTER
5/3/2022         RE: Jessica Jay  [address] [facility] [id number]        Dear Colleague,    Thank you for referring your patient, Jessica Jay, to the [facility]. Please see a copy of my visit note below.    Jessica Jay , a 61 year old year old female patient, I was asked to see by Dr. Silver for psoriasis.  Patient states this has been present for a while.  Patient reports the following symptoms:  none .  Patient reports the following previous treatments kenalog.  Patient reports the following modifying factors none.  Associated symptoms: none.  Patient has no other skin complaints today.  Remainder of the HPI, Meds, PMH, Allergies, FH, and SH was reviewed in chart.      Past Medical History:   Diagnosis Date     Cellulitis and abscess of foot, except toes 09/26/2010    [facility]     Complete rupture of rotator cuff 07/06/2009     Degenerative disc disease     cervical     Depression, major      Depressive disorder      Dizziness - light-headed 02/15/2011     GERD (gastroesophageal reflux disease)      LBP (low back pain)      Migraines      Panic attacks      Seasonal allergies      Sleep apnoea     Wears C-PAP     Vulvar dermatitis 07/17/2012       Past Surgical History:   Procedure Laterality Date     ARTHROSCOPY SHOULDER  1990    Right/spurs     CARPAL TUNNEL RELEASE RT/LT  1990    Left     COLONOSCOPY  2016     CRANIOTOMY, EXCISE TUMOR COMPLEX, COMBINED  5/9/2011    Procedure:COMBINED CRANIOTOMY, EXCISE TUMOR COMPLEX; 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; Surgeon:LEE ANN PAULA; Location:[facility] OR     ROTATOR CUFF REPAIR RT/LT  2009     Left     [facility] FOOT/TOES SURGERY PROC UNLISTED  1975    Toe fracture, left        Family History   Problem Relation Age of Onset     Hypertension Mother      C.A.D. Father      Cancer Maternal Grandmother      Cerebrovascular Disease Paternal Grandmother      Breast Cancer Paternal Grandmother       Neurologic Disorder Sister         migraine     Cancer Brother      Cancer - colorectal Brother      Colon Cancer Brother        Social History     Socioeconomic History     Marital status: Single     Spouse name: Not on file     Number of children: 0     Years of education: 18     Highest education level: Not on file   Occupational History     Occupation:      Employer: [employer]   Tobacco Use     Smoking status: Former Smoker     Packs/day: 0.80     Years: 30.00     Pack years: 24.00     Types: Cigarettes     Smokeless tobacco: Never Used     Tobacco comment: 1 PPD x 30. Quit 2017   Vaping Use     Vaping Use: Never used   Substance and Sexual Activity     Alcohol use: Yes     Comment: 6 drinks a year     Drug use: No     Sexual activity: Not Currently     Birth control/protection: None   Other Topics Concern      Service No     Blood Transfusions No     Caffeine Concern Yes     Comment: Coffee     Occupational Exposure No     Hobby Hazards No     Sleep Concern Yes     Comment: feels she sleeps to much     Stress Concern No     Weight Concern Yes     Comment: cannot lose     Special Diet No     Back Care No     Exercise Yes     Comment: Walk on treadmill, swim     Bike Helmet Not Asked     Seat Belt Yes     Self-Exams No     Parent/sibling w/ CABG, MI or angioplasty before 65F 55M? No   Social History Narrative    Process death claims     Social Determinants of Health     Financial Resource Strain: High Risk     Difficulty of Paying Living Expenses: Hard   Food Insecurity: No Food Insecurity     Worried About Running Out of Food in the Last Year: Never true     Ran Out of Food in the Last Year: Never true   Transportation Needs: Not on file   Physical Activity: Inactive     Days of Exercise per Week: 0 days     Minutes of Exercise per Session: 0 min   Stress: Not on file   Social Connections: Not on file   Intimate Partner Violence: Not At Risk     Fear of Current or Ex-Partner:  No     Emotionally Abused: No     Physically Abused: No     Sexually Abused: No   Housing Stability: Not on file       Outpatient Encounter Medications as of 5/3/2022   Medication Sig Dispense Refill     ACCU-CHEK GUIDE test strip USE TO TEST TWICE DAILY 200 strip 0     alcohol swab prep pads Use to swab area of injection/haley as directed. 100 each 3     aspirin (ASA) 81 MG EC tablet Take 1 tablet (81 mg) by mouth daily 100 tablet 3     atorvastatin (LIPITOR) 40 MG tablet Take 1 tablet (40 mg) by mouth daily 90 tablet 3     blood glucose (NO BRAND SPECIFIED) lancets standard Use to test blood sugar 2 times daily or as directed. 100 lancet 3     blood glucose monitoring (NO BRAND SPECIFIED) meter device kit Use to test blood sugar 2 times daily or as directed. 1 kit 0     cyclobenzaprine (FLEXERIL) 5 MG tablet Take 1 tablet (5 mg) by mouth 3 times daily as needed for muscle spasms 42 tablet 0     doxylamine (UNISOM) 25 MG TABS tablet Take 1 tablet (25 mg) by mouth nightly as needed for sleep 30 tablet 1     DULoxetine (CYMBALTA) 60 MG capsule Take 2 capsules (120 mg) by mouth daily 180 capsule 1     furosemide (LASIX) 20 MG tablet Take 1 tablet (20 mg) by mouth 2 times daily 180 tablet 3     hydrOXYzine (ATARAX) 25 MG tablet Take 25-50 mg twice a day as needed for anxiety. Can also take  mg at bedtime for sleep/itching. Don't exceed 300 mg daily. 90 tablet 1     LORazepam (ATIVAN) 0.5 MG tablet Take 1 tablet (0.5 mg) by mouth every 30 minutes as needed (MRI claustrophobia) 2 tablet 0     losartan (COZAAR) 25 MG tablet Take 1 tablet (25 mg) by mouth daily 90 tablet 3     metFORMIN (GLUCOPHAGE-XR) 500 MG 24 hr tablet Take 2 tablets (1,000 mg) by mouth 2 times daily (with meals) 360 tablet 3     metoprolol succinate ER (TOPROL-XL) 25 MG 24 hr tablet Take 1 tablet (25 mg) by mouth daily 90 tablet 3     omeprazole (PRILOSEC) 20 MG DR capsule Take 1 capsule (20 mg) by mouth daily 90 capsule 1     triamcinolone  (KENALOG) 0.1 % external ointment Apply topically 2 times daily To affected area x 2 weeks as needed 80 g 0     Facility-Administered Encounter Medications as of 5/3/2022   Medication Dose Route Frequency Provider Last Rate Last Admin     lidocaine 1 % injection 0.5 mL  0.5 mL   Ki May MD   0.5 mL at 05/04/20 1021     triamcinolone (KENALOG-40) injection 20 mg  20 mg   Ki May MD   20 mg at 05/04/20 1021     triamcinolone (KENALOG-40) injection 40 mg  40 mg   Francisco Farris DO   40 mg at 11/26/21 1709             Review Of Systems  Skin: As above  Eyes: negative  Ears/Nose/Throat: negative  Respiratory: No shortness of breath, dyspnea on exertion, cough, or hemoptysis  Cardiovascular: negative  Gastrointestinal: negative  Genitourinary: negative  Musculoskeletal: negative  Neurologic: negative  Psychiatric: negative  Hematologic/Lymphatic/Immunologic: negative  Endocrine: negative      O:   NAD, WDWN, Alert & Oriented, Mood & Affect wnl, Vitals stable   Here today alone   General appearance janae ii   Vitals stable   Alert, oriented and in no acute distress   Nummular red scaly patches on arms      Eyes: Conjunctivae/lids:Normal     ENT: Lips, buccal mucosa, tongue: normal    MSK:Normal    Cardiovascular: peripheral edema none    Pulm: Breathing Normal    Neuro/Psych: Orientation:Normal; Mood/Affect:Normal      A/P:  1. Favor nummular derm  cloebtasol twice daily  Skin care discussed with patient   Emollient use discussed with patient   Return to clinic 2 weeks virtual   It was a pleasure speaking to Jessica Jay today.  Previous clinic  notes and pertinent laboratory tests were reviewed prior to Jessica Jay's visit.  Skin care regimen reviewed with patient: Eliminate harsh soaps, i.e. Dial, zest, irsih spring; Mild soaps such as Cetaphil or Dove sensitive skin, avoid hot or cold showers, aggressive use of emollients including vanicream, cetaphil or cerave discussed with  patient.          Again, thank you for allowing me to participate in the care of your patient.        Sincerely,        Adam Painting MD

## 2022-05-03 NOTE — PATIENT INSTRUCTIONS
Try prescribed cream for 2 weeks  Follow up virtually with Dr. Painting in 2 weeks.  Apply lotion twice a day.      Proper skin care from Dr. Painting- [facility]     Eliminate harsh soaps, i.e. Dial, Zest, [place] Spring;   Use mild soaps such as Cetaphil or Dove Sensitive Skin   Avoid overly hot or cold showers   After showering, lightly pat dry off.   Apply moisturizer immediately to damp dry skin.   Aggressive use of a moisturizer (including Vanicream, Cetaphil, Aquaphor, Eucerin, or Cerave)   We recommend using tub-style moisturizer that needs to be scooped out by hand, not a pump. This has more of an oil base. It will hold moisture in your skin much better than a water base moisturizer. The ones recommended are non- pore clogging.       If you have any questions call [phone] and follow the prompts to Dr. Painting's office.

## 2022-05-05 ENCOUNTER — ANCILLARY PROCEDURE (OUTPATIENT)
Dept: MRI IMAGING | Facility: CLINIC | Age: 62
End: 2022-05-05
Attending: PHYSICIAN ASSISTANT
Payer: COMMERCIAL

## 2022-05-05 DIAGNOSIS — M25.561 ACUTE PAIN OF RIGHT KNEE: ICD-10-CM

## 2022-05-05 PROCEDURE — 73721 MRI JNT OF LWR EXTRE W/O DYE: CPT | Mod: RT | Performed by: RADIOLOGY

## 2022-05-06 ENCOUNTER — TELEPHONE (OUTPATIENT)
Dept: FAMILY MEDICINE | Facility: CLINIC | Age: 62
End: 2022-05-06
Payer: COMMERCIAL

## 2022-05-06 NOTE — TELEPHONE ENCOUNTER
----- Message from LYDIA Renae sent at 5/6/2022 12:25 PM CDT -----  Team - please call patient with results. She has viewed them in MyChart, but I would like to explain further. MRI shows no fracture, dislocation or clear worrisome injury. She does have a hematoma that will likely resolve with rest and time. If her symptoms are not improving in the next 2 weeks, she should see orthopedics as I have referred her. Otherwise, she can skip this eval if she is improving. Thanks.

## 2022-05-06 NOTE — TELEPHONE ENCOUNTER
Patient calling, I advised her of provider's note regarding MRI result.    She says the bruising is much improved, still having pain but improved as well and today has better range of motion of knee.    She will watch for a couple weeks and defer ortho for now, will call to schedule if not resolving.    Shena Paul RN  [facility]

## 2022-05-06 NOTE — TELEPHONE ENCOUNTER
Called [phone] (home)     Did patient answer the phone: No, left a message on voicemail to return call to the [facility] at [phone].    Rupa RN,BSN  Triage Nurse  [facility]: [facility]

## 2022-05-09 ENCOUNTER — PATIENT OUTREACH (OUTPATIENT)
Dept: FAMILY MEDICINE | Facility: CLINIC | Age: 62
End: 2022-05-09
Payer: COMMERCIAL

## 2022-05-09 NOTE — PROGRESS NOTES
Clinic Care Coordination Contact    Follow Up Progress Note      Assessment: Pt reporting that she is feeling a little better.  She is working on finding counseling and work.  She is not feeling as down as before yet voicing she is not happy.  She has noted an increase in her anger and directing this towards others.     Her main concern is finding a job so she can pay bills.  She feels this lack of work is causing a lot of the issues.  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 She needed some auto repair and this has not been able to be fully paid.  Discussed options and she is going to check with the auto repair shop on payment options.  Also discussed the 30-days foundation.     Care Gaps:    Health Maintenance Due   Topic Date Due     URINE DRUG SCREEN  Never done     Pneumococcal Vaccine: Pediatrics (0 to 5 Years) and At-Risk Patients (6 to 64 Years) (2 - PCV) 09/16/2017     COVID-19 Vaccine (4 - Booster for Pfizer series) 04/04/2022     MICROALBUMIN  04/27/2022       Postponed to next PCP office visit     Goals addressed this encounter:    Goals Addressed                    This Visit's Progress       #1  Mental Health Management (pt-stated)   50%      Goal Statement: I will work on finding two techniques to help me reduce my depression, in the next 6 month.  Date Goal set: 4/27/2020  Barriers: Social distancing, depression  Strengths: Family, friends, and understanding that a needs some help with my depression  Date to Achieve By: 6/21/22   Patient expressed understanding of goal: Yes  Action steps to achieve this goal:  1. I will search for some mindfulness videos to help with depression  2. I will look at the [facility] website for resources  3. I will work with counseling to learn new tools as well  4. I will explore trips and books that help me feel better             #2 Being Active (pt-stated)   On Hold      Goal Statement: I want to increase my activity to help improve my weight and  back pain.  I will walk at least 10 minutes 3 days/week or more and maintain this for 3 consecutive months.  Date Goal set: 4/27/2020   Barriers: Depression, back pain, social distancing to some extent  Strengths: Understand that increasing my exercise will help with managing my weight and back pain  Date to Achieve By: 9/18/22   Patient expressed understanding of goal: Yes  Action steps to achieve this goal:  1. I will walk in one direction for 5 minutes and then return for a total of 10 minutes  2. I will not get discouraged if I cannot make it the 5 minutes in one direction before having to turn around  3. I will celebrate my successes              Intervention/Education provided during outreach: listened and offered support.  Suggested continued work with the workforce center.  Discussed that the would know what are her options with current degrees and if school is an option.     Encouraged her to look at mindfulness videos on anger as she noted this has increased.      Outreach Frequency: monthly        Plan:   Pt to work on bill payment plan and options. Pt to look at counseling options.   Care Coordinator will follow up in two weeks.     JAQUELIN Mensah  [facility] - Care Coordinator   5/9/2022   10:43 AM  [phone]

## 2022-05-12 ENCOUNTER — MYC MEDICAL ADVICE (OUTPATIENT)
Dept: FAMILY MEDICINE | Facility: CLINIC | Age: 62
End: 2022-05-12
Payer: COMMERCIAL

## 2022-05-12 DIAGNOSIS — E11.65 TYPE 2 DIABETES MELLITUS WITH HYPERGLYCEMIA, WITHOUT LONG-TERM CURRENT USE OF INSULIN (H): ICD-10-CM

## 2022-05-14 RX ORDER — ASPIRIN 81 MG/1
TABLET, COATED ORAL
Qty: 100 TABLET | Refills: 3 | OUTPATIENT
Start: 2022-05-14

## 2022-05-16 ENCOUNTER — VIRTUAL VISIT (OUTPATIENT)
Dept: DERMATOLOGY | Facility: CLINIC | Age: 62
End: 2022-05-16
Payer: COMMERCIAL

## 2022-05-16 DIAGNOSIS — L30.0 NUMMULAR DERMATITIS: Primary | ICD-10-CM

## 2022-05-16 PROCEDURE — 99213 OFFICE O/P EST LOW 20 MIN: CPT | Mod: 95 | Performed by: DERMATOLOGY

## 2022-05-16 NOTE — LETTER
"    5/16/2022         RE: Jessica Jay  [address] [place] [address]        Dear Colleague,    Thank you for referring your patient, Jessica Jay, to the [facility]. Please see a copy of my visit note below.        Jessica Jay is a 61 year old female who is being evaluated via a phone  visit.      The patient has been notified of following:     \"This phone  visit will be conducted via a call between you and your physician/provider. We have found that certain health care needs can be provided without the need for an in-person physical exam.  This service lets us provide the care you need with a video conversation.  If a prescription is necessary we can send it directly to your pharmacy.  If lab work is needed we can place an order for that and you can then stop by our lab to have the test done at a later time.    Phone visits are billed at different rates depending on your insurance coverage.  Please reach out to your insurance provider with any questions.    If during the course of the call the physician/provider feels a phone visit is not appropriate, you will not be charged for this service.\"    Patient has given verbal consent for phone visit? Yes    How would you like to obtain your AVS? Maryhart    Jessica Jay is a 61 year old year old female patient here today for f/u nummular derm.  Clearing per patient no issues.  Patient has no other skin complaints today.  Remainder of the HPI, Meds, PMH, Allergies, FH, and SH was reviewed in chart.      Past Medical History:   Diagnosis Date     Cellulitis and abscess of foot, except toes 09/26/2010    [facility]     Complete rupture of rotator cuff 07/06/2009     Degenerative disc disease     cervical     Depression, major      Depressive disorder      Dizziness - light-headed 02/15/2011     GERD (gastroesophageal reflux disease)      LBP (low back pain)      Migraines      Panic attacks      Seasonal allergies  "     Sleep apnoea     Wears C-PAP     Vulvar dermatitis 07/17/2012       Past Surgical History:   Procedure Laterality Date     ARTHROSCOPY SHOULDER  1990    Right/spurs     CARPAL TUNNEL RELEASE RT/LT  1990    Left     COLONOSCOPY  2016     CRANIOTOMY, EXCISE TUMOR COMPLEX, COMBINED  5/9/2011    Procedure:COMBINED CRANIOTOMY, EXCISE TUMOR COMPLEX; 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; Surgeon:LEE ANN PAULA; Location:[facility]     ROTATOR CUFF REPAIR RT/LT  2009     Left     [facility] FOOT/TOES SURGERY PROC UNLISTED  1975    Toe fracture, left        Family History   Problem Relation Age of Onset     Hypertension Mother      C.A.D. Father      Cancer Maternal Grandmother      Cerebrovascular Disease Paternal Grandmother      Breast Cancer Paternal Grandmother      Neurologic Disorder Sister         migraine     Cancer Brother      Cancer - colorectal Brother      Colon Cancer Brother        Social History     Socioeconomic History     Marital status: Single     Spouse name: Not on file     Number of children: 0     Years of education: 18     Highest education level: Not on file   Occupational History     Occupation:      Employer: [employer]   Tobacco Use     Smoking status: Former Smoker     Packs/day: 0.80     Years: 30.00     Pack years: 24.00     Types: Cigarettes     Smokeless tobacco: Never Used     Tobacco comment: 1 PPD x 30. Quit 2017   Vaping Use     Vaping Use: Never used   Substance and Sexual Activity     Alcohol use: Yes     Comment: 6 drinks a year     Drug use: No     Sexual activity: Not Currently     Birth control/protection: None   Other Topics Concern      Service No     Blood Transfusions No     Caffeine Concern Yes     Comment: Coffee     Occupational Exposure No     Hobby Hazards No     Sleep Concern Yes     Comment: feels she sleeps to much     Stress Concern No     Weight Concern Yes     Comment: cannot lose     Special Diet No     Back Care  No     Exercise Yes     Comment: Walk on treadmill, swim     Bike Helmet Not Asked     Seat Belt Yes     Self-Exams No     Parent/sibling w/ CABG, MI or angioplasty before 65F 55M? No   Social History Narrative    Process death claims     Social Determinants of Health     Financial Resource Strain: High Risk     Difficulty of Paying Living Expenses: Hard   Food Insecurity: No Food Insecurity     Worried About Running Out of Food in the Last Year: Never true     Ran Out of Food in the Last Year: Never true   Transportation Needs: Not on file   Physical Activity: Inactive     Days of Exercise per Week: 0 days     Minutes of Exercise per Session: 0 min   Stress: Not on file   Social Connections: Not on file   Intimate Partner Violence: Not At Risk     Fear of Current or Ex-Partner: No     Emotionally Abused: No     Physically Abused: No     Sexually Abused: No   Housing Stability: Not on file       Outpatient Encounter Medications as of 5/16/2022   Medication Sig Dispense Refill     ACCU-CHEK GUIDE test strip USE TO TEST TWICE DAILY 200 strip 0     alcohol swab prep pads Use to swab area of injection/haley as directed. 100 each 3     aspirin (ASA) 81 MG EC tablet Take 1 tablet (81 mg) by mouth daily 100 tablet 3     atorvastatin (LIPITOR) 40 MG tablet Take 1 tablet (40 mg) by mouth daily 90 tablet 3     blood glucose (NO BRAND SPECIFIED) lancets standard Use to test blood sugar 2 times daily or as directed. 100 lancet 3     blood glucose monitoring (NO BRAND SPECIFIED) meter device kit Use to test blood sugar 2 times daily or as directed. 1 kit 0     clobetasol (TEMOVATE) 0.05 % external cream Apply sparingly to affected area twice daily as needed.  Do not apply to face. 120 g 3     cyclobenzaprine (FLEXERIL) 5 MG tablet Take 1 tablet (5 mg) by mouth 3 times daily as needed for muscle spasms 42 tablet 0     doxylamine (UNISOM) 25 MG TABS tablet Take 1 tablet (25 mg) by mouth nightly as needed for sleep 30 tablet 1      DULoxetine (CYMBALTA) 60 MG capsule Take 2 capsules (120 mg) by mouth daily 180 capsule 1     furosemide (LASIX) 20 MG tablet Take 1 tablet (20 mg) by mouth 2 times daily 180 tablet 3     hydrOXYzine (ATARAX) 25 MG tablet Take 25-50 mg twice a day as needed for anxiety. Can also take  mg at bedtime for sleep/itching. Don't exceed 300 mg daily. 90 tablet 1     LORazepam (ATIVAN) 0.5 MG tablet Take 1 tablet (0.5 mg) by mouth every 30 minutes as needed (MRI claustrophobia) 2 tablet 0     losartan (COZAAR) 25 MG tablet Take 1 tablet (25 mg) by mouth daily 90 tablet 3     metFORMIN (GLUCOPHAGE-XR) 500 MG 24 hr tablet Take 2 tablets (1,000 mg) by mouth 2 times daily (with meals) 360 tablet 3     metoprolol succinate ER (TOPROL-XL) 25 MG 24 hr tablet Take 1 tablet (25 mg) by mouth daily 90 tablet 3     omeprazole (PRILOSEC) 20 MG DR capsule Take 1 capsule (20 mg) by mouth daily 90 capsule 1     triamcinolone (KENALOG) 0.1 % external ointment Apply topically 2 times daily To affected area x 2 weeks as needed 80 g 0     Facility-Administered Encounter Medications as of 5/16/2022   Medication Dose Route Frequency Provider Last Rate Last Admin     lidocaine 1 % injection 0.5 mL  0.5 mL   Ki May MD   0.5 mL at 05/04/20 1021     triamcinolone (KENALOG-40) injection 20 mg  20 mg   Ki May MD   20 mg at 05/04/20 1021     triamcinolone (KENALOG-40) injection 40 mg  40 mg   Francisco Farris DO   40 mg at 11/26/21 1709             Review Of Systems  Skin: As above  Eyes: negative  Ears/Nose/Throat: negative  Respiratory: No shortness of breath, dyspnea on exertion, cough, or hemoptysis  Cardiovascular: negative  Gastrointestinal: negative  Genitourinary: negative  Musculoskeletal: negative  Neurologic: negative  Psychiatric: negative  Hematologic/Lymphatic/Immunologic: negative  Endocrine: negative      O:   Alert & Orientedx3, Mood & Affect wnl,    General appearance normal   Alert, oriented and in  no acute distress    Clearing per patient    Pulm: Breathing Normal, talking in normal sentences, no shortness of breath during conversation    Neuro/Psych: Orientation:Alert and Orientedx3 ; Mood/Affect:normal ; no anxiety or depression     A/P:  1.nummular derm  Clearing  Cont topicals  It was a pleasure speaking to Jessica Jay today.  Previous clinic  notes and pertinent laboratory tests were reviewed prior to Jessica Jay's visit.  Skin care regimen reviewed with patient: Eliminate harsh soaps, i.e. Dial, zest, irsih spring; Mild soaps such as Cetaphil or Dove sensitive skin, avoid hot or cold showers, aggressive use of emollients including vanicream, cetaphil or cerave discussed with patient.    Return to clinic prn     Teledermatology information:  - Location of patient: home  - Location of teledermatologist: [facility]   - Reason teledermatology is appropriate: of National Emergency Regarding Coronavirus disease (COVID 19) Outbreak  - The patient's condition can safely be assessed using telemedicine: yes  - Method of transmission: store and forward teledermatology  - In-person dermatology visit recommendation: no  - Service start time:9am/pm  - Service end time:930am/pm  - Date of report: 05/16/22        Again, thank you for allowing me to participate in the care of your patient.        Sincerely,        Adam Painting MD

## 2022-05-16 NOTE — PROGRESS NOTES
"Jessica Jay is a 61 year old female who is being evaluated via a phone  visit.      The patient has been notified of following:     \"This phone  visit will be conducted via a call between you and your physician/provider. We have found that certain health care needs can be provided without the need for an in-person physical exam.  This service lets us provide the care you need with a video conversation.  If a prescription is necessary we can send it directly to your pharmacy.  If lab work is needed we can place an order for that and you can then stop by our lab to have the test done at a later time.    Phone visits are billed at different rates depending on your insurance coverage.  Please reach out to your insurance provider with any questions.    If during the course of the call the physician/provider feels a phone visit is not appropriate, you will not be charged for this service.\"    Patient has given verbal consent for phone visit? Yes    How would you like to obtain your AVS? Portia    Jessica Jay is a 61 year old year old female patient here today for f/u nummular derm.  Clearing per patient no issues.  Patient has no other skin complaints today.  Remainder of the HPI, Meds, PMH, Allergies, FH, and SH was reviewed in chart.      Past Medical History:   Diagnosis Date     Cellulitis and abscess of foot, except toes 09/26/2010    [facility]     Complete rupture of rotator cuff 07/06/2009     Degenerative disc disease     cervical     Depression, major      Depressive disorder      Dizziness - light-headed 02/15/2011     GERD (gastroesophageal reflux disease)      LBP (low back pain)      Migraines      Panic attacks      Seasonal allergies      Sleep apnoea     Wears C-PAP     Vulvar dermatitis 07/17/2012       Past Surgical History:   Procedure Laterality Date     ARTHROSCOPY SHOULDER  1990    Right/spurs     CARPAL TUNNEL RELEASE RT/LT  1990    Left     COLONOSCOPY  2016     CRANIOTOMY, " EXCISE TUMOR COMPLEX, COMBINED  5/9/2011    Procedure:COMBINED CRANIOTOMY, EXCISE TUMOR COMPLEX; 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; Surgeon:LEE ANN PAULA; Location:[facility]     ROTATOR CUFF REPAIR RT/LT  2009     Left     [facility] FOOT/TOES SURGERY PROC UNLISTED  1975    Toe fracture, left        Family History   Problem Relation Age of Onset     Hypertension Mother      C.A.D. Father      Cancer Maternal Grandmother      Cerebrovascular Disease Paternal Grandmother      Breast Cancer Paternal Grandmother      Neurologic Disorder Sister         migraine     Cancer Brother      Cancer - colorectal Brother      Colon Cancer Brother        Social History     Socioeconomic History     Marital status: Single     Spouse name: Not on file     Number of children: 0     Years of education: 18     Highest education level: Not on file   Occupational History     Occupation:      Employer: [employer]   Tobacco Use     Smoking status: Former Smoker     Packs/day: 0.80     Years: 30.00     Pack years: 24.00     Types: Cigarettes     Smokeless tobacco: Never Used     Tobacco comment: 1 PPD x 30. Quit 2017   Vaping Use     Vaping Use: Never used   Substance and Sexual Activity     Alcohol use: Yes     Comment: 6 drinks a year     Drug use: No     Sexual activity: Not Currently     Birth control/protection: None   Other Topics Concern      Service No     Blood Transfusions No     Caffeine Concern Yes     Comment: Coffee     Occupational Exposure No     Hobby Hazards No     Sleep Concern Yes     Comment: feels she sleeps to much     Stress Concern No     Weight Concern Yes     Comment: cannot lose     Special Diet No     Back Care No     Exercise Yes     Comment: Walk on treadmill, swim     Bike Helmet Not Asked     Seat Belt Yes     Self-Exams No     Parent/sibling w/ CABG, MI or angioplasty before 65F 55M? No   Social History Narrative    Process death claims     Social  Determinants of Health     Financial Resource Strain: High Risk     Difficulty of Paying Living Expenses: Hard   Food Insecurity: No Food Insecurity     Worried About Running Out of Food in the Last Year: Never true     Ran Out of Food in the Last Year: Never true   Transportation Needs: Not on file   Physical Activity: Inactive     Days of Exercise per Week: 0 days     Minutes of Exercise per Session: 0 min   Stress: Not on file   Social Connections: Not on file   Intimate Partner Violence: Not At Risk     Fear of Current or Ex-Partner: No     Emotionally Abused: No     Physically Abused: No     Sexually Abused: No   Housing Stability: Not on file       Outpatient Encounter Medications as of 5/16/2022   Medication Sig Dispense Refill     ACCU-CHEK GUIDE test strip USE TO TEST TWICE DAILY 200 strip 0     alcohol swab prep pads Use to swab area of injection/haley as directed. 100 each 3     aspirin (ASA) 81 MG EC tablet Take 1 tablet (81 mg) by mouth daily 100 tablet 3     atorvastatin (LIPITOR) 40 MG tablet Take 1 tablet (40 mg) by mouth daily 90 tablet 3     blood glucose (NO BRAND SPECIFIED) lancets standard Use to test blood sugar 2 times daily or as directed. 100 lancet 3     blood glucose monitoring (NO BRAND SPECIFIED) meter device kit Use to test blood sugar 2 times daily or as directed. 1 kit 0     clobetasol (TEMOVATE) 0.05 % external cream Apply sparingly to affected area twice daily as needed.  Do not apply to face. 120 g 3     cyclobenzaprine (FLEXERIL) 5 MG tablet Take 1 tablet (5 mg) by mouth 3 times daily as needed for muscle spasms 42 tablet 0     doxylamine (UNISOM) 25 MG TABS tablet Take 1 tablet (25 mg) by mouth nightly as needed for sleep 30 tablet 1     DULoxetine (CYMBALTA) 60 MG capsule Take 2 capsules (120 mg) by mouth daily 180 capsule 1     furosemide (LASIX) 20 MG tablet Take 1 tablet (20 mg) by mouth 2 times daily 180 tablet 3     hydrOXYzine (ATARAX) 25 MG tablet Take 25-50 mg twice a  day as needed for anxiety. Can also take  mg at bedtime for sleep/itching. Don't exceed 300 mg daily. 90 tablet 1     LORazepam (ATIVAN) 0.5 MG tablet Take 1 tablet (0.5 mg) by mouth every 30 minutes as needed (MRI claustrophobia) 2 tablet 0     losartan (COZAAR) 25 MG tablet Take 1 tablet (25 mg) by mouth daily 90 tablet 3     metFORMIN (GLUCOPHAGE-XR) 500 MG 24 hr tablet Take 2 tablets (1,000 mg) by mouth 2 times daily (with meals) 360 tablet 3     metoprolol succinate ER (TOPROL-XL) 25 MG 24 hr tablet Take 1 tablet (25 mg) by mouth daily 90 tablet 3     omeprazole (PRILOSEC) 20 MG DR capsule Take 1 capsule (20 mg) by mouth daily 90 capsule 1     triamcinolone (KENALOG) 0.1 % external ointment Apply topically 2 times daily To affected area x 2 weeks as needed 80 g 0     Facility-Administered Encounter Medications as of 5/16/2022   Medication Dose Route Frequency Provider Last Rate Last Admin     lidocaine 1 % injection 0.5 mL  0.5 mL   Ki May MD   0.5 mL at 05/04/20 1021     triamcinolone (KENALOG-40) injection 20 mg  20 mg   Ki May MD   20 mg at 05/04/20 1021     triamcinolone (KENALOG-40) injection 40 mg  40 mg   Francisco Farris, DO   40 mg at 11/26/21 1709             Review Of Systems  Skin: As above  Eyes: negative  Ears/Nose/Throat: negative  Respiratory: No shortness of breath, dyspnea on exertion, cough, or hemoptysis  Cardiovascular: negative  Gastrointestinal: negative  Genitourinary: negative  Musculoskeletal: negative  Neurologic: negative  Psychiatric: negative  Hematologic/Lymphatic/Immunologic: negative  Endocrine: negative      O:   Alert & Orientedx3, Mood & Affect wnl,    General appearance normal   Alert, oriented and in no acute distress    Clearing per patient    Pulm: Breathing Normal, talking in normal sentences, no shortness of breath during conversation    Neuro/Psych: Orientation:Alert and Orientedx3 ; Mood/Affect:normal ; no anxiety or depression      A/P:  1.nummular derm  Clearing  Cont topicals  It was a pleasure speaking to Jessica Jay today.  Previous clinic  notes and pertinent laboratory tests were reviewed prior to Jessica DIONISIO Misty's visit.  Skin care regimen reviewed with patient: Eliminate harsh soaps, i.e. Dial, zest, irsih spring; Mild soaps such as Cetaphil or Dove sensitive skin, avoid hot or cold showers, aggressive use of emollients including vanicream, cetaphil or cerave discussed with patient.    Return to clinic prn     Teledermatology information:  - Location of patient: home  - Location of teledermatologist: [facility]   - Reason teledermatology is appropriate: of National Emergency Regarding Coronavirus disease (COVID 19) Outbreak  - The patient's condition can safely be assessed using telemedicine: yes  - Method of transmission: store and forward teledermatology  - In-person dermatology visit recommendation: no  - Service start time:9am/pm  - Service end time:930am/pm  - Date of report: 05/16/22

## 2022-05-27 ENCOUNTER — PATIENT OUTREACH (OUTPATIENT)
Dept: FAMILY MEDICINE | Facility: CLINIC | Age: 62
End: 2022-05-27
Payer: COMMERCIAL

## 2022-05-27 NOTE — PROGRESS NOTES
Clinic Care Coordination Contact    Follow Up Progress Note      Assessment: pt talked about her sore knee and finger.  We talked about options and she is considering what she wants to do. We talked about what can fuel decisions we make.  Reviewed the importance of understanding what is causing the opposition and know if it is good or bad.     She is noticing increased depression and talked with a  to help reduce those symptoms.     Care Gaps:    Health Maintenance Due   Topic Date Due     URINE DRUG SCREEN  Never done     Pneumococcal Vaccine: Pediatrics (0 to 5 Years) and At-Risk Patients (6 to 64 Years) (2 - PCV) 09/16/2017     COVID-19 Vaccine (4 - Booster for Pfizer series) 04/04/2022     MICROALBUMIN  04/27/2022       Postponed to next office visit     Goals addressed this encounter:    Goals Addressed                    This Visit's Progress       #1  Mental Health Management (pt-stated)   50%      Goal Statement: I will work on finding two techniques to help me reduce my depression, in the next 6 month.  Date Goal set: 4/27/2020  Barriers: Social distancing, depression  Strengths: Family, friends, and understanding that a needs some help with my depression  Date to Achieve By: 6/21/22   Patient expressed understanding of goal: Yes  Action steps to achieve this goal:  1. I will search for some mindfulness videos to help with depression  2. I will look at the Maryanne website for resources  3. I will work with counseling to learn new tools as well  4. I will explore trips and books that help me feel better                Intervention/Education provided during outreach: discussed the difference between stubborn and determination and that they are the same emotion that fuels it but different ways to look at it - positive and negative connotations.  Long discussion on making decisions with the most information as possible to be able to make informed decisions.      Outreach Frequency: monthly    Patient  centered plan of care sent.     Plan:   Pt to work on understanding what is fueling her decisions and obtaining information as a way to address her depression with decisions she has made.  Care Coordinator will follow up in one month.    JAQUELIN Mensah  [facility] - Care Coordinator   5/27/2022   12:57 PM  [phone]

## 2022-05-27 NOTE — LETTER
It was a pleasure to speak with you.  I would like to provide you with the enclosed information for your records.  As part of care coordination, we are developing care plans to assist in accomplishing your health care goals.  When we speak next, please feel free to let me know if you want to add or change any information on your care plans.    As always, feel free to contact me if you have any questions or concerns.  I look forward to working with you in the effort to achieve your health care and wellness goals .        Sincerely,      eBll Brito, [facility]  Clinic Care Coordination  [phone]    [facility]  Patient Centered Plan of Care  About Me:        Patient Name:  Jessica Jay    YOB: 1960  Age:         61 year old   [place] MRN:    [id number] Telephone Information:  Home Phone [phone]   Mobile [phone]       Address:  [address] [place] [address] Email address:  [email or web address]      Emergency Contact(s)    Name Relationship Lgl Grd Work Phone Home Phone Mobile Phone   1. ILIANA JAY Mother No  [phone]   2. NOAH TOBIAS Sister No  [phone]           Primary language:  English     needed? No   [place] [facility]:  [phone] op. 1  Other communication barriers:Glasses    Preferred Method of Communication:  Portia  Current living arrangement: I live alone    Mobility Status/ Medical Equipment: Independent        Health Maintenance  Health Maintenance Reviewed: Due/Overdue   Health Maintenance Due   Topic Date Due     URINE DRUG SCREEN  Never done     Pneumococcal Vaccine: Pediatrics (0 to 5 Years) and At-Risk Patients (6 to 64 Years) (2 - PCV) 09/16/2017     COVID-19 Vaccine (4 - Booster for Pfizer series) 04/04/2022     MICROALBUMIN  04/27/2022           My Access Plan  Medical Emergency 911   Primary Clinic Line [facility] - [phone]   24 Hour Appointment Line  [phone] or  [phone]) (toll-free)   24 Hour Nurse Line [phone] (toll-free)   Preferred Urgent Care Other     Preferred Hospital [facility]  [phone]     Preferred Pharmacy [id number] - [place], [place] - [address] AT [facility]     Behavioral Health Crisis Line The National Suicide Prevention Lifeline at 1-456.560.4028 or 91             My Care Team Members  Patient Care Team       Relationship Specialty Notifications Start End    Marybeth Silver MD PCP - General Family Practice  5/16/18     Phone: [phone] Fax: [phone] [address] [place] [address]    Marybeth Silver MD Assigned PCP   4/15/18     Phone: [phone] Fax: [phone] [address] [place] [address]    Bell Brito LSW Lead Care Coordinator Primary Care - CC Admissions 1/29/20     Phone: [phone] Fax: [phone]        Caren Denise DO Assigned Behavioral Health Provider   11/22/20     Phone: [phone] Fax: [phone] [address] [place] [facility] [address]    Mayela Plummer [facility] Pharmacist Pharmacist  5/19/21     Phone: [phone] Fax: [phone] [address] [facility] [place] [address]    Estephanie Malave NP Assigned Neuroscience Provider   7/16/21     Phone: [phone] Fax: [phone] [address] [place] [address]    Francisco Farris DO Assigned Musculoskeletal Provider   12/5/21     Phone: [phone] Fax: [phone] [address] [place] [address]            My Care Plans  Self Management and Treatment Plan  Goals and (Comments)   Goals        General     #1  Mental Health Management (pt-stated)      Notes - Note edited  12/23/2021  3:41 PM by Bell Brito LSW     Goal Statement: I will work on finding two techniques to help me reduce my depression, in the next 6 month.  Date Goal set: 4/27/2020  Barriers: Social distancing,  depression  Strengths: Family, friends, and understanding that a needs some help with my depression  Date to Achieve By: 6/21/22   Patient expressed understanding of goal: Yes  Action steps to achieve this goal:  1. I will search for some mindfulness videos to help with depression  2. I will look at the Maryanne website for resources  3. I will work with counseling to learn new tools as well  4. I will explore trips and books that help me feel better           #2 Being Active (pt-stated)      Notes - Note edited  3/22/2022  3:38 PM by Bell Brito LSW     Goal Statement: I want to increase my activity to help improve my weight and back pain.  I will walk at least 10 minutes 3 days/week or more and maintain this for 3 consecutive months.  Date Goal set: 4/27/2020   Barriers: Depression, back pain, social distancing to some extent  Strengths: Understand that increasing my exercise will help with managing my weight and back pain  Date to Achieve By: 9/18/22   Patient expressed understanding of goal: Yes  Action steps to achieve this goal:  1. I will walk in one direction for 5 minutes and then return for a total of 10 minutes  2. I will not get discouraged if I cannot make it the 5 minutes in one direction before having to turn around  3. I will celebrate my successes               Action Plans on File:            Depression          Advance Care Plans/Directives Type:   Advanced Directive - On File      My Medical and Care Information  Problem List   Patient Active Problem List   Diagnosis     Depression, major     Migraines     CARDIOVASCULAR SCREENING; LDL GOAL LESS THAN 160     Disc herniation     DDD (degenerative disc disease), lumbar     DDD (degenerative disc disease), cervical     Neck pain     Chronic daily headache     Tobacco abuse     ELYSSA (obstructive sleep apnea)- severe (AHI 49)     Spondylolisthesis, grade 1     Chronic low back pain     Brain lipoma     History of colonic polyps     Gastroesophageal  reflux disease without esophagitis     Overweight with BMI >50     Hypertension goal BP (blood pressure) < 140/90     Traylor's esophagus determined by endoscopy     Bilateral edema of lower extremity     Generalized anxiety disorder     Restless legs syndrome (RLS)     Diabetes mellitus, type 2 (H)      Current Medications and Allergies:      Allergies   Allergen Reactions     Buspar [Buspirone Hcl] Rash     Lisinopril Cough     Seroquel [Quetiapine] Itching         Current Outpatient Medications:      ACCU-CHEK GUIDE test strip, USE TO TEST TWICE DAILY, Disp: 200 strip, Rfl: 0     alcohol swab prep pads, Use to swab area of injection/haley as directed., Disp: 100 each, Rfl: 3     aspirin (ASA) 81 MG EC tablet, Take 1 tablet (81 mg) by mouth daily, Disp: 100 tablet, Rfl: 3     atorvastatin (LIPITOR) 40 MG tablet, Take 1 tablet (40 mg) by mouth daily, Disp: 90 tablet, Rfl: 3     blood glucose (NO BRAND SPECIFIED) lancets standard, Use to test blood sugar 2 times daily or as directed., Disp: 100 lancet, Rfl: 3     blood glucose monitoring (NO BRAND SPECIFIED) meter device kit, Use to test blood sugar 2 times daily or as directed., Disp: 1 kit, Rfl: 0     clobetasol (TEMOVATE) 0.05 % external cream, Apply sparingly to affected area twice daily as needed.  Do not apply to face., Disp: 120 g, Rfl: 3     cyclobenzaprine (FLEXERIL) 5 MG tablet, Take 1 tablet (5 mg) by mouth 3 times daily as needed for muscle spasms, Disp: 42 tablet, Rfl: 0     doxylamine (UNISOM) 25 MG TABS tablet, Take 1 tablet (25 mg) by mouth nightly as needed for sleep, Disp: 30 tablet, Rfl: 1     DULoxetine (CYMBALTA) 60 MG capsule, Take 2 capsules (120 mg) by mouth daily, Disp: 180 capsule, Rfl: 1     furosemide (LASIX) 20 MG tablet, Take 1 tablet (20 mg) by mouth 2 times daily, Disp: 180 tablet, Rfl: 3     hydrOXYzine (ATARAX) 25 MG tablet, Take 25-50 mg twice a day as needed for anxiety. Can also take  mg at bedtime for sleep/itching. Don't  exceed 300 mg daily., Disp: 90 tablet, Rfl: 1     LORazepam (ATIVAN) 0.5 MG tablet, Take 1 tablet (0.5 mg) by mouth every 30 minutes as needed (MRI claustrophobia), Disp: 2 tablet, Rfl: 0     losartan (COZAAR) 25 MG tablet, Take 1 tablet (25 mg) by mouth daily, Disp: 90 tablet, Rfl: 3     metFORMIN (GLUCOPHAGE-XR) 500 MG 24 hr tablet, Take 2 tablets (1,000 mg) by mouth 2 times daily (with meals), Disp: 360 tablet, Rfl: 3     metoprolol succinate ER (TOPROL-XL) 25 MG 24 hr tablet, Take 1 tablet (25 mg) by mouth daily, Disp: 90 tablet, Rfl: 3     omeprazole (PRILOSEC) 20 MG DR capsule, Take 1 capsule (20 mg) by mouth daily, Disp: 90 capsule, Rfl: 1     triamcinolone (KENALOG) 0.1 % external ointment, Apply topically 2 times daily To affected area x 2 weeks as needed, Disp: 80 g, Rfl: 0    Current Facility-Administered Medications:      lidocaine 1 % injection 0.5 mL, 0.5 mL, , , Ki May MD, 0.5 mL at 05/04/20 1021     triamcinolone (KENALOG-40) injection 20 mg, 20 mg, , , Ki May MD, 20 mg at 05/04/20 1021     triamcinolone (KENALOG-40) injection 40 mg, 40 mg, , , Francisco Farris, DO, 40 mg at 11/26/21 1709      Care Coordination Start Date: 1/29/2020   Frequency of Care Coordination: monthly     Form Last Updated: 05/27/2022

## 2022-06-07 ENCOUNTER — OFFICE VISIT (OUTPATIENT)
Dept: ORTHOPEDICS | Facility: CLINIC | Age: 62
End: 2022-06-07
Payer: COMMERCIAL

## 2022-06-07 VITALS — HEIGHT: 62 IN | DIASTOLIC BLOOD PRESSURE: 84 MMHG | SYSTOLIC BLOOD PRESSURE: 132 MMHG | BODY MASS INDEX: 44.47 KG/M2

## 2022-06-07 DIAGNOSIS — M17.11 PRIMARY OSTEOARTHRITIS OF RIGHT KNEE: Primary | ICD-10-CM

## 2022-06-07 DIAGNOSIS — M15.1 DEGENERATIVE ARTHRITIS OF DISTAL INTERPHALANGEAL JOINT OF INDEX FINGER OF RIGHT HAND: ICD-10-CM

## 2022-06-07 PROCEDURE — 20610 DRAIN/INJ JOINT/BURSA W/O US: CPT | Mod: RT | Performed by: PEDIATRICS

## 2022-06-07 PROCEDURE — 99214 OFFICE O/P EST MOD 30 MIN: CPT | Mod: 25 | Performed by: PEDIATRICS

## 2022-06-07 RX ADMIN — TRIAMCINOLONE ACETONIDE 40 MG: 40 INJECTION, SUSPENSION INTRA-ARTICULAR; INTRAMUSCULAR at 12:07

## 2022-06-07 RX ADMIN — LIDOCAINE HYDROCHLORIDE 2 ML: 10 INJECTION, SOLUTION INFILTRATION; PERINEURAL at 12:07

## 2022-06-07 NOTE — PROGRESS NOTES
ASSESSMENT & PLAN    Jessica was seen today for injury and injury.    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  We reviewed symptom management with icing, heat, over-the-counter medication.  Activity modification briefly discussed.  Imaging reviewed.    Right index finger:  Support options for the finger may include a stax splint.  Otherwise, ok to do chalino taping.  Splinting and taping is just for comfort, not required.  At this point, would try to limit routine use of splint, to avoid increased stiffness in the finger.  Consider use of Voltaren (diclofenac) gel, which is available over-the-counter.  Hand therapy is a consideration as well.  Contact clinic interested in hand therapy.  Finally, future consideration could include imaging guided injection to the DIP joint of the index finger.    Right knee:  Similar to the finger, may use over-the-counter Voltaren gel.  Contact clinic if interested in physical therapy referral.  Okay to use compression/sleeve/brace if desired, not required.  We discussed injection options.  Steroid injection done today.    Will leave follow up open-ended.  Contact clinic if any questions or concerns.    If you have any further questions for your physician or physician s care team you can call [phone] and use option 3 to leave a voice message. Calls received during business hours will be returned same day.        Injection Discharge Instructions      You may shower, however avoid swimming, tub baths or hot tubs for 24 hours following your procedure    You may have a mild to moderate increase in pain for several days  following the injection.    It may take up to 14 days for the steroid medication to start working although you may feel the effect as early as a few days after the procedure.    You may use ice packs for 10-15 minutes, 3 to 4 times a day at the injection site for comfort    You may use anti-inflammatory medications (such as Ibuprofen or Aleve or Advil) if you are able to take safely, or acetaminophen (Tylenol) for pain control if necessary    If you were fasting, you may resume your normal diet and medications after the procedure    If you have diabetes, check your blood sugar more frequently than usual as your blood sugar may be higher than normal for 10-14 days following a steroid injection. Contact your doctor who manages your diabetes if your blood sugar is higher than usual      If you experience any of the following, call the clinic @ [phone]  - Fever over 100 degree F  - Swelling, bleeding, redness, drainage, warmth at the injection site  - New or worsening pain        Francisco Ricketts [facility]      CC: Toro Russell      -----  Chief Complaint   Patient presents with     Right Knee - Injury     Right Index Finger - Injury       SUBJECTIVE  Jessica Jay is a/an 61 year old female who is seen in consultation at the request of  Toro Russell PA-C for evaluation of right index finger and right knee injury.  Fell about 2 months ago at the gas station.  Jammed her finger into the ground and landed on her right knee.   Continues to have pain at the DIP joint of her index finger and pain in her knee.  .     The patient is seen by themselves.  The patient is Right handed    Onset: 2 month(s) ago. Patient describes injury as fall   Location of Pain: right index finger DIP joint and right knee   Worsened by: walking, standing  Better with: lying in bed   Treatments tried: ice, heat, Tylenol, ibuprofen and casting/splinting/bracing for both finger and  "knee   Associated symptoms: swelling    Orthopedic/Surgical history: NO  Social History/Occupation: unemployed     No family history pertinent to patient's problem today.     **  Right index finger: DIP joint.  No noted finger bruising. + swelling.  No noted joint noise.  Has been using alumafoam splint at times.  Injury with axial load to the finger on the ground.  Finger overall a bit improved with time.      **  Right knee:  Pain more anterior, can have some medial and lateral pain.  + pain at rest at times, including currently.  Can get some joint noise, feels some stretching sensation with motion (flexion).        REVIEW OF SYSTEMS:  Review of Systems   All other systems reviewed and are negative.        OBJECTIVE:  /84   Ht 1.568 m (5' 1.75\")   BMI 44.47 kg/m     General: healthy, alert and in no distress  HEENT: no scleral icterus or conjunctival erythema  Skin: no suspicious lesions or rash. No jaundice.  CV: distal perfusion intact   Resp: normal respiratory effort without conversational dyspnea   Psych: normal mood and affect  Gait: pain with ambulation   Neuro: Normal light sensory exam of  extremity       Hand/wrist (right):    Inspection:  No deformity noted. + distal index finger swelling. No ecchymosis.    Motion:  Digit motion mild limitation DIP joint index finger    Strength:  Able to resist at finger    Sensation:  Grossly intact  Light touch.    Radial pulses normal, +2/4, capillary refill brisk.    Palpation:  Tender DIP joint distal index finger          Right Knee exam    Inspection:   Appearance of mild effusion   no ecchymosis    ROM:      Full active and passive ROM with flexion and extension  Pain end of flexion    Tender: anterior knee    Special Tests:      neg (-) Lachman       neg (-) anterior drawer       neg (-) posterior drawer       neg (-) varus       neg (-) valgus       no pain with forced extension        RADIOLOGY:  I independently visualized and reviewed these images " with the patient  Knee imaging with arthrosis.    **  Right index finger with arthrosis.          MR right knee without contrast 5/5/2022 2:02 PM     Techniques: Multiplanar multisequence imaging of the right knee was  obtained without administration of intra-articular or intravenous  contrast using routing protocol.     History: Knee trauma, meniscal/ligament injury suspected, xray done  (Age >= 1y); Acute pain of right knee     Comparison: 4/29/2022     Findings:     Motion partially degrading images.     MENISCI:  Medial meniscus: Subtle signal abnormality violating free edge/femoral  articular surface of the medial meniscal body and also tibial  articular surface of the posterior horn, possible tearing. Otherwise,  no tear.     Lateral meniscus: Intact.     LIGAMENTS  Cruciate ligaments: Intact.  Medial supporting structures: Intact. Thickening of proximal tibial  collateral ligament, likely from remote trauma.  Lateral supporting structures: Intact.     EXTENSOR MECHANISM  Intact.     FLUID  No joint effusion. Trace Jarvis's cyst. Loculated, internally septated  heterogeneous fluid collection anteriorly along the patellar tendon  and lateral retinaculum with perilesional edema/leakage of fluid,  likely hematoma in the provided clinical setting of recent trauma.     OSSEOUS and ARTICULAR STRUCTURES  Bones: No fracture, contusion, or osseous lesion is seen. 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     Patellofemoral compartment: Diffuse chondral thinning/loss of the  patellar articular cartilage with subtle subchondral edema-like marrow  signal intensity at the patellar median ridge. High-grade chondral  fissure/loss of the medial trochlea with subchondral bone plate  contour irregularity.     Medial compartment: Signal heterogeneity with moderate chondral loss  with possible delamination in the central weightbearing surface of the  medial femoral  condyle.     Lateral compartment: No high-grade hyaline cartilage disease.     ANCILLARY FINDINGS  None.                                                                      Impression:  1. Query subtle meniscal tearing at the body and posterior horn, which  may be degenerative rather than posttraumatic.  2. Presumed anterior soft tissue hematoma.  3. Grade IV chondromalacia of patellofemoral compartment and grade  II/III chondromalacia of the medial compartment.     NATHALY CORONEL       ==========================  KNEE RIGHT THREE VIEW  DATE/TIME: 4/29/2022 9:50 AM     INDICATION: Acute pain of right knee.  COMPARISON: 4/22/2022                                                                      IMPRESSION: Anatomic alignment right knee. No acute displaced right  knee fracture. Unchanged mild medial and patellofemoral compartment  right knee osteoarthritis. No significant knee joint effusion.  Anterior knee soft tissue swelling.     BRYNN BARNES MD      =========================    RIGHT FINGER TWO OR MORE VIEWS  4/29/2022 9:49 AM     INDICATION: Pain of finger of right hand.     COMPARISON: 4/22/2022                                                                      IMPRESSION: Anatomic alignment right index finger. No acute displaced  right index finger fracture. No evidence for subacute healing index  finger fracture. Degenerative change right index finger, greatest at  the DIP joint. Chronic nonunited ulnar styloid fracture. Mild index  finger soft tissue swelling.     BRYNN BARNES MD      =======================    KNEE RIGHT THREE VIEWS   4/22/2022 1:27 PM      HISTORY: Fall, initial encounter; Injury of finger of right hand,  initial encounter.     COMPARISON: 3/7/2018 x-ray.                                                                      IMPRESSION: Mild medial compartment joint space narrowing. Mild  marginal osteophyte formation patellofemoral compartment. No fracture  or effusion. No  significant change.     HENRY BACA MD       FINGER RIGHT TWO OR MORE VIEWS April 22, 2022 1:27 PM      HISTORY: Pain after fall.     COMPARISON: None.                                                                      IMPRESSION: Moderate DIP degenerative changes. Linear lucency  subchondral bone distal phalanx at the DIP joint on lateral view. I  suspect this is related to old trauma. Acute fracture less likely. If  symptoms persist recommend follow-up x-ray in seven to 10 days.     HENRY BACA MD       Large Joint Injection/Arthocentesis: R knee joint    Date/Time: 6/7/2022 12:07 PM  Performed by: Francisco Farris DO  Authorized by: Francisco Farris DO     Indications:  Pain and osteoarthritis  Needle Size:  25 G  Guidance: landmark guided    Approach:  Anteromedial  Location:  Knee      Medications:  2 mL lidocaine 1 %; 40 mg triamcinolone 40 MG/ML  Outcome:  Tolerated well, no immediate complications  Procedure discussed: discussed risks, benefits, and alternatives    Consent Given by:  Patient  Timeout: timeout called immediately prior to procedure    Prep: patient was prepped and draped in usual sterile fashion

## 2022-06-07 NOTE — LETTER
6/7/2022         RE: Jessica Jay  [address] [facility] [address]        Dear Colleague,    Thank you for referring your patient, Jessica Jay, to the [facility]. Please see a copy of my visit note below.    ASSESSMENT & PLAN    Jessica was seen today for injury and injury.    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  We reviewed symptom management with icing, heat, over-the-counter medication.  Activity modification briefly discussed.  Imaging reviewed.    Right index finger:  Support options for the finger may include a stax splint.  Otherwise, ok to do chalino taping.  Splinting and taping is just for comfort, not required.  At this point, would try to limit routine use of splint, to avoid increased stiffness in the finger.  Consider use of Voltaren (diclofenac) gel, which is available over-the-counter.  Hand therapy is a consideration as well.  Contact clinic interested in hand therapy.  Finally, future consideration could include imaging guided injection to the DIP joint of the index finger.    Right knee:  Similar to the finger, may use over-the-counter Voltaren gel.  Contact clinic if interested in physical therapy referral.  Okay to use compression/sleeve/brace if desired, not required.  We discussed injection options.  Steroid injection done today.    Will leave follow up open-ended.  Contact clinic if any questions or concerns.    If you have any further questions for your physician or physician s care team you can call [phone] and use option 3 to leave a voice message. Calls received  during business hours will be returned same day.        Injection Discharge Instructions      You may shower, however avoid swimming, tub baths or hot tubs for 24 hours following your procedure    You may have a mild to moderate increase in pain for several days following the injection.    It may take up to 14 days for the steroid medication to start working although you may feel the effect as early as a few days after the procedure.    You may use ice packs for 10-15 minutes, 3 to 4 times a day at the injection site for comfort    You may use anti-inflammatory medications (such as Ibuprofen or Aleve or Advil) if you are able to take safely, or acetaminophen (Tylenol) for pain control if necessary    If you were fasting, you may resume your normal diet and medications after the procedure    If you have diabetes, check your blood sugar more frequently than usual as your blood sugar may be higher than normal for 10-14 days following a steroid injection. Contact your doctor who manages your diabetes if your blood sugar is higher than usual      If you experience any of the following, call the clinic @ [phone]  - Fever over 100 degree F  - Swelling, bleeding, redness, drainage, warmth at the injection site  - New or worsening pain        [facility]      CC: Toro Russell      -----  Chief Complaint   Patient presents with     Right Knee - Injury     Right Index Finger - Injury       SUBJECTIVE  Jessica Jay is a/an 61 year old female who is seen in consultation at the request of  Toro Russell PA-C for evaluation of right index finger and right knee injury.  Fell about 2 months ago at the gas station.  Jammed her finger into the ground and landed on her right knee.   Continues to have pain at the DIP joint of her index finger and pain in her knee.  .     The patient is seen by themselves.  The patient is Right handed    Onset: 2 month(s)  "ago. Patient describes injury as fall   Location of Pain: right index finger DIP joint and right knee   Worsened by: walking, standing  Better with: lying in bed   Treatments tried: ice, heat, Tylenol, ibuprofen and casting/splinting/bracing for both finger and knee   Associated symptoms: swelling    Orthopedic/Surgical history: NO  Social History/Occupation: unemployed     No family history pertinent to patient's problem today.     **  Right index finger: DIP joint.  No noted finger bruising. + swelling.  No noted joint noise.  Has been using alumafoam splint at times.  Injury with axial load to the finger on the ground.  Finger overall a bit improved with time.      **  Right knee:  Pain more anterior, can have some medial and lateral pain.  + pain at rest at times, including currently.  Can get some joint noise, feels some stretching sensation with motion (flexion).        REVIEW OF SYSTEMS:  Review of Systems   All other systems reviewed and are negative.        OBJECTIVE:  /84   Ht 1.568 m (5' 1.75\")   BMI 44.47 kg/m     General: healthy, alert and in no distress  HEENT: no scleral icterus or conjunctival erythema  Skin: no suspicious lesions or rash. No jaundice.  CV: distal perfusion intact   Resp: normal respiratory effort without conversational dyspnea   Psych: normal mood and affect  Gait: pain with ambulation   Neuro: Normal light sensory exam of  extremity       Hand/wrist (right):    Inspection:  No deformity noted. + distal index finger swelling. No ecchymosis.    Motion:  Digit motion mild limitation DIP joint index finger    Strength:  Able to resist at finger    Sensation:  Grossly intact  Light touch.    Radial pulses normal, +2/4, capillary refill brisk.    Palpation:  Tender DIP joint distal index finger          Right Knee exam    Inspection:   Appearance of mild effusion   no ecchymosis    ROM:      Full active and passive ROM with flexion and extension  Pain end of flexion    Tender: " anterior knee    Special Tests:      neg (-) Lachman       neg (-) anterior drawer       neg (-) posterior drawer       neg (-) varus       neg (-) valgus       no pain with forced extension        RADIOLOGY:  I independently visualized and reviewed these images with the patient  Knee imaging with arthrosis.    **  Right index finger with arthrosis.          MR right knee without contrast 5/5/2022 2:02 PM     Techniques: Multiplanar multisequence imaging of the right knee was  obtained without administration of intra-articular or intravenous  contrast using routing protocol.     History: Knee trauma, meniscal/ligament injury suspected, xray done  (Age >= 1y); Acute pain of right knee     Comparison: 4/29/2022     Findings:     Motion partially degrading images.     MENISCI:  Medial meniscus: Subtle signal abnormality violating free edge/femoral  articular surface of the medial meniscal body and also tibial  articular surface of the posterior horn, possible tearing. Otherwise,  no tear.     Lateral meniscus: Intact.     LIGAMENTS  Cruciate ligaments: Intact.  Medial supporting structures: Intact. Thickening of proximal tibial  collateral ligament, likely from remote trauma.  Lateral supporting structures: Intact.     EXTENSOR MECHANISM  Intact.     FLUID  No joint effusion. Trace Jarvis's cyst. Loculated, internally septated  heterogeneous fluid collection anteriorly along the patellar tendon  and lateral retinaculum with perilesional edema/leakage of fluid,  likely hematoma in the provided clinical setting of recent trauma.     OSSEOUS and ARTICULAR STRUCTURES  Bones: No fracture, contusion, or osseous lesion is seen. 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     Patellofemoral compartment: Diffuse chondral thinning/loss of the  patellar articular cartilage with subtle subchondral edema-like marrow  signal intensity at the patellar median ridge. High-grade  chondral  fissure/loss of the medial trochlea with subchondral bone plate  contour irregularity.     Medial compartment: Signal heterogeneity with moderate chondral loss  with possible delamination in the central weightbearing surface of the  medial femoral condyle.     Lateral compartment: No high-grade hyaline cartilage disease.     ANCILLARY FINDINGS  None.                                                                      Impression:  1. Query subtle meniscal tearing at the body and posterior horn, which  may be degenerative rather than posttraumatic.  2. Presumed anterior soft tissue hematoma.  3. Grade IV chondromalacia of patellofemoral compartment and grade  II/III chondromalacia of the medial compartment.     NATHALY CORONEL       ==========================  KNEE RIGHT THREE VIEW  DATE/TIME: 4/29/2022 9:50 AM     INDICATION: Acute pain of right knee.  COMPARISON: 4/22/2022                                                                      IMPRESSION: Anatomic alignment right knee. No acute displaced right  knee fracture. Unchanged mild medial and patellofemoral compartment  right knee osteoarthritis. No significant knee joint effusion.  Anterior knee soft tissue swelling.     BRYNN BARNES MD      =========================    RIGHT FINGER TWO OR MORE VIEWS  4/29/2022 9:49 AM     INDICATION: Pain of finger of right hand.     COMPARISON: 4/22/2022                                                                      IMPRESSION: Anatomic alignment right index finger. No acute displaced  right index finger fracture. No evidence for subacute healing index  finger fracture. Degenerative change right index finger, greatest at  the DIP joint. Chronic nonunited ulnar styloid fracture. Mild index  finger soft tissue swelling.     BRYNN BARNES MD      =======================    KNEE RIGHT THREE VIEWS   4/22/2022 1:27 PM      HISTORY: Fall, initial encounter; Injury of finger of right hand,  initial  encounter.     COMPARISON: 3/7/2018 x-ray.                                                                      IMPRESSION: Mild medial compartment joint space narrowing. Mild  marginal osteophyte formation patellofemoral compartment. No fracture  or effusion. No significant change.     HENRY BACA MD       FINGER RIGHT TWO OR MORE VIEWS April 22, 2022 1:27 PM      HISTORY: Pain after fall.     COMPARISON: None.                                                                      IMPRESSION: Moderate DIP degenerative changes. Linear lucency  subchondral bone distal phalanx at the DIP joint on lateral view. I  suspect this is related to old trauma. Acute fracture less likely. If  symptoms persist recommend follow-up x-ray in seven to 10 days.     HENRY BACA MD       Large Joint Injection/Arthocentesis: R knee joint    Date/Time: 6/7/2022 12:07 PM  Performed by: Francisco Farris DO  Authorized by: Francisco Farris DO     Indications:  Pain and osteoarthritis  Needle Size:  25 G  Guidance: landmark guided    Approach:  Anteromedial  Location:  Knee      Medications:  2 mL lidocaine 1 %; 40 mg triamcinolone 40 MG/ML  Outcome:  Tolerated well, no immediate complications  Procedure discussed: discussed risks, benefits, and alternatives    Consent Given by:  Patient  Timeout: timeout called immediately prior to procedure    Prep: patient was prepped and draped in usual sterile fashion                     Again, thank you for allowing me to participate in the care of your patient.        Sincerely,        Francisco Farris DO

## 2022-06-07 NOTE — PATIENT INSTRUCTIONS
Imaging of the right index finger and the right knee demonstrates degenerative change, or osteoarthritis, in both areas.  We reviewed symptom management with icing, heat, over-the-counter medication.  Activity modification briefly discussed.  Imaging reviewed.    Right index finger:  Support options for the finger may include a stax splint.  Otherwise, ok to do chalino taping.  Splinting and taping is just for comfort, not required.  At this point, would try to limit routine use of splint, to avoid increased stiffness in the finger.  Consider use of Voltaren (diclofenac) gel, which is available over-the-counter.  Hand therapy is a consideration as well.  Contact clinic interested in hand therapy.  Finally, future consideration could include imaging guided injection to the DIP joint of the index finger.    Right knee:  Similar to the finger, may use over-the-counter Voltaren gel.  Contact clinic if interested in physical therapy referral.  Okay to use compression/sleeve/brace if desired, not required.  We discussed injection options.  Steroid injection done today.    Will leave follow up open-ended.  Contact clinic if any questions or concerns.    If you have any further questions for your physician or physician s care team you can call [phone] and use option 3 to leave a voice message. Calls received during business hours will be returned same day.        Injection Discharge Instructions    You may shower, however avoid swimming, tub baths or hot tubs for 24 hours following your procedure  You may have a mild to moderate increase in pain for several days following the injection.  It may take up to 14 days for the steroid medication to start working although you may feel the effect as early as a few days after the procedure.  You may use ice packs for 10-15 minutes, 3 to 4 times a day at the injection site for comfort  You may use anti-inflammatory medications (such as Ibuprofen or Aleve or Advil) if you are able  to take safely, or acetaminophen (Tylenol) for pain control if necessary  If you were fasting, you may resume your normal diet and medications after the procedure  If you have diabetes, check your blood sugar more frequently than usual as your blood sugar may be higher than normal for 10-14 days following a steroid injection. Contact your doctor who manages your diabetes if your blood sugar is higher than usual    If you experience any of the following, call the clinic @ [phone]  - Fever over 100 degree F  - Swelling, bleeding, redness, drainage, warmth at the injection site  - New or worsening pain

## 2022-06-08 ENCOUNTER — VIRTUAL VISIT (OUTPATIENT)
Dept: SLEEP MEDICINE | Facility: CLINIC | Age: 62
End: 2022-06-08
Attending: PSYCHIATRY & NEUROLOGY
Payer: COMMERCIAL

## 2022-06-08 VITALS — WEIGHT: 240 LBS | BODY MASS INDEX: 44.16 KG/M2 | HEIGHT: 62 IN

## 2022-06-08 DIAGNOSIS — R53.81 MALAISE AND FATIGUE: ICD-10-CM

## 2022-06-08 DIAGNOSIS — R06.89 DYSPNEA AND RESPIRATORY ABNORMALITY: Primary | ICD-10-CM

## 2022-06-08 DIAGNOSIS — R53.83 MALAISE AND FATIGUE: ICD-10-CM

## 2022-06-08 DIAGNOSIS — G47.33 OSA (OBSTRUCTIVE SLEEP APNEA): ICD-10-CM

## 2022-06-08 DIAGNOSIS — I10 ESSENTIAL HYPERTENSION: ICD-10-CM

## 2022-06-08 DIAGNOSIS — E66.01 CLASS 3 SEVERE OBESITY DUE TO EXCESS CALORIES WITH SERIOUS COMORBIDITY AND BODY MASS INDEX (BMI) OF 40.0 TO 44.9 IN ADULT (H): ICD-10-CM

## 2022-06-08 DIAGNOSIS — R06.00 DYSPNEA AND RESPIRATORY ABNORMALITY: Primary | ICD-10-CM

## 2022-06-08 DIAGNOSIS — Z72.820 LACK OF ADEQUATE SLEEP: ICD-10-CM

## 2022-06-08 DIAGNOSIS — E66.813 CLASS 3 SEVERE OBESITY DUE TO EXCESS CALORIES WITH SERIOUS COMORBIDITY AND BODY MASS INDEX (BMI) OF 40.0 TO 44.9 IN ADULT (H): ICD-10-CM

## 2022-06-08 DIAGNOSIS — G47.00 INSOMNIA, UNSPECIFIED TYPE: ICD-10-CM

## 2022-06-08 PROBLEM — E11.9 DIABETES MELLITUS, TYPE 2 (H): Chronic | Status: ACTIVE | Noted: 2021-04-19

## 2022-06-08 PROCEDURE — 99244 OFF/OP CNSLTJ NEW/EST MOD 40: CPT | Mod: 95 | Performed by: PHYSICIAN ASSISTANT

## 2022-06-08 RX ORDER — ZOLPIDEM TARTRATE 5 MG/1
TABLET ORAL
Qty: 1 TABLET | Refills: 0 | Status: SHIPPED | OUTPATIENT
Start: 2022-06-08 | End: 2022-07-25

## 2022-06-08 ASSESSMENT — SLEEP AND FATIGUE QUESTIONNAIRES
HOW LIKELY ARE YOU TO NOD OFF OR FALL ASLEEP WHILE LYING DOWN TO REST IN THE AFTERNOON WHEN CIRCUMSTANCES PERMIT: SLIGHT CHANCE OF DOZING
HOW LIKELY ARE YOU TO NOD OFF OR FALL ASLEEP WHEN YOU ARE A PASSENGER IN A CAR FOR AN HOUR WITHOUT A BREAK: WOULD NEVER DOZE
HOW LIKELY ARE YOU TO NOD OFF OR FALL ASLEEP WHILE SITTING INACTIVE IN A PUBLIC PLACE: WOULD NEVER DOZE
HOW LIKELY ARE YOU TO NOD OFF OR FALL ASLEEP IN A CAR, WHILE STOPPED FOR A FEW MINUTES IN TRAFFIC: WOULD NEVER DOZE
HOW LIKELY ARE YOU TO NOD OFF OR FALL ASLEEP WHILE SITTING AND TALKING TO SOMEONE: WOULD NEVER DOZE
HOW LIKELY ARE YOU TO NOD OFF OR FALL ASLEEP WHILE SITTING AND READING: SLIGHT CHANCE OF DOZING
HOW LIKELY ARE YOU TO NOD OFF OR FALL ASLEEP WHILE SITTING QUIETLY AFTER LUNCH WITHOUT ALCOHOL: WOULD NEVER DOZE
HOW LIKELY ARE YOU TO NOD OFF OR FALL ASLEEP WHILE WATCHING TV: WOULD NEVER DOZE

## 2022-06-08 NOTE — PATIENT INSTRUCTIONS
MY CONTACT NUMBERS ARE: [phone]  DOCTOR : LYDIA Waller  SLEEP CENTER :   CPAP EQUIPMENT :    IF I HAVE SLEEP APNEA.....  WHERE CAN I FIND MORE INFORMATION?    American Academy [employer] Patient information on sleep disorders:  http://yoursleep.aasmnet.org    THINGS TO REMEMBER  In most situations, sleep apnea is a lifelong disease that must be managed with daily therapy. Untreated disease, when severe, may result in an increased risk for an array of problems from heart disease to mood changes, car accidents and shorter lifespan.    CPAP -  WHY AND HOW?  Continuous positive airway pressure, or CPAP, is the most effective treatment for obstructive sleep apnea. A decision to use CPAP is a major step forward in the pursuit of a healthier life. The successful use of CPAP will help you breathe easier, sleep better and live healthier. Using CPAP can be a positive experience if you keep these mohamud points in mind:  Commitment  CPAP is not a quick fix for your problem. It involves a long-term commitment to improve your sleep and your health.    Communication  Stay in close communication with both your sleep doctor and your CPAP supplier. Ask lots of questions and seek help when you need it.    Consistency  Use CPAP all night, every night and for every nap. You will receive the maximum health benefits from CPAP when you use it every time that you sleep. This will also make it easier for your body to adjust to the treatment.    Correction  The first machine and mask that you try may not be the best ones for you. Work with your sleep doctor and your CPAP supplier to make corrections to your equipment selection. Ask about trying a different type of machine or mask if you have ongoing problems. Make sure that your mask is a good fit and learn to use your equipment properly.    Challenge  Tell a family member or close friend to ask you each morning if you used your CPAP the previous night. Have someone to  "challenge you to give it your best effort.    Connection   Your adjustment to CPAP will be easier if you are able to connect with others who use the same treatment. Ask your sleep doctor if there is a support group in your area for people who have sleep apnea, or look for one on the Internet.    Comfort   Increase your level of comfort by using a saline spray, decongestant or heated humidifier if CPAP irritates your nose, mouth or throat. Use your unit's \"ramp\" setting to slowly get used to the air pressure level. There may be soft pads you can buy that will fit over your mask straps. Look on www.CPAP.com for accessories such as these straps, a pillow contoured for side-sleeping with CPAP, longer hoses, hose covers to reduce condensation, or stands to keep the hose out of your way.                                 Cleaning   Clean your mask, tubing and headgear on a regular basis. Put this time in your schedule so that you don't forget to do it. Check and replace the filters for your CPAP unit and humidifier.    Completion   Although you are never finished with CPAP therapy, you should reward yourself by celebrating the completion of your first month of treatment. Expect this first month to be your hardest period of adjustment. It will involve some trial and error as you find the machine, mask and pressure settings that are right for you.    Continuation  After your first month of treatment, continue to make a daily commitment to use your CPAP all night, every night and for every nap.    CPAP-Tips to starting with success:  Begin using your CPAP for short periods of time during the day while you watch TV or read.    Use CPAP every night and for every nap. Using it less often reduces the health benefits and makes it harder for your body to get used to it.    Newer CPAP models are virtually silent; however, if you find the sound of your CPAP machine to be bothersome, place the unit under your bed to dampen the sound. "     Make small adjustments to your mask, tubing, straps and headgear until you get the right fit. Tightening the mask may actually worsen the leak.  If it leaves significant marks on your face or irritates the bridge of your nose, it may not be the best mask for you.  Speak with the person who supplied the mask and consider trying other masks.    Use a saline nasal spray to ease mild nasal congestion. Neti-Pot or saline nasal rinses may also help. Nasal gel sprays can help reduce nasal dryness.  Biotene mouthwash can be helpful to protect your teeth if you experience frequent dry mouth.  Dry mouth may be a sign of air escaping out of your mouth or out of the mask in the case of a full face mask.  Speak with your provider if you expect that is the case.     Take a nasal decongestant to relieve more severe nasal or sinus congestion.  Do not use Afrin (oxymetazoline) nasal spray more than 3 days in a row.  Speak with your sleep doctor if your nasal congestion is chronic.    Use a heated humidifier that fits your CPAP model to enhance your breathing comfort. Adjust the heat setting up if you get a dry nose or throat, down if you get condensation in the hose or mask.  Position the CPAP lower than you so that any condensation in the hose drains back into the machine rather than towards the mask.    Try a system that uses nasal pillows if traditional masks give you problems.    Clean your mask, tubing and headgear once a week. Make sure the equipment dries fully.    Regularly check and replace the filters for your CPAP unit and humidifier.    Work closely with your sleep doctor and your CPAP supplier to make sure that you have the machine, mask and air pressure setting that works best for you.    BESIDES CPAP, WHAT OTHER THERAPIES ARE THERE?  Postioning devices if you only have the problem on your back  Dental devices if your condition is mild  Nasal valves may be effective though experience is limited  Tongue Retaining  Device if missing teeth precludes the use of a dental device  Weight loss if you are overweight  Surgery in limited cases where devices are not acceptable or there are problems with structures in the nose and throat  If treated with one of these alternative options, further evaluation is necessary to ensure that the therapy is effective. This may require some form of testing.     Healthy Lifestyle:  Healthy diet, exercise and Limit alcohol: Not only will excessive alcohol increase your weight over time, but it irritates the throat tissues and make them swell, shrinking the airway and causing snoring. Drinking alcohol should be limited and stopped within 3-4 hours before going to bed.   Stop smoking: (Red swollen throat, heat, nicotine), also irritates and swells the airway, among numerous other negative health consequences.    Positioning Device  This example shows a pillow that straps around the waist. It may be appropriate for those whose sleep study shows milder sleep apnea that occurs primarily when lying flat on one's back. Preliminary studies have shown benefit but effectiveness at home should be verified.    Nasal Valves              Nasal valves may not be effective if you have frequent nasal congestion or have difficulty breathing through your nose. They may be an option for mild apnea if other options are not well tolerated. The efficacy of these devices is generally less than CPAP or oral appliances.  Oral Appliance  These are examples of two of many custom-made devices that are more likely to work in mild sleep apnea  Oral appliances are dental mouth pieces that fit very much like a sports mouth guards or removable orthodontic retainers. They are used to treat snoring  And obstructive sleep apnea . The device prevents the airway from collapsing by either holding the tongue or supporting the jaw in a forward position. Since oral appliances are non-invasive and easy to use, they may be considered as an  early treatment option. Oral appliance therapy (OAT) involves the customization, selection, fabrication, fitting, adjustments and long-term follow-up care of specially designed oral devices, worn during sleep, which reposition the lower jaw and tongue base forward to maintain an open airway.  Custom made oral appliances are proven to be more effective than over-the-counter devices. Therefore, the over-the-counter devices are recommended not to be used as a screening tool nor as a therapeutic option.  Who gets a dental device?  Oral appliance therapy can be used as an alternative to  CPAP therapy for the treatment of mild to moderate sleep apnea and for those patients who prefer OAT to CPAP. Oral appliance therapy is a first line therapy for the treatment of primary snoring. Additionally, OAT is an option for those that cannot tolerate CPAP as therapy or who have experienced insufficient surgical results.    Possible side effects?  Frequent but minor side effects include: excessive salivation, dry mouth, discomfort of teeth and jaw and temporary changes in the patient s bite.  Potential complications include: jaw pain, permanent occlusal changes and TMJ symptoms.  The above mentioned side effects and complications can be recognized and managed by dentists trained in dental sleep medicine.    Finding a dentist that practices dental sleep medicine  Specific training is available through the American Academy of Dental Sleep Medicine for dentists interested in working in the field of sleep. To find a dentist who is educated in the field of sleep and the use of oral appliances, near you, visit the Web site of the American Academy of Dental Sleep Medicine; also see http://www.accpstorage.org/newOrganization/patients/oralAppliances.pdf   To search for a dentist certified in these practices:  Http://aadsm.org/FindADentist.[email or web address]  [email or web address]/[email or web address]    Tongue Retaining  Device               Tongue Retaining Devices are devices that generally  suction cup  onto the tongue preventing it from falling back into the back of the throat during sleep.  They may be an option for people missing teeth, but can be uncomfortable. This particular device can be purchased online, but similar devices made by dentists fit more precisely and may be tolerated better. In general, they are rarely effective and often not very well tolerated.    Weight Loss:  Some patients may experience reduction or elimination of sleep apnea with weight loss.  Though there are significant health benefits from weight loss, long-term weight loss is very difficult to achieve- studies show success with dietary management in less that 10% of people.     If you are interested in dietary weight loss, you should review the options discussed at the National Institutes of Health patient information site:     Http:/www.health.nih.gov/topic/WeightLossDieting    Bariatric programs offer counseling in all methods of weight loss:    Http:/www.[facility].[email or web address]/Specialties/WeightLossSurgeryandMedicalMgmt/htm    Surgery:  There are a number of surgeries that have been attempted to treat apnea. In general, surgical options are usually reserved for cases in which there is a physical abnormality contributing to obstruction or other treatment options are ineffective or not tolerated. Most surgical options are either unreliable or quite invasive. One of the more common procedures is:  Uvulopalatopharyngoplasty: In this procedure, the uvula (the finger-like tissue that hangs in the back of the throat), part of the soft palate (the tissue that the uvula is attached to), and sometimes the tonsils or adenoids are removed. The efficacy of this surgery is around 30-50% .  After surgery, complications may include:  Sleepiness and sleep apnea related to post-surgery medication   Swelling, infection and bleeding   A sore throat and/or difficulty  "swallowing   Drainage of secretions into the nose and a nasal quality to the voice. English language speech does not seem to be affected by this surgery.   Narrowing of the airway in the nose and throat (hence constricting breathing) snoring and even iatrogenically caused sleep apnea. By cutting the tissues, excess scar tissue can \"tighten\" the airway and make it even smaller than it was before UPPP.  Patients who have had the uvula removed will become unable to correctly speak Romansh or any other language that has a uvular 'r' phoneme.    Surgeries to help resolve nasal congestion may help reduce the severity of apnea slightly. Nasal congestion does not cause apnea on its own, so these surgeries are usually not performed just for ELYSSA.  They may be worth considering if the nasal congestion is significantly bothersome independent of apnea.   "

## 2022-06-08 NOTE — PROGRESS NOTES
Gemma is a 61 year old who is being evaluated via a billable video visit.      How would you like to obtain your AVS? MyChart  If the video visit is dropped, the invitation should be resent by: Text to cell phone: [phone]   Will anyone else be joining your video visit? No      Video Start Time: 3:16 PM  Video-Visit Details    Type of service:  Video Visit    Video End Time:3:43 PM    Originating Location (pt. Location): Home    Distant Location (provider location):  [facility]     Platform used for Video Visit: [facility]         Outpatient Sleep Medicine Consultation:      Name: Jessica Jay MRN# [id number]   Age: 61 year old YOB: 1960     Date of Consultation: June 8, 2022  Consultation is requested by: [place] [address] Caren Denise  Primary care provider: Marybeth Silver       Reason for Sleep Consult:     Jessica Jay is sent by Caren Denise for a sleep consultation regarding obstructive sleep apnea.    Patient s Reason for visit  Jessica Jay main reason for visit:  Re-establish care.  Patient states problem(s) started:    Jessica Jay's goals for this visit:  Re-test sleep apnea           Assessment and Plan:     Summary Sleep Diagnoses:  1. History of severe obstructive sleep apnea by a sleep study in 2010. She stopped using CPAP 18 months ago  2. Morbid obesity with BMI of 44. Interim weight loss of 15 lbs since her last sleep study  3. Delayed sleep phase      Comorbid Diagnoses:  Diabetes type 2  Hypertension   Depression   Generalized Anxiety Disorder      Summary Recommendations:  Recommend Polysomnogram (using 4% desaturation/Medicare/ AASM 1B scoring rules) with TCM and VBG to evaluate current severity sleep disordered breathing. Ambien if needed.  We discussed the link between obesity, sleep apnea, and health outcomes.  Patient was encouraged to decrease caloric intake and increase activity  levels to try to move towards a normal weight.    Discussed sleep hygiene, stimulus control and sleep restriction.    Orders Placed This Encounter   Procedures     Comprehensive Sleep Study     Blood gas venous       Summary Counseling:    Sleep Testing Reviewed  Obstructive Sleep Apnea Reviewed  Complications of Untreated Sleep Apnea Reviewed      Medical Decision-making:   Educational materials provided in instructions    Total time spent reviewing medical records, history and physical examination, review of previous testing and interpretation as well as documentation on this date: 55 minutes    CC: Caren Denise          History of Present Illness:     Past Sleep Evaluations:    Jessica Jay is a 61 year old female with history of severe sleep apnea.      In review:   Patient was initially seen by Dr. Whitten at [place] 12/4/2009 with heavy nocturnal snoring nightly, observed apneic events, morbid obesity with a body mass index of 45, and a neck circumference of 17 inches, as well as excessive daytime sleepiness with an [place] Sleepiness Scale of 15 and delayed sleep phase.     She had a sleep study AHI 49.4/hour, RDI 54.2/hour, REM RDI not applicable, supine RDI of 37.5/hour.  Minimum oxygen saturation was 88%.  She also had many leg movements was 58.3/hour with a leg movement arousal index of 11/hour. Weight at the time of testing was 255 lbs    CPAP 30hhE67 was recommended     She subsequently was seen by Dr. Chase and in 2010 appeared to develop worsening hypersomnolence with long sleep times after a GI illness despite apparent adequate treatment with CPAP.  Her daytime symptoms did improve strikingly when she increased her sleep intake to 10 hours a day     She subsequently was seen by Dr. Mark  in 2011 with excessive daytime sleepiness (ESS 17) in the setting of suboptimal CPAP adherance. Restless leg symptoms (ferritin 37), sleep onset insomnia, delayed sleep phase syndrome were noted. CPAP was  increased to 13 cmH20. At follow-up adherence was better, and sleepiness improved (ESS 13)     She did not follow-up again.    She stopped using CPAP a year and half ago because the mask was ripped and she did not think that she needed CPAP.      SLEEP-WAKE SCHEDULE:     Work/School Days: Patient goes to school/work: no  Usually gets into bed at 11:30 PM-12 AM  Takes patient about 30 minutes to fall asleep  Has trouble falling asleep 2-3 nights per week  Wakes up in the middle of the night 0 times.  She has trouble falling back asleep 0 times a week.   Patient is usually up at 7 AM  Uses alarm: yes     Weekends/Non-work Days/All Other Days:  Usually gets into bed at 12 AM   Takes patient about 30 minutes to fall asleep  Patient is usually up at 7 AM  Uses alarm: yes     Sleep Need  Patient gets 5-6 sleep on average   Patient thinks she needs about 7 hours of sleep    Jessica Jay prefers to sleep in this position(s): Stomach   Patient states they do the following activities in bed: TV in bed sometimes     Naps  Patient takes a purposeful nap 0 times a week  She feels better after a nap:    She dozes off unintentionally 0 days per week  Patient has had a driving accident or near-miss due to sleepiness/drowsiness: no      SLEEP DISRUPTIONS:    Breathing/Snoring  Patient snores: yes  Other people complain about her snoring: no, she does not have a bed partner.     Patient has been told she stops breathing in her sleep: no  She has issues with the following: no morning headaches. She reports morning dry mouth    Movement:  Patient gets pain, discomfort, with an urge to move: no    It happens when she is resting:     It happens more at night:     Patient has been told she kicks her legs at night:no        Behaviors in Sleep:  Jessica Jay has experienced the following behaviors while sleeping:   She denies any sleep walking, sleep talking, dream enactment, sleep paralysis, cataplexy and hypnogogic/hypnopompic  hallucinations.   She has bruxism.     Is there anything else you would like your sleep provider to know:        CAFFEINE AND OTHER SUBSTANCES:    Patient consumes caffeinated beverages per day: none  Last caffeine use is usually:    List of any prescribed or over the counter stimulants that patient takes:  none  List of any prescribed or over the counter sleep medication patient takes:  none  List of previous sleep medications that patient has tried: Unisom    Patient drinks alcohol to help them sleep: no   Patient drinks alcohol near bedtime: no      Family History:  Patient has a family member been diagnosed with a sleep disorder: no              SCALES:    EPWORTH SLEEPINESS SCALE      [place] Sleepiness Scale ( SHINE Kamara  1990-1997[facility] [place]/English - Final version - 21 Nov 07 - [facility] [place].) 6/8/2022   Sitting and reading Slight chance of dozing   Watching TV Would never doze   Sitting, inactive in a public place (e.g. a theatre or a meeting) Would never doze   As a passenger in a car for an hour without a break Would never doze   Lying down to rest in the afternoon when circumstances permit Slight chance of dozing   Sitting and talking to someone Would never doze   Sitting quietly after a lunch without alcohol Would never doze   In a car, while stopped for a few minutes in traffic Would never doze   [place] Score (MC) 2   [place] Score (Sleep) 2         INSOMNIA SEVERITY INDEX (EDDIE)      Insomnia Severity Index (EDDIE) 6/8/2022   Difficulty falling asleep 2   Difficulty staying asleep 0   Problems waking up too early 0   How SATISFIED/DISSATISFIED are you with your CURRENT sleep pattern? 3   How NOTICEABLE to others do you think your sleep problem is in terms of impairing the quality of your life? 0   How WORRIED/DISTRESSED are you about your current sleep problem? 1   To what extent do you consider your sleep problem to INTERFERE with your daily functioning (e.g. daytime fatigue, mood, ability to  function at work/daily chores, concentration, memory, mood, etc.) CURRENTLY? 3   EDDIE Total Score 9       Guidelines for Scoring/Interpretation:  Total score categories:  0-7 = No clinically significant insomnia   8-14 = Subthreshold insomnia   15-21 = Clinical insomnia (moderate severity)  22-28 = Clinical insomnia (severe)  Used via courtesy of www.[employer].va.gov with permission from David Bledsoe PhD., [facility]      STOP BANG     STOP BANG Questionnaire (  2008, the American Society of Anesthesiologists, Inc. [employer] 6/8/2022   B/P Clinic: -   BMI Clinic: 44.25         GAD7    GABRIELA-7  1/11/2022   1. Feeling nervous, anxious, or on edge 0   2. Not being able to stop or control worrying 1   3. Worrying too much about different things 2   4. Trouble relaxing 0   5. Being so restless that it is hard to sit still 0   6. Becoming easily annoyed or irritable 3   7. Feeling afraid, as if something awful might happen 0   GABRIELA-7 Total Score 6   If you checked any problems, how difficult have they made it for you to do your work, take care of things at home, or get along with other people? Somewhat difficult         CAGE-AID    CAGE-AID Flowsheet 2/28/2022   Have you ever felt you should Cut down on your drinking or drug use? 0   Have people Annoyed you by criticizing your drinking or drug use? 0   Have you ever felt bad or Guilty about your drinking or drug use? 0   Have you ever had a drink or used drugs first thing in the morning to steady your nerves or to get rid of a hangover? (Eye opener) 0   CAGE-AID SCORE 0   1. Have you felt you ought to cut down on your drinking or drug use? No   2. Have people annoyed you by criticizing your drinking or drug use? No   3. Have you felt bad or guilty about your drinking or drug use? No   4. Have you ever had a drink or used drugs first thing in the morning to steady your nerves or to get rid of a hangover (eye opener)? No   CAGE-AID SCORE 0 (A  "total score of 2 or greater is considered clinically significant)       CAGE-AID reprinted with permission from the [facility] Journal, BECKY Peters. and KWAME Mcclellan, \"Conjoint screening questionnaires for alcohol and drug abuse\" [facility] Journal 94: 135-140, 1995.      PATIENT HEALTH QUESTIONNAIRE-9 (PHQ - 9)    PHQ-9 ([employer]) 4/28/2022   No Interest In Doing Things -   Feeling Depressed -   Trouble Sleeping -   Tired / No Energy -   No appetite or Over-Eating -   Feeling Bad about Self -   Trouble Concentrating -   Moving Slow or Restless -   Suicidal Thoughts -   Total Score -   1.  Little interest or pleasure in doing things 2   2.  Feeling down, depressed, or hopeless 3   3.  Trouble falling or staying asleep, or sleeping too much 2   4.  Feeling tired or having little energy 0   5.  Poor appetite or overeating 0   6.  Feeling bad about yourself 2   7.  Trouble concentrating 0   8.  Moving slowly or restless 0   9.  Suicidal or self-harm thoughts 0   PHQ-9 Total Score 9   Difficulty at work, home, or with people -   1.  Little interest or pleasure in doing things More than half the days   2.  Feeling down, depressed, or hopeless Nearly every day   3.  Trouble falling or staying asleep, or sleeping too much More than half the days   4.  Feeling tired or having little energy Not at all   5.  Poor appetite or overeating Not at all   6.  Feeling bad about yourself More than half the days   7.  Trouble concentrating Not at all   8.  Moving slowly or restless Not at all   9.  Suicidal or self-harm thoughts Not at all   PHQ-9 via [employer] TOTAL SCORE-----> 9 (Mild depression)   Difficulty at work, home, or with people Somewhat difficult       Developed by Jovi Pendleton, Petrona Frey, Mason Akhtar and colleagues, with an educational holden from [employer] No permission required to reproduce, translate, display or distribute.        Allergies:    Allergies   Allergen Reactions     Buspar " [Buspirone Hcl] Rash     Lisinopril Cough     Seroquel [Quetiapine] Itching       Medications:    Current Outpatient Medications   Medication Sig Dispense Refill     ACCU-CHEK GUIDE test strip USE TO TEST TWICE DAILY 200 strip 0     alcohol swab prep pads Use to swab area of injection/haley as directed. 100 each 3     atorvastatin (LIPITOR) 40 MG tablet Take 1 tablet (40 mg) by mouth daily 90 tablet 3     blood glucose (NO BRAND SPECIFIED) lancets standard Use to test blood sugar 2 times daily or as directed. 100 lancet 3     blood glucose monitoring (NO BRAND SPECIFIED) meter device kit Use to test blood sugar 2 times daily or as directed. 1 kit 0     clobetasol (TEMOVATE) 0.05 % external cream Apply sparingly to affected area twice daily as needed.  Do not apply to face. 120 g 3     cyclobenzaprine (FLEXERIL) 5 MG tablet Take 1 tablet (5 mg) by mouth 3 times daily as needed for muscle spasms 42 tablet 0     doxylamine (UNISOM) 25 MG TABS tablet Take 1 tablet (25 mg) by mouth nightly as needed for sleep 30 tablet 1     DULoxetine (CYMBALTA) 60 MG capsule Take 2 capsules (120 mg) by mouth daily 180 capsule 1     furosemide (LASIX) 20 MG tablet Take 1 tablet (20 mg) by mouth 2 times daily 180 tablet 3     hydrOXYzine (ATARAX) 25 MG tablet Take 25-50 mg twice a day as needed for anxiety. Can also take  mg at bedtime for sleep/itching. Don't exceed 300 mg daily. 90 tablet 1     LORazepam (ATIVAN) 0.5 MG tablet Take 1 tablet (0.5 mg) by mouth every 30 minutes as needed (MRI claustrophobia) 2 tablet 0     losartan (COZAAR) 25 MG tablet Take 1 tablet (25 mg) by mouth daily 90 tablet 3     metFORMIN (GLUCOPHAGE-XR) 500 MG 24 hr tablet Take 2 tablets (1,000 mg) by mouth 2 times daily (with meals) 360 tablet 3     metoprolol succinate ER (TOPROL-XL) 25 MG 24 hr tablet Take 1 tablet (25 mg) by mouth daily 90 tablet 3     omeprazole (PRILOSEC) 20 MG DR capsule Take 1 capsule (20 mg) by mouth daily 90 capsule 1      triamcinolone (KENALOG) 0.1 % external ointment Apply topically 2 times daily To affected area x 2 weeks as needed 80 g 0     zolpidem (AMBIEN) 5 MG tablet Take tablet by mouth 15 minutes prior to sleep, for Sleep Study 1 tablet 0     aspirin (ASA) 81 MG EC tablet Take 1 tablet (81 mg) by mouth daily (Patient not taking: Reported on 6/8/2022) 100 tablet 3       Problem List:  Patient Active Problem List    Diagnosis Date Noted     Diabetes mellitus, type 2 (H) 04/19/2021     Priority: Medium     Bilateral edema of lower extremity 09/18/2020     Priority: Medium     Generalized anxiety disorder 09/18/2020     Priority: Medium     Traylor's esophagus determined by endoscopy 11/19/2018     Priority: Medium     Hypertension goal BP (blood pressure) < 140/90 06/10/2018     Priority: Medium     Overweight with BMI >50 09/16/2016     Priority: Medium     Gastroesophageal reflux disease without esophagitis 05/20/2016     Priority: Medium     History of colonic polyps 04/13/2015     Priority: Medium     Brain lipoma 06/28/2012     Priority: Medium     MRI 2017- No change in the appearance of the lipoma above the cerebellar vermis, status post biopsy in 2011.       Spondylolisthesis, grade 1 05/24/2012     Priority: Medium     Chronic low back pain 05/24/2012     Priority: Medium     ELYSSA (obstructive sleep apnea)- severe (AHI 49) 08/22/2011     Priority: Medium     Sleep study 2010 BMI 45 -  AHI 49.4/hour, RDI 54.2/hour, REM RDI not applicable, supine RDI of 37.5/hour.  Minimum oxygen saturation was 88%.  PLMI 58.3/hour, PLMAI 11/hour.          Tobacco abuse 05/06/2011     Priority: Medium     Neck pain 01/28/2011     Priority: Medium     DDD (degenerative disc disease), cervical 12/19/2010     Priority: Medium     Disc herniation 10/08/2010     Priority: Medium     DDD (degenerative disc disease), lumbar 10/08/2010     Priority: Medium     Sees Dr. Cuevas, spine.  I am willing to do vicodin for flare ups which patient states  occurs 3-4 times/year.  She is interested in pain management program.       CARDIOVASCULAR SCREENING; LDL GOAL LESS THAN 160 05/23/2010     Priority: Medium     Depression, major      Priority: Medium     Has been on wellbutrin, prozac.  Citalopram at 60 mg gave side effects, better at 40mg.  Dr. Lebron, psychiatry [facility].       Migraines      Priority: Medium     Restless legs syndrome (RLS) 01/05/2021     Priority: Low        Past Medical/Surgical History:  Past Medical History:   Diagnosis Date     Cellulitis and abscess of foot, except toes 09/26/2010    [facility]     Chronic daily headache 02/15/2011     Complete rupture of rotator cuff 07/06/2009     Degenerative disc disease     cervical     Depression, major      Depressive disorder      Diabetes mellitus, type 2 (H)      Dizziness - light-headed 02/15/2011     GERD (gastroesophageal reflux disease)      Hypertension goal BP (blood pressure) < 140/90      LBP (low back pain)      Panic attacks      Seasonal allergies      Vulvar dermatitis 07/17/2012     Past Surgical History:   Procedure Laterality Date     ARTHROSCOPY SHOULDER  1990    Right/spurs     CARPAL TUNNEL RELEASE RT/LT  1990    Left     COLONOSCOPY  2016     CRANIOTOMY, EXCISE TUMOR COMPLEX, COMBINED  5/9/2011    Procedure:COMBINED CRANIOTOMY, EXCISE TUMOR COMPLEX; 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; Surgeon:LEE ANN PAULA; Location:[facility] OR     ROTATOR CUFF REPAIR RT/LT  2009     Left     [facility] FOOT/TOES SURGERY PROC UNLISTED  1975    Toe fracture, left       Social History:  Social History     Socioeconomic History     Marital status: Single     Spouse name: Not on file     Number of children: 0     Years of education: 18     Highest education level: Not on file   Occupational History     Occupation:      Employer: [employer]   Tobacco Use     Smoking status: Former Smoker     Packs/day: 0.80     Years: 30.00     Pack years:  24.00     Types: Cigarettes     Smokeless tobacco: Never Used     Tobacco comment: 1 PPD x 30. Quit 2017   Vaping Use     Vaping Use: Never used   Substance and Sexual Activity     Alcohol use: Yes     Comment: 6 drinks a year     Drug use: No     Sexual activity: Not Currently     Birth control/protection: None   Other Topics Concern      Service No     Blood Transfusions No     Caffeine Concern Yes     Comment: Coffee     Occupational Exposure No     Hobby Hazards No     Sleep Concern Yes     Comment: feels she sleeps to much     Stress Concern No     Weight Concern Yes     Comment: cannot lose     Special Diet No     Back Care No     Exercise Yes     Comment: Walk on treadmill, swim     Bike Helmet Not Asked     Seat Belt Yes     Self-Exams No     Parent/sibling w/ CABG, MI or angioplasty before 65F 55M? No   Social History Narrative    Process death claims     Social Determinants of Health     Financial Resource Strain: High Risk     Difficulty of Paying Living Expenses: Hard   Food Insecurity: No Food Insecurity     Worried About Running Out of Food in the Last Year: Never true     Ran Out of Food in the Last Year: Never true   Transportation Needs: Not on file   Physical Activity: Inactive     Days of Exercise per Week: 0 days     Minutes of Exercise per Session: 0 min   Stress: Not on file   Social Connections: Not on file   Intimate Partner Violence: Not At Risk     Fear of Current or Ex-Partner: No     Emotionally Abused: No     Physically Abused: No     Sexually Abused: No   Housing Stability: Not on file       Family History:  Family History   Problem Relation Age of Onset     Hypertension Mother      C.A.D. Father      Cancer Maternal Grandmother      Cerebrovascular Disease Paternal Grandmother      Breast Cancer Paternal Grandmother      Neurologic Disorder Sister         migraine     Cancer Brother      Cancer - colorectal Brother      Colon Cancer Brother        Review of Systems:  A complete  "review of systems reviewed by me is negative with the exeption of what has been mentioned in the history of present illness.    Physical Examination:  Vitals: Ht 1.568 m (5' 1.75\")   Wt 108.9 kg (240 lb)   BMI 44.25 kg/m    BMI= Body mass index is 44.25 kg/m .           GENERAL APPEARANCE: alert and no distress  EYES: Eyes grossly normal to inspection  HENT: oropharynx crowded  NECK: no asymmetry, masses, or scars  RESP: breathing is non-labored  NEURO: mentation intact and speech normal  PSYCH: affect normal/bright         Data: All pertinent previous laboratory data reviewed     Recent Labs   Lab Test 10/23/21  1042 10/22/20  1147    138   POTASSIUM 4.5 4.0   CHLORIDE 107 103   CO2 27 28   ANIONGAP 4 7   * 167*   BUN 12 11   CR 0.92 0.69   ALCIDES 9.7 9.1       Recent Labs   Lab Test 10/22/20  1147   WBC 5.7   RBC 5.44*   HGB 13.8   HCT 43.5   MCV 80   MCH 25.4*   MCHC 31.7   RDW 15.3*          Recent Labs   Lab Test 10/23/21  1042   PROTTOTAL 7.6   ALBUMIN 3.8   BILITOTAL 0.4   ALKPHOS 79   AST 24   ALT 35       TSH (mU/L)   Date Value   04/09/2021 6.36 (H)   10/22/2020 5.38 (H)       Benzodiazepine Qual Urine (no units)   Date Value   08/13/2018 Negative     Cannabinoids Qual Urine (no units)   Date Value   08/13/2018 Negative     Cocaine Qual Urine (no units)   Date Value   08/13/2018 Negative     Opiates Qualitative Urine (no units)   Date Value   08/13/2018 Negative       Ferritin   Date/Time Value Ref Range Status   08/23/2011 12:57 PM 37 10 - 300 ng/mL Final       pH Arterial (pH)   Date Value   05/09/2011 7.42     pO2 Arterial (mm Hg)   Date Value   05/09/2011 177 (H)     pCO2 Arterial (mm Hg)   Date Value   05/09/2011 32 (L)     Bicarbonate Arterial (mmol/L)   Date Value   05/09/2011 20 (L)       Chest CT: CT Chest Lung Cancer Scrn Low Dose wo 10/13/2021    Narrative  CT CHEST LUNG CANCER SCREEN LOW DOSE WITHOUT  10/13/2021 4:00 PM    HISTORY:  Lung cancer screening, >= 30 pk-yr smoking " "history in last  15 yrs (Age 55-80y); Personal history of tobacco use, presenting  hazards to health    TECHNIQUE:  Scans obtained from the apices through the diaphragm  without IV contrast. Low dose CT chest technique was utilized.  Radiation dose for this scan was reduced using automated exposure  control, adjustment of the mA and/or kV according to patient size, or  iterative reconstruction technique.    COMPARISON:  10/26/2020    FINDINGS:  Lungs: Stable 3 mm subpleural nodule right lower lobe on image 169. No  new pulmonary nodules. Lungs otherwise clear.    Additional findings: No lymphadenopathy. Mild coronary artery  calcification.    Impression  IMPRESSION:  1. ACR Assessment Category:  Lung-RADS Category 2. Benign appearance  or behavior. Recommendation: Continue annual screening, if clinically  relevant (please order exam code IMG 2290). .  2. Significant Incidental Finding(s):  Category S: No.    Download the \"LungRADS Assessment Categories\" table at this site:  http://www.acr.org/Quality-Safety/Resources/LungRADS    GERALD CHURCH MD    SpO2 Readings from Last 4 Encounters:   04/29/22 100%   04/15/22 100%   03/07/22 99%   01/11/22 100%       Valorie Camacho PA-C 6/8/2022         "

## 2022-06-09 ENCOUNTER — TELEPHONE (OUTPATIENT)
Dept: SLEEP MEDICINE | Facility: CLINIC | Age: 62
End: 2022-06-09
Payer: COMMERCIAL

## 2022-06-11 RX ORDER — LIDOCAINE HYDROCHLORIDE 10 MG/ML
2 INJECTION, SOLUTION INFILTRATION; PERINEURAL
Status: DISCONTINUED | OUTPATIENT
Start: 2022-06-07 | End: 2024-05-21

## 2022-06-11 RX ORDER — TRIAMCINOLONE ACETONIDE 40 MG/ML
40 INJECTION, SUSPENSION INTRA-ARTICULAR; INTRAMUSCULAR
Status: DISCONTINUED | OUTPATIENT
Start: 2022-06-07 | End: 2024-05-21

## 2022-06-16 ENCOUNTER — NURSE TRIAGE (OUTPATIENT)
Dept: FAMILY MEDICINE | Facility: CLINIC | Age: 62
End: 2022-06-16
Payer: COMMERCIAL

## 2022-06-16 ENCOUNTER — MYC MEDICAL ADVICE (OUTPATIENT)
Dept: FAMILY MEDICINE | Facility: CLINIC | Age: 62
End: 2022-06-16
Payer: COMMERCIAL

## 2022-06-16 NOTE — TELEPHONE ENCOUNTER
Agree with RN plan of care.  Would recommend emergency department for possible symptomatic arrhythmia.    Macey Simons PA-C on 6/16/2022 at 2:16 PM

## 2022-06-16 NOTE — TELEPHONE ENCOUNTER
Secondary triage needed: Per protocol go to the ED/UC or to office with approval.     Patient has been dizzy for about 2 weeks with lightheadedness, some times to the point she thought she may pass out after standing up. Patient is okay now, slight dizziness and lightheadedness noted. She went to donate blood yesterday and they took her vitals and told her that she was having skipped heartbeats. They waited and re-checked and she was still having them. Patient said she did not feel this happening but they denied letting her donate blood. Patient states she feels okay for the most part but would prefer to be seen in [facility]  or ER.     Christine Corley RN      Reason for Disposition    Extra heart beats OR irregular heart beating (i.e., 'palpitations')     Patient was told she could not give blood because she was having skipped beats    Additional Information    Negative: Shock suspected (e.g., cold/pale/clammy skin, too weak to stand, low BP, rapid pulse)    Negative: Difficult to awaken or acting confused (e.g., disoriented, slurred speech)    Negative: Fainted, and still feels dizzy afterwards    Negative: Severe difficulty breathing (e.g., struggling for each breath, speaks in single words)    Negative: Overdose (accidental or intentional) of medications    Negative: New neurologic deficit that is present now: * Weakness of the face, arm, or leg on one side of the body * Numbness of the face, arm, or leg on one side of the body * Loss of speech or garbled speech    Negative: Heart beating < 50 beats per minute OR > 140 beats per minute    Negative: Sounds like a life-threatening emergency to the triager    Negative: Chest pain    Negative: Rectal bleeding, bloody stool, or tarry-black stool    Negative: Vomiting is the main symptom    Negative: Diarrhea is the main symptom    Negative: Headache is the main symptom    Negative: Heat exhaustion suspected (i.e., dehydration from heat exposure)    Negative:  "Patient states that he/she is having an anxiety/panic attack    Negative: SEVERE dizziness (e.g., unable to stand, requires support to walk, feels like passing out now)    Negative: SEVERE headache or neck pain    Negative: Spinning or tilting sensation (vertigo) present now and one or more stroke risk factors (i.e., hypertension, diabetes, prior stroke/TIA, heart attack, age over 60) (Exception: prior physician evaluation for this AND no different/worse than usual)    Negative: Loss of vision or double vision    Answer Assessment - Initial Assessment Questions  1. DESCRIPTION: \"Describe your dizziness.\"      Constant   2. LIGHTHEADED: \"Do you feel lightheaded?\" (e.g., somewhat faint, woozy, weak upon standing)      Yes   3. VERTIGO: \"Do you feel like either you or the room is spinning or tilting?\" (i.e. vertigo)      No to both   4. SEVERITY: \"How bad is it?\"  \"Do you feel like you are going to faint?\" \"Can you stand and walk?\"    - MILD - walking normally    - MODERATE - interferes with normal activities (e.g., work, school)     - SEVERE - unable to stand, requires support to walk, feels like passing out now.       Able to do things but she off balace   5. ONSET:  \"When did the dizziness begin?\"      About a week or two   6. AGGRAVATING FACTORS: \"Does anything make it worse?\" (e.g., standing, change in head position)      Standing makes it worse   7. HEART RATE: \"Can you tell me your heart rate?\" \"How many beats in 15 seconds?\"  (Note: not all patients can do this)        No, no   8. CAUSE: \"What do you think is causing the dizziness?\"      Unsure   9. RECURRENT SYMPTOM: \"Have you had dizziness before?\" If so, ask: \"When was the last time?\" \"What happened that time?\"      No  10. OTHER SYMPTOMS: \"Do you have any other symptoms?\" (e.g., fever, chest pain, vomiting, diarrhea, bleeding)        Was told has skipped heart beats while trying to give blood   11. PREGNANCY: \"Is there any chance you are pregnant?\" \"When " "was your last menstrual period?\"        No    Protocols used: DIZZINESS-A-OH      "

## 2022-06-16 NOTE — TELEPHONE ENCOUNTER
Called patient and relayed the providers message below. Patient verbalized understanding and will go be checked out at the ER today.     Christine Corley RN

## 2022-06-20 ENCOUNTER — MYC MEDICAL ADVICE (OUTPATIENT)
Dept: FAMILY MEDICINE | Facility: CLINIC | Age: 62
End: 2022-06-20
Payer: COMMERCIAL

## 2022-06-20 DIAGNOSIS — F41.1 GAD (GENERALIZED ANXIETY DISORDER): ICD-10-CM

## 2022-06-20 DIAGNOSIS — F33.1 MODERATE EPISODE OF RECURRENT MAJOR DEPRESSIVE DISORDER (H): ICD-10-CM

## 2022-06-22 ENCOUNTER — LAB (OUTPATIENT)
Dept: LAB | Facility: CLINIC | Age: 62
End: 2022-06-22
Payer: COMMERCIAL

## 2022-06-22 ENCOUNTER — THERAPY VISIT (OUTPATIENT)
Dept: SLEEP MEDICINE | Facility: CLINIC | Age: 62
End: 2022-06-22
Attending: PHYSICIAN ASSISTANT

## 2022-06-22 DIAGNOSIS — R53.83 MALAISE AND FATIGUE: ICD-10-CM

## 2022-06-22 DIAGNOSIS — R06.00 DYSPNEA AND RESPIRATORY ABNORMALITY: ICD-10-CM

## 2022-06-22 DIAGNOSIS — R06.89 DYSPNEA AND RESPIRATORY ABNORMALITY: ICD-10-CM

## 2022-06-22 DIAGNOSIS — G47.00 INSOMNIA, UNSPECIFIED TYPE: ICD-10-CM

## 2022-06-22 DIAGNOSIS — Z72.820 LACK OF ADEQUATE SLEEP: ICD-10-CM

## 2022-06-22 DIAGNOSIS — E66.01 CLASS 3 SEVERE OBESITY DUE TO EXCESS CALORIES WITH SERIOUS COMORBIDITY AND BODY MASS INDEX (BMI) OF 40.0 TO 44.9 IN ADULT (H): ICD-10-CM

## 2022-06-22 DIAGNOSIS — G47.33 OSA (OBSTRUCTIVE SLEEP APNEA): ICD-10-CM

## 2022-06-22 DIAGNOSIS — I10 ESSENTIAL HYPERTENSION: ICD-10-CM

## 2022-06-22 DIAGNOSIS — R53.81 MALAISE AND FATIGUE: ICD-10-CM

## 2022-06-22 DIAGNOSIS — E66.813 CLASS 3 SEVERE OBESITY DUE TO EXCESS CALORIES WITH SERIOUS COMORBIDITY AND BODY MASS INDEX (BMI) OF 40.0 TO 44.9 IN ADULT (H): ICD-10-CM

## 2022-06-22 LAB
BASE EXCESS BLDV CALC-SCNC: 4.2 MMOL/L (ref -7.7–1.9)
HCO3 BLDV-SCNC: 30 MMOL/L (ref 21–28)
O2/TOTAL GAS SETTING VFR VENT: 21 %
PCO2 BLDV: 49 MM HG (ref 40–50)
PH BLDV: 7.4 [PH] (ref 7.32–7.43)
PO2 BLDV: 28 MM HG (ref 25–47)

## 2022-06-22 PROCEDURE — 95810 POLYSOM 6/> YRS 4/> PARAM: CPT | Performed by: INTERNAL MEDICINE

## 2022-06-22 PROCEDURE — 82803 BLOOD GASES ANY COMBINATION: CPT | Performed by: PATHOLOGY

## 2022-06-22 PROCEDURE — 36415 COLL VENOUS BLD VENIPUNCTURE: CPT | Performed by: PATHOLOGY

## 2022-06-22 RX ORDER — DULOXETIN HYDROCHLORIDE 60 MG/1
120 CAPSULE, DELAYED RELEASE ORAL DAILY
Qty: 180 CAPSULE | Refills: 0 | Status: SHIPPED | OUTPATIENT
Start: 2022-06-22 | End: 2022-07-25

## 2022-06-22 NOTE — TELEPHONE ENCOUNTER
Called to verify patient's preferred pharmacy which is [facility] in [place].  Sent refill to preferred pharmacy. Remaining refills adjusted to reflect original script.    Duke Sainz RN, BSN  [facility]

## 2022-06-27 ENCOUNTER — PATIENT OUTREACH (OUTPATIENT)
Dept: CARE COORDINATION | Facility: CLINIC | Age: 62
End: 2022-06-27

## 2022-06-27 NOTE — PROGRESS NOTES
Clinic Care Coordination Contact    Follow Up Progress Note      Assessment: pt reporting continued depression. Discussed some options such as a meetup group that may be free for socialization.  She has been trying to get some education in and she can not get into the [employer] class to complete the program.  She is working with the [employer] [facility].    Pt is working with a debt consolidation program.  She has not told them she is not working as that would mean the program goes away.  Discussed the [employer] holden as an option to get help with some bills.  She still has just shy of $200 on her auto repair bill.  Encouraged her to try and see if there was funds for this bill.      Pt is not working on finding techniques to manage her depression.  After 2 years she agreed that the goal was not a focus at the current time.  Her exercise goal is still on hold as her knee is causing more concerns.  She got a steroid shot about 3 weeks ago and this weekend she said her knee went out again.  She asked about the use of a cane to help and we talked about how this could help.  She noted that on Saturday she was going up and down the stairs for laundry.  Attempted to talk about spreading out the laundry over different days so she was not going up and down the stairs so often in one day.  She was not necessarily agreeable to this option.  Encouraged her to try and find a way to reduce the number of times she uses the stairs each day.  Either to spread out the laundry or find an activity she can stay down stairs while doing laundry.     Care Gaps:    Health Maintenance Due   Topic Date Due     URINE DRUG SCREEN  Never done     Pneumococcal Vaccine: Pediatrics (0 to 5 Years) and At-Risk Patients (6 to 64 Years) (2 - PCV) 09/16/2017     COVID-19 Vaccine (4 - Booster for Pfizer series) 04/04/2022     MICROALBUMIN  04/27/2022     EYE EXAM  07/23/2022       Postponed to next office visit     Goals addressed this encounter:     Goals Addressed                    This Visit's Progress       #1 Financial Wellbeing (pt-stated)         Goal Statement: I want to find a job and increase my income in the next 6 months.   Date Goal set: 6/27/2022   Barriers: course needed is not available (quick books)  Strengths: going to class's  Date to Achieve By: 12/24/22   Patient expressed understanding of goal: yes  Action steps to achieve this goal:  1. I will continue with classes  2. I will look for the course I need and schedule  3. I will complete my resume  4. I will work with the [employer] center to get the needed programs             #2 Being Active (pt-stated)   On Hold      Goal Statement: I want to increase my activity to help improve my weight and back pain.  I will walk at least 10 minutes 3 days/week or more and maintain this for 3 consecutive months.  Date Goal set: 4/27/2020   Barriers: Depression, back pain, social distancing to some extent  Strengths: Understand that increasing my exercise will help with managing my weight and back pain  Date to Achieve By: 9/18/22   Patient expressed understanding of goal: Yes  Action steps to achieve this goal:  1. I will walk in one direction for 5 minutes and then return for a total of 10 minutes  2. I will not get discouraged if I cannot make it the 5 minutes in one direction before having to turn around  3. I will celebrate my successes              Intervention/Education provided during outreach: listened and attempted to problem solve.  Pt identified her depression as a reason she declines options discussed.  She also has very limited financial means due to not working.  Encouraged work with the [employer] foundation for her auto repair bill.  She was a little hesitant to work towards that yet we talked about paying it off and having one less bill to pay.     Relationships with some at [employer] is increasing her depression. Listened as pt talked about her concerns and her plan to approach.       Outreach Frequency: two weeks.    Plan:   Pt to look into [employer].  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   Care Coordinator will follow up in two weeks.     JAQUELIN Mensah  [facility] - Care Coordinator   6/27/2022   3:24 PM  [phone]

## 2022-06-28 LAB — SLPCOMP: NORMAL

## 2022-07-11 ENCOUNTER — PATIENT OUTREACH (OUTPATIENT)
Dept: CARE COORDINATION | Facility: CLINIC | Age: 62
End: 2022-07-11

## 2022-07-11 NOTE — PROGRESS NOTES
Clinic Care Coordination Contact    Follow Up Progress Note      Assessment: pt reporting that she was in pain due to activities over the weekend at [employer].  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 Her knee is better after the injection and her pain is in her back.  If she lays still she is comfortable.      She continues to work on getting her classes in for her work with the [place] center.     Care Gaps:    Health Maintenance Due   Topic Date Due     URINE DRUG SCREEN  Never done     Pneumococcal Vaccine: Pediatrics (0 to 5 Years) and At-Risk Patients (6 to 64 Years) (2 - PCV) 09/16/2017     COVID-19 Vaccine (4 - Booster for Pfizer series) 04/04/2022     MICROALBUMIN  04/27/2022     A1C  07/15/2022     EYE EXAM  07/23/2022       Postponed to next office visit     Goals addressed this encounter:    Goals Addressed                    This Visit's Progress       #1 Financial Wellbeing (pt-stated)   10%      Goal Statement: I want to find a job and increase my income in the next 6 months.   Date Goal set: 6/27/2022   Barriers: course needed is not available (quick books)  Strengths: going to class's  Date to Achieve By: 12/24/22   Patient expressed understanding of goal: yes  Action steps to achieve this goal:  1. I will continue with classes  2. I will look for the course I need and schedule  3. I will complete my resume  4. I will work with the [place] center to get the needed programs             #2 Being Active (pt-stated)   On Hold      Goal Statement: I want to increase my activity to help improve my weight and back pain.  I will walk at least 10 minutes 3 days/week or more and maintain this for 3 consecutive months.  Date Goal set: 4/27/2020   Barriers: Depression, back pain, social distancing to some extent  Strengths: Understand that increasing my exercise will help with managing my weight and back pain  Date to Achieve By: 9/18/22   Patient expressed understanding of goal:  Yes  Action steps to achieve this goal:  1. I will walk in one direction for 5 minutes and then return for a total of 10 minutes  2. I will not get discouraged if I cannot make it the 5 minutes in one direction before having to turn around  3. I will celebrate my successes              Intervention/Education provided during outreach: listened and encouraged her to find the balance between rest and movement.     internet went down and call could not be completed.       Outreach Frequency: 2 weeks      Plan:   Pt to continue to find the balance of rest and activity.   Care Coordinator will follow up in two weeks.     JAQUELIN Mensah  [facility] - Care Coordinator   7/12/2022   7:32 AM  [phone]

## 2022-07-25 ENCOUNTER — OFFICE VISIT (OUTPATIENT)
Dept: FAMILY MEDICINE | Facility: CLINIC | Age: 62
End: 2022-07-25
Payer: COMMERCIAL

## 2022-07-25 ENCOUNTER — PATIENT OUTREACH (OUTPATIENT)
Dept: CARE COORDINATION | Facility: CLINIC | Age: 62
End: 2022-07-25

## 2022-07-25 VITALS
BODY MASS INDEX: 45.95 KG/M2 | SYSTOLIC BLOOD PRESSURE: 136 MMHG | RESPIRATION RATE: 20 BRPM | WEIGHT: 249.2 LBS | OXYGEN SATURATION: 98 % | TEMPERATURE: 97.2 F | DIASTOLIC BLOOD PRESSURE: 74 MMHG | HEART RATE: 96 BPM

## 2022-07-25 DIAGNOSIS — I10 HYPERTENSION GOAL BP (BLOOD PRESSURE) < 140/90: ICD-10-CM

## 2022-07-25 DIAGNOSIS — F33.1 MODERATE EPISODE OF RECURRENT MAJOR DEPRESSIVE DISORDER (H): ICD-10-CM

## 2022-07-25 DIAGNOSIS — E11.65 TYPE 2 DIABETES MELLITUS WITH HYPERGLYCEMIA, WITHOUT LONG-TERM CURRENT USE OF INSULIN (H): Primary | ICD-10-CM

## 2022-07-25 DIAGNOSIS — E66.01 MORBID OBESITY (H): ICD-10-CM

## 2022-07-25 DIAGNOSIS — F41.1 GAD (GENERALIZED ANXIETY DISORDER): ICD-10-CM

## 2022-07-25 LAB
CREAT UR-MCNC: 43 MG/DL
HBA1C MFR BLD: 7.1 % (ref 0–5.6)
MICROALBUMIN UR-MCNC: 5 MG/L
MICROALBUMIN/CREAT UR: 11.63 MG/G CR (ref 0–25)

## 2022-07-25 PROCEDURE — 99214 OFFICE O/P EST MOD 30 MIN: CPT | Performed by: FAMILY MEDICINE

## 2022-07-25 PROCEDURE — 82043 UR ALBUMIN QUANTITATIVE: CPT | Performed by: FAMILY MEDICINE

## 2022-07-25 PROCEDURE — 36415 COLL VENOUS BLD VENIPUNCTURE: CPT | Performed by: FAMILY MEDICINE

## 2022-07-25 PROCEDURE — 83036 HEMOGLOBIN GLYCOSYLATED A1C: CPT | Performed by: FAMILY MEDICINE

## 2022-07-25 RX ORDER — METFORMIN HCL 500 MG
1000 TABLET, EXTENDED RELEASE 24 HR ORAL 2 TIMES DAILY WITH MEALS
Qty: 360 TABLET | Refills: 3 | Status: SHIPPED | OUTPATIENT
Start: 2022-07-25 | End: 2023-04-13

## 2022-07-25 RX ORDER — DULOXETIN HYDROCHLORIDE 60 MG/1
120 CAPSULE, DELAYED RELEASE ORAL DAILY
Qty: 180 CAPSULE | Refills: 1 | Status: SHIPPED | OUTPATIENT
Start: 2022-07-25 | End: 2023-03-08

## 2022-07-25 RX ORDER — METOPROLOL SUCCINATE 25 MG/1
25 TABLET, EXTENDED RELEASE ORAL DAILY
Qty: 90 TABLET | Refills: 3 | Status: SHIPPED | OUTPATIENT
Start: 2022-07-25 | End: 2023-04-13

## 2022-07-25 ASSESSMENT — PAIN SCALES - GENERAL: PAINLEVEL: NO PAIN (0)

## 2022-07-25 ASSESSMENT — ANXIETY QUESTIONNAIRES
GAD7 TOTAL SCORE: 9
6. BECOMING EASILY ANNOYED OR IRRITABLE: MORE THAN HALF THE DAYS
1. FEELING NERVOUS, ANXIOUS, OR ON EDGE: MORE THAN HALF THE DAYS
7. FEELING AFRAID AS IF SOMETHING AWFUL MIGHT HAPPEN: NOT AT ALL
2. NOT BEING ABLE TO STOP OR CONTROL WORRYING: NEARLY EVERY DAY
3. WORRYING TOO MUCH ABOUT DIFFERENT THINGS: MORE THAN HALF THE DAYS
GAD7 TOTAL SCORE: 9
5. BEING SO RESTLESS THAT IT IS HARD TO SIT STILL: NOT AT ALL

## 2022-07-25 ASSESSMENT — PATIENT HEALTH QUESTIONNAIRE - PHQ9
SUM OF ALL RESPONSES TO PHQ QUESTIONS 1-9: 7
10. IF YOU CHECKED OFF ANY PROBLEMS, HOW DIFFICULT HAVE THESE PROBLEMS MADE IT FOR YOU TO DO YOUR WORK, TAKE CARE OF THINGS AT HOME, OR GET ALONG WITH OTHER PEOPLE: NOT DIFFICULT AT ALL
SUM OF ALL RESPONSES TO PHQ QUESTIONS 1-9: 7
5. POOR APPETITE OR OVEREATING: NOT AT ALL

## 2022-07-25 NOTE — PROGRESS NOTES
Clinic Care Coordination Contact  [facility]/Voicemail       Clinical Data: Care Coordinator Outreach  Outreach attempted x 1.  Left message on patient's voicemail with call back information and requested return call.  Plan:  Care Coordinator will try to reach patient again in 6-10 business days.    JAQUELIN Mensah  [facility] - Care Coordinator   7/25/2022   3:27 PM  [phone]

## 2022-07-25 NOTE — PROGRESS NOTES
"  Assessment & Plan     Type 2 diabetes mellitus with hyperglycemia, without long-term current use of insulin (H), fairly controlled. A1c 7.1 today.  - HEMOGLOBIN A1C  - Albumin Random Urine Quantitative with Creat Ratio  - Adult Eye Referral  - Refill:  metFORMIN (GLUCOPHAGE XR) 500 MG 24 hr tablet  Dispense: 360 tablet; Refill: 3  - Albumin Random Urine Quantitative with Creat Ratio  -Continue therapeutic lifestyle changes.    Hypertension goal BP (blood pressure) < 140/90, controlled   -Refill: metoprolol succinate ER (TOPROL XL) 25 MG 24 hr tablet  Dispense: 90 tablet; Refill: 3    GABRIELA (generalized anxiety disorder), stable  - PHQ-9/GABRIELA 7 completed, see below/Epic for details    - Refill: DULoxetine (CYMBALTA) 60 MG capsule  Dispense: 180 capsule; Refill: 1    Moderate episode of recurrent major depressive disorder (H), stable.   - PHQ-9/GABRIELA 7 completed, see below/Epic for details    - Refill: DULoxetine (CYMBALTA) 60 MG capsule  Dispense: 180 capsule; Refill: 1    Morbid obesity (H)  - Weight management plan: Discussed healthy diet and exercise guidelines           BMI:   Estimated body mass index is 45.95 kg/m  as calculated from the following:    Height as of 6/8/22: 1.568 m (5' 1.75\").    Weight as of this encounter: 113 kg (249 lb 3.2 oz).   Weight management plan: Discussed healthy diet and exercise guidelines        Return in about 3 months (around 10/25/2022) for Diabetes Follow Up with a HgbA1C prior to visit.    Marybeth Silver MD  [facility] KIRSTIE Menendez is a 61 year old, presenting for the following health issues:  Diabetes and Hypertension      History of Present Illness       Diabetes:   She presents for follow up of diabetes.  She is checking home blood glucose a few times a month. She checks blood glucose before and after meals.  Blood glucose is never over 200 and never under 70. When her blood glucose is low, the patient is asymptomatic for confusion, blurred " vision, lethargy and reports not feeling dizzy, shaky, or weak.  She has no concerns regarding her diabetes at this time.  She is not experiencing numbness or burning in feet, excessive thirst, blurry vision, weight changes or redness, sores or blisters on feet. The patient has not had a diabetic eye exam in the last 12 months.         Hypertension: She presents for follow up of hypertension.  She does not check blood pressure  regularly outside of the clinic. Outpatient blood pressures have not been over 140/90. She follows a low salt diet.      Today's PHQ-9         PHQ-9 Total Score: 7    PHQ-9 Q9 Thoughts of better off dead/self-harm past 2 weeks :   Not at all    How difficult have these problems made it for you to do your work, take care of things at home, or get along with other people: Not difficult at all     Diabetes Follow-up  Currently on Metformin 1000 mg twice a day.  Tolerating well no side effects reported.  How often are you checking your blood sugar? A few times a week  What time of day are you checking your blood sugars (select all that apply)?  Before meals  Have you had any blood sugars above 200?  No  Have you had any blood sugars below 70?  No    What symptoms do you notice when your blood sugar is low?  None    What concerns do you have today about your diabetes? None     Do you have any of these symptoms? (Select all that apply)  No numbness or tingling in feet.  No redness, sores or blisters on feet.  No complaints of excessive thirst.  No reports of blurry vision.  No significant changes to weight.    Have you had a diabetic eye exam in the last 12 months? No        BP Readings from Last 2 Encounters:   07/25/22 136/74   06/07/22 132/84     Hemoglobin A1C POCT (%)   Date Value   06/22/2021 8.3 (H)   04/09/2021 12.7 (H)     Hemoglobin A1C (%)   Date Value   07/25/2022 7.1 (H)   04/15/2022 6.7 (H)     LDL Cholesterol Calculated (mg/dL)   Date Value   10/23/2021 86   10/22/2020 158 (H)    08/13/2018 130 (H)         Depression and Anxiety Follow-Up      How are you doing with your depression since your last visit? No change    How are you doing with your anxiety since your last visit?  No change    Are you having other symptoms that might be associated with depression or anxiety? Yes:  Stress and anxiety from being unemployed.  Currently getting further [employer] and hoping to complete the course next week and start applying for new jobs. She is hopeful.    Have you had a significant life event? Job Concerns     Do you have any concerns with your use of alcohol or other drugs? No    Social History     Tobacco Use     Smoking status: Former Smoker     Packs/day: 0.80     Years: 30.00     Pack years: 24.00     Types: Cigarettes     Smokeless tobacco: Never Used     Tobacco comment: 1 PPD x 30. Quit 2017   Vaping Use     Vaping Use: Never used   Substance Use Topics     Alcohol use: Yes     Comment: 6 drinks a year     Drug use: No     PHQ 4/15/2022 4/28/2022 7/25/2022   PHQ-9 Total Score 9 9 7   Q9: Thoughts of better off dead/self-harm past 2 weeks Not at all Not at all Not at all     GABRIELA-7 SCORE 10/5/2021 1/11/2022 7/25/2022   Total Score - - -   Total Score 5 6 9     Last PHQ-9 7/25/2022   1.  Little interest or pleasure in doing things 1   2.  Feeling down, depressed, or hopeless 2   3.  Trouble falling or staying asleep, or sleeping too much 2   4.  Feeling tired or having little energy 1   5.  Poor appetite or overeating 0   6.  Feeling bad about yourself 1   7.  Trouble concentrating 0   8.  Moving slowly or restless 0   Q9: Thoughts of better off dead/self-harm past 2 weeks 0   PHQ-9 Total Score 7   Difficulty at work, home, or with people -     GABRIELA-7  7/25/2022   1. Feeling nervous, anxious, or on edge 2   2. Not being able to stop or control worrying 3   3. Worrying too much about different things 2   4. Trouble relaxing 0   5. Being so restless that it is hard to sit still 0   6. Becoming  easily annoyed or irritable 2   7. Feeling afraid, as if something awful might happen 0   GABRIELA-7 Total Score 9   If you checked any problems, how difficult have they made it for you to do your work, take care of things at home, or get along with other people? -       Suicide Assessment Five-step Evaluation and Treatment (SAFE-T)          Review of Systems   Constitutional, HEENT, cardiovascular, pulmonary, gi and gu systems are negative, except as otherwise noted.      Objective    /74   Pulse 96   Temp 97.2  F (36.2  C) (Tympanic)   Resp 20   Wt 113 kg (249 lb 3.2 oz)   SpO2 98%   Breastfeeding No   BMI 45.95 kg/m    Body mass index is 45.95 kg/m .  Physical Exam   GENERAL: healthy, alert and no distress  RESP: lungs clear to auscultation - no rales, rhonchi or wheezes  CV: regular rate and rhythm, normal S1 S2, no S3 or S4, no murmur, click or rub, no peripheral edema and peripheral pulses strong  PSYCH: mentation appears normal, affect normal/bright    DATA  Reviewed and discussed with patient prior to discharge.  Results for orders placed or performed in visit on 07/25/22   HEMOGLOBIN A1C     Status: Abnormal   Result Value Ref Range    Hemoglobin A1C 7.1 (H) 0.0 - 5.6 %                 .  ..

## 2022-07-28 NOTE — PROGRESS NOTES
Clinic Care Coordination Contact    Follow Up Progress Note      Assessment: pt reported that she was able to get a holden from the [employer] foundation to pay off her car repair.  She asked her mom for help with some bills.  She continues to work on learning [employer]books so she can take her test.  She is applying for jobs through [employer] in and workforce center.     She is looking at options to help get funds.  It is her gas, food, and other revolving bills she needs help with.      Care Gaps:    Health Maintenance Due   Topic Date Due     URINE DRUG SCREEN  Never done     Pneumococcal Vaccine: Pediatrics (0 to 5 Years) and At-Risk Patients (6 to 64 Years) (2 - PCV) 09/16/2017     COVID-19 Vaccine (4 - Booster for Pfizer series) 04/04/2022     EYE EXAM  07/23/2022       Postponed to future appointments with PCP     Goals addressed this encounter:    Goals Addressed                    This Visit's Progress       #1 Financial Wellbeing (pt-stated)   20%      Goal Statement: I want to find a job and increase my income in the next 6 months.   Date Goal set: 6/27/2022   Barriers: course needed is not available (quick books)  Strengths: going to class's  Date to Achieve By: 12/24/22   Patient expressed understanding of goal: yes  Action steps to achieve this goal:  1. I will continue with classes  2. I will look for the course I need and schedule  3. I will complete my resume  4. 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  Asked follow up questions and what is the next step to help her move her thoughts along.      Outreach Frequency: 2 weeks    Plan:   Pt to continue to look into options.  Care Coordinator will follow up in two weeks     JAQUELIN Mensah   [facility] - Care Coordination  [facility]   [phone]

## 2022-08-04 ENCOUNTER — TRANSFERRED RECORDS (OUTPATIENT)
Dept: FAMILY MEDICINE | Facility: CLINIC | Age: 62
End: 2022-08-04

## 2022-08-04 LAB — RETINOPATHY: NEGATIVE

## 2022-08-10 ENCOUNTER — PATIENT OUTREACH (OUTPATIENT)
Dept: CARE COORDINATION | Facility: CLINIC | Age: 62
End: 2022-08-10

## 2022-08-10 NOTE — PROGRESS NOTES
Clinic Care Coordination Contact    Follow Up Progress Note      Assessment: pt noted that she is not feeling that good today.  She is working on getting to her test and feels more ready for it.     The [employer] foundation has not paid the auto bill and she sent an email to them yesterday.      Care Gaps:    Health Maintenance Due   Topic Date Due     URINE DRUG SCREEN  Never done     Pneumococcal Vaccine: Pediatrics (0 to 5 Years) and At-Risk Patients (6 to 64 Years) (2 - PCV) 09/16/2017     COVID-19 Vaccine (4 - Booster for Pfizer series) 04/04/2022     EYE EXAM  07/23/2022       Postponed to next office visit     Goals addressed this encounter:    Goals Addressed                    This Visit's Progress       #1 Financial Wellbeing (pt-stated)   30%      Goal Statement: I want to find a job and increase my income in the next 6 months.   Date Goal set: 6/27/2022   Barriers: course needed is not available ([employer] books)  Strengths: going to class's  Date to Achieve By: 12/24/22   Patient expressed understanding of goal: yes  Action steps to achieve this goal:  1. I will continue with classes  2. I will look for the course I need and schedule  3. I will complete my resume  4. 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  I will walk at least 10 minutes 3 days/week or more and maintain this for 3 consecutive months.  Date Goal set: 4/27/2020   Barriers: Depression, back pain, social distancing to some extent  Strengths: Understand that increasing my exercise will help with managing my weight and back pain  Date to Achieve By: 9/18/22   Patient expressed understanding of goal: Yes  Action steps to achieve this goal:  1. I will walk in one direction for 5 minutes and then return for a total of 10 minutes  2. I will not get discouraged if I cannot make it the 5  minutes in one direction before having to turn around  3. I will celebrate my successes              Intervention/Education provided during outreach: offered congratulations for her progress.      She had no new needs and felt she was doing pretty good today.      Outreach Frequency: monthly      Plan:   Pt to continue with her course work.   Care Coordinator will follow up in one month.     JAQUELIN Mensah  [facility] - Care Coordinator   8/10/2022   3:24 PM  [phone]

## 2022-08-18 ENCOUNTER — VIRTUAL VISIT (OUTPATIENT)
Dept: EDUCATION SERVICES | Facility: CLINIC | Age: 62
End: 2022-08-18
Payer: COMMERCIAL

## 2022-08-18 DIAGNOSIS — E11.65 TYPE 2 DIABETES MELLITUS WITH HYPERGLYCEMIA, WITHOUT LONG-TERM CURRENT USE OF INSULIN (H): Primary | ICD-10-CM

## 2022-08-18 PROCEDURE — G0108 DIAB MANAGE TRN  PER INDIV: HCPCS | Performed by: DIETITIAN, REGISTERED

## 2022-08-18 NOTE — PROGRESS NOTES
"Diabetes Self-Management Education & Support    Presents for: Individual review    CDE VISIT MODE: The patient has been notified of following:     \"This telephone visit will be conducted via a call between you and your physician/provider. We have found that certain health care needs can be provided without the need for a physical exam.  This service lets us provide the care you need with a short phone conversation.  If a prescription is necessary we can send it directly to your pharmacy.  If lab work is needed we can place an order for that and you can then stop by our lab to have the test done at a later time.    Telephone visits are billed at different rates depending on your insurance coverage. During this emergency period, for some insurers they may be billed the same as an in-person visit.  Please reach out to your insurance provider with any questions.    If during the course of the call the physician/provider feels a telephone visit is not appropriate, you will not be charged for this service.\"    Patient has given verbal consent for Telephone visit?  Yes    What phone number would you like to be contacted at?     How would you like to obtain your AVS? Portia    Assessment Type:   ASSESSMENT:  -has been stressful out of job since march  -a1c is up a bit 7.1%, has been due to the stress  She is going to work on doing more exercise and trying to make more meals.    Patient's most recent   Lab Results   Component Value Date    A1C 7.1 07/25/2022    A1C 8.3 06/22/2021     is meeting goal of <8.0    Diabetes knowledge and skills assessment:   Patient is knowledgeable in diabetes management concepts related to: Healthy Eating, Being Active and Monitoring    Continue education with the following diabetes management concepts: Healthy Eating, Being Active, Monitoring, Taking Medication, Problem Solving, Reducing Risks and Healthy Coping    Based on learning assessment above, most appropriate setting for further " "diabetes education would be: Individual setting.      PLAN  -work on getting out and walking  -try to make more meals, trying to get some fruit and vegetables.  Topics to cover at upcoming visits: Healthy Eating, Being Active, Monitoring and Taking Medication    Follow-up: in 6 months    See Care Plan for co-developed, patient-state behavior change goals.  AVS provided for patient today.    Education Materials Provided:  No new materials provided today      SUBJECTIVE/OBJECTIVE:  Presents for: Individual review  Accompanied by: Self  Diabetes type: Type 2  How confident are you filling out medical forms by yourself:: Quite a bit  Other concerns:: Glasses  Cultural Influences/Ethnic Background:  Not  or       Diabetes Symptoms & Complications:          Patient Problem List and Family Medical History reviewed for relevant medical history, current medical status, and diabetes risk factors.    Vitals:  There were no vitals taken for this visit.  Estimated body mass index is 45.95 kg/m  as calculated from the following:    Height as of 6/8/22: 1.568 m (5' 1.75\").    Weight as of 7/25/22: 113 kg (249 lb 3.2 oz).   Last 3 BP:   BP Readings from Last 3 Encounters:   07/25/22 136/74   06/07/22 132/84   04/29/22 121/86       History   Smoking Status     Former Smoker     Packs/day: 0.80     Years: 30.00     Types: Cigarettes   Smokeless Tobacco     Never Used     Comment: 1 PPD x 30. Quit 2017       Labs:  Lab Results   Component Value Date    A1C 7.1 07/25/2022    A1C 8.3 06/22/2021     Lab Results   Component Value Date     10/23/2021     10/22/2020     Lab Results   Component Value Date    LDL 86 10/23/2021     10/22/2020     HDL Cholesterol   Date Value Ref Range Status   10/22/2020 47 (L) >49 mg/dL Final     Direct Measure HDL   Date Value Ref Range Status   10/23/2021 50 >=50 mg/dL Final   ]  GFR Estimate   Date Value Ref Range Status   10/23/2021 67 >60 mL/min/1.73m2 Final     " Comment:     As of July 11, 2021, eGFR is calculated by the CKD-EPI creatinine equation, without race adjustment. eGFR can be influenced by muscle mass, exercise, and diet. The reported eGFR is an estimation only and is only applicable if the renal function is stable.   10/22/2020 >90 >60 mL/min/[1.73_m2] Final     Comment:     Non  GFR Calc  Starting 12/18/2018, serum creatinine based estimated GFR (eGFR) will be   calculated using the Chronic Kidney Disease Epidemiology Collaboration   (CKD-EPI) equation.       GFR Estimate If Black   Date Value Ref Range Status   10/22/2020 >90 >60 mL/min/[1.73_m2] Final     Comment:      GFR Calc  Starting 12/18/2018, serum creatinine based estimated GFR (eGFR) will be   calculated using the Chronic Kidney Disease Epidemiology Collaboration   (CKD-EPI) equation.       Lab Results   Component Value Date    CR 0.92 10/23/2021    CR 0.69 10/22/2020     No results found for: MICROALBUMIN    Healthy Eating: on snap from [facility]   breakfast- usually bowl if cereal or two toast   Lunch- twizzlers today ate whole bag-high stress today  Supper- peanut butter an jelly or meatloaf or impossibly chessy bisquick cheeseburger pie.  Snacks: has not been snacking  Gained 9 lbs in three months, stress and not as able.    Being Active:   walking to mailbox    Monitoring:   a1c 7.1%         Taking Medications:  Diabetes Medication(s)     Biguanides       metFORMIN (GLUCOPHAGE XR) 500 MG 24 hr tablet    Take 2 tablets (1,000 mg) by mouth 2 times daily (with meals)               Problem Solving:   not assessed              Reducing Risks:   not assessed today    Healthy Coping:     Patient Activation Measure Survey Score:  JAYDEN Score (Last Two) 1/6/2011   JAYDEN Raw Score 41   Activation Score 63.2   JAYDEN Level 3         Care Plan and Education Provided:  Being active, walk part of block    Time Spent: 40 minutes  Encounter Type: Individual    Any diabetes medication dose  changes were made via the CDE Protocol per the patient's primary care provider. A copy of this encounter was shared with the provider.

## 2022-08-24 NOTE — LETTER
[facility] - Surgery ([facility])  Surgery Department  Operative Note    SUMMARY     Date of Procedure: 8/24/2022     Procedure: Procedure(s) (LRB):  XI ROBOTIC CHOLECYSTECTOMY (N/A)  REPAIR, HERNIA, UMBILICAL, AGE 5 YEARS OR OLDER (N/A)     Surgeon(s) and Role:     * Meet Alatorre MD - Primary    Assisting Surgeon: None    Pre-Operative Diagnosis: Calculus of gallbladder without cholecystitis without obstruction [K80.20]  Umbilical hernia without obstruction and without gangrene [K42.9]    Post-Operative Diagnosis: Post-Op Diagnosis Codes:     * Calculus of gallbladder without cholecystitis without obstruction [K80.20]     * Umbilical hernia without obstruction and without gangrene [K42.9]    Anesthesia: General    Operative Findings (including complications, if any):     Acutely and chronically inflamed gallbladder.  The duodenum was adherent to the gallbladder with a small perforation noted when  the duodenum from the gallbladder.    Description of Technical Procedures:     The patient was brought into the operating room and placed on the operating table in the supine position.  General endotracheal anesthesia was induced.  IV antibiotics were administered.  Pneumatic compression devices were placed in the lower extremities.  Arms were tucked at the side.  The abdomen was clipped, prepped and draped in the standard manner.   Indocyanine green was administered    A timeout was performed.    A small transverse incision was made just above the umbilicus.  The fascia was identified and elevated with Nidhi clamps.  A varies needle was inserted and its position confirmed by saline drop test.  Pneumoperitoneum was established to 15 mmHg.  A 8 mm robotic trocar was inserted.  The laparoscope was inserted.  There was no evidence of a vascular or enteric injury.    Additional trochars were placed as follows an 8 mm robotic trocar was placed in the midclavicular line in the left midabdomen.  An 8 mm trocar was  It was a pleasure to speak with you.  I would like to provide you with the enclosed information for your records.  As part of care coordination, we are developing care plans to assist in accomplishing your health care goals.  When we speak next, please feel free to let me know if you want to add or change any information on your care plans.    As always, feel free to contact me if you have any questions or concerns.  I look forward to working with you in the effort to achieve your health care and wellness goals .        Sincerely,      Bell Brito, hospitals  Clinic Care Coordination  [phone]      [facility]  Complex Care Plan  About Me:    Patient Name:  Jessica Jay    YOB: 1960  Age:         60 year old   [place] MRN:    [id number] Telephone Information:  Home Phone [phone]   Mobile [phone]       Address:  [address] [place] [id number] Email address:  [email or web address]      Emergency Contact(s)    Name Relationship Lgl Grd Work Phone Home Phone Mobile Phone   1. ILIANA JAY Mother   [phone]   2. NOAH TOBIAS Sister  [phone]            Primary language:  English     needed? No   [facility] [employer] [facility]:  [phone] op. 1  Other communication barriers: Glasses  Preferred Method of Communication:  Jezt  Current living arrangement:    Mobility Status/ Medical Equipment:      Health Maintenance  Health Maintenance Reviewed: Due/Overdue   Health Maintenance Due   Topic Date Due     URINE DRUG SCREEN  Never done     EYE EXAM  Never done     ZOSTER IMMUNIZATION (1 of 2) Never done     HPV TEST  09/16/2021     PAP  09/16/2021         My Access Plan  Medical Emergency 911   Primary Clinic Line [facility]  [phone]   24 Hour Appointment Line [phone] or  [phone]) (toll-free)   24 Hour Nurse Line [phone] (toll-free)   Preferred Urgent Care      [id number] - [place], [place] - [address]  AT [facility]     Behavioral Health Crisis Line The National Suicide Prevention Lifeline at 1-758.293.1120 or 911             My Care Team Members  Patient Care Team       Relationship Specialty Notifications Start End    Marybeth Silver MD PCP - General Family Practice  5/16/18     Phone: [phone] Fax: [phone]         [address] [place] [address]    Marybeth Silver MD Assigned PCP   4/15/18     Phone: [phone] Fax: [phone]         [address] [place] [address]    Bell Brito LSW Lead Care Coordinator Primary Care - CC Admissions 1/29/20     Phone: [phone] Fax: [phone]        Sarthak Gross MD Assigned Surgical Provider   10/23/20     Phone: [phone] Fax: [phone] [address] [facility] [address]    Ki May MD Assigned Musculoskeletal Provider   10/23/20     Phone: [phone] Fax: [phone] [address] [facility] [address]    Caren Denise DO Assigned Behavioral Health Provider   11/22/20     Phone: [phone] Fax: [phone] [address] [facility] [address]    Mayela Plummer [facility] Pharmacist Pharmacist  5/19/21     Phone: [phone] Fax: [phone] [address] [facility] [address]    Estephanie Malave NP Assigned Neuroscience Provider   7/16/21     Phone: [phone] Fax: [phone] [address] [place] [address]            My Care Plans  Self Management and Treatment Plan  Goals and (Comments)  Goals        General     #1  Mental Health Management (pt-stated)      Notes - Note edited  8/5/2021  3:54 PM by Bell Brito LSW     Goal Statement: I will work on finding two techniques to help me reduce my depression, in the next month.  Date Goal set: 4/27/2020  Barriers: Social distancing, depression  Strengths: Family,  placed in the midclavicular line of the right midabdomen.  An 8 mm trocar was placed in the anterior axillary line of the right midabdomen.  The patient was placed in reverse Trendelenburg position and rolled to the left.  The patient was docked to the da Sean robot.    The fundus of the gallbladder was elevated in the omentum was gently swept inferiorly.  As we reached the lower 3rd the gallbladder the duodenum was adherent to the gallbladder.  Times duodenum was being swept away with gentle a noticed there was a small bone duodenum.  This was about a cm in size.  The indocyanine green could be seen leaking from the duodenal opening .  We then freed the remainder of the duodenum from the lower portion of the gallbladder.  This allowed us access to the fundus of the gallbladder    The fundus of the gallbladder was retracted cephalad.  The infundibulum was retracted laterally.  The fire fly technology of the robot was activated in the course of the common bile duct was elucidated.    The cystic duct could not be visualized at this point and no dye was seen entering the gallbladder.     Peritoneum over the neck of the gallbladder was taken down with the hook cautery and the cystic duct was dissected circumferentially.  The cystic artery was then dissected circumferentially and the neck of the gallbladder was then dissected off the gallbladder bed.  This cleared Diego triangle of all loose areolar tissue and the critical view was obtained.  With use of the 5 like technology the course of the common bile duct was seen.  The cystic duct faintly illuminated but no dye could be seen entering the gallbladder      At this point we converted to with 30 degree scope.  The duodenal perforation was closed with an inner layer of 3-0 new barbed PDS suture.  Once the closure was complete the area was inspected using the firefly technology and small amount of indocyanine green could be seen leaking.  The every the leak was repaired  friends, and understanding that a needs some help with my depression  Date to Achieve By: 12/31/21  Patient expressed understanding of goal: Yes  Action steps to achieve this goal:  1. I will search for some mindfulness videos to help with depression  2. I will look at the Maryanne website for resources  3. I will work with counseling to learn new tools as well  4. I will explore trips and books that help me feel better         #2 Being Active (pt-stated)      Notes - Note edited  6/28/2021  3:28 PM by Bell Brito LSW     Goal Statement: I want to increase my activity to help improve my weight and back pain.  I will walk at least 10 minutes 3 days/week or more and maintain this for 3 consecutive months.  Date Goal set: 4/27/2020   Barriers: Depression, back pain, social distancing to some extent  Strengths: Understand that increasing my exercise will help with managing my weight and back pain  Date to Achieve By: 12/25/21   Patient expressed understanding of goal: Yes  Action steps to achieve this goal:  1. I will walk in one direction for 5 minutes and then return for a total of 10 minutes  2. I will not get discouraged if I cannot make it the 5 minutes in one direction before having to turn around  3. I will celebrate my successes             Action Plans on File:            Depression          Advance Care Plans/Directives Type: nothing on file       My Medical and Care Information  Problem List   Patient Active Problem List   Diagnosis     Depression, major     Migraines     CARDIOVASCULAR SCREENING; LDL GOAL LESS THAN 160     Disc herniation     DDD (degenerative disc disease), lumbar     DDD (degenerative disc disease), cervical     Neck pain     Chronic daily headache     Tobacco abuse     ELYSSA (obstructive sleep apnea)- severe (AHI 49)     Spondylolisthesis, grade 1     Chronic low back pain     Brain lipoma     History of colonic polyps     Gastroesophageal reflux disease without esophagitis     Overweight  with 3-0 Vicryl in an interrupted fashion x2.      The area was irrigated in we inspected in no bile can be seen leaking.  The area was imbricated in a layered fashion with 3-0 silk sutures x6.      At the completion of the procedure the area was irrigated.  No evidence of a bile leak was noted using the firefly technology.    The cystic duct was clipped with 2 clips proximally 1 clip distally and transected.  The cystic artery was clipped with 2 clips proximally 1 clip distally and transected.  The hook cautery was then used to remove the gallbladder from the gallbladder bed.  The small posterior cystic artery was controlled with electrocautery    The gallbladder was nearly excised from the gallbladder bed, the gallbladder bed was inspected and hemostasis was ensured using electrocautery.  Surgicel was placed  Removal of the gallbladder was then completed.    The right lateral operating arm of the robot was then removed and an Endo Catch bag was inserted.  The gallbladder was placed in Endo Catch bag.  The patient was then undocked from the da Sean operating robot.    The gallbladder was extracted through the right lateral incision.  To facilitate gallbladder removal, the incision was enlarged.  Gallbladder within actually extracted in stones were removed.  The gallbladder was and completely extracted.      The extraction site was closed internally with 0 Vicryl suture using a suture Passer x2.      The 15 Sudanese Ari drain was placed in more since pouch.      The external fascia was closed with 0 Vicryl on a UR 6 needle x2 sutures.        The remain trochars were removed under direct vision and no bleeding was noted.  The abdomen was then desufflated.  Marcaine was infiltrated.  All incisions were closed with 4-0 Monocryl in a subcuticular manner.    Attention was inserted the umbilical hernia.  The incision was enlarged electrocautery sees a dissected separate areas tissue.  The umbilical stalk was encircled in   from the umbilical skin.      The hernia was repaired with worth 0 Nurolon sutures.  Each suture was placed under direct vision in and high.      The wound was irrigated.  Additional Marcaine was infiltrated.  The undersurface of the umbilicus was tacked to the fascia with 3-0 Vicryl and fashion.  Deep dermis was closed with 3-0 Vicryl in interrupted fashion.  The skin was closed with 4-0 Monocryl subcuticular manner.      Dermabond was placed.  A drain sponge was placed.  At the umbilicus the gauze was placed into the area of the repair and held in place with a Tegaderm for downward pressure.      Patient tolerated procedure well.  Final sponge instrument and needle counts were correct      Negative Dermabond was placed      Significant Surgical Tasks Conducted by the Assistant(s), if Applicable:  None    Estimated Blood Loss (EBL):  15 mL   IV fluids 1300 mL   Marcaine is 0.25% 30 mL           Implants: * No implants in log *    Specimens:   Specimen (24h ago, onward)             Start     Ordered    08/24/22 1430  Specimen to Pathology, Surgery General Surgery  Once        Comments: Pre-op Diagnosis: Calculus of gallbladder without cholecystitis without obstruction [K80.20]Umbilical hernia without obstruction and without gangrene [K42.9]Procedure(s):XI ROBOTIC CHOLECYSTECTOMYREPAIR, HERNIA, UMBILICAL, AGE 5 YEARS OR OLDER Number of specimens: 1Name of specimens: 1. Gallbladder with stones (perm).     References:    Click here for ordering Quick Tip   Question Answer Comment   Procedure Type: General Surgery    Specimen Class: Routine/Screening    Which provider would you like to cc? LALA CASTANEDA    Release to patient Immediate        08/24/22 2947                        Condition: Stable    Disposition: PACU - hemodynamically stable.    Attestation: I performed the procedure.   with BMI >50     Hypertension goal BP (blood pressure) < 140/90     Traylor's esophagus determined by endoscopy     Bilateral edema of lower extremity     Generalized anxiety disorder     Restless legs syndrome (RLS)     Diabetes mellitus, type 2 (H)      Current Medications and Allergies:       Allergies   Allergen Reactions     Buspar [Buspirone Hcl] Rash     Seroquel [Quetiapine] Itching         Current Outpatient Medications:      ACCU-CHEK GUIDE test strip, USE TO TEST TWICE DAILY, Disp: 200 strip, Rfl: 0     alcohol swab prep pads, Use to swab area of injection/haley as directed., Disp: 100 each, Rfl: 3     aspirin (ASA) 81 MG EC tablet, Take 1 tablet (81 mg) by mouth daily, Disp: 100 tablet, Rfl: 3     atorvastatin (LIPITOR) 40 MG tablet, Take 1 tablet (40 mg) by mouth daily, Disp: 90 tablet, Rfl: 3     blood glucose (NO BRAND SPECIFIED) lancets standard, Use to test blood sugar 2 times daily or as directed., Disp: 100 lancet, Rfl: 3     blood glucose monitoring (NO BRAND SPECIFIED) meter device kit, Use to test blood sugar 2 times daily or as directed., Disp: 1 kit, Rfl: 0     Continuous Blood Gluc Sensor (FREESTYLE MONY 14 DAY SENSOR) [facility], 1 Device every 14 days Change sensor as directed every 14 days, Disp: 2 each, Rfl: 11     DULoxetine (CYMBALTA) 30 MG capsule, Take 3 capsules (90 mg) by mouth daily, Disp: 90 capsule, Rfl: 1     furosemide (LASIX) 20 MG tablet, Take 1 tablet (20 mg) by mouth 2 times daily, Disp: 180 tablet, Rfl: 3     hydrOXYzine (ATARAX) 25 MG tablet, Take 25-50 mg twice a day as needed for anxiety. Can also take  mg at bedtime for sleep/itching. Don't exceed 300 mg daily., Disp: 90 tablet, Rfl: 1     insulin pen needle (32G X 4 MM) 32G X 4 MM miscellaneous, Use 1 pen needles daily or as directed with Victoza, Disp: 90 each, Rfl: 1     liraglutide (VICTOZA) 18 MG/3ML solution, Inject 1.2 mg Subcutaneous daily Start at 0.6 mg daily x 1 week then increase to 1.2 mg thereafter.,  Disp: 15 mL, Rfl: 3     lisinopril (ZESTRIL) 10 MG tablet, Take 1 tablet (10 mg) by mouth daily, Disp: 90 tablet, Rfl: 3     metFORMIN (GLUCOPHAGE-XR) 500 MG 24 hr tablet, Take 2 tablets (1,000 mg) by mouth 2 times daily (with meals), Disp: 360 tablet, Rfl: 3     metoprolol succinate ER (TOPROL-XL) 25 MG 24 hr tablet, Take 1 tablet (25 mg) by mouth daily, Disp: 90 tablet, Rfl: 3     omeprazole (PRILOSEC) 20 MG DR capsule, TAKE 1 CAPSULE(20 MG) BY MOUTH DAILY, Disp: 90 capsule, Rfl: 1    Current Facility-Administered Medications:      lidocaine 1 % injection 0.5 mL, 0.5 mL, , , Ki May MD, 0.5 mL at 05/04/20 1021     triamcinolone (KENALOG-40) injection 20 mg, 20 mg, , , Ki May MD, 20 mg at 05/04/20 1021      Care Coordination Start Date: 1/29/2020   Frequency of Care Coordination: monthly   Form Last Updated: 08/05/2021

## 2022-09-02 ENCOUNTER — PATIENT OUTREACH (OUTPATIENT)
Dept: CARE COORDINATION | Facility: CLINIC | Age: 62
End: 2022-09-02

## 2022-09-02 NOTE — PROGRESS NOTES
Clinic Care Coordination - Chart Review Only    Situation/Background: Patient chart reviewed by care coordinator related to [facility] conversion.    Assessment: Patient continues to be followed by Clinic Care Coordination.    Plan: Patient's chart updated to align with [facility] program for ongoing patient management.    Claire Becerra RN Care Coordinator  [facility]  Email: [email or web address]  Phone: [phone]

## 2022-09-10 ASSESSMENT — SLEEP AND FATIGUE QUESTIONNAIRES
HOW LIKELY ARE YOU TO NOD OFF OR FALL ASLEEP WHEN YOU ARE A PASSENGER IN A CAR FOR AN HOUR WITHOUT A BREAK: WOULD NEVER DOZE
HOW LIKELY ARE YOU TO NOD OFF OR FALL ASLEEP WHILE SITTING AND TALKING TO SOMEONE: WOULD NEVER DOZE
HOW LIKELY ARE YOU TO NOD OFF OR FALL ASLEEP WHILE SITTING INACTIVE IN A PUBLIC PLACE: WOULD NEVER DOZE
HOW LIKELY ARE YOU TO NOD OFF OR FALL ASLEEP WHILE SITTING AND READING: WOULD NEVER DOZE
HOW LIKELY ARE YOU TO NOD OFF OR FALL ASLEEP IN A CAR, WHILE STOPPED FOR A FEW MINUTES IN TRAFFIC: WOULD NEVER DOZE
HOW LIKELY ARE YOU TO NOD OFF OR FALL ASLEEP WHILE LYING DOWN TO REST IN THE AFTERNOON WHEN CIRCUMSTANCES PERMIT: WOULD NEVER DOZE
HOW LIKELY ARE YOU TO NOD OFF OR FALL ASLEEP WHILE SITTING QUIETLY AFTER LUNCH WITHOUT ALCOHOL: WOULD NEVER DOZE
HOW LIKELY ARE YOU TO NOD OFF OR FALL ASLEEP WHILE WATCHING TV: MODERATE CHANCE OF DOZING

## 2022-09-13 ENCOUNTER — VIRTUAL VISIT (OUTPATIENT)
Dept: SLEEP MEDICINE | Facility: CLINIC | Age: 62
End: 2022-09-13
Payer: COMMERCIAL

## 2022-09-13 VITALS — HEIGHT: 62 IN | WEIGHT: 246 LBS | BODY MASS INDEX: 45.27 KG/M2

## 2022-09-13 DIAGNOSIS — G47.33 OSA (OBSTRUCTIVE SLEEP APNEA): Chronic | ICD-10-CM

## 2022-09-13 PROCEDURE — 99213 OFFICE O/P EST LOW 20 MIN: CPT | Mod: 95 | Performed by: PHYSICIAN ASSISTANT

## 2022-09-13 ASSESSMENT — PATIENT HEALTH QUESTIONNAIRE - PHQ9: SUM OF ALL RESPONSES TO PHQ QUESTIONS 1-9: 13

## 2022-09-13 ASSESSMENT — PAIN SCALES - GENERAL: PAINLEVEL: MODERATE PAIN (4)

## 2022-09-13 NOTE — PROGRESS NOTES
Gemma is a 62 year old who is being evaluated via a billable video visit.      How would you like to obtain your AVS? MyChart  If the video visit is dropped, the invitation should be resent by: Text to cell phone: [phone]  Will anyone else be joining your video visit? No      Video-Visit Details    Video Start Time: 1:00 PM    Type of service:  Video Visit    Video End Time:1:11 PM    Originating Location (pt. Location): Home    Distant Location (provider location):  [facility]     Platform used for Video Visit: Gary       Medication and allergies have been reviewed.       [facility]          Sleep Study Follow-Up Visit:    Date on this visit: 9/13/2022    Jessica Jay comes in today for follow-up of her sleep study done on 6/22/2022 at the [facility]. A diagnostic polysomnogram was performed to evaluate for current severity of sleep apnea.     Polysomnogram as interpreted by Dr. Higgins:  Sleep Architecture: The patient s sleep architecture was fragmented with increased arousal index The total recording time of the polysomnogram was 510.3 minutes. The total sleep time was 316.5 minutes. Sleep latency was increased at 29.0 minutes with the use of a sleep aid (Zolpidem 5 mg). REM latency was - minutes. Arousal index was increased at 85.5 arousals per hour. Sleep efficiency was decreased at 62.0%. Wake after sleep onset was 164.5 minutes. The patient spent 36.5% of total sleep time in Stage N1, 38.4% in Stage N2, 25.1% in Stage N3, and 0.0% in REM. Time in REM supine was - minutes.     Respiration: The patient was found to have mild obstructive sleep apnea with an apnea hypopnea index of 5.1 events per hour with the lowest O2 Sat of 84%. The patient s respiratory events were worst in supine sleep.     Events ? The polysomnogram revealed a presence of 13 obstructive, - central, and - mixed apneas resulting in an apnea index of 2.5 events per  hour. There were 14 obstructive hypopneas and - central hypopneas resulting in an obstructive hypopnea index of 2.7 and central hypopnea index of - events per hour. The combined apnea/hypopnea index was 5.1 events per hour (central apnea/hypopnea index was - events per hour). The REM AHI was - events per hour. The supine AHI was 6.5 events per hour. The RERA index was 11.4 events per hour. The RDI was 16.5 events per hour.     Snoring - was reported as loud.     Respiratory rate and pattern - were notable for obstructive apneas and hypopneas     Sustained Sleep Associated Hypoventilation - Transcutaneous carbon dioxide monitoring was used; however significant hypoventilation was not present with a maximum change from 24.3 to 39.7 mmHg and 0 minutes at or greater than 55 mmHg.     Sleep Associated Hypoxemia - (Greater than 5 minutes O2 sat at or below 88%) was not present. Baseline oxygen saturation was 95.7%. Lowest oxygen saturation was 84.0%. Time spent less than or equal to 88% was 0 minutes. Time spent less than or equal to 89% was 0 minutes.     Movement Activity: Patient was found to have periodic limb movement in sleep.     Periodic Limb Activity - There were 329 PLMs during the entire study. The PLM index was 62.4 movements per hour. The PLM Arousal Index was 29.4 per hour.   REM EMG Activity - Excessive transient/sustained muscle activity was not present.     Nocturnal Behavior - Abnormal sleep related behaviors were not noted during sleep.     Bruxism - Nonapparent.     Cardiac Summary: Sinus rhythm with frequent PVC s were observed. The average pulse rate was 63.3 bpm. The minimum pulse rate was 41.0 bpm while the maximum pulse rate was 108.0 bpm. Sinus rhythm with frequent PVCs were observed    Past medical/surgical history, family history, social history, medications and allergies were reviewed.      Problem List:  Patient Active Problem List    Diagnosis Date Noted     Morbid obesity (H) 07/25/2022      Priority: Medium     Diabetes mellitus, type 2 (H) 04/19/2021     Priority: Medium     Bilateral edema of lower extremity 09/18/2020     Priority: Medium     GABRIELA (generalized anxiety disorder) 09/18/2020     Priority: Medium     Traylor's esophagus determined by endoscopy 11/19/2018     Priority: Medium     Hypertension goal BP (blood pressure) < 140/90 06/10/2018     Priority: Medium     Gastroesophageal reflux disease without esophagitis 05/20/2016     Priority: Medium     History of colonic polyps 04/13/2015     Priority: Medium     Brain lipoma 06/28/2012     Priority: Medium     MRI 2017- No change in the appearance of the lipoma above the cerebellar vermis, status post biopsy in 2011.       Spondylolisthesis, grade 1 05/24/2012     Priority: Medium     Chronic low back pain 05/24/2012     Priority: Medium     ELYSSA (obstructive sleep apnea)- severe (AHI 49) 08/22/2011     Priority: Medium     Sleep study 2010 BMI 45 -  AHI 49.4/hour, RDI 54.2/hour, REM RDI not applicable, supine RDI of 37.5/hour.  Minimum oxygen saturation was 88%.  PLMI 58.3/hour, PLMAI 11/hour.          Tobacco abuse 05/06/2011     Priority: Medium     Neck pain 01/28/2011     Priority: Medium     DDD (degenerative disc disease), cervical 12/19/2010     Priority: Medium     Disc herniation 10/08/2010     Priority: Medium     DDD (degenerative disc disease), lumbar 10/08/2010     Priority: Medium     Sees Dr. Cuevas, spine.  I am willing to do vicodin for flare ups which patient states occurs 3-4 times/year.  She is interested in pain management program.       CARDIOVASCULAR SCREENING; LDL GOAL LESS THAN 160 05/23/2010     Priority: Medium     Depression, major      Priority: Medium     Has been on wellbutrin, prozac.  Citalopram at 60 mg gave side effects, better at 40mg.  Dr. Lebron, psychiatry [facility].       Migraines      Priority: Medium     Restless legs syndrome (RLS) 01/05/2021     Priority: Low         Impression/Plan:  Very mild obstructive sleep apnea without sleep related hypoxemia. TCM not suggestive of significant hypoventilation. Disease burden might be underestimated due to lack of REM sleep.    Polysomnogram was reviewed in detail today   Counseled the patient that mild sleep apnea is not felt to significantly increase long-term cardiovascular risk, but can contribute to excessive daytime sleepiness.    Treatment options discussed today including no treatment, auto-CPAP, oral appliance therapy or polysomnography with full night PAP titration.  Elected no treatment with increased efforts to decrease weight.    She will follow up with me as needed.     20 minutes spent on day of encounter doing chart review,  history and exam, counseling, coordinating plan of care, documentation and further activities as noted above.      Valorie Camacho PA-C

## 2022-09-16 ENCOUNTER — PATIENT OUTREACH (OUTPATIENT)
Dept: CARE COORDINATION | Facility: CLINIC | Age: 62
End: 2022-09-16

## 2022-09-16 NOTE — PROGRESS NOTES
Clinic Care Coordination Contact    Patient answered and asked for a call back after 11:30 as she was in class.     JAQUELIN Mensah  [facility] Primary Care - Care Coordinator   9/16/2022   9:38 AM  [phone]

## 2022-09-16 NOTE — LETTER
[facility]  Patient Centered Plan of Care  About Me:        Patient Name:  Jessica Soria    YOB: 1960  Age:         62 year old   [facility] MRN:    [id number] Telephone Information:  Home Phone [phone]   Mobile [phone]       Address:  [address] [address] Email address:  [email or web address]      Emergency Contact(s)    Name Relationship Lgl Grd Work Phone Home Phone Mobile Phone   1. ILIANA SORIA Mother No  [phone]   2. NOAH TOBIAS Sister No  [phone]           Primary language:  English     needed? No   [facility] [employer]:  [phone] op. 1  Other communication barriers:Glasses    Preferred Method of Communication:  Portia  Current living arrangement: I live alone    Mobility Status/ Medical Equipment: Independent        Health Maintenance  Health Maintenance Reviewed: Due/Overdue   Health Maintenance Due   Topic Date Due     URINE DRUG SCREEN  Never done     Pneumococcal Vaccine: Pediatrics (0 to 5 Years) and At-Risk Patients (6 to 64 Years) (2 - PCV) 09/16/2017     COVID-19 Vaccine (4 - Booster for Pfizer series) 04/04/2022     INFLUENZA VACCINE (1) 09/01/2022     LUNG CANCER SCREENING  10/13/2022           My Access Plan  Medical Emergency 911   Primary Clinic Line [facility]  [phone]   24 Hour Appointment Line [phone] or  [phone]) (toll-free)   24 Hour Nurse Line [phone] (toll-free)   Preferred Urgent Care Other     Preferred [facility]  [phone]     Preferred Pharmacy [facility] [place]  [address]     Behavioral Health Crisis Line The National Suicide Prevention Lifeline at 1-474.112.3413 or Text/Call 298             My Care Team Members  Patient Care Team       Relationship Specialty Notifications Start End    Marybeth Silver MD PCP - General Family Practice  5/16/18      Phone: [phone] Fax: [phone]         [address] [place] [address]    Marybeth Silver MD Assigned PCP   4/15/18     Phone: [phone] Fax: [phone]         [address] [place] [address]    Bell Brito LSW Lead Care Coordinator Primary Care - CC Admissions 1/29/20     Phone: [phone] Fax: [phone]        Caren Denise DO Assigned Behavioral Health Provider   11/22/20     Phone: [phone] Fax: [phone]         [address] [place] [facility] [address]    Mayela Plummer [facility] Pharmacist Pharmacist  5/19/21     Phone: [phone] Fax: [phone] [address] [address]    Francisco Farris DO Assigned Musculoskeletal Provider   12/5/21     Phone: [phone] Fax: [phone]         [address] [place] [address]    Adam Painting MD Assigned Surgical Provider   5/8/22     Phone: [phone] Pager: [phone] Fax: [phone]        [address] [address]    Mayela Plummer [facility] Assigned MTM Pharmacist   5/28/22     Phone: [phone] Fax: [phone]         [address] [address]    Shobha Donovan RD Diabetes Educator Dietitian, Registered  8/18/22     Phone: [phone] Fax: [phone]                My Care Plans  Self Management and Treatment Plan  Care Plan  Care Plan: Financial Wellbeing     Problem: Patient expresses financial resource strain     Goal: Create an action plan to increase financial stability     Start Date: 6/27/2022 Expected End Date: 12/24/2022    This Visit's Progress: 30%    Note:     Barriers: course needed is not available (quick books)  Strengths: going to class's    Patient expressed understanding of goal: yes  Action steps to achieve this goal:  1. I will continue with classes  2. I will look for the course I need and schedule  3. I will complete my resume  4. I will work with the workforce center to get the needed programs                     Care Plan: Physical Activity     Problem: Patient is inactive     Goal: Exercise at Least 20 Minutes per Day     Start Date: 4/27/2020 Expected End Date: 9/16/2023    This Visit's Progress: 0%    Note:     Barriers: Depression, back pain, social distancing to some extent  Strengths: Understand that increasing my exercise will help with managing my weight and back pain    Patient expressed understanding of goal: Yes  Action steps to achieve this goal:  1. I will walk in one direction for 5 minutes and then return for a total of 10 minutes  2. I will not get discouraged if I cannot make it the 5 minutes in one direction before having to turn around  3. I will celebrate my successes                         Action Plans on File:            Depression          Advance Care Plans/Directives Type:   Advanced Directive - On File      My Medical and Care Information  Problem List   Patient Active Problem List   Diagnosis     Depression, major     Migraines     CARDIOVASCULAR SCREENING; LDL GOAL LESS THAN 160     Disc herniation     DDD (degenerative disc disease), lumbar     DDD (degenerative disc disease), cervical     Neck pain     Tobacco abuse     ELYSSA (obstructive sleep apnea)- severe (AHI 49)     Spondylolisthesis, grade 1     Chronic low back pain     Brain lipoma     History of colonic polyps     Gastroesophageal reflux disease without esophagitis     Hypertension goal BP (blood pressure) < 140/90     Traylor's esophagus determined by endoscopy     Bilateral edema of lower extremity     GABRIELA (generalized anxiety disorder)     Restless legs syndrome (RLS)     Diabetes mellitus, type 2 (H)     Morbid obesity (H)      Current Medications and Allergies:       Allergies   Allergen Reactions     Buspar [Buspirone Hcl] Rash     Lisinopril Cough     Seroquel [Quetiapine] Itching         Current Outpatient Medications:      ACCU-CHEK GUIDE test strip, USE TO TEST TWICE DAILY, Disp: 200 strip, Rfl: 0     alcohol swab prep  pads, Use to swab area of injection/haley as directed., Disp: 100 each, Rfl: 3     atorvastatin (LIPITOR) 40 MG tablet, Take 1 tablet (40 mg) by mouth daily, Disp: 90 tablet, Rfl: 3     blood glucose (NO BRAND SPECIFIED) lancets standard, Use to test blood sugar 2 times daily or as directed., Disp: 100 lancet, Rfl: 3     blood glucose monitoring (NO BRAND SPECIFIED) meter device kit, Use to test blood sugar 2 times daily or as directed., Disp: 1 kit, Rfl: 0     clobetasol (TEMOVATE) 0.05 % external cream, Apply sparingly to affected area twice daily as needed.  Do not apply to face., Disp: 120 g, Rfl: 3     cyclobenzaprine (FLEXERIL) 5 MG tablet, Take 1 tablet (5 mg) by mouth 3 times daily as needed for muscle spasms, Disp: 42 tablet, Rfl: 0     doxylamine (UNISOM) 25 MG TABS tablet, Take 1 tablet (25 mg) by mouth nightly as needed for sleep, Disp: 30 tablet, Rfl: 1     DULoxetine (CYMBALTA) 60 MG capsule, Take 2 capsules (120 mg) by mouth daily, Disp: 180 capsule, Rfl: 1     furosemide (LASIX) 20 MG tablet, Take 1 tablet (20 mg) by mouth 2 times daily, Disp: 180 tablet, Rfl: 3     hydrOXYzine (ATARAX) 25 MG tablet, Take 25-50 mg twice a day as needed for anxiety. Can also take  mg at bedtime for sleep/itching. Don't exceed 300 mg daily., Disp: 90 tablet, Rfl: 1     losartan (COZAAR) 25 MG tablet, Take 1 tablet (25 mg) by mouth daily, Disp: 90 tablet, Rfl: 3     metFORMIN (GLUCOPHAGE XR) 500 MG 24 hr tablet, Take 2 tablets (1,000 mg) by mouth 2 times daily (with meals), Disp: 360 tablet, Rfl: 3     metoprolol succinate ER (TOPROL XL) 25 MG 24 hr tablet, Take 1 tablet (25 mg) by mouth daily, Disp: 90 tablet, Rfl: 3     omeprazole (PRILOSEC) 20 MG DR capsule, Take 1 capsule (20 mg) by mouth daily, Disp: 90 capsule, Rfl: 1     triamcinolone (KENALOG) 0.1 % external ointment, Apply topically 2 times daily To affected area x 2 weeks as needed, Disp: 80 g, Rfl: 0    Current Facility-Administered Medications:       lidocaine 1 % injection 0.5 mL, 0.5 mL, , , Ki May MD, 0.5 mL at 05/04/20 1021     lidocaine 1 % injection 2 mL, 2 mL, , , Francisco Farris DO, 2 mL at 06/07/22 1207     triamcinolone (KENALOG-40) injection 20 mg, 20 mg, , , Ki May MD, 20 mg at 05/04/20 1021     triamcinolone (KENALOG-40) injection 40 mg, 40 mg, , , Francisco Farris DO, 40 mg at 06/07/22 1207     triamcinolone (KENALOG-40) injection 40 mg, 40 mg, , , Francisco Farris DO, 40 mg at 11/26/21 1709      Care Coordination Start Date: 1/29/2020   Frequency of Care Coordination: monthly     Form Last Updated: 09/16/2022

## 2022-09-30 ENCOUNTER — PATIENT OUTREACH (OUTPATIENT)
Dept: CARE COORDINATION | Facility: CLINIC | Age: 62
End: 2022-09-30

## 2022-09-30 DIAGNOSIS — K21.9 GASTROESOPHAGEAL REFLUX DISEASE, UNSPECIFIED WHETHER ESOPHAGITIS PRESENT: ICD-10-CM

## 2022-09-30 NOTE — PROGRESS NOTES
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  She had the interview and was hired the same day.  She will be starting Monday October 3rd.     She is feeling very positive about the chance and work.     Care Gaps:    Health Maintenance Due   Topic Date Due     URINE DRUG SCREEN  Never done     Pneumococcal Vaccine: Pediatrics (0 to 5 Years) and At-Risk Patients (6 to 64 Years) (2 - PCV) 09/16/2017     COVID-19 Vaccine (4 - Booster for Pfizer series) 01/29/2022     INFLUENZA VACCINE (1) 09/01/2022     LUNG CANCER SCREENING  10/13/2022     BMP  10/23/2022     LIPID  10/23/2022       Not discussed due to shortness of call.     Care Plans  Care Plan: Financial Wellbeing     Problem: Patient expresses financial resource strain     Goal: Create an action plan to increase financial stability     Start Date: 6/27/2022 Expected End Date: 12/24/2022    This Visit's Progress: 50% Recent Progress: 30%    Note:     Barriers: course needed is not available (quick books)  Strengths: going to class's    Patient expressed understanding of goal: yes  Action steps to achieve this goal:  1. I will continue with classes  2. I will look for the course I need and schedule  3. I will complete my resume  4. I will work with the workforce center to get the needed programs                    Care Plan: Physical Activity     Problem: Patient is inactive     Goal: Exercise at Least 20 Minutes per Day     Start Date: 4/27/2020 Expected End Date: 9/16/2023    This Visit's Progress: 10% Recent Progress: 0%    Note:     Barriers: Depression, back pain, social distancing to some extent  Strengths: Understand that increasing my exercise will help with managing my weight and back pain    Patient expressed understanding of goal: Yes  Action steps to achieve this goal:  1. I will walk in one direction for 5 minutes and then return for a total of 10 minutes  2. I will not get  discouraged if I cannot make it the 5 minutes in one direction before having to turn around  3. I will celebrate my successes                        Intervention/Education provided during outreach: congratulated pt on her new job and outlook.      Outreach Frequency: monthly    Plan:   Pt to start her new job and continue with counseling.   Care Coordinator will follow up in one month.     JAQUELIN Mensah  [facility] Primary Care - Care Coordinator   9/30/2022   3:47 PM  [phone]

## 2022-10-02 DIAGNOSIS — K21.9 GASTROESOPHAGEAL REFLUX DISEASE, UNSPECIFIED WHETHER ESOPHAGITIS PRESENT: ICD-10-CM

## 2022-10-28 ENCOUNTER — PATIENT OUTREACH (OUTPATIENT)
Dept: CARE COORDINATION | Facility: CLINIC | Age: 62
End: 2022-10-28

## 2022-10-28 NOTE — PROGRESS NOTES
Clinic Care Coordination Contact  [facility]/Voicemail       Clinical Data: Care Coordinator Outreach  Outreach attempted x 1.  Left message on patient's voicemail with call back information and requested return call.  Plan:  Care Coordinator will try to reach patient again in about 10 business days.    JAQUELIN Mensah  [facility] Primary Care - Care Coordinator   10/28/2022   9:58 AM  [phone]

## 2022-11-10 NOTE — PROGRESS NOTES
Clinic Care Coordination Contact    Follow Up Progress Note      Assessment: patient reported that she lost her job.  She worked 5 weeks and the manager notified her that the created position was not working out as planned and that it was no fault of hers.  She said that helped her feel better.  She did like the kind of work and has started looking again for a new job.     Pt noted that she had increased depression with the loss of the job and worked through it.  She talked with her  and asked if she can call again if she is that low.  She feels she has good supports if she gets depressed.     She has not been feeling good and not exercising.      Care Gaps:    Health Maintenance Due   Topic Date Due     URINE DRUG SCREEN  Never done     Pneumococcal Vaccine: Pediatrics (0 to 5 Years) and At-Risk Patients (6 to 64 Years) (2 - PCV) 09/16/2017     COVID-19 Vaccine (4 - Booster for Pfizer series) 01/29/2022     INFLUENZA VACCINE (1) 09/01/2022     LUNG CANCER SCREENING  10/13/2022     BMP  10/23/2022     LIPID  10/23/2022     A1C  10/25/2022     MAMMO SCREENING  11/18/2022       Postponed to next office visit     Care Plans  Care Plan: Financial Wellbeing     Problem: Patient expresses financial resource strain     Goal: Create an action plan to increase financial stability     Start Date: 6/27/2022 Expected End Date: 12/24/2022    This Visit's Progress: 50% Recent Progress: 50%    Note:     Barriers: course needed is not available (quick books)  Strengths: going to class's    Patient expressed understanding of goal: yes  Action steps to achieve this goal:  1. I will continue with classes  2. I will look for the course I need and schedule  3. I will complete my resume  4. I will work with the workforce center to get the needed programs                    Care Plan: Physical Activity     Problem: Patient is inactive     Goal: Exercise at Least 20 Minutes per Day     Start Date: 4/27/2020 Expected End Date:  9/16/2023    This Visit's Progress: 10% Recent Progress: 10%    Note:     Barriers: Depression, back pain, social distancing to some extent  Strengths: Understand that increasing my exercise will help with managing my weight and back pain    Patient expressed understanding of goal: Yes  Action steps to achieve this goal:  1. I will walk in one direction for 5 minutes and then return for a total of 10 minutes  2. I will not get discouraged if I cannot make it the 5 minutes in one direction before having to turn around  3. I will celebrate my successes                        Intervention/Education provided during outreach: encouraged pt to focus on the positives of liking the kind of work and that it was not any fault of hers.       Outreach Frequency: monthly    Plan:   Pt to work on finding a new job and exercising.  Pt to call if she has worsening depression.   Care Coordinator will follow up in one month.     JAQUELIN Mensah  [facility] Primary Care - Care Coordinator   11/10/2022   10:27 AM  [phone]

## 2022-11-10 NOTE — PROGRESS NOTES
Clinic Care Coordination Contact  [facility]/Voicemail     Pt did leave a message after last outreach.      Clinical Data: Care Coordinator Outreach  Outreach attempted x 2.  Left message on patient's voicemail with call back information and requested return call.  Plan:  Care Coordinator will try to reach patient again in 8-10 business days.    JAQUELIN Mensah  [facility] Primary Care - Care Coordinator   11/10/2022   9:33 AM  [phone]

## 2022-11-14 ENCOUNTER — VIRTUAL VISIT (OUTPATIENT)
Dept: EDUCATION SERVICES | Facility: CLINIC | Age: 62
End: 2022-11-14
Payer: COMMERCIAL

## 2022-11-14 DIAGNOSIS — E11.65 TYPE 2 DIABETES MELLITUS WITH HYPERGLYCEMIA, WITHOUT LONG-TERM CURRENT USE OF INSULIN (H): Primary | Chronic | ICD-10-CM

## 2022-11-14 PROCEDURE — G0108 DIAB MANAGE TRN  PER INDIV: HCPCS | Mod: 95 | Performed by: DIETITIAN, REGISTERED

## 2022-11-14 NOTE — PROGRESS NOTES
Diabetes Self-Management Education & Support    Presents for: Individual review    Type of Service: Telephone Visit    Originating Location (Patient Location): Home  Distant Location (Provider Location): Home  Mode of Communication:  Telephone        Assessment Type:   ASSESSMENT:  -recently lost her job in last week  -is applying for more jobs  -her blood sugars are looking good, she is due for PCP visit  -due for cholesterol testing, she had an eye exam this past year, reviewed complications of diabetes  Doing well on meal plan.  She is trying to watch portions, using fruits for snacks.  Out to eat is only eating 1/2 or less and take rest home.  Takes sandwich to work.      Patient's most recent   Lab Results   Component Value Date    A1C 7.1 07/25/2022    A1C 8.3 06/22/2021     is meeting goal of <7.0    Diabetes knowledge and skills assessment:   Patient is knowledgeable in diabetes management concepts related to: Healthy Eating, Being Active and Monitoring    Continue education with the following diabetes management concepts: Healthy Eating, Being Active, Monitoring, Taking Medication, Problem Solving, Reducing Risks and Healthy Coping    Based on learning assessment above, most appropriate setting for further diabetes education would be: Individual setting.      PLAN  Doing well on meal plan.  She is trying to watch portions, using fruits for snacks.  Out to eat is only eating 1/2 or less and take rest home.  Takes sandwich to work.    Topics to cover at upcoming visits: Healthy Eating, Being Active, Monitoring, Taking Medication, Problem Solving, Reducing Risks and Healthy Coping    Follow-up:     See Care Plan for co-developed, patient-state behavior change goals.  AVS provided for patient today.    Education Materials Provided:  No new materials provided today      SUBJECTIVE/OBJECTIVE:  Presents for: Individual review  Accompanied by: Self  Diabetes type: Type 2  Cultural Influences/Ethnic Background:  Not  " or       Diabetes Symptoms & Complications:          Patient Problem List and Family Medical History reviewed for relevant medical history, current medical status, and diabetes risk factors.    Vitals:  There were no vitals taken for this visit.  Estimated body mass index is 44.99 kg/m  as calculated from the following:    Height as of 9/13/22: 1.575 m (5' 2\").    Weight as of 9/13/22: 111.6 kg (246 lb).   Last 3 BP:   BP Readings from Last 3 Encounters:   07/25/22 136/74   06/07/22 132/84   04/29/22 121/86       History   Smoking Status     Former     Packs/day: 0.80     Years: 30.00     Types: Cigarettes   Smokeless Tobacco     Never     Comment: 1 PPD x 30. Quit 2017       Labs:  Lab Results   Component Value Date    A1C 7.1 07/25/2022    A1C 8.3 06/22/2021     Lab Results   Component Value Date     10/23/2021     10/22/2020     Lab Results   Component Value Date    LDL 86 10/23/2021     10/22/2020     HDL Cholesterol   Date Value Ref Range Status   10/22/2020 47 (L) >49 mg/dL Final     Direct Measure HDL   Date Value Ref Range Status   10/23/2021 50 >=50 mg/dL Final   ]  GFR Estimate   Date Value Ref Range Status   10/23/2021 67 >60 mL/min/1.73m2 Final     Comment:     As of July 11, 2021, eGFR is calculated by the CKD-EPI creatinine equation, without race adjustment. eGFR can be influenced by muscle mass, exercise, and diet. The reported eGFR is an estimation only and is only applicable if the renal function is stable.   10/22/2020 >90 >60 mL/min/[1.73_m2] Final     Comment:     Non  GFR Calc  Starting 12/18/2018, serum creatinine based estimated GFR (eGFR) will be   calculated using the Chronic Kidney Disease Epidemiology Collaboration   (CKD-EPI) equation.       GFR Estimate If Black   Date Value Ref Range Status   10/22/2020 >90 >60 mL/min/[1.73_m2] Final     Comment:      GFR Calc  Starting 12/18/2018, serum creatinine based estimated GFR " (eGFR) will be   calculated using the Chronic Kidney Disease Epidemiology Collaboration   (CKD-EPI) equation.       Lab Results   Component Value Date    CR 0.92 10/23/2021    CR 0.69 10/22/2020     No results found for: MICROALBUMIN    Healthy Eating:  Healthy Eating Assessed Today: Yes  Breakfast: usually a bowl of cereal.  honeynut cheerios or kelly grahams or life.  usually around 2 cups she thinks.  Lunch: peice of veinna bread 2 slices with 1 pc of cheese provolone, 6 slices of salami  Dinner: rigatoni with sauce (homeade)  Snacks: usually not a snacker, occasionally raisins or grapes or fruit.  Other: lately has not been feeling very well, felt like she has been getting sick.  has not been sleeping lately, maybe 2 hours of sleep.  blood sugars: yesterday 140 5-10 minutes post eating.  100-132 running lately.    Being Active:  Being Active Assessed Today: Yes (has not been back has been flaring up lately)  Exercise::  (encouraged getting up and walking around a bit each hour.)    Monitoring:           Taking Medications:  Diabetes Medication(s)     Biguanides       metFORMIN (GLUCOPHAGE XR) 500 MG 24 hr tablet    Take 2 tablets (1,000 mg) by mouth 2 times daily (with meals)               Problem Solving:   not assessed              Reducing Risks:   assessed today    Healthy Coping:     Patient Activation Measure Survey Score:  JAYDEN Score (Last Two) 1/6/2011   JAYDEN Raw Score 41   Activation Score 63.2   JAYDEN Level 3         Care Plan and Education Provided:  Care Plan: Diabetes   Updates made by Shobha Donovan RD since 11/14/2022 12:00 AM      Problem: Diabetes Self-Management Education Needed to Optimize Self-Care Behaviors       Goal: Healthy Eating - follow a healthy eating pattern for diabetes       Task: Provide education on heart healthy eating Completed 11/14/2022   Responsible User: Shobha Donovan RD      Goal: Being Active - get regular physical activity, working up to at least 150 minutes per week    Start  Date: 11/14/2022   Expected End Date: 2/14/2023   Note:    Will try to get up each hour and walk around for a couple of minutes     Goal: Reducing Risks - know how to prevent and treat long-term diabetes complications       Task: Provide education on recommended care for dental, eye and foot health Completed 11/14/2022   Responsible User: Shobha Donovan RD      Task: Provide education on Hemoglobin A1c - goals and relationship to blood glucose levels Completed 11/14/2022   Responsible User: Shobha Donovan RD      Task: Provide education on recommendations for heart health - lipid levels and goals, blood pressure and goals, and aspirin therapy, if indicated Completed 11/14/2022   Responsible User: Shobha Donovan RD              Time Spent: 30 minutes  Encounter Type: Individual    Any diabetes medication dose changes were made via the CDE Protocol per the patient's primary care provider. A copy of this encounter was shared with the provider.

## 2022-12-08 ENCOUNTER — PATIENT OUTREACH (OUTPATIENT)
Dept: CARE COORDINATION | Facility: CLINIC | Age: 62
End: 2022-12-08

## 2022-12-08 NOTE — PROGRESS NOTES
"Clinic Care Coordination Contact    Follow Up Progress Note      Assessment: patient reporting lack of motivation and a depressed mood.  She notes that December has always been a bad month for her and with loosing that last job it has added.  She has an interview coming up for a potential.  She is also noting that she is trying to come to terms that there will be no more travel or cost activities for her future.  She feels that how her left has been for the last two years is what it will be like.  Pt was not willing to talk about it more yet did agree that it feels like life is \"piling up on her\".      Attempted to talk about options to try and help and pt unwilling to talk.  Will send her some potential financial resources and support resources.     Care Gaps:    Health Maintenance Due   Topic Date Due     URINE DRUG SCREEN  Never done     Pneumococcal Vaccine: Pediatrics (0 to 5 Years) and At-Risk Patients (6 to 64 Years) (2 - PCV) 09/16/2017     COVID-19 Vaccine (4 - Booster for Pfizer series) 01/29/2022     INFLUENZA VACCINE (1) 09/01/2022     LUNG CANCER SCREENING  10/13/2022     BMP  10/23/2022     LIPID  10/23/2022     A1C  10/25/2022     MAMMO SCREENING  11/18/2022       Not discussed during today's call.     Care Plans  Care Plan: Financial Wellbeing     Problem: Patient expresses financial resource strain     Goal: Create an action plan to increase financial stability     Start Date: 6/27/2022 Expected End Date: 6/6/2023    This Visit's Progress: 50% Recent Progress: 50%    Note:     Barriers: course needed is not available (quick books)  Strengths: going to class's    Patient expressed understanding of goal: yes  Action steps to achieve this goal:  1. I will continue with classes  2. I will look for the course I need and schedule  3. I will complete my resume  4. I will work with the workforce center to get the needed programs                    Care Plan: Physical Activity     Problem: Patient is " inactive     Goal: Exercise at Least 20 Minutes per Day     Start Date: 4/27/2020 Expected End Date: 9/16/2023    This Visit's Progress: 10% Recent Progress: 10%    Note:     Barriers: Depression, back pain, social distancing to some extent  Strengths: Understand that increasing my exercise will help with managing my weight and back pain    Patient expressed understanding of goal: Yes  Action steps to achieve this goal:  1. I will walk in one direction for 5 minutes and then return for a total of 10 minutes  2. I will not get discouraged if I cannot make it the 5 minutes in one direction before having to turn around  3. I will celebrate my successes                        Intervention/Education provided during outreach: attempted to get pt to review what is all going on and lead to possible options.  Pt was unwilling to discuss.  She was willing to have resource sent.      Outreach Frequency: monthly    Plan:   Pt to continue with job interviews.  Pt to reach out to resources.   Care Coordinator will follow up in one month. Care plan sent.    JAQUELIN Mensah  [facility] - Care Coordinator   12/8/2022   10:49 AM  [phone]

## 2022-12-08 NOTE — LETTER
It was a pleasure to speak with you.  I would like to provide you with the enclosed information for your records.  As part of care coordination, we are developing care plans to assist in accomplishing your health care goals.  When we speak next, please feel free to let me know if you want to add or change any information on your care plans.    As always, feel free to contact me if you have any questions or concerns.  I look forward to working with you in the effort to achieve your health care and wellness goals .        Sincerely,      Bell Brito, [facility]  Clinic Care Coordination  [phone]    [facility]  Patient Centered Plan of Care  About Me:        Patient Name:  Jessica Jay    YOB: 1960  Age:         62 year old   [employer] MRN:    [id number] Telephone Information:  Home Phone [phone]   Mobile [phone]       Address:  [address] [place] [id number] Email address:  [email or web address]      Emergency Contact(s)    Name Relationship Lgl Grd Work Phone Home Phone Mobile Phone   1. ILIANA JAY Mother No  [phone]   2. NOAH TOBIAS Sister No  [phone]           Primary language:  English     needed? No   [employer]:  [phone] op. 1  Other communication barriers:Glasses    Preferred Method of Communication:  Portia  Current living arrangement: I live alone    Mobility Status/ Medical Equipment: Independent        Health Maintenance  Health Maintenance Reviewed: Due/Overdue   Health Maintenance Due   Topic Date Due     URINE DRUG SCREEN  Never done     Pneumococcal Vaccine: Pediatrics (0 to 5 Years) and At-Risk Patients (6 to 64 Years) (2 - PCV) 09/16/2017     COVID-19 Vaccine (4 - Booster for Pfizer series) 01/29/2022     INFLUENZA VACCINE (1) 09/01/2022     LUNG CANCER SCREENING  10/13/2022     BMP  10/23/2022     LIPID  10/23/2022     A1C  10/25/2022     MAMMO SCREENING  11/18/2022         My  Access Plan  Medical Emergency 911   Primary Clinic Line [facility] - [phone]   24 Hour Appointment Line [phone] or  [phone]) (toll-free)   24 Hour Nurse Line [phone] (toll-free)   Preferred Urgent Care Other     Preferred Hospital [facility]  [phone]     [facility]  [address]     Behavioral Health Crisis Line The National Suicide Prevention Lifeline at 1-666.493.2734 or Text/Call 948             My Care Team Members  Patient Care Team       Relationship Specialty Notifications Start End    Marybeth Silver MD PCP - General Family Practice  5/16/18     Phone: [phone] Fax: [phone] [address] [place] [address]    Marybeth Silver MD Assigned PCP   4/15/18     Phone: [phone] Fax: [phone]         [address] [place] [address]    Bell Brito LSW Lead Care Coordinator Primary Care - CC Admissions 1/29/20     Phone: [phone] Fax: [phone]        Caren Denise DO Assigned Behavioral Health Provider   11/22/20     Phone: [phone] Fax: [phone] [address] [place] [address]    Mayela Plummer [facility] Pharmacist Pharmacist  5/19/21     Phone: [phone] Fax: [phone] [address] [facility] [address]    Francisco Farris DO Assigned Musculoskeletal Provider   12/5/21     Phone: [phone] Fax: [phone] [address] [place] [address]    Adam Painting MD Assigned Surgical Provider   5/8/22     Phone: [phone] Pager: [phone] Fax: [phone] [address] [facility] [address]    Shobha Donovan RD Diabetes Educator Dietitian, Registered  8/18/22     Phone: [phone] Fax: [phone]        Mayela Plummer RP Assigned [place] Pharmacist   9/28/22     Phone: [phone] Fax: [phone] [address] [facility] [address]             My Care Plans  Self Management and Treatment Plan  Care Plan  Care Plan: Financial Wellbeing     Problem: Patient expresses financial resource strain     Goal: Create an action plan to increase financial stability     Start Date: 6/27/2022 Expected End Date: 6/6/2023    This Visit's Progress: 50% Recent Progress: 50%    Note:     Barriers: course needed is not available (quick books)  Strengths: going to class's    Patient expressed understanding of goal: yes  Action steps to achieve this goal:  1. I will continue with classes  2. I will look for the course I need and schedule  3. I will complete my resume  4. I will work with the [facility] center to get the needed programs                    Care Plan: Physical Activity     Problem: Patient is inactive     Goal: Exercise at Least 20 Minutes per Day     Start Date: 4/27/2020 Expected End Date: 9/16/2023    This Visit's Progress: 10% Recent Progress: 10%    Note:     Barriers: Depression, back pain, social distancing to some extent  Strengths: Understand that increasing my exercise will help with managing my weight and back pain    Patient expressed understanding of goal: Yes  Action steps to achieve this goal:  1. I will walk in one direction for 5 minutes and then return for a total of 10 minutes  2. I will not get discouraged if I cannot make it the 5 minutes in one direction before having to turn around  3. I will celebrate my successes                         Action Plans on File:            Depression          Advance Care Plans/Directives Type:   Advanced Directive - On File      My Medical and Care Information  Problem List   Patient Active Problem List   Diagnosis     Depression, major     Migraines     CARDIOVASCULAR SCREENING; LDL GOAL LESS THAN 160     Disc herniation     DDD (degenerative disc disease), lumbar     DDD (degenerative disc disease), cervical     Neck pain     Tobacco abuse     ELYSSA (obstructive sleep apnea)- severe (AHI 49)      Spondylolisthesis, grade 1     Chronic low back pain     Brain lipoma     History of colonic polyps     Gastroesophageal reflux disease without esophagitis     Hypertension goal BP (blood pressure) < 140/90     Traylor's esophagus determined by endoscopy     Bilateral edema of lower extremity     GABRIELA (generalized anxiety disorder)     Restless legs syndrome (RLS)     Diabetes mellitus, type 2 (H)     Morbid obesity (H)      Current Medications and Allergies:       Allergies   Allergen Reactions     Buspar [Buspirone Hcl] Rash     Lisinopril Cough     Seroquel [Quetiapine] Itching         Current Outpatient Medications:      ACCU-CHEK GUIDE test strip, USE TO TEST TWICE DAILY, Disp: 200 strip, Rfl: 0     alcohol swab prep pads, Use to swab area of injection/haley as directed., Disp: 100 each, Rfl: 3     atorvastatin (LIPITOR) 40 MG tablet, Take 1 tablet (40 mg) by mouth daily, Disp: 90 tablet, Rfl: 3     blood glucose (NO BRAND SPECIFIED) lancets standard, Use to test blood sugar 2 times daily or as directed., Disp: 100 lancet, Rfl: 3     blood glucose monitoring (NO BRAND SPECIFIED) meter device kit, Use to test blood sugar 2 times daily or as directed., Disp: 1 kit, Rfl: 0     clobetasol (TEMOVATE) 0.05 % external cream, Apply sparingly to affected area twice daily as needed.  Do not apply to face., Disp: 120 g, Rfl: 3     cyclobenzaprine (FLEXERIL) 5 MG tablet, Take 1 tablet (5 mg) by mouth 3 times daily as needed for muscle spasms, Disp: 42 tablet, Rfl: 0     doxylamine (UNISOM) 25 MG TABS tablet, Take 1 tablet (25 mg) by mouth nightly as needed for sleep, Disp: 30 tablet, Rfl: 1     DULoxetine (CYMBALTA) 60 MG capsule, Take 2 capsules (120 mg) by mouth daily, Disp: 180 capsule, Rfl: 1     furosemide (LASIX) 20 MG tablet, Take 1 tablet (20 mg) by mouth 2 times daily, Disp: 180 tablet, Rfl: 3     hydrOXYzine (ATARAX) 25 MG tablet, Take 25-50 mg twice a day as needed for anxiety. Can also take  mg at bedtime  for sleep/itching. Don't exceed 300 mg daily., Disp: 90 tablet, Rfl: 1     losartan (COZAAR) 25 MG tablet, Take 1 tablet (25 mg) by mouth daily, Disp: 90 tablet, Rfl: 3     metFORMIN (GLUCOPHAGE XR) 500 MG 24 hr tablet, Take 2 tablets (1,000 mg) by mouth 2 times daily (with meals), Disp: 360 tablet, Rfl: 3     metoprolol succinate ER (TOPROL XL) 25 MG 24 hr tablet, Take 1 tablet (25 mg) by mouth daily, Disp: 90 tablet, Rfl: 3     omeprazole (PRILOSEC) 20 MG DR capsule, TAKE 1 CAPSULE BY MOUTH ONCE DAILY., Disp: 90 capsule, Rfl: 0     triamcinolone (KENALOG) 0.1 % external ointment, Apply topically 2 times daily To affected area x 2 weeks as needed, Disp: 80 g, Rfl: 0    Current Facility-Administered Medications:      lidocaine 1 % injection 0.5 mL, 0.5 mL, , , Ki May MD, 0.5 mL at 05/04/20 1021     lidocaine 1 % injection 2 mL, 2 mL, , , Francisco Farris DO, 2 mL at 06/07/22 1207     triamcinolone (KENALOG-40) injection 20 mg, 20 mg, , , Ki May MD, 20 mg at 05/04/20 1021     triamcinolone (KENALOG-40) injection 40 mg, 40 mg, , , Francisco Farris DO, 40 mg at 06/07/22 1207     triamcinolone (KENALOG-40) injection 40 mg, 40 mg, , , Francisco Farris DO, 40 mg at 11/26/21 1709      Care Coordination Start Date: 1/29/2020   Frequency of Care Coordination: monthly     Form Last Updated: 12/08/2022

## 2022-12-18 DIAGNOSIS — K21.9 GASTROESOPHAGEAL REFLUX DISEASE, UNSPECIFIED WHETHER ESOPHAGITIS PRESENT: ICD-10-CM

## 2023-01-06 ENCOUNTER — PATIENT OUTREACH (OUTPATIENT)
Dept: CARE COORDINATION | Facility: CLINIC | Age: 63
End: 2023-01-06

## 2023-01-06 NOTE — PROGRESS NOTES
Clinic Care Coordination Contact  [facility]/Voicemail       Clinical Data: Care Coordinator Outreach  Outreach attempted x 1.  Left message on patient's voicemail with call back information and requested return call.  Plan:  Care Coordinator will try to reach patient again in 7-10 business days.    JAQUELIN Mensah  [facility] Primary Care - Care Coordinator   1/6/2023   12:29 PM  [phone]

## 2023-01-17 ENCOUNTER — TELEPHONE (OUTPATIENT)
Dept: EDUCATION SERVICES | Facility: CLINIC | Age: 63
End: 2023-01-17

## 2023-01-17 NOTE — PROGRESS NOTES
Clinic Care Coordination Contact  [facility]/Voicemail       Clinical Data: Care Coordinator Outreach  Outreach attempted x 2.  Left message on patient's voicemail with call back information and requested return call.  Plan: Care Coordinator will send unable to contact letter with care coordinator contact information via Virtual 3-D Display for Smartphones. Care Coordinator will try to reach patient again in 1 month.    JAQUELIN Mensah  [facility] Primary Care - Care Coordinator   1/17/2023   10:34 AM  [phone]

## 2023-01-17 NOTE — TELEPHONE ENCOUNTER
Gemma calling to leave a message for Shobha BATISTA. Patient has a Phone appointment on 2/14 with Shobha. Patient is requesting that she can call Shobha the day of the Appointment as she may be home a little later than 5pm.

## 2023-01-18 ENCOUNTER — PATIENT OUTREACH (OUTPATIENT)
Dept: CARE COORDINATION | Facility: CLINIC | Age: 63
End: 2023-01-18
Payer: COMMERCIAL

## 2023-01-18 NOTE — PROGRESS NOTES
Clinic Care Coordination Contact    Follow Up Progress Note      Assessment: patient reporting that she is working and will be transitioning to a new job.  She is not liking the person training her in and it is a distance to drive.     She is talking with her therapist and updated her on some suicidal ideation with no plans or intent. She noted her depression is not stable yet.  Reinforced her work with the counselor.      Care Gaps:    Health Maintenance Due   Topic Date Due     URINE DRUG SCREEN  Never done     Pneumococcal Vaccine: Pediatrics (0 to 5 Years) and At-Risk Patients (6 to 64 Years) (2 - PCV) 09/16/2017     COVID-19 Vaccine (4 - Booster for Pfizer series) 01/29/2022     INFLUENZA VACCINE (1) 09/01/2022     LUNG CANCER SCREENING  10/13/2022     BMP  10/23/2022     LIPID  10/23/2022     A1C  10/25/2022     MAMMO SCREENING  11/18/2022     YEARLY PREVENTIVE VISIT  01/11/2023       Postponed to next office visit     Care Plans  Care Plan: Financial Wellbeing     Problem: Patient expresses financial resource strain     Goal: Create an action plan to increase financial stability     Start Date: 6/27/2022 Expected End Date: 6/6/2023    This Visit's Progress: 50% Recent Progress: 50%    Note:     Barriers: course needed is not available (quick books)  Strengths: going to class's    Patient expressed understanding of goal: yes  Action steps to achieve this goal:  1. I will continue with classes  2. I will look for the course I need and schedule  3. I will complete my resume  4. I will work with the workforce center to get the needed programs                    Care Plan: Physical Activity     Problem: Patient is inactive     Goal: Exercise at Least 20 Minutes per Day     Start Date: 4/27/2020 Expected End Date: 9/16/2023    This Visit's Progress: 10% Recent Progress: 10%    Note:     Barriers: Depression, back pain, social distancing to some extent  Strengths: Understand that increasing my exercise will help  with managing my weight and back pain    Patient expressed understanding of goal: Yes  Action steps to achieve this goal:  1. I will walk in one direction for 5 minutes and then return for a total of 10 minutes  2. I will not get discouraged if I cannot make it the 5 minutes in one direction before having to turn around  3. I will celebrate my successes                        Intervention/Education provided during outreach: listened and helped reframe some negative situations. Reinforced her right to feel the way she is in certain situations and that she can only do what is in her abilities/control.  Encouraged continued talk with therapist.      Outreach Frequency: monthly    Plan:   Pt to continue with therapy to help with her depression.   Care Coordinator will follow up in one month.     JAQUELIN Mensah  [facility] Primary Care - Care Coordinator   1/18/2023   11:54 AM  [phone]

## 2023-01-28 DIAGNOSIS — I10 HYPERTENSION GOAL BP (BLOOD PRESSURE) < 140/90: ICD-10-CM

## 2023-01-30 DIAGNOSIS — I10 HYPERTENSION GOAL BP (BLOOD PRESSURE) < 140/90: ICD-10-CM

## 2023-02-01 RX ORDER — LOSARTAN POTASSIUM 25 MG/1
TABLET ORAL
Qty: 90 TABLET | Refills: 0 | Status: SHIPPED | OUTPATIENT
Start: 2023-02-01 | End: 2023-03-08

## 2023-02-01 RX ORDER — FUROSEMIDE 20 MG
TABLET ORAL
Qty: 180 TABLET | Refills: 0 | Status: SHIPPED | OUTPATIENT
Start: 2023-02-01 | End: 2023-05-01

## 2023-02-09 NOTE — TELEPHONE ENCOUNTER
Forms were faxed this morning.  Now I am unable to locate them for some reason, no longer in scan bin.  Will postpone this until tomorrow and check with Adriana if she is aware of where the forms are.    soft

## 2023-02-14 ENCOUNTER — VIRTUAL VISIT (OUTPATIENT)
Dept: EDUCATION SERVICES | Facility: CLINIC | Age: 63
End: 2023-02-14
Payer: COMMERCIAL

## 2023-02-14 DIAGNOSIS — E11.65 TYPE 2 DIABETES MELLITUS WITH HYPERGLYCEMIA, WITHOUT LONG-TERM CURRENT USE OF INSULIN (H): Primary | Chronic | ICD-10-CM

## 2023-02-14 PROCEDURE — G0108 DIAB MANAGE TRN  PER INDIV: HCPCS | Mod: 93 | Performed by: DIETITIAN, REGISTERED

## 2023-02-14 NOTE — PATIENT INSTRUCTIONS
Get appt made with doctor and get a1c drawn, lipid test done.  I put those labs in.      2.  Try to get outside and walk as able or walk in house or walking in place in front of chair.      3.  Continue to make good food choices!  You are doing well avoiding poor choices, great job saying no.

## 2023-02-14 NOTE — PROGRESS NOTES
Diabetes Self-Management Education & Support    Presents for: Individual review    Type of Service: Telephone Visit    Originating Location (Patient Location): Home  Distant Location (Provider Location): [facility]  Mode of Communication:  Telephone        Assessment Type:   ASSESSMENT:  - doing well with meal plan, trying to make good choices.  Saying no to things she wants at store.  -stay as active as possible  -she is currently working but this job is ending soon, and she will be looking again.    Patient's most recent   Lab Results   Component Value Date    A1C 7.1 07/25/2022    A1C 8.3 06/22/2021     is meeting goal of <8.0    Diabetes knowledge and skills assessment:   Patient is knowledgeable in diabetes management concepts related to: Healthy Eating, Being Active and Monitoring    Continue education with the following diabetes management concepts: Healthy Eating, Being Active and Monitoring    Based on learning assessment above, most appropriate setting for further diabetes education would be: Individual setting.      PLAN      Topics to cover at upcoming visits: Healthy Eating, Being Active and Monitoring    Follow-up: in three months    See Care Plan for co-developed, patient-state behavior change goals.  AVS provided for patient today.    Education Materials Provided:  No new materials provided today      SUBJECTIVE/OBJECTIVE:  Presents for: Individual review  Accompanied by: Self  Diabetes type: Type 2  How confident are you filling out medical forms by yourself:: Quite a bit  Other concerns:: Glasses  Cultural Influences/Ethnic Background:  Not  or       Diabetes Symptoms & Complications:          Patient Problem List and Family Medical History reviewed for relevant medical history, current medical status, and diabetes risk factors.    Vitals:  There were no vitals taken for this visit.  Estimated body mass index is 44.99 kg/m  as calculated from the  "following:    Height as of 9/13/22: 1.575 m (5' 2\").    Weight as of 9/13/22: 111.6 kg (246 lb).   Last 3 BP:   BP Readings from Last 3 Encounters:   07/25/22 136/74   06/07/22 132/84   04/29/22 121/86       History   Smoking Status     Former     Packs/day: 0.80     Years: 30.00     Types: Cigarettes   Smokeless Tobacco     Never     Comment: 1 PPD x 30. Quit 2017       Labs:  Lab Results   Component Value Date    A1C 7.1 07/25/2022    A1C 8.3 06/22/2021     Lab Results   Component Value Date     10/23/2021     10/22/2020     Lab Results   Component Value Date    LDL 86 10/23/2021     10/22/2020     HDL Cholesterol   Date Value Ref Range Status   10/22/2020 47 (L) >49 mg/dL Final     Direct Measure HDL   Date Value Ref Range Status   10/23/2021 50 >=50 mg/dL Final   ]  GFR Estimate   Date Value Ref Range Status   10/23/2021 67 >60 mL/min/1.73m2 Final     Comment:     As of July 11, 2021, eGFR is calculated by the CKD-EPI creatinine equation, without race adjustment. eGFR can be influenced by muscle mass, exercise, and diet. The reported eGFR is an estimation only and is only applicable if the renal function is stable.   10/22/2020 >90 >60 mL/min/[1.73_m2] Final     Comment:     Non  GFR Calc  Starting 12/18/2018, serum creatinine based estimated GFR (eGFR) will be   calculated using the Chronic Kidney Disease Epidemiology Collaboration   (CKD-EPI) equation.       GFR Estimate If Black   Date Value Ref Range Status   10/22/2020 >90 >60 mL/min/[1.73_m2] Final     Comment:      GFR Calc  Starting 12/18/2018, serum creatinine based estimated GFR (eGFR) will be   calculated using the Chronic Kidney Disease Epidemiology Collaboration   (CKD-EPI) equation.       Lab Results   Component Value Date    CR 0.92 10/23/2021    CR 0.69 10/22/2020     No results found for: MICROALBUMIN    Healthy Eating:  Healthy Eating Assessed Today: Yes  Breakfast: usually bowl of cereal: " honey nut cheerios or life.  one bowl and thats it  Lunch: sandwich or leftover or does not eat  Dinner: 5:10 pm: bowl of chili or make tacos (tostitoes scoops) nachoes.  Snacks: not usually snacking in evening.  rigoberto hua was doing some.  Other: blood sugars: 130-135, 140.  She will work to make sure she doing the 2 hour post meals    Being Active:  Exercise:: Yes (has beening going to work so a bit more activity)    Monitoring:           Taking Medications:  Diabetes Medication(s)     Biguanides       metFORMIN (GLUCOPHAGE XR) 500 MG 24 hr tablet    Take 2 tablets (1,000 mg) by mouth 2 times daily (with meals)           Reducing Risks:   getting lipids drawn    Healthy Coping:     Patient Activation Measure Survey Score:  JAYDEN Score (Last Two) 1/6/2011   JAYDEN Raw Score 41   Activation Score 63.2   JAYDEN Level 3         Care Plan and Education Provided:  Care Plan: Diabetes   Updates made by Shobha Donovan RD since 2/14/2023 12:00 AM      Problem: Diabetes Self-Management Education Needed to Optimize Self-Care Behaviors       Goal: Healthy Eating - follow a healthy eating pattern for diabetes       Task: Provide education on portion control and consistency in amount, composition and timing of food intake    Responsible User: Shobha Donovan RD   Note:    Continue to use smaller plate and make good choices             Time Spent: 35 minutes  Encounter Type: Individual    Any diabetes medication dose changes were made via the CDE Protocol per the patient's primary care provider. A copy of this encounter was shared with the provider.

## 2023-02-15 ENCOUNTER — PATIENT OUTREACH (OUTPATIENT)
Dept: CARE COORDINATION | Facility: CLINIC | Age: 63
End: 2023-02-15
Payer: COMMERCIAL

## 2023-02-15 NOTE — PROGRESS NOTES
Clinic Care Coordination Contact  Care Team Conversations    Sent a Redgage[employer] message to pt to identify a good time to talk next week.     Will wait for response and if no reply in one week will call pt.     JAQUELIN Mensah  [facility] - Care Coordinator   2/15/2023   10:38 AM  [phone]

## 2023-02-15 NOTE — LETTER
It was a pleasure to speak with you.  I would like to provide you with the enclosed information for your records.  As part of care coordination, we are developing care plans to assist in accomplishing your health care goals.  When we speak next, please feel free to let me know if you want to add or change any information on your care plans.    As always, feel free to contact me if you have any questions or concerns.  I look forward to working with you in the effort to achieve your health care and wellness goals .        Sincerely,      Bell Brito, [facility]  Clinic Care Coordination  [phone]    [facility]  Patient Centered Plan of Care  About Me:        Patient Name:  Jessica Jay    YOB: 1960  Age:         62 year old   [place] MRN:    [id number] Telephone Information:  Home Phone [phone]   Mobile [phone]       Address:  [address] [place] [id number] Email address:  [email or web address]      Emergency Contact(s)    Name Relationship Lgl Grd Work Phone Home Phone Mobile Phone   1. ILIANA JAY Mother No  [phone]   2. NOAH TOBIAS Sister No  [phone]           Primary language:  English     needed? No   [place] [employer]:  [phone] op. 1  Other communication barriers:Glasses    Preferred Method of Communication:  Portia  Current living arrangement: I live alone    Mobility Status/ Medical Equipment: Independent        Health Maintenance  Health Maintenance Reviewed: Due/Overdue   Health Maintenance Due   Topic Date Due     URINE DRUG SCREEN  Never done     Pneumococcal Vaccine: Pediatrics (0 to 5 Years) and At-Risk Patients (6 to 64 Years) (2 - PCV) 09/16/2017     COVID-19 Vaccine (4 - Booster for Pfizer series) 01/29/2022     INFLUENZA VACCINE (1) 09/01/2022     LUNG CANCER SCREENING  10/13/2022     BMP  10/23/2022     LIPID  10/23/2022     A1C  10/25/2022     MAMMO SCREENING  11/18/2022     YEARLY  PREVENTIVE VISIT  01/11/2023     PHQ-9  03/13/2023           My Access Plan  Medical Emergency 911   Primary Clinic Line [facility] [place] - [phone]   24 Hour Appointment Line [phone] or  [phone]) (toll-free)   24 Hour Nurse Line [phone] (toll-free)   Preferred Urgent Care Other     Preferred Hospital [facility]  [phone]     Preferred Pharmacy [facility]  [place]  [address]     Behavioral Health Crisis Line The National Suicide Prevention Lifeline at 1-936.622.5364 or Text/Call 988             My Care Team Members  Patient Care Team       Relationship Specialty Notifications Start End    Marybeth Silver MD PCP - General Family Practice  5/16/18     Phone: [phone] Fax: [phone] [address] [place] [address]    Marybeth Silver MD Assigned PCP   4/15/18     Phone: [phone] Fax: [phone]         [address] [place] [address]    Bell Brito LSW Lead Care Coordinator Primary Care - CC Admissions 1/29/20     Phone: [phone] Fax: [phone]        Caren Denise DO Assigned Behavioral Health Provider   11/22/20     Phone: [phone] Fax: [phone] [address] [facility] [address]    Mayela Plummer [facility] Pharmacist Pharmacist  5/19/21     Phone: [phone] Fax: [phone] [address] [facility] [place] [address]    Francisco Farris DO Assigned Musculoskeletal Provider   12/5/21     Phone: [phone] Fax: [phone] [address] [place] [address]    Adam Painting MD Assigned Surgical Provider   5/8/22     Phone: [phone] Pager: [phone] Fax: [phone] [address] [facility] [address]    Shobha Donovan RD Diabetes Educator Dietitian, Registered  8/18/22     Phone: [phone] Fax: [phone]        Mayela Plummer RP Assigned MTM Pharmacist   9/28/22     Phone: [phone]  Fax: [phone]         [address] [facility] [address]            My Care Plans  Self Management and Treatment Plan  Care Plan  Care Plan: Financial Wellbeing     Problem: Patient expresses financial resource strain     Goal: Create an action plan to increase financial stability     Start Date: 6/27/2022 Expected End Date: 6/6/2023    This Visit's Progress: 50% Recent Progress: 50%    Note:     Barriers: course needed is not available (quick books)  Strengths: going to class's    Patient expressed understanding of goal: yes  Action steps to achieve this goal:  1. I will continue with classes  2. I will look for the course I need and schedule  3. I will complete my resume  4. I will work with the [facility] to get the needed programs                    Care Plan: Physical Activity     Problem: Patient is inactive     Goal: Exercise at Least 20 Minutes per Day     Start Date: 4/27/2020 Expected End Date: 9/16/2023    This Visit's Progress: 10% Recent Progress: 10%    Note:     Barriers: Depression, back pain, social distancing to some extent  Strengths: Understand that increasing my exercise will help with managing my weight and back pain    Patient expressed understanding of goal: Yes  Action steps to achieve this goal:  1. I will walk in one direction for 5 minutes and then return for a total of 10 minutes  2. I will not get discouraged if I cannot make it the 5 minutes in one direction before having to turn around  3. I will celebrate my successes                         Action Plans on File:            Depression          Advance Care Plans/Directives Type:   Advanced Directive - On File      My Medical and Care Information  Problem List   Patient Active Problem List   Diagnosis     Depression, major     Migraines     CARDIOVASCULAR SCREENING; LDL GOAL LESS THAN 160     Disc herniation     DDD (degenerative disc disease), lumbar     DDD (degenerative disc disease), cervical     Neck pain     Tobacco  abuse     ELYSSA (obstructive sleep apnea)- severe (AHI 49)     Spondylolisthesis, grade 1     Chronic low back pain     Brain lipoma     History of colonic polyps     Gastroesophageal reflux disease without esophagitis     Hypertension goal BP (blood pressure) < 140/90     Traylor's esophagus determined by endoscopy     Bilateral edema of lower extremity     GABRIELA (generalized anxiety disorder)     Restless legs syndrome (RLS)     Diabetes mellitus, type 2 (H)     Morbid obesity (H)      Current Medications and Allergies:       Allergies   Allergen Reactions     Buspar [Buspirone Hcl] Rash     Lisinopril Cough     Seroquel [Quetiapine] Itching         Current Outpatient Medications:      ACCU-CHEK GUIDE test strip, USE TO TEST TWICE DAILY, Disp: 200 strip, Rfl: 0     alcohol swab prep pads, Use to swab area of injection/haley as directed., Disp: 100 each, Rfl: 3     atorvastatin (LIPITOR) 40 MG tablet, Take 1 tablet (40 mg) by mouth daily, Disp: 90 tablet, Rfl: 3     blood glucose (NO BRAND SPECIFIED) lancets standard, Use to test blood sugar 2 times daily or as directed., Disp: 100 lancet, Rfl: 3     blood glucose monitoring (NO BRAND SPECIFIED) meter device kit, Use to test blood sugar 2 times daily or as directed., Disp: 1 kit, Rfl: 0     clobetasol (TEMOVATE) 0.05 % external cream, Apply sparingly to affected area twice daily as needed.  Do not apply to face., Disp: 120 g, Rfl: 3     cyclobenzaprine (FLEXERIL) 5 MG tablet, Take 1 tablet (5 mg) by mouth 3 times daily as needed for muscle spasms, Disp: 42 tablet, Rfl: 0     doxylamine (UNISOM) 25 MG TABS tablet, Take 1 tablet (25 mg) by mouth nightly as needed for sleep, Disp: 30 tablet, Rfl: 1     DULoxetine (CYMBALTA) 60 MG capsule, Take 2 capsules (120 mg) by mouth daily, Disp: 180 capsule, Rfl: 1     furosemide (LASIX) 20 MG tablet, TAKE 1 TABLET BY MOUTH TWICE DAILY., Disp: 180 tablet, Rfl: 0     hydrOXYzine (ATARAX) 25 MG tablet, Take 25-50 mg twice a day as  needed for anxiety. Can also take  mg at bedtime for sleep/itching. Don't exceed 300 mg daily., Disp: 90 tablet, Rfl: 1     losartan (COZAAR) 25 MG tablet, TAKE 1 TABLET BY MOUTH ONCE DAILY., Disp: 90 tablet, Rfl: 0     metFORMIN (GLUCOPHAGE XR) 500 MG 24 hr tablet, Take 2 tablets (1,000 mg) by mouth 2 times daily (with meals), Disp: 360 tablet, Rfl: 3     metoprolol succinate ER (TOPROL XL) 25 MG 24 hr tablet, Take 1 tablet (25 mg) by mouth daily, Disp: 90 tablet, Rfl: 3     omeprazole (PRILOSEC) 20 MG DR capsule, TAKE 1 CAPSULE BY MOUTH ONCE DAILY., Disp: 90 capsule, Rfl: 0     triamcinolone (KENALOG) 0.1 % external ointment, Apply topically 2 times daily To affected area x 2 weeks as needed, Disp: 80 g, Rfl: 0    Current Facility-Administered Medications:      lidocaine 1 % injection 0.5 mL, 0.5 mL, , , iK May MD, 0.5 mL at 05/04/20 1021     lidocaine 1 % injection 2 mL, 2 mL, , , Francisco Farris DO, 2 mL at 06/07/22 1207     triamcinolone (KENALOG-40) injection 20 mg, 20 mg, , , Ki May MD, 20 mg at 05/04/20 1021     triamcinolone (KENALOG-40) injection 40 mg, 40 mg, , , Francisco Farris DO, 40 mg at 06/07/22 1207     triamcinolone (KENALOG-40) injection 40 mg, 40 mg, , , Francisco Farris DO, 40 mg at 11/26/21 1709      Care Coordination Start Date: 1/29/2020   Frequency of Care Coordination: monthly     Form Last Updated: 02/23/2023

## 2023-02-23 ASSESSMENT — ACTIVITIES OF DAILY LIVING (ADL): DEPENDENT_IADLS:: INDEPENDENT

## 2023-02-23 NOTE — PROGRESS NOTES
Clinic Care Coordination Contact    Clinic Care Coordination Contact  OUTREACH    Referral Information:  Referral Source: Specialist    Primary Diagnosis: Behavioral Health    Chief Complaint   Patient presents with     Clinic Care Coordination - Follow-up     Erika to schedule call        Universal Utilization: no concerns  Clinic Utilization  Difficulty keeping appointments:: No  Compliance Concerns: No  No-Show Concerns: No  No PCP office visit in Past Year: No  Utilization    Hospital Admissions  0             ED Visits  0             No Show Count (past year)  0                Current as of: 2/17/2023  5:40 PM              Clinical Concerns:  Current Medical Concerns:  No new concerns.  Pt follows with diabetic education.     Current Behavioral Concerns: pt noted continued down feeling/depression with no job and relationships.    Her financial concerns are causing loss of sleep.   Education Provided to patient: talked about some resources to reach out to again for supports with finances.  Brainstormed some additional ideas to help with sleep.  She noted she can not get her body to relax and her brain to stop the worry.     Pain  Pain (GOAL):: Yes  Type: Chronic (>3mo)  Health Maintenance Reviewed: Due/Overdue   Health Maintenance Due   Topic Date Due     URINE DRUG SCREEN  Never done     Pneumococcal Vaccine: Pediatrics (0 to 5 Years) and At-Risk Patients (6 to 64 Years) (2 - PCV) 09/16/2017     COVID-19 Vaccine (4 - Booster for Pfizer series) 01/29/2022     INFLUENZA VACCINE (1) 09/01/2022     LUNG CANCER SCREENING  10/13/2022     BMP  10/23/2022     LIPID  10/23/2022     A1C  10/25/2022     MAMMO SCREENING  11/18/2022     YEARLY PREVENTIVE VISIT  01/11/2023     PHQ-9  03/13/2023       Clinical Pathway: None    Medication Management:  Medication review status: Medications reviewed and no changes reported per patient.             Functional Status:  Dependent ADLs:: Independent  Dependent IADLs::  Independent  Bed or wheelchair confined:: No  Mobility Status: Independent  Fallen 2 or more times in the past year?: No  Any fall with injury in the past year?: No    Living Situation:  Current living arrangement:: I live alone  Type of residence:: Private home - no stairs    Lifestyle & Psychosocial Needs:    Social Determinants of Health     Tobacco Use: Medium Risk     Smoking Tobacco Use: Former     Smokeless Tobacco Use: Never     Passive Exposure: Not on file   Alcohol Use: Not on file   Financial Resource Strain: High Risk     Difficulty of Paying Living Expenses: Hard   Food Insecurity: No Food Insecurity     Worried About Running Out of Food in the Last Year: Never true     Ran Out of Food in the Last Year: Never true   Transportation Needs: Not on file   Physical Activity: Inactive     Days of Exercise per Week: 0 days     Minutes of Exercise per Session: 0 min   Stress: Not on file   Social Connections: Not on file   Intimate Partner Violence: Not At Risk     Fear of Current or Ex-Partner: No     Emotionally Abused: No     Physically Abused: No     Sexually Abused: No   Depression: At risk     PHQ-2 Score: 4   Housing Stability: Not on file     Diet:: Regular  Inadequate nutrition (GOAL):: No  Tube Feeding: No  Inadequate activity/exercise (GOAL):: No  Significant changes in sleep pattern (GOAL): No  Transportation means:: Regular car     Church or spiritual beliefs that impact treatment:: No  Mental health DX:: Yes  Mental health DX how managed:: Medication  Mental health management concern (GOAL):: Yes  Chemical Dependency Status: No Current Concerns  Informal Support system:: Family, Friends        Pt had no new concerns.  She continues to struggle with finances and this increases her depression.  Encouraged continued work on searching for resources and supports.  Her mom has borrowed her some money to help pay off some credit cards.      Resources and Interventions:  Current Resources:       Community Resources: None  Supplies Currently Used at Home: None  Equipment Currently Used at Home: none  Employment Status: employment seeking         Advance Care Plan/Directive  Advanced Care Plans/Directives on file:: Yes  Type Advanced Care Plans/Directives: Advanced Directive - On File    Referrals Placed: financial       Care Plan:  Care Plan: Financial Wellbeing     Problem: Patient expresses financial resource strain     Goal: Create an action plan to increase financial stability     Start Date: 6/27/2022 Expected End Date: 6/6/2023    This Visit's Progress: 50% Recent Progress: 50%    Note:     Barriers: course needed is not available (quick books)  Strengths: going to class's    Patient expressed understanding of goal: yes  Action steps to achieve this goal:  1. I will continue with classes  2. I will look for the course I need and schedule  3. I will complete my resume  4. I will work with the workforce center to get the needed programs                    Care Plan: Physical Activity     Problem: Patient is inactive     Goal: Exercise at Least 20 Minutes per Day     Start Date: 4/27/2020 Expected End Date: 9/16/2023    This Visit's Progress: 10% Recent Progress: 10%    Note:     Barriers: Depression, back pain, social distancing to some extent  Strengths: Understand that increasing my exercise will help with managing my weight and back pain    Patient expressed understanding of goal: Yes  Action steps to achieve this goal:  1. I will walk in one direction for 5 minutes and then return for a total of 10 minutes  2. I will not get discouraged if I cannot make it the 5 minutes in one direction before having to turn around  3. I will celebrate my successes                        Patient/Caregiver understanding: pt to work on improving her financial situation and exercise as weather improves.     Outreach Frequency: monthly  Future Appointments              Tomorrow BE [facility] [place]  KIRSTIE Torres    In 5 days BE MA/LPN [facility] KIRSTIE Hathaway    In 2 months Shobha Donovan RD [facility]          Plan: pt to continue with job search and improving sleep. Sw to call in about one month.     JAQUELIN Mensah  [facility] Primary [facility] - Care Coordinator   2/23/2023   10:51 AM  [phone]

## 2023-02-24 ENCOUNTER — LAB (OUTPATIENT)
Dept: LAB | Facility: CLINIC | Age: 63
End: 2023-02-24
Payer: COMMERCIAL

## 2023-02-24 DIAGNOSIS — E11.65 TYPE 2 DIABETES MELLITUS WITH HYPERGLYCEMIA, WITHOUT LONG-TERM CURRENT USE OF INSULIN (H): Chronic | ICD-10-CM

## 2023-02-24 LAB
CHOLEST SERPL-MCNC: 130 MG/DL
FASTING STATUS PATIENT QL REPORTED: YES
HBA1C MFR BLD: 7.7 % (ref 0–5.6)
HDLC SERPL-MCNC: 49 MG/DL
LDLC SERPL CALC-MCNC: 60 MG/DL
NONHDLC SERPL-MCNC: 81 MG/DL
TRIGL SERPL-MCNC: 107 MG/DL

## 2023-02-24 PROCEDURE — 80061 LIPID PANEL: CPT

## 2023-02-24 PROCEDURE — 83036 HEMOGLOBIN GLYCOSYLATED A1C: CPT

## 2023-02-24 PROCEDURE — 36415 COLL VENOUS BLD VENIPUNCTURE: CPT

## 2023-02-28 ENCOUNTER — IMMUNIZATION (OUTPATIENT)
Dept: FAMILY MEDICINE | Facility: CLINIC | Age: 63
End: 2023-02-28
Payer: COMMERCIAL

## 2023-02-28 DIAGNOSIS — Z23 HIGH PRIORITY FOR 2019-NCOV VACCINE: Primary | ICD-10-CM

## 2023-02-28 PROCEDURE — 0124A COVID-19 VACCINE BIVALENT BOOSTER 12+ (PFIZER): CPT

## 2023-02-28 PROCEDURE — 91312 COVID-19 VACCINE BIVALENT BOOSTER 12+ (PFIZER): CPT

## 2023-02-28 PROCEDURE — 99207 PR NO CHARGE NURSE ONLY: CPT

## 2023-03-08 ENCOUNTER — OFFICE VISIT (OUTPATIENT)
Dept: FAMILY MEDICINE | Facility: CLINIC | Age: 63
End: 2023-03-08
Payer: COMMERCIAL

## 2023-03-08 VITALS
OXYGEN SATURATION: 100 % | SYSTOLIC BLOOD PRESSURE: 126 MMHG | RESPIRATION RATE: 22 BRPM | BODY MASS INDEX: 46.2 KG/M2 | HEART RATE: 110 BPM | DIASTOLIC BLOOD PRESSURE: 86 MMHG | TEMPERATURE: 97.5 F | WEIGHT: 252.6 LBS

## 2023-03-08 DIAGNOSIS — G47.00 INSOMNIA, UNSPECIFIED TYPE: ICD-10-CM

## 2023-03-08 DIAGNOSIS — Z59.86 FINANCIAL INSECURITY: ICD-10-CM

## 2023-03-08 DIAGNOSIS — F33.1 MODERATE EPISODE OF RECURRENT MAJOR DEPRESSIVE DISORDER (H): Primary | ICD-10-CM

## 2023-03-08 DIAGNOSIS — I10 HYPERTENSION GOAL BP (BLOOD PRESSURE) < 140/90: ICD-10-CM

## 2023-03-08 DIAGNOSIS — E11.65 TYPE 2 DIABETES MELLITUS WITH HYPERGLYCEMIA, WITHOUT LONG-TERM CURRENT USE OF INSULIN (H): ICD-10-CM

## 2023-03-08 DIAGNOSIS — R45.851 SUICIDAL IDEATION: ICD-10-CM

## 2023-03-08 DIAGNOSIS — F41.1 GAD (GENERALIZED ANXIETY DISORDER): ICD-10-CM

## 2023-03-08 DIAGNOSIS — Z12.31 ENCOUNTER FOR SCREENING MAMMOGRAM FOR BREAST CANCER: ICD-10-CM

## 2023-03-08 DIAGNOSIS — Z23 NEED FOR VACCINATION: ICD-10-CM

## 2023-03-08 LAB
ALBUMIN SERPL-MCNC: 3.6 G/DL (ref 3.4–5)
ALP SERPL-CCNC: 100 U/L (ref 40–150)
ALT SERPL W P-5'-P-CCNC: 33 U/L (ref 0–50)
ANION GAP SERPL CALCULATED.3IONS-SCNC: 1 MMOL/L (ref 3–14)
AST SERPL W P-5'-P-CCNC: 18 U/L (ref 0–45)
BILIRUB SERPL-MCNC: 0.5 MG/DL (ref 0.2–1.3)
BUN SERPL-MCNC: 11 MG/DL (ref 7–30)
CALCIUM SERPL-MCNC: 9.8 MG/DL (ref 8.5–10.1)
CHLORIDE BLD-SCNC: 105 MMOL/L (ref 94–109)
CO2 SERPL-SCNC: 30 MMOL/L (ref 20–32)
CREAT SERPL-MCNC: 0.82 MG/DL (ref 0.52–1.04)
GFR SERPL CREATININE-BSD FRML MDRD: 80 ML/MIN/1.73M2
GLUCOSE BLD-MCNC: 164 MG/DL (ref 70–99)
POTASSIUM BLD-SCNC: 4.6 MMOL/L (ref 3.4–5.3)
PROT SERPL-MCNC: 7.9 G/DL (ref 6.8–8.8)
SODIUM SERPL-SCNC: 136 MMOL/L (ref 133–144)
T4 FREE SERPL-MCNC: 0.92 NG/DL (ref 0.76–1.46)
TSH SERPL DL<=0.005 MIU/L-ACNC: 4.6 MU/L (ref 0.4–4)

## 2023-03-08 PROCEDURE — 90471 IMMUNIZATION ADMIN: CPT | Performed by: FAMILY MEDICINE

## 2023-03-08 PROCEDURE — 90677 PCV20 VACCINE IM: CPT | Performed by: FAMILY MEDICINE

## 2023-03-08 PROCEDURE — 84439 ASSAY OF FREE THYROXINE: CPT | Performed by: FAMILY MEDICINE

## 2023-03-08 PROCEDURE — 96127 BRIEF EMOTIONAL/BEHAV ASSMT: CPT | Performed by: FAMILY MEDICINE

## 2023-03-08 PROCEDURE — 80053 COMPREHEN METABOLIC PANEL: CPT | Performed by: FAMILY MEDICINE

## 2023-03-08 PROCEDURE — 84443 ASSAY THYROID STIM HORMONE: CPT | Performed by: FAMILY MEDICINE

## 2023-03-08 PROCEDURE — 99214 OFFICE O/P EST MOD 30 MIN: CPT | Mod: 25 | Performed by: FAMILY MEDICINE

## 2023-03-08 PROCEDURE — 36415 COLL VENOUS BLD VENIPUNCTURE: CPT | Performed by: FAMILY MEDICINE

## 2023-03-08 RX ORDER — DULOXETIN HYDROCHLORIDE 60 MG/1
120 CAPSULE, DELAYED RELEASE ORAL DAILY
Qty: 180 CAPSULE | Refills: 1 | Status: SHIPPED | OUTPATIENT
Start: 2023-03-08 | End: 2023-08-14

## 2023-03-08 RX ORDER — LOSARTAN POTASSIUM 25 MG/1
25 TABLET ORAL DAILY
Qty: 90 TABLET | Refills: 3 | Status: SHIPPED | OUTPATIENT
Start: 2023-03-08 | End: 2023-12-08

## 2023-03-08 SDOH — ECONOMIC STABILITY - INCOME SECURITY: FINANCIAL INSECURITY: Z59.86

## 2023-03-08 ASSESSMENT — ANXIETY QUESTIONNAIRES
1. FEELING NERVOUS, ANXIOUS, OR ON EDGE: SEVERAL DAYS
4. TROUBLE RELAXING: MORE THAN HALF THE DAYS
IF YOU CHECKED OFF ANY PROBLEMS ON THIS QUESTIONNAIRE, HOW DIFFICULT HAVE THESE PROBLEMS MADE IT FOR YOU TO DO YOUR WORK, TAKE CARE OF THINGS AT HOME, OR GET ALONG WITH OTHER PEOPLE: SOMEWHAT DIFFICULT
7. FEELING AFRAID AS IF SOMETHING AWFUL MIGHT HAPPEN: SEVERAL DAYS
3. WORRYING TOO MUCH ABOUT DIFFERENT THINGS: NEARLY EVERY DAY
7. FEELING AFRAID AS IF SOMETHING AWFUL MIGHT HAPPEN: SEVERAL DAYS
8. IF YOU CHECKED OFF ANY PROBLEMS, HOW DIFFICULT HAVE THESE MADE IT FOR YOU TO DO YOUR WORK, TAKE CARE OF THINGS AT HOME, OR GET ALONG WITH OTHER PEOPLE?: SOMEWHAT DIFFICULT
5. BEING SO RESTLESS THAT IT IS HARD TO SIT STILL: SEVERAL DAYS
GAD7 TOTAL SCORE: 14
2. NOT BEING ABLE TO STOP OR CONTROL WORRYING: NEARLY EVERY DAY
6. BECOMING EASILY ANNOYED OR IRRITABLE: NEARLY EVERY DAY
GAD7 TOTAL SCORE: 14
GAD7 TOTAL SCORE: 14

## 2023-03-08 ASSESSMENT — PAIN SCALES - GENERAL: PAINLEVEL: MODERATE PAIN (5)

## 2023-03-08 ASSESSMENT — PATIENT HEALTH QUESTIONNAIRE - PHQ9
SUM OF ALL RESPONSES TO PHQ QUESTIONS 1-9: 17
SUM OF ALL RESPONSES TO PHQ QUESTIONS 1-9: 17
10. IF YOU CHECKED OFF ANY PROBLEMS, HOW DIFFICULT HAVE THESE PROBLEMS MADE IT FOR YOU TO DO YOUR WORK, TAKE CARE OF THINGS AT HOME, OR GET ALONG WITH OTHER PEOPLE: SOMEWHAT DIFFICULT

## 2023-03-08 NOTE — PROGRESS NOTES
Assessment & Plan     Moderate episode of recurrent major depressive disorder (H)  - PHQ-9/GABRIELA 7 completed, see below/Epic for details    - Refill: DULoxetine (CYMBALTA) 60 MG capsule  Dispense: 180 capsule; Refill: 1  - Comprehensive metabolic panel (BMP + Alb, Alk Phos, ALT, AST, Total. Bili, TP)  - TSH with free T4 reflex  - Continue Psychotherapy Counseling    Suicidal ideation  - No active plans.   - Safety plan reviewed. To the Emergency Department as needed or call after hours crisis line at [phone] or [phone]. [place] Crisis Text Line: Text [place] to 657162  or  Suicide LifeLine Chat: suicidepreventionNervana Systemsline.org/chat    Financial insecurity  Recently lost her job again. In the process of finding another one. Currently struggling to pay her bills. No concerns regarding housing, food and medication affordability.   - Primary Care - Care Coordination Referral    Insomnia, unspecified type  - Refill: doxylamine (UNISOM) 25 MG TABS tablet  Dispense: 30 tablet; Refill: 1    GABRIELA (generalized anxiety disorder)  - PHQ-9/GABRIELA 7 completed, see below/Epic for details    -Refill: DULoxetine (CYMBALTA) 60 MG capsule  Dispense: 180 capsule; Refill: 1  - Comprehensive metabolic panel (BMP + Alb, Alk Phos, ALT, AST, Total. Bili, TP)  -Continue psychotherapy counseling weekly    Type 2 diabetes mellitus with hyperglycemia, without long-term current use of insulin, slightly uncontrolled with recent A1c 7.7.   - Continue Metformin 1000 mg BID.       Hypertension goal BP (blood pressure) < 140/90, controlled  - losartan (COZAAR) 25 MG tablet  Dispense: 90 tablet; Refill: 3    Encounter for screening mammogram for breast cancer  - REVIEW OF HEALTH MAINTENANCE PROTOCOL ORDERS  - MA SCREENING DIGITAL BILAT - Future  (s+30)      Need for vaccination  - Pneumococcal 20 Valent Conjugate (Prevnar 20)      BMI:   Estimated body mass index is 46.2 kg/m  as calculated from the following:    Height as of 9/13/22: 1.575 m (5'  "2\").    Weight as of this encounter: 114.6 kg (252 lb 9.6 oz).   Weight management plan: Discussed healthy diet and exercise guidelines    Depression Screening Follow Up    PHQ 3/8/2023   PHQ-9 Total Score 17   Q9: Thoughts of better off dead/self-harm past 2 weeks Several days   F/U: Thoughts of suicide or self-harm Yes   F/U: Self harm-plan Yes   F/U: Self-harm action No   F/U: Safety concerns No     Last PHQ-9 3/8/2023   1.  Little interest or pleasure in doing things 3   2.  Feeling down, depressed, or hopeless 3   3.  Trouble falling or staying asleep, or sleeping too much 2   4.  Feeling tired or having little energy 3   5.  Poor appetite or overeating 1   6.  Feeling bad about yourself 1   7.  Trouble concentrating 2   8.  Moving slowly or restless 1   Q9: Thoughts of better off dead/self-harm past 2 weeks 1   PHQ-9 Total Score 17   Difficulty at work, home, or with people -   In the past two weeks have you had thoughts of suicide or self harm? Yes   Do you have concerns about your personal safety or the safety of others? No   In the past 2 weeks have you thought about a plan or had intention to harm yourself? Yes   In the past 2 weeks have you acted on these thoughts in any way? No       Follow Up      Follow Up Actions Taken  Crisis resource information provided in the After Visit Summary    Discussed the following ways the patient can remain in a safe environment:  be around others      Return in about 2 weeks (around 3/22/2023) for Follow up.  Sooner if needed.    Marybeth Silver MD  [facility] KIRSTIE Menendez is a 62 year old, presenting for the following health issues:  Depression, Anxiety, and Hypertension      History of Present Illness       Hypertension: She presents for follow up of hypertension.  She does not check blood pressure  regularly outside of the clinic. Outpatient blood pressures have not been over 140/90. She does not follow a low salt diet.      Today's PHQ-9 "         PHQ-9 Total Score: 17    PHQ-9 Q9 Thoughts of better off dead/self-harm past 2 weeks :   Several days  Thoughts of suicide or self harm: (P) Yes  Self-harm Plan:   (P) Yes  Self-harm Action:     (P) No  Safety concerns for self or others: (P) No    How difficult have these problems made it for you to do your work, take care of things at home, or get along with other people: Somewhat difficult  Today's GABRIELA-7 Score: 14    Patient reports that she lost her job again a couple of months ago.   States that she's been more depressed and suicidal since then.   States that she has housing, food, transportation and is able to afford her medications but is worried about affording to pay her bills.     Patient has a known history of Depression and generalized anxiety disorder.  Reports that she has been struggling to sleep lately.    In the past she has seen psychiatrist Dr. Christine Denise-last seen in February 2022.  Started on Cymbalta 120 mg daily along with doxylamine 25 mg.  From records it appears patient ran out of meds and has not been following up as recommended with a psychiatrist but reports that she does see a therapist on a weekly basis.    Last PHQ-9 3/8/2023   1.  Little interest or pleasure in doing things 3   2.  Feeling down, depressed, or hopeless 3   3.  Trouble falling or staying asleep, or sleeping too much 2   4.  Feeling tired or having little energy 3   5.  Poor appetite or overeating 1   6.  Feeling bad about yourself 1   7.  Trouble concentrating 2   8.  Moving slowly or restless 1   Q9: Thoughts of better off dead/self-harm past 2 weeks 1   PHQ-9 Total Score 17   Difficulty at work, home, or with people -   In the past two weeks have you had thoughts of suicide or self harm? Yes   Do you have concerns about your personal safety or the safety of others? No   In the past 2 weeks have you thought about a plan or had intention to harm yourself? Yes   In the past 2 weeks have you acted on these  thoughts in any way? No     GABRIELA-7  3/8/2023   1. Feeling nervous, anxious, or on edge 1   2. Not being able to stop or control worrying 3   3. Worrying too much about different things 3   4. Trouble relaxing 2   5. Being so restless that it is hard to sit still 1   6. Becoming easily annoyed or irritable 3   7. Feeling afraid, as if something awful might happen 1   GABRIELA-7 Total Score 14   If you checked any problems, how difficult have they made it for you to do your work, take care of things at home, or get along with other people? Somewhat difficult     In the past two weeks have you had thoughts of suicide or self-harm?  No.    Do you have concerns about your personal safety or the safety of others?   No.        DIABETES - Patient has a longstanding history of DiabetesType Type II . Patient is being treated with oral agents -metformin 1000 mg twice daily and denies significant side effects. Control has been fair. Complicating factors include but are not limited to: hypertension and morbid obesity .   Recently completed labs.  Reports that due to her depression she has not felt motivated to exercise or eat healthy.     HYPERTENSION - Patient has longstanding history of HTN , currently denies any symptoms referable to elevated blood pressure. Specifically denies chest pain, palpitations, dyspnea, orthopnea, PND or peripheral edema. Blood pressure readings have been in normal range. Current medication regimen is as listed below. Patient denies any side effects of medication.   BP Readings from Last 3 Encounters:   03/08/23 126/86   07/25/22 136/74   06/07/22 132/84           HEALTH CARE MAINTENANCE: Due for mammogram, pneumonia vaccine and labs    Review of Systems   Constitutional, HEENT, cardiovascular, pulmonary, gi and gu systems are negative, except as otherwise noted.      Objective    /86   Pulse 110   Temp 97.5  F (36.4  C) (Tympanic)   Resp 22   Wt 114.6 kg (252 lb 9.6 oz)   SpO2 100%   BMI 46.20  kg/m    Body mass index is 46.2 kg/m .  Physical Exam   GENERAL: healthy, alert and no distress  PSYCH: mentation appears normal, affect flat, judgement and insight intact and appearance well groomed    DATA  Recent labs reviewed.   Component      Latest Ref Rng & Units 2/24/2023   Cholesterol      <200 mg/dL 130   Triglycerides      <150 mg/dL 107   HDL Cholesterol      >=50 mg/dL 49 (L)   LDL Cholesterol Calculated      <=100 mg/dL 60   Non HDL Cholesterol      <130 mg/dL 81   Patient Fasting > 8hrs?       Yes   Hemoglobin A1C      0.0 - 5.6 % 7.7 (H)

## 2023-03-08 NOTE — PATIENT INSTRUCTIONS
Community Resources:    National Suicide Prevention Lifeline: 852.200.3095 (TTY: 265.250.6601). Call anytime for help.  (www.suicidepreventionlifeline.org)  National [place] on Mental Illness (www.jerry.org): 266.604.5106 or 227-635-7546.   Mental Health Association (www.mentalhealth.org): 703.114.6763 or 738-357-1836.  [place] Crisis Text Line: Text [place] to 756775  Suicide LifeLine Chat: suicideTalentology.org/chat    Depression Self Care Plan / Survival Kit    Self-Care for Depression  Here s the deal. Your body and mind are really not as separate as most people think.  What you do and think affects how you feel and how you feel influences what you do and think. This means if you do things that people who feel good do, it will help you feel better.  Sometimes this is all it takes.  There is also a place for medication and therapy depending on how severe your depression is, so be sure to consult with your medical provider and/ or Behavioral Health Consultant if your symptoms are worsening or not improving.     In order to better manage my stress, I will:    Exercise  Get some form of exercise, every day. This will help reduce pain and release endorphins, the  feel good  chemicals in your brain. This is almost as good as taking antidepressants!  This is not the same as joining a gym and then never going! (they count on that by the way ) It can be as simple as just going for a walk or doing some gardening, anything that will get you moving.      Hygiene   Maintain good hygiene (Get out of bed in the morning, Make your bed, Brush your teeth, Take a shower, and Get dressed like you were going to work, even if you are unemployed).  If your clothes don't fit try to get ones that do.    Diet  I will strive to eat foods that are good for me, drink plenty of water, and avoid excessive sugar, caffeine, alcohol, and other mood-altering substances.  Some foods that are helpful in depression are: complex carbohydrates, B  vitamins, flaxseed, fish or fish oil, fresh fruits and vegetables.    Psychotherapy  I agree to participate in Individual Therapy (if recommended).    Medication  If prescribed medications, I agree to take them.  Missing doses can result in serious side effects.  I understand that drinking alcohol, or other illicit drug use, may cause potential side effects.  I will not stop my medication abruptly without first discussing it with my provider.    Staying Connected With Others  I will stay in touch with my friends, family members, and my primary care provider/team.    Use your imagination  Be creative.  We all have a creative side; it doesn t matter if it s oil painting, sand castles, or mud pies! This will also kick up the endorphins.    Witness Beauty  (AKA stop and smell the roses) Take a look outside, even in mid-winter. Notice colors, textures. Watch the squirrels and birds.     Service to others  Be of service to others.  There is always someone else in need.  By helping others we can  get out of ourselves  and remember the really important things.  This also provides opportunities for practicing all the other parts of the program.    Humor  Laugh and be silly!  Adjust your TV habits for less news and crime-drama and more comedy.    Control your stress  Try breathing deep, massage therapy, biofeedback, and meditation. Find time to relax each day.

## 2023-03-08 NOTE — NURSING NOTE
Prior to immunization administration, verified patients identity using patient s name and date of birth. Please see Immunization Activity for additional information.     Screening Questionnaire for Adult Immunization    Are you sick today?   No   Do you have allergies to medications, food, a vaccine component or latex?   No   Have you ever had a serious reaction after receiving a vaccination?   No   Do you have a long-term health problem with heart, lung, kidney, or metabolic disease (e.g., diabetes), asthma, a blood disorder, no spleen, complement component deficiency, a cochlear implant, or a spinal fluid leak?  Are you on long-term aspirin therapy?   No   Do you have cancer, leukemia, HIV/AIDS, or any other immune system problem?   No   Do you have a parent, brother, or sister with an immune system problem?   No   In the past 3 months, have you taken medications that affect  your immune system, such as prednisone, other steroids, or anticancer drugs; drugs for the treatment of rheumatoid arthritis, Crohn s disease, or psoriasis; or have you had radiation treatments?   No   Have you had a seizure, or a brain or other nervous system problem?   No   During the past year, have you received a transfusion of blood or blood    products, or been given immune (gamma) globulin or antiviral drug?   No   For women: Are you pregnant or is there a chance you could become       pregnant during the next month?   No   Have you received any vaccinations in the past 4 weeks?   No     Immunization questionnaire answers were all negative.        Per orders of Dr. ng, injection of pna  given by Lora Sanches MA. Patient instructed to remain in clinic for 15 minutes afterwards, and to report any adverse reaction to me immediately.       Screening performed by Lora Sanches MA on 3/8/2023 at 9:36 AM.

## 2023-03-13 ENCOUNTER — ANCILLARY PROCEDURE (OUTPATIENT)
Dept: MAMMOGRAPHY | Facility: CLINIC | Age: 63
End: 2023-03-13
Attending: FAMILY MEDICINE
Payer: COMMERCIAL

## 2023-03-13 DIAGNOSIS — Z12.31 ENCOUNTER FOR SCREENING MAMMOGRAM FOR BREAST CANCER: ICD-10-CM

## 2023-03-13 PROCEDURE — 77067 SCR MAMMO BI INCL CAD: CPT | Mod: TC | Performed by: RADIOLOGY

## 2023-03-15 DIAGNOSIS — K21.9 GASTROESOPHAGEAL REFLUX DISEASE, UNSPECIFIED WHETHER ESOPHAGITIS PRESENT: ICD-10-CM

## 2023-03-17 DIAGNOSIS — E03.8 SUBCLINICAL HYPOTHYROIDISM: Primary | ICD-10-CM

## 2023-03-27 ENCOUNTER — PATIENT OUTREACH (OUTPATIENT)
Dept: CARE COORDINATION | Facility: CLINIC | Age: 63
End: 2023-03-27
Payer: COMMERCIAL

## 2023-03-27 NOTE — PROGRESS NOTES
Clinic Care Coordination Contact  [facility]       Clinical Data: Care Coordinator Outreach  Outreach attempted x 1.  No answer  Plan:  Care Coordinator will try to reach patient again in 3-5 business days.    JAQUELIN Mensah  [facility] Primary Care - Care Coordinator   3/27/2023   9:28 AM  [phone]

## 2023-03-30 NOTE — PROGRESS NOTES
Clinic Care Coordination Contact  [facility]/Voicemail       Clinical Data: Care Coordinator Outreach  Outreach attempted x 2.  Left message on patient's voicemail with call back information and requested return call.  Plan:  Care Coordinator will try to reach patient again in 10 business days.    JAQUELIN Mensah  [facility] Primary Care - Care Coordinator   3/30/2023   12:06 PM  [phone]

## 2023-04-03 NOTE — PROGRESS NOTES
Clinic Care Coordination Contact    Follow Up Progress Note      Assessment: Patient reports that she continues to look for a job.  She talks about feeling suicidal yet no plans.  She has supports that she can outreach to and knows she can go to the hospital. She has events to look forward to this week and that is helping some.  She continues with counseling and declined any additional resources.      She is working with two temp agencies and the [employer]. The [employer] center has two job fares coming up that she will be going to.     Offered [employer] and pt said she has help through the [facility] for insurance and food.      Care Gaps:    Health Maintenance Due   Topic Date Due     URINE DRUG SCREEN  Never done     INFLUENZA VACCINE (1) 09/01/2022     LUNG CANCER SCREENING  10/13/2022     YEARLY PREVENTIVE VISIT  01/11/2023     DIABETIC FOOT EXAM  04/15/2023       Postponed to future calls as she was not interested at this time.      Care Plans  Care Plan: Financial Wellbeing     Problem: Patient expresses financial resource strain     Long-Range Goal: Create an action plan to increase financial stability     Start Date: 6/27/2022 Expected End Date: 9/30/2023    This Visit's Progress: 50% Recent Progress: 50%    Note:     Barriers: course needed is not available (quick books)  Strengths: going to class's    Patient expressed understanding of goal: yes  Action steps to achieve this goal:  1. I will continue with classes  2. I will look for the course I need and schedule  3. I will complete my resume  4. I will work with the [employer] center to get the needed programs                    Care Plan: Physical Activity     Problem: Patient is inactive     Long-Range Goal: Exercise at Least 20 Minutes per Day     Start Date: 4/27/2020 Expected End Date: 9/16/2023    This Visit's Progress: 0% Recent Progress: 10%    Note:     Barriers: Depression, back pain, social distancing to some extent  Strengths: Understand  that increasing my exercise will help with managing my weight and back pain    Patient expressed understanding of goal: Yes  Action steps to achieve this goal:  1. I will walk in one direction for 5 minutes and then return for a total of 10 minutes  2. I will not get discouraged if I cannot make it the 5 minutes in one direction before having to turn around  3. I will celebrate my successes                        Intervention/Education provided during outreach: listened and encouraged pt to reach out to resources if she was making any plans.  She did deny any plans at this time and said she would call her supports or go to the hospital.      Outreach Frequency: monthly    Plan:   Pt to reach out to supports if she starts to make any plans to end her life.   Care Coordinator will follow up in one week.     JAQUELIN Mensah  [facility] Primary Care - Care Coordinator   4/3/2023   3:29 PM  [phone]

## 2023-04-10 ENCOUNTER — PATIENT OUTREACH (OUTPATIENT)
Dept: CARE COORDINATION | Facility: CLINIC | Age: 63
End: 2023-04-10
Payer: COMMERCIAL

## 2023-04-10 NOTE — PROGRESS NOTES
Clinic Care Coordination Contact    Follow Up Progress Note      Assessment: call placed to pt for support with a plan for the next couple of weeks.  She had noted on the last call that her plan to help manage her MH was to attend the [facility] services for [place].  She denied any suicidal plans or intent.     Pt had no real plan for going forward until her counselor called in a few weeks.  She did tell a friend and the  at [facility] and both said she could call them.  Discussed additional resources and sent those via [employer].     Discussed trying to get outside to sit in the sun and get out of the house.  She noted this as a good plan.  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     Care Gaps:    Health Maintenance Due   Topic Date Due     URINE DRUG SCREEN  Never done     INFLUENZA VACCINE (1) 09/01/2022     LUNG CANCER SCREENING  10/13/2022     YEARLY PREVENTIVE VISIT  01/11/2023     DIABETIC FOOT EXAM  04/15/2023       Postponed to future calls     Care Plans  Care Plan: Financial Wellbeing     Problem: Patient expresses financial resource strain     Long-Range Goal: Create an action plan to increase financial stability     Start Date: 6/27/2022 Expected End Date: 9/30/2023    This Visit's Progress: 50% Recent Progress: 50%    Note:     Barriers: course needed is not available (quick books)  Strengths: going to class's    Patient expressed understanding of goal: yes  Action steps to achieve this goal:  1. I will continue with classes  2. I will look for the course I need and schedule  3. I will complete my resume  4. 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 I will walk in one direction for 5 minutes and then return for a total of 10 minutes  2. I will not get discouraged if I cannot make it the 5 minutes in one direction before having to turn around  3. I will celebrate my successes                        Intervention/Education provided during outreach: encouraged pt to follow through with calls to her support system as needed.  Educated on  How sun and exercise can help with depression and her hope is to get out and sit in the sun.     Educated on resources for her to call and sent these via [employer] message.      Outreach Frequency: monthly    Plan:   Pt to reach out to resources as needed.  Pt to attend job fair. Pt to get out of the house and sit in the sun.   Care Coordinator will follow up in two weeks.     JAQUELIN Mensah  [facility] Primary Care - Care Coordinator   4/10/2023   10:58 AM  [phone]

## 2023-04-13 ENCOUNTER — TELEPHONE (OUTPATIENT)
Dept: FAMILY MEDICINE | Facility: CLINIC | Age: 63
End: 2023-04-13
Payer: COMMERCIAL

## 2023-04-13 DIAGNOSIS — E11.65 TYPE 2 DIABETES MELLITUS WITH HYPERGLYCEMIA, WITHOUT LONG-TERM CURRENT USE OF INSULIN (H): ICD-10-CM

## 2023-04-13 DIAGNOSIS — I10 HYPERTENSION GOAL BP (BLOOD PRESSURE) < 140/90: ICD-10-CM

## 2023-04-13 DIAGNOSIS — E78.5 HYPERLIPIDEMIA LDL GOAL <100: ICD-10-CM

## 2023-04-13 RX ORDER — METOPROLOL SUCCINATE 25 MG/1
25 TABLET, EXTENDED RELEASE ORAL DAILY
Qty: 90 TABLET | Refills: 3 | Status: SHIPPED | OUTPATIENT
Start: 2023-04-13 | End: 2023-12-08

## 2023-04-13 RX ORDER — METFORMIN HCL 500 MG
TABLET, EXTENDED RELEASE 24 HR ORAL
Qty: 360 TABLET | Refills: 0 | Status: SHIPPED | OUTPATIENT
Start: 2023-04-13 | End: 2023-07-19

## 2023-04-13 RX ORDER — ATORVASTATIN CALCIUM 40 MG/1
TABLET, FILM COATED ORAL
Qty: 90 TABLET | Refills: 0 | Status: SHIPPED | OUTPATIENT
Start: 2023-04-13 | End: 2023-07-19

## 2023-04-23 ENCOUNTER — HEALTH MAINTENANCE LETTER (OUTPATIENT)
Age: 63
End: 2023-04-23

## 2023-04-28 ENCOUNTER — PATIENT OUTREACH (OUTPATIENT)
Dept: CARE COORDINATION | Facility: CLINIC | Age: 63
End: 2023-04-28
Payer: COMMERCIAL

## 2023-04-28 NOTE — PROGRESS NOTES
Clinic Care Coordination Contact  [facility]/Voicemail       Clinical Data: Care Coordinator Outreach  Outreach attempted x 1.  Left message on patient's voicemail with call back information and requested return call.  Plan: Care Coordinator will try to reach patient again in 10 business days.    JAQUELIN Mensah  [facility] Primary Care - Care Coordinator   4/28/2023   10:24 AM  [phone]

## 2023-04-29 DIAGNOSIS — I10 HYPERTENSION GOAL BP (BLOOD PRESSURE) < 140/90: ICD-10-CM

## 2023-05-01 RX ORDER — FUROSEMIDE 20 MG
TABLET ORAL
Qty: 180 TABLET | Refills: 0 | Status: SHIPPED | OUTPATIENT
Start: 2023-05-01 | End: 2023-08-07

## 2023-05-05 ENCOUNTER — PATIENT OUTREACH (OUTPATIENT)
Dept: CARE COORDINATION | Facility: CLINIC | Age: 63
End: 2023-05-05
Payer: COMMERCIAL

## 2023-05-05 NOTE — PROGRESS NOTES
Clinic Care Coordination Contact    Follow Up Progress Note      Assessment: spoke with pt and she reports she has been in a better mood.  She started a new job and likes it.  She did have a situation where something did not go right at work and she was able to verbalize that she needed a few minutes and then went back and met with her manager.  Congratulated her on her progress as she noted prior she would not have handled it very well. If she does well she is hoping come July she is offered the position permanently.     Care Gaps:    Health Maintenance Due   Topic Date Due     URINE DRUG SCREEN  Never done     INFLUENZA VACCINE (1) 09/01/2022     LUNG CANCER SCREENING  10/13/2022     YEARLY PREVENTIVE VISIT  01/11/2023     DIABETIC FOOT EXAM  04/15/2023       Postponed to future visits  she was at work and could not talk long     Care Plans  Care Plan: Financial Wellbeing     Problem: Patient expresses financial resource strain     Long-Range Goal: Create an action plan to increase financial stability     Start Date: 6/27/2022 Expected End Date: 9/30/2023    This Visit's Progress: 60% Recent Progress: 50%    Note:     Barriers: course needed is not available (quick books)  Strengths: going to class's    Patient expressed understanding of goal: yes  Action steps to achieve this goal:  1. I will continue with classes  2. I will look for the course I need and schedule  3. I will complete my resume  4. 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 I will walk in one direction for 5 minutes and then return for a total of 10 minutes  2. I will not get discouraged if I cannot make it the 5 minutes in one direction before having to turn around  3. I will celebrate my successes                        Intervention/Education provided during outreach: congratulated pt on her job and her growth at dealing with upsetting situations.  She recognized her progress and how it was different then past responses.      Outreach Frequency: 2 weeks    Plan:   Pt to continue with her work on improving her skills and helping with the financial situation.  Pt to work on incorporating exercise into her day.   Care Coordinator will follow up in two weeks.     JAQUELIN Mensah  [facility] Primary Care - Care Coordinator   5/5/2023   2:57 PM  [phone]

## 2023-05-16 ENCOUNTER — VIRTUAL VISIT (OUTPATIENT)
Dept: EDUCATION SERVICES | Facility: CLINIC | Age: 63
End: 2023-05-16
Payer: COMMERCIAL

## 2023-05-16 DIAGNOSIS — E11.65 TYPE 2 DIABETES MELLITUS WITH HYPERGLYCEMIA, WITHOUT LONG-TERM CURRENT USE OF INSULIN (H): Primary | Chronic | ICD-10-CM

## 2023-05-16 PROCEDURE — G0108 DIAB MANAGE TRN  PER INDIV: HCPCS | Mod: 93 | Performed by: DIETITIAN, REGISTERED

## 2023-05-16 NOTE — PROGRESS NOTES
Diabetes Self-Management Education & Support    Presents for: Individual review    Type of Service: Telephone Visit    Originating Location (Patient Location): Home  Distant Location (Provider Location): [facility]  Mode of Communication:  Telephone      How would patient like to obtain AVS? Portia    Assessment Type:   ASSESSMENT:  -she was not testing blood sugars, she started up again and was at 240 and now had a 120  -she is trying to make good food choices, trying to split lunch into two meals.  -she is working on getting  More active, is having  A lot of pain with hips.  May need to do chair exercises.    Patient's most recent   Lab Results   Component Value Date    A1C 7.7 02/24/2023    A1C 8.3 06/22/2021     is not meeting goal of <7.0    Diabetes knowledge and skills assessment:   Patient is knowledgeable in diabetes management concepts related to: Healthy Eating, Being Active and Monitoring    Continue education with the following diabetes management concepts: Healthy Eating, Being Active, Monitoring, Taking Medication, Problem Solving, Reducing Risks and Healthy Coping    Based on learning assessment above, most appropriate setting for further diabetes education would be: Individual setting.      PLAN  1. Work on testing blood sugars more goal is  mg/dL.  IF things are running higher then let me or   your provider know.    2.  Work on meal plan and keeping active.  IF you are getting in too much pain may need to do chair exercises.    Follow-up: in 6 months    See Care Plan for co-developed, patient-state behavior change goals.  AVS provided for patient today.    Education Materials Provided:  No new materials provided today      SUBJECTIVE/OBJECTIVE:  Presents for: Individual review  Accompanied by: Self  Diabetes type: Type 2  How confident are you filling out medical forms by yourself:: Quite a bit  Other concerns:: Glasses  Cultural Influences/Ethnic Background:  Not  " or       Diabetes Symptoms & Complications:          Patient Problem List and Family Medical History reviewed for relevant medical history, current medical status, and diabetes risk factors.    Vitals:  There were no vitals taken for this visit.  Estimated body mass index is 46.2 kg/m  as calculated from the following:    Height as of 9/13/22: 1.575 m (5' 2\").    Weight as of 3/8/23: 114.6 kg (252 lb 9.6 oz).   Last 3 BP:   BP Readings from Last 3 Encounters:   03/08/23 126/86   07/25/22 136/74   06/07/22 132/84       History   Smoking Status     Former     Packs/day: 0.80     Years: 30.00     Types: Cigarettes   Smokeless Tobacco     Never     Comment: 1 PPD x 30. Quit 2017       Labs:  Lab Results   Component Value Date    A1C 7.7 02/24/2023    A1C 8.3 06/22/2021     Lab Results   Component Value Date     03/08/2023     10/22/2020     Lab Results   Component Value Date    LDL 60 02/24/2023     10/22/2020     HDL Cholesterol   Date Value Ref Range Status   10/22/2020 47 (L) >49 mg/dL Final     Direct Measure HDL   Date Value Ref Range Status   02/24/2023 49 (L) >=50 mg/dL Final   ]  GFR Estimate   Date Value Ref Range Status   03/08/2023 80 >60 mL/min/1.73m2 Final     Comment:     eGFR calculated using 2021 CKD-EPI equation.   10/22/2020 >90 >60 mL/min/[1.73_m2] Final     Comment:     Non  GFR Calc  Starting 12/18/2018, serum creatinine based estimated GFR (eGFR) will be   calculated using the Chronic Kidney Disease Epidemiology Collaboration   (CKD-EPI) equation.       GFR Estimate If Black   Date Value Ref Range Status   10/22/2020 >90 >60 mL/min/[1.73_m2] Final     Comment:      GFR Calc  Starting 12/18/2018, serum creatinine based estimated GFR (eGFR) will be   calculated using the Chronic Kidney Disease Epidemiology Collaboration   (CKD-EPI) equation.       Lab Results   Component Value Date    CR 0.82 03/08/2023    CR 0.69 10/22/2020     No " results found for: MICROALBUMIN    Healthy Eating:  Healthy Eating Assessed Today: Yes  Breakfast: honey nut cheerios  Lunch: burrito, ceseare salad or veggie sandwich  Dinner: leftovers from lunch or no name parcarrollan chicken, PBJ sandwich  Snacks: tries to not snack, goldfish  Other: blood sugars: 240 one day, 220 a couple of days later, then 150, now 120.    Being Active:  Exercise:: Yes (has beening going to work so a bit more activity)    Monitoring:   see above        Taking Medications:  Diabetes Medication(s)     Biguanides       metFORMIN (GLUCOPHAGE XR) 500 MG 24 hr tablet    TAKE 2 TABLETS BY MOUTH TWICE DAILY WITH MEALS.                 Healthy Coping:     Patient Activation Measure Survey Score:      1/6/2011    11:00 AM   JAYDEN Score (Last Two)   JAYDEN Raw Score 41   Activation Score 63.2   JAYDEN Level 3         Care Plan and Education Provided:  Care Plan: Diabetes   Updates made by Shobha Donovan, KAREN since 5/16/2023 12:00 AM      Problem: Diabetes Self-Management Education Needed to Optimize Self-Care Behaviors       Goal: Being Active - get regular physical activity, working up to at least 150 minutes per week    Start Date: 11/14/2022   Expected End Date: 2/14/2023   This Visit's Progress: 30%   Note:    Will try to get up each hour and walk around for a couple of minutes and work on being more active at home             Time Spent: 30 minutes  Encounter Type: Individual    Any diabetes medication dose changes were made via the CDE Protocol per the patient's primary care provider. A copy of this encounter was shared with the provider.

## 2023-05-16 NOTE — PATIENT INSTRUCTIONS
Work on testing blood sugars more goal is  mg/dL.  IF things are running higher then let me or   your provider know.    2.  Work on meal plan and keeping active.  IF you are getting in too much pain may need to do chair exercises.

## 2023-05-25 ENCOUNTER — PATIENT OUTREACH (OUTPATIENT)
Dept: CARE COORDINATION | Facility: CLINIC | Age: 63
End: 2023-05-25
Payer: COMMERCIAL

## 2023-05-25 NOTE — PROGRESS NOTES
Clinic Care Coordination Contact    Follow Up Progress Note      Assessment: patient reported that she lost her job and feels they were changing things each day which made it hard to track and manage.  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     She wants to talk to the Father at her [employer] about a family that just had a suicide.  She denied any suicidal thoughts.  She said she will continue to talk with her counselor and Father about her struggles with her mood with continued financial challenges.     Discussed talking with the [employer] center about any kinds of test/assessment to identify potential job strengths to refine her search.  She plans to call on Tuesday to discuss her struggles and where to go next.     Care Gaps:    Health Maintenance Due   Topic Date Due     URINE DRUG SCREEN  Never done     INFLUENZA VACCINE (1) 09/01/2022     LUNG CANCER SCREENING  10/13/2022     YEARLY PREVENTIVE VISIT  01/11/2023     DIABETIC FOOT EXAM  04/15/2023     A1C  05/24/2023       Postponed to future calls due to focus of call     Care Plans  Care Plan: Financial Wellbeing     Problem: Patient expresses financial resource strain     Long-Range Goal: Create an action plan to increase financial stability     Start Date: 6/27/2022 Expected End Date: 9/30/2023    This Visit's Progress: 50% Recent Progress: 60%    Note:     Barriers: course needed is not available (quick books)  Strengths: going to class's    Patient expressed understanding of goal: yes  Action steps to achieve this goal:  1. I will continue with classes  2. I will look for the course I need and schedule  3. I will complete my resume  4. I will work with the workforce center to get the needed programs                    Care Plan: Physical Activity     Problem: Patient is inactive     Long-Range Goal: Exercise at Least 20 Minutes per Day     Start Date: 4/27/2020 Expected End Date: 9/16/2023    This  Visit's Progress: 10% Recent Progress: 0%    Note:     Barriers: Depression, back pain, social distancing to some extent  Strengths: Understand that increasing my exercise will help with managing my weight and back pain    Patient expressed understanding of goal: Yes  Action steps to achieve this goal:  1. I will walk in one direction for 5 minutes and then return for a total of 10 minutes  2. I will not get discouraged if I cannot make it the 5 minutes in one direction before having to turn around  3. I will celebrate my successes                        Intervention/Education provided during outreach: listened and supported her plans to talk with Father and counselor.  Encouraged good self care this weekend and to try to get outside.      Outreach Frequency: 2 weeks    Plan:   Pt to talk with workforce center.  Pt to talk with counselor and Father for supports.   Care Coordinator will follow up in two weeks.     JAQUELIN Mensah  [facility] - Care Coordinator   5/25/2023   3:54 PM  [phone]

## 2023-05-25 NOTE — LETTER
It was a pleasure to speak with you.  I would like to provide you with the enclosed information for your records.  As part of care coordination, we are developing care plans to assist in accomplishing your health care goals.  When we speak next, please feel free to let me know if you want to add or change any information on your care plans.    As always, feel free to contact me if you have any questions or concerns.  I look forward to working with you in the effort to achieve your health care and wellness goals .        Sincerely,      Bell Brito, [facility]  Clinic Care Coordination  [phone]  [facility]  Patient Centered Plan of Care  About Me:        Patient Name:  Jessica Jay    YOB: 1960  Age:         62 year old   Regis MRN:    [id number] Telephone Information:  Home Phone [phone]   Mobile [phone]       Address:  [address] [place] [id number] Email address:  [email or web address]      Emergency Contact(s)    Name Relationship Lgl Grd Work Phone Home Phone Mobile Phone   1. ILIANA JAY Mother No  [phone]   2. NOAH TOBIAS Sister No  [phone]           Primary language:  English     needed? No   [employer]:  [phone] op. 1  Other communication barriers:Glasses    Preferred Method of Communication:  Portia  Current living arrangement: I live alone    Mobility Status/ Medical Equipment: Independent        Health Maintenance  Health Maintenance Reviewed: Due/Overdue   Health Maintenance Due   Topic Date Due    URINE DRUG SCREEN  Never done    INFLUENZA VACCINE (1) 09/01/2022    LUNG CANCER SCREENING  10/13/2022    YEARLY PREVENTIVE VISIT  01/11/2023    DIABETIC FOOT EXAM  04/15/2023    A1C  05/24/2023           My Access Plan  Medical Emergency 911   Primary Clinic Line [facility] [phone]   24 Hour Appointment Line [phone] or  [phone])  (toll-free)   24 Hour Nurse Line [phone] (toll-free)   Preferred Urgent Care Other       Preferred Hospital [facility] [place]  [phone]       Preferred Pharmacy [facility]  [address]     Behavioral Health Crisis Line The National Suicide Prevention Lifeline at 1-353.831.8969 or Text/Call 568             My Care Team Members  Patient Care Team         Relationship Specialty Notifications Start End    Marybeth Silver MD PCP - General Family Practice  5/16/18     Phone: [phone] Fax: [phone] [address] [place] [address]    Marybeth Silver MD Assigned PCP   4/15/18     Phone: [phone] Fax: [phone] [address] [place] [address]    Bell Brito LSW Lead Care Coordinator Primary Care - CC Admissions 1/29/20     Phone: [phone] Fax: [phone]        Caren Denise DO Assigned Behavioral Health Provider   11/22/20     Phone: [phone] Fax: [phone] [address] [place] [address]    Mayela Plummer [facility] Pharmacist Pharmacist  5/19/21     Phone: [phone] Fax: [phone] [address] [facility] [address] [address]    Francisco Farris DO Assigned Musculoskeletal Provider   12/5/21     Phone: [phone] Fax: [phone]         [address] [place] [address]    Adam Painting MD Assigned Surgical Provider   5/8/22     Phone: [phone] Pager: [phone] Fax: [phone] [address] [facility] [address]    Shobha Donovan RD Diabetes Educator Dietitian, Registered  8/18/22     Phone: [phone] Fax: [phone]                  My Care Plans  Self Management and Treatment Plan  Care Plan  Care Plan: Financial Wellbeing       Problem: Patient expresses financial resource strain       Long-Range Goal: Create an action plan to increase financial stability       Start Date: 6/27/2022 Expected End Date: 9/30/2023    This Visit's  Progress: 50% Recent Progress: 60%    Note:     Barriers: course needed is not available (quick books)  Strengths: going to class's    Patient expressed understanding of goal: yes  Action steps to achieve this goal:  1. I will continue with classes  2. I will look for the course I need and schedule  3. I will complete my resume  4. I will work with the workforce center to get the needed programs                            Care Plan: Physical Activity       Problem: Patient is inactive       Long-Range Goal: Exercise at Least 20 Minutes per Day       Start Date: 4/27/2020 Expected End Date: 9/16/2023    This Visit's Progress: 10% Recent Progress: 0%    Note:     Barriers: Depression, back pain, social distancing to some extent  Strengths: Understand that increasing my exercise will help with managing my weight and back pain    Patient expressed understanding of goal: Yes  Action steps to achieve this goal:  1. I will walk in one direction for 5 minutes and then return for a total of 10 minutes  2. I will not get discouraged if I cannot make it the 5 minutes in one direction before having to turn around  3. I will celebrate my successes                               Action Plans on File:            Depression          Advance Care Plans/Directives Type:   Advanced Directive - On File      My Medical and Care Information  Problem List   Patient Active Problem List   Diagnosis    Depression, major    Migraines    CARDIOVASCULAR SCREENING; LDL GOAL LESS THAN 160    Disc herniation    DDD (degenerative disc disease), lumbar    DDD (degenerative disc disease), cervical    Neck pain    Tobacco abuse    ELYSSA (obstructive sleep apnea)- severe (AHI 49)    Spondylolisthesis, grade 1    Chronic low back pain    Brain lipoma    History of colonic polyps    Gastroesophageal reflux disease without esophagitis    Hypertension goal BP (blood pressure) < 140/90    Traylor's esophagus determined by endoscopy    Bilateral edema of lower  extremity    GABRIELA (generalized anxiety disorder)    Restless legs syndrome (RLS)    Diabetes mellitus, type 2 (H)    Morbid obesity (H)      Current Medications and Allergies:     Allergies   Allergen Reactions    Buspar [Buspirone Hcl] Rash    Lisinopril Cough    Seroquel [Quetiapine] Itching         Current Outpatient Medications:     ACCU-CHEK GUIDE test strip, USE TO TEST TWICE DAILY, Disp: 200 strip, Rfl: 0    alcohol swab prep pads, Use to swab area of injection/haley as directed., Disp: 100 each, Rfl: 3    atorvastatin (LIPITOR) 40 MG tablet, TAKE 1 TABLET BY MOUTH ONCE DAILY., Disp: 90 tablet, Rfl: 0    blood glucose (NO BRAND SPECIFIED) lancets standard, Use to test blood sugar 2 times daily or as directed., Disp: 100 lancet, Rfl: 3    blood glucose monitoring (NO BRAND SPECIFIED) meter device kit, Use to test blood sugar 2 times daily or as directed., Disp: 1 kit, Rfl: 0    clobetasol (TEMOVATE) 0.05 % external cream, Apply sparingly to affected area twice daily as needed.  Do not apply to face., Disp: 120 g, Rfl: 3    cyclobenzaprine (FLEXERIL) 5 MG tablet, Take 1 tablet (5 mg) by mouth 3 times daily as needed for muscle spasms, Disp: 42 tablet, Rfl: 0    doxylamine (UNISOM) 25 MG TABS tablet, Take 1 tablet (25 mg) by mouth nightly as needed for sleep, Disp: 30 tablet, Rfl: 1    DULoxetine (CYMBALTA) 60 MG capsule, Take 2 capsules (120 mg) by mouth daily, Disp: 180 capsule, Rfl: 1    furosemide (LASIX) 20 MG tablet, TAKE 1 TABLET BY MOUTH TWICE DAILY., Disp: 180 tablet, Rfl: 0    hydrOXYzine (ATARAX) 25 MG tablet, Take 25-50 mg twice a day as needed for anxiety. Can also take  mg at bedtime for sleep/itching. Don't exceed 300 mg daily., Disp: 90 tablet, Rfl: 1    losartan (COZAAR) 25 MG tablet, Take 1 tablet (25 mg) by mouth daily, Disp: 90 tablet, Rfl: 3    metFORMIN (GLUCOPHAGE XR) 500 MG 24 hr tablet, TAKE 2 TABLETS BY MOUTH TWICE DAILY WITH MEALS., Disp: 360 tablet, Rfl: 0    metoprolol succinate  ER (TOPROL XL) 25 MG 24 hr tablet, Take 1 tablet (25 mg) by mouth daily, Disp: 90 tablet, Rfl: 3    omeprazole (PRILOSEC) 20 MG DR capsule, TAKE 1 CAPSULE BY MOUTH ONCE DAILY., Disp: 90 capsule, Rfl: 0    triamcinolone (KENALOG) 0.1 % external ointment, Apply topically 2 times daily To affected area x 2 weeks as needed, Disp: 80 g, Rfl: 0    Current Facility-Administered Medications:     lidocaine 1 % injection 0.5 mL, 0.5 mL, , , Ki May MD, 0.5 mL at 05/04/20 1021    lidocaine 1 % injection 2 mL, 2 mL, , , Francisco Farris DO, 2 mL at 06/07/22 1207    triamcinolone (KENALOG-40) injection 20 mg, 20 mg, , , Ki May MD, 20 mg at 05/04/20 1021    triamcinolone (KENALOG-40) injection 40 mg, 40 mg, , , Francisco Farris DO, 40 mg at 06/07/22 1207    triamcinolone (KENALOG-40) injection 40 mg, 40 mg, , , Francisco Farris DO, 40 mg at 11/26/21 1709      Care Coordination Start Date: 1/29/2020   Frequency of Care Coordination: 2 weeks       Form Last Updated: 05/25/2023

## 2023-05-26 ENCOUNTER — LAB (OUTPATIENT)
Dept: LAB | Facility: CLINIC | Age: 63
End: 2023-05-26
Payer: COMMERCIAL

## 2023-05-26 DIAGNOSIS — E11.9 DIABETES MELLITUS, TYPE 2 (H): ICD-10-CM

## 2023-05-26 DIAGNOSIS — E03.8 SUBCLINICAL HYPOTHYROIDISM: ICD-10-CM

## 2023-05-26 LAB
HBA1C MFR BLD: 7.9 % (ref 0–5.6)
T4 FREE SERPL-MCNC: 0.88 NG/DL (ref 0.76–1.46)
TSH SERPL DL<=0.005 MIU/L-ACNC: 4.15 MU/L (ref 0.4–4)

## 2023-05-26 PROCEDURE — 83036 HEMOGLOBIN GLYCOSYLATED A1C: CPT

## 2023-05-26 PROCEDURE — 84439 ASSAY OF FREE THYROXINE: CPT

## 2023-05-26 PROCEDURE — 36415 COLL VENOUS BLD VENIPUNCTURE: CPT

## 2023-05-26 PROCEDURE — 84443 ASSAY THYROID STIM HORMONE: CPT

## 2023-06-08 ENCOUNTER — PATIENT OUTREACH (OUTPATIENT)
Dept: CARE COORDINATION | Facility: CLINIC | Age: 63
End: 2023-06-08
Payer: COMMERCIAL

## 2023-06-08 NOTE — PROGRESS NOTES
Clinic Care Coordination Contact  [facility]/Voicemail       Clinical Data: Care Coordinator Outreach  Outreach attempted x 1.  Left message on patient's voicemail with call back information and requested return call.  Plan:  Care Coordinator will try to reach patient again in about one week.    JAQUELIN Mensah  [facility] Primary Care - Care Coordinator   6/8/2023   10:06 AM  [phone]

## 2023-06-14 NOTE — PROGRESS NOTES
Clinic Care Coordination Contact    Follow Up Progress Note      Assessment: patient reports that she is still working on finding a job.  She is working with [employer] and got a new computer to help with job search.  Her car has a check engine light on so she has not had transportation for a couple of weeks.  She will be purchasing her nephews old car so she will have transportation.     Pt appeared to be in good spirits on the call.     Care Gaps:    Health Maintenance Due   Topic Date Due     URINE DRUG SCREEN  Never done     LUNG CANCER SCREENING  10/13/2022     YEARLY PREVENTIVE VISIT  01/11/2023     DIABETIC FOOT EXAM  04/15/2023       Postponed to future calls due to transportation and not working     Care Plans  Care Plan: Financial Wellbeing     Problem: Patient expresses financial resource strain     Long-Range Goal: Create an action plan to increase financial stability     Start Date: 6/27/2022 Expected End Date: 9/30/2023    This Visit's Progress: 40% Recent Progress: 50%    Note:     Barriers: course needed is not available (quick books)  Strengths: going to class's    Patient expressed understanding of goal: yes  Action steps to achieve this goal:  1. I will continue with classes  2. I will look for the course I need and schedule  3. I will complete my resume  4. I will work with the [employer] to get the needed programs                    Care Plan: Physical Activity     Problem: Patient is inactive     Long-Range Goal: Exercise at Least 20 Minutes per Day     Start Date: 4/27/2020 Expected End Date: 9/16/2023    This Visit's Progress: 10% Recent Progress: 10%    Note:     Barriers: Depression, back pain, social distancing to some extent  Strengths: Understand that increasing my exercise will help with managing my weight and back pain    Patient expressed understanding of goal: Yes  Action steps to achieve this goal:  1. I will walk in one direction for 5 minutes and then return for a  total of 10 minutes  2. I will not get discouraged if I cannot make it the 5 minutes in one direction before having to turn around  3. I will celebrate my successes                        Intervention/Education provided during outreach: encouraged continued work on finding a job.      Outreach Frequency: 2 weeks    Plan:   Pt to work on finding work.   Care Coordinator will follow up in 2-4 weeks.     JAQUELIN Mensah  [facility] - Care Coordinator   6/14/2023   3:25 PM  [phone]

## 2023-06-15 ENCOUNTER — MYC MEDICAL ADVICE (OUTPATIENT)
Dept: FAMILY MEDICINE | Facility: CLINIC | Age: 63
End: 2023-06-15
Payer: COMMERCIAL

## 2023-06-29 ENCOUNTER — TELEPHONE (OUTPATIENT)
Dept: FAMILY MEDICINE | Facility: CLINIC | Age: 63
End: 2023-06-29
Payer: COMMERCIAL

## 2023-06-29 NOTE — TELEPHONE ENCOUNTER
Forms/Letter Request    Type of form/letter: Application for Disability Parking Certificate. Patient request for this to be permanent.     Have you been seen for this request: N/A    Do we have the form/letter: Yes:     When is form/letter needed by: ASAP    How would you like the form/letter returned:     Patient Notified form requests are processed in 3-5 business days:Yes  Previous certificate expires 6/30/23      Could we send this information to you in [employer] or would you prefer to receive a phone call?:   Patient would prefer a phone call     Okay to leave a detailed message?: Yes at Home number on file [phone] (home) or Cell number on file:    Telephone Information:   Mobile [phone]

## 2023-07-07 ENCOUNTER — PATIENT OUTREACH (OUTPATIENT)
Dept: CARE COORDINATION | Facility: CLINIC | Age: 63
End: 2023-07-07
Payer: COMMERCIAL

## 2023-07-07 NOTE — PROGRESS NOTES
Clinic Care Coordination Contact  [facility]/Voicemail       Clinical Data: Care Coordinator Outreach  Outreach attempted x 1.  Left message on patient's voicemail with call back information and requested return call.  Plan: Care Coordinator will try to reach patient again in 8-10 business days.    JAQUELIN Mensah  [facility] - Care Coordinator   7/7/2023   3:16 PM  [phone]

## 2023-07-19 NOTE — PROGRESS NOTES
Clinic Care Coordination Contact  Follow Up Progress Note      Assessment: patient reporting continued financial struggles and depression.  She is not working and having issues with the car she bought from her nephew.  She is still going to counseling and said she is honest with what she is experiencing/feeling with her counselor.     Allowed pt to talk and explored any ideas yet she has tried many option with no success. She is not making progress on her goals yet did not discuss changing as focus was on making sure she is utilizing her supports, of which she is.     Care Gaps:    Health Maintenance Due   Topic Date Due    URINE DRUG SCREEN  Never done    LUNG CANCER SCREENING  10/13/2022    YEARLY PREVENTIVE VISIT  01/11/2023    DIABETIC FOOT EXAM  04/15/2023    MICROALBUMIN  07/25/2023    EYE EXAM  08/04/2023       Postponed to future calls     Care Plans  Care Plan: Financial Wellbeing       Problem: Patient expresses financial resource strain       Long-Range Goal: Create an action plan to increase financial stability       Start Date: 6/27/2022 Expected End Date: 9/30/2023    This Visit's Progress: 30% Recent Progress: 40%    Note:     Barriers: course needed is not available (quick books)  Strengths: going to class's    Patient expressed understanding of goal: yes  Action steps to achieve this goal:  1. I will continue with classes  2. I will look for the course I need and schedule  3. I will complete my resume  4. I will work with the workforce center to get the needed programs                            Care Plan: Physical Activity       Problem: Patient is inactive       Long-Range Goal: Exercise at Least 20 Minutes per Day       Start Date: 4/27/2020 Expected End Date: 9/16/2023    This Visit's Progress: 10% Recent Progress: 10%    Note:     Barriers: Depression, back pain, social distancing to some extent  Strengths: Understand that increasing my exercise will help with managing my weight and back  pain    Patient expressed understanding of goal: Yes  Action steps to achieve this goal:  1. I will walk in one direction for 5 minutes and then return for a total of 10 minutes  2. I will not get discouraged if I cannot make it the 5 minutes in one direction before having to turn around  3. I will celebrate my successes                              Intervention/Education provided during outreach: reviewed supports and encouraged continued follow up with them.      Outreach Frequency: 2 weeks    Plan:   Pt to continue to utilize supports and try to be active.   Care Coordinator will follow up in two weeks.     JAQUELIN Mensah  [facility] - Care Coordinator   7/19/2023   12:37 PM  [phone]

## 2023-08-03 ENCOUNTER — PATIENT OUTREACH (OUTPATIENT)
Dept: CARE COORDINATION | Facility: CLINIC | Age: 63
End: 2023-08-03
Payer: COMMERCIAL

## 2023-08-03 NOTE — PROGRESS NOTES
Clinic Care Coordination Contact  Follow Up Progress Note      Assessment: patient noted that she has been continuing to go to job interviews.  Her mom will be moving in with pt's sister and this is adding stress to her.  She is not doing any exercises or self care tasks.  Encouraged her to continue and try to do this.     Pt denied any needs right now and has been focusing on cleaning her mom's home for selling it.     Care Gaps:    Health Maintenance Due   Topic Date Due    URINE DRUG SCREEN  Never done    LUNG CANCER SCREENING  10/13/2022    YEARLY PREVENTIVE VISIT  01/11/2023    DIABETIC FOOT EXAM  04/15/2023    MICROALBUMIN  07/25/2023    EYE EXAM  08/04/2023       Declined due to focus on mom and finding a job     Care Plans  Care Plan: Financial Wellbeing       Problem: Patient expresses financial resource strain       Long-Range Goal: Create an action plan to increase financial stability       Start Date: 6/27/2022 Expected End Date: 9/30/2023    This Visit's Progress: 20% Recent Progress: 30%    Note:     Barriers: course needed is not available (quick books)  Strengths: going to class's    Patient expressed understanding of goal: yes  Action steps to achieve this goal:  1. I will continue with classes  2. I will look for the course I need and schedule  3. I will complete my resume  4. I will work with the workforce center to get the needed programs                            Care Plan: Physical Activity       Problem: Patient is inactive       Long-Range Goal: Exercise at Least 20 Minutes per Day       Start Date: 4/27/2020 Expected End Date: 9/16/2023    This Visit's Progress: 10% Recent Progress: 10%    Note:     Barriers: Depression, back pain, social distancing to some extent  Strengths: Understand that increasing my exercise will help with managing my weight and back pain    Patient expressed understanding of goal: Yes  Action steps to achieve this goal:  1. I will walk in one direction for 5 minutes  and then return for a total of 10 minutes  2. I will not get discouraged if I cannot make it the 5 minutes in one direction before having to turn around  3. I will celebrate my successes                              Intervention/Education provided during outreach: encouraged pt to try and do some self care practices.       Outreach Frequency: monthly      Plan:   Pt to continue to look for work and practice self care.   Care Coordinator will follow up in one month. Will send care plan via [employer].     JAQUELIN Mensah  [facility] - Care Coordinator   8/3/2023   11:01 AM  [phone]

## 2023-08-03 NOTE — LETTER
It was a pleasure to speak with you.  I would like to provide you with the enclosed information for your records.  As part of care coordination, we are developing care plans to assist in accomplishing your health care goals.  When we speak next, please feel free to let me know if you want to add or change any information on your care plans.    As always, feel free to contact me if you have any questions or concerns.  I look forward to working with you in the effort to achieve your health care and wellness goals .        Sincerely,      Bell Brito, [facility]  Clinic Care Coordination  [phone]    [facility]  Patient Centered Plan of Care  About Me:        Patient Name:  Jessica Jay    YOB: 1960  Age:         62 year old   Regis MRN:    [id number] Telephone Information:  Home Phone [phone]   Mobile [phone]       Address:  [address] [place] [id number] Email address:  [email or web address]      Emergency Contact(s)    Name Relationship Lgl Grd Work Phone Home Phone Mobile Phone   1. ILIANA JAY Mother No  [phone]   2. NOAH TOBIAS Sister No  [phone]           Primary language:  English     needed? No   [employer]:  [phone] op. 1  Other communication barriers:Glasses    Preferred Method of Communication:  Portia  Current living arrangement: I live alone    Mobility Status/ Medical Equipment: Independent        Health Maintenance  Health Maintenance Reviewed: Due/Overdue   Health Maintenance Due   Topic Date Due    URINE DRUG SCREEN  Never done    LUNG CANCER SCREENING  10/13/2022    YEARLY PREVENTIVE VISIT  01/11/2023    DIABETIC FOOT EXAM  04/15/2023    MICROALBUMIN  07/25/2023    EYE EXAM  08/04/2023           My Access Plan  Medical Emergency 911   Primary Clinic Line [facility] [phone]   24 Hour Appointment Line [phone] or  [phone])  (toll-free)   24 Hour Nurse Line [phone] (toll-free)   Preferred Urgent Care Other     Preferred Hospital [place], [place]  [phone]     Preferred Pharmacy [facility]  [address]     Behavioral Health Crisis Line The National Suicide Prevention Lifeline at 1-815.556.4319 or Text/Call 858             My Care Team Members  Patient Care Team         Relationship Specialty Notifications Start End    Marybeth Silver MD PCP - General Family Practice  5/16/18     Phone: [phone] Fax: [phone] [address] [place] [address]    Marybeth Silver MD Assigned PCP   4/15/18     Phone: [phone] Fax: [phone] [address] [place] [address]    Bell Brito LSW Lead Care Coordinator Primary Care - CC Admissions 1/29/20     Phone: [phone] Fax: [phone]        Caren Denise DO Assigned Behavioral Health Provider   11/22/20     Phone: [phone] Fax: [phone] [address] [place] [address]    Mayela Plummer [facility] Pharmacist Pharmacist  5/19/21     Phone: [phone] Fax: [phone] [address] [facility] [address]    Francisco Farris DO Assigned Musculoskeletal Provider   12/5/21     Phone: [phone] Fax: [phone]         [address] [place] [address]    Adam Painting MD Assigned Surgical Provider   5/8/22     Phone: [phone] Pager: [phone] Fax: [phone] [address] [facility] [address]    Shobha Donovan RD Diabetes Educator Dietitian, Registered  8/18/22     Phone: [phone] Fax: [phone]                  My Care Plans  Self Management and Treatment Plan  Care Plan  Care Plan: Financial Wellbeing       Problem: Patient expresses financial resource strain       Long-Range Goal: Create an action plan to increase financial stability       Start Date: 6/27/2022 Expected End Date: 9/30/2023    This Visit's Progress:  20% Recent Progress: 30%    Note:     Barriers: course needed is not available (quick books)  Strengths: going to class's    Patient expressed understanding of goal: yes  Action steps to achieve this goal:  1. I will continue with classes  2. I will look for the course I need and schedule  3. I will complete my resume  4. I will work with the workforce center to get the needed programs                            Care Plan: Physical Activity       Problem: Patient is inactive       Long-Range Goal: Exercise at Least 20 Minutes per Day       Start Date: 4/27/2020 Expected End Date: 9/16/2023    This Visit's Progress: 10% Recent Progress: 10%    Note:     Barriers: Depression, back pain, social distancing to some extent  Strengths: Understand that increasing my exercise will help with managing my weight and back pain    Patient expressed understanding of goal: Yes  Action steps to achieve this goal:  1. I will walk in one direction for 5 minutes and then return for a total of 10 minutes  2. I will not get discouraged if I cannot make it the 5 minutes in one direction before having to turn around  3. I will celebrate my successes                               Action Plans on File:            Depression          Advance Care Plans/Directives Type:   Advanced Directive - On File      My Medical and Care Information  Problem List   Patient Active Problem List   Diagnosis    Depression, major    Migraines    CARDIOVASCULAR SCREENING; LDL GOAL LESS THAN 160    Disc herniation    DDD (degenerative disc disease), lumbar    DDD (degenerative disc disease), cervical    Neck pain    Tobacco abuse    ELYSSA (obstructive sleep apnea)- severe (AHI 49)    Spondylolisthesis, grade 1    Chronic low back pain    Brain lipoma    History of colonic polyps    Gastroesophageal reflux disease without esophagitis    Hypertension goal BP (blood pressure) < 140/90    Traylor's esophagus determined by endoscopy    Bilateral edema of lower extremity     GABRIELA (generalized anxiety disorder)    Restless legs syndrome (RLS)    Diabetes mellitus, type 2 (H)    Morbid obesity (H)      Current Medications and Allergies:     Allergies   Allergen Reactions    Buspar [Buspirone Hcl] Rash    Lisinopril Cough    Seroquel [Quetiapine] Itching         Current Outpatient Medications:     ACCU-CHEK GUIDE test strip, USE TO TEST TWICE DAILY, Disp: 200 strip, Rfl: 0    alcohol swab prep pads, Use to swab area of injection/haley as directed., Disp: 100 each, Rfl: 3    atorvastatin (LIPITOR) 40 MG tablet, TAKE 1 TABLET BY MOUTH ONCE DAILY, Disp: 90 tablet, Rfl: 1    blood glucose (NO BRAND SPECIFIED) lancets standard, Use to test blood sugar 2 times daily or as directed., Disp: 100 lancet, Rfl: 3    blood glucose monitoring (NO BRAND SPECIFIED) meter device kit, Use to test blood sugar 2 times daily or as directed., Disp: 1 kit, Rfl: 0    clobetasol (TEMOVATE) 0.05 % external cream, Apply sparingly to affected area twice daily as needed.  Do not apply to face., Disp: 120 g, Rfl: 3    cyclobenzaprine (FLEXERIL) 5 MG tablet, Take 1 tablet (5 mg) by mouth 3 times daily as needed for muscle spasms, Disp: 42 tablet, Rfl: 0    doxylamine (UNISOM) 25 MG TABS tablet, Take 1 tablet (25 mg) by mouth nightly as needed for sleep, Disp: 30 tablet, Rfl: 1    DULoxetine (CYMBALTA) 60 MG capsule, Take 2 capsules (120 mg) by mouth daily, Disp: 180 capsule, Rfl: 1    furosemide (LASIX) 20 MG tablet, TAKE 1 TABLET BY MOUTH TWICE DAILY., Disp: 180 tablet, Rfl: 0    hydrOXYzine (ATARAX) 25 MG tablet, Take 25-50 mg twice a day as needed for anxiety. Can also take  mg at bedtime for sleep/itching. Don't exceed 300 mg daily., Disp: 90 tablet, Rfl: 1    losartan (COZAAR) 25 MG tablet, Take 1 tablet (25 mg) by mouth daily, Disp: 90 tablet, Rfl: 3    metFORMIN (GLUCOPHAGE XR) 500 MG 24 hr tablet, TAKE 2 TABLETS BY MOUTH TWICE DAILY WITH MEALS, Disp: 360 tablet, Rfl: 0    metoprolol succinate ER (TOPROL  XL) 25 MG 24 hr tablet, Take 1 tablet (25 mg) by mouth daily, Disp: 90 tablet, Rfl: 3    omeprazole (PRILOSEC) 20 MG DR capsule, TAKE 1 CAPSULE BY MOUTH ONCE DAILY., Disp: 90 capsule, Rfl: 0    triamcinolone (KENALOG) 0.1 % external ointment, Apply topically 2 times daily To affected area x 2 weeks as needed, Disp: 80 g, Rfl: 0    Current Facility-Administered Medications:     lidocaine 1 % injection 0.5 mL, 0.5 mL, , , Ki May MD, 0.5 mL at 05/04/20 1021    lidocaine 1 % injection 2 mL, 2 mL, , , Francisco Farris DO, 2 mL at 06/07/22 1207    triamcinolone (KENALOG-40) injection 20 mg, 20 mg, , , Ki May MD, 20 mg at 05/04/20 1021    triamcinolone (KENALOG-40) injection 40 mg, 40 mg, , , Francisco Farris DO, 40 mg at 06/07/22 1207    triamcinolone (KENALOG-40) injection 40 mg, 40 mg, , , Francisco Farris DO, 40 mg at 11/26/21 1709      Care Coordination Start Date: 1/29/2020   Frequency of Care Coordination: monthly     Form Last Updated: 08/03/2023

## 2023-08-06 DIAGNOSIS — I10 HYPERTENSION GOAL BP (BLOOD PRESSURE) < 140/90: ICD-10-CM

## 2023-08-07 RX ORDER — FUROSEMIDE 20 MG
TABLET ORAL
Qty: 180 TABLET | Refills: 0 | Status: SHIPPED | OUTPATIENT
Start: 2023-08-07 | End: 2023-11-02

## 2023-08-11 DIAGNOSIS — F41.1 GAD (GENERALIZED ANXIETY DISORDER): ICD-10-CM

## 2023-08-11 DIAGNOSIS — F33.1 MODERATE EPISODE OF RECURRENT MAJOR DEPRESSIVE DISORDER (H): ICD-10-CM

## 2023-08-14 RX ORDER — DULOXETIN HYDROCHLORIDE 60 MG/1
120 CAPSULE, DELAYED RELEASE ORAL DAILY
Qty: 180 CAPSULE | Refills: 0 | Status: SHIPPED | OUTPATIENT
Start: 2023-08-14 | End: 2023-12-08

## 2023-08-31 ENCOUNTER — PATIENT OUTREACH (OUTPATIENT)
Dept: CARE COORDINATION | Facility: CLINIC | Age: 63
End: 2023-08-31

## 2023-08-31 ENCOUNTER — E-VISIT (OUTPATIENT)
Dept: FAMILY MEDICINE | Facility: CLINIC | Age: 63
End: 2023-08-31
Payer: COMMERCIAL

## 2023-08-31 ENCOUNTER — LAB (OUTPATIENT)
Dept: LAB | Facility: CLINIC | Age: 63
End: 2023-08-31
Payer: COMMERCIAL

## 2023-08-31 DIAGNOSIS — E11.65 TYPE 2 DIABETES MELLITUS WITH HYPERGLYCEMIA, WITHOUT LONG-TERM CURRENT USE OF INSULIN (H): ICD-10-CM

## 2023-08-31 DIAGNOSIS — R41.3 MEMORY CHANGE: Primary | ICD-10-CM

## 2023-08-31 DIAGNOSIS — E11.9 DIABETES MELLITUS, TYPE 2 (H): Primary | ICD-10-CM

## 2023-08-31 LAB
CREAT UR-MCNC: 65.2 MG/DL
HBA1C MFR BLD: 7.8 % (ref 0–5.6)
MICROALBUMIN UR-MCNC: <12 MG/L
MICROALBUMIN/CREAT UR: NORMAL MG/G{CREAT}

## 2023-08-31 PROCEDURE — 99207 PR NON-BILLABLE SERV PER CHARTING: CPT | Performed by: PHYSICIAN ASSISTANT

## 2023-08-31 PROCEDURE — 83036 HEMOGLOBIN GLYCOSYLATED A1C: CPT

## 2023-08-31 PROCEDURE — 82570 ASSAY OF URINE CREATININE: CPT

## 2023-08-31 PROCEDURE — 36415 COLL VENOUS BLD VENIPUNCTURE: CPT

## 2023-08-31 PROCEDURE — 82043 UR ALBUMIN QUANTITATIVE: CPT

## 2023-08-31 NOTE — PROGRESS NOTES
"Clinic Care Coordination Contact  Follow Up Progress Note      Assessment: patient talked a lot about her struggles.  It has been 3 years since she has had a full time job and continues to struggle to find a job she can physically do.  She does not want to pursue disability and wants to work.  Her mom has moved in with her sister and this has limited her contact as she does not want to be around the rest of the family.  Lab this am showed continued elevated A1C and she notes that she struggles with cooking and eating the right foods.  She is not able to get out and about due to no income for gas.    Pt hinted about wanting to die yet said she was \"too chicken\" to do anything.  She talked about wanting to find a job and improve her A1C.  She denied any need for a wellness check or any kind of resources.  She does continue with counseling.     Care Gaps:    Health Maintenance Due   Topic Date Due    URINE DRUG SCREEN  Never done    LUNG CANCER SCREENING  10/13/2022    YEARLY PREVENTIVE VISIT  01/11/2023    DIABETIC FOOT EXAM  04/15/2023    MICROALBUMIN  07/25/2023    EYE EXAM  08/04/2023    PHQ-9  09/08/2023       Postponed to future calls as she did not want to add things at this tinme.      Care Plans  Care Plan: Financial Wellbeing       Problem: Patient expresses financial resource strain       Long-Range Goal: Create an action plan to increase financial stability       Start Date: 6/27/2022 Expected End Date: 9/30/2023    This Visit's Progress: 20% Recent Progress: 20%    Note:     Barriers: course needed is not available (quick books)  Strengths: going to class's    Patient expressed understanding of goal: yes  Action steps to achieve this goal:  1. I will continue with classes  2. I will look for the course I need and schedule  3. I will complete my resume  4. I will work with the [employer] center to get the needed programs                            Care Plan: Physical Activity       Problem: Patient is " inactive       Long-Range Goal: Exercise at Least 20 Minutes per Day       Start Date: 4/27/2020 Expected End Date: 9/16/2023    This Visit's Progress: 10% Recent Progress: 10%    Note:     Barriers: Depression, back pain, social distancing to some extent  Strengths: Understand that increasing my exercise will help with managing my weight and back pain    Patient expressed understanding of goal: Yes  Action steps to achieve this goal:  1. I will walk in one direction for 5 minutes and then return for a total of 10 minutes  2. I will not get discouraged if I cannot make it the 5 minutes in one direction before having to turn around  3. I will celebrate my successes                              Intervention/Education provided during outreach: encouraged pt to try and do some good self care practices.  She noted she is spending most of her day worried about work and finances.  She stays in side and not around others.  Encouraged her to try and get out to do some walking or biking.    Encouraged pt to talk with diabetic educator about ways to improve her diet. She does not like to cook and finds many roadblocks with living alone.      Outreach Frequency: monthly      Plan:   Pt to talk with diabetic educator on options for food that is easy prepare and within her likes. Pt to try and get out and exercise.  Care Coordinator will follow up in one month.     JAQUELIN Mensah  [facility] - Care Coordinator   8/31/2023   9:51 AM  [phone]

## 2023-08-31 NOTE — TELEPHONE ENCOUNTER
Hemoglobin A1C   Date Value Ref Range Status   08/31/2023 7.8 (H) 0.0 - 5.6 % Final     Comment:     Normal <5.7%   Prediabetes 5.7-6.4%    Diabetes 6.5% or higher     Note: Adopted from ADA consensus guidelines.   06/22/2021 8.3 (H) 0 - 5.6 % Final     Comment:     Normal <5.7% Prediabetes 5.7-6.4%  Diabetes 6.5% or higher - adopted from ADA   consensus guidelines.  Results confirmed by repeat test       How often do you want patient to check her blood sugars?    Carmencita Franco RN BSN  [facility]

## 2023-08-31 NOTE — PATIENT INSTRUCTIONS
Thank you for choosing us for your care. I think an in-clinic visit with Dr. Silver, your primary care provider would be best next steps based on your symptoms. Please schedule a clinic appointment; you won t be charged for this eVisit.      You can schedule an appointment right here in [facility], or call [phone]

## 2023-09-03 RX ORDER — BLOOD SUGAR DIAGNOSTIC
STRIP MISCELLANEOUS
Qty: 200 STRIP | Refills: 0 | Status: SHIPPED | OUTPATIENT
Start: 2023-09-03

## 2023-09-07 ENCOUNTER — VIRTUAL VISIT (OUTPATIENT)
Dept: EDUCATION SERVICES | Facility: CLINIC | Age: 63
End: 2023-09-07
Payer: COMMERCIAL

## 2023-09-07 ENCOUNTER — TELEPHONE (OUTPATIENT)
Dept: EDUCATION SERVICES | Facility: CLINIC | Age: 63
End: 2023-09-07

## 2023-09-07 DIAGNOSIS — E11.65 TYPE 2 DIABETES MELLITUS WITH HYPERGLYCEMIA, WITHOUT LONG-TERM CURRENT USE OF INSULIN (H): Primary | Chronic | ICD-10-CM

## 2023-09-07 DIAGNOSIS — E11.65 TYPE 2 DIABETES MELLITUS WITH HYPERGLYCEMIA, WITHOUT LONG-TERM CURRENT USE OF INSULIN (H): Primary | ICD-10-CM

## 2023-09-07 PROCEDURE — G0108 DIAB MANAGE TRN  PER INDIV: HCPCS | Mod: 95 | Performed by: DIETITIAN, REGISTERED

## 2023-09-07 RX ORDER — LIRAGLUTIDE 6 MG/ML
0.6 INJECTION SUBCUTANEOUS DAILY
Qty: 6 ML | Refills: 2 | Status: SHIPPED | OUTPATIENT
Start: 2023-09-07 | End: 2023-11-28

## 2023-09-07 RX ORDER — PEN NEEDLE, DIABETIC 32GX 5/32"
NEEDLE, DISPOSABLE MISCELLANEOUS
Qty: 100 EACH | Refills: 4 | Status: SHIPPED | OUTPATIENT
Start: 2023-09-07

## 2023-09-07 NOTE — TELEPHONE ENCOUNTER
Dr Silver,    I would like to start Victoza again with Gemma.  She had been on it a couple of years ago and it really helped her blood sugars and she lost some weight.    Suggest 0.6 mg dose daily for 1 month then increase to 1.2 mg daily.  She did have some nausea the first time she was on it that is why the slower increase.    Are you ok with this plan?  If so please sign off on orders.    Thanks! Shobha Donovan RD, [facility]

## 2023-09-07 NOTE — PROGRESS NOTES
Diabetes Self-Management Education & Support    Presents for: Individual review    Type of Service: Telephone Visit    Originating Location (Patient Location): Home  Distant Location (Provider Location): [facility]  Mode of Communication:  Telephone    How would patient like to obtain AVS? Mail a copy    Assessment Type:   ASSESSMENT:  -blood sugars still running higher  -discussed GLP1 medication, was previously on victoza and that was stopped a few years ago due to one low blood sugar.  Note sent to pcp regarding starting this again, she did have some nausea with it so suggest staying on the 0.6 mg dose for a month before increasing.  -went over carbohydrate choices and went through her meals and what she can do to fit into the correct carbohydrate choices.    Patient's most recent   Lab Results   Component Value Date    A1C 7.8 08/31/2023    A1C 8.3 06/22/2021     is not meeting goal of <7.0    Diabetes knowledge and skills assessment:   Patient is knowledgeable in diabetes management concepts related to: Healthy Eating, Being Active, and Monitoring    Continue education with the following diabetes management concepts: Healthy Eating, Being Active, Monitoring, Taking Medication, Problem Solving, Reducing Risks, and Healthy Coping    Based on learning assessment above, most appropriate setting for further diabetes education would be: Individual setting.      PLAN  WORK TOWARDS 2-3 CARBOHYDRATE CHOICES (30-45 grams)  1-2 choices (15-30 grams) at snacks    1. Try to do 1.5 cups of cereal in the am. The Corn chex, multigrain cheerios and honeynut cheerios are the best choices.    2.  Nixon is 2 choices which works fine, tostitos 22 chips is max amount with meat and cheese meal works fine, meat loaf (using 3/4 cup brown sugar and making 5 meal servings will be 3 choices per meal).  Try adding meat and green beans or peas/corn to the ramen meal.  Split the package into 2.  If you do pasta  "try to measure out 1 cup servings=3 choices.   One chalupa is 2 choices.  10 pc chicken nugget from mcdonalds= 2 choices.  Latosha side pasta packs: need to divide into 3 meals and add meat and vegetable.      Snack:  Peach or pear or orange or string cheese or banana or ritz 8 crackers with cheese or greek yogurt    T  Follow-up: in two months    See Care Plan for co-developed, patient-state behavior change goals.  AVS provided for patient today.    Education Materials Provided:  Carbohydrate Counting      SUBJECTIVE/OBJECTIVE:  Presents for: Individual review  Accompanied by: Self  Focus of Visit: Patient Unsure  Diabetes type: Type 2  How confident are you filling out medical forms by yourself:: Quite a bit  Other concerns:: Glasses  Cultural Influences/Ethnic Background:  Not  or       Diabetes Symptoms & Complications:          Patient Problem List and Family Medical History reviewed for relevant medical history, current medical status, and diabetes risk factors.    Vitals:  There were no vitals taken for this visit.  Estimated body mass index is 46.2 kg/m  as calculated from the following:    Height as of 9/13/22: 1.575 m (5' 2\").    Weight as of 3/8/23: 114.6 kg (252 lb 9.6 oz).   Last 3 BP:   BP Readings from Last 3 Encounters:   03/08/23 126/86   07/25/22 136/74   06/07/22 132/84       History   Smoking Status    Former    Packs/day: 0.80    Years: 30.00    Types: Cigarettes   Smokeless Tobacco    Never     Comment: 1 PPD x 30. Quit 2017       Labs:  Lab Results   Component Value Date    A1C 7.8 08/31/2023    A1C 8.3 06/22/2021     Lab Results   Component Value Date     03/08/2023     10/22/2020     Lab Results   Component Value Date    LDL 60 02/24/2023     10/22/2020     HDL Cholesterol   Date Value Ref Range Status   10/22/2020 47 (L) >49 mg/dL Final     Direct Measure HDL   Date Value Ref Range Status   02/24/2023 49 (L) >=50 mg/dL Final   ]  GFR Estimate   Date Value " Ref Range Status   03/08/2023 80 >60 mL/min/1.73m2 Final     Comment:     eGFR calculated using 2021 CKD-EPI equation.   10/22/2020 >90 >60 mL/min/[1.73_m2] Final     Comment:     Non  GFR Calc  Starting 12/18/2018, serum creatinine based estimated GFR (eGFR) will be   calculated using the Chronic Kidney Disease Epidemiology Collaboration   (CKD-EPI) equation.       GFR Estimate If Black   Date Value Ref Range Status   10/22/2020 >90 >60 mL/min/[1.73_m2] Final     Comment:      GFR Calc  Starting 12/18/2018, serum creatinine based estimated GFR (eGFR) will be   calculated using the Chronic Kidney Disease Epidemiology Collaboration   (CKD-EPI) equation.       Lab Results   Component Value Date    CR 0.82 03/08/2023    CR 0.69 10/22/2020     No results found for: MICROALBUMIN    Healthy Eating:   Breakfast- bowl of cereal kelly grahams, life an honey nut.    Lunch: sandwich if at work, tacos tostidos cups.  Supper: thaddeus pkt or meatloaf or pasta or chalupas  Being Active:       Monitoring:   Just had an A1c done and it was 7.8%    Taking Medications:  Diabetes Medication(s)       Biguanides       metFORMIN (GLUCOPHAGE XR) 500 MG 24 hr tablet    TAKE 2 TABLETS BY MOUTH TWICE DAILY WITH MEALS                 Problem Solving:                 Reducing Risks:       Healthy Coping:     Patient Activation Measure Survey Score:      1/6/2011    11:00 AM   JAYDEN Score (Last Two)   JAYDEN Raw Score 41   Activation Score 63.2   JAYDEN Level 3         Care Plan and Education Provided:  Care Plan: Diabetes   Updates made by Shobha Donovan, KAREN since 9/7/2023 12:00 AM        Problem: Diabetes Self-Management Education Needed to Optimize Self-Care Behaviors         Goal: Healthy Eating - follow a healthy eating pattern for diabetes    This Visit's Progress: 0%   Note:    Work on keeping carbohydrate choices at 2-3 at meals and 1-2 at snacks             Time Spent: 60 minutes  Encounter Type: Individual    Any  diabetes medication dose changes were made via the CDE Protocol per the patient's primary care provider. A copy of this encounter was shared with the provider.

## 2023-09-07 NOTE — PATIENT INSTRUCTIONS
WORK TOWARDS 2-3 CARBOHYDRATE CHOICES (30-45 grams)  1-2 choices (15-30 grams) at snacks    1. Try to do 1.5 cups of cereal in the am. The Corn chex, multigrain cheerios and honeynut cheerios are the best choices.    2.  [place] is 2 choices which works fine, tostitos 22 chips is max amount with meat and cheese meal works fine, meat loaf (using 3/4 cup brown sugar and making 5 meal servings will be 3 choices per meal).  Try adding meat and green beans or peas/corn to the ramen meal.  Split the package into 2.  If you do pasta try to measure out 1 cup servings=3 choices.   One chalupa is 2 choices.  10 pc chicken nugget from [employer]= 2 choices.  Latosha side pasta packs: need to divide into 3 meals and add meat and vegetable.      Snack:  Peach or pear or orange or string cheese or banana or ritz 8 crackers with cheese or greek yogurt

## 2023-09-17 DIAGNOSIS — K21.9 GASTROESOPHAGEAL REFLUX DISEASE, UNSPECIFIED WHETHER ESOPHAGITIS PRESENT: ICD-10-CM

## 2023-09-29 ENCOUNTER — PATIENT OUTREACH (OUTPATIENT)
Dept: CARE COORDINATION | Facility: CLINIC | Age: 63
End: 2023-09-29
Payer: COMMERCIAL

## 2023-09-29 NOTE — PROGRESS NOTES
Clinic Care Coordination Contact  [facility]/Voicemail       Clinical Data: Care Coordinator Outreach  Outreach attempted x 1.  Left message on patient's voicemail with call back information and requested return call.  Plan: Care Coordinator will try to reach patient again in 3-5 business days.    JAQUELIN Mensah   [facility] - Care Coordination  [facility]   [phone]

## 2023-10-05 ENCOUNTER — OFFICE VISIT (OUTPATIENT)
Dept: OPTOMETRY | Facility: CLINIC | Age: 63
End: 2023-10-05
Payer: COMMERCIAL

## 2023-10-05 DIAGNOSIS — H25.13 NUCLEAR AGE-RELATED CATARACT, BOTH EYES: ICD-10-CM

## 2023-10-05 DIAGNOSIS — E11.9 TYPE 2 DIABETES MELLITUS WITHOUT RETINOPATHY (H): ICD-10-CM

## 2023-10-05 DIAGNOSIS — H52.221 REGULAR ASTIGMATISM OF RIGHT EYE: ICD-10-CM

## 2023-10-05 DIAGNOSIS — Z01.01 ENCOUNTER FOR EXAMINATION OF EYES AND VISION WITH ABNORMAL FINDINGS: Primary | ICD-10-CM

## 2023-10-05 DIAGNOSIS — H52.03 HYPERMETROPIA, BILATERAL: ICD-10-CM

## 2023-10-05 DIAGNOSIS — H52.4 PRESBYOPIA: ICD-10-CM

## 2023-10-05 PROCEDURE — 92015 DETERMINE REFRACTIVE STATE: CPT | Performed by: OPTOMETRIST

## 2023-10-05 PROCEDURE — 92004 COMPRE OPH EXAM NEW PT 1/>: CPT | Performed by: OPTOMETRIST

## 2023-10-05 ASSESSMENT — REFRACTION_MANIFEST
OD_SPHERE: +0.75
OD_AXIS: 164
OD_ADD: +2.75
OS_ADD: +2.75
OS_SPHERE: +1.00
OD_CYLINDER: +0.50
OS_CYLINDER: SPHERE

## 2023-10-05 ASSESSMENT — CONF VISUAL FIELD
OD_SUPERIOR_NASAL_RESTRICTION: 0
OS_SUPERIOR_NASAL_RESTRICTION: 0
OD_INFERIOR_NASAL_RESTRICTION: 0
OD_NORMAL: 1
OS_SUPERIOR_TEMPORAL_RESTRICTION: 0
OD_SUPERIOR_TEMPORAL_RESTRICTION: 0
METHOD: COUNTING FINGERS
OS_INFERIOR_NASAL_RESTRICTION: 0
OD_INFERIOR_TEMPORAL_RESTRICTION: 0
OS_NORMAL: 1
OS_INFERIOR_TEMPORAL_RESTRICTION: 0

## 2023-10-05 ASSESSMENT — VISUAL ACUITY
CORRECTION_TYPE: GLASSES
OS_CC: 20/40
OD_SC: 20/400
OS_CC: 20/50
OS_SC: 20/40
OS_SC+: -2
OS_SC: 20/200
OS_CC+: -2
OD_SC: 20/40
OD_CC: 20/70
METHOD: SNELLEN - LINEAR
OD_CC: 20/20

## 2023-10-05 ASSESSMENT — TONOMETRY
OD_IOP_MMHG: 15
OS_IOP_MMHG: 16
IOP_METHOD: APPLANATION

## 2023-10-05 ASSESSMENT — SLIT LAMP EXAM - LIDS
COMMENTS: 1+ DERMATOCHALASIS, 1+ MEIBOMIAN GLAND DYSFUNCTION
COMMENTS: 1+ DERMATOCHALASIS, 1+ MEIBOMIAN GLAND DYSFUNCTION

## 2023-10-05 ASSESSMENT — CUP TO DISC RATIO
OS_RATIO: 0.15
OD_RATIO: 0.2

## 2023-10-05 ASSESSMENT — REFRACTION_WEARINGRX
SPECS_TYPE: PAL
OS_AXIS: 034
OD_SPHERE: +2.25
OS_CYLINDER: +0.75
OS_ADD: +2.50
OD_ADD: +2.50
OS_SPHERE: +1.75
OD_AXIS: 160
OD_CYLINDER: +0.75

## 2023-10-05 ASSESSMENT — EXTERNAL EXAM - RIGHT EYE: OD_EXAM: NORMAL

## 2023-10-05 ASSESSMENT — EXTERNAL EXAM - LEFT EYE: OS_EXAM: NORMAL

## 2023-10-05 NOTE — LETTER
10/5/2023         RE: Jessica Jay  [address] [facility] [address]        Dear Colleague,    Thank you for referring your patient, Jessica Jay, to the [facility]. Please see a copy of my visit note below.    Chief Complaint   Patient presents with     Diabetic Eye Exam        Chief Complaint(s) and History of Present Illness(es)       Diabetic Eye Exam              Vision: is stable    Diabetes Type: Type 2 and taking oral medications    Duration: years    Blood Sugars: fluctuates (Checks few times per week )                   Lab Results   Component Value Date    A1C 7.8 08/31/2023    A1C 7.9 05/26/2023    A1C 7.7 02/24/2023    A1C 7.1 07/25/2022    A1C 6.7 04/15/2022    A1C 8.3 06/22/2021    A1C 12.7 04/09/2021     -Family hx of AMD (mother - dry)       Last Eye Exam: 2021(?) - [facility]   Dilated Previously: Yes, side effects of dilation explained today    What are you currently using to see?  Glasses - PAL's - wears full time     Distance Vision Acuity: Noticed gradual change in both eyes - ongoing for a few months     Near Vision Acuity: Not satisfied     Eye Comfort: good - eye fatigue secondary to poor sleep   Do you use eye drops? : Yes: Clear Eyes few times per yr  Occupation or Hobbies: Home - lots of screen time     Karon Martines  Optometry Assistant     Medical, surgical and family histories reviewed and updated 10/5/2023.       OBJECTIVE: See Ophthalmology exam    ASSESSMENT:    ICD-10-CM    1. Encounter for examination of eyes and vision with abnormal findings  Z01.01       2. Type 2 diabetes mellitus without retinopathy (H)  E11.9       3. Nuclear age-related cataract, both eyes  H25.13       4. Hypermetropia, bilateral  H52.03       5. Regular astigmatism of right eye  H52.221       6. Presbyopia  H52.4           PLAN:    Jessica Jay aware  eye exam results will be sent to Marybeth Silver.  Patient Instructions   Patient educated on  importance of good blood sugar control.  Letter sent to primary care provider with diabetic eye exam report.     You have the start of mild cataracts.  You may notice some blurred vision or glare with night driving.  It is important that you wear good sunglasses to protect your eyes from the ultraviolet light from the sun.     Updated glasses prescription provided today.   Allow 2 weeks to adapt to the new glasses.     Return in 1 year for a comprehensive eye exam, or sooner if needed.      The effects of the dilating drops last for 4- 6 hours.  You will be more sensitive to light and vision will be blurry up close.  Mydriatic sunglasses were given if needed.     Ramírez Guzman, CARMEN  [address] [place]  [address]    [phone]             Again, thank you for allowing me to participate in the care of your patient.        Sincerely,        Ramírez Guzman OD

## 2023-10-05 NOTE — PROGRESS NOTES
Chief Complaint   Patient presents with    Diabetic Eye Exam        Chief Complaint(s) and History of Present Illness(es)       Diabetic Eye Exam              Vision: is stable    Diabetes Type: Type 2 and taking oral medications    Duration: years    Blood Sugars: fluctuates (Checks few times per week )                   Lab Results   Component Value Date    A1C 7.8 08/31/2023    A1C 7.9 05/26/2023    A1C 7.7 02/24/2023    A1C 7.1 07/25/2022    A1C 6.7 04/15/2022    A1C 8.3 06/22/2021    A1C 12.7 04/09/2021     -Family hx of AMD (mother - dry)       Last Eye Exam: 2021(?) - [facility]   Dilated Previously: Yes, side effects of dilation explained today    What are you currently using to see?  Glasses - PAL's - wears full time     Distance Vision Acuity: Noticed gradual change in both eyes - ongoing for a few months     Near Vision Acuity: Not satisfied     Eye Comfort: good - eye fatigue secondary to poor sleep   Do you use eye drops? : Yes: Clear Eyes few times per yr  Occupation or Hobbies: Home - lots of screen time     Karon Martines  Optometry Assistant     Medical, surgical and family histories reviewed and updated 10/5/2023.       OBJECTIVE: See Ophthalmology exam    ASSESSMENT:    ICD-10-CM    1. Encounter for examination of eyes and vision with abnormal findings  Z01.01       2. Type 2 diabetes mellitus without retinopathy (H)  E11.9       3. Nuclear age-related cataract, both eyes  H25.13       4. Hypermetropia, bilateral  H52.03       5. Regular astigmatism of right eye  H52.221       6. Presbyopia  H52.4           PLAN:    Jessica Jay aware  eye exam results will be sent to Marybeth Silver.  Patient Instructions   Patient educated on importance of good blood sugar control.  Letter sent to primary care provider with diabetic eye exam report.     You have the start of mild cataracts.  You may notice some blurred vision or glare with night driving.  It is important that you wear good  sunglasses to protect your eyes from the ultraviolet light from the sun.     Updated glasses prescription provided today.   Allow 2 weeks to adapt to the new glasses.     Return in 1 year for a comprehensive eye exam, or sooner if needed.      The effects of the dilating drops last for 4- 6 hours.  You will be more sensitive to light and vision will be blurry up close.  Mydriatic sunglasses were given if needed.     Ramírez Guzman, OD  [address]  [place]  [address] [phone]

## 2023-10-05 NOTE — PATIENT INSTRUCTIONS
Patient educated on importance of good blood sugar control.  Letter sent to primary care provider with diabetic eye exam report.     You have the start of mild cataracts.  You may notice some blurred vision or glare with night driving.  It is important that you wear good sunglasses to protect your eyes from the ultraviolet light from the sun.     Updated glasses prescription provided today.   Allow 2 weeks to adapt to the new glasses.     Return in 1 year for a comprehensive eye exam, or sooner if needed.      The effects of the dilating drops last for 4- 6 hours.  You will be more sensitive to light and vision will be blurry up close.  Mydriatic sunglasses were given if needed.     Ramírez Guzman, CARMEN  [address] [place]  [address]    [phone]

## 2023-10-23 DIAGNOSIS — E11.65 TYPE 2 DIABETES MELLITUS WITH HYPERGLYCEMIA, WITHOUT LONG-TERM CURRENT USE OF INSULIN (H): ICD-10-CM

## 2023-10-24 RX ORDER — METFORMIN HCL 500 MG
TABLET, EXTENDED RELEASE 24 HR ORAL
Qty: 360 TABLET | Refills: 0 | Status: SHIPPED | OUTPATIENT
Start: 2023-10-24 | End: 2023-12-08

## 2023-10-27 ENCOUNTER — PATIENT OUTREACH (OUTPATIENT)
Dept: CARE COORDINATION | Facility: CLINIC | Age: 63
End: 2023-10-27
Payer: COMMERCIAL

## 2023-10-27 NOTE — LETTER
It was a pleasure to speak with you.  I would like to provide you with the enclosed information for your records.  As part of care coordination, we are developing care plans to assist in accomplishing your health care goals.  When we speak next, please feel free to let me know if you want to add or change any information on your care plans.    As always, feel free to contact me if you have any questions or concerns.  I look forward to working with you in the effort to achieve your health care and wellness goals .        Sincerely,      Bell Brito, [facility]  Clinic Care Coordination  [phone]    [facility]  Patient Centered Plan of Care  About Me:        Patient Name:  Jessica Jay    YOB: 1960  Age:         63 year old   [place] MRN:    [id number] Telephone Information:  Home Phone [phone]   Mobile [phone]       Address:  [address] [place] [id number] Email address:  [email or web address]      Emergency Contact(s)    Name Relationship Lgl Grd Work Phone Home Phone Mobile Phone   1. ILIANA JAY Mother No  [phone]   2. NOAH TOBIAS Sister No  [phone]           Primary language:  English     needed? No   [place] [employer]:  [phone] op. 1  Other communication barriers:Glasses    Preferred Method of Communication:  Portia  Current living arrangement: I live alone    Mobility Status/ Medical Equipment: Independent        Health Maintenance  Health Maintenance Reviewed: Due/Overdue   Health Maintenance Due   Topic Date Due    URINE DRUG SCREEN  Never done    HEPATITIS B IMMUNIZATION (1 of 3 - Risk 3-dose series) Never done    RSV VACCINE 60+ (1 - 1-dose 60+ series) Never done    LUNG CANCER SCREENING  10/13/2022    YEARLY PREVENTIVE VISIT  01/11/2023    DIABETIC FOOT EXAM  04/15/2023    INFLUENZA VACCINE (1) 09/01/2023    COVID-19 Vaccine (5 - 2023-24 season) 09/01/2023    PHQ-9  09/08/2023           My  Access Plan  Medical Emergency 911   Primary Clinic Line [facility] [phone]   24 Hour Appointment Line [phone] or  [phone]) (toll-free)   24 Hour Nurse Line [phone] (toll-free)   Preferred Urgent Care Other     Preferred Hospital [facility]  [phone]     Preferred Pharmacy [facility] [place] - [address]     Behavioral Health Crisis Line The National Suicide Prevention Lifeline at 1-603.603.6057 or Text/Call 298           My Care Team Members  Patient Care Team         Relationship Specialty Notifications Start End    Marybeth Silver MD PCP - General Family Practice  5/16/18     Phone: [phone] Fax: [phone] [address] [place] [address]    Marybeth Silver MD Assigned PCP   4/15/18     Phone: [phone] Fax: [phone]         [address] [place] [address]    Bell Brito LSW Lead Care Coordinator Primary Care - CC Admissions 1/29/20     Phone: [phone] Fax: [phone]        Mayela Plummer [facility] Pharmacist Pharmacist  5/19/21     Phone: [phone] Fax: [phone] [address] [facility] [place] [address]    Francisco Farris DO Assigned Musculoskeletal Provider   12/5/21     Phone: [phone] Fax: [phone]         [address] [place] [address]    Adam Painting MD Assigned Surgical Provider   5/8/22     Phone: [phone] Pager: [phone] Fax: [phone] [address] [facility] [place] [address]    Shobha Donovan RD Diabetes Educator Dietitian, Registered  8/18/22     Phone: [phone] Fax: [phone]                    My Care Plans  Self Management and Treatment Plan    Care Plan  Care Plan: Financial Wellbeing       Problem: Patient expresses financial resource strain       Long-Range Goal: Create an action plan to increase financial stability       Start Date: 6/27/2022 Expected End Date: 3/29/2024    This  Visit's Progress: 30% Recent Progress: 30%    Note:     Barriers: course needed is not available (quick books)  Strengths: going to class's    Patient expressed understanding of goal: yes  Action steps to achieve this goal:  1. I will continue with classes  2. I will look for the course I need and schedule  3. I will complete my resume  4. I will work with the workforce center to get the needed programs                            Care Plan: Physical Activity       Problem: Patient is inactive       Long-Range Goal: Exercise at Least 20 Minutes per Day       Start Date: 4/27/2020 Expected End Date: 3/29/2024    This Visit's Progress: 10% Recent Progress: 10%    Note:     Barriers: Depression, back pain, social distancing to some extent  Strengths: Understand that increasing my exercise will help with managing my weight and back pain    Patient expressed understanding of goal: Yes  Action steps to achieve this goal:  1. I will walk in one direction for 5 minutes and then return for a total of 10 minutes  2. I will not get discouraged if I cannot make it the 5 minutes in one direction before having to turn around  3. I will celebrate my successes                              Action Plans on File:            Depression          Advance Care Plans/Directives:   Advanced Care Plan/Directives on file:   Yes    Status of Document(s): No data recorded  Advanced Care Plan/Directives Type:   Advanced Directive - On File           My Medical and Care Information  Problem List   Patient Active Problem List   Diagnosis    Depression, major    Migraines    CARDIOVASCULAR SCREENING; LDL GOAL LESS THAN 160    Disc herniation    DDD (degenerative disc disease), lumbar    DDD (degenerative disc disease), cervical    Neck pain    Tobacco abuse    ELYSSA (obstructive sleep apnea)- severe (AHI 49)    Spondylolisthesis, grade 1    Chronic low back pain    Brain lipoma    History of colonic polyps    Gastroesophageal reflux disease without  esophagitis    Hypertension goal BP (blood pressure) < 140/90    Traylor's esophagus determined by endoscopy    Bilateral edema of lower extremity    GABRIELA (generalized anxiety disorder)    Restless legs syndrome (RLS)    Diabetes mellitus, type 2 (H)    Morbid obesity (H)      Current Medications and Allergies:     Allergies   Allergen Reactions    Buspar [Buspirone Hcl] Rash    Lisinopril Cough    Seasonal Allergies     Seroquel [Quetiapine] Itching         Current Outpatient Medications:     alcohol swab prep pads, Use to swab area of injection/haley as directed., Disp: 100 each, Rfl: 3    atorvastatin (LIPITOR) 40 MG tablet, TAKE 1 TABLET BY MOUTH ONCE DAILY, Disp: 90 tablet, Rfl: 1    blood glucose (ACCU-CHEK GUIDE) test strip, USE TO TEST TWICE DAILY, Disp: 200 strip, Rfl: 0    blood glucose (NO BRAND SPECIFIED) lancets standard, Use to test blood sugar 2 times daily or as directed., Disp: 100 lancet, Rfl: 3    blood glucose monitoring (NO BRAND SPECIFIED) meter device kit, Use to test blood sugar 2 times daily or as directed., Disp: 1 kit, Rfl: 0    clobetasol (TEMOVATE) 0.05 % external cream, Apply sparingly to affected area twice daily as needed.  Do not apply to face., Disp: 120 g, Rfl: 3    cyclobenzaprine (FLEXERIL) 5 MG tablet, Take 1 tablet (5 mg) by mouth 3 times daily as needed for muscle spasms, Disp: 42 tablet, Rfl: 0    doxylamine (UNISOM) 25 MG TABS tablet, Take 1 tablet (25 mg) by mouth nightly as needed for sleep, Disp: 30 tablet, Rfl: 1    DULoxetine (CYMBALTA) 60 MG capsule, Take 2 capsules (120 mg) by mouth daily., Disp: 180 capsule, Rfl: 0    furosemide (LASIX) 20 MG tablet, TAKE 1 TABLET BY MOUTH TWICE DAILY., Disp: 180 tablet, Rfl: 0    hydrOXYzine (ATARAX) 25 MG tablet, Take 25-50 mg twice a day as needed for anxiety. Can also take  mg at bedtime for sleep/itching. Don't exceed 300 mg daily., Disp: 90 tablet, Rfl: 1    insulin pen needle (BD BRANDY U/F) 32G X 4 MM miscellaneous, Use 1  daily as directed., Disp: 100 each, Rfl: 4    liraglutide (VICTOZA) 18 MG/3ML solution, Inject 0.6 mg Subcutaneous daily For one month, then increase to 1.2 mg daily., Disp: 6 mL, Rfl: 2    losartan (COZAAR) 25 MG tablet, Take 1 tablet (25 mg) by mouth daily, Disp: 90 tablet, Rfl: 3    metFORMIN (GLUCOPHAGE XR) 500 MG 24 hr tablet, TAKE 2 TABLETS BY MOUTH TWICE DAILY WITH MEALS, Disp: 360 tablet, Rfl: 0    metoprolol succinate ER (TOPROL XL) 25 MG 24 hr tablet, Take 1 tablet (25 mg) by mouth daily, Disp: 90 tablet, Rfl: 3    omeprazole (PRILOSEC) 20 MG DR capsule, TAKE 1 CAPSULE BY MOUTH ONCE DAILY, Disp: 90 capsule, Rfl: 0    triamcinolone (KENALOG) 0.1 % external ointment, Apply topically 2 times daily To affected area x 2 weeks as needed, Disp: 80 g, Rfl: 0    Current Facility-Administered Medications:     lidocaine 1 % injection 0.5 mL, 0.5 mL, , , Ki May MD, 0.5 mL at 05/04/20 1021    lidocaine 1 % injection 2 mL, 2 mL, , , Francisco Farris DO, 2 mL at 06/07/22 1207    triamcinolone (KENALOG-40) injection 20 mg, 20 mg, , , Ki May MD, 20 mg at 05/04/20 1021    triamcinolone (KENALOG-40) injection 40 mg, 40 mg, , , Francisco Farris DO, 40 mg at 06/07/22 1207    triamcinolone (KENALOG-40) injection 40 mg, 40 mg, , , Francisco Farris DO, 40 mg at 11/26/21 1709      Care Coordination Start Date: 1/29/2020   Frequency of Care Coordination: monthly, more frequently as needed     Form Last Updated: 10/27/2023

## 2023-10-27 NOTE — PROGRESS NOTES
Clinic Care Coordination Contact  Follow Up Progress Note      Assessment: patient noted she is doing better.  She is working on getting finances and other needs sorted out.  She has plans for the weekend at [employer] and they are close to closing on her mom's home.  She had an interview for a part time job and should hear next week on how it is going.     She had no new concerns.  She is slowly making progress on organizing her finances yet not making progress on bills or exercising.     Care Gaps:    Health Maintenance Due   Topic Date Due    URINE DRUG SCREEN  Never done    HEPATITIS B IMMUNIZATION (1 of 3 - Risk 3-dose series) Never done    RSV VACCINE 60+ (1 - 1-dose 60+ series) Never done    LUNG CANCER SCREENING  10/13/2022    YEARLY PREVENTIVE VISIT  01/11/2023    DIABETIC FOOT EXAM  04/15/2023    INFLUENZA VACCINE (1) 09/01/2023    COVID-19 Vaccine (5 - 2023-24 season) 09/01/2023    PHQ-9  09/08/2023       Wanting to hold of    Care Plans  Care Plan: Financial Wellbeing       Problem: Patient expresses financial resource strain       Long-Range Goal: Create an action plan to increase financial stability       Start Date: 6/27/2022 Expected End Date: 3/29/2024    This Visit's Progress: 30% Recent Progress: 30%    Note:     Barriers: course needed is not available (quick books)  Strengths: going to class's    Patient expressed understanding of goal: yes  Action steps to achieve this goal:  1. I will continue with classes  2. I will look for the course I need and schedule  3. I will complete my resume  4. I will work with the workforce center to get the needed programs                            Care Plan: Physical Activity       Problem: Patient is inactive       Long-Range Goal: Exercise at Least 20 Minutes per Day       Start Date: 4/27/2020 Expected End Date: 3/29/2024    This Visit's Progress: 10% Recent Progress: 10%    Note:     Barriers: Depression, back pain, social distancing to some extent  Strengths:  Understand that increasing my exercise will help with managing my weight and back pain    Patient expressed understanding of goal: Yes  Action steps to achieve this goal:  1. I will walk in one direction for 5 minutes and then return for a total of 10 minutes  2. I will not get discouraged if I cannot make it the 5 minutes in one direction before having to turn around  3. I will celebrate my successes                              Intervention/Education provided during outreach: listened and encouraged making forward progress no matter how small of steps.      Outreach Frequency: monthly, more frequently as needed      Plan:   Pt work on exercising and better plan for finances.   Care Coordinator will follow up in one month.     JAQUELIN Mensah  [facility] - Care Coordinator   10/27/2023   12:44 PM  [phone]

## 2023-11-02 DIAGNOSIS — I10 HYPERTENSION GOAL BP (BLOOD PRESSURE) < 140/90: ICD-10-CM

## 2023-11-02 RX ORDER — FUROSEMIDE 20 MG
TABLET ORAL
Qty: 180 TABLET | Refills: 0 | Status: SHIPPED | OUTPATIENT
Start: 2023-11-02 | End: 2024-02-05

## 2023-11-27 ENCOUNTER — PATIENT OUTREACH (OUTPATIENT)
Dept: CARE COORDINATION | Facility: CLINIC | Age: 63
End: 2023-11-27

## 2023-11-27 ENCOUNTER — LAB (OUTPATIENT)
Dept: LAB | Facility: CLINIC | Age: 63
End: 2023-11-27
Payer: COMMERCIAL

## 2023-11-27 DIAGNOSIS — E11.9 DIABETES MELLITUS, TYPE 2 (H): Primary | ICD-10-CM

## 2023-11-27 LAB — HBA1C MFR BLD: 6.6 % (ref 0–5.6)

## 2023-11-27 PROCEDURE — 36415 COLL VENOUS BLD VENIPUNCTURE: CPT

## 2023-11-27 PROCEDURE — 83036 HEMOGLOBIN GLYCOSYLATED A1C: CPT

## 2023-11-27 NOTE — PROGRESS NOTES
Clinic Care Coordination Contact  [facility]/Voicemail    Clinical Data: Care Coordinator Outreach    Outreach Documentation Number of Outreach Attempt   11/27/2023  12:58 PM 1       Left message on patient's voicemail with call back information and requested return call.    Plan: Care Coordinator will try to reach patient again in 8-10 business days.    JAQUELIN Mensah   [facility] - Care Coordination  [facility]   [phone]

## 2023-11-28 ENCOUNTER — VIRTUAL VISIT (OUTPATIENT)
Dept: EDUCATION SERVICES | Facility: CLINIC | Age: 63
End: 2023-11-28
Payer: COMMERCIAL

## 2023-11-28 DIAGNOSIS — E11.65 TYPE 2 DIABETES MELLITUS WITH HYPERGLYCEMIA, WITHOUT LONG-TERM CURRENT USE OF INSULIN (H): ICD-10-CM

## 2023-11-28 PROCEDURE — G0108 DIAB MANAGE TRN  PER INDIV: HCPCS | Mod: 95 | Performed by: DIETITIAN, REGISTERED

## 2023-11-28 RX ORDER — LIRAGLUTIDE 6 MG/ML
1.8 INJECTION SUBCUTANEOUS DAILY
Qty: 9 ML | Refills: 2 | Status: SHIPPED | OUTPATIENT
Start: 2023-11-28 | End: 2024-05-21

## 2023-11-28 NOTE — PATIENT INSTRUCTIONS
Great job with wt loss and A1C!!!      2.    Keep watching your portions.      3.   Increase to 1.8 mg of Victoza daily

## 2023-11-28 NOTE — PROGRESS NOTES
Diabetes Self-Management Education & Support    Presents for: Individual review    Type of Service: Telephone Visit    Originating Location (Patient Location): Home  Distant Location (Provider Location): [facility]  Mode of Communication:  Telephone    Telephone Visit Start Time: 5:05 pm  Telephone Visit End Time (telephone visit stop time): 5:45    How would patient like to obtain AVS? Portia      ASSESSMENT:  -doing well maintaining 15 lbs wt loss  -she is cutting back on portions  -she is going to go up on her victoza to 1.8 mg daily  -A1c dropped to 6.6%  Patient's most recent   Lab Results   Component Value Date    A1C 6.6 11/27/2023    A1C 8.3 06/22/2021     is meeting goal of <7.0    Diabetes knowledge and skills assessment:   Patient is knowledgeable in diabetes management concepts related to: Healthy Eating, Being Active, Monitoring, and Taking Medication    Continue education with the following diabetes management concepts: Healthy Eating, Being Active, Monitoring, Taking Medication, Problem Solving, Reducing Risks, and Healthy Coping    Based on learning assessment above, most appropriate setting for further diabetes education would be: Individual setting.      PLAN      Great job with wt loss and A1C!!!      2.    Keep watching your portions.      3.   Increase to 1.8 mg of Victoza daily      Follow-up: in three months    See Care Plan for co-developed, patient-state behavior change goals.  AVS provided for patient today.    Education Materials Provided:  No new materials provided today      SUBJECTIVE/OBJECTIVE:  Presents for: Individual review  Diabetes type: Type 2  How confident are you filling out medical forms by yourself:: Quite a bit  Other concerns:: Glasses  Cultural Influences/Ethnic Background:  Not  or       Diabetes Symptoms & Complications:          Patient Problem List and Family Medical History reviewed for relevant medical history, current  "medical status, and diabetes risk factors.    Vitals:  There were no vitals taken for this visit.  Estimated body mass index is 46.2 kg/m  as calculated from the following:    Height as of 9/13/22: 1.575 m (5' 2\").    Weight as of 3/8/23: 114.6 kg (252 lb 9.6 oz).   Last 3 BP:   BP Readings from Last 3 Encounters:   03/08/23 126/86   07/25/22 136/74   06/07/22 132/84       History   Smoking Status    Former    Packs/day: 0.80    Years: 30.00    Types: Cigarettes    Start date: 10/15/1975    Quit date: 12/31/2018   Smokeless Tobacco    Never       Labs:  Lab Results   Component Value Date    A1C 6.6 11/27/2023    A1C 8.3 06/22/2021     Lab Results   Component Value Date     03/08/2023     10/22/2020     Lab Results   Component Value Date    LDL 60 02/24/2023     10/22/2020     HDL Cholesterol   Date Value Ref Range Status   10/22/2020 47 (L) >49 mg/dL Final     Direct Measure HDL   Date Value Ref Range Status   02/24/2023 49 (L) >=50 mg/dL Final   ]  GFR Estimate   Date Value Ref Range Status   03/08/2023 80 >60 mL/min/1.73m2 Final     Comment:     eGFR calculated using 2021 CKD-EPI equation.   10/22/2020 >90 >60 mL/min/[1.73_m2] Final     Comment:     Non  GFR Calc  Starting 12/18/2018, serum creatinine based estimated GFR (eGFR) will be   calculated using the Chronic Kidney Disease Epidemiology Collaboration   (CKD-EPI) equation.       GFR Estimate If Black   Date Value Ref Range Status   10/22/2020 >90 >60 mL/min/[1.73_m2] Final     Comment:      GFR Calc  Starting 12/18/2018, serum creatinine based estimated GFR (eGFR) will be   calculated using the Chronic Kidney Disease Epidemiology Collaboration   (CKD-EPI) equation.       Lab Results   Component Value Date    CR 0.82 03/08/2023    CR 0.69 10/22/2020     No results found for: \"MICROALBUMIN\"    Healthy Eating:  Healthy Eating Assessed Today: Yes  Breakfast: cereal 3/4 of bowl  Lunch: frozen peas in with side " dish package and then split it into 3 servings  Dinner: pasta side dishes she is doing 1/2 if she doesnt add veggies.  Snacks: not doing much snacking feeling full, she may do a greek yogurt if gets hungry  Other: a1c 6.6% doing great.  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, cleaning, working some at [employer])    Monitoring:   Last A1c 6.6%    Taking Medications:  Diabetes Medication(s)       Biguanides       metFORMIN (GLUCOPHAGE XR) 500 MG 24 hr tablet    TAKE 2 TABLETS BY MOUTH TWICE DAILY WITH MEALS      Incretin Mimetic Agents       liraglutide (VICTOZA) 18 MG/3ML solution    Inject 1.8 mg Subcutaneous daily                   Healthy Coping:     Patient Activation Measure Survey Score:      1/6/2011    11:00 AM   JAYDEN Score (Last Two)   JAYDEN Raw Score 41   Activation Score 63.2   JAYDEN Level 3         Care Plan and Education Provided:  There are no care plans that you recently modified to display for this patient.          Time Spent: 40minutes  Encounter Type: Individual    Any diabetes medication dose changes were made via the CDE Protocol per the patient's primary care provider. A copy of this encounter was shared with the provider.

## 2023-11-29 NOTE — PROGRESS NOTES
Clinic Care Coordination Contact  Follow Up Progress Note      Assessment: pt noted that she has started a temp job and if it goes well then she will be hired on permanent.  She has completed 13 hours this week so far and should be full time.  She also was offered another job that is part time and she also took that job.  This has really picked up her mood. Her A1c has dropped to 6.6 and she is feeling great about that.     She has noted that her body is tired today and we talked about how going back to work affects her energy level. She typically has rough December with mental health and is planning a way to work through it.  She will call SW if she needs to talk and is ok with call in a month.     Care Gaps:    Health Maintenance Due   Topic Date Due    URINE DRUG SCREEN  Never done    RSV VACCINE (Pregnancy & 60+) (1 - 1-dose 60+ series) Never done    LUNG CANCER SCREENING  10/13/2022    YEARLY PREVENTIVE VISIT  01/11/2023    DIABETIC FOOT EXAM  04/15/2023    INFLUENZA VACCINE (1) 09/01/2023    COVID-19 Vaccine (5 - 2023-24 season) 09/01/2023    PHQ-9  09/08/2023       Postponed to after she maintains her job for a while     Care Plans  Care Plan: Financial Wellbeing       Problem: Patient expresses financial resource strain       Long-Range Goal: Create an action plan to increase financial stability       Start Date: 6/27/2022 Expected End Date: 3/29/2024    This Visit's Progress: 30% Recent Progress: 30%    Note:     Barriers: course needed is not available (quick books)  Strengths: going to class's    Patient expressed understanding of goal: yes  Action steps to achieve this goal:  1. I will continue with classes  2. I will look for the course I need and schedule  3. I will complete my resume  4. I will work with the [employer] [facility] to get the needed programs                            Care Plan: Physical Activity       Problem: Patient is inactive       Long-Range Goal: Exercise at Least 20 Minutes per Day        Start Date: 4/27/2020 Expected End Date: 3/29/2024    This Visit's Progress: 10% Recent Progress: 10%    Note:     Barriers: Depression, back pain, social distancing to some extent  Strengths: Understand that increasing my exercise will help with managing my weight and back pain    Patient expressed understanding of goal: Yes  Action steps to achieve this goal:  1. I will walk in one direction for 5 minutes and then return for a total of 10 minutes  2. I will not get discouraged if I cannot make it the 5 minutes in one direction before having to turn around  3. I will celebrate my successes                              Intervention/Education provided during outreach: encouraged to work on good systems of notes for new tasks.       Outreach Frequency: monthly, more frequently as needed    Plan:   Pt to work on setting up good systems for work and to help her through December.  Pt to call if she needs to talk.   Care Coordinator will follow up in one month.     JAQUELIN Mensah  [facility] - Care Coordinator   11/29/2023   3:14 PM  [phone]

## 2023-12-01 ASSESSMENT — ENCOUNTER SYMPTOMS
HEARTBURN: 0
CHILLS: 0
FREQUENCY: 0
PARESTHESIAS: 0
JOINT SWELLING: 0
CONSTIPATION: 0
SORE THROAT: 0
MYALGIAS: 0
NAUSEA: 0
COUGH: 0
FEVER: 0
SHORTNESS OF BREATH: 0
BREAST MASS: 0
DIZZINESS: 0
HEMATOCHEZIA: 0
EYE PAIN: 0
DIARRHEA: 0
WEAKNESS: 0
HEMATURIA: 0
PALPITATIONS: 0
DYSURIA: 0
NERVOUS/ANXIOUS: 0
ARTHRALGIAS: 1
ABDOMINAL PAIN: 0
HEADACHES: 0

## 2023-12-07 ASSESSMENT — PATIENT HEALTH QUESTIONNAIRE - PHQ9
SUM OF ALL RESPONSES TO PHQ QUESTIONS 1-9: 4
10. IF YOU CHECKED OFF ANY PROBLEMS, HOW DIFFICULT HAVE THESE PROBLEMS MADE IT FOR YOU TO DO YOUR WORK, TAKE CARE OF THINGS AT HOME, OR GET ALONG WITH OTHER PEOPLE: NOT DIFFICULT AT ALL
SUM OF ALL RESPONSES TO PHQ QUESTIONS 1-9: 4

## 2023-12-08 ENCOUNTER — OFFICE VISIT (OUTPATIENT)
Dept: FAMILY MEDICINE | Facility: CLINIC | Age: 63
End: 2023-12-08
Payer: COMMERCIAL

## 2023-12-08 VITALS
SYSTOLIC BLOOD PRESSURE: 112 MMHG | DIASTOLIC BLOOD PRESSURE: 68 MMHG | RESPIRATION RATE: 20 BRPM | OXYGEN SATURATION: 96 % | HEIGHT: 62 IN | TEMPERATURE: 98.1 F | HEART RATE: 100 BPM | WEIGHT: 234.6 LBS | BODY MASS INDEX: 43.17 KG/M2

## 2023-12-08 DIAGNOSIS — Z23 NEED FOR VACCINATION: ICD-10-CM

## 2023-12-08 DIAGNOSIS — K21.9 GASTROESOPHAGEAL REFLUX DISEASE, UNSPECIFIED WHETHER ESOPHAGITIS PRESENT: ICD-10-CM

## 2023-12-08 DIAGNOSIS — E11.65 TYPE 2 DIABETES MELLITUS WITH HYPERGLYCEMIA, WITHOUT LONG-TERM CURRENT USE OF INSULIN (H): ICD-10-CM

## 2023-12-08 DIAGNOSIS — E78.5 HYPERLIPIDEMIA LDL GOAL <100: ICD-10-CM

## 2023-12-08 DIAGNOSIS — F33.1 MODERATE EPISODE OF RECURRENT MAJOR DEPRESSIVE DISORDER (H): ICD-10-CM

## 2023-12-08 DIAGNOSIS — L40.9 PSORIASIS: ICD-10-CM

## 2023-12-08 DIAGNOSIS — I10 HYPERTENSION GOAL BP (BLOOD PRESSURE) < 140/90: ICD-10-CM

## 2023-12-08 DIAGNOSIS — Z00.00 ROUTINE GENERAL MEDICAL EXAMINATION AT A HEALTH CARE FACILITY: Primary | ICD-10-CM

## 2023-12-08 DIAGNOSIS — F41.1 GAD (GENERALIZED ANXIETY DISORDER): ICD-10-CM

## 2023-12-08 DIAGNOSIS — Z87.891 PERSONAL HISTORY OF TOBACCO USE: ICD-10-CM

## 2023-12-08 PROBLEM — F33.9 MAJOR DEPRESSION, RECURRENT (H): Status: ACTIVE | Noted: 2023-12-08

## 2023-12-08 PROCEDURE — 99396 PREV VISIT EST AGE 40-64: CPT | Mod: 25 | Performed by: FAMILY MEDICINE

## 2023-12-08 PROCEDURE — 99214 OFFICE O/P EST MOD 30 MIN: CPT | Mod: 25 | Performed by: FAMILY MEDICINE

## 2023-12-08 PROCEDURE — 90678 RSV VACC PREF BIVALENT IM: CPT | Performed by: FAMILY MEDICINE

## 2023-12-08 PROCEDURE — 96372 THER/PROPH/DIAG INJ SC/IM: CPT | Performed by: FAMILY MEDICINE

## 2023-12-08 PROCEDURE — G0296 VISIT TO DETERM LDCT ELIG: HCPCS | Performed by: FAMILY MEDICINE

## 2023-12-08 PROCEDURE — 91320 SARSCV2 VAC 30MCG TRS-SUC IM: CPT | Performed by: FAMILY MEDICINE

## 2023-12-08 PROCEDURE — 99207 PR FOOT EXAM NO CHARGE: CPT | Performed by: FAMILY MEDICINE

## 2023-12-08 PROCEDURE — 90480 ADMN SARSCOV2 VAC 1/ONLY CMP: CPT | Performed by: FAMILY MEDICINE

## 2023-12-08 RX ORDER — LOSARTAN POTASSIUM 25 MG/1
25 TABLET ORAL DAILY
Qty: 90 TABLET | Refills: 3 | Status: SHIPPED | OUTPATIENT
Start: 2023-12-08

## 2023-12-08 RX ORDER — TRIAMCINOLONE ACETONIDE 1 MG/G
OINTMENT TOPICAL 2 TIMES DAILY
Qty: 80 G | Refills: 0 | Status: SHIPPED | OUTPATIENT
Start: 2023-12-08 | End: 2024-02-16

## 2023-12-08 RX ORDER — DULOXETIN HYDROCHLORIDE 60 MG/1
120 CAPSULE, DELAYED RELEASE ORAL DAILY
Qty: 180 CAPSULE | Refills: 1 | Status: SHIPPED | OUTPATIENT
Start: 2023-12-08 | End: 2024-07-02

## 2023-12-08 RX ORDER — METOPROLOL SUCCINATE 25 MG/1
25 TABLET, EXTENDED RELEASE ORAL DAILY
Qty: 90 TABLET | Refills: 3 | Status: SHIPPED | OUTPATIENT
Start: 2023-12-08

## 2023-12-08 RX ORDER — ATORVASTATIN CALCIUM 40 MG/1
40 TABLET, FILM COATED ORAL DAILY
Qty: 90 TABLET | Refills: 3 | Status: SHIPPED | OUTPATIENT
Start: 2023-12-08

## 2023-12-08 RX ORDER — METFORMIN HCL 500 MG
1000 TABLET, EXTENDED RELEASE 24 HR ORAL 2 TIMES DAILY WITH MEALS
Qty: 360 TABLET | Refills: 3 | Status: SHIPPED | OUTPATIENT
Start: 2023-12-08

## 2023-12-08 ASSESSMENT — ENCOUNTER SYMPTOMS
ABDOMINAL PAIN: 0
NAUSEA: 0
JOINT SWELLING: 0
SHORTNESS OF BREATH: 0
DYSURIA: 0
DIARRHEA: 0
EYE PAIN: 0
SORE THROAT: 0
PARESTHESIAS: 0
NERVOUS/ANXIOUS: 0
HEMATURIA: 0
CHILLS: 0
HEMATOCHEZIA: 0
COUGH: 0
FEVER: 0
FREQUENCY: 0
PALPITATIONS: 0
MYALGIAS: 0
HEARTBURN: 0
DIZZINESS: 0
CONSTIPATION: 0
BREAST MASS: 0
HEADACHES: 0
WEAKNESS: 0
ARTHRALGIAS: 1

## 2023-12-08 ASSESSMENT — ANXIETY QUESTIONNAIRES
6. BECOMING EASILY ANNOYED OR IRRITABLE: NOT AT ALL
GAD7 TOTAL SCORE: 0
1. FEELING NERVOUS, ANXIOUS, OR ON EDGE: NOT AT ALL
3. WORRYING TOO MUCH ABOUT DIFFERENT THINGS: NOT AT ALL
GAD7 TOTAL SCORE: 0
2. NOT BEING ABLE TO STOP OR CONTROL WORRYING: NOT AT ALL
5. BEING SO RESTLESS THAT IT IS HARD TO SIT STILL: NOT AT ALL
IF YOU CHECKED OFF ANY PROBLEMS ON THIS QUESTIONNAIRE, HOW DIFFICULT HAVE THESE PROBLEMS MADE IT FOR YOU TO DO YOUR WORK, TAKE CARE OF THINGS AT HOME, OR GET ALONG WITH OTHER PEOPLE: NOT DIFFICULT AT ALL
7. FEELING AFRAID AS IF SOMETHING AWFUL MIGHT HAPPEN: NOT AT ALL

## 2023-12-08 ASSESSMENT — PAIN SCALES - GENERAL: PAINLEVEL: NO PAIN (0)

## 2023-12-08 ASSESSMENT — PATIENT HEALTH QUESTIONNAIRE - PHQ9: 5. POOR APPETITE OR OVEREATING: NOT AT ALL

## 2023-12-08 NOTE — PATIENT INSTRUCTIONS
Preventive Health Recommendations  Female Ages 50 - 64    Yearly exam: See your health care provider every year in order to  Review health changes.   Discuss preventive care.    Review your medicines if your doctor has prescribed any.    Get a Pap test every three years (unless you have an abnormal result and your provider advises testing more often).  If you get Pap tests with HPV test, you only need to test every 5 years, unless you have an abnormal result.   You do not need a Pap test if your uterus was removed (hysterectomy) and you have not had cancer.  You should be tested each year for STDs (sexually transmitted diseases) if you're at risk.   Have a mammogram every 1 to 2 years.  Have a colonoscopy at age 45, or have a yearly FIT test (stool test). These exams screen for colon cancer.    Have a cholesterol test every 5 years, or more often if advised.  Have a diabetes test (fasting glucose) every three years. If you are at risk for diabetes, you should have this test more often.   If you are at risk for osteoporosis (brittle bone disease), think about having a bone density scan (DEXA).    Shots: Get a flu shot each year. Get a tetanus shot every 10 years.    Nutrition:   Eat at least 5 servings of fruits and vegetables each day.  Eat whole-grain bread, whole-wheat pasta and brown rice instead of white grains and rice.  Get adequate Calcium and Vitamin D.     Lifestyle  Exercise at least 150 minutes a week (30 minutes a day, 5 days a week). This will help you control your weight and prevent disease.  Limit alcohol to one drink per day.  No smoking.   Wear sunscreen to prevent skin cancer.   See your dentist every six months for an exam and cleaning.  See your eye doctor every 1 to 2 years.    Lung Cancer Screening   Frequently Asked Questions  If you are at high-risk for lung cancer, getting screened with low-dose computed tomography (LDCT) every year can help save your life. This handout offers answers to  some of the most common questions about lung cancer screening. If you have other questions, please call [phone] ([phone]).     What is it?  Lung cancer screening uses special X-ray technology to create an image of your lung tissue. The exam is quick and easy and takes less than 10 seconds. We don t give you any medicine or use any needles. You can eat before and after the exam. You don t need to change your clothes as long as the clothing on your chest doesn t contain metal. But, you do need to be able to hold your breath for at least 6 seconds during the exam.    What is the goal of lung cancer screening?  The goal of lung cancer screening is to save lives. Many times, lung cancer is not found until a person starts having physical symptoms. Lung cancer screening can help detect lung cancer in the earliest stages when it may be easier to treat.    Who should be screened for lung cancer?  We suggest lung cancer screening for anyone who is at high-risk for lung cancer. You are in the high-risk group if you:      are between the ages of 55 and 79, and    have smoked at least 1 pack of cigarettes a day for 20 or more years, and    still smoke or have quit within the past 15 years.    However, if you have a new cough or shortness of breath, you should talk to your doctor before being screened.    Why does it matter if I have symptoms?  Certain symptoms can be a sign that you have a condition in your lungs that should be checked and treated by your doctor. These symptoms include fever, chest pain, a new or changing cough, shortness of breath that you have never felt before, coughing up blood or unexplained weight loss. Having any of these symptoms can greatly affect the results of lung cancer screening.       Should all smokers get an LDCT lung cancer screening exam?  It depends. Lung cancer screening is for a very specific group of men and women who have a history of heavy smoking over a long period of  time (see  Who should be screened for lung cancer  above).  I am in the high-risk group, but have been diagnosed with cancer in the past. Is LDCT lung cancer screening right for me?  In some cases, you should not have LDCT lung screening, such as when your doctor is already following your cancer with CT scan studies. Your doctor will help you decide if LDCT lung screening is right for you.  Do I need to have a screening exam every year?  Yes. If you are in the high-risk group described earlier, you should get an LDCT lung cancer screening exam every year until you are 79, or are no longer willing or able to undergo screening and possible procedures to diagnose and treat lung cancer.  How effective is LDCT at preventing death from lung cancer?  Studies have shown that LDCT lung cancer screening can lower the risk of death from lung cancer by 20 percent in people who are at high-risk.  What are the risks?  There are some risks and limitations of LDCT lung cancer screening. We want to make sure you understand the risks and benefits, so please let us know if you have any questions. Your doctor may want to talk with you more about these risks.    Radiation exposure: As with any exam that uses radiation, there is a very small increased risk of cancer. The amount of radiation in LDCT is small--about the same amount a person would get from a mammogram. Your doctor orders the exam when he or she feels the potential benefits outweigh the risks.    False negatives: No test is perfect, including LDCT. It is possible that you may have a medical condition, including lung cancer, that is not found during your exam. This is called a false negative result.    False positives and more testing: LDCT very often finds something in the lung that could be cancer, but in fact is not. This is called a false positive result. False positive tests often cause anxiety. To make sure these findings are not cancer, you may need to have more tests.  These tests will be done only if you give us permission. Sometimes patients need a treatment that can have side effects, such as a biopsy. For more information on false positives, see  What can I expect from the results?     Findings not related to lung cancer: Your LDCT exam also takes pictures of areas of your body next to your lungs. In a very small number of cases, the CT scan will show an abnormal finding in one of these areas, such as your kidneys, adrenal glands, liver or thyroid. This finding may not be serious, but you may need more tests. Your doctor can help you decide what other tests you may need, if any.  What can I expect from the results?  About 1 out of 4 LDCT exams will find something that may need more tests. Most of the time, these findings are lung nodules. Lung nodules are very small collections of tissue in the lung. These nodules are very common, and the vast majority--more than 97 percent--are not cancer (benign). Most are normal lymph nodes or small areas of scarring from past infections.  But, if a small lung nodule is found to be cancer, the cancer can be cured more than 90 percent of the time. To know if the nodule is cancer, we may need to get more images before your next yearly screening exam. If the nodule has suspicious features (for example, it is large, has an odd shape or grows over time), we will refer you to a specialist for further testing.  Will my doctor also get the results?  Yes. Your doctor will get a copy of your results.  Is it okay to keep smoking now that there s a cancer screening exam?  No. Tobacco is one of the strongest cancer-causing agents. It causes not only lung cancer, but other cancers and cardiovascular (heart) diseases as well. The damage caused by smoking builds over time. This means that the longer you smoke, the higher your risk of disease. While it is never too late to quit, the sooner you quit, the better.  Where can I find help to quit smoking?  The  best way to prevent lung cancer is to stop smoking. If you have already quit smoking, congratulations and keep it up! For help on quitting smoking, please call [employer] at 6-015-QUITNOW (1-325.886.1045) or the American [employer] at 1-782.808.2452 to find local resources near you.  One-on-one health coaching:  If you d prefer to work individually with a health care provider on tobacco cessation, we offer:      Medication Therapy Management:  Our specially trained pharmacists work closely with you and your doctor to help you quit smoking.  Call [phone] or [phone] (toll free).

## 2023-12-08 NOTE — PROGRESS NOTES
SUBJECTIVE:   Gemma is a 63 year old, presenting for the following:  Physical        12/8/2023     3:42 PM   Additional Questions   Roomed by Dai GARCIA MA   Accompanied by No one         12/8/2023     3:42 PM   Patient Reported Additional Medications   Patient reports taking the following new medications None       Healthy Habits:     Getting at least 3 servings of Calcium per day:  Yes    Bi-annual eye exam:  Yes    Dental care twice a year:  NO    Sleep apnea or symptoms of sleep apnea:  None    Diet:  Diabetic    Frequency of exercise:  None    Taking medications regularly:  Yes    Medication side effects:  None    Additional concerns today:  Yes (med check)    Diabetes Follow-up  Currently on metformin 1000 mg twice daily and Victoza 1.8 subcu daily.  Tolerating well, no side effects reported.  Seeing a diabetic educator as well.  Reports that this is really helped her in managing her diabetes.  Recent A1c was within goal  Component      Latest Ref Rng 11/27/2023  6:58 AM   Hemoglobin A1C      0.0 - 5.6 % 6.6 (H)         How often are you checking your blood sugar? A few times a month  What time of day are you checking your blood sugars (select all that apply)?  Before meals  Have you had any blood sugars above 200?  No  Have you had any blood sugars below 70?  No  What symptoms do you notice when your blood sugar is low?  None  What concerns do you have today about your diabetes? None   Do you have any of these symptoms? (Select all that apply)  No numbness or tingling in feet.  No redness, sores or blisters on feet.  No complaints of excessive thirst.  No reports of blurry vision.  No significant changes to weight.        Hyperlipidemia Follow-Up    Are you regularly taking any medication or supplement to lower your cholesterol?   Yes- Atorvastatin 40 mg/day  Are you having muscle aches or other side effects that you think could be caused by your cholesterol lowering medication?  No  Last lipid panel-  Recent  Labs   Lab Test 23  0923 10/23/21  1042   CHOL 130 156   HDL 49* 50   LDL 60 86   TRIG 107 100         Hypertension Follow-up    Do you check your blood pressure regularly outside of the clinic? Yes   Are you following a low salt diet? Yes  Are your blood pressures ever more than 140 on the top number (systolic) OR more   than 90 on the bottom number (diastolic), for example 140/90? No    BP Readings from Last 2 Encounters:   23 112/68   23 126/86     Hemoglobin A1C (%)   Date Value   2023 6.6 (H)   2023 7.8 (H)   2021 8.3 (H)   2021 12.7 (H)     LDL Cholesterol Calculated (mg/dL)   Date Value   2023 60   10/23/2021 86   10/22/2020 158 (H)   2018 130 (H)     Wt Readings from Last 4 Encounters:   23 106.4 kg (234 lb 9.6 oz)   23 114.6 kg (252 lb 9.6 oz)   22 111.6 kg (246 lb)   22 113 kg (249 lb 3.2 oz)       Reports a history of psoriasis.   Requesting a refill on her triamcinolone.      Depression and Anxiety Follow-Up  Currently on Cymbalta 120 mg daily.  Tolerating well, no side effects.  Patient reports that her mental health has greatly improved since starting her job 2 weeks.  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  How are you doing with your depression since your last visit? Improved   How are you doing with your anxiety since your last visit?  Improved   Are you having other symptoms that might be associated with depression or anxiety? No  Have you had a significant life event? OTHER: Got a job- this has greatly improved her mental health and outlook to life.    Do you have any concerns with your use of alcohol or other drugs? No    Social History     Tobacco Use    Smoking status: Former     Packs/day: 0.80     Years: 30.00     Additional pack years: 0.00     Total pack years: 24.00     Types: Cigarettes     Start date: 10/15/1975     Quit date: 2018     Years since quittin.9      Passive exposure: Past    Smokeless tobacco: Never    Tobacco comments:     1 PPD x 30. Quit 2017   Vaping Use    Vaping Use: Never used   Substance Use Topics    Alcohol use: Yes     Comment: very seldom drink    Drug use: No         9/13/2022    12:39 PM 3/8/2023     8:32 AM 12/7/2023     5:21 PM   PHQ   PHQ-9 Total Score 13 17 4   Q9: Thoughts of better off dead/self-harm past 2 weeks Not at all Several days Not at all   F/U: Thoughts of suicide or self-harm  Yes    F/U: Self harm-plan  Yes    F/U: Self-harm action  No    F/U: Safety concerns  No          7/25/2022    10:42 AM 3/8/2023     8:33 AM 12/8/2023     3:59 PM   GABRIELA-7 SCORE   Total Score  14 (moderate anxiety)    Total Score 9 14 0         12/7/2023     5:21 PM   Last PHQ-9   1.  Little interest or pleasure in doing things 0   2.  Feeling down, depressed, or hopeless 0   3.  Trouble falling or staying asleep, or sleeping too much 3   4.  Feeling tired or having little energy 1   5.  Poor appetite or overeating 0   6.  Feeling bad about yourself 0   7.  Trouble concentrating 0   8.  Moving slowly or restless 0   Q9: Thoughts of better off dead/self-harm past 2 weeks 0   PHQ-9 Total Score 4         12/8/2023     3:59 PM   GABRIELA-7    1. Feeling nervous, anxious, or on edge 0   2. Not being able to stop or control worrying 0   3. Worrying too much about different things 0   4. Trouble relaxing 0   5. Being so restless that it is hard to sit still 0   6. Becoming easily annoyed or irritable 0   7. Feeling afraid, as if something awful might happen 0   GABRIELA-7 Total Score 0   If you checked any problems, how difficult have they made it for you to do your work, take care of things at home, or get along with other people? Not difficult at all     In the past two weeks have you had thoughts of suicide or self-harm?  No.    Do you have concerns about your personal safety or the safety of others?   No      HEALTH CARE MAINTENANCE: Colonoscopy, mammogram up-to-date.   Due for vaccines.    Today's PHQ-9 Score:       2023     5:21 PM   PHQ-9 SCORE   PHQ-9 Total Score MyChart 4 (Minimal depression)   PHQ-9 Total Score 4     Social History     Tobacco Use    Smoking status: Former     Packs/day: 0.80     Years: 30.00     Additional pack years: 0.00     Total pack years: 24.00     Types: Cigarettes     Start date: 10/15/1975     Quit date: 2018     Years since quittin.9     Passive exposure: Past    Smokeless tobacco: Never    Tobacco comments:     1 PPD x 30. Quit 2017   Substance Use Topics    Alcohol use: Yes     Comment: very seldom drink             2023     8:50 PM   Alcohol Use   Prescreen: >3 drinks/day or >7 drinks/week? No     Reviewed orders with patient.  Reviewed health maintenance and updated orders accordingly - Yes  Labs reviewed in EPIC  BP Readings from Last 3 Encounters:   23 112/68   23 126/86   22 136/74    Wt Readings from Last 3 Encounters:   23 106.4 kg (234 lb 9.6 oz)   23 114.6 kg (252 lb 9.6 oz)   22 111.6 kg (246 lb)                  Patient Active Problem List   Diagnosis    Depression, major    Migraines    CARDIOVASCULAR SCREENING; LDL GOAL LESS THAN 160    Disc herniation    DDD (degenerative disc disease), lumbar    DDD (degenerative disc disease), cervical    Neck pain    Tobacco abuse    ELYSSA (obstructive sleep apnea)- severe (AHI 49)    Spondylolisthesis, grade 1    Chronic low back pain    Brain lipoma    History of colonic polyps    Gastroesophageal reflux disease without esophagitis    Hypertension goal BP (blood pressure) < 140/90    Traylor's esophagus determined by endoscopy    Bilateral edema of lower extremity    GABRIELA (generalized anxiety disorder)    Restless legs syndrome (RLS)    Diabetes mellitus, type 2 (H)    Morbid obesity (H)    Major depression, recurrent (H)     Past Surgical History:   Procedure Laterality Date    ARTHROSCOPY SHOULDER      Right/spurs    CARPAL TUNNEL  RELEASE RT/LT      Left    COLONOSCOPY  2016    CRANIOTOMY, EXCISE TUMOR COMPLEX, COMBINED  2011    Procedure:COMBINED CRANIOTOMY, EXCISE TUMOR COMPLEX; 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; Surgeon:LEE ANN PAULA; Location:[facility] OR    ROTATOR CUFF REPAIR RT/LT       Left    [facility] FOOT/TOES SURGERY PROC UNLISTED      Toe fracture, left       Social History     Tobacco Use    Smoking status: Former     Packs/day: 0.80     Years: 30.00     Additional pack years: 0.00     Total pack years: 24.00     Types: Cigarettes     Start date: 10/15/1975     Quit date: 2018     Years since quittin.9     Passive exposure: Past    Smokeless tobacco: Never    Tobacco comments:     1 PPD x 30. Quit 2017   Substance Use Topics    Alcohol use: Yes     Comment: very seldom drink     Family History   Problem Relation Age of Onset    Macular Degeneration Mother         Dry    Hypertension Mother     C.A.D. Father     Cancer Maternal Grandmother     Cerebrovascular Disease Paternal Grandmother     Breast Cancer Paternal Grandmother     Cancer Brother     Cancer - colorectal Brother     Colon Cancer Brother     Diabetes Brother     Neurologic Disorder Sister         migraine    Glaucoma No family hx of          Current Outpatient Medications   Medication Sig Dispense Refill    alcohol swab prep pads Use to swab area of injection/haley as directed. 100 each 3    atorvastatin (LIPITOR) 40 MG tablet Take 1 tablet (40 mg) by mouth daily 90 tablet 3    blood glucose (ACCU-CHEK GUIDE) test strip USE TO TEST TWICE DAILY 200 strip 0    blood glucose (NO BRAND SPECIFIED) lancets standard Use to test blood sugar 2 times daily or as directed. 100 lancet 3    blood glucose monitoring (NO BRAND SPECIFIED) meter device kit Use to test blood sugar 2 times daily or as directed. 1 kit 0    clobetasol (TEMOVATE) 0.05 % external cream Apply sparingly to affected area twice daily as needed.  Do not apply to  face. 120 g 3    cyclobenzaprine (FLEXERIL) 5 MG tablet Take 1 tablet (5 mg) by mouth 3 times daily as needed for muscle spasms 42 tablet 0    DULoxetine (CYMBALTA) 60 MG capsule Take 2 capsules (120 mg) by mouth daily 180 capsule 1    furosemide (LASIX) 20 MG tablet TAKE 1 TABLET BY MOUTH TWICE DAILY 180 tablet 0    insulin pen needle (BD BRANDY U/F) 32G X 4 MM miscellaneous Use 1 daily as directed. 100 each 4    liraglutide (VICTOZA) 18 MG/3ML solution Inject 1.8 mg Subcutaneous daily 9 mL 2    losartan (COZAAR) 25 MG tablet Take 1 tablet (25 mg) by mouth daily 90 tablet 3    metFORMIN (GLUCOPHAGE XR) 500 MG 24 hr tablet Take 2 tablets (1,000 mg) by mouth 2 times daily (with meals) 360 tablet 3    metoprolol succinate ER (TOPROL XL) 25 MG 24 hr tablet Take 1 tablet (25 mg) by mouth daily 90 tablet 3    omeprazole (PRILOSEC) 20 MG DR capsule Take 1 capsule (20 mg) by mouth daily as needed (GERD) 90 capsule 0    triamcinolone (KENALOG) 0.1 % external ointment Apply topically 2 times daily To affected area x 2 weeks as needed 80 g 0     Allergies   Allergen Reactions    Buspar [Buspirone Hcl] Rash    Lisinopril Cough    Seasonal Allergies     Seroquel [Quetiapine] Itching     Recent Labs   Lab Test 11/27/23  0658 08/31/23  0709 05/26/23  0833 03/08/23  0939 02/24/23  0923 01/11/22  1544 10/23/21  1042 04/09/21  1023 10/22/20  1147 03/19/20  0951   A1C 6.6* 7.8* 7.9*  --  7.7*   < >  --    < >  --   --    LDL  --   --   --   --  60  --  86  --  158*  --    HDL  --   --   --   --  49*  --  50  --  47*  --    TRIG  --   --   --   --  107  --  100  --  112  --    ALT  --   --   --  33  --   --  35  --  200* 98*   CR  --   --   --  0.82  --   --  0.92  --  0.69 0.70   GFRESTIMATED  --   --   --  80  --   --  67  --  >90 >90   GFRESTBLACK  --   --   --   --   --   --   --   --  >90 >90   POTASSIUM  --   --   --  4.6  --   --  4.5  --  4.0 4.4   TSH  --   --  4.15* 4.60*  --   --   --    < > 5.38* 6.79*    < > = values in  this interval not displayed.        Breast Cancer Screening:    FHS-7:       1/11/2022     3:39 PM 3/13/2023     9:27 AM 3/13/2023     9:29 AM 12/1/2023     8:52 PM   Breast CA Risk Assessment (FHS-7)   Did any of your first-degree relatives have breast or ovarian cancer? Yes No  Yes   Did any of your relatives have bilateral breast cancer? No No  Unknown   Did any man in your family have breast cancer? No No  No   Did any woman in your family have breast and ovarian cancer? Yes No  No   Did any woman in your family have breast cancer before age 50 y? No No  No   Do you have 2 or more relatives with breast and/or ovarian cancer? Yes Yes  No   Do you have 2 or more relatives with breast and/or bowel cancer? Yes  No No     click delete button to remove this line now  Mammogram Screening: Recommended mammography every 1-2 years with patient discussion and risk factor consideration  Pertinent mammograms are reviewed under the imaging tab.    History of abnormal Pap smear: NO - age 30-65 PAP every 5 years with negative HPV co-testing recommended      Latest Ref Rng & Units 1/11/2022     4:09 PM 9/16/2016     3:41 PM 9/16/2016    12:00 AM   PAP / HPV   PAP  Negative for Intraepithelial Lesion or Malignancy (NILM)      PAP (Historical)    NIL    HPV 16 DNA Negative Negative  Negative     HPV 18 DNA Negative Negative  Negative     Other HR HPV Negative Negative  Negative       Reviewed and updated as needed this visit by clinical staff   Tobacco  Allergies  Meds              Reviewed and updated as needed this visit by Provider                   Review of Systems   Constitutional:  Negative for chills and fever.   HENT:  Negative for congestion, ear pain, hearing loss and sore throat.    Eyes:  Negative for pain and visual disturbance.   Respiratory:  Negative for cough and shortness of breath.    Cardiovascular:  Negative for chest pain, palpitations and peripheral edema.   Gastrointestinal:  Negative for abdominal  "pain, constipation, diarrhea, heartburn, hematochezia and nausea.   Breasts:  Negative for tenderness, breast mass and discharge.   Genitourinary:  Positive for genital sores. Negative for dysuria, frequency, hematuria, pelvic pain, urgency, vaginal bleeding and vaginal discharge.   Musculoskeletal:  Positive for arthralgias. Negative for joint swelling and myalgias.   Skin:  Positive for rash.   Neurological:  Negative for dizziness, weakness, headaches and paresthesias.   Psychiatric/Behavioral:  Negative for mood changes. The patient is not nervous/anxious.           OBJECTIVE:   /68   Pulse 100   Temp 98.1  F (36.7  C) (Temporal)   Resp 20   Ht 1.562 m (5' 1.5\")   Wt 106.4 kg (234 lb 9.6 oz)   SpO2 96%   BMI 43.61 kg/m    Physical Exam  GENERAL APPEARANCE: healthy, alert and no distress  EYES: Eyes grossly normal to inspection, PERRL and conjunctivae and sclerae normal  HENT: ear canals and TM's normal, nose and mouth without ulcers or lesions, oropharynx clear and oral mucous membranes moist  NECK: no adenopathy, no asymmetry, masses, or scars and thyroid normal to palpation  RESP: lungs clear to auscultation - no rales, rhonchi or wheezes  CV: regular rate and rhythm, normal S1 S2, no S3 or S4, no murmur, click or rub, no peripheral edema and peripheral pulses strong  ABDOMEN: soft, nontender, no hepatosplenomegaly, no masses and bowel sounds normal  MS: no musculoskeletal defects are noted and gait is age appropriate without ataxia  SKIN: no suspicious lesions or rashes.  Dermatitis like rash with excoriations on bilateral upper extremities.  No secondary bacterial infection noted.  NEURO: Normal strength and tone, sensory exam grossly normal, mentation intact and speech normal  PSYCH: mentation appears normal and affect normal/bright.  Diabetic foot exam: normal DP and PT pulses, no trophic changes or ulcerative lesions and sensation intact by monofilament bilaterally.    Diagnostic Test " Results:  Recent Labs reviewed in Epic  Component      Latest Ref Rng 8/31/2023  7:09 AM 8/31/2023  7:33 AM 11/27/2023  6:58 AM   Creatinine Urine      mg/dL  65.2     Albumin Urine mg/L      mg/L  <12.0     Albumin Urine mg/g Cr  --     Hemoglobin A1C      0.0 - 5.6 % 7.8 (H)   6.6 (H)         ASSESSMENT/PLAN:   Gemma was seen today for physical.    Diagnoses and all orders for this visit:    Routine general medical examination at a health care facility  -     PRIMARY CARE FOLLOW-UP SCHEDULING; Future    Type 2 diabetes mellitus with hyperglycemia, without long-term current use of insulin (H), much improved with a recent A1c of 6.6.  A1c within goal  -  Continue current management.   -   Refill: metFORMIN (GLUCOPHAGE XR) 500 MG 24 hr tablet; Take 2 tablets (1,000 mg) by mouth 2 times daily (with meals)  -   FOOT EXAM    Hypertension goal BP (blood pressure) < 140/90, controlled  -     Refill: Losartan (COZAAR) 25 MG tablet; Take 1 tablet (25 mg) by mouth daily  -     Refill: Metoprolol succinate ER (TOPROL XL) 25 MG 24 hr tablet; Take 1 tablet (25 mg) by mouth daily    Hyperlipidemia LDL goal <100  -     Refill: Atorvastatin (LIPITOR) 40 MG tablet; Take 1 tablet (40 mg) by mouth daily    GABRIELA (generalized anxiety disorder), stable  -     PHQ-9/GABRIELA 7 completed, see below/Epic for details    -     Refill: DULoxetine (CYMBALTA) 60 MG capsule; Take 2 capsules (120 mg) by mouth daily    Moderate episode of recurrent major depressive disorder (H), stable  -    PHQ-9/GABRIELA 7 completed, see below/Epic for details    -   Refill: DULoxetine (CYMBALTA) 60 MG capsule; Take 2 capsules (120 mg) by mouth daily    Gastroesophageal reflux disease, unspecified whether esophagitis present, controlled  -     Refill: Omeprazole (PRILOSEC) 20 MG DR capsule; Take 1 capsule (20 mg) by mouth daily as needed (GERD)    Psoriasis  -     Refill: Triamcinolone (KENALOG) 0.1 % external ointment; Apply topically 2 times daily To affected area x 2  "weeks as needed  -     Adult Dermatology  Referral; Future    Personal history of tobacco use  -     Prof fee: Shared Decision Making for Lung Cancer Screening  -     CT Chest Lung Cancer Scrn Low Dose wo; Future    Need for vaccination  -     RSV VACCINE (ABRYSVO)  -     COVID-19 12+ (2023-24) (PFIZER)        Patient has been advised of split billing requirements and indicates understanding: Yes      COUNSELING:  Reviewed preventive health counseling, as reflected in patient instructions       Regular exercise       Healthy diet/nutrition      BMI:   Estimated body mass index is 43.61 kg/m  as calculated from the following:    Height as of this encounter: 1.562 m (5' 1.5\").    Weight as of this encounter: 106.4 kg (234 lb 9.6 oz).   Weight management plan: Discussed healthy diet and exercise guidelines      She reports that she quit smoking about 4 years ago. Her smoking use included cigarettes. She started smoking about 48 years ago. She has a 24.00 pack-year smoking history. She has been exposed to tobacco smoke. She has never used smokeless tobacco.    Return in about 3 months (around 3/8/2024) for Diabetes Follow Up with a HgbA1C prior to visit.    Marybeth Silver MD  [facility]  Lung Cancer Screening Shared Decision Making Visit     Jessica Jay, a 63 year old female, is eligible for lung cancer screening    History   Smoking Status    Former    Packs/day: 0.80    Years: 30.00    Types: Cigarettes    Start date: 10/15/1975    Quit date: 12/31/2018   Smokeless Tobacco    Never       I have discussed with patient the risks and benefits of screening for lung cancer with low-dose CT.     The risks include:    radiation exposure: one low dose chest CT has as much ionizing radiation as about 15 chest x-rays, or 6 months of background radiation living in [place]      false positives: most findings/nodules are NOT cancer, but some might still require additional diagnostic " evaluation, including biopsy    over-diagnosis: some slow growing cancers that might never have been clinically significant will be detected and treated unnecessarily     The benefit of early detection of lung cancer is contingent upon adherence to annual screening or more frequent follow up if indicated.     Furthermore, to benefit from screening, Jessica must be willing and able to undergo diagnostic procedures, if indicated. Although no specific guide is available for determining severity of comorbidities, it is reasonable to withhold screening in patients who have greater mortality risk from other diseases.     We did discuss that the best way to prevent lung cancer is to not smoke.    Some patients may value a numeric estimation of lung cancer risk when evaluating if lung cancer screening is right for them, here is one calculator:    ShouldIScreen

## 2023-12-26 ENCOUNTER — PATIENT OUTREACH (OUTPATIENT)
Dept: CARE COORDINATION | Facility: CLINIC | Age: 63
End: 2023-12-26
Payer: COMMERCIAL

## 2023-12-26 NOTE — PROGRESS NOTES
Clinic Care Coordination Contact  Follow Up Progress Note      Assessment: patient reporting that she is off work this week and is hoping that when she returns that she gets training on new areas.  She feels she is doing well and less mistakes each day. She is wanting to improve her credit rating and discussed [facility]  and provided her the number.      She has not been as active at home yet is more active at work when there.  She talked about her back pain and struggles when she over do's it.  With Bahai this weekend she is in a lot of pain.  She is questioning options.  Will route to PCP regarding pain and palliative referral to see if that is an option to support her in reducing her back pain.     Care Gaps:    Health Maintenance Due   Topic Date Due    URINE DRUG SCREEN  Never done    LUNG CANCER SCREENING  10/13/2022    INFLUENZA VACCINE (1) 09/01/2023       Postponed to future office visit.      Care Plans  Care Plan: Financial Wellbeing       Problem: Patient expresses financial resource strain       Long-Range Goal: Create an action plan to increase financial stability       Start Date: 6/27/2022 Expected End Date: 3/29/2024    This Visit's Progress: 30% Recent Progress: 30%    Note:     Barriers: course needed is not available (quick books)  Strengths: going to class's    Patient expressed understanding of goal: yes  Action steps to achieve this goal:  1. I will continue with classes  2. I will look for the course I need and schedule  3. I will complete my resume  4. I will work with the workforce center to get the needed programs  5. I will reach out to [facility]  for support in rebuilding my credit                            Care Plan: Physical Activity       Problem: Patient is inactive       Long-Range Goal: Exercise at Least 20 Minutes per Day       Start Date: 4/27/2020 Expected End Date: 3/29/2024    This Visit's Progress: 20% Recent Progress: 10%    Note:     Barriers:  Depression, back pain, social distancing to some extent  Strengths: Understand that increasing my exercise will help with managing my weight and back pain    Patient expressed understanding of goal: Yes  Action steps to achieve this goal:  1. I will walk in one direction for 5 minutes and then return for a total of 10 minutes  2. I will not get discouraged if I cannot make it the 5 minutes in one direction before having to turn around  3. I will celebrate my successes                              Intervention/Education provided during outreach: encouraged continued work on her financial situation and reach out to [facility] for support.  Encouraged continued exercises.       Outreach Frequency: monthly, more frequently as needed      Plan:   Pt to continue with exercises and improving financial situation.   Care Coordinator will follow up in one month.     JAQUELIN Mensah   [facility] - Care Coordination  [facility]   [phone]

## 2023-12-26 NOTE — Clinical Note
Patient is noticing continued/increased pain in her back with activity.  She is wondering if pain and palliative would be a benefit to help manage her pain. If so please place a referral and let her know.   JAQUELIN Mensah  [facility] - Care Coordination [facility]  [phone]

## 2023-12-29 ENCOUNTER — ANCILLARY PROCEDURE (OUTPATIENT)
Dept: CT IMAGING | Facility: CLINIC | Age: 63
End: 2023-12-29
Attending: FAMILY MEDICINE
Payer: COMMERCIAL

## 2023-12-29 DIAGNOSIS — Z87.891 PERSONAL HISTORY OF TOBACCO USE: ICD-10-CM

## 2023-12-29 PROCEDURE — 71271 CT THORAX LUNG CANCER SCR C-: CPT | Mod: TC | Performed by: INTERNAL MEDICINE

## 2024-01-15 ENCOUNTER — PATIENT OUTREACH (OUTPATIENT)
Dept: GASTROENTEROLOGY | Facility: CLINIC | Age: 64
End: 2024-01-15
Payer: COMMERCIAL

## 2024-01-30 ENCOUNTER — PATIENT OUTREACH (OUTPATIENT)
Dept: CARE COORDINATION | Facility: CLINIC | Age: 64
End: 2024-01-30
Payer: COMMERCIAL

## 2024-01-30 ENCOUNTER — TELEPHONE (OUTPATIENT)
Dept: FAMILY MEDICINE | Facility: CLINIC | Age: 64
End: 2024-01-30
Payer: COMMERCIAL

## 2024-01-30 ENCOUNTER — NURSE TRIAGE (OUTPATIENT)
Dept: NURSING | Facility: CLINIC | Age: 64
End: 2024-01-30
Payer: COMMERCIAL

## 2024-01-30 NOTE — PROGRESS NOTES
Clinic Care Coordination Contact    Upon review of chart to make follow up call it was noted that pt was recommended to go to the [facility] and is planning to go.  Will postpone call by about one week.     JAQUELIN Mensah  [facility] - Care Coordinator   1/30/2024   2:36 PM  [phone]

## 2024-01-30 NOTE — TELEPHONE ENCOUNTER
See phone call that has already been routed to provider.    I see this 2nd level triage has been routed to Dr. Silver to document advice as well.    Shena TORRES RN  [facility] Triage

## 2024-01-30 NOTE — TELEPHONE ENCOUNTER
"Nurse Triage SBAR    Is this a 2nd Level Triage? YES, LICENSED PRACTITIONER REVIEW IS REQUIRED    Situation: Dizziness/lightheadedness    Background: Patient calling with concerns with lightheadedness. Patient has diabetes and high blood pressure. Patient has been \"super cold\" the last two days. Patient has had some episodes where she thought she was going to faint but did not. Some nausea yesterday but fine today and no vomiting.   Patient has not been wearing her glasses all the time and is wondering if that could be the cause of these symptoms. Informed that it may cause a headache or fatigue but not this severe of symptoms. Blood pressure 127/84 with pulse of 85.   Glucose 86. Typical 100-110.     Patient can move around independently if she moves slow but there can be episodes of increased lightheadedness where she will need to sit down or grab a hold of something.     Patient does not have a ride anywhere at this time.     Assessment: disposition needed    Protocol Recommended Disposition:   Go To Office Now    Recommendation: disposition needed     Routed to provider    Does the patient meet one of the following criteria for ADS visit consideration? 16+ years old, with an [facility] PCP     TIP  Providers, please consider if this condition is appropriate for management at one of our Acute and Diagnostic Services sites.     If patient is a good candidate, please use dotphrase <dot>triageresponse and select Refer to ADS to document.         Clarissa Syed RN   01/30/24 12:23 PM  [facility] Nurse Advisor    Reason for Disposition   Lightheadedness (dizziness) present now, after 2 hours of rest and fluids    Additional Information   Negative: SEVERE difficulty breathing (e.g., struggling for each breath, speaks in single words)   Negative: Shock suspected (e.g., cold/pale/clammy skin, too weak to stand, low BP, rapid pulse)   Negative: Difficult to awaken or acting confused (e.g., disoriented, slurred speech)   " Negative: Fainted, and still feels dizzy afterwards   Negative: Overdose (accidental or intentional) of medications   Negative: New neurologic deficit that is present now: * Weakness of the face, arm, or leg on one side of the body * Numbness of the face, arm, or leg on one side of the body * Loss of speech or garbled speech   Negative: Heart beating < 50 beats per minute OR > 140 beats per minute   Negative: Sounds like a life-threatening emergency to the triager   Negative: Chest pain   Negative: Rectal bleeding, bloody stool, or tarry-black stool   Negative: Vomiting is main symptom   Negative: Diarrhea is main symptom   Negative: Headache is main symptom   Negative: Heat exhaustion suspected (i.e., dehydration from heat exposure)   Negative: Patient states that they are having an anxiety or panic attack   Negative: Dizziness from low blood sugar (i.e., < 60 mg/dl or 3.5 mmol/l)   Negative: SEVERE dizziness (e.g., unable to stand, requires support to walk, feels like passing out now)   Negative: SEVERE headache or neck pain   Negative: Spinning or tilting sensation (vertigo) present now and one or more stroke risk factors (i.e., hypertension, diabetes mellitus, prior stroke/TIA, heart attack, age over 60) (Exception: Prior physician evaluation for this AND no different/worse than usual.)   Negative: Neurologic deficit that was brief (now gone), ANY of the following:* Weakness of the face, arm, or leg on one side of the body* Numbness of the face, arm, or leg on one side of the body* Loss of speech or garbled speech   Negative: Loss of vision or double vision  (Exception: Similar to previous migraines.)   Negative: Extra heartbeats, irregular heart beating, or heart is beating very fast (i.e., 'palpitations')   Negative: Difficulty breathing   Negative: Drinking very little and dehydration suspected (e.g., no urine > 12 hours, very dry mouth, very lightheaded)   Negative: Follows bleeding (e.g., stomach, rectum,  vagina)  (Exception: Became dizzy from sight of small amount blood.)   Negative: Patient sounds very sick or weak to the triager    Protocols used: Dizziness-A-OH

## 2024-01-30 NOTE — TELEPHONE ENCOUNTER
Noted.   Since symptoms have worsened over the past couple of days, are constant and she is feeling sick to her stomach.   Recommend she is seen today due to her underlying co-morbidities.  My apologies, I have no openings today. Ok to see any other available provider or go to Urgent care. Thanks.

## 2024-01-30 NOTE — TELEPHONE ENCOUNTER
General Call      Reason for Call:  message to care team, feeling light headed, pain, wondering if its bp or diabetes     What are your questions or concerns:  going on for 6 weeks last two days been pretty negar, sick to stomach, wondering what to do? Talked with triage after sent    Date of last appointment with provider: ct on 12/29/23 seen provider 12/8/23    Could we send this information to you in [facility] or would you prefer to receive a phone call?:   Patient would prefer a phone call   Okay to leave a detailed message?: Yes at Home number on file [phone] (home)

## 2024-01-30 NOTE — TELEPHONE ENCOUNTER
See Nurse Triage 1/30/24. Pt in compliant to be seen in [facility] today.     Ginna Starkey, RN on 1/30/2024 at 1:34 PM

## 2024-01-30 NOTE — TELEPHONE ENCOUNTER
Noted.   Patient complaining of acute onset of dizziness/lightheadedness. Normal BP & pulse noted.   I recommend with RN recommendations to be seen today.   Go to Urgent Care  if no available slots on [place] provider schedule. Thanks

## 2024-01-30 NOTE — TELEPHONE ENCOUNTER
Spoke with patient, relayed providers message below and patient verbalized understanding. Pt stated no further questions and will proceed to [facility].    Ginna Starkey, RN on 1/30/2024 at 1:33 PM

## 2024-02-04 DIAGNOSIS — I10 HYPERTENSION GOAL BP (BLOOD PRESSURE) < 140/90: ICD-10-CM

## 2024-02-05 ENCOUNTER — PATIENT OUTREACH (OUTPATIENT)
Dept: CARE COORDINATION | Facility: CLINIC | Age: 64
End: 2024-02-05
Payer: COMMERCIAL

## 2024-02-05 RX ORDER — FUROSEMIDE 20 MG
TABLET ORAL
Qty: 180 TABLET | Refills: 0 | Status: SHIPPED | OUTPATIENT
Start: 2024-02-05 | End: 2024-05-07

## 2024-02-05 NOTE — PROGRESS NOTES
Clinic Care Coordination Contact  [facility]/Voicemail    Clinical Data: Care Coordinator Outreach    Outreach Documentation Number of Outreach Attempt       2/5/2024   3:11 PM 1       Left message on patient's voicemail with call back information and requested return call.    Plan: Care Coordinator will try to reach patient again in 9-10 business days.    JAQUELIN Mensah  [facility] - Care Coordinator   2/5/2024   3:12 PM  [phone]

## 2024-02-05 NOTE — LETTER
It was a pleasure to speak with you.  I would like to provide you with the enclosed information for your records.  As part of care coordination, we are developing care plans to assist in accomplishing your health care goals.  When we speak next, please feel free to let me know if you want to add or change any information on your care plans.    As always, feel free to contact me if you have any questions or concerns.  I look forward to working with you in the effort to achieve your health care and wellness goals .        Sincerely,      Bell Brito, \[facility]\""  Clinic Care Coordination  [phone]    [facility]  Patient Centered Plan of Care  About Me:        Patient Name:  Jessica Jay    YOB: 1960  Age:         63 year old   Regis MRN:    [id number] Telephone Information:  Home Phone [phone]   Mobile [phone]       Address:  [address] [place] [address] Email address:  [email or web address]      Emergency Contact(s)    Name Relationship Lgl Grd Work Phone Home Phone Mobile Phone   1. ILIANA JAY Mother No   [phone]   2. NOAH TOBIAS Sister No  [phone]           Primary language:  English     needed? No   [place] [facility]:  [phone] op. 1  Other communication barriers:Glasses    Preferred Method of Communication:  Portia  Current living arrangement: I live alone    Mobility Status/ Medical Equipment: Independent        Health Maintenance  Health Maintenance Reviewed: Due/Overdue   Health Maintenance Due   Topic Date Due    URINE DRUG SCREEN  Never done    INFLUENZA VACCINE (1) 09/01/2023    LIPID  02/24/2024    A1C  02/27/2024    BMP  03/08/2024           My Access Plan  Medical Emergency 911   Primary Clinic Line [facility] [phone]   24 Hour Appointment Line [phone] or  [phone]) (toll-free)   24 Hour Nurse Line [phone] (toll-free)   Preferred Urgent Care  Other     Preferred Hospital [place] [place]  [phone]     Preferred Pharmacy [facility] [place], [place] - [address]     Behavioral Health Crisis Line The National Suicide Prevention Lifeline at 1-763.436.6143 or Text/Call 988           My Care Team Members  Patient Care Team         Relationship Specialty Notifications Start End    Marybeth Silver MD PCP - General Family Practice  5/16/18     Referred to Dermatology    Phone: [phone] Fax: [phone] [address] [place] [address]    Marybeth Silver MD Assigned PCP   4/15/18     Phone: [phone] Fax: [phone] [address] [place] [address]    Bell Brito LSW Lead Care Coordinator Primary Care - CC Admissions 1/29/20     Phone: [phone] Fax: [phone]        Mayela Plummer [facility] Pharmacist Pharmacist  5/19/21     Phone: [phone] Fax: [phone] [address] [facility] [place] [address]    Shobha Donovan RD Diabetes Educator Dietitian, Registered  8/18/22     Phone: [phone] Fax: [phone]        Ramírez Guzman OD Assigned Surgical Provider   11/4/23     Phone: [phone] Fax: [phone] [address] [place] [address]    Brenda Ortiz APRN CNP Nurse Practitioner Dermatology  12/14/23     Phone: [phone] Fax: [phone] [address] [facility] [place] [address]                My Care Plans  Self Management and Treatment Plan    Care Plan  Care Plan: Financial Wellbeing       Problem: Patient expresses financial resource strain       Long-Range Goal: Create an action plan to increase financial stability       Start Date: 6/27/2022 Expected End Date: 2/19/2025    This Visit's Progress: 40% Recent Progress: 30%    Note:     Barriers: course needed is not available (quick books)  Strengths: going to class's    Patient expressed understanding of goal: yes  Action steps to achieve this goal:  1. I will continue with  classes  2. I will look for the course I need and schedule  3. I will complete my resume  4. I will work with the workforce center to get the needed programs  5. I will reach out to [facility]  for support in rebuilding my credit                            Care Plan: Physical Activity       Problem: Patient is inactive       Long-Range Goal: Exercise at Least 20 Minutes per Day       Start Date: 4/27/2020 Expected End Date: 2/19/2025    This Visit's Progress: 30% Recent Progress: 20%    Note:     Barriers: Depression, back pain, social distancing to some extent  Strengths: Understand that increasing my exercise will help with managing my weight and back pain    Patient expressed understanding of goal: Yes  Action steps to achieve this goal:  1. I will walk in one direction for 5 minutes and then return for a total of 10 minutes  2. I will not get discouraged if I cannot make it the 5 minutes in one direction before having to turn around  3. I will celebrate my successes                              Action Plans on File:            Depression          Advance Care Plans/Directives:   Advanced Care Plan/Directives on file:   Yes    Status of Document(s): No data recorded  Advanced Care Plan/Directives Type:   Advanced Directive - On File           My Medical and Care Information  Problem List   Patient Active Problem List   Diagnosis    Depression, major    Migraines    CARDIOVASCULAR SCREENING; LDL GOAL LESS THAN 160    Disc herniation    DDD (degenerative disc disease), lumbar    DDD (degenerative disc disease), cervical    Neck pain    Tobacco abuse    ELYSSA (obstructive sleep apnea)- severe (AHI 49)    Spondylolisthesis, grade 1    Chronic low back pain    Brain lipoma    History of colonic polyps    Gastroesophageal reflux disease without esophagitis    Hypertension goal BP (blood pressure) < 140/90    Traylor's esophagus determined by endoscopy    Bilateral edema of lower extremity    GABRIELA (generalized  anxiety disorder)    Restless legs syndrome (RLS)    Diabetes mellitus, type 2 (H)    Morbid obesity (H)    Major depression, recurrent (H)      Current Medications and Allergies:     Allergies   Allergen Reactions    Buspar [Buspirone Hcl] Rash    Lisinopril Cough    Seasonal Allergies     Seroquel [Quetiapine] Itching         Current Outpatient Medications:     alcohol swab prep pads, Use to swab area of injection/haley as directed., Disp: 100 each, Rfl: 3    atorvastatin (LIPITOR) 40 MG tablet, Take 1 tablet (40 mg) by mouth daily, Disp: 90 tablet, Rfl: 3    blood glucose (ACCU-CHEK GUIDE) test strip, USE TO TEST TWICE DAILY, Disp: 200 strip, Rfl: 0    blood glucose (NO BRAND SPECIFIED) lancets standard, Use to test blood sugar 2 times daily or as directed., Disp: 100 lancet, Rfl: 3    blood glucose monitoring (NO BRAND SPECIFIED) meter device kit, Use to test blood sugar 2 times daily or as directed., Disp: 1 kit, Rfl: 0    clobetasol (TEMOVATE) 0.05 % external cream, Apply sparingly to affected area twice daily as needed.  Do not apply to face., Disp: 120 g, Rfl: 3    cyclobenzaprine (FLEXERIL) 5 MG tablet, Take 1 tablet (5 mg) by mouth 3 times daily as needed for muscle spasms, Disp: 42 tablet, Rfl: 0    DULoxetine (CYMBALTA) 60 MG capsule, Take 2 capsules (120 mg) by mouth daily, Disp: 180 capsule, Rfl: 1    furosemide (LASIX) 20 MG tablet, TAKE 1 TABLET BY MOUTH TWICE DAILY, Disp: 180 tablet, Rfl: 0    insulin pen needle (BD BRANDY U/F) 32G X 4 MM miscellaneous, Use 1 daily as directed., Disp: 100 each, Rfl: 4    liraglutide (VICTOZA) 18 MG/3ML solution, Inject 1.8 mg Subcutaneous daily, Disp: 9 mL, Rfl: 2    losartan (COZAAR) 25 MG tablet, Take 1 tablet (25 mg) by mouth daily, Disp: 90 tablet, Rfl: 3    metFORMIN (GLUCOPHAGE XR) 500 MG 24 hr tablet, Take 2 tablets (1,000 mg) by mouth 2 times daily (with meals), Disp: 360 tablet, Rfl: 3    metoprolol succinate ER (TOPROL XL) 25 MG 24 hr tablet, Take 1 tablet  (25 mg) by mouth daily, Disp: 90 tablet, Rfl: 3    omeprazole (PRILOSEC) 20 MG DR capsule, Take 1 capsule (20 mg) by mouth daily as needed (GERD), Disp: 90 capsule, Rfl: 0    traZODone (DESYREL) 50 MG tablet, Take 1 tablet (50 mg) by mouth at bedtime, Disp: 30 tablet, Rfl: 1    Current Facility-Administered Medications:     lidocaine 1 % injection 0.5 mL, 0.5 mL, , , Ki May MD, 0.5 mL at 05/04/20 1021    lidocaine 1 % injection 2 mL, 2 mL, , , Francisco Farris DO, 2 mL at 06/07/22 1207    triamcinolone (KENALOG-40) injection 20 mg, 20 mg, , , Ki May MD, 20 mg at 05/04/20 1021    triamcinolone (KENALOG-40) injection 40 mg, 40 mg, , , Francisco Farris DO, 40 mg at 06/07/22 1207    triamcinolone (KENALOG-40) injection 40 mg, 40 mg, , , Francisco Farris DO, 40 mg at 11/26/21 1709      Care Coordination Start Date: 1/29/2020   Frequency of Care Coordination: monthly, more frequently as needed     Form Last Updated: 02/19/2024

## 2024-02-13 ENCOUNTER — TELEPHONE (OUTPATIENT)
Dept: FAMILY MEDICINE | Facility: CLINIC | Age: 64
End: 2024-02-13
Payer: COMMERCIAL

## 2024-02-13 NOTE — TELEPHONE ENCOUNTER
General Call      Reason for Call:  Question    What are your questions or concerns:  Sleep issues - patient is wondering what she can due for her severe lack of sleep issues, she has only gotten 3 hours of sleep since 2/11.    Pt declined nurse triage      Date of last appointment with provider: 12/08/2023    Could we send this information to you in [employer] or would you prefer to receive a phone call?:   No preference   Okay to leave a detailed message?: Yes at Home number on file [phone] (home)

## 2024-02-14 NOTE — TELEPHONE ENCOUNTER
Noted.   Patient reporting insomnia  Can schedule a Virtual visit to determine underlying etiology and go from there.  In the interim, may trial OTC Melatonin as needed.

## 2024-02-14 NOTE — TELEPHONE ENCOUNTER
Attempted to call patient with number on file to relay provider's message below with no answer, left voicemail to call clinic back at [phone].    Ginna Starkey, RN on 2/14/2024 at 4:31 PM

## 2024-02-15 ENCOUNTER — OFFICE VISIT (OUTPATIENT)
Dept: DERMATOLOGY | Facility: CLINIC | Age: 64
End: 2024-02-15
Payer: COMMERCIAL

## 2024-02-15 DIAGNOSIS — L40.9 PSORIASIS: ICD-10-CM

## 2024-02-15 PROCEDURE — 99214 OFFICE O/P EST MOD 30 MIN: CPT | Performed by: NURSE PRACTITIONER

## 2024-02-15 NOTE — TELEPHONE ENCOUNTER
Patient called back, she is desperate to get her insomnia addressed.   Has tried melatonin in the past and it did not help at all.    Advised phone or video visit would work for this if that is easier for her with work schedule, she state that would be.    Scheduled for tomorrow 2:55 pm arrival for video visit.    Patient verbalized understanding of and agreement with plan.    Shena TORRES RN  [facility] Triage

## 2024-02-15 NOTE — PROGRESS NOTES
[facility] Dermatology Note  Encounter Date: Feb 15, 2024  Office Visit     Reviewed patients past medical history and pertinent chart review prior to patients visit today.     Dermatology Problem List:  Eczematous dermatitis    ____________________________________________    Assessment & Plan:     # Eczematous dermatitis    -Start triamcinolone 0.1% ointment twice daily for 2-4 weeks, decreasing as patients rash improves, repeat cycle for flares. If not fully resolved in 1 month, patient advised to return to clinic for reevaluation. Discussed risk of skin atrophy with long term chronic use. Not for face, armpits or groin.   -When bathing, use gentle soap such as Dove for Sensitive Skin and lukewarm water on amrpits, groin, and other visibly dirty areas, all other areas let the water run over but no soap is necessary. Pat skin dry instead of vigorous rubbing. Encouraged regular use of an emollient such as Vanicream, Cera Ve Cream or Cetaphil Cream to the entire body.   -Start a humidifier in bedroom when sleeping if possible for patient. Clean regularly.   -Try Sarna Original lotion whenever you feel itchy. You can keep it in the refrigerator to keep it cold so it is more soothing and itch relief. This does not have steroids so it is safe to use as much as you would like.    -also discussed possibility that this may be a referred pain from back nerve irritation.     Return to clinic in 1 month if not fully resolved, sooner PRN for new or worsening conditions.     Angelica Ortiz, CNP  Dermatology   _______________________________________    CC: Derm Problem (July 2023 started rash on arms that is itchy and raised. Comes and goes and present is looking fairly good compared to previous times. /Used Triam 0.01% oint that helps but rash remains)    HPI:  Ms. Jessica Jay is a(n) 63 year old female who presents today as a new patient for rash on the arms. It has been going on for a few years on and off.  Tried triamcinolone 0.1% ointment for treatment without resolution but she only applies when it is severely flared or itchy. She uses cera ve cream as a moisturizer.     Patient is otherwise feeling well, without additional skin concerns.      Physical Exam:  Vitals: There were no vitals taken for this visit.  SKIN: Focused examination of arms was performed.  - pink annular scaly patches on the upper arms and excoriated streaks on forearms without underlying rash    - No other lesions of concern on areas examined.     Medications:  Current Outpatient Medications   Medication    triamcinolone (KENALOG) 0.1 % external ointment    alcohol swab prep pads    atorvastatin (LIPITOR) 40 MG tablet    blood glucose (ACCU-CHEK GUIDE) test strip    blood glucose (NO BRAND SPECIFIED) lancets standard    blood glucose monitoring (NO BRAND SPECIFIED) meter device kit    clobetasol (TEMOVATE) 0.05 % external cream    cyclobenzaprine (FLEXERIL) 5 MG tablet    DULoxetine (CYMBALTA) 60 MG capsule    furosemide (LASIX) 20 MG tablet    insulin pen needle (BD BRANDY U/F) 32G X 4 MM miscellaneous    liraglutide (VICTOZA) 18 MG/3ML solution    losartan (COZAAR) 25 MG tablet    metFORMIN (GLUCOPHAGE XR) 500 MG 24 hr tablet    metoprolol succinate ER (TOPROL XL) 25 MG 24 hr tablet    omeprazole (PRILOSEC) 20 MG DR capsule     Current Facility-Administered Medications   Medication    lidocaine 1 % injection 0.5 mL    lidocaine 1 % injection 2 mL    triamcinolone (KENALOG-40) injection 20 mg    triamcinolone (KENALOG-40) injection 40 mg    triamcinolone (KENALOG-40) injection 40 mg      Past Medical History:   Patient Active Problem List   Diagnosis    Depression, major    Migraines    CARDIOVASCULAR SCREENING; LDL GOAL LESS THAN 160    Disc herniation    DDD (degenerative disc disease), lumbar    DDD (degenerative disc disease), cervical    Neck pain    Tobacco abuse    ELYSSA (obstructive sleep apnea)- severe (AHI 49)    Spondylolisthesis, grade  1    Chronic low back pain    Brain lipoma    History of colonic polyps    Gastroesophageal reflux disease without esophagitis    Hypertension goal BP (blood pressure) < 140/90    Traylor's esophagus determined by endoscopy    Bilateral edema of lower extremity    GABRIELA (generalized anxiety disorder)    Restless legs syndrome (RLS)    Diabetes mellitus, type 2 (H)    Morbid obesity (H)    Major depression, recurrent (H)     Past Medical History:   Diagnosis Date    Cellulitis and abscess of foot, except toes 09/26/2010    [facility]    Chronic daily headache 02/15/2011    Complete rupture of rotator cuff 07/06/2009    Degenerative disc disease     cervical    Depression, major     Depressive disorder     Diabetes mellitus, type 2 (H)     Dizziness - light-headed 02/15/2011    GERD (gastroesophageal reflux disease)     Hypertension goal BP (blood pressure) < 140/90     LBP (low back pain)     Panic attacks     Seasonal allergies     Vulvar dermatitis 07/17/2012       [place]  [address] [facility] [address] [place] [address] on close of this encounter.

## 2024-02-15 NOTE — LETTER
2/15/2024         RE: Jessica Jay  [address] [facility] [address]        Dear Colleague,    Thank you for referring your patient, Jessica Jay, to the [facility]. Please see a copy of my visit note below.    [facility] Dermatology Note  Encounter Date: Feb 15, 2024  Office Visit     Reviewed patients past medical history and pertinent chart review prior to patients visit today.     Dermatology Problem List:  Eczematous dermatitis    ____________________________________________    Assessment & Plan:     # Eczematous dermatitis    -Start triamcinolone 0.1% ointment twice daily for 2-4 weeks, decreasing as patients rash improves, repeat cycle for flares. If not fully resolved in 1 month, patient advised to return to clinic for reevaluation. Discussed risk of skin atrophy with long term chronic use. Not for face, armpits or groin.   -When bathing, use gentle soap such as Dove for Sensitive Skin and lukewarm water on amrpits, groin, and other visibly dirty areas, all other areas let the water run over but no soap is necessary. Pat skin dry instead of vigorous rubbing. Encouraged regular use of an emollient such as Vanicream, Cera Ve Cream or Cetaphil Cream to the entire body.   -Start a humidifier in bedroom when sleeping if possible for patient. Clean regularly.   -Try Sarna Original lotion whenever you feel itchy. You can keep it in the refrigerator to keep it cold so it is more soothing and itch relief. This does not have steroids so it is safe to use as much as you would like.    -also discussed possibility that this may be a referred pain from back nerve irritation.     Return to clinic in 1 month if not fully resolved, sooner PRN for new or worsening conditions.     Angelica Oritz, RODNEY  Dermatology   _______________________________________    CC: Derm Problem (July 2023 started rash on arms that is itchy and raised. Comes and goes and present is  looking fairly good compared to previous times. /Used Triam 0.01% oint that helps but rash remains)    HPI:  Ms. Jessica Jay is a(n) 63 year old female who presents today as a new patient for rash on the arms. It has been going on for a few years on and off. Tried triamcinolone 0.1% ointment for treatment without resolution but she only applies when it is severely flared or itchy. She uses cera ve cream as a moisturizer.     Patient is otherwise feeling well, without additional skin concerns.      Physical Exam:  Vitals: There were no vitals taken for this visit.  SKIN: Focused examination of arms was performed.  - pink annular scaly patches on the upper arms and excoriated streaks on forearms without underlying rash    - No other lesions of concern on areas examined.     Medications:  Current Outpatient Medications   Medication     triamcinolone (KENALOG) 0.1 % external ointment     alcohol swab prep pads     atorvastatin (LIPITOR) 40 MG tablet     blood glucose (ACCU-CHEK GUIDE) test strip     blood glucose (NO BRAND SPECIFIED) lancets standard     blood glucose monitoring (NO BRAND SPECIFIED) meter device kit     clobetasol (TEMOVATE) 0.05 % external cream     cyclobenzaprine (FLEXERIL) 5 MG tablet     DULoxetine (CYMBALTA) 60 MG capsule     furosemide (LASIX) 20 MG tablet     insulin pen needle (BD BRANDY U/F) 32G X 4 MM miscellaneous     liraglutide (VICTOZA) 18 MG/3ML solution     losartan (COZAAR) 25 MG tablet     metFORMIN (GLUCOPHAGE XR) 500 MG 24 hr tablet     metoprolol succinate ER (TOPROL XL) 25 MG 24 hr tablet     omeprazole (PRILOSEC) 20 MG DR capsule     Current Facility-Administered Medications   Medication     lidocaine 1 % injection 0.5 mL     lidocaine 1 % injection 2 mL     triamcinolone (KENALOG-40) injection 20 mg     triamcinolone (KENALOG-40) injection 40 mg     triamcinolone (KENALOG-40) injection 40 mg      Past Medical History:   Patient Active Problem List   Diagnosis     Depression,  major     Migraines     CARDIOVASCULAR SCREENING; LDL GOAL LESS THAN 160     Disc herniation     DDD (degenerative disc disease), lumbar     DDD (degenerative disc disease), cervical     Neck pain     Tobacco abuse     ELYSSA (obstructive sleep apnea)- severe (AHI 49)     Spondylolisthesis, grade 1     Chronic low back pain     Brain lipoma     History of colonic polyps     Gastroesophageal reflux disease without esophagitis     Hypertension goal BP (blood pressure) < 140/90     Traylor's esophagus determined by endoscopy     Bilateral edema of lower extremity     GABRIELA (generalized anxiety disorder)     Restless legs syndrome (RLS)     Diabetes mellitus, type 2 (H)     Morbid obesity (H)     Major depression, recurrent (H)     Past Medical History:   Diagnosis Date     Cellulitis and abscess of foot, except toes 09/26/2010    [facility]     Chronic daily headache 02/15/2011     Complete rupture of rotator cuff 07/06/2009     Degenerative disc disease     cervical     Depression, major      Depressive disorder      Diabetes mellitus, type 2 (H)      Dizziness - light-headed 02/15/2011     GERD (gastroesophageal reflux disease)      Hypertension goal BP (blood pressure) < 140/90      LBP (low back pain)      Panic attacks      Seasonal allergies      Vulvar dermatitis 07/17/2012       CC Marybeth Silver MD  [address] [place] [id number] on close of this encounter.       Again, thank you for allowing me to participate in the care of your patient.        Sincerely,        Brenda Ortiz, SONIA CNP

## 2024-02-15 NOTE — PATIENT INSTRUCTIONS
Try Sarna Original lotion whenever you feel itchy. You can keep it in the refrigerator to keep it cold so it is more soothing and itch relief. This does not have steroids so it is safe to use as much as you would like.      Eczema Action Plan    Name: Jessica NICHOLE Misty Provider: SARATH GRAMAJO Date: 2/15/2024    Step 1: Eczema Under Control (Skin is soft and flexible, not red or itchy)  Moisturize the whole body at least two times a day.  Watch for signs that the skin is turning red, itchy, or dry.  Use a cream in a jar from the list below. Do not use lotions from a pump.  Suggested creams:  CeraVe Cream, Cetaphil Cream, Vanicream, Aquaphor, Vaseline  Use a gentle cleanser/soap in the bath/shower such as dove sensitive cleanser or Cetaphil cleanser only to the armpits and groin areas, or where you are visibly dirty. All other areas should get washed with water only. Do not scrub. After bathing pat the skin dry with a towel instead of rubbing dry. Apply your moisturizer while you're still slightly damp to lock in some of the moisture.     Step 2:  Eczema Flare (Eczema is out of control and not responding to maintenance care)       Continue above procedures  Also, use prescribed steroid ointment two times a day for up to two weeks per month. Apply to red and itchy areas. Put the steroid on the skin first before other creams.     Body: triamcinolone 0.1% ointment     Use one fingertip unit of the ointment for eczema. A fingertip unit is the amount of ointment from the first bend in finger to the fingertip. This amount will cover an area equal to two adult hands. Using steroids for extended periods of time can thin the skin and cause stretch marks. Never use for longer than 3-4 weeks before following up with your provider. Please follow up in clinic if rash persists and does not resolve with this regimen in 1 months time.     Moisturize your whole body two times a day with a suggested cream.    Wet Wraps  If your  "rash is very itchy or getting out of control you can also try applying what we call \"wet wraps\". Use your moisturizers/medications where instructed and THEN cover that body area with a wet cotton clothing or cloth and put a second dry layer over the top. You can do this for a short time or even overnight. For example: if your hands are the problem area, try wet cotton gloves or socks and cover with a dry pair. Or if your legs are affected, wet a pair of cotton pajama pants and ring them out then wear them covered with a dry pair overnight.     About Dry Skin    What is dry skin?  Common skin problem  Can be worse during the winter   Affects all ages  Occurs in people with or without other skin problems    What does it look like?  Fine lines in the skin become more visible   Rough feeling skin   Flaky skin  Most common on the arms and legs  Skin can become cracked, especially on the hands and feet    What are some problems caused by dry skin?   Itching  Rubbing or scratching can cause thickened, rough skin patches  Cracks in skin can be painful  Red, itchy, scaly skin (called eczema) can occur  Yellow crusting or pus could be signs of an infection    What causes dry skin?  A lack of water in the top layer of the skin  Too much soapy water,  hot water, or harsh chemicals  Aging and sun damage    How do I treat dry skin?  Shower or bathe daily for under ten minutes with lukewarm water and mild soap.  Pat yourself dry with a towel gently and leave your skin slightly damp.  Use moisturizing cream or ointment right away.  Avoid lotions.    What kind of mild soap should I be using?  Camay , Dove , Tone , Neutrogena , Purpose , or Oil of Olay   A non-detergent cleanser, like Cetaphil , can be used.    What should I stay away from?  Scented soaps   Bath oils    What moisturizers should I be using?  Cetaphil Cream,CeraVe Cream, Vanicream, Eucerin, Aquaphor, or Vaseline   Always apply after showering or bathing.  Reapply " throughout the day, if possible.  If dry skin affects your hands, always reapply after handwashing.    What else should I know?  Using a humidifier during winter months may help.  If dry skin gets worse or if eczema develops, a steroid cream may be needed.

## 2024-02-16 ENCOUNTER — VIRTUAL VISIT (OUTPATIENT)
Dept: FAMILY MEDICINE | Facility: CLINIC | Age: 64
End: 2024-02-16
Payer: COMMERCIAL

## 2024-02-16 ENCOUNTER — TELEPHONE (OUTPATIENT)
Dept: FAMILY MEDICINE | Facility: CLINIC | Age: 64
End: 2024-02-16

## 2024-02-16 DIAGNOSIS — G47.00 INSOMNIA, UNSPECIFIED TYPE: Primary | ICD-10-CM

## 2024-02-16 DIAGNOSIS — G47.33 OSA (OBSTRUCTIVE SLEEP APNEA): ICD-10-CM

## 2024-02-16 PROCEDURE — 99214 OFFICE O/P EST MOD 30 MIN: CPT | Mod: 95 | Performed by: FAMILY MEDICINE

## 2024-02-16 RX ORDER — TRAZODONE HYDROCHLORIDE 50 MG/1
50 TABLET, FILM COATED ORAL AT BEDTIME
Qty: 30 TABLET | Refills: 1 | Status: SHIPPED | OUTPATIENT
Start: 2024-02-16

## 2024-02-16 NOTE — PATIENT INSTRUCTIONS
"Sleep Hygiene habits that tend to improve and maintain good sleep  -Sleep only long enough to feel rested and then get out of bed  -Go to bed and get up at the same time every day  -Do not try to force yourself to sleep. If you can't sleep, get out of bed and try again later.  -Have coffee, tea, and other foods that have caffeine only in the morning  -Avoid alcohol in the late afternoon, evening, and bedtime  -Avoid smoking, especially in the evening  -Keep your bedroom dark, cool, quiet, and free of reminders of work or other things that cause you stress  -Solve problems you have before you go to bed  -Exercise several days a week, but not right before bed  - Avoid looking at phones or reading devices (\"e-books\") that give off light before bed. This can make it harder to fall asleep    "

## 2024-02-16 NOTE — PROGRESS NOTES
Gemma is a 63 year old who is being evaluated via a billable video visit.      How would you like to obtain your AVS? MyChart  If the video visit is dropped, the invitation should be resent by: Text to cell phone: [phone]  Will anyone else be joining your video visit? No          Assessment & Plan     Gemma was seen today for insomnia.    Diagnoses and all orders for this visit:    Insomnia, unspecified type, uncontrolled  -     Trial: traZODone (DESYREL) 50 MG tablet; Take 1 tablet (50 mg) by mouth at bedtime  -      Sleep hygiene habits discussed-see AVS for details  -      Recommended follow-up with her sleep medicine team    ELYSSA (obstructive sleep apnea)  -    Stop Bang score today =  6  -   Adult Sleep Eval & Management  Referral; Future, due to CPAP use.  Patient previously elected no treatment      Return in about 1 month (around 3/16/2024).  Sooner if needed.      Subjective   Gemma is a 63 year old, presenting for the following health issues:  Insomnia        2/16/2024     1:48 PM   Additional Questions   Roomed by [place]   Accompanied by n/a     History of Present Illness       Reason for visit:  Recheck insomnia, discuss medication as prescribed medication has not helped    She eats 0-1 servings of fruits and vegetables daily.She consumes 0 sweetened beverage(s) daily.She exercises with enough effort to increase her heart rate 9 or less minutes per day.  She exercises with enough effort to increase her heart rate 3 or less days per week.   She is taking medications regularly.     Insomnia  Patient reports that she started a new job around [employer] 3 months ago.   Prior to that in September-noticed that she was struggling to fall and stay asleep.  Insomnia has worsened in the recent past with an average of 3 to 4 hours a night.   Denies having any worsening anxiety from baseline.  States that she enjoys her job.    Is an underlying history of mild sleep apnea.   In the past was offered  options to include no treatment versus CPAP use.   She elected for no treatment with continued weight loss.   Reports that she has been trying to lose weight.     Wt Readings from Last 4 Encounters:   12/08/23 106.4 kg (234 lb 9.6 oz)   03/08/23 114.6 kg (252 lb 9.6 oz)   09/13/22 111.6 kg (246 lb)   07/25/22 113 kg (249 lb 3.2 oz)       Sleep Apnea:       2/16/2024     3:08 PM   STOP-Bang Total Score   Total Score 6   Risk Stratification 5 - 8: High Risk for ELYSSA     Reports that she has been seen and evaluated for sleep apnea in 2022- was told that she has mild sleep apnea.  Treatment options were discussed with her and she opted for no treatment.  Currently does not use a CPAP. Lives alone.     Denies having any worsening anxiety or depression from baseline-        12/7/2023     5:21 PM   Last PHQ-9   1.  Little interest or pleasure in doing things 0   2.  Feeling down, depressed, or hopeless 0   3.  Trouble falling or staying asleep, or sleeping too much 3   4.  Feeling tired or having little energy 1   5.  Poor appetite or overeating 0   6.  Feeling bad about yourself 0   7.  Trouble concentrating 0   8.  Moving slowly or restless 0   Q9: Thoughts of better off dead/self-harm past 2 weeks 0   PHQ-9 Total Score 4         12/8/2023     3:59 PM   GABRIELA-7    1. Feeling nervous, anxious, or on edge 0   2. Not being able to stop or control worrying 0   3. Worrying too much about different things 0   4. Trouble relaxing 0   5. Being so restless that it is hard to sit still 0   6. Becoming easily annoyed or irritable 0   7. Feeling afraid, as if something awful might happen 0   GABRIELA-7 Total Score 0   If you checked any problems, how difficult have they made it for you to do your work, take care of things at home, or get along with other people? Not difficult at all     In the past two weeks have you had thoughts of suicide or self-harm?  No.    Do you have concerns about your personal safety or the safety of others?    No        Review of Systems  Constitutional, HEENT, cardiovascular, pulmonary, gi and gu systems are negative, except as otherwise noted.      Objective           Vitals:  No vitals were obtained today due to virtual visit.    Physical Exam   GENERAL: alert and no distress  EYES: Eyes grossly normal to inspection.  No discharge or erythema, or obvious scleral/conjunctival abnormalities.  RESP: No audible wheeze, cough, or visible cyanosis.    SKIN: Visible skin clear. No significant rash, abnormal pigmentation or lesions.  NEURO: Cranial nerves grossly intact.  Mentation and speech appropriate for age.  PSYCH: Appropriate affect, tone, and pace of words          Video-Visit Details    Type of service:  Video Visit   Video Start Time:  3:00 pm   Video End Time: 3:30 pm    Originating Location (pt. Location): Home  Distant Location (provider location):  On-site  Platform used for Video Visit: Gary  Signed Electronically by: Marybeth Silver MD

## 2024-02-16 NOTE — TELEPHONE ENCOUNTER
Pharmacy calling to report possible reaction of trazodone and Cymbalta causing serotonin syndrome.      Disp Refills Start End ARABELLA   traZODone (DESYREL) 50 MG tablet 30 tablet 1 2/16/2024 -- No   Sig - Route: Take 1 tablet (50 mg) by mouth at bedtime - Oral   Sent to pharmacy as: traZODone HCl 50 MG Oral Tablet (DESYREL)   Class: E-Prescribe   Order: [id number]   E-Prescribing Status: Receipt confirmed by pharmacy (2/16/2024  3:15 PM CST)       OK for pharmacy to fill trazodone 50mg?    Ginna Starkey, RN on 2/16/2024 at 3:45 PM

## 2024-02-16 NOTE — TELEPHONE ENCOUNTER
RN called and spoke with Pharmacist. RN relayed providers message, Pharmacist verbalized understanding. No further questions.       Zaida Hanson RN on 2/16/2024 at 4:12 PM

## 2024-02-19 ASSESSMENT — ACTIVITIES OF DAILY LIVING (ADL): DEPENDENT_IADLS:: INDEPENDENT

## 2024-02-19 NOTE — PROGRESS NOTES
Clinic Care Coordination Contact  Clinic Care Coordination Contact  OUTREACH    Referral Information:  Referral Source: Specialist    Primary Diagnosis: Behavioral Health    Chief Complaint   Patient presents with    Clinic Care Coordination - Follow-up     [facility]        [place] Utilization: no concerns  Clinic Utilization  Difficulty keeping appointments:: No  Compliance Concerns: No  No-Show Concerns: No  No PCP office visit in Past Year: No  Utilization      No Show Count (past year)  0             ED Visits  0             Hospital Admissions  0                    Current as of: 2/17/2024  3:11 PM                Clinical Concerns:  Current Medical Concerns:  pt noted no new areas of concern.  She does follow up with diabetic ed on her diabetes.  She continues to have chronic pain.     Current Behavioral Concerns: pt continues to have challenges with finding work and that affects her depression.   Her mom has moved into her sister's home and this limites her contact with her.  Her mom has been a great support for her.  Education Provided to patient: pt did not want any resources at this time or to talk about her needs.  Encouraged her to call if she has any questions.    Pain  Pain (GOAL):: Yes  Type: Chronic (>3mo)  Health Maintenance Reviewed: Due/Overdue   Health Maintenance Due   Topic Date Due    URINE DRUG SCREEN  Never done    INFLUENZA VACCINE (1) 09/01/2023    LIPID  02/24/2024    A1C  02/27/2024    BMP  03/08/2024       Clinical Pathway: None    Medication Management:  Medication review status: Medications reviewed and no changes reported per patient.             Functional Status:  Dependent ADLs:: Independent  Dependent IADLs:: Independent  Bed or wheelchair confined:: No  Mobility Status: Independent  Fallen 2 or more times in the past year?: No  Any fall with injury in the past year?: No    Living Situation:  Current living arrangement:: I live alone  Type of residence:: Private home - no  stairs    Lifestyle & Psychosocial Needs:    Social Determinants of Health     Food Insecurity: Low Risk  (12/1/2023)    Food Insecurity     Within the past 12 months, did you worry that your food would run out before you got money to buy more?: No     Within the past 12 months, did the food you bought just not last and you didn t have money to get more?: No   Depression: Not at risk (12/8/2023)    PHQ-2     PHQ-2 Score: 0   Housing Stability: Low Risk  (12/1/2023)    Housing Stability     Do you have housing? : Yes     Are you worried about losing your housing?: No   Tobacco Use: Medium Risk (2/16/2024)    Patient History     Smoking Tobacco Use: Former     Smokeless Tobacco Use: Never     Passive Exposure: Past   Financial Resource Strain: Low Risk  (12/1/2023)    Financial Resource Strain     Within the past 12 months, have you or your family members you live with been unable to get utilities (heat, electricity) when it was really needed?: No   Alcohol Use: Not on file   Transportation Needs: Low Risk  (12/1/2023)    Transportation Needs     Within the past 12 months, has lack of transportation kept you from medical appointments, getting your medicines, non-medical meetings or appointments, work, or from getting things that you need?: No   Physical Activity: Inactive (4/26/2022)    Exercise Vital Sign     Days of Exercise per Week: 0 days     Minutes of Exercise per Session: 0 min   Interpersonal Safety: Low Risk  (12/8/2023)    Interpersonal Safety     Do you feel physically and emotionally safe where you currently live?: Yes     Within the past 12 months, have you been hit, slapped, kicked or otherwise physically hurt by someone?: No     Within the past 12 months, have you been humiliated or emotionally abused in other ways by your partner or ex-partner?: No   Stress: Stress Concern Present (4/14/2021)    [place] [employer] - Occupational Stress Questionnaire     Feeling of Stress : Rather  much   Social Connections: Moderately Isolated (4/14/2021)    Social Connection and Isolation Panel [NHANES]     Frequency of Communication with Friends and Family: Three times a week     Frequency of Social Gatherings with Friends and Family: Twice a week     Attends Scientologist Services: More than 4 times per year     Active Member of Clubs or Organizations: No     Attends Club or Organization Meetings: Never     Marital Status: Never      Diet:: Regular  Inadequate nutrition (GOAL):: No  Tube Feeding: No  Inadequate activity/exercise (GOAL):: No  Significant changes in sleep pattern (GOAL): No  Transportation means:: Regular car     Scientologist or spiritual beliefs that impact treatment:: No  Mental health DX:: Yes  Mental health DX how managed:: Medication  Mental health management concern (GOAL):: Yes  Chemical Dependency Status: No Current Concerns  Informal Support system:: Family, Friends        Pt said she was let go from her job on Friday.  They cited too many mistakes yet she feels it was her supervisor that was making mistakes and is blaming her.  Offered to talk about options of support and she was not interested.  She continues to work with the work/employment resources and has another place to apply.      She was slightly better on her financial situation yet this loss of work is not helping.  She has major credit card debt that she is to start paying on it this month.      She wants to keep the current goals.  Will extend target date.      Resources and Interventions:  Current Resources:      Community Resources: None  Supplies Currently Used at Home: None  Equipment Currently Used at Home: none  Employment Status: employment seeking         Advance Care Plan/Directive  Advanced Care Plans/Directives on file:: Yes  Type Advanced Care Plans/Directives: Advanced Directive - On File    Referrals Placed: Financial Services ([place] food helpline)       Care Plan:  Care Plan: Financial Wellbeing        Problem: Patient expresses financial resource strain       Long-Range Goal: Create an action plan to increase financial stability       Start Date: 6/27/2022 Expected End Date: 2/19/2025    This Visit's Progress: 40% Recent Progress: 30%    Note:     Barriers: course needed is not available (quick books)  Strengths: going to class's    Patient expressed understanding of goal: yes  Action steps to achieve this goal:  1. I will continue with classes  2. I will look for the course I need and schedule  3. I will complete my resume  4. I will work with the [employer] [facility] to get the needed programs  5. I will reach out to [facility]  for support in rebuilding my credit                            Care Plan: Physical Activity       Problem: Patient is inactive       Long-Range Goal: Exercise at Least 20 Minutes per Day       Start Date: 4/27/2020 Expected End Date: 2/19/2025    This Visit's Progress: 30% Recent Progress: 20%    Note:     Barriers: Depression, back pain, social distancing to some extent  Strengths: Understand that increasing my exercise will help with managing my weight and back pain    Patient expressed understanding of goal: Yes  Action steps to achieve this goal:  1. I will walk in one direction for 5 minutes and then return for a total of 10 minutes  2. I will not get discouraged if I cannot make it the 5 minutes in one direction before having to turn around  3. I will celebrate my successes                              Patient/Caregiver understanding: pt to continue to work on exercise and try to get out when the day is nice. Pt work on her finances    Outreach Frequency: monthly, more frequently as needed  Future Appointments                In 1 week Shobha Donovan RD M [facility]    In 9 months Marybeth Silver MD M [facility] KIRSTIE Hathaway CLINANTHONY            Plan: will send the care plan via [employer].  Will call in one month and pt encouraged  to call if she has any needs or needs to talk.     JAQUELIN Mensah  [facility] - Care Coordinator   2/19/2024   3:41 PM  [phone]

## 2024-02-27 ENCOUNTER — VIRTUAL VISIT (OUTPATIENT)
Dept: EDUCATION SERVICES | Facility: CLINIC | Age: 64
End: 2024-02-27
Payer: COMMERCIAL

## 2024-02-27 DIAGNOSIS — E11.65 TYPE 2 DIABETES MELLITUS WITH HYPERGLYCEMIA, WITHOUT LONG-TERM CURRENT USE OF INSULIN (H): Primary | Chronic | ICD-10-CM

## 2024-02-27 PROCEDURE — G0108 DIAB MANAGE TRN  PER INDIV: HCPCS | Mod: 93 | Performed by: DIETITIAN, REGISTERED

## 2024-02-27 NOTE — PATIENT INSTRUCTIONS
Emailed you healthy meal options on a budget.      2.    Try one new meal per week.      3.   Work on getting outside and doing the short walk.

## 2024-02-27 NOTE — PROGRESS NOTES
Diabetes Self-Management Education & Support    Presents for: Individual review    Type of Service: Telephone Visit    Originating Location (Patient Location): Home  Distant Location (Provider Location): [facility]  Mode of Communication:  Telephone    Telephone Visit Start Time: 4:00 pm  Telephone Visit End Time (telephone visit stop time): 4:50 pm    How would patient like to obtain AVS? Portia      ASSESSMENT:  -just lost her job about 2 weeks ago, so is struggling with depression  -we talked about different food options as she is getting bored with what she is doing   -sent her some budget friendly recipes to try, she will work on doing 1 new meal per week  -encouraged getting outside when nice out and taking a walk.  She will work on the 'short' walk she does outside.  -blood sugars look good     Patient's most recent   Lab Results   Component Value Date    A1C 6.6 11/27/2023    A1C 8.3 06/22/2021     is meeting goal of <7.0    Diabetes knowledge and skills assessment:   Patient is knowledgeable in diabetes management concepts related to: Healthy Eating, Being Active, and Monitoring    Continue education with the following diabetes management concepts: Healthy Eating, Being Active, Monitoring, Taking Medication, Problem Solving, Reducing Risks, and Healthy Coping    Based on learning assessment above, most appropriate setting for further diabetes education would be: Individual setting.      PLAN   Emailed you healthy meal options on a budget.      2.    Try one new meal per week.      3.   Work on getting outside and doing the short walk.            Follow-up: in three months    See Care Plan for co-developed, patient-state behavior change goals.  AVS provided for patient today.    Education Materials Provided:  Budget friendly meals      SUBJECTIVE/OBJECTIVE:  Presents for: Individual review  Accompanied by: Self  Diabetes type: Type 2  How confident are you filling out medical  "forms by yourself:: Quite a bit  Other concerns:: Glasses  Cultural Influences/Ethnic Background:  Not  or       Diabetes Symptoms & Complications:          Patient Problem List and Family Medical History reviewed for relevant medical history, current medical status, and diabetes risk factors.    Vitals:  There were no vitals taken for this visit.  Estimated body mass index is 43.61 kg/m  as calculated from the following:    Height as of 12/8/23: 1.562 m (5' 1.5\").    Weight as of 12/8/23: 106.4 kg (234 lb 9.6 oz).   Last 3 BP:   BP Readings from Last 3 Encounters:   12/08/23 112/68   03/08/23 126/86   07/25/22 136/74       History   Smoking Status    Former    Packs/day: 0.80    Years: 30.00    Types: Cigarettes    Start date: 10/15/1975    Quit date: 12/31/2018   Smokeless Tobacco    Never       Labs:  Lab Results   Component Value Date    A1C 6.6 11/27/2023    A1C 8.3 06/22/2021     Lab Results   Component Value Date     03/08/2023     10/22/2020     Lab Results   Component Value Date    LDL 60 02/24/2023     10/22/2020     HDL Cholesterol   Date Value Ref Range Status   10/22/2020 47 (L) >49 mg/dL Final     Direct Measure HDL   Date Value Ref Range Status   02/24/2023 49 (L) >=50 mg/dL Final   ]  GFR Estimate   Date Value Ref Range Status   03/08/2023 80 >60 mL/min/1.73m2 Final     Comment:     eGFR calculated using 2021 CKD-EPI equation.   10/22/2020 >90 >60 mL/min/[1.73_m2] Final     Comment:     Non  GFR Calc  Starting 12/18/2018, serum creatinine based estimated GFR (eGFR) will be   calculated using the Chronic Kidney Disease Epidemiology Collaboration   (CKD-EPI) equation.       GFR Estimate If Black   Date Value Ref Range Status   10/22/2020 >90 >60 mL/min/[1.73_m2] Final     Comment:      GFR Calc  Starting 12/18/2018, serum creatinine based estimated GFR (eGFR) will be   calculated using the Chronic Kidney Disease Epidemiology " "Collaboration   (CKD-EPI) equation.       Lab Results   Component Value Date    CR 0.82 2023    CR 0.69 10/22/2020     No results found for: \"MICROALBUMIN\"    Healthy Eating:  Healthy Eating Assessed Today: Yes  Breakfast: bowl of cereal, not full bowl or two toast with jelly  Lunch: rotessiere chicken, mixed with side dish and then splt the side dish in two.  Dinner: chipolte ate 1/2 at work.  did the other half the next day.  Snacks: has been trying to avoid snacking.  Other: lost job two weeks ago.  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  [employer].  Victoza up to 1.8 mg daily.  B-110 mg.dL    Being Active:  Exercise:: Yes (walking when she can)    Monitoring:       See above    Taking Medications:  Diabetes Medication(s)       Biguanides       metFORMIN (GLUCOPHAGE XR) 500 MG 24 hr tablet Take 2 tablets (1,000 mg) by mouth 2 times daily (with meals)       Incretin Mimetic Agents       liraglutide (VICTOZA) 18 MG/3ML solution Inject 1.8 mg Subcutaneous daily                 Problem Solving:                 Reducing Risks:       Healthy Coping:  Informal Support system:: Family, Friends  Patient Activation Measure Survey Score:      2011    11:00 AM   JADYEN Score (Last Two)   JAYDEN Raw Score 41   Activation Score 63.2   JAYDEN Level 3         Care Plan and Education Provided:  There are no care plans that you recently modified to display for this patient.          Time Spent: 50 minutes  Encounter Type: Individual    Any diabetes medication dose changes were made via the CDE Protocol per the patient's primary care provider. A copy of this encounter was shared with the provider.   "

## 2024-03-12 ASSESSMENT — SLEEP AND FATIGUE QUESTIONNAIRES
HOW LIKELY ARE YOU TO NOD OFF OR FALL ASLEEP IN A CAR, WHILE STOPPED FOR A FEW MINUTES IN TRAFFIC: WOULD NEVER DOZE
HOW LIKELY ARE YOU TO NOD OFF OR FALL ASLEEP WHILE SITTING AND READING: MODERATE CHANCE OF DOZING
HOW LIKELY ARE YOU TO NOD OFF OR FALL ASLEEP WHILE SITTING AND TALKING TO SOMEONE: WOULD NEVER DOZE
HOW LIKELY ARE YOU TO NOD OFF OR FALL ASLEEP WHILE SITTING INACTIVE IN A PUBLIC PLACE: SLIGHT CHANCE OF DOZING
HOW LIKELY ARE YOU TO NOD OFF OR FALL ASLEEP WHILE SITTING QUIETLY AFTER LUNCH WITHOUT ALCOHOL: SLIGHT CHANCE OF DOZING
HOW LIKELY ARE YOU TO NOD OFF OR FALL ASLEEP WHILE WATCHING TV: WOULD NEVER DOZE
HOW LIKELY ARE YOU TO NOD OFF OR FALL ASLEEP WHILE LYING DOWN TO REST IN THE AFTERNOON WHEN CIRCUMSTANCES PERMIT: MODERATE CHANCE OF DOZING
HOW LIKELY ARE YOU TO NOD OFF OR FALL ASLEEP WHEN YOU ARE A PASSENGER IN A CAR FOR AN HOUR WITHOUT A BREAK: SLIGHT CHANCE OF DOZING

## 2024-03-18 ENCOUNTER — VIRTUAL VISIT (OUTPATIENT)
Dept: SLEEP MEDICINE | Facility: CLINIC | Age: 64
End: 2024-03-18
Attending: FAMILY MEDICINE
Payer: COMMERCIAL

## 2024-03-18 VITALS — BODY MASS INDEX: 41.41 KG/M2 | WEIGHT: 225 LBS | HEIGHT: 62 IN

## 2024-03-18 DIAGNOSIS — G47.00 PERSISTENT INSOMNIA: ICD-10-CM

## 2024-03-18 DIAGNOSIS — E66.01 MORBID OBESITY (H): ICD-10-CM

## 2024-03-18 DIAGNOSIS — G47.33 OSA (OBSTRUCTIVE SLEEP APNEA): Primary | ICD-10-CM

## 2024-03-18 PROCEDURE — 99213 OFFICE O/P EST LOW 20 MIN: CPT | Mod: 95 | Performed by: INTERNAL MEDICINE

## 2024-03-18 ASSESSMENT — PATIENT HEALTH QUESTIONNAIRE - PHQ9: SUM OF ALL RESPONSES TO PHQ QUESTIONS 1-9: 15

## 2024-03-18 ASSESSMENT — PAIN SCALES - GENERAL: PAINLEVEL: NO PAIN (0)

## 2024-03-18 NOTE — PATIENT INSTRUCTIONS
Equipment Instructions    We will process your PAP order and send it to a Durable Medical Equipment (DME) provider.    The medical equipment company should call you within 7 days.  If you have not heard from the company, please contact them to see if they received your order and are planning to call you.    Please call us at [phone] if you are unable to contact the medical equipment company or if they do not have the order.    If you are starting a new PAP machine, please call us after you use it the first night to let us know how it went. This call also helps us know that you received your equipment and that everything is ready. Please use our central phone number [phone]    Contact information for [employer] company:    [employer] [facility] Tel: [phone]

## 2024-03-18 NOTE — PROGRESS NOTES
Video-Visit Details    Type of service:  Video Visit    Video Visit Start Time:10:48 AM    Video End Time:11:02 AM    Originating Location (pt. Location): Home      Distant Location (provider location):  Off-site     Platform used for Video Visit: AmWell    Additional 15 minutes on the date of service was spent performing the following:    -Preparing to see the patient  -Obtaining and/or reviewing separately obtained history   -Ordering medications, tests, or procedures   -Documenting clinical information in the electronic or other health record     Thank you for the opportunity to participate in the care of Jessica Jay.     She is a 63 year old y/o female patient who comes to the sleep medicine clinic for follow up.  The patient was diagnosed with ELYSSA on 06/22/2022 (AHI=5.1). The patient was also found to have periodic limb movement in sleep. She opted not to continue CPAP therapy at that time even though she did qualify to get a new machine. As a result she is now having difficulty initiating and maintaining sleep.     Assessment and Plan:  In summary Jessica Jay is a 63 year old year old female who is here for follow up.    1. ELYSSA (obstructive sleep apnea)/Persistent insomnia  I will put in an order for the patient to get a replacement CPAP machine. I recommend that she use the machine for at least 1 month and then schedule a follow up visit to see me.  - Adult Sleep Eval & Management  Referral    2. Morbid obesity (H)  Healthy weigh management is recommended as part of the long term goal to improve ELYSSA. I will give the patient a hand out on the topic in the AVS.     Lab reviewed: Discussed with patient.    Sleep-Wake Cycle:    The patient likes to initiate sleep at around 10 PM to Midnight with a sleep latency of more than 1 hour. The patient has zero nocturnal awakenings. Final wake up time is around 10 AM.     Total score - Dozier: 7 (3/12/2024  8:37 PM)    Failed to redirect to the  Timeline version of the REVFS SmartLink.   Patient Active Problem List   Diagnosis    Depression, major    Migraines    CARDIOVASCULAR SCREENING; LDL GOAL LESS THAN 160    Disc herniation    DDD (degenerative disc disease), lumbar    DDD (degenerative disc disease), cervical    Neck pain    Tobacco abuse    ELYSSA (obstructive sleep apnea)- severe (AHI 49)    Spondylolisthesis, grade 1    Chronic low back pain    Brain lipoma    History of colonic polyps    Gastroesophageal reflux disease without esophagitis    Hypertension goal BP (blood pressure) < 140/90    Traylor's esophagus determined by endoscopy    Bilateral edema of lower extremity    GABRIELA (generalized anxiety disorder)    Restless legs syndrome (RLS)    Diabetes mellitus, type 2 (H)    Morbid obesity (H)    Major depression, recurrent (H)       Past Medical History:   Diagnosis Date    Cellulitis and abscess of foot, except toes 09/26/2010    [facility]    Chronic daily headache 02/15/2011    Complete rupture of rotator cuff 07/06/2009    Degenerative disc disease     cervical    Depression, major     Depressive disorder     Diabetes mellitus, type 2 (H)     Dizziness - light-headed 02/15/2011    GERD (gastroesophageal reflux disease)     Hypertension goal BP (blood pressure) < 140/90     LBP (low back pain)     Panic attacks     Seasonal allergies     Vulvar dermatitis 07/17/2012       Past Surgical History:   Procedure Laterality Date    ARTHROSCOPY SHOULDER  1990    Right/spurs    CARPAL TUNNEL RELEASE RT/LT  1990    Left    COLONOSCOPY  2016    CRANIOTOMY, EXCISE TUMOR COMPLEX, COMBINED  5/9/2011    Procedure:COMBINED CRANIOTOMY, EXCISE TUMOR COMPLEX; 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; Surgeon:LEE ANN PAULA; Location:[facility] OR    ROTATOR CUFF REPAIR RT/LT  2009     Left    [facility] FOOT/TOES SURGERY PROC UNLISTED  1975    Toe fracture, left       Current Outpatient Medications   Medication Sig Dispense Refill    alcohol swab  prep pads Use to swab area of injection/haley as directed. 100 each 3    atorvastatin (LIPITOR) 40 MG tablet Take 1 tablet (40 mg) by mouth daily 90 tablet 3    blood glucose (ACCU-CHEK GUIDE) test strip USE TO TEST TWICE DAILY 200 strip 0    blood glucose (NO BRAND SPECIFIED) lancets standard Use to test blood sugar 2 times daily or as directed. 100 lancet 3    blood glucose monitoring (NO BRAND SPECIFIED) meter device kit Use to test blood sugar 2 times daily or as directed. 1 kit 0    clobetasol (TEMOVATE) 0.05 % external cream Apply sparingly to affected area twice daily as needed.  Do not apply to face. 120 g 3    cyclobenzaprine (FLEXERIL) 5 MG tablet Take 1 tablet (5 mg) by mouth 3 times daily as needed for muscle spasms 42 tablet 0    DULoxetine (CYMBALTA) 60 MG capsule Take 2 capsules (120 mg) by mouth daily 180 capsule 1    furosemide (LASIX) 20 MG tablet TAKE 1 TABLET BY MOUTH TWICE DAILY 180 tablet 0    insulin pen needle (BD BRANDY U/F) 32G X 4 MM miscellaneous Use 1 daily as directed. 100 each 4    liraglutide (VICTOZA) 18 MG/3ML solution Inject 1.8 mg Subcutaneous daily 9 mL 2    losartan (COZAAR) 25 MG tablet Take 1 tablet (25 mg) by mouth daily 90 tablet 3    metFORMIN (GLUCOPHAGE XR) 500 MG 24 hr tablet Take 2 tablets (1,000 mg) by mouth 2 times daily (with meals) 360 tablet 3    metoprolol succinate ER (TOPROL XL) 25 MG 24 hr tablet Take 1 tablet (25 mg) by mouth daily 90 tablet 3    omeprazole (PRILOSEC) 20 MG DR capsule Take 1 capsule (20 mg) by mouth daily as needed (GERD) 90 capsule 0    traZODone (DESYREL) 50 MG tablet Take 1 tablet (50 mg) by mouth at bedtime 30 tablet 1       Allergies   Allergen Reactions    Buspar [Buspirone Hcl] Rash    Lisinopril Cough    Seasonal Allergies     Seroquel [Quetiapine] Itching       Visual  Exam:  GEN: NAD,  Psych: normal mood, normal affect      Lab Results   Component Value Date    PH 7.42 05/09/2011    PO2 177 (H) 05/09/2011    PCO2 32 (L) 05/09/2011     HCO3 20 (L) 05/09/2011     Lab Results   Component Value Date    TSH 4.15 (H) 05/26/2023    TSH 4.60 (H) 03/08/2023     Lab Results   Component Value Date     (H) 03/08/2023     (H) 10/23/2021     Lab Results   Component Value Date    HGB 13.8 10/22/2020    HGB 13.7 03/19/2020     Lab Results   Component Value Date    BUN 11 03/08/2023    BUN 12 10/23/2021    CR 0.82 03/08/2023    CR 0.92 10/23/2021     Lab Results   Component Value Date    AST 18 03/08/2023    AST 24 10/23/2021    ALT 33 03/08/2023    ALT 35 10/23/2021    ALKPHOS 100 03/08/2023    ALKPHOS 79 10/23/2021    BILITOTAL 0.5 03/08/2023    BILITOTAL 0.4 10/23/2021     Lab Results   Component Value Date    BENZODIAZEUR Negative 08/13/2018    UCANN Negative 08/13/2018    UCOC Negative 08/13/2018    OPIT Negative 08/13/2018       Recent Labs   Lab Test 03/08/23  0939 10/23/21  1042    138   POTASSIUM 4.6 4.5   CHLORIDE 105 107   CO2 30 27   ANIONGAP 1* 4   * 132*   BUN 11 12   CR 0.82 0.92   ALCIDES 9.8 9.7       Ferritin   Date Value Ref Range Status   08/23/2011 37 10 - 300 ng/mL Final       Patient verbalized understanding of these issues, agrees with the plan and all questions were answered today. Patient was given an opportuntity to voice any other symptoms or concerns not listed above. Patient did not have any other symptoms or concerns.      Brent Higgins DO  Board Certified in Internal Medicine and Sleep Medicine    (Note created with Dragon voice recognition and unintended spelling errors and word substitutions may occur)     Audio and visual devices were used for this virtual clinic visit with permission from patient.

## 2024-03-18 NOTE — NURSING NOTE
Depression Response    Patient completed the PHQ-9 assessment for depression and scored >9? Yes  Question 9 on the PHQ-9 was positive for suicidality? No  Does patient have current mental health provider? Yes    Is this a virtual visit? Yes   Does patient have suicidal ideation (positive question 9)? No - offer to place Mental Health Referral.  Patient declined referral/not needed    I personally notified the following: visit provider        Is the patient currently in the state of [place]? YES    Visit mode:VIDEO    If the visit is dropped, the patient can be reconnected by: VIDEO VISIT: Text to cell phone:   Telephone Information:   Mobile [phone]       Will anyone else be joining the visit? NO  (If patient encounters technical issues they should call [phone] :[phone])    How would you like to obtain your AVS? MyChart    Are changes needed to the allergy or medication list? Pt stated no med changes    Reason for visit: RECHECK    Natalie Torres VVENZO   Has patient had flu shot for current/most recent flu season? If so, when? No

## 2024-03-20 ENCOUNTER — PATIENT OUTREACH (OUTPATIENT)
Dept: CARE COORDINATION | Facility: CLINIC | Age: 64
End: 2024-03-20
Payer: COMMERCIAL

## 2024-03-20 NOTE — PROGRESS NOTES
Clinic Care Coordination Contact  Follow Up Progress Note      Assessment: patient reports that she has been working with [employer] as a  and is enjoying it.  She is working on her communication and people skills. She does have a cold at this time which has kept her in bed the last day or so.     Her mood appeared brighter and was able to laugh and joke a bit.      Care Gaps:    Health Maintenance Due   Topic Date Due    URINE DRUG SCREEN  Never done    INFLUENZA VACCINE (1) 09/01/2023    LIPID  02/24/2024    A1C  02/27/2024    BMP  03/08/2024       Postponed to future office visits      Care Plans  Care Plan: Financial Wellbeing       Problem: Patient expresses financial resource strain       Long-Range Goal: Create an action plan to increase financial stability       Start Date: 6/27/2022 Expected End Date: 2/19/2025    This Visit's Progress: 40% Recent Progress: 40%    Note:     Barriers: course needed is not available (quick books)  Strengths: going to class's    Patient expressed understanding of goal: yes  Action steps to achieve this goal:  1. I will continue with classes  2. I will look for the course I need and schedule  3. I will complete my resume  4. I will work with the workforce center to get the needed programs  5. I will reach out to [facility]  for support in rebuilding my credit                            Care Plan: Physical Activity       Problem: Patient is inactive       Long-Range Goal: Exercise at Least 20 Minutes per Day       Start Date: 4/27/2020 Expected End Date: 2/19/2025    This Visit's Progress: 30% Recent Progress: 30%    Note:     Barriers: Depression, back pain, social distancing to some extent  Strengths: Understand that increasing my exercise will help with managing my weight and back pain    Patient expressed understanding of goal: Yes  Action steps to achieve this goal:  1. I will walk in one direction for 5 minutes and then return for a total of 10  minutes  2. I will not get discouraged if I cannot make it the 5 minutes in one direction before having to turn around  3. I will celebrate my successes                              Intervention/Education provided during outreach: encouraged continued work on building new skills as that will always assist her in her work and personal life.     Encouraged rest, fluids and good foods for good health     Outreach Frequency: monthly, more frequently as needed      Plan:   Pt to continue to work on her health and financial situation.  Care Coordinator will follow up in one month.     JAQUELIN Mensah  [facility] - Care Coordinator   3/20/2024   9:08 AM  [phone]

## 2024-03-28 ENCOUNTER — DOCUMENTATION ONLY (OUTPATIENT)
Dept: SLEEP MEDICINE | Facility: CLINIC | Age: 64
End: 2024-03-28
Payer: COMMERCIAL

## 2024-03-28 DIAGNOSIS — G47.33 OSA (OBSTRUCTIVE SLEEP APNEA): Primary | ICD-10-CM

## 2024-03-28 NOTE — PROGRESS NOTES
Patient was offered choice of vendor and chose [facility].  Patient Jessica Jay was set up at [place]  on March 28, 2024. Patient received a Resmed Airsense 10 Pressures were set at  5-15 cm H2O.   Patient s ramp is 4 cm H2O for Auto and FLEX/EPR is 2.  Patient received a Resmed Mask name: [id number]  Full Face mask size Medium, heated tubing and heated humidifier.  Patient has the following compliance requirements: usage only.    Aundrea Connolly

## 2024-04-01 ENCOUNTER — NURSE TRIAGE (OUTPATIENT)
Dept: FAMILY MEDICINE | Facility: CLINIC | Age: 64
End: 2024-04-01
Payer: COMMERCIAL

## 2024-04-01 DIAGNOSIS — K21.9 GASTROESOPHAGEAL REFLUX DISEASE, UNSPECIFIED WHETHER ESOPHAGITIS PRESENT: ICD-10-CM

## 2024-04-01 NOTE — TELEPHONE ENCOUNTER
Provider Response to 2nd Level Triage Request    I have reviewed the RN documentation. My recommendation is:  Ok to wait for appt tomorrow if symptoms remain stable.    Please assist in scheduling appt (with either PCP or same day provider tomorrow).     Christine Duke PA-C

## 2024-04-01 NOTE — TELEPHONE ENCOUNTER
RN called patient and relayed providers message. Patient verbalized understanding.     RN assisted with scheduling patient with [facility] day provider at the [facility] for 11 am tomorrow 4/2/24    RN discussed when to be seen sooner in ER/UC and educated patient on 24 hour nurse line if she were to need it.       Zaida Hanson RN on 4/1/2024 at 4:21 PM

## 2024-04-01 NOTE — TELEPHONE ENCOUNTER
"Nurse Triage SBAR    Is this a 2nd Level Triage? YES, LICENSED PRACTITIONER REVIEW IS REQUIRED    Situation: Patient reports blood in her stool today. The toilet \"turned red\".     Background: She had blood in her stool a couple of weeks ago, twice in one day. She was not seen at the time.     Assessment: She reports she also has \"a little abdominal pain\".     She has noticed for the past month, there is very little stool and mostly \"brown liquid\" when she has a BM.     She denies bleeding at this time, light headedness, dizziness, fainting, weakness, vomiting, rectal pain, itching, rashes, nausea, taking blood thinners, having hemorrhoids, constipation.    RN advised she should be seen today. She asked if she could come in tomorrow since she has to go to work at 6 pm.     RN advised if she has any bleeding when not having a BM, any weakness, fainting, worsening abdominal pain to go to the emergency room. She verbalized understanding.     Protocol Recommended Disposition:   See in Office Today, See More Appropriate Protocol    Recommendation: Please advise if okay to wait to be seen until tomorrow. Thank you.     Routed to provider    Does the patient meet one of the following criteria for ADS visit consideration? 16+ years old, with an [facility] PCP     TIP  Providers, please consider if this condition is appropriate for management at one of our Acute and Diagnostic Services sites.     If patient is a good candidate, please use dotphrase <dot>triageresponse and select Refer to ADS to document.    Jeanette Wolff, RN, BSN, PHN  [facility]  Nurse Triage, Family Practice      Reason for Disposition   Blood in or on bowel movement is main symptom   MODERATE rectal bleeding (small blood clots, passing blood without stool, or toilet water turns red)    Additional Information   Negative: Passed out (i.e., fainted, collapsed and was not responding)   Negative: Shock suspected (e.g., cold/pale/clammy " "skin, too weak to stand, low BP, rapid pulse)   Negative: Vomiting red blood or black (coffee ground) material   Negative: Sounds like a life-threatening emergency to the triager   Negative: Diarrhea is main symptom   Negative: Rectal symptoms   Negative: Sounds like a life-threatening emergency to the triager   Negative: SEVERE rectal bleeding (large blood clots; constant or on and off bleeding)   Negative: SEVERE dizziness (e.g., unable to stand, requires support to walk, feels like passing out now)   Negative: MODERATE rectal bleeding (small blood clots, passing blood without stool, or toilet water turns red) more than once a day   Negative: Bloody, black, or tarry bowel movements  (Exception: Chronic-unchanged black-grey bowel movements and is taking iron pills or Pepto-Bismol.)   Negative: High-risk adult (e.g., prior surgery on aorta, abdominal aortic aneurysm)   Negative: Rectal foreign body (inserted or swallowed)   Negative: SEVERE abdominal pain (e.g., excruciating)   Negative: Constant abdominal pain lasting > 2 hours   Negative: Pale skin (pallor) of new-onset or worsening   Negative: Patient sounds very sick or weak to the triager    Answer Assessment - Initial Assessment Questions  1. SYMPTOM:  \"What's the main symptom you're concerned about?\" (e.g., pain, itching, swelling, rash)      Blood in stool  2. ONSET: \"When did the bleeding  start?\"      2 weeks ago, had quite a bit more at that time  3. RECTAL PAIN: \"Do you have any pain around your rectum?\" \"How bad is the pain?\"  (Scale 0-10; or mild, moderate, severe)    - NONE (0): no pain    - MILD (1-3): doesn't interfere with normal activities     - MODERATE (4-7): interferes with normal activities or awakens from sleep, limping     - SEVERE (8-10): excruciating pain, unable to have a bowel movement       No  4. RECTAL ITCHING: \"Do you have any itching in this area?\" \"How bad is the itching?\"  (Scale 0-10; or mild, moderate, severe)    - NONE: no " "itching    - MILD: doesn't interfere with normal activities     - MODERATE-SEVERE: interferes with normal activities or awakens from sleep      No  5. CONSTIPATION: \"Do you have constipation?\" If Yes, ask: \"How bad is it?\"      No  6. CAUSE: \"What do you think is causing the anus symptoms?\"      Not sure  7. OTHER SYMPTOMS: \"Do you have any other symptoms?\"  (e.g., abdomen pain, fever, rectal bleeding, vomiting)      Mild abdominal pain  8. PREGNANCY: \"Is there any chance you are pregnant?\" \"When was your last menstrual period?\"      no    Protocols used: Rectal Symptoms-A-OH, Rectal Bleeding-A-OH    "

## 2024-04-02 ENCOUNTER — OFFICE VISIT (OUTPATIENT)
Dept: FAMILY MEDICINE | Facility: CLINIC | Age: 64
End: 2024-04-02
Payer: COMMERCIAL

## 2024-04-02 ENCOUNTER — ANCILLARY PROCEDURE (OUTPATIENT)
Dept: CT IMAGING | Facility: CLINIC | Age: 64
End: 2024-04-02
Attending: PHYSICIAN ASSISTANT
Payer: COMMERCIAL

## 2024-04-02 VITALS
DIASTOLIC BLOOD PRESSURE: 76 MMHG | SYSTOLIC BLOOD PRESSURE: 114 MMHG | WEIGHT: 228.8 LBS | BODY MASS INDEX: 42.1 KG/M2 | RESPIRATION RATE: 20 BRPM | HEIGHT: 62 IN | TEMPERATURE: 97.6 F | HEART RATE: 102 BPM | OXYGEN SATURATION: 95 %

## 2024-04-02 DIAGNOSIS — R10.84 ABDOMINAL PAIN, GENERALIZED: Primary | ICD-10-CM

## 2024-04-02 DIAGNOSIS — R10.84 ABDOMINAL PAIN, GENERALIZED: ICD-10-CM

## 2024-04-02 DIAGNOSIS — M89.9 LESION OF PELVIC BONE: Primary | ICD-10-CM

## 2024-04-02 DIAGNOSIS — R19.7 DIARRHEA, UNSPECIFIED TYPE: ICD-10-CM

## 2024-04-02 DIAGNOSIS — R42 LIGHTHEADEDNESS: ICD-10-CM

## 2024-04-02 DIAGNOSIS — R19.7 DIARRHEA, UNSPECIFIED TYPE: Primary | ICD-10-CM

## 2024-04-02 LAB
ERYTHROCYTE [DISTWIDTH] IN BLOOD BY AUTOMATED COUNT: 16.3 % (ref 10–15)
HCT VFR BLD AUTO: 36.1 % (ref 35–47)
HGB BLD-MCNC: 11.1 G/DL (ref 11.7–15.7)
MCH RBC QN AUTO: 23.4 PG (ref 26.5–33)
MCHC RBC AUTO-ENTMCNC: 30.7 G/DL (ref 31.5–36.5)
MCV RBC AUTO: 76 FL (ref 78–100)
PLATELET # BLD AUTO: 366 10E3/UL (ref 150–450)
RBC # BLD AUTO: 4.74 10E6/UL (ref 3.8–5.2)
WBC # BLD AUTO: 7.2 10E3/UL (ref 4–11)

## 2024-04-02 PROCEDURE — 93000 ELECTROCARDIOGRAM COMPLETE: CPT | Performed by: PHYSICIAN ASSISTANT

## 2024-04-02 PROCEDURE — 85027 COMPLETE CBC AUTOMATED: CPT | Performed by: PHYSICIAN ASSISTANT

## 2024-04-02 PROCEDURE — 74177 CT ABD & PELVIS W/CONTRAST: CPT | Mod: TC | Performed by: RADIOLOGY

## 2024-04-02 PROCEDURE — 36415 COLL VENOUS BLD VENIPUNCTURE: CPT | Performed by: PHYSICIAN ASSISTANT

## 2024-04-02 PROCEDURE — 80048 BASIC METABOLIC PNL TOTAL CA: CPT | Performed by: PHYSICIAN ASSISTANT

## 2024-04-02 PROCEDURE — 99214 OFFICE O/P EST MOD 30 MIN: CPT | Mod: 25 | Performed by: PHYSICIAN ASSISTANT

## 2024-04-02 RX ORDER — IOPAMIDOL 755 MG/ML
200 INJECTION, SOLUTION INTRAVASCULAR ONCE
Status: COMPLETED | OUTPATIENT
Start: 2024-04-02 | End: 2024-04-02

## 2024-04-02 RX ADMIN — IOPAMIDOL 130 ML: 755 INJECTION, SOLUTION INTRAVASCULAR at 15:40

## 2024-04-02 RX ADMIN — Medication 79 ML: at 15:40

## 2024-04-02 ASSESSMENT — ENCOUNTER SYMPTOMS
DIAPHORESIS: 0
FEVER: 0
DIARRHEA: 1
LIGHT-HEADEDNESS: 0
VOMITING: 0
NAUSEA: 0
PALPITATIONS: 0
ABDOMINAL PAIN: 1
DIZZINESS: 1
CHILLS: 1
SHORTNESS OF BREATH: 0

## 2024-04-02 ASSESSMENT — PAIN SCALES - GENERAL: PAINLEVEL: NO PAIN (0)

## 2024-04-02 NOTE — PATIENT INSTRUCTIONS
For further evaluation of your abdominal pain, bloody stools, and diarrhea we are completing blood work as well as a CT scan.  We will send results via [employer].  For any severe symptoms such as fevers, severe pain, persistent dizziness/lightheadedness, please go to the emergency department.    For further evaluation of lightheadedness, we recommend a EKG to make sure there are no abnormal heart rhythms.    Please reach out with any questions or concerns along the way.

## 2024-04-02 NOTE — PROGRESS NOTES
Assessment & Plan     Abdominal pain, generalized  Lightheadedness  Diarrhea, unspecified type  Patient is a 63-year-old female who presents to clinic due to 2 months of diarrhea episodes 1-3 times per day.  She also notes intermittent feeling of unsteadiness during this timeframe.  Patient also notes 2 episodes of bright red blood with stools with the most recent episode occurring last night.  Vital signs significant for mild tachycardia.  Physical exam significant for generalized abdominal tenderness to palpation, worst at RLQ.  Lower suspicion for acute abdomen as patient is afebrile and no rebound or guarding present.  Additionally, symptoms have been ongoing for 2 months.  Differential diagnosis includes, but is not limited to diverticulitis, Crohn's, colitis.  Will complete labs and stat CT for further evaluation.  Additionally completed EKG given episodes of lightheadedness/unsteadiness.  PVCs present, but no worrisome arrhythmia.  Discussed signs and symptoms that warrant urgent/emergent follow-up and return precautions.  - CBC with platelets; Future  - CT Abdomen Pelvis w Contrast; Future  - CBC with platelets  - EKG 12-lead complete w/read - Clinics  - Basic metabolic panel  (Ca, Cl, CO2, Creat, Gluc, K, Na, BUN); Future  - Basic metabolic panel  (Ca, Cl, CO2, Creat, Gluc, K, Na, BUN)      36 minutes spent by me on the date of the encounter doing chart review, history and exam, documentation and further activities per the note      See Patient Instructions    Marvel Menendez is a 63 year old, presenting for the following health issues:  Rectal Problem        4/2/2024    10:34 AM   Additional Questions   Roomed by Dai GARCIA MA   Accompanied by No one         4/2/2024    10:34 AM   Patient Reported Additional Medications   Patient reports taking the following new medications None     History of Present Illness       Reason for visit:  Blood in stool  Symptom onset:  More than a month  Symptoms include:   "Blood in stool  Symptom intensity:  Moderate  Symptom progression:  Worsening  Had these symptoms before:  No  What makes it worse:  No  What makes it better:  No    She eats 0-1 servings of fruits and vegetables daily.She consumes 0 sweetened beverage(s) daily.She exercises with enough effort to increase her heart rate 9 or less minutes per day.  She exercises with enough effort to increase her heart rate 3 or less days per week.   She is taking medications regularly.       For the past 2 months patients has had diarrhea 1-3 times per day. She had an episode of bright red bloody stool 2-3 weeks ago that lasted for a few stools and then resolved. Bloody stools returned last night. No nausea or vomiting. She notes episodes of feeling unsteady around once weekly X 2 months.     Colonscopy 2016: Hyperplastic polyps removed. Recommended follow up colonoscopy in 10 years.     Brother with history of colon cancer. No recent antibiotic use.     Review of Systems   Constitutional:  Positive for chills. Negative for diaphoresis and fever.   Respiratory:  Negative for shortness of breath.    Cardiovascular:  Negative for chest pain, palpitations and leg swelling.   Gastrointestinal:  Positive for abdominal pain (suprapubic) and diarrhea. Negative for nausea and vomiting.   Neurological:  Positive for dizziness. Negative for light-headedness.           Objective    /76   Pulse 102   Temp 97.6  F (36.4  C) (Temporal)   Resp 20   Ht 1.575 m (5' 2\")   Wt 103.8 kg (228 lb 12.8 oz)   SpO2 95%   BMI 41.85 kg/m    Body mass index is 41.85 kg/m .  Physical Exam  Vitals and nursing note reviewed.   Constitutional:       General: She is not in acute distress.     Appearance: Normal appearance.   HENT:      Head: Normocephalic and atraumatic.      Mouth/Throat:      Mouth: Mucous membranes are moist.      Pharynx: Oropharynx is clear.   Eyes:      Extraocular Movements: Extraocular movements intact.      Pupils: Pupils are " equal, round, and reactive to light.   Cardiovascular:      Rate and Rhythm: Normal rate and regular rhythm.      Heart sounds: Normal heart sounds.   Pulmonary:      Effort: Pulmonary effort is normal.      Breath sounds: Normal breath sounds.   Abdominal:      General: Bowel sounds are normal.      Palpations: Abdomen is soft.      Tenderness: There is abdominal tenderness (Diffuse, worst at RLQ). There is no guarding or rebound.   Musculoskeletal:         General: Normal range of motion.      Cervical back: Normal range of motion.   Skin:     General: Skin is warm and dry.   Neurological:      General: No focal deficit present.      Mental Status: She is alert.   Psychiatric:         Mood and Affect: Mood normal.         Behavior: Behavior normal.          EKG: NSR, PVCs present, no ST elevation/depression, normal axis, normal interval.          Signed Electronically by: Macey Simons PA-C

## 2024-04-03 LAB
ANION GAP SERPL CALCULATED.3IONS-SCNC: 9 MMOL/L (ref 7–15)
BUN SERPL-MCNC: 13.5 MG/DL (ref 8–23)
CALCIUM SERPL-MCNC: 9.9 MG/DL (ref 8.8–10.2)
CHLORIDE SERPL-SCNC: 104 MMOL/L (ref 98–107)
CREAT BLD-MCNC: 0.8 MG/DL (ref 0.5–1)
CREAT SERPL-MCNC: 0.76 MG/DL (ref 0.51–0.95)
DEPRECATED HCO3 PLAS-SCNC: 25 MMOL/L (ref 22–29)
EGFRCR SERPLBLD CKD-EPI 2021: 88 ML/MIN/1.73M2
EGFRCR SERPLBLD CKD-EPI 2021: >60 ML/MIN/1.73M2
GLUCOSE SERPL-MCNC: 96 MG/DL (ref 70–99)
POTASSIUM SERPL-SCNC: 5 MMOL/L (ref 3.4–5.3)
SODIUM SERPL-SCNC: 138 MMOL/L (ref 135–145)

## 2024-04-03 PROCEDURE — 82565 ASSAY OF CREATININE: CPT

## 2024-04-05 ENCOUNTER — TELEPHONE (OUTPATIENT)
Dept: FAMILY MEDICINE | Facility: CLINIC | Age: 64
End: 2024-04-05
Payer: COMMERCIAL

## 2024-04-05 NOTE — TELEPHONE ENCOUNTER
Gemma is calling to inquire if she can  her stool collection containers when she goes to [place] for her bone scan. Orders are in [facility]. She should go to lab to .  Maria Del Rosario CARY RN

## 2024-04-07 ENCOUNTER — MYC MEDICAL ADVICE (OUTPATIENT)
Dept: FAMILY MEDICINE | Facility: CLINIC | Age: 64
End: 2024-04-07
Payer: COMMERCIAL

## 2024-04-08 NOTE — TELEPHONE ENCOUNTER
Macey Ronak is not in clinic today, back on 4/15.  She was seen 4/2/24 in clinic by Macey Simons, same day provider.      Her PCP is Dr. Silver (also not in clinic today).    Routed to covering provider pool for Dr. Silver as no same day providers are in today.   Appears patient needs confirmation of plan (ER visit needed?).    Shena TORRES RN  [facility] Triage

## 2024-04-08 NOTE — TELEPHONE ENCOUNTER
RN called pt to triage, pt notes continuation of BM overnight and 2-3 BM today already. Bottom is sore from wiping.     Pt states she has less lightheadedness/dizziness from previous months prior. Back pain is chronic but has concerns that back pain occurs when having BM.     RN recommended ED visit for severe BM and concerns for dehydration. Pt states she drinks 8-10 bottles of water a day and does not have a concern for dehydration. Pt is open to going to ED but only after work later today. Pt is asking for advice from Macey Simons.     Ginna Starkey RN on 4/8/2024 at 12:18 PM

## 2024-04-08 NOTE — TELEPHONE ENCOUNTER
It looks like she had a CT and labwork done when she was here on 4-2.  Unless things have gotten a lot worse or she has dry mouth/less urination (signs of dehydration) I don't think the ER will necessarily do anything differently for diarrhea. She has some stool studies that have not been completed yet. Has she tried immodium OTC?  I would get an updated colonoscopy as well for ongoing diarrhea if the stool testing is negative/normal.    Shobha Oscar PA-C

## 2024-04-08 NOTE — TELEPHONE ENCOUNTER
I called and spoke to patient, she will try some immodium but maybe will come  the stool kits before she starts that.   She plans to come get the kits for stool testing tomorrow.    She is drinking mostly water, occasional gatorade.   I advised she might want to try more  gatorade to keep electrolytes up.   She says she is eating bananas as well.      She also has type 2 diabetes so has to monitor her sugar intake.    I advised ER if she is feeling weak, confused, not urinating, blood in stool or severe abdominal pain.    Patient verbalized understanding of and agreement with plan.    Shena TORRES RN  [facility] Triage

## 2024-04-09 ENCOUNTER — LAB (OUTPATIENT)
Dept: LAB | Facility: CLINIC | Age: 64
End: 2024-04-09
Payer: COMMERCIAL

## 2024-04-09 DIAGNOSIS — R10.84 ABDOMINAL PAIN, GENERALIZED: ICD-10-CM

## 2024-04-09 DIAGNOSIS — R19.7 DIARRHEA, UNSPECIFIED TYPE: ICD-10-CM

## 2024-04-09 NOTE — TELEPHONE ENCOUNTER
Noted.   Patient seen by Macey recently-  CT abdo/pelvis completed. Stool studies pending. Did patient  the kit?

## 2024-04-09 NOTE — TELEPHONE ENCOUNTER
"I see a lab \"visit\" today, patient did plan to come  the stool test kits so assume that was what that visit was for.    Await stool test results.    Shena TORRES RN  [facility] Triage        "

## 2024-04-10 LAB

## 2024-04-10 PROCEDURE — 87507 IADNA-DNA/RNA PROBE TQ 12-25: CPT

## 2024-04-10 PROCEDURE — 87493 C DIFF AMPLIFIED PROBE: CPT | Mod: 59

## 2024-04-18 ENCOUNTER — HOSPITAL ENCOUNTER (OUTPATIENT)
Dept: NUCLEAR MEDICINE | Facility: CLINIC | Age: 64
Setting detail: NUCLEAR MEDICINE
Discharge: HOME | End: 2024-04-18
Attending: PHYSICIAN ASSISTANT
Payer: COMMERCIAL

## 2024-04-18 DIAGNOSIS — R19.8 CHANGE IN BOWEL MOVEMENT: ICD-10-CM

## 2024-04-18 DIAGNOSIS — K62.5 RECTAL BLEEDING: ICD-10-CM

## 2024-04-18 DIAGNOSIS — D64.9 ANEMIA, UNSPECIFIED TYPE: Primary | ICD-10-CM

## 2024-04-18 DIAGNOSIS — M89.9 LESION OF PELVIC BONE: ICD-10-CM

## 2024-04-18 PROCEDURE — 78306 BONE IMAGING WHOLE BODY: CPT

## 2024-04-18 PROCEDURE — A9561 TC99M OXIDRONATE: HCPCS | Performed by: PHYSICIAN ASSISTANT

## 2024-04-18 PROCEDURE — 343N000001 HC RX 343: Performed by: PHYSICIAN ASSISTANT

## 2024-04-18 RX ORDER — FERROUS SULFATE 325(65) MG
325 TABLET ORAL
Qty: 90 TABLET | Refills: 0 | Status: SHIPPED | OUTPATIENT
Start: 2024-04-18 | End: 2024-07-22

## 2024-04-18 RX ADMIN — Medication 25.6 MILLICURIE: at 07:05

## 2024-04-19 ENCOUNTER — PATIENT OUTREACH (OUTPATIENT)
Dept: CARE COORDINATION | Facility: CLINIC | Age: 64
End: 2024-04-19
Payer: COMMERCIAL

## 2024-04-19 NOTE — PROGRESS NOTES
Clinic Care Coordination Contact  Follow Up Progress Note      Assessment: Patient noted that she has been focusing on her GI issues and not had the energy to focus on her goals. She is trying to have a positive outlook and change her attitude.    Care Gaps:    Health Maintenance Due   Topic Date Due    URINE DRUG SCREEN  Never done    INFLUENZA VACCINE (1) 09/01/2023    LIPID  02/24/2024    A1C  02/27/2024       Postponed to future appointments     Care Plans  Care Plan: Financial Wellbeing       Problem: Patient expresses financial resource strain       Long-Range Goal: Create an action plan to increase financial stability       Start Date: 6/27/2022 Expected End Date: 2/19/2025    This Visit's Progress: 40% Recent Progress: 40%    Note:     Barriers: course needed is not available (quick books)  Strengths: going to class's    Patient expressed understanding of goal: yes  Action steps to achieve this goal:  1. I will continue with classes  2. I will look for the course I need and schedule  3. I will complete my resume  4. I will work with the [employer] center to get the needed programs  5. I will reach out to [facility]  for support in rebuilding my credit                            Care Plan: Physical Activity       Problem: Patient is inactive       Long-Range Goal: Exercise at Least 20 Minutes per Day       Start Date: 4/27/2020 Expected End Date: 2/19/2025    This Visit's Progress: 30% Recent Progress: 30%    Note:     Barriers: Depression, back pain, social distancing to some extent  Strengths: Understand that increasing my exercise will help with managing my weight and back pain    Patient expressed understanding of goal: Yes  Action steps to achieve this goal:  1. I will walk in one direction for 5 minutes and then return for a total of 10 minutes  2. I will not get discouraged if I cannot make it the 5 minutes in one direction before having to turn around  3. I will celebrate my successes                               Intervention/Education provided during outreach: encouraged continued work on positive attitude.       Outreach Frequency: monthly, more frequently as needed      Plan: Pt attend testing to identify concerns.   Care Coordinator will follow up in one month.     JAQUELIN Mensah  [facility] Primary Care - Care Coordinator   4/19/2024   1:20 PM  [phone]

## 2024-04-21 ENCOUNTER — HEALTH MAINTENANCE LETTER (OUTPATIENT)
Age: 64
End: 2024-04-21

## 2024-04-26 ENCOUNTER — ANESTHESIA EVENT (OUTPATIENT)
Dept: GASTROENTEROLOGY | Facility: CLINIC | Age: 64
End: 2024-04-26
Payer: COMMERCIAL

## 2024-04-26 ASSESSMENT — LIFESTYLE VARIABLES: TOBACCO_USE: 1

## 2024-04-26 NOTE — ANESTHESIA PREPROCEDURE EVALUATION
Anesthesia Pre-Procedure Evaluation    Patient: Jessica Jay   MRN: [id number] : 1960        Procedure : Procedure(s):  Colonoscopy          Past Medical History:   Diagnosis Date     Cellulitis and abscess of foot, except toes 2010    [facility]     Chronic daily headache 02/15/2011     Complete rupture of rotator cuff 2009     Degenerative disc disease     cervical     Depression, major      Depressive disorder      Diabetes mellitus, type 2 (H)      Dizziness - light-headed 02/15/2011     GERD (gastroesophageal reflux disease)      Hypertension goal BP (blood pressure) < 140/90      LBP (low back pain)      Panic attacks      Seasonal allergies      Vulvar dermatitis 2012      Past Surgical History:   Procedure Laterality Date     ARTHROSCOPY SHOULDER      Right/spurs     CARPAL TUNNEL RELEASE RT/LT      Left     COLONOSCOPY       CRANIOTOMY, EXCISE TUMOR COMPLEX, COMBINED  2011    Procedure:COMBINED CRANIOTOMY, EXCISE TUMOR COMPLEX; 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; Surgeon:LEE ANN PAULA; Location:[facility] OR     ROTATOR CUFF REPAIR RT/LT       Left     [facility] FOOT/TOES SURGERY PROC UNLISTED      Toe fracture, left      Allergies   Allergen Reactions     Buspar [Buspirone Hcl] Rash     Lisinopril Cough     Seasonal Allergies      Seroquel [Quetiapine] Itching      Social History     Tobacco Use     Smoking status: Former     Current packs/day: 0.00     Average packs/day: 0.8 packs/day for 43.2 years (34.6 ttl pk-yrs)     Types: Cigarettes     Start date: 10/15/1975     Quit date: 2018     Years since quittin.3     Passive exposure: Past     Smokeless tobacco: Never     Tobacco comments:     1 PPD x 30. Quit 2017   Substance Use Topics     Alcohol use: Yes     Comment: very seldom drink      Wt Readings from Last 1 Encounters:   24 103.8 kg (228 lb 12.8 oz)        Anesthesia Evaluation   Pt has had prior  anesthetic. Type: General, MAC and Regional.        ROS/MED HX  ENT/Pulmonary:     (+) sleep apnea,    ELYSSA risk factors,      allergic rhinitis,     tobacco use, Past use,                       Neurologic:     (+)      migraines,                          Cardiovascular:     (+) Dyslipidemia hypertension- -   -  - -                                 Previous cardiac testing   Echo: Date: Results:    Stress Test:  Date: Results:    ECG Reviewed:  Date: 4/24 Results:  Sinus Rhythm -frequent multiform ectopic ventricular beats   Cath:  Date: Results:      METS/Exercise Tolerance:     Hematologic:  - neg hematologic  ROS     Musculoskeletal: Comment: DDD    (+)  arthritis,             GI/Hepatic: Comment: Traylor's esophagus      (+) GERD,                   Renal/Genitourinary:  - neg Renal ROS     Endo:     (+)  type II DM, Last HgA1c: 6.6, date: 11/23,           Obesity (Morbid),       Psychiatric/Substance Use:     (+) psychiatric history depression and anxiety       Infectious Disease:  - neg infectious disease ROS     Malignancy:  - neg malignancy ROS     Other:  - neg other ROS          Physical Exam    Airway        Mallampati: II   TM distance: > 3 FB   Neck ROM: full   Mouth opening: > 3 cm    Respiratory Devices and Support         Dental           Cardiovascular   cardiovascular exam normal          Pulmonary   pulmonary exam normal            OUTSIDE LABS:  CBC:   Lab Results   Component Value Date    WBC 7.2 04/02/2024    WBC 5.7 10/22/2020    HGB 11.1 (L) 04/02/2024    HGB 13.8 10/22/2020    HCT 36.1 04/02/2024    HCT 43.5 10/22/2020     04/02/2024     10/22/2020     BMP:   Lab Results   Component Value Date     04/02/2024     03/08/2023    POTASSIUM 5.0 04/02/2024    POTASSIUM 4.6 03/08/2023    CHLORIDE 104 04/02/2024    CHLORIDE 105 03/08/2023    CO2 25 04/02/2024    CO2 30 03/08/2023    BUN 13.5 04/02/2024    BUN 11 03/08/2023    CR 0.8 04/03/2024    CR 0.76 04/02/2024    GLC 96  "04/02/2024     (H) 03/08/2023     COAGS: No results found for: \"PTT\", \"INR\", \"FIBR\"  POC: No results found for: \"BGM\", \"HCG\", \"HCGS\"  HEPATIC:   Lab Results   Component Value Date    ALBUMIN 3.6 03/08/2023    PROTTOTAL 7.9 03/08/2023    ALT 33 03/08/2023    AST 18 03/08/2023    ALKPHOS 100 03/08/2023    BILITOTAL 0.5 03/08/2023     OTHER:   Lab Results   Component Value Date    PH 7.42 05/09/2011    A1C 6.6 (H) 11/27/2023    ALCIDES 9.9 04/02/2024    MAG 2.2 05/10/2011    LIPASE 96 09/04/2012    AMYLASE 52 09/04/2012    TSH 4.15 (H) 05/26/2023    T4 0.88 05/26/2023    SED 29 (H) 05/20/2010       Anesthesia Plan    ASA Status:  3       Anesthesia Type: General.   Induction: Propofol.   Maintenance: TIVA.        Consents    Anesthesia Plan(s) and associated risks, benefits, and realistic alternatives discussed. Questions answered and patient/representative(s) expressed understanding.     - Discussed: Risks, Benefits and Alternatives for BOTH SEDATION and the PROCEDURE were discussed     - Discussed with:  Patient            Postoperative Care    Pain management: IV analgesics, Oral pain medications, Multi-modal analgesia.   PONV prophylaxis: Ondansetron (or other 5HT-3), Dexamethasone or Solumedrol     Comments:             Enrique Roque, APRN CRNA    I have reviewed the pertinent notes and labs in the chart from the past 30 days and (re)examined the patient.  Any updates or changes from those notes are reflected in this note.              # DMII: A1C = N/A within past 6 months  # Severe Obesity: Estimated body mass index is 41.85 kg/m  as calculated from the following:    Height as of 4/2/24: 1.575 m (5' 2\").    Weight as of 4/2/24: 103.8 kg (228 lb 12.8 oz).      "

## 2024-04-29 ENCOUNTER — ANESTHESIA (OUTPATIENT)
Dept: GASTROENTEROLOGY | Facility: CLINIC | Age: 64
End: 2024-04-29
Payer: COMMERCIAL

## 2024-04-29 ENCOUNTER — HOSPITAL ENCOUNTER (OUTPATIENT)
Facility: CLINIC | Age: 64
Discharge: HOME | End: 2024-04-29
Attending: SURGERY | Admitting: SURGERY
Payer: COMMERCIAL

## 2024-04-29 VITALS
TEMPERATURE: 97 F | HEART RATE: 80 BPM | SYSTOLIC BLOOD PRESSURE: 105 MMHG | DIASTOLIC BLOOD PRESSURE: 55 MMHG | OXYGEN SATURATION: 97 % | RESPIRATION RATE: 16 BRPM

## 2024-04-29 LAB
COLONOSCOPY: NORMAL
GLUCOSE BLDC GLUCOMTR-MCNC: 131 MG/DL (ref 70–99)

## 2024-04-29 PROCEDURE — 45385 COLONOSCOPY W/LESION REMOVAL: CPT | Mod: PT | Performed by: SURGERY

## 2024-04-29 PROCEDURE — 250N000009 HC RX 250: Performed by: NURSE ANESTHETIST, CERTIFIED REGISTERED

## 2024-04-29 PROCEDURE — 370N000017 HC ANESTHESIA TECHNICAL FEE, PER MIN: Performed by: SURGERY

## 2024-04-29 PROCEDURE — 45385 COLONOSCOPY W/LESION REMOVAL: CPT | Mod: PT

## 2024-04-29 PROCEDURE — 88305 TISSUE EXAM BY PATHOLOGIST: CPT | Mod: TC | Performed by: SURGERY

## 2024-04-29 PROCEDURE — 258N000003 HC RX IP 258 OP 636: Performed by: NURSE ANESTHETIST, CERTIFIED REGISTERED

## 2024-04-29 PROCEDURE — 88305 TISSUE EXAM BY PATHOLOGIST: CPT | Mod: 26

## 2024-04-29 PROCEDURE — 82962 GLUCOSE BLOOD TEST: CPT

## 2024-04-29 PROCEDURE — 250N000011 HC RX IP 250 OP 636: Performed by: NURSE ANESTHETIST, CERTIFIED REGISTERED

## 2024-04-29 PROCEDURE — 45380 COLONOSCOPY AND BIOPSY: CPT | Performed by: SURGERY

## 2024-04-29 RX ORDER — LIDOCAINE HYDROCHLORIDE 20 MG/ML
INJECTION, SOLUTION INFILTRATION; PERINEURAL PRN
Status: DISCONTINUED | OUTPATIENT
Start: 2024-04-29 | End: 2024-04-29

## 2024-04-29 RX ORDER — LIDOCAINE 40 MG/G
CREAM TOPICAL
Status: DISCONTINUED | OUTPATIENT
Start: 2024-04-29 | End: 2024-04-29 | Stop reason: HOSPADM

## 2024-04-29 RX ORDER — ONDANSETRON 4 MG/1
4 TABLET, ORALLY DISINTEGRATING ORAL EVERY 30 MIN PRN
Status: DISCONTINUED | OUTPATIENT
Start: 2024-04-29 | End: 2024-04-29 | Stop reason: HOSPADM

## 2024-04-29 RX ORDER — OXYCODONE HYDROCHLORIDE 5 MG/1
10 TABLET ORAL
Status: DISCONTINUED | OUTPATIENT
Start: 2024-04-29 | End: 2024-04-29 | Stop reason: HOSPADM

## 2024-04-29 RX ORDER — NALOXONE HYDROCHLORIDE 0.4 MG/ML
0.1 INJECTION, SOLUTION INTRAMUSCULAR; INTRAVENOUS; SUBCUTANEOUS
Status: DISCONTINUED | OUTPATIENT
Start: 2024-04-29 | End: 2024-04-29 | Stop reason: HOSPADM

## 2024-04-29 RX ORDER — SODIUM CHLORIDE, SODIUM LACTATE, POTASSIUM CHLORIDE, CALCIUM CHLORIDE 600; 310; 30; 20 MG/100ML; MG/100ML; MG/100ML; MG/100ML
INJECTION, SOLUTION INTRAVENOUS CONTINUOUS
Status: DISCONTINUED | OUTPATIENT
Start: 2024-04-29 | End: 2024-04-29 | Stop reason: HOSPADM

## 2024-04-29 RX ORDER — PROPOFOL 10 MG/ML
INJECTION, EMULSION INTRAVENOUS PRN
Status: DISCONTINUED | OUTPATIENT
Start: 2024-04-29 | End: 2024-04-29

## 2024-04-29 RX ORDER — GLYCOPYRROLATE 0.2 MG/ML
INJECTION, SOLUTION INTRAMUSCULAR; INTRAVENOUS PRN
Status: DISCONTINUED | OUTPATIENT
Start: 2024-04-29 | End: 2024-04-29

## 2024-04-29 RX ORDER — PROPOFOL 10 MG/ML
INJECTION, EMULSION INTRAVENOUS CONTINUOUS PRN
Status: DISCONTINUED | OUTPATIENT
Start: 2024-04-29 | End: 2024-04-29

## 2024-04-29 RX ORDER — OXYCODONE HYDROCHLORIDE 5 MG/1
5 TABLET ORAL
Status: DISCONTINUED | OUTPATIENT
Start: 2024-04-29 | End: 2024-04-29 | Stop reason: HOSPADM

## 2024-04-29 RX ORDER — ONDANSETRON 2 MG/ML
4 INJECTION INTRAMUSCULAR; INTRAVENOUS EVERY 30 MIN PRN
Status: DISCONTINUED | OUTPATIENT
Start: 2024-04-29 | End: 2024-04-29 | Stop reason: HOSPADM

## 2024-04-29 RX ADMIN — PROPOFOL 200 MCG/KG/MIN: 10 INJECTION, EMULSION INTRAVENOUS at 13:06

## 2024-04-29 RX ADMIN — SODIUM CHLORIDE, POTASSIUM CHLORIDE, SODIUM LACTATE AND CALCIUM CHLORIDE: 600; 310; 30; 20 INJECTION, SOLUTION INTRAVENOUS at 12:11

## 2024-04-29 RX ADMIN — LIDOCAINE HYDROCHLORIDE 100 MG: 20 INJECTION, SOLUTION INFILTRATION; PERINEURAL at 13:06

## 2024-04-29 RX ADMIN — GLYCOPYRROLATE 0.1 MG: 0.2 INJECTION, SOLUTION INTRAMUSCULAR; INTRAVENOUS at 13:06

## 2024-04-29 RX ADMIN — PROPOFOL 100 MG: 10 INJECTION, EMULSION INTRAVENOUS at 13:06

## 2024-04-29 ASSESSMENT — ACTIVITIES OF DAILY LIVING (ADL)
ADLS_ACUITY_SCORE: 35

## 2024-04-29 NOTE — ANESTHESIA POSTPROCEDURE EVALUATION
Patient: Jessica Jay    Procedure: Procedure(s):  Colonoscopy with polypectomy and biopsies       Anesthesia Type:  General    Note:  Disposition: Outpatient   Postop Pain Control: Uneventful            Sign Out: Well controlled pain   PONV: No   Neuro/Psych: Uneventful            Sign Out: Acceptable/Baseline neuro status   Airway/Respiratory: Uneventful   CV/Hemodynamics: Uneventful            Sign Out: Acceptable CV status; No obvious hypovolemia; No obvious fluid overload   Other NRE: NONE   DID A NON-ROUTINE EVENT OCCUR? No           Last vitals:  Vitals Value Taken Time   /70 04/29/24 1345   Temp 36.1  C (97  F) 04/29/24 1339   Pulse 83 04/29/24 1345   Resp     SpO2 97 % 04/29/24 1350   Vitals shown include unfiled device data.    Electronically Signed By: SONIA Barnett CRNA  April 29, 2024  1:51 PM

## 2024-04-29 NOTE — LETTER
Jessica Jay  [address] [address]    May 6, 2024    Dear Jessica,  This letter is written to inform you of the results of your recent colonoscopy.  Your examination showed polyp(s) in your descending colon and sigmoid colon. All polyps were removed in their entirety and sent for review by a pathologist. As you will see on the pathology report below, the tissue(s) were tubular adenomatous polyps. Your examination was otherwise without abnormality; however, random biopsies were done to the entire colon to see if there are causes to your diarrhea.  Final pathology is as below.      A.  Colon, random biopsy:  -Negative for diagnostic colitis, dysplasia, or malignancy.     B.  Sigmoid colon, polyps (x 2, 3-5 mm), biopsy/polypectomy:  -Fragments of tubular adenoma(s); negative for high-grade dysplasia or malignancy.     C.  Descending colon, polyp (x 1, 6 mm), biopsy/polypectomy:  -Fragments of hyperplastic polyp.  -Focal features suggestive of diminutive tubular adenoma; negative for high-grade dysplasia or malignancy.     Adenomatous polyps are entirely benign (non-cancerous); however, patients who have developed these polyps are at an increased risk for developing additional polyps in the future. If these are not eventually removed, there is a risk of developing colon cancer. We will advise more frequent examinations with you because of the risk associated with this type of polyp.    Hyperplastic polyps are entirely benign (non-cancerous) and rarely associated with the development of additional polyps or colorectal cancer.    Given these findings, your personal history of tubular adenoma and your family history of colon cancer in your brother, I recommend that you undergo a repeat colonoscopy in 3 year(s) for surveillance. We will enter you into a recall system so you receive a reminder closer to the time that you are due for repeat examination.     Please remember that this  recommendation is made with the understanding that you are not experiencing persistent changes in bowel function, bleeding per rectum, and/or significant abdominal pain. If you experience these symptoms, please contact your primary care provider for a further evaluation.     If you have any questions or concerns about the results of your colonoscopy or the appropriate follow-up, please contact my assistant at [phone].    Sincerely,        [facility] DO [place] FACOS [facility]  ___

## 2024-04-29 NOTE — ANESTHESIA CARE TRANSFER NOTE
Patient: Jessica Jay    Procedure: Procedure(s):  Colonoscopy with polypectomy and biopsies       Diagnosis: Anemia, unspecified type [D64.9]  Diagnosis Additional Information: No value filed.    Anesthesia Type:   General     Note:    Oropharynx: oropharynx clear of all foreign objects  Level of Consciousness: drowsy  Oxygen Supplementation: nasal cannula    Independent Airway: airway patency satisfactory and stable  Dentition: dentition unchanged  Vital Signs Stable: post-procedure vital signs reviewed and stable  Report to RN Given: handoff report given  Patient transferred to: Phase II    Handoff Report: Identifed the Patient, Identified the Reponsible Provider, Reviewed the pertinent medical history, Discussed the surgical course, Reviewed Intra-OP anesthesia mangement and issues during anesthesia, Set expectations for post-procedure period and Allowed opportunity for questions and acknowledgement of understanding      Vitals:  Vitals Value Taken Time   /84 04/29/24 1339   Temp 36.1  C (97  F) 04/29/24 1339   Pulse 93 04/29/24 1339   Resp     SpO2 93 % 04/29/24 1345   Vitals shown include unfiled device data.    Electronically Signed By: SONIA Barnett CRNA  April 29, 2024  1:46 PM

## 2024-04-29 NOTE — H&P
Former[facility]    Pre-Endoscopy History and Physical     Jessica Jay MRN# [id number]   YOB: 1960 Age: 63 year old     Date of Procedure: 4/29/2024  Primary care provider: Marybeth Silver  Type of Endoscopy: Colonoscopy with possible biopsy, possible polypectomy  Reason for Procedure: Anemia, rectal bleeding   Type of Anesthesia Anticipated: MAC    HPI:    Jessica is a 63 year old female who will be undergoing the above procedure. Last colon 2012 (benign polyp x 3); no family hx of colon ca; no blood thinners. Reports a 2 month history of diarrhea with intermittent lightheadedness and bright red blood per rectum x 2.     A history and physical has been performed. The patient's medications and allergies have been reviewed. The risks and benefits of the procedure and the sedation options and risks were discussed with the patient.  All questions were answered and informed consent was obtained.      She denies a personal or family history of anesthesia complications or bleeding disorders.     Patient Active Problem List   Diagnosis    Depression, major    Migraines    CARDIOVASCULAR SCREENING; LDL GOAL LESS THAN 160    Disc herniation    DDD (degenerative disc disease), lumbar    DDD (degenerative disc disease), cervical    Neck pain    Tobacco abuse    ELYSSA (obstructive sleep apnea)- severe (AHI 49)    Spondylolisthesis, grade 1    Chronic low back pain    Brain lipoma    History of colonic polyps    Gastroesophageal reflux disease without esophagitis    Hypertension goal BP (blood pressure) < 140/90    Traylor's esophagus determined by endoscopy    Bilateral edema of lower extremity    GABRIELA (generalized anxiety disorder)    Restless legs syndrome (RLS)    Diabetes mellitus, type 2 (H)    Morbid obesity (H)    Major depression, recurrent (H)        Past Medical History:   Diagnosis Date    Cellulitis and abscess of foot, except toes 09/26/2010    [facility]    Chronic daily  headache 02/15/2011    Complete rupture of rotator cuff 2009    Degenerative disc disease     cervical    Depression, major     Depressive disorder     Diabetes mellitus, type 2 (H)     Dizziness - light-headed 02/15/2011    GERD (gastroesophageal reflux disease)     Hypertension goal BP (blood pressure) < 140/90     LBP (low back pain)     Panic attacks     Seasonal allergies     Vulvar dermatitis 2012        Past Surgical History:   Procedure Laterality Date    ARTHROSCOPY SHOULDER      Right/spurs    CARPAL TUNNEL RELEASE RT/LT      Left    COLONOSCOPY  2016    CRANIOTOMY, EXCISE TUMOR COMPLEX, COMBINED  2011    Procedure:COMBINED CRANIOTOMY, EXCISE TUMOR COMPLEX; 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; Surgeon:LEE ANN PAULA; Location:[facility] OR    ROTATOR CUFF REPAIR RT/LT       Left    [facility] FOOT/TOES SURGERY PROC UNLISTED      Toe fracture, left       Social History     Tobacco Use    Smoking status: Former     Current packs/day: 0.00     Average packs/day: 0.8 packs/day for 43.2 years (34.6 ttl pk-yrs)     Types: Cigarettes     Start date: 10/15/1975     Quit date: 2018     Years since quittin.3     Passive exposure: Past    Smokeless tobacco: Never    Tobacco comments:     1 PPD x 30. Quit 2017   Substance Use Topics    Alcohol use: Yes     Comment: very seldom drink       Family History   Problem Relation Age of Onset    Macular Degeneration Mother         Dry    Hypertension Mother     C.A.D. Father     Cancer Maternal Grandmother     Cerebrovascular Disease Paternal Grandmother     Breast Cancer Paternal Grandmother     Cancer Brother     Cancer - colorectal Brother     Colon Cancer Brother     Diabetes Brother     Neurologic Disorder Sister         migraine    Glaucoma No family hx of        Prior to Admission medications    Medication Sig Start Date End Date Taking? Authorizing Provider   alcohol swab prep pads Use to swab area of  injection/haley as directed. 4/27/21   Marybeth Silver MD   atorvastatin (LIPITOR) 40 MG tablet Take 1 tablet (40 mg) by mouth daily 12/8/23   Marybeth Silver MD   blood glucose (ACCU-CHEK GUIDE) test strip USE TO TEST TWICE DAILY 9/3/23   Marybeth Silver MD   blood glucose (NO BRAND SPECIFIED) lancets standard Use to test blood sugar 2 times daily or as directed. 4/19/21   aMrybeth Silver MD   blood glucose monitoring (NO BRAND SPECIFIED) meter device kit Use to test blood sugar 2 times daily or as directed. 4/19/21   Marybeth Silver MD   clobetasol (TEMOVATE) 0.05 % external cream Apply sparingly to affected area twice daily as needed.  Do not apply to face. 5/3/22   Adam Painting MD   cyclobenzaprine (FLEXERIL) 5 MG tablet Take 1 tablet (5 mg) by mouth 3 times daily as needed for muscle spasms 4/15/22   Marybeth Silver MD   DULoxetine (CYMBALTA) 60 MG capsule Take 2 capsules (120 mg) by mouth daily 12/8/23   Marybeth Silver MD   ferrous sulfate (FEROSUL) 325 (65 Fe) MG tablet Take 1 tablet (325 mg) by mouth daily (with breakfast) 4/18/24   Marybeth Silver MD   furosemide (LASIX) 20 MG tablet TAKE 1 TABLET BY MOUTH TWICE DAILY 2/5/24   Marybeth Silver MD   insulin pen needle (BD BRANDY U/F) 32G X 4 MM miscellaneous Use 1 daily as directed. 9/7/23   Marybeth Silver MD   liraglutide (VICTOZA) 18 MG/3ML solution Inject 1.8 mg Subcutaneous daily 11/28/23   Marybeth Silver MD   losartan (COZAAR) 25 MG tablet Take 1 tablet (25 mg) by mouth daily 12/8/23   Marybeth Silver MD   metFORMIN (GLUCOPHAGE XR) 500 MG 24 hr tablet Take 2 tablets (1,000 mg) by mouth 2 times daily (with meals) 12/8/23   Marybeth Silver MD   metoprolol succinate ER (TOPROL XL) 25 MG 24 hr tablet Take 1 tablet (25 mg) by mouth daily 12/8/23   Marybeth Silver MD   omeprazole (PRILOSEC) 20 MG DR capsule Take 1 capsule by mouth daily as needed (for Gerd) 4/1/24   Marybeth Silver MD   traZODone  "(DESYREL) 50 MG tablet Take 1 tablet (50 mg) by mouth at bedtime 2/16/24   Marybeth Silver MD       Allergies   Allergen Reactions    Buspar [Buspirone Hcl] Rash    Lisinopril Cough    Seasonal Allergies     Seroquel [Quetiapine] Itching        REVIEW OF SYSTEMS:   5 point ROS negative except as noted above in HPI, including Gen., Resp., CV, GI &  system review.    PHYSICAL EXAM:   There were no vitals taken for this visit. Estimated body mass index is 41.85 kg/m  as calculated from the following:    Height as of 4/2/24: 1.575 m (5' 2\").    Weight as of 4/2/24: 103.8 kg (228 lb 12.8 oz).   Constitutional: Awake, alert, no acute distress.  Eyes: No scleral icterus.  Conjunctiva are without injection.  ENMT: Mucous membranes moist, dentition and gums are intact.   Neck: Soft, supple, trachea midline.    Endocrine: n/a   Lymphatic: There is no cervical, submandibularadenopathy.  Respiratory: normal effortgs   Cardiovascular: S1, S2  Abdomen: Non-distended, non-tender,  No masses,  Musculoskeletal: No spinal or CVA tenderness. Full range of motion in the upper and lower extremities.    Skin: No skin rashes or lesions to inspection.  No petechia.    Neurologic: alerted and oriented 3x  Psychiatric: The patient's affect is not blunted and mood is appropriate.  DIAGNOSTICS:    Not indicated    IMPRESSION   ASA Class 2 - Mild systemic disease    PLAN:   Plan for Colonoscopy with possible biopsy, possible polypectomy. We discussed the risks, benefits and alternatives and the patient wished to proceed.  Patient is cleared for the above procedure.    The above has been forwarded to the consulting provider.    Estelita Lerma DO, PGY - 2   [facility]        "

## 2024-05-02 LAB
PATH REPORT.COMMENTS IMP SPEC: NORMAL
PATH REPORT.COMMENTS IMP SPEC: NORMAL
PATH REPORT.FINAL DX SPEC: NORMAL
PATH REPORT.GROSS SPEC: NORMAL
PATH REPORT.MICROSCOPIC SPEC OTHER STN: NORMAL
PATH REPORT.RELEVANT HX SPEC: NORMAL
PHOTO IMAGE: NORMAL

## 2024-05-03 ENCOUNTER — VIRTUAL VISIT (OUTPATIENT)
Dept: SLEEP MEDICINE | Facility: CLINIC | Age: 64
End: 2024-05-03
Payer: COMMERCIAL

## 2024-05-03 VITALS
SYSTOLIC BLOOD PRESSURE: 105 MMHG | BODY MASS INDEX: 41.41 KG/M2 | HEIGHT: 62 IN | DIASTOLIC BLOOD PRESSURE: 55 MMHG | WEIGHT: 225 LBS

## 2024-05-03 DIAGNOSIS — G47.33 OSA (OBSTRUCTIVE SLEEP APNEA): Primary | ICD-10-CM

## 2024-05-03 DIAGNOSIS — G47.00 PERSISTENT INSOMNIA: ICD-10-CM

## 2024-05-03 PROCEDURE — 99214 OFFICE O/P EST MOD 30 MIN: CPT | Mod: 95 | Performed by: INTERNAL MEDICINE

## 2024-05-03 ASSESSMENT — SLEEP AND FATIGUE QUESTIONNAIRES
HOW LIKELY ARE YOU TO NOD OFF OR FALL ASLEEP IN A CAR, WHILE STOPPED FOR A FEW MINUTES IN TRAFFIC: WOULD NEVER DOZE
HOW LIKELY ARE YOU TO NOD OFF OR FALL ASLEEP WHEN YOU ARE A PASSENGER IN A CAR FOR AN HOUR WITHOUT A BREAK: SLIGHT CHANCE OF DOZING
HOW LIKELY ARE YOU TO NOD OFF OR FALL ASLEEP WHILE LYING DOWN TO REST IN THE AFTERNOON WHEN CIRCUMSTANCES PERMIT: SLIGHT CHANCE OF DOZING
HOW LIKELY ARE YOU TO NOD OFF OR FALL ASLEEP WHILE SITTING INACTIVE IN A PUBLIC PLACE: WOULD NEVER DOZE
HOW LIKELY ARE YOU TO NOD OFF OR FALL ASLEEP WHILE SITTING QUIETLY AFTER LUNCH WITHOUT ALCOHOL: WOULD NEVER DOZE
HOW LIKELY ARE YOU TO NOD OFF OR FALL ASLEEP WHILE SITTING AND READING: SLIGHT CHANCE OF DOZING
HOW LIKELY ARE YOU TO NOD OFF OR FALL ASLEEP WHILE SITTING AND TALKING TO SOMEONE: WOULD NEVER DOZE
HOW LIKELY ARE YOU TO NOD OFF OR FALL ASLEEP WHILE WATCHING TV: MODERATE CHANCE OF DOZING

## 2024-05-03 ASSESSMENT — PAIN SCALES - GENERAL: PAINLEVEL: NO PAIN (0)

## 2024-05-03 ASSESSMENT — PATIENT HEALTH QUESTIONNAIRE - PHQ9: SUM OF ALL RESPONSES TO PHQ QUESTIONS 1-9: 12

## 2024-05-03 NOTE — NURSING NOTE
PHQ9 =  12    Depression Response    Patient completed the PHQ-9 assessment for depression and scored >9? Yes  Question 9 on the PHQ-9 was positive for suicidality? No  Does patient have current mental health provider? Yes    Is this a virtual visit? Yes   Does patient have suicidal ideation (positive question 9)? No - offer to place Mental Health Referral.  Patient declined referral/not needed    I personally notified the following: visit provider       Has patient had flu shot for current/most recent flu season? If so, when? No      Is the patient currently in the state of [place]? YES    Visit mode:VIDEO    If the visit is dropped, the patient can be reconnected by: VIDEO VISIT: Text to cell phone:   Telephone Information:   Mobile [phone]       Will anyone else be joining the visit? NO  (If patient encounters technical issues they should call [phone])    How would you like to obtain your AVS? MyChart    Are changes needed to the allergy or medication list? No    Are refills needed on medications prescribed by this physician? NO    Reason for visit: RECHECK    Luann FORRESTER

## 2024-05-03 NOTE — PROGRESS NOTES
Virtual Visit Details    Type of service:  Video Visit   Video Start Time: 3:56 PM  Video End Time:4:11 PM  Originating Location (pt. Location): Home  Distant Location (provider location):  Off-site  Platform used for Video Visit: [employer]    Additional 10 minutes on the date of service was spent performing the following:    -Preparing to see the patient  -Ordering medications, tests, or procedures   -Documenting clinical information in the electronic or other health record     Thank you for the opportunity to participate in the care of Jessica Jay.     She is a 63 year old y/o female patient who comes to the sleep medicine clinic for follow up. The patient was diagnosed with ELYSSA on 06/22/2022 (AHI=5.1). This is the patient's 1st clinical visit since starting CPAP therapy. The patient reports that her sleep quality has improved a little. She still has some episodes of insomnia.     Assessment and Plan:  In summary Jessica Jay is a 63 year old year old female who is here for follow up.    1. ELYSSA (obstructive sleep apnea)  I congratulated the patient on her excellent CPAP usage. I will narrow her pressure to a set pressure of 13 cwp. RTC annually  - COMPREHENSIVE DME    2. Persistent insomnia  We discussed the option of initiating low dose Melatonin at bedtime to see if this will help.    Compliance Download data for 30 Days:  Compliance:80%  Pressure setting:APAP 5-15 cwp  95% pressure:12.9 cwp  Leak:Minimal  Residual AHI:2 events per hour  Mask Tolerance:Good  Skin irritation: [employer] [place]  DME:[facility].    Lab reviewed: Discussed with patient.    Cpap Fu Template    Question 4/27/2024 12:12 PM CDT - Filed by Patient   Do you use a CPAP Machine at home? Yes   Overall, on a scale of 0-10 how would you rate your CPAP? 10   Is your mask comfortable? Yes   Is your mask leaking? No   Do you notice snoring with mask on? No   Do you notice gasping arousals with mask on? No   Are you having significant  oral or nasal dryness? No   Is the pressure setting comfortable? Yes   What type of mask do you use? Nasal Pillow   What is your typical bedtime? 9 pm   How long does it take you to go to sleep on PAP therapy? 5 minutes to not sleeping at all.   What time do you typically get out of bed for the day? 9 am   How many hours on average per night are you using PAP therapy? 8 hours   How many hours are you sleeping per night? 8   Do you feel well rested in the morning? No       EDDIE:  EDDIE Total Score: 12  Total score - [place]: 5 (5/3/2024  3:46 PM)    Failed to redirect to the Timeline version of the [employer] SmartLink.   Patient Active Problem List   Diagnosis    Depression, major    Migraines    CARDIOVASCULAR SCREENING; LDL GOAL LESS THAN 160    Disc herniation    DDD (degenerative disc disease), lumbar    DDD (degenerative disc disease), cervical    Neck pain    Tobacco abuse    ELYSSA (obstructive sleep apnea)- severe (AHI 49)    Spondylolisthesis, grade 1    Chronic low back pain    Brain lipoma    History of colonic polyps    Gastroesophageal reflux disease without esophagitis    Hypertension goal BP (blood pressure) < 140/90    Traylor's esophagus determined by endoscopy    Bilateral edema of lower extremity    GABRIELA (generalized anxiety disorder)    Restless legs syndrome (RLS)    Diabetes mellitus, type 2 (H)    Morbid obesity (H)    Major depression, recurrent (H)       Past Medical History:   Diagnosis Date    Cellulitis and abscess of foot, except toes 09/26/2010    [facility]    Chronic daily headache 02/15/2011    Complete rupture of rotator cuff 07/06/2009    Degenerative disc disease     cervical    Depression, major     Depressive disorder     Diabetes mellitus, type 2 (H)     Dizziness - light-headed 02/15/2011    GERD (gastroesophageal reflux disease)     Hypertension goal BP (blood pressure) < 140/90     LBP (low back pain)     Panic attacks     Seasonal allergies     Vulvar dermatitis 07/17/2012        Past Surgical History:   Procedure Laterality Date    ARTHROSCOPY SHOULDER  1990    Right/spurs    CARPAL TUNNEL RELEASE RT/LT  1990    Left    COLONOSCOPY  2016    COLONOSCOPY N/A 4/29/2024    Procedure: Colonoscopy with polypectomy and biopsies;  Surgeon: Ruben Keller MD;  Location: [facility]    CRANIOTOMY, EXCISE TUMOR COMPLEX, COMBINED  5/9/2011    Procedure:COMBINED CRANIOTOMY, EXCISE TUMOR COMPLEX; 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; Surgeon:LEE ANN PAULA; Location:[facility]    ROTATOR CUFF REPAIR RT/LT  2009     Left    [facility] FOOT/TOES SURGERY PROC UNLISTED  1975    Toe fracture, left       Current Outpatient Medications   Medication Sig Dispense Refill    alcohol swab prep pads Use to swab area of injection/haley as directed. 100 each 3    atorvastatin (LIPITOR) 40 MG tablet Take 1 tablet (40 mg) by mouth daily 90 tablet 3    blood glucose (ACCU-CHEK GUIDE) test strip USE TO TEST TWICE DAILY 200 strip 0    blood glucose (NO BRAND SPECIFIED) lancets standard Use to test blood sugar 2 times daily or as directed. 100 lancet 3    blood glucose monitoring (NO BRAND SPECIFIED) meter device kit Use to test blood sugar 2 times daily or as directed. 1 kit 0    clobetasol (TEMOVATE) 0.05 % external cream Apply sparingly to affected area twice daily as needed.  Do not apply to face. 120 g 3    cyclobenzaprine (FLEXERIL) 5 MG tablet Take 1 tablet (5 mg) by mouth 3 times daily as needed for muscle spasms 42 tablet 0    DULoxetine (CYMBALTA) 60 MG capsule Take 2 capsules (120 mg) by mouth daily 180 capsule 1    ferrous sulfate (FEROSUL) 325 (65 Fe) MG tablet Take 1 tablet (325 mg) by mouth daily (with breakfast) 90 tablet 0    furosemide (LASIX) 20 MG tablet TAKE 1 TABLET BY MOUTH TWICE DAILY 180 tablet 0    insulin pen needle (BD BRANDY U/F) 32G X 4 MM miscellaneous Use 1 daily as directed. 100 each 4    losartan (COZAAR) 25 MG tablet Take 1 tablet (25 mg) by mouth daily 90 tablet 3     metFORMIN (GLUCOPHAGE XR) 500 MG 24 hr tablet Take 2 tablets (1,000 mg) by mouth 2 times daily (with meals) 360 tablet 3    metoprolol succinate ER (TOPROL XL) 25 MG 24 hr tablet Take 1 tablet (25 mg) by mouth daily 90 tablet 3    omeprazole (PRILOSEC) 20 MG DR capsule Take 1 capsule by mouth daily as needed (for Gerd) 90 capsule 0    traZODone (DESYREL) 50 MG tablet Take 1 tablet (50 mg) by mouth at bedtime 30 tablet 1    liraglutide (VICTOZA) 18 MG/3ML solution Inject 1.8 mg Subcutaneous daily 9 mL 2       Allergies   Allergen Reactions    Buspar [Buspirone Hcl] Rash    Lisinopril Cough    Seasonal Allergies     Seroquel [Quetiapine] Itching       Visual  Exam:  GEN: NAD,  Psych: normal mood, normal affect  Labs/Studies:      Lab Results   Component Value Date    PH 7.42 05/09/2011    PO2 177 (H) 05/09/2011    PCO2 32 (L) 05/09/2011    HCO3 20 (L) 05/09/2011     Lab Results   Component Value Date    TSH 4.15 (H) 05/26/2023    TSH 4.60 (H) 03/08/2023     Lab Results   Component Value Date     (H) 04/29/2024    GLC 96 04/02/2024     Lab Results   Component Value Date    HGB 11.1 (L) 04/02/2024    HGB 13.8 10/22/2020     Lab Results   Component Value Date    BUN 13.5 04/02/2024    BUN 11 03/08/2023    CR 0.8 04/03/2024    CR 0.76 04/02/2024     Lab Results   Component Value Date    AST 18 03/08/2023    AST 24 10/23/2021    ALT 33 03/08/2023    ALT 35 10/23/2021    ALKPHOS 100 03/08/2023    ALKPHOS 79 10/23/2021    BILITOTAL 0.5 03/08/2023    BILITOTAL 0.4 10/23/2021     Lab Results   Component Value Date    BENZODIAZEUR Negative 08/13/2018    UCANN Negative 08/13/2018    UCOC Negative 08/13/2018    OPIT Negative 08/13/2018       Recent Labs   Lab Test 04/29/24  1210 04/03/24  0835 04/02/24  1205 03/08/23  0939   NA  --   --  138 136   POTASSIUM  --   --  5.0 4.6   CHLORIDE  --   --  104 105   CO2  --   --  25 30   ANIONGAP  --   --  9 1*   *  --  96 164*   BUN  --   --  13.5 11   CR  --  0.8 0.76 0.82    ALCIDES  --   --  9.9 9.8       Ferritin   Date Value Ref Range Status   08/23/2011 37 10 - 300 ng/mL Final       I reviewed the efficacy and compliance report from her device. Data summarized on the HPI and the PAP compliance flow sheet.     Patient verbalized understanding of these issues, agrees with the plan and all questions were answered today. Patient was given an opportuntity to voice any other symptoms or concerns not listed above. Patient did not have any other symptoms or concerns.      Brent Higgins DO  Board Certified in Internal Medicine and Sleep Medicine    (Note created with Dragon voice recognition and unintended spelling errors and word substitutions may occur)     Audio and visual devices were used for this virtual clinic visit with permission from patient.

## 2024-05-07 DIAGNOSIS — I10 HYPERTENSION GOAL BP (BLOOD PRESSURE) < 140/90: ICD-10-CM

## 2024-05-07 RX ORDER — FUROSEMIDE 20 MG
TABLET ORAL
Qty: 180 TABLET | Refills: 2 | Status: SHIPPED | OUTPATIENT
Start: 2024-05-07

## 2024-05-20 ENCOUNTER — PATIENT OUTREACH (OUTPATIENT)
Dept: CARE COORDINATION | Facility: CLINIC | Age: 64
End: 2024-05-20
Payer: COMMERCIAL

## 2024-05-20 NOTE — LETTER
It was a pleasure to speak with you.  I would like to provide you with the enclosed information for your records.  As part of care coordination, we are developing care plans to assist in accomplishing your health care goals.  When we speak next, please feel free to let me know if you want to add or change any information on your care plans.    As always, feel free to contact me if you have any questions or concerns.  I look forward to working with you in the effort to achieve your health care and wellness goals .        Sincerely,      Bell Brito, [facility]  Clinic Care Coordination  [phone]    [facility]  Patient Centered Plan of Care  About Me:        Patient Name:  Jessica Jay    YOB: 1960  Age:         63 year old   [facility] MRN:    [id number] Telephone Information:  Home Phone [phone]   Mobile [phone]       Address:  [address] [place] [id number] Email address:  [email or web address]      Emergency Contact(s)    Name Relationship Lgl Grd Work Phone Home Phone Mobile Phone   1. NOAH TOBIAS Sister No   [phone]   2. ILIANA JAY Mother No   [phone]           Primary language:  English     needed? No   [facility]:  [phone] op. 1  Other communication barriers:Glasses    Preferred Method of Communication:  Portia  Current living arrangement: I live alone    Mobility Status/ Medical Equipment: Independent        Health Maintenance  Health Maintenance Reviewed: Due/Overdue   Health Maintenance Due   Topic Date Due    URINE DRUG SCREEN  Never done    LIPID  02/24/2024    A1C  02/27/2024           My Access Plan  Medical Emergency 911   Primary Clinic [facility] [phone]   24 Hour Appointment Line [phone] or  [phone]) (toll-free)   24 Hour Nurse Line [phone] (toll-free)   Preferred Urgent Care Other     Preferred [facility]   [phone]     Preferred Pharmacy [facility]  [place] [place] [facility] [address]     Behavioral Health Crisis Line The National Suicide Prevention Lifeline at 1-525.253.3250 or Text/Call 988           My Care Team Members  Patient Care Team         Relationship Specialty Notifications Start End    Marybeth Silver MD PCP - General Family Practice  5/16/18     Referred to Dermatology    Phone: [phone] Fax: [phone] [address] [place] [address]    Marybeth Silver MD Assigned PCP   4/15/18     Phone: [phone] Fax: [phone] [address] [place] [address]    Bell Brito LSW Lead Care Coordinator Primary Care - CC Admissions 1/29/20     Phone: [phone] Fax: [phone]        Mayela Plummer [facility] Pharmacist Pharmacist  5/19/21     Phone: [phone] Fax: [phone] [address] [facility] [address]    Shobha Donovan RD Diabetes Educator Dietitian, Registered  8/18/22     Phone: [phone] Fax: [phone]        Brenda Ortiz APRN CNP Nurse Practitioner Dermatology  12/14/23     Phone: [phone] Fax: [phone] [address] [facility] [place] [address]    Brenda Ortiz APRN CNP Assigned Surgical Provider   2/23/24     Phone: [phone] Fax: [phone] [address] [facility] [address]    Brent Higgins DO Assigned Sleep Provider   3/23/24     Phone: [phone] Fax: [phone] [address] [address]                My Care Plans  Self Management and Treatment Plan    Care Plan  Care Plan: Financial Wellbeing       Problem: Patient expresses financial resource strain       Long-Range Goal: Create an action plan to increase financial stability       Start Date: 6/27/2022 Expected End Date: 2/19/2025    This Visit's Progress: 50% Recent Progress: 40%    Note:     Barriers: course needed is not available (quick books)  Strengths: going to  class's    Patient expressed understanding of goal: yes  Action steps to achieve this goal:  1. I will continue with classes  2. I will look for the course I need and schedule  3. I will complete my resume  4. I will work with the [employer] [facility] to get the needed programs  5. I will reach out to [facility]  for support in rebuilding my credit                            Care Plan: Physical Activity       Problem: Patient is inactive       Long-Range Goal: Exercise at Least 20 Minutes per Day       Start Date: 4/27/2020 Expected End Date: 2/19/2025    This Visit's Progress: 30% Recent Progress: 30%    Note:     Barriers: Depression, back pain, social distancing to some extent  Strengths: Understand that increasing my exercise will help with managing my weight and back pain    Patient expressed understanding of goal: Yes  Action steps to achieve this goal:  1. I will walk in one direction for 5 minutes and then return for a total of 10 minutes  2. I will not get discouraged if I cannot make it the 5 minutes in one direction before having to turn around  3. I will celebrate my successes                              Action Plans on File:            Depression          Advance Care Plans/Directives:   Advanced Care Plan/Directives on file:   Yes    Status of Document(s): No data recorded  Advanced Care Plan/Directives Type:   Advanced Directive - On File           My Medical and Care Information  Problem List   Patient Active Problem List   Diagnosis    Depression, major    Migraines    CARDIOVASCULAR SCREENING; LDL GOAL LESS THAN 160    Disc herniation    DDD (degenerative disc disease), lumbar    DDD (degenerative disc disease), cervical    Neck pain    Tobacco abuse    ELYSSA (obstructive sleep apnea)- severe (AHI 49)    Spondylolisthesis, grade 1    Chronic low back pain    Brain lipoma    History of colonic polyps    Gastroesophageal reflux disease without esophagitis    Hypertension goal BP (blood  pressure) < 140/90    Traylor's esophagus determined by endoscopy    Bilateral edema of lower extremity    GABRIELA (generalized anxiety disorder)    Restless legs syndrome (RLS)    Diabetes mellitus, type 2 (H)    Morbid obesity (H)    Major depression, recurrent (H)      Current Medications and Allergies:     Allergies   Allergen Reactions    Buspar [Buspirone Hcl] Rash    Lisinopril Cough    Seasonal Allergies     Seroquel [Quetiapine] Itching         Current Outpatient Medications:     alcohol swab prep pads, Use to swab area of injection/haley as directed., Disp: 100 each, Rfl: 3    atorvastatin (LIPITOR) 40 MG tablet, Take 1 tablet (40 mg) by mouth daily, Disp: 90 tablet, Rfl: 3    blood glucose (ACCU-CHEK GUIDE) test strip, USE TO TEST TWICE DAILY, Disp: 200 strip, Rfl: 0    blood glucose (NO BRAND SPECIFIED) lancets standard, Use to test blood sugar 2 times daily or as directed., Disp: 100 lancet, Rfl: 3    blood glucose monitoring (NO BRAND SPECIFIED) meter device kit, Use to test blood sugar 2 times daily or as directed., Disp: 1 kit, Rfl: 0    clobetasol (TEMOVATE) 0.05 % external cream, Apply sparingly to affected area twice daily as needed.  Do not apply to face., Disp: 120 g, Rfl: 3    cyclobenzaprine (FLEXERIL) 5 MG tablet, Take 1 tablet (5 mg) by mouth 3 times daily as needed for muscle spasms, Disp: 42 tablet, Rfl: 0    DULoxetine (CYMBALTA) 60 MG capsule, Take 2 capsules (120 mg) by mouth daily, Disp: 180 capsule, Rfl: 1    ferrous sulfate (FEROSUL) 325 (65 Fe) MG tablet, Take 1 tablet (325 mg) by mouth daily (with breakfast), Disp: 90 tablet, Rfl: 0    furosemide (LASIX) 20 MG tablet, TAKE 1 TABLET BY MOUTH TWICE DAILY, Disp: 180 tablet, Rfl: 2    insulin pen needle (BD BRANDY U/F) 32G X 4 MM miscellaneous, Use 1 daily as directed., Disp: 100 each, Rfl: 4    liraglutide (VICTOZA) 18 MG/3ML solution, Inject 1.8 mg Subcutaneous daily, Disp: 9 mL, Rfl: 2    losartan (COZAAR) 25 MG tablet, Take 1 tablet (25  mg) by mouth daily, Disp: 90 tablet, Rfl: 3    metFORMIN (GLUCOPHAGE XR) 500 MG 24 hr tablet, Take 2 tablets (1,000 mg) by mouth 2 times daily (with meals), Disp: 360 tablet, Rfl: 3    metoprolol succinate ER (TOPROL XL) 25 MG 24 hr tablet, Take 1 tablet (25 mg) by mouth daily, Disp: 90 tablet, Rfl: 3    omeprazole (PRILOSEC) 20 MG DR capsule, Take 1 capsule by mouth daily as needed (for Gerd), Disp: 90 capsule, Rfl: 0    traZODone (DESYREL) 50 MG tablet, Take 1 tablet (50 mg) by mouth at bedtime, Disp: 30 tablet, Rfl: 1    Current Facility-Administered Medications:     lidocaine 1 % injection 0.5 mL, 0.5 mL, , , Ki May MD, 0.5 mL at 05/04/20 1021    lidocaine 1 % injection 2 mL, 2 mL, , , Francisco Farris DO, 2 mL at 06/07/22 1207    triamcinolone (KENALOG-40) injection 20 mg, 20 mg, , , Ki May MD, 20 mg at 05/04/20 1021    triamcinolone (KENALOG-40) injection 40 mg, 40 mg, , , Francisco Farris DO, 40 mg at 06/07/22 1207    triamcinolone (KENALOG-40) injection 40 mg, 40 mg, , , Francisco Farris DO, 40 mg at 11/26/21 1709      Care Coordination Start Date: 1/29/2020   Frequency of Care Coordination: monthly, more frequently as needed     Form Last Updated: 05/20/2024

## 2024-05-20 NOTE — PROGRESS NOTES
Clinic Care Coordination Contact  Follow Up Progress Note      Assessment: patient continues to have some health concerns and will be seeing her provider tomorrow.    She continues to have up and down moods.  She had been able to go watch some ball games that are close by and sat in the sun for a couple hours.  She continues to look for work.     Care Gaps:    Health Maintenance Due   Topic Date Due    URINE DRUG SCREEN  Never done    LIPID  02/24/2024    A1C  02/27/2024       Postponed to provider appt     Care Plans  Care Plan: Financial Wellbeing       Problem: Patient expresses financial resource strain       Long-Range Goal: Create an action plan to increase financial stability       Start Date: 6/27/2022 Expected End Date: 2/19/2025    This Visit's Progress: 50% Recent Progress: 40%    Note:     Barriers: course needed is not available (quick books)  Strengths: going to class's    Patient expressed understanding of goal: yes  Action steps to achieve this goal:  1. I will continue with classes  2. I will look for the course I need and schedule  3. I will complete my resume  4. I will work with the [employer] [facility] to get the needed programs  5. I will reach out to [facility]  for support in rebuilding my credit                            Care Plan: Physical Activity       Problem: Patient is inactive       Long-Range Goal: Exercise at Least 20 Minutes per Day       Start Date: 4/27/2020 Expected End Date: 2/19/2025    This Visit's Progress: 30% Recent Progress: 30%    Note:     Barriers: Depression, back pain, social distancing to some extent  Strengths: Understand that increasing my exercise will help with managing my weight and back pain    Patient expressed understanding of goal: Yes  Action steps to achieve this goal:  1. I will walk in one direction for 5 minutes and then return for a total of 10 minutes  2. I will not get discouraged if I cannot make it the 5 minutes in one direction before  having to turn around  3. I will celebrate my successes                              Intervention/Education provided during outreach: encouraged pt to continue to find positives each day.      Outreach Frequency: monthly, more frequently as needed      Plan:   Pt to work on her finances and exercises.  Pt to find positives each day to help her focus on her goals.   Care Coordinator will follow up in one month.  Care plan sent today.     JAQUELIN Mensah  [facility] - Care Coordinator   5/20/2024   10:39 AM  [phone]

## 2024-05-21 ENCOUNTER — VIRTUAL VISIT (OUTPATIENT)
Dept: FAMILY MEDICINE | Facility: CLINIC | Age: 64
End: 2024-05-21
Payer: COMMERCIAL

## 2024-05-21 DIAGNOSIS — D64.9 MILD ANEMIA: ICD-10-CM

## 2024-05-21 DIAGNOSIS — F33.1 MODERATE EPISODE OF RECURRENT MAJOR DEPRESSIVE DISORDER (H): ICD-10-CM

## 2024-05-21 DIAGNOSIS — E11.65 TYPE 2 DIABETES MELLITUS WITH HYPERGLYCEMIA, WITHOUT LONG-TERM CURRENT USE OF INSULIN (H): Primary | ICD-10-CM

## 2024-05-21 DIAGNOSIS — R19.7 DIARRHEA, UNSPECIFIED TYPE: ICD-10-CM

## 2024-05-21 DIAGNOSIS — F41.1 GAD (GENERALIZED ANXIETY DISORDER): ICD-10-CM

## 2024-05-21 DIAGNOSIS — E78.5 HYPERLIPIDEMIA LDL GOAL <100: ICD-10-CM

## 2024-05-21 PROCEDURE — 99214 OFFICE O/P EST MOD 30 MIN: CPT | Mod: 95 | Performed by: FAMILY MEDICINE

## 2024-05-21 RX ORDER — DULOXETIN HYDROCHLORIDE 60 MG/1
120 CAPSULE, DELAYED RELEASE ORAL DAILY
Qty: 180 CAPSULE | Refills: 1 | Status: CANCELLED | OUTPATIENT
Start: 2024-05-21

## 2024-05-21 RX ORDER — TRAZODONE HYDROCHLORIDE 50 MG/1
50 TABLET, FILM COATED ORAL AT BEDTIME
Qty: 30 TABLET | Refills: 1 | Status: CANCELLED | OUTPATIENT
Start: 2024-05-21

## 2024-05-21 RX ORDER — BLOOD SUGAR DIAGNOSTIC
STRIP MISCELLANEOUS
Qty: 200 STRIP | Refills: 0 | Status: CANCELLED | OUTPATIENT
Start: 2024-05-21

## 2024-05-21 RX ORDER — GLUCOSAMINE HCL/CHONDROITIN SU 500-400 MG
CAPSULE ORAL
Qty: 100 EACH | Refills: 3 | Status: CANCELLED | OUTPATIENT
Start: 2024-05-21

## 2024-05-21 RX ORDER — CYCLOBENZAPRINE HCL 5 MG
5 TABLET ORAL 3 TIMES DAILY PRN
Qty: 42 TABLET | Refills: 0 | Status: CANCELLED | OUTPATIENT
Start: 2024-05-21

## 2024-05-21 RX ORDER — LIRAGLUTIDE 6 MG/ML
1.8 INJECTION SUBCUTANEOUS DAILY
Qty: 9 ML | Refills: 2 | Status: SHIPPED | OUTPATIENT
Start: 2024-05-21 | End: 2024-09-03

## 2024-05-21 NOTE — PROGRESS NOTES
Gemma is a 63 year old who is being evaluated via a billable video visit.    How would you like to obtain your AVS? [facility]  If the video visit is dropped, the invitation should be resent by: Text to cell phone: [phone]  Will anyone else be joining your video visit? No      Assessment & Plan     Gemma was seen today for gastrointestinal problem.    Diagnoses and all orders for this visit:    Type 2 diabetes mellitus with hyperglycemia, without long-term current use of insulin (H), due for an A1c  -     HEMOGLOBIN A1C; Future  -     REVIEW OF HEALTH MAINTENANCE PROTOCOL ORDERS  -     Refill; liraglutide (VICTOZA) 18 MG/3ML solution; Inject 1.8 mg Subcutaneous daily  -     On Metformin therapy.     Diarrhea, unspecified type, improving       -      Labs, CT abdo/pelvis and colonoscopy- unremarkable       -      Consider referral to GI if symptoms worsen.     GABRIELA (generalized anxiety disorder), stable      -    PHQ-9/GABRIELA 7 completed, see below/Epic for details        -    On Cymbalta. Continue Psychotherapy counseling     Moderate episode of recurrent major depressive disorder (H)     -     PHQ-9/GABRIELA 7 completed, see below/Epic for details       -     On Cymbalta. Continue Psychotherapy counseling     Hyperlipidemia LDL goal <100  -     Lipid panel reflex to direct LDL Fasting; Future  -     On Atorvastatin 40 mg/day    Mild anemia  -     CBC with platelets; Future          Return in about 3 months (around 8/21/2024) for Diabetes Follow Up with a HgbA1C prior to visit.      Subjective   Gemma is a 63 year old, presenting for the following health issues:  Gastrointestinal Problem        5/21/2024     7:38 AM   Additional Questions   Roomed by Patient completed echeck in via [employer]     Video Start Time:  4:00 pm    History of Present Illness       Reason for visit:  [place] and other problems    She eats 0-1 servings of fruits and vegetables daily.She consumes 0 sweetened beverage(s) daily.She exercises with enough  effort to increase her heart rate 9 or less minutes per day.  She exercises with enough effort to increase her heart rate 3 or less days per week.   She is taking medications regularly.     Patient reports that she lost her job about 5 months ago.  Since then she's had diarrhea and abdominal pain.   Was seen and evaluated about a month ago on 4/2/24 by Macey Simons- see Epic for details.   Had labs, stool tests and CT Abdo/pelvis that were unremarkable.  Noted mild anemia. Will repeat labs.     Had a Colonoscopy done as well on 4/29/24 that revealed no colitis, dysplasia or malignancy.     Today she reports that the diarrhea has improved, stool is well formed but goes about 3-4 x/day.     Has an underlying known history of Major depressive disorder with generalized anxiety disorder, slightly worsened since she lost her job.   Still taking Cymbalta and seeing a Therapist and feels this is helping.   Looking for a job.         5/3/2024     3:41 PM   Last PHQ-9   1.  Little interest or pleasure in doing things 3   2.  Feeling down, depressed, or hopeless 1   3.  Trouble falling or staying asleep, or sleeping too much 3   4.  Feeling tired or having little energy 3   5.  Poor appetite or overeating 0   6.  Feeling bad about yourself 0   7.  Trouble concentrating 0   8.  Moving slowly or restless 2   Q9: Thoughts of better off dead/self-harm past 2 weeks 0   PHQ-9 Total Score 12   Difficulty at work, home, or with people Somewhat difficult         12/8/2023     3:59 PM   GABRIELA-7    1. Feeling nervous, anxious, or on edge 0   2. Not being able to stop or control worrying 0   3. Worrying too much about different things 0   4. Trouble relaxing 0   5. Being so restless that it is hard to sit still 0   6. Becoming easily annoyed or irritable 0   7. Feeling afraid, as if something awful might happen 0   GABRIELA-7 Total Score 0   If you checked any problems, how difficult have they made it for you to do your work, take care of things  at home, or get along with other people? Not difficult at all     In the past two weeks have you had thoughts of suicide or self-harm?  No.    Do you have concerns about your personal safety or the safety of others?   No        Diabetes Follow-up  Currently on metformin 1000 mg twice daily and Victoza 1.8 mg subcu daily.   Reports that she ran out of her Victoza about a month ago and has not been using it.   Due for an A1c check-plans to schedule lab appointment.  How often are you checking your blood sugar? A few times a month  What time of day are you checking your blood sugars (select all that apply)?  Before meals  Have you had any blood sugars above 200?  No  Have you had any blood sugars below 70?  No  What symptoms do you notice when your blood sugar is low?  Dizzy  What concerns do you have today about your diabetes? None   Do you have any of these symptoms? (Select all that apply)  No numbness or tingling in feet.  No redness, sores or blisters on feet.  No complaints of excessive thirst.  No reports of blurry vision.  No significant changes to weight.      BP Readings from Last 2 Encounters:   05/03/24 105/55   04/29/24 105/55     Hemoglobin A1C (%)   Date Value   11/27/2023 6.6 (H)   08/31/2023 7.8 (H)   06/22/2021 8.3 (H)   04/09/2021 12.7 (H)     LDL Cholesterol Calculated (mg/dL)   Date Value   02/24/2023 60   10/23/2021 86   10/22/2020 158 (H)   08/13/2018 130 (H)       Review of Systems  Constitutional, HEENT, cardiovascular, pulmonary, gi and gu systems are negative, except as otherwise noted.      Objective           Vitals:  No vitals were obtained today due to virtual visit.    Physical Exam   GENERAL: alert and no distress  EYES: Eyes grossly normal to inspection.  No discharge or erythema, or obvious scleral/conjunctival abnormalities.  RESP: No audible wheeze, cough, or visible cyanosis.    SKIN: Visible skin clear. No significant rash, abnormal pigmentation or lesions.  NEURO: Cranial nerves  grossly intact.  Mentation and speech appropriate for age.  PSYCH: Appropriate affect, tone, and pace of words    DATA  Recent labs, CT Abdo/pelvis and Colonoscopy results reviewed.         Video-Visit Details    Type of service:  Video Visit   Video End Time: 4:30 pm  Originating Location (pt. Location): Home  Distant Location (provider location):  On-site  Platform used for Video Visit: Gary  Signed Electronically by: Marybeth Silver MD

## 2024-05-24 ENCOUNTER — LAB (OUTPATIENT)
Dept: LAB | Facility: CLINIC | Age: 64
End: 2024-05-24
Payer: COMMERCIAL

## 2024-05-24 DIAGNOSIS — E78.5 HYPERLIPIDEMIA LDL GOAL <100: ICD-10-CM

## 2024-05-24 DIAGNOSIS — E11.65 TYPE 2 DIABETES MELLITUS WITH HYPERGLYCEMIA, WITHOUT LONG-TERM CURRENT USE OF INSULIN (H): ICD-10-CM

## 2024-05-24 DIAGNOSIS — D64.9 MILD ANEMIA: ICD-10-CM

## 2024-05-24 DIAGNOSIS — D64.9 ANEMIA, UNSPECIFIED TYPE: ICD-10-CM

## 2024-05-24 LAB
BASOPHILS # BLD AUTO: 0 10E3/UL (ref 0–0.2)
BASOPHILS NFR BLD AUTO: 1 %
EOSINOPHIL # BLD AUTO: 0.2 10E3/UL (ref 0–0.7)
EOSINOPHIL NFR BLD AUTO: 3 %
ERYTHROCYTE [DISTWIDTH] IN BLOOD BY AUTOMATED COUNT: 18.7 % (ref 10–15)
HBA1C MFR BLD: 6.9 % (ref 0–5.6)
HCT VFR BLD AUTO: 39.7 % (ref 35–47)
HGB BLD-MCNC: 12.4 G/DL (ref 11.7–15.7)
IMM GRANULOCYTES # BLD: 0 10E3/UL
IMM GRANULOCYTES NFR BLD: 0 %
LYMPHOCYTES # BLD AUTO: 1.8 10E3/UL (ref 0.8–5.3)
LYMPHOCYTES NFR BLD AUTO: 31 %
MCH RBC QN AUTO: 24.1 PG (ref 26.5–33)
MCHC RBC AUTO-ENTMCNC: 31.2 G/DL (ref 31.5–36.5)
MCV RBC AUTO: 77 FL (ref 78–100)
MONOCYTES # BLD AUTO: 0.4 10E3/UL (ref 0–1.3)
MONOCYTES NFR BLD AUTO: 7 %
NEUTROPHILS # BLD AUTO: 3.2 10E3/UL (ref 1.6–8.3)
NEUTROPHILS NFR BLD AUTO: 58 %
PLATELET # BLD AUTO: 447 10E3/UL (ref 150–450)
RBC # BLD AUTO: 5.15 10E6/UL (ref 3.8–5.2)
WBC # BLD AUTO: 5.6 10E3/UL (ref 4–11)

## 2024-05-24 PROCEDURE — 83036 HEMOGLOBIN GLYCOSYLATED A1C: CPT

## 2024-05-24 PROCEDURE — 36415 COLL VENOUS BLD VENIPUNCTURE: CPT

## 2024-05-24 PROCEDURE — 80061 LIPID PANEL: CPT

## 2024-05-24 PROCEDURE — 85025 COMPLETE CBC W/AUTO DIFF WBC: CPT

## 2024-05-25 LAB
CHOLEST SERPL-MCNC: 155 MG/DL
FASTING STATUS PATIENT QL REPORTED: YES
HDLC SERPL-MCNC: 52 MG/DL
LDLC SERPL CALC-MCNC: 76 MG/DL
NONHDLC SERPL-MCNC: 103 MG/DL
TRIGL SERPL-MCNC: 133 MG/DL

## 2024-05-28 ENCOUNTER — VIRTUAL VISIT (OUTPATIENT)
Dept: EDUCATION SERVICES | Facility: CLINIC | Age: 64
End: 2024-05-28
Payer: COMMERCIAL

## 2024-05-28 DIAGNOSIS — E11.65 TYPE 2 DIABETES MELLITUS WITH HYPERGLYCEMIA, WITHOUT LONG-TERM CURRENT USE OF INSULIN (H): Primary | Chronic | ICD-10-CM

## 2024-05-28 PROCEDURE — G0108 DIAB MANAGE TRN  PER INDIV: HCPCS | Mod: 93 | Performed by: DIETITIAN, REGISTERED

## 2024-05-28 NOTE — PROGRESS NOTES
Diabetes Self-Management Education & Support    Presents for: Individual review    Type of Service: Telephone Visit    Originating Location (Patient Location): Home  Distant Location (Provider Location): [facility]  Mode of Communication:  Telephone    Telephone Visit Start Time: 5:00 pm  Telephone Visit End Time (telephone visit stop time): 5:35 pm    How would patient like to obtain AVS? Portia      ASSESSMENT:  -A1c up a bit 6.6 to 6.9  -she is working on doing some short walks down the block  -she is trying to make good choices with food  Last wt 231     Patient's most recent   Lab Results   Component Value Date    A1C 6.9 05/24/2024    A1C 8.3 06/22/2021     is meeting goal of <7.0    Diabetes knowledge and skills assessment:   Patient is knowledgeable in diabetes management concepts related to: Healthy Eating, Being Active, Monitoring, and Taking Medication    Continue education with the following diabetes management concepts: Healthy Eating, Being Active, Monitoring, Taking Medication, Problem Solving, Reducing Risks, and Healthy Coping    Based on learning assessment above, most appropriate setting for further diabetes education would be: Individual setting.      PLAN   Things look good.      2.   Continue to work on increasing your activity.  Try for the short block every day for a week.        Follow-up: in three months    See Care Plan for co-developed, patient-state behavior change goals.  AVS provided for patient today.    Education Materials Provided:  No new materials provided today      SUBJECTIVE/OBJECTIVE:  Presents for: Individual review  Accompanied by: Self  Diabetes education in the past 24mo: Yes  Diabetes type: Type 2  Disease course: Stable  How confident are you filling out medical forms by yourself:: Extremely  Cultural Influences/Ethnic Background:  Not  or       Diabetes Symptoms & Complications:  Diabetes Related Symptoms: Fatigue, Polydipsia  "(increased thirst), Polyuria (increased urination)  Weight trend: Stable  Symptom course: Stable  Disease course: Stable       Patient Problem List and Family Medical History reviewed for relevant medical history, current medical status, and diabetes risk factors.    Vitals:  There were no vitals taken for this visit.  Estimated body mass index is 41.15 kg/m  as calculated from the following:    Height as of 5/3/24: 1.575 m (5' 2\").    Weight as of 5/3/24: 102.1 kg (225 lb).   Last 3 BP:   BP Readings from Last 3 Encounters:   05/03/24 105/55   04/29/24 105/55   04/02/24 114/76       History   Smoking Status    Former    Types: Cigarettes   Smokeless Tobacco    Never       Labs:  Lab Results   Component Value Date    A1C 6.9 05/24/2024    A1C 8.3 06/22/2021     Lab Results   Component Value Date     04/29/2024     03/08/2023     10/22/2020     Lab Results   Component Value Date    LDL 76 05/24/2024     10/22/2020     HDL Cholesterol   Date Value Ref Range Status   10/22/2020 47 (L) >49 mg/dL Final     Direct Measure HDL   Date Value Ref Range Status   05/24/2024 52 >=50 mg/dL Final   ]  GFR Estimate   Date Value Ref Range Status   10/22/2020 >90 >60 mL/min/[1.73_m2] Final     Comment:     Non  GFR Calc  Starting 12/18/2018, serum creatinine based estimated GFR (eGFR) will be   calculated using the Chronic Kidney Disease Epidemiology Collaboration   (CKD-EPI) equation.       GFR, ESTIMATED POCT   Date Value Ref Range Status   04/03/2024 >60 >60 mL/min/1.73m2 Final     GFR Estimate If Black   Date Value Ref Range Status   10/22/2020 >90 >60 mL/min/[1.73_m2] Final     Comment:      GFR Calc  Starting 12/18/2018, serum creatinine based estimated GFR (eGFR) will be   calculated using the Chronic Kidney Disease Epidemiology Collaboration   (CKD-EPI) equation.       Lab Results   Component Value Date    CR 0.8 04/03/2024    CR 0.76 04/02/2024    CR 0.69 10/22/2020 " "    No results found for: \"MICROALBUMIN\"    Healthy Eating:  Healthy Eating Assessed Today: Yes  Cultural/Episcopal diet restrictions?: No  How many times a week on average do you eat food made away from home (restaurant/take-out)?: 0  Meals include: Breakfast, Dinner  Breakfast: cereal shredded wheat did 2 of these.  Lunch: no lunch  Dinner: turkey sandwich, water.  or rice side dish and added veggies to them.  Snacks: not snacking in evening other than when she was eating ice cream. some popcorn or sanam crackers at times.  Other: walked short block without stopping.  Beverages: Water    Being Active:  Being Active Assessed Today: Yes (has been trying to walk more, is working slowly on this.)  Barrier to exercise: Physical limitation    Monitoring:  Blood Glucose Meter: Accu-chek  Times checking blood sugar at home (number): 1  Times checking blood sugar at home (per): Week        Taking Medications:  Diabetes Medication(s)       Biguanides       metFORMIN (GLUCOPHAGE XR) 500 MG 24 hr tablet Take 2 tablets (1,000 mg) by mouth 2 times daily (with meals)       Incretin Mimetic Agents       liraglutide (VICTOZA) 18 MG/3ML solution Inject 1.8 mg Subcutaneous daily            Current Treatments: Oral Medication (taken by mouth)                     Reducing Risks:  Has dilated eye exam at least once a year?: Yes  Sees dentist every 6 months?: No  Feet checked by healthcare provider in the last year?: Yes    Healthy Coping:  Informal Support system:: Huong based, Parent  Patient Activation Measure Survey Score:      1/6/2011    11:00 AM   JAYDEN Score (Last Two)   JAYDEN Raw Score 41   Activation Score 63.2   JAYDEN Level 3         Care Plan and Education Provided:  There are no care plans that you recently modified to display for this patient.          Time Spent: 35 minutes  Encounter Type: Individual    Any diabetes medication dose changes were made via the CDE Protocol per the patient's primary care provider. A copy of this " encounter was shared with the provider.

## 2024-05-28 NOTE — PATIENT INSTRUCTIONS
Things look good.      2.   Continue to work on increasing your activity.  Try for the short block every day for a week.

## 2024-06-19 ENCOUNTER — PATIENT OUTREACH (OUTPATIENT)
Dept: CARE COORDINATION | Facility: CLINIC | Age: 64
End: 2024-06-19
Payer: COMMERCIAL

## 2024-06-19 NOTE — PROGRESS NOTES
Clinic Care Coordination Contact  Follow Up Progress Note      Assessment: patient is making great strides in improving her physical activity.  She is now walking a half mile every day and slowly increasing it.  She still struggles with her MH due to not working and worry.  She noted her mom has told her to go on social security and discussed educating herself on how much she could work without causing challenges with social security.     Care Gaps:    Health Maintenance Due   Topic Date Due    URINE DRUG SCREEN  Never done       Postponed to office visits     Care Plans  Care Plan: Financial Wellbeing       Problem: Patient expresses financial resource strain       Long-Range Goal: Create an action plan to increase financial stability       Start Date: 6/27/2022 Expected End Date: 2/19/2025    This Visit's Progress: 50% Recent Progress: 50%    Note:     Barriers: course needed is not available (quick books)  Strengths: going to class's    Patient expressed understanding of goal: yes  Action steps to achieve this goal:  1. I will continue with classes  2. I will look for the course I need and schedule  3. I will complete my resume  4. I will work with the workforce center to get the needed programs  5. I will reach out to [place]  for support in rebuilding my credit                            Care Plan: Physical Activity       Problem: Patient is inactive       Long-Range Goal: Exercise at Least 20 Minutes per Day       Start Date: 4/27/2020 Expected End Date: 2/19/2025    This Visit's Progress: 60% Recent Progress: 30%    Note:     Barriers: Depression, back pain, social distancing to some extent  Strengths: Understand that increasing my exercise will help with managing my weight and back pain    Patient expressed understanding of goal: Yes  Action steps to achieve this goal:  1. I will walk in one direction for 10 minutes and then return for a total of 20 minutes  2. I will not get discouraged if I  cannot make it the 10 minutes in one direction before having to turn around  3. I will celebrate my successes                              Intervention/Education provided during outreach: congratulated pt on her progress with walking. Encouraged pt to consider educating her self on social security and how much she could work.      Outreach Frequency: monthly, more frequently as needed      Plan:   Pt to continue walking.  Pt to consider calling social security about working after starting, if she starts social security.   Care Coordinator will follow up in one month.     JAQUELIN Mensah  [facility] - Care Coordinator   6/19/2024   10:55 AM  [phone]

## 2024-06-28 ENCOUNTER — TELEPHONE (OUTPATIENT)
Dept: FAMILY MEDICINE | Facility: CLINIC | Age: 64
End: 2024-06-28
Payer: COMMERCIAL

## 2024-06-28 NOTE — TELEPHONE ENCOUNTER
Forms/Letter Request    Type of form/letter: OTHER:  [employer] DONOR    Do we have the form/letter: Yes:     Who is the form from? Patient    Where did/will the form come from? Patient or family brought in       When is form/letter needed by: asap    How would you like the form/letter returned: 1st) fax to # [phone]  then 2nd) mail originals to the home address please. TY    Patient Notified form requests are processed in 5-7 business days:Yes    Could we send this information to you in [employer] or would you prefer to receive a phone call?:   No preference   Okay to leave a detailed message?: Yes at Cell number on file:    Telephone Information:   Mobile [phone]      Thank you,  Rivas Rodriguez Registration

## 2024-06-30 DIAGNOSIS — F33.1 MODERATE EPISODE OF RECURRENT MAJOR DEPRESSIVE DISORDER (H): ICD-10-CM

## 2024-06-30 DIAGNOSIS — F41.1 GAD (GENERALIZED ANXIETY DISORDER): ICD-10-CM

## 2024-07-01 ENCOUNTER — TRANSFERRED RECORDS (OUTPATIENT)
Dept: HEALTH INFORMATION MANAGEMENT | Facility: CLINIC | Age: 64
End: 2024-07-01
Payer: COMMERCIAL

## 2024-07-02 ENCOUNTER — MYC MEDICAL ADVICE (OUTPATIENT)
Dept: FAMILY MEDICINE | Facility: CLINIC | Age: 64
End: 2024-07-02
Payer: COMMERCIAL

## 2024-07-02 RX ORDER — DULOXETIN HYDROCHLORIDE 60 MG/1
120 CAPSULE, DELAYED RELEASE ORAL DAILY
Qty: 180 CAPSULE | Refills: 0 | Status: SHIPPED | OUTPATIENT
Start: 2024-07-02 | End: 2024-10-02

## 2024-07-17 ENCOUNTER — PATIENT OUTREACH (OUTPATIENT)
Dept: CARE COORDINATION | Facility: CLINIC | Age: 64
End: 2024-07-17
Payer: COMMERCIAL

## 2024-07-17 NOTE — PROGRESS NOTES
Clinic Care Coordination Contact  [facility]/Voicemail    Clinical Data: Care Coordinator Outreach    Outreach Documentation Number of Outreach Attempt   7/17/2024   2:47 PM 1       Left message on patient's voicemail with call back information and requested return call.    Plan: Care Coordinator will try to reach patient again within 10 business days.    JAQUELIN Mensah   [facility] - Care Coordination  [facility]   [phone]

## 2024-07-19 ENCOUNTER — TELEPHONE (OUTPATIENT)
Dept: FAMILY MEDICINE | Facility: CLINIC | Age: 64
End: 2024-07-19
Payer: COMMERCIAL

## 2024-07-19 NOTE — TELEPHONE ENCOUNTER
Forms/Letter Request    Type of form/letter: OTHER: [employer]       Do we have the form/letter: Yes:     Who is the form from? Patient    Where did/will the form come from? Patient or family brought in       When is form/letter needed by: ASAP    How would you like the form/letter returned: Mail  Is this the correct address?: Yes  [address] [id number]    Patient Notified form requests are processed in 5-7 business days:Yes    Could we send this information to you in [employer] or would you prefer to receive a phone call?:   No preference   Okay to leave a detailed message?: Yes at Cell number on file:    Telephone Information:   [phone]    Thank you,  Rivas Rodriguez Registration

## 2024-07-21 DIAGNOSIS — K62.5 RECTAL BLEEDING: ICD-10-CM

## 2024-07-22 RX ORDER — FERROUS SULFATE 325(65) MG
325 TABLET ORAL
Qty: 90 TABLET | Refills: 2 | Status: SHIPPED | OUTPATIENT
Start: 2024-07-22

## 2024-07-22 NOTE — PROGRESS NOTES
Clinic Care Coordination Contact  Follow Up Progress Note      Assessment: patient noted she has  been spending some time with her mom as her health fails and discharged from the hospital.     She has a [employer] job lined up to start with the start of school.  She is working on exercising yet today is still a little sore so not over doing it. She continues with counseling.     Care Gaps:    Health Maintenance Due   Topic Date Due    URINE DRUG SCREEN  Never done       Postponed to future office visits     Care Plans  Care Plan: Financial Wellbeing       Problem: Patient expresses financial resource strain       Long-Range Goal: Create an action plan to increase financial stability       Start Date: 6/27/2022 Expected End Date: 2/19/2025    This Visit's Progress: 50% Recent Progress: 50%    Note:     Barriers: course needed is not available (quick books)  Strengths: going to class's    Patient expressed understanding of goal: yes  Action steps to achieve this goal:  1. I will continue with classes  2. I will look for the course I need and schedule  3. I will complete my resume  4. I will work with the workforce center to get the needed programs  5. I will reach out to [facility]  for support in rebuilding my credit                            Care Plan: Physical Activity       Problem: Patient is inactive       Long-Range Goal: Exercise at Least 20 Minutes per Day       Start Date: 4/27/2020 Expected End Date: 2/19/2025    This Visit's Progress: 60% Recent Progress: 60%    Note:     Barriers: Depression, back pain, social distancing to some extent  Strengths: Understand that increasing my exercise will help with managing my weight and back pain    Patient expressed understanding of goal: Yes  Action steps to achieve this goal:  1. I will walk in one direction for 10 minutes and then return for a total of 20 minutes  2. I will not get discouraged if I cannot make it the 10 minutes in one direction before  having to turn around  3. I will celebrate my successes                              Intervention/Education provided during outreach: encouraged good self care and talk with her therapist.     Outreach Frequency: monthly, more frequently as needed      Plan:   Pt to continue with moving forward with new employment and exercise.    Care Coordinator will follow up in one month.     JAQUELIN Mensah   [facility] - Care Coordination  [facility]   [phone]

## 2024-08-15 ENCOUNTER — MYC MEDICAL ADVICE (OUTPATIENT)
Dept: FAMILY MEDICINE | Facility: CLINIC | Age: 64
End: 2024-08-15
Payer: COMMERCIAL

## 2024-08-18 DIAGNOSIS — K21.9 GASTROESOPHAGEAL REFLUX DISEASE, UNSPECIFIED WHETHER ESOPHAGITIS PRESENT: ICD-10-CM

## 2024-08-20 ENCOUNTER — VIRTUAL VISIT (OUTPATIENT)
Dept: EDUCATION SERVICES | Facility: CLINIC | Age: 64
End: 2024-08-20
Payer: COMMERCIAL

## 2024-08-20 ENCOUNTER — LAB (OUTPATIENT)
Dept: LAB | Facility: CLINIC | Age: 64
End: 2024-08-20
Payer: COMMERCIAL

## 2024-08-20 DIAGNOSIS — E11.65 TYPE 2 DIABETES MELLITUS WITH HYPERGLYCEMIA, WITHOUT LONG-TERM CURRENT USE OF INSULIN (H): Primary | Chronic | ICD-10-CM

## 2024-08-20 DIAGNOSIS — E11.9 DIABETES MELLITUS, TYPE 2 (H): Primary | ICD-10-CM

## 2024-08-20 LAB
CREAT UR-MCNC: 76.9 MG/DL
HBA1C MFR BLD: 6.4 % (ref 0–5.6)
MICROALBUMIN UR-MCNC: <12 MG/L
MICROALBUMIN/CREAT UR: NORMAL MG/G{CREAT}

## 2024-08-20 PROCEDURE — G0108 DIAB MANAGE TRN  PER INDIV: HCPCS | Performed by: DIETITIAN, REGISTERED

## 2024-08-20 PROCEDURE — 82570 ASSAY OF URINE CREATININE: CPT

## 2024-08-20 PROCEDURE — 83036 HEMOGLOBIN GLYCOSYLATED A1C: CPT

## 2024-08-20 PROCEDURE — 82043 UR ALBUMIN QUANTITATIVE: CPT

## 2024-08-20 PROCEDURE — 36415 COLL VENOUS BLD VENIPUNCTURE: CPT

## 2024-08-20 NOTE — PATIENT INSTRUCTIONS
You are doing so great with your activity!  I am SO proud of you!      2.   Good-luck with your new job, I think it is going to be the perfect job!

## 2024-08-20 NOTE — PROGRESS NOTES
"Diabetes Self-Management Education & Support    Presents for: Individual review    Type of Service: In Person Visit      ASSESSMENT:  -she is walking daily!  So awesome!  -starts her new job the 29th, she is excited about this.  Para at the elementary school.  -watching her food choices, watching portions.    Patient's most recent   Lab Results   Component Value Date    A1C 6.4 08/20/2024    A1C 8.3 06/22/2021     is meeting goal of <7.0    Diabetes knowledge and skills assessment:   Patient is knowledgeable in diabetes management concepts related to: Healthy Eating, Being Active, and Monitoring    Continue education with the following diabetes management concepts: Healthy Eating, Being Active, Monitoring, Taking Medication, Problem Solving, Reducing Risks, and Healthy Coping    Based on learning assessment above, most appropriate setting for further diabetes education would be: Individual setting.      PLAN  You are doing so great with your activity!  I am SO proud of you!      2.   Good-luck with your new job, I think it is going to be the perfect job!      Follow-up: in three months    See Care Plan for co-developed, patient-state behavior change goals.  AVS provided for patient today.    Education Materials Provided:  No new materials provided today      SUBJECTIVE/OBJECTIVE:  Presents for: Individual review  Diabetes type: Type 2  How confident are you filling out medical forms by yourself:: Quite a bit  Other concerns:: Glasses  Cultural Influences/Ethnic Background:  Not  or       Diabetes Symptoms & Complications:          Patient Problem List and Family Medical History reviewed for relevant medical history, current medical status, and diabetes risk factors.    Vitals:  There were no vitals taken for this visit.  Estimated body mass index is 41.15 kg/m  as calculated from the following:    Height as of 5/3/24: 1.575 m (5' 2\").    Weight as of 5/3/24: 102.1 kg (225 lb).   Last 3 BP:   BP " "Readings from Last 3 Encounters:   05/03/24 105/55   04/29/24 105/55   04/02/24 114/76       History   Smoking Status    Former    Types: Cigarettes   Smokeless Tobacco    Never       Labs:  Lab Results   Component Value Date    A1C 6.4 08/20/2024    A1C 8.3 06/22/2021     Lab Results   Component Value Date     04/29/2024     03/08/2023     10/22/2020     Lab Results   Component Value Date    LDL 76 05/24/2024     10/22/2020     HDL Cholesterol   Date Value Ref Range Status   10/22/2020 47 (L) >49 mg/dL Final     Direct Measure HDL   Date Value Ref Range Status   05/24/2024 52 >=50 mg/dL Final   ]  GFR Estimate   Date Value Ref Range Status   10/22/2020 >90 >60 mL/min/[1.73_m2] Final     Comment:     Non  GFR Calc  Starting 12/18/2018, serum creatinine based estimated GFR (eGFR) will be   calculated using the Chronic Kidney Disease Epidemiology Collaboration   (CKD-EPI) equation.       GFR, ESTIMATED POCT   Date Value Ref Range Status   04/03/2024 >60 >60 mL/min/1.73m2 Final     GFR Estimate If Black   Date Value Ref Range Status   10/22/2020 >90 >60 mL/min/[1.73_m2] Final     Comment:      GFR Calc  Starting 12/18/2018, serum creatinine based estimated GFR (eGFR) will be   calculated using the Chronic Kidney Disease Epidemiology Collaboration   (CKD-EPI) equation.       Lab Results   Component Value Date    CR 0.8 04/03/2024    CR 0.76 04/02/2024    CR 0.69 10/22/2020     No results found for: \"MICROALBUMIN\"    Healthy Eating:  Healthy Eating Assessed Today: Yes  Breakfast: cereal in the  am  Lunch: pasta salad takes one meals worth, sunday: sausage, eggs, peppers onions  Dinner: thaddeus rice side dishes, food from mom and sister,  gets a combo sometimes when having bad days  Other: weight at clinic 231#.   staying fairly stable. Walking daily 1.7 miles.  next month celebrating moms 98th birthday.  Beverages: Water    Being Active:  Being Active Assessed " Today: Yes (some weeks did it every other day and now she doing it every day.  1.7 miles.)    Monitoring:   Last A1c 6.4%        Taking Medications:  Diabetes Medication(s)       Biguanides       metFORMIN (GLUCOPHAGE XR) 500 MG 24 hr tablet Take 2 tablets (1,000 mg) by mouth 2 times daily (with meals)       Incretin Mimetic Agents       liraglutide (VICTOZA) 18 MG/3ML solution Inject 1.8 mg Subcutaneous daily                      Healthy Coping:     Patient Activation Measure Survey Score:      1/6/2011    11:00 AM   JAYDEN Score (Last Two)   JAYDEN Raw Score 41   Activation Score 63.2   JAYDEN Level 3         Care Plan and Education Provided:  There are no care plans that you recently modified to display for this patient.          Time Spent: 50 minutes  Encounter Type: Individual    Any diabetes medication dose changes were made via the CDE Protocol per the patient's primary care provider. A copy of this encounter was shared with the provider.

## 2024-08-21 ENCOUNTER — PATIENT OUTREACH (OUTPATIENT)
Dept: CARE COORDINATION | Facility: CLINIC | Age: 64
End: 2024-08-21
Payer: COMMERCIAL

## 2024-08-21 NOTE — PROGRESS NOTES
Clinic Care Coordination Contact  [facility]    Clinical Data: Care Coordinator Outreach    Outreach Documentation Number of Outreach Attempt   7/17/2024   2:47 PM 1   8/21/2024  12:39 PM 1       No answer    Plan:  Care Coordinator will try to reach patient again within 10 business days.    JAQUELIN Mensah   [facility] - Care Coordination  [facility]   [phone]

## 2024-08-29 NOTE — PROGRESS NOTES
Clinic Care Coordination Contact    Patient was to start a new job today so will send a [employer] message to find out when it is a good time to call.     If no reply within two weeks will call later in the afternoon.     Bell Brito [facility] - Care Coordinator   8/29/2024   2:20 PM  [phone]

## 2024-09-01 DIAGNOSIS — E11.65 TYPE 2 DIABETES MELLITUS WITH HYPERGLYCEMIA, WITHOUT LONG-TERM CURRENT USE OF INSULIN (H): ICD-10-CM

## 2024-09-03 RX ORDER — LIRAGLUTIDE 6 MG/ML
1.8 INJECTION SUBCUTANEOUS DAILY
Qty: 9 ML | Refills: 0 | Status: SHIPPED | OUTPATIENT
Start: 2024-09-03

## 2024-09-05 ENCOUNTER — PATIENT OUTREACH (OUTPATIENT)
Dept: CARE COORDINATION | Facility: CLINIC | Age: 64
End: 2024-09-05
Payer: COMMERCIAL

## 2024-09-05 NOTE — LETTER
It was a pleasure to speak with you.  I would like to provide you with the enclosed information for your records.  As part of care coordination, we are developing care plans to assist in accomplishing your health care goals.  When we speak next, please feel free to let me know if you want to add or change any information on your care plans.    As always, feel free to contact me if you have any questions or concerns.  I look forward to working with you in the effort to achieve your health care and wellness goals .        Sincerely,      Bell Brito, [facility]  Clinic Care Coordination  [phone]    [facility]  Patient Centered Plan of Care  About Me:        Patient Name:  Jessica Jay    YOB: 1960  Age:         64 year old   [facility] MRN:    [id number] Telephone Information:  Home Phone [phone]   Mobile [phone]       Address:  [address] [place] [id number] Email address:  [email or web address]      Emergency Contact(s)    Name Relationship Lgl Grd Work Phone Home Phone Mobile Phone   1. NOAH TOBIAS Sister No   [phone]   2. ILIANA JAY Mother No   [phone]           Primary language:  English     needed? No   [employer]:  [phone] op. 1  Other communication barriers:Glasses    Preferred Method of Communication:  Portia  Current living arrangement: I live alone    Mobility Status/ Medical Equipment: Independent        Health Maintenance  Health Maintenance Reviewed: Due/Overdue   Health Maintenance Due   Topic Date Due    URINE DRUG SCREEN  Never done    INFLUENZA VACCINE (1) 09/01/2024    EYE EXAM  10/05/2024           My Access Plan  Medical Emergency 911   Primary Clinic Line [facility] [place] - [phone]   24 Hour Appointment Line [phone] or  [phone]) (toll-free)   24 Hour Nurse Line [phone] (toll-free)   Preferred Urgent Care Other     Preferred [facility],  Charline Blanco  [phone]     Preferred Pharmacy [facility] [place], [place] - [address]     Behavioral Health Crisis Line The National Suicide Prevention Lifeline at 1-912.953.9248 or Text/Call 318           My Care Team Members  Patient Care Team         Relationship Specialty Notifications Start End    Marybeth Silver MD PCP - General Family Practice  5/16/18     Referred to Dermatology    Phone: [phone] Fax: [phone] [address] [place] [address]    Marybeth Silver MD Assigned PCP   4/15/18     Phone: [phone] Fax: [phone] [address] [place] [address]    Bell Brito LSW Lead Care Coordinator Primary Care - CC Admissions 1/29/20     Phone: [phone] Fax: [phone]        Mayela Plummer [facility] Pharmacist Pharmacist  5/19/21     Phone: [phone] Fax: [phone] [address] [facility] [address]    Shobha Donovan RD Diabetes Educator Dietitian, Registered  8/18/22     Phone: [phone] Fax: [phone]        Brenda Ortiz APRN CNP Nurse Practitioner Dermatology  12/14/23     Phone: [phone] Fax: [phone] [address] [facility] [place] [address]    Brenda Ortiz APRN CNP Assigned Surgical Provider   2/23/24     Phone: [phone] Fax: [phone] [address] [facility] [address]    Brent Higgins DO Assigned Sleep Provider   3/23/24     Phone: [phone] Fax: [phone] [address] [address]                My Care Plans  Self Management and Treatment Plan    Care Plan  Care Plan: Financial Wellbeing       Problem: Patient expresses financial resource strain       Long-Range Goal: Create an action plan to increase financial stability       Start Date: 6/27/2022 Expected End Date: 2/19/2025    This Visit's Progress: 60% Recent Progress: 50%    Note:     Barriers: course needed is not available (quick books)  Strengths: going to  class's    Patient expressed understanding of goal: yes  Action steps to achieve this goal:  1. I will continue with classes  2. I will look for the course I need and schedule  3. I will complete my resume  4. I will work with the [facility] to get the needed programs  5. I will reach out to [facility]  for support in rebuilding my credit                            Care Plan: Physical Activity       Problem: Patient is inactive       Long-Range Goal: Exercise at Least 20 Minutes per Day       Start Date: 4/27/2020 Expected End Date: 2/19/2025    This Visit's Progress: 60% Recent Progress: 60%    Note:     Barriers: Depression, back pain, social distancing to some extent  Strengths: Understand that increasing my exercise will help with managing my weight and back pain    Patient expressed understanding of goal: Yes  Action steps to achieve this goal:  1. I will walk in one direction for 10 minutes and then return for a total of 20 minutes  2. I will not get discouraged if I cannot make it the 10 minutes in one direction before having to turn around  3. I will celebrate my successes                              Action Plans on File:            Depression          Advance Care Plans/Directives:   Advanced Care Plan/Directives on file:   Yes    Status of Document(s): No data recorded  Advanced Care Plan/Directives Type:   Advanced Directive - On File           My Medical and Care Information  Problem List   Patient Active Problem List   Diagnosis    Depression, major    Migraines    CARDIOVASCULAR SCREENING; LDL GOAL LESS THAN 160    Disc herniation    DDD (degenerative disc disease), lumbar    DDD (degenerative disc disease), cervical    Neck pain    Tobacco abuse    ELYSSA (obstructive sleep apnea)- severe (AHI 49)    Spondylolisthesis, grade 1    Chronic low back pain    Brain lipoma    History of colonic polyps    Gastroesophageal reflux disease without esophagitis    Hypertension goal BP (blood  pressure) < 140/90    Traylor's esophagus determined by endoscopy    Bilateral edema of lower extremity    GABRIELA (generalized anxiety disorder)    Restless legs syndrome (RLS)    Diabetes mellitus, type 2 (H)    Morbid obesity (H)    Major depression, recurrent (H)        Care Coordination Start Date: 1/29/2020   Frequency of Care Coordination: monthly, more frequently as needed     Form Last Updated: 09/05/2024

## 2024-09-25 ENCOUNTER — OFFICE VISIT (OUTPATIENT)
Dept: URGENT CARE | Facility: URGENT CARE | Age: 64
End: 2024-09-25
Payer: COMMERCIAL

## 2024-09-25 VITALS
SYSTOLIC BLOOD PRESSURE: 148 MMHG | TEMPERATURE: 98 F | HEART RATE: 55 BPM | WEIGHT: 230 LBS | OXYGEN SATURATION: 98 % | RESPIRATION RATE: 20 BRPM | BODY MASS INDEX: 42.07 KG/M2 | DIASTOLIC BLOOD PRESSURE: 84 MMHG

## 2024-09-25 DIAGNOSIS — J01.90 ACUTE SINUSITIS, RECURRENCE NOT SPECIFIED, UNSPECIFIED LOCATION: Primary | ICD-10-CM

## 2024-09-25 PROCEDURE — 99213 OFFICE O/P EST LOW 20 MIN: CPT | Performed by: PREVENTIVE MEDICINE

## 2024-09-25 RX ORDER — FLUTICASONE PROPIONATE 50 MCG
2 SPRAY, SUSPENSION (ML) NASAL DAILY
Qty: 9.9 ML | Refills: 0 | Status: SHIPPED | OUTPATIENT
Start: 2024-09-25

## 2024-09-25 ASSESSMENT — PAIN SCALES - GENERAL: PAINLEVEL: WORST PAIN (10)

## 2024-09-25 NOTE — PROGRESS NOTES
Assessment & Plan     (J01.90) Acute sinusitis, recurrence not specified, unspecified location  (primary encounter diagnosis)  Plan: amoxicillin-clavulanate (AUGMENTIN) 875-125 MG         tablet, fluticasone (FLONASE) 50 MCG/ACT nasal         spray    Augmentin for one week  Flonase  Netipot    Follow up in 10-14 days if not improving or sooner as needed          No follow-ups on file.    Abelino Guerra MD  [facility]    Subjective     Jessica Jay is a 64 year old year old female who presents to clinic today for the following health issues:    Patient presents with:  Urgent Care  Sinus Problem: X's 4 weeks no fever, facial pressure with headaches     This is a 65 yo female with nasal congestion, post nasal drip for 2-3 week.  Also facial pressure above and below eyes for 1-2 weeks.  No sob, cp.    Has a dry cough.    Patient Active Problem List   Diagnosis    Depression, major    Migraines    CARDIOVASCULAR SCREENING; LDL GOAL LESS THAN 160    Disc herniation    DDD (degenerative disc disease), lumbar    DDD (degenerative disc disease), cervical    Neck pain    Tobacco abuse    ELYSSA (obstructive sleep apnea)- severe (AHI 49)    Spondylolisthesis, grade 1    Chronic low back pain    Brain lipoma    History of colonic polyps    Gastroesophageal reflux disease without esophagitis    Hypertension goal BP (blood pressure) < 140/90    Traylor's esophagus determined by endoscopy    Bilateral edema of lower extremity    GABRIELA (generalized anxiety disorder)    Restless legs syndrome (RLS)    Diabetes mellitus, type 2 (H)    Morbid obesity (H)    Major depression, recurrent (H)       Current Outpatient Medications   Medication Sig Dispense Refill    alcohol swab prep pads Use to swab area of injection/haley as directed. 100 each 3    atorvastatin (LIPITOR) 40 MG tablet Take 1 tablet (40 mg) by mouth daily 90 tablet 3    blood glucose (ACCU-CHEK GUIDE) test strip USE TO TEST TWICE DAILY  200 strip 0    blood glucose (NO BRAND SPECIFIED) lancets standard Use to test blood sugar 2 times daily or as directed. 100 lancet 3    blood glucose monitoring (NO BRAND SPECIFIED) meter device kit Use to test blood sugar 2 times daily or as directed. 1 kit 0    clobetasol (TEMOVATE) 0.05 % external cream Apply sparingly to affected area twice daily as needed.  Do not apply to face. 120 g 3    cyclobenzaprine (FLEXERIL) 5 MG tablet Take 1 tablet (5 mg) by mouth 3 times daily as needed for muscle spasms 42 tablet 0    DULoxetine (CYMBALTA) 60 MG capsule Take 2 capsules by mouth daily 180 capsule 0    ferrous sulfate (FEROSUL) 325 (65 Fe) MG tablet Take 1 tablet (325 mg) by mouth daily (with breakfast). 90 tablet 2    furosemide (LASIX) 20 MG tablet TAKE 1 TABLET BY MOUTH TWICE DAILY 180 tablet 2    insulin pen needle (BD BRANDY U/F) 32G X 4 MM miscellaneous Use 1 daily as directed. 100 each 4    liraglutide (VICTOZA) 18 MG/3ML solution Inject 1.8 mg Subcutaneous daily 9 mL 0    losartan (COZAAR) 25 MG tablet Take 1 tablet (25 mg) by mouth daily 90 tablet 3    metFORMIN (GLUCOPHAGE XR) 500 MG 24 hr tablet Take 2 tablets (1,000 mg) by mouth 2 times daily (with meals) 360 tablet 3    metoprolol succinate ER (TOPROL XL) 25 MG 24 hr tablet Take 1 tablet (25 mg) by mouth daily 90 tablet 3    omeprazole (PRILOSEC) 20 MG DR capsule Take 1 capsule by mouth daily as needed (for Gerd) 90 capsule 0    traZODone (DESYREL) 50 MG tablet Take 1 tablet (50 mg) by mouth at bedtime 30 tablet 1     No current facility-administered medications for this visit.       Past Medical History:   Diagnosis Date    Cellulitis and abscess of foot, except toes 09/26/2010    [facility]    Chronic daily headache 02/15/2011    Complete rupture of rotator cuff 07/06/2009    Degenerative disc disease     cervical    Depression, major     Depressive disorder     Diabetes mellitus, type 2 (H)     Dizziness - light-headed 02/15/2011    GERD  (gastroesophageal reflux disease)     Hypertension goal BP (blood pressure) < 140/90     LBP (low back pain)     Panic attacks     Seasonal allergies     Vulvar dermatitis 07/17/2012       Social History   reports that she quit smoking about 5 years ago. Her smoking use included cigarettes. She started smoking about 48 years ago. She has a 34.6 pack-year smoking history. She has been exposed to tobacco smoke. She has never used smokeless tobacco. She reports current alcohol use. She reports that she does not use drugs.    Family History   Problem Relation Age of Onset    Macular Degeneration Mother         Dry    Hypertension Mother     C.A.D. Father     Cancer Maternal Grandmother     Cerebrovascular Disease Paternal Grandmother     Breast Cancer Paternal Grandmother     Cancer Brother     Cancer - colorectal Brother     Colon Cancer Brother     Diabetes Brother     Neurologic Disorder Sister         migraine    Glaucoma No family hx of        Review of Systems  Constitutional, HEENT, cardiovascular, pulmonary, GI, , musculoskeletal, neuro, skin, endocrine and psych systems are negative, except as otherwise noted.      Objective    BP (!) 148/84   Pulse 55   Temp 98  F (36.7  C) (Tympanic)   Resp 20   Wt 104.3 kg (230 lb)   SpO2 98%   BMI 42.07 kg/m    Physical Exam   GENERAL: alert and no distress  EYES: Eyes grossly normal to inspection, PERRL and conjunctivae and sclerae normal  HENT: ear canals and TM's normal, nose and mouth without ulcers or lesions  NECK: no adenopathy, no asymmetry, masses, or scars  RESP: lungs clear to auscultation - no rales, rhonchi or wheezes  CV: regular rate and rhythm, normal S1 S2, no S3 or S4, no murmur, click or rub, no peripheral edema  ABDOMEN: soft, nontender, no hepatosplenomegaly, no masses and bowel sounds normal  MS: no gross musculoskeletal defects noted, no edema  SKIN: no suspicious lesions or rashes  NEURO: Normal strength and tone, mentation intact and speech  normal  PSYCH: mentation appears normal, affect normal/bright

## 2024-09-30 DIAGNOSIS — F33.1 MODERATE EPISODE OF RECURRENT MAJOR DEPRESSIVE DISORDER (H): ICD-10-CM

## 2024-09-30 DIAGNOSIS — F41.1 GAD (GENERALIZED ANXIETY DISORDER): ICD-10-CM

## 2024-10-02 RX ORDER — DULOXETIN HYDROCHLORIDE 60 MG/1
120 CAPSULE, DELAYED RELEASE ORAL DAILY
Qty: 180 CAPSULE | Refills: 0 | Status: SHIPPED | OUTPATIENT
Start: 2024-10-02

## 2024-10-04 ENCOUNTER — PATIENT OUTREACH (OUTPATIENT)
Dept: CARE COORDINATION | Facility: CLINIC | Age: 64
End: 2024-10-04
Payer: COMMERCIAL

## 2024-10-04 NOTE — PROGRESS NOTES
Clinic Care Coordination Contact  Follow Up Progress Note      Assessment: pt noted she is having frustration with work and computer access for training.  She is having challenges with exercising after work due to her back or leg.  Her back has been feeling better yet the outside of one leg has been numb feeling.     Her finances have improved with working.     Care Gaps:    Health Maintenance Due   Topic Date Due    URINE DRUG SCREEN  Never done    INFLUENZA VACCINE (1) 09/01/2024    COVID-19 Vaccine (6 - 2024-25 season) 09/01/2024    EYE EXAM  10/05/2024    PHQ-9  11/03/2024       Postponed to future appointments     Care Plans  Care Plan: Financial Wellbeing       Problem: Patient expresses financial resource strain       Long-Range Goal: Create an action plan to increase financial stability       Start Date: 6/27/2022 Expected End Date: 2/19/2025    This Visit's Progress: 70% Recent Progress: 60%    Note:     Barriers: course needed is not available (quick books)  Strengths: going to class's    Patient expressed understanding of goal: yes  Action steps to achieve this goal:  1. I will continue with classes  2. I will look for the course I need and schedule  3. I will complete my resume  4. I will work with the [employer] [facility] to get the needed programs  5. I will reach out to [facility]  for support in rebuilding my credit                            Care Plan: Physical Activity       Problem: Patient is inactive       Long-Range Goal: Exercise at Least 20 Minutes per Day       Start Date: 4/27/2020 Expected End Date: 2/19/2025    This Visit's Progress: 50% Recent Progress: 60%    Note:     Barriers: Depression, back pain, social distancing to some extent  Strengths: Understand that increasing my exercise will help with managing my weight and back pain    Patient expressed understanding of goal: Yes  Action steps to achieve this goal:  1. I will walk in one direction for 10 minutes and then return  for a total of 20 minutes  2. I will not get discouraged if I cannot make it the 10 minutes in one direction before having to turn around  3. I will celebrate my successes    10/4/24 - work is making it challenging to exercise after work                              Intervention/Education provided during outreach: encouraged pt to continue to work on ways to reduce her back and leg pain with standing at work.       Outreach Frequency: monthly, more frequently as needed      Plan:   Pt to continue with finances and exercise.   Care Coordinator will follow up in one month.     JAQUELIN Mensah   [facility] - Care Coordination  [facility]   [phone]

## 2024-10-11 ENCOUNTER — PATIENT OUTREACH (OUTPATIENT)
Dept: CARE COORDINATION | Facility: CLINIC | Age: 64
End: 2024-10-11
Payer: COMMERCIAL

## 2024-10-12 ENCOUNTER — NURSE TRIAGE (OUTPATIENT)
Dept: NURSING | Facility: CLINIC | Age: 64
End: 2024-10-12
Payer: COMMERCIAL

## 2024-10-12 NOTE — TELEPHONE ENCOUNTER
"\"Seen in [facility] ER on Thursday: bad tooth and facial swelling (infection). RX Amoxillin- clav and pain medication given. I have a rash all over my body that itches. Pain RX=Ketorolac: I have taken 2-3 tablets in total. One Thursday and one yesterday. Amoxicillin clavulanate 1 on Thursday, 2 yesterday, and 1 so far today. (875mg bid). This is the first time I have had this kind of a rash: it is blotchy, all over, and looks like sunburn, solid red. I used lotion: Cervavie, and it helped for awhile, but now it is starting to itch again\". She is short of breath with activity (new). The rash began with the itching Thursday, rash noticed Friday. She states the swelling in her face and mouth has improved.     Triaged to a disposition of call 911 now if having difficulty breathing: Go to ED nearest now. She agrees with this plan. She states her nearest ER is [facility] in [place]. She does not have any antihistamine OTC.      Maine Yoder RN Triage Nurse Advisor 4:37 PM 10/12/2024  Reason for Disposition   [1] Drug rash suspected AND [2] started taking new medicine within last 2 weeks  (Exception: Antihistamine, eye drops, ear drops, decongestant or other OTC cough/cold medicines.)   [1] Taking new prescription medication AND [2] rash within 4 hours of 1st dose   Difficulty breathing or wheezing    Additional Information   Negative: [1] Life-threatening reaction (anaphylaxis) in the past to similar substance (e.g., food, insect bite/sting, chemical, etc.) AND [2] < 2 hours since exposure   Negative: [1] Sudden onset of rash (within last 2 hours) AND [2] difficulty breathing or swallowing   Negative: Shock suspected (e.g., cold/pale/clammy skin, too weak to stand, low BP, rapid pulse)   Negative: Difficult to awaken or acting confused (e.g., disoriented, slurred speech)   Negative: [1] Purple or blood-colored spots or dots AND [2] fever   Negative: Sounds like a life-threatening emergency to the triager   Negative: [1] " Life-threatening reaction (anaphylaxis) in the past to the same drug AND [2] < 2 hours since exposure   Negative: [1] Hoarseness or cough AND [2] started soon after 1st dose of drug   Negative: [1] Swollen tongue AND [2] started soon after 1st dose of drug   Negative: [1] Purple or blood-colored rash (spots or dots) AND [2] fever   Negative: Sounds like a life-threatening emergency to the triager   Negative: Rash is only on 1 part of the body (localized)   Negative: Taking new non-prescription (OTC) antihistamine, decongestant, ear drops, eye drops, or other OTC cough/cold medicine   Negative: Taking new prescription antihistamine, allergy medicine, asthma medicine, eye drops, ear drops or nose drops   Negative: Rash started more than 3 days after stopping new prescription medicine   Negative: Swollen tongue   Negative: [1] Widespread hives AND [2] onset < 2 hours of exposure to 1st dose of drug   Negative: Fever   Negative: Patient sounds very sick or weak to the triager   Negative: [1] Purple or blood-colored rash (spots or dots) AND [2] no fever AND [3] sounds well to triager    Protocols used: Rash or Redness - Widespread-A-AH, Rash - Widespread On Drugs-A-AH

## 2024-10-14 ENCOUNTER — PATIENT OUTREACH (OUTPATIENT)
Dept: CARE COORDINATION | Facility: CLINIC | Age: 64
End: 2024-10-14
Payer: COMMERCIAL

## 2024-10-14 NOTE — PROGRESS NOTES
Clinic Care Coordination Contact    Situation: Patient chart reviewed by care coordinator.    Background: pt to the ED on 10/12/24    Assessment: pt had allergic reaction to ABX prescribed on 10/10/24.  Pt was treated and ABX changed and discharged home in stable condition.     Plan/Recommendations: will not call at this time related to ED  as appropriate action was taken.     JAQUELIN Mensah   [facility] - Care Coordination  [facility]   [phone]

## 2024-10-29 ENCOUNTER — ANCILLARY PROCEDURE (OUTPATIENT)
Dept: GENERAL RADIOLOGY | Facility: CLINIC | Age: 64
End: 2024-10-29
Attending: INTERNAL MEDICINE
Payer: OTHER MISCELLANEOUS

## 2024-10-29 ENCOUNTER — OFFICE VISIT (OUTPATIENT)
Dept: URGENT CARE | Facility: URGENT CARE | Age: 64
End: 2024-10-29
Payer: OTHER MISCELLANEOUS

## 2024-10-29 VITALS
SYSTOLIC BLOOD PRESSURE: 133 MMHG | RESPIRATION RATE: 14 BRPM | HEART RATE: 53 BPM | OXYGEN SATURATION: 97 % | TEMPERATURE: 97.6 F | DIASTOLIC BLOOD PRESSURE: 84 MMHG

## 2024-10-29 DIAGNOSIS — S89.92XA INJURY OF LEFT KNEE, INITIAL ENCOUNTER: Primary | ICD-10-CM

## 2024-10-29 DIAGNOSIS — S89.92XA INJURY OF LEFT KNEE, INITIAL ENCOUNTER: ICD-10-CM

## 2024-10-29 PROCEDURE — 99213 OFFICE O/P EST LOW 20 MIN: CPT | Performed by: INTERNAL MEDICINE

## 2024-10-29 PROCEDURE — 73562 X-RAY EXAM OF KNEE 3: CPT | Mod: TC | Performed by: RADIOLOGY

## 2024-10-29 NOTE — PROGRESS NOTES
SUBJECTIVE:  Jessica Jay is an 64 year old female who presents for left leg injury  Last week fell in cafeteria dn hurt left knee.  Can't walk normally since then.  Landed on knee when fell.  Has pain even when not on it, worse when walks.  Some swelling initially but mild swelling today.  No redness or increased warmth.  No previous knee surgeries or issues.  No fevers, chills, sweats.  No n/v/.  Has taken some tylenol and a couple pain meds she had at home which helped some.  Does feel sometime like the knee might lock up or give out.    PMH:   has a past medical history of Cellulitis and abscess of foot, except toes (09/26/2010), Chronic daily headache (02/15/2011), Complete rupture of rotator cuff (07/06/2009), Degenerative disc disease, Depression, major, Depressive disorder, Diabetes mellitus, type 2 (H), Dizziness - light-headed (02/15/2011), GERD (gastroesophageal reflux disease), Hypertension goal BP (blood pressure) < 140/90, LBP (low back pain), Panic attacks, Seasonal allergies, and Vulvar dermatitis (07/17/2012).  Patient Active Problem List   Diagnosis    Depression, major    Migraines    CARDIOVASCULAR SCREENING; LDL GOAL LESS THAN 160    Disc herniation    DDD (degenerative disc disease), lumbar    DDD (degenerative disc disease), cervical    Neck pain    Tobacco abuse    ELYSSA (obstructive sleep apnea)- severe (AHI 49)    Spondylolisthesis, grade 1    Chronic low back pain    Brain lipoma    History of colonic polyps    Gastroesophageal reflux disease without esophagitis    Hypertension goal BP (blood pressure) < 140/90    Traylor's esophagus determined by endoscopy    Bilateral edema of lower extremity    GABRIELA (generalized anxiety disorder)    Restless legs syndrome (RLS)    Diabetes mellitus, type 2 (H)    Morbid obesity (H)    Major depression, recurrent (H)     Social History     Socioeconomic History    Marital status: Single     Spouse name: None    Number of children: 0    Years of  education: 18    Highest education level: None   Occupational History    Occupation: Unemployed   Tobacco Use    Smoking status: Former     Current packs/day: 0.00     Average packs/day: 0.8 packs/day for 43.2 years (34.6 ttl pk-yrs)     Types: Cigarettes     Start date: 10/15/1975     Quit date: 2018     Years since quittin.8     Passive exposure: Past    Smokeless tobacco: Never    Tobacco comments:     1 PPD x 30. Quit 2017   Vaping Use    Vaping status: Never Used   Substance and Sexual Activity    Alcohol use: Yes     Comment: very seldom drink    Drug use: No    Sexual activity: Not Currently     Birth control/protection: None   Other Topics Concern     Service No    Blood Transfusions No    Caffeine Concern Yes     Comment: Coffee    Occupational Exposure No    Hobby Hazards No    Sleep Concern Yes     Comment: feels she sleeps to much    Stress Concern No    Weight Concern Yes     Comment: cannot lose    Special Diet No    Back Care No    Exercise Yes     Comment: Walk on treadmill, swim    Seat Belt Yes    Self-Exams No    Parent/sibling w/ CABG, MI or angioplasty before 65F 55M? No   Social History Narrative    Process death claims     Social Drivers of Health     Financial Resource Strain: Low Risk  (2023)    Financial Resource Strain     Within the past 12 months, have you or your family members you live with been unable to get utilities (heat, electricity) when it was really needed?: No   Food Insecurity: Low Risk  (2023)    Food Insecurity     Within the past 12 months, did you worry that your food would run out before you got money to buy more?: No     Within the past 12 months, did the food you bought just not last and you didn t have money to get more?: No   Transportation Needs: Low Risk  (2023)    Transportation Needs     Within the past 12 months, has lack of transportation kept you from medical appointments, getting your medicines, non-medical meetings or  appointments, work, or from getting things that you need?: No   Physical Activity: Inactive (4/26/2022)    Exercise Vital Sign     Days of Exercise per Week: 0 days     Minutes of Exercise per Session: 0 min   Stress: Stress Concern Present (4/14/2021)    [employer] [place] [employer] - Occupational Stress Questionnaire     Feeling of Stress : Rather much   Social Connections: Moderately Isolated (4/14/2021)    Social Connection and Isolation Panel [NHANES]     Frequency of Communication with Friends and Family: Three times a week     Frequency of Social Gatherings with Friends and Family: Twice a week     Attends Voodoo Services: More than 4 times per year     Active Member of Clubs or Organizations: No     Attends Club or Organization Meetings: Never     Marital Status: Never    Interpersonal Safety: Low Risk  (12/8/2023)    Interpersonal Safety     Do you feel physically and emotionally safe where you currently live?: Yes     Within the past 12 months, have you been hit, slapped, kicked or otherwise physically hurt by someone?: No     Within the past 12 months, have you been humiliated or emotionally abused in other ways by your partner or ex-partner?: No   Housing Stability: Low Risk  (12/1/2023)    Housing Stability     Do you have housing? : Yes     Are you worried about losing your housing?: No     Family History   Problem Relation Age of Onset    Macular Degeneration Mother         Dry    Hypertension Mother     C.A.D. Father     Cancer Maternal Grandmother     Cerebrovascular Disease Paternal Grandmother     Breast Cancer Paternal Grandmother     Cancer Brother     Cancer - colorectal Brother     Colon Cancer Brother     Diabetes Brother     Neurologic Disorder Sister         migraine    Glaucoma No family hx of        ALLERGIES:  Amoxicillin-pot clavulanate, Buspar [buspirone hcl], Lisinopril, Seasonal allergies, and Seroquel [quetiapine]    Current Outpatient Medications   Medication  Sig Dispense Refill    alcohol swab prep pads Use to swab area of injection/haley as directed. 100 each 3    atorvastatin (LIPITOR) 40 MG tablet Take 1 tablet (40 mg) by mouth daily 90 tablet 3    blood glucose (ACCU-CHEK GUIDE) test strip USE TO TEST TWICE DAILY 200 strip 0    blood glucose (NO BRAND SPECIFIED) lancets standard Use to test blood sugar 2 times daily or as directed. 100 lancet 3    blood glucose monitoring (NO BRAND SPECIFIED) meter device kit Use to test blood sugar 2 times daily or as directed. 1 kit 0    clobetasol (TEMOVATE) 0.05 % external cream Apply sparingly to affected area twice daily as needed.  Do not apply to face. 120 g 3    cyclobenzaprine (FLEXERIL) 5 MG tablet Take 1 tablet (5 mg) by mouth 3 times daily as needed for muscle spasms 42 tablet 0    DULoxetine (CYMBALTA) 60 MG capsule Take 2 capsules by mouth daily 180 capsule 0    ferrous sulfate (FEROSUL) 325 (65 Fe) MG tablet Take 1 tablet (325 mg) by mouth daily (with breakfast). 90 tablet 2    fluticasone (FLONASE) 50 MCG/ACT nasal spray Spray 2 sprays into both nostrils daily. 9.9 mL 0    furosemide (LASIX) 20 MG tablet TAKE 1 TABLET BY MOUTH TWICE DAILY 180 tablet 2    insulin pen needle (BD BRANDY U/F) 32G X 4 MM miscellaneous Use 1 daily as directed. 100 each 4    liraglutide (VICTOZA) 18 MG/3ML solution Inject 1.8 mg Subcutaneous daily 9 mL 0    losartan (COZAAR) 25 MG tablet Take 1 tablet (25 mg) by mouth daily 90 tablet 3    metFORMIN (GLUCOPHAGE XR) 500 MG 24 hr tablet Take 2 tablets (1,000 mg) by mouth 2 times daily (with meals) 360 tablet 3    metoprolol succinate ER (TOPROL XL) 25 MG 24 hr tablet Take 1 tablet (25 mg) by mouth daily 90 tablet 3    omeprazole (PRILOSEC) 20 MG DR capsule Take 1 capsule by mouth daily as needed (for Gerd) 90 capsule 0    traZODone (DESYREL) 50 MG tablet Take 1 tablet (50 mg) by mouth at bedtime 30 tablet 1     No current facility-administered medications for this visit.         ROS:  ROS is  done and is negative for general/constitutional, eye, ENT, Respiratory, cardiovascular, GI, , Skin, musculoskeletal except as noted elsewhere.  All other review of systems negative except as noted elsewhere.      OBJECTIVE:  /84   Pulse 53   Temp 97.6  F (36.4  C) (Oral)   Resp 14   SpO2 97%   GENERAL APPEARANCE: Alert, in no acute distress  EYES: normal  NOSE:normal  SKIN: no ulcers, lesions or rash  MUSCULOSKELETAL:left knee - mild edema, moderate lateral joint line tenderness, mild pain with rom testing, no joint instability noted.      RESULTS  .  Recent Results (from the past 48 hours)   XR Knee Left 3 Views    Narrative    EXAM: XR KNEE LEFT 3 VIEWS  [facility]  DATE: 10/29/2024    INDICATION: Left knee injury. Knee pain.  COMPARISON: None.      Impression    IMPRESSION: Anatomic alignment left knee. No acute displaced left knee fracture. Moderate medial and moderate to severe patellofemoral compartment left knee osteoarthritis. Suspect several loose bodies in the left knee particularly posteriorly. Chronic   ossicle along the superior left patella. No sizable left knee joint effusion. No significant anterior left knee soft tissue swelling.        ASSESSMENT/PLAN:    ASSESSMENT / PLAN:  (S89.92XA) Injury of left knee, initial encounter  (primary encounter diagnosis)  Comment: some underlying osteoarthritis which is likely exacerbated by her fall.  Also consider possible meniscal tear.  Plan: XR Knee Left 3 Views, Orthopedic          Referral        Refer to ortho for further eval and tx. I reviewed supportive care, otc meds to use if needed, expected course, and signs of concern.  Follow up with ortho within 1 week(s) or if worsens in any way.  Reviewed red flag symptoms and is to go to the ER if experiences any of these.    See [facility] for orders, medications, letters, patient instructions    Shobha Kong M.D.

## 2024-10-30 ENCOUNTER — PATIENT OUTREACH (OUTPATIENT)
Dept: CARE COORDINATION | Facility: CLINIC | Age: 64
End: 2024-10-30
Payer: COMMERCIAL

## 2024-10-30 NOTE — PROGRESS NOTES
CHIEF COMPLAINT:   Chief Complaint   Patient presents with    Left Knee - Injury     W/C. DOI 10/21/31. Patient notes she fell in the cafeteria while working landing hard on her left knee. She had immediate pain. She was able to walk after but is very sore. Pain is worse by the end of the day. Pain is anterior/lateral and is worse with walking or standing. No tx.            HISTORY OF PRESENT ILLNESS    Jessica Jay is a 64 year old female seen for evaluation of ongoing left knee pain following a work-related injury 10/21/2024. Fell in cafeteria while working, landing on her left knee. Immediate onset of pain. She's able to walk but is sore. She locates pain front/outer knee aggravated with walking standing, sit to stand. Worse as the day goes on. Continues to work, does a lot of sitting. She's a school para.    No treatments.     Denies prior knee problems.    Chronic low back pain. Left lower extremity numbness and tingling down left thigh and leg (pre-existent to injury).    Right knee is ok.    Gemma is diabetic, hgb A1c= 6.4 on 8/20/2024.    Present symptoms: pain laterally and anteriorly, pain sharp, dull/achy, burning , moderate pain, mild swelling.    Pain severity: 7/10  Frequency of symptoms: are constant  Exacerbating Factors: weight bearing, stairs, [id number]  Relieving Factors: rest, sitting  Night Pain: Yes  Pain while at rest: Yes   Numbness or tingling:  yes, unrelated.       Other PMH:  has a past medical history of Cellulitis and abscess of foot, except toes (09/26/2010), Chronic daily headache (02/15/2011), Complete rupture of rotator cuff (07/06/2009), Degenerative disc disease, Depression, major, Depressive disorder, Diabetes mellitus, type 2 (H), Dizziness - light-headed (02/15/2011), GERD (gastroesophageal reflux disease), Hypertension goal BP (blood pressure) < 140/90, LBP (low back pain), Panic attacks, Seasonal allergies, and Vulvar dermatitis (07/17/2012).    She has no past medical  history of Cancer (H), Cerebral infarction (H), Congestive heart failure (H), COPD (chronic obstructive pulmonary disease) (H), Heart disease, History of blood transfusion, Thyroid disease, or Uncomplicated asthma.  Patient Active Problem List   Diagnosis    Depression, major    Migraines    CARDIOVASCULAR SCREENING; LDL GOAL LESS THAN 160    Disc herniation    DDD (degenerative disc disease), lumbar    DDD (degenerative disc disease), cervical    Neck pain    Tobacco abuse    ELYSSA (obstructive sleep apnea)- severe (AHI 49)    Spondylolisthesis, grade 1    Chronic low back pain    Brain lipoma    History of colonic polyps    Gastroesophageal reflux disease without esophagitis    Hypertension goal BP (blood pressure) < 140/90    Traylor's esophagus determined by endoscopy    Bilateral edema of lower extremity    GABRIELA (generalized anxiety disorder)    Restless legs syndrome (RLS)    Diabetes mellitus, type 2 (H)    Morbid obesity (H)    Major depression, recurrent (H)       Surgical Hx:  has a past surgical history that includes Arthroscopy shoulder (1990); FOOT/TOES SURGERY PROC UNLISTED (1975); rotator cuff repair rt/lt (2009); carpal tunnel release rt/lt (1990); Craniotomy, excise tumor complex, combined (5/9/2011); colonoscopy (2016); and Colonoscopy (N/A, 4/29/2024).    Medications:   Current Outpatient Medications:     alcohol swab prep pads, Use to swab area of injection/haley as directed., Disp: 100 each, Rfl: 3    atorvastatin (LIPITOR) 40 MG tablet, Take 1 tablet (40 mg) by mouth daily, Disp: 90 tablet, Rfl: 3    blood glucose (ACCU-CHEK GUIDE) test strip, USE TO TEST TWICE DAILY, Disp: 200 strip, Rfl: 0    blood glucose (NO BRAND SPECIFIED) lancets standard, Use to test blood sugar 2 times daily or as directed., Disp: 100 lancet, Rfl: 3    blood glucose monitoring (NO BRAND SPECIFIED) meter device kit, Use to test blood sugar 2 times daily or as directed., Disp: 1 kit, Rfl: 0    clobetasol (TEMOVATE) 0.05 %  external cream, Apply sparingly to affected area twice daily as needed.  Do not apply to face., Disp: 120 g, Rfl: 3    cyclobenzaprine (FLEXERIL) 5 MG tablet, Take 1 tablet (5 mg) by mouth 3 times daily as needed for muscle spasms, Disp: 42 tablet, Rfl: 0    DULoxetine (CYMBALTA) 60 MG capsule, Take 2 capsules by mouth daily, Disp: 180 capsule, Rfl: 0    ferrous sulfate (FEROSUL) 325 (65 Fe) MG tablet, Take 1 tablet (325 mg) by mouth daily (with breakfast)., Disp: 90 tablet, Rfl: 2    fluticasone (FLONASE) 50 MCG/ACT nasal spray, Spray 2 sprays into both nostrils daily., Disp: 9.9 mL, Rfl: 0    furosemide (LASIX) 20 MG tablet, TAKE 1 TABLET BY MOUTH TWICE DAILY, Disp: 180 tablet, Rfl: 2    insulin pen needle (BD BRANDY U/F) 32G X 4 MM miscellaneous, Use 1 daily as directed., Disp: 100 each, Rfl: 4    liraglutide (VICTOZA) 18 MG/3ML solution, Inject 1.8 mg Subcutaneous daily, Disp: 9 mL, Rfl: 0    losartan (COZAAR) 25 MG tablet, Take 1 tablet (25 mg) by mouth daily, Disp: 90 tablet, Rfl: 3    metFORMIN (GLUCOPHAGE XR) 500 MG 24 hr tablet, Take 2 tablets (1,000 mg) by mouth 2 times daily (with meals), Disp: 360 tablet, Rfl: 3    metoprolol succinate ER (TOPROL XL) 25 MG 24 hr tablet, Take 1 tablet (25 mg) by mouth daily, Disp: 90 tablet, Rfl: 3    omeprazole (PRILOSEC) 20 MG DR capsule, Take 1 capsule by mouth daily as needed (for Gerd), Disp: 90 capsule, Rfl: 0    traZODone (DESYREL) 50 MG tablet, Take 1 tablet (50 mg) by mouth at bedtime, Disp: 30 tablet, Rfl: 1    Allergies:   Allergies   Allergen Reactions    Amoxicillin-Pot Clavulanate Rash    Buspar [Buspirone Hcl] Rash    Lisinopril Cough    Seasonal Allergies     Seroquel [Quetiapine] Itching       Social Hx: elementary school para.   reports that she quit smoking about 5 years ago. Her smoking use included cigarettes. She started smoking about 49 years ago. She has a 34.6 pack-year smoking history. She has been exposed to tobacco smoke. She has never used  "smokeless tobacco. She reports current alcohol use. She reports that she does not use drugs.    Family Hx: family history includes Breast Cancer in her paternal grandmother; C.A.D. in her father; Cancer in her brother and maternal grandmother; Cancer - colorectal in her brother; Cerebrovascular Disease in her paternal grandmother; Colon Cancer in her brother; Diabetes in her brother; Hypertension in her mother; Macular Degeneration in her mother; Neurologic Disorder in her sister.    REVIEW OF SYSTEMS: 10 point ROS neg other than the symptoms noted above in the HPI and PMH. Notables include  CONSTITUTIONAL:NEGATIVE for fever, chills, change in weight  INTEGUMENTARY/SKIN: NEGATIVE for worrisome rashes, moles or lesions  MUSCULOSKELETAL:See HPI above  NEURO: NEGATIVE for weakness, dizziness or paresthesias    PHYSICAL EXAM:  /84   Pulse 76   Ht 1.575 m (5' 2\")   Wt 103 kg (227 lb)   BMI 41.52 kg/m     GENERAL APPEARANCE: healthy, alert, no distress  SKIN: no suspicious lesions or rashes  NEURO: Normal strength and tone, mentation intact and speech normal  PSYCH:  mentation appears normal and affect normal, not anxious  RESPIRATORY: No increased work of breathing.  HANDS: no clubbing, nail pitting  LYMPH: no palpable popliteal lymphadenopathy.    BILATERAL LOWER EXTREMITIES:  Gait: antalgic favoring left.  Alignment: varus  No gross deformities or masses.  No Quad atrophy, strength normal.  Intact sensation deep peroneal nerve, superficial peroneal nerve, med/lat tibial nerve, sural nerve, saphenous nerve  Intact EHL, EDL, TA, FHL, GS, quadriceps hamstrings and hip flexors   Bilateral calf soft and nttp or squeeze.  DTRs: achilles 2+, patella 2+.  Edema: 1+    LEFT KNEE EXAM:    Skin: intact, no ecchymosis or erythema   Mild anterior soft tissue swelling, more anterolateral, tibial tubercle.  ROM: 3  extension to 95 flexion with anterior discomfort.  Tight hamstrings on straight leg raise.  Effusion: " "none  Tender: medial and lateral joint line, anterior knee, tibial tubercle, popliteal fossa  Posterior fullness.     MCL: stable, and non-painful at both 0 and 30 degrees knee flexion  Varus stress: stable, and non-painful at both 0 and 30 degrees knee flexion  Lachmans: neg, firm endpoint  Posterior Drawer stable  Patellofemoral joint:                Apprehension: negative              Crepitations: mild-moderate   Grind: positive.    RIGHT KNEE EXAM:    Skin: intact, no ecchymosis or erythema   ROM: full extension to full flexion  Tight hamstrings on straight leg raise.  Effusion: none  Tender: NTTP med/lat joint line, anterior or posterior knee  McMurrays: negative    MCL: stable, and non-painful at both 0 and 30 degrees knee flexion  Varus stress: stable, and non-painful at both 0 and 30 degrees knee flexion  Lachmans: neg, firm endpoint  Posterior Drawer stable  Patellofemoral joint:                Apprehension: negative              Crepitations: mild    X-RAY:  3 views left knee from 10/29/2024 were reviewed in clinic today. On my review, no obvious fractures or dislocations.   Anatomic alignment left knee. No acute displaced left knee fracture. Moderate medial and moderate to severe patellofemoral compartment left knee osteoarthritis. Suspect several loose bodies in the left knee particularly posteriorly. Chronic   ossicle along the superior left patella. No sizable left knee joint effusion. No significant anterior left knee soft tissue swelling.                ASSESSMENT/PLAN: Jessica Jay is a 64 year old female with acute  left knee pain status post injury, contusion, advanced left knee primary osteoarthritis.     * reviewed imaging studies with patient, showing arthritic changes, or wearing of the cartilage in the knee. This can be caused by normal \"wear and tear\" over the years or following prior injury to the knee.  Also soft tissue contusion/bruising. This will take time to improve, likely 4-6 " "weeks possibly longer.  The fall likely aggravated underlying osteoarthritis, as noted back in xrays 2021.  Treatment typically starts nonsurgically. Surgical indication for total knee arthroplasty  when nonsurgical management is no longer effective.    Non-surgical treatment for knee arthritis includes:    * rest, sitting  * Activity modification - avoid impact activities or activities that aggravate symptoms.  * NSAIDS (non-steroidal anti-inflammatory medications; e.g. Aleve, advil, motrin, ibuprofen) - regular use for inflammation ( twice daily or three times daily), with food, as long as no contra-indications Please discuss with primary care doctor if needed  * ice, 15-20 minutes at a time several times a day or as needed.  * Strengthening of quadriceps muscles  * Physical Therapy for strengthening, stretching and range of motion exercises of legs  * Tylenol as needed for pain, consider Tylenol arthritis or similar  * Weight loss: Weight loss:  Body mass index is 41.52 kg/m .. weight loss benefits, not only for the current pain symptoms, but also overall health. Recommend a good diet plan that works for the patient, with the assistance of a dietician or primary care doctor as needed. Also, a good, low-impact exercise program for at least 20 minutes per day, 3 times per week, such as exercise bike, elliptical , or pool.  * Exercise: low impact such as stationary bike, elliptical, pool.  * Injections: cortisone versus viscosupplementation (hyaluronic acid, \"rooster comb\", \"gel shots\"); risks and perceived benefits discussed today. Patient elects   to proceed.  * Bracing: bracing the knee may offer some relief of symptoms when worn and provide some stability.  * over the counter supplements such as glucosamine and chondroitin sulfate may help with joint pain.  * topical ointments may help as well  Workability note provided.    * return to clinic as needed.         Ki May M.D., M.S.  Dept. of " Orthopaedic Surgery  [facility]     Large Joint Injection/Arthocentesis: L knee joint    Date/Time: 11/4/2024 4:03 PM    Performed by: Ki May MD  Authorized by: Ki May MD    Indications:  Pain  Needle Size:  22 G  Guidance: landmark guided    Location:  Knee      Medications:  80 mg methylPREDNISolone 80 MG/ML  Outcome:  Tolerated well, no immediate complications  Procedure discussed: discussed risks, benefits, and alternatives    Consent Given by:  Patient  Timeout: timeout called immediately prior to procedure    Prep: patient was prepped and draped in usual sterile fashion

## 2024-10-30 NOTE — PROGRESS NOTES
Clinic Care Coordination Contact  Follow Up Progress Note      Assessment: she is continuing to work and enjoying it.  Her financial is improving but her exercise is not due to knee pain and physical nature of her work.     Her knee injury occurred at work when she slipped on syrup.  There were witnesses yet she did not report it.  Discussed the importance of reporting it and she will tomorrow.    Pt had no other concerns she wanted to talk about.    Care Gaps:    Health Maintenance Due   Topic Date Due    URINE DRUG SCREEN  Never done    INFLUENZA VACCINE (1) 09/01/2024    COVID-19 Vaccine (6 - 2024-25 season) 09/01/2024    EYE EXAM  10/05/2024    PHQ-9  11/03/2024    LUNG CANCER SCREENING  12/29/2024       Postponed to future appointments.     Care Plans  Care Plan: Financial Wellbeing       Problem: Patient expresses financial resource strain       Long-Range Goal: Create an action plan to increase financial stability       Start Date: 6/27/2022 Expected End Date: 2/19/2025    This Visit's Progress: 80% Recent Progress: 70%    Note:     Barriers: course needed is not available (quick books)  Strengths: going to class's    Patient expressed understanding of goal: yes  Action steps to achieve this goal:  1. I will continue with classes  2. I will look for the course I need and schedule  3. I will complete my resume  4. I will work with the workforce center to get the needed programs  5. I will reach out to [facility]  for support in rebuilding my credit                            Care Plan: Physical Activity       Problem: Patient is inactive       Long-Range Goal: Exercise at Least 20 Minutes per Day       Start Date: 4/27/2020 Expected End Date: 2/19/2025    This Visit's Progress: 40% Recent Progress: 50%    Note:     Barriers: Depression, back pain, social distancing to some extent  Strengths: Understand that increasing my exercise will help with managing my weight and back pain    Patient expressed  understanding of goal: Yes  Action steps to achieve this goal:  1. I will walk in one direction for 10 minutes and then return for a total of 20 minutes  2. I will not get discouraged if I cannot make it the 10 minutes in one direction before having to turn around  3. I will celebrate my successes    10/4/24 - work is making it challenging to exercise after work                              Intervention/Education provided during outreach: discussed the reasons why she should report her fall to work.       Outreach Frequency: monthly, more frequently as needed      Plan:   Pt to report her fall.  Pt to continue to work on financial.   Care Coordinator will follow up in a little over one month due to SW being out of office.  Pt will call if needs arise.     JAQUELIN Mensah   [facility] - Care Coordination  [facility]   [phone]

## 2024-11-01 ASSESSMENT — KOOS JR
TWISING OR PIVOTING ON KNEE: EXTREME
STRAIGHTENING KNEE FULLY: EXTREME
HOW SEVERE IS YOUR KNEE STIFFNESS AFTER FIRST WAKING IN MORNING: MODERATE
BENDING TO THE FLOOR TO PICK UP OBJECT: SEVERE
RISING FROM SITTING: SEVERE
KOOS JR SCORING: 34.17
GOING UP OR DOWN STAIRS: SEVERE
STANDING UPRIGHT: MODERATE

## 2024-11-04 ENCOUNTER — OFFICE VISIT (OUTPATIENT)
Dept: ORTHOPEDICS | Facility: CLINIC | Age: 64
End: 2024-11-04
Attending: INTERNAL MEDICINE
Payer: COMMERCIAL

## 2024-11-04 VITALS
HEIGHT: 62 IN | HEART RATE: 76 BPM | BODY MASS INDEX: 41.77 KG/M2 | DIASTOLIC BLOOD PRESSURE: 84 MMHG | WEIGHT: 227 LBS | SYSTOLIC BLOOD PRESSURE: 125 MMHG

## 2024-11-04 DIAGNOSIS — S89.92XA INJURY OF LEFT KNEE, INITIAL ENCOUNTER: Primary | ICD-10-CM

## 2024-11-04 DIAGNOSIS — S80.02XA CONTUSION OF LEFT KNEE, INITIAL ENCOUNTER: ICD-10-CM

## 2024-11-04 DIAGNOSIS — M17.12 PRIMARY OSTEOARTHRITIS OF LEFT KNEE: ICD-10-CM

## 2024-11-04 PROCEDURE — 99203 OFFICE O/P NEW LOW 30 MIN: CPT | Mod: 25 | Performed by: ORTHOPAEDIC SURGERY

## 2024-11-04 PROCEDURE — 20610 DRAIN/INJ JOINT/BURSA W/O US: CPT | Mod: LT | Performed by: ORTHOPAEDIC SURGERY

## 2024-11-04 RX ORDER — METHYLPREDNISOLONE ACETATE 80 MG/ML
80 INJECTION, SUSPENSION INTRA-ARTICULAR; INTRALESIONAL; INTRAMUSCULAR; SOFT TISSUE
Status: COMPLETED | OUTPATIENT
Start: 2024-11-04 | End: 2024-11-04

## 2024-11-04 RX ADMIN — METHYLPREDNISOLONE ACETATE 80 MG: 80 INJECTION, SUSPENSION INTRA-ARTICULAR; INTRALESIONAL; INTRAMUSCULAR; SOFT TISSUE at 16:03

## 2024-11-04 ASSESSMENT — PAIN SCALES - GENERAL: PAINLEVEL_OUTOF10: SEVERE PAIN (7)

## 2024-11-04 NOTE — NURSING NOTE
[facility]   ORTHOPEDICS & SPORTS MEDICINE  [address] [place] [address]  Dept: [phone]  ______________________________________________________________________________    Patient: Jessica Jay   : 1960   MRN: [id number]   2024    INVASIVE PROCEDURE SAFETY CHECKLIST    Date: 24   Procedure:Left Knee Injection  Patient Name: Jessica Jay  MRN: [id number]  YOB: 1960    Action: Complete sections as appropriate. Any discrepancy results in a HARD COPY until resolved.     PRE PROCEDURE:  Patient ID verified with 2 identifiers (name and  or MRN): Yes  Procedure and site verified with patient/designee (when able): Yes  Accurate consent documentation in medical record: Yes  H&P (or appropriate assessment) documented in medical record: Yes  H&P must be up to 20 days prior to procedure and updates within 24 hours of procedure as applicable: NA  Relevant diagnostic and radiology test results appropriately labeled and displayed as applicable: NA  Procedure site(s) marked with provider initials: NA    TIMEOUT:  Time-Out performed immediately prior to starting procedure, including verbal and active participation of all team members addressing the following:Yes  * Correct patient identify  * Confirmed that the correct side and site are marked  * An accurate procedure consent form  * Agreement on the procedure to be done  * Correct patient position  * Relevant images and results are properly labeled and appropriately displayed  * The need to administer antibiotics or fluids for irrigation purposes during the procedure as applicable   * Safety precautions based on patient history or medication use    DURING PROCEDURE: Verification of correct person, site, and procedures any time the responsibility for care of the patient is transferred to another member of the care team.       Prior to injection, verified patient identity using patient's name and date of  birth.  Due to injection administration, patient instructed to remain in clinic for 15 minutes  afterwards, and to report any adverse reaction to me immediately.    Joint injection was performed.      Drug Amount Wasted:  None.  Vial/Syringe: Multi dose vial  Expiration Date:  02/2027      Mikey Ramirez, [facility]  November 4, 2024

## 2024-11-04 NOTE — LETTER
11/4/2024      Jessica Jay  [address] [place] [facility] [place] [address]      Dear Colleague,    Thank you for referring your patient, Jessica Jay, to the [facility]. Please see a copy of my visit note below.    CHIEF COMPLAINT:   Chief Complaint   Patient presents with     Left Knee - Injury     W/C. DOI 10/21/31. Patient notes she fell in the cafeteria while working landing hard on her left knee. She had immediate pain. She was able to walk after but is very sore. Pain is worse by the end of the day. Pain is anterior/lateral and is worse with walking or standing. No tx.            HISTORY OF PRESENT ILLNESS    Jessica Jay is a 64 year old female seen for evaluation of ongoing left knee pain following a work-related injury 10/21/2024. Fell in cafeteria while working, landing on her left knee. Immediate onset of pain. She's able to walk but is sore. She locates pain front/outer knee aggravated with walking standing, sit to stand. Worse as the day goes on. Continues to work, does a lot of sitting. She's a school para.    No treatments.     Denies prior knee problems.    Chronic low back pain. Left lower extremity numbness and tingling down left thigh and leg (pre-existent to injury).    Right knee is ok.    Gemma is diabetic, hgb A1c= 6.4 on 8/20/2024.    Present symptoms: pain laterally and anteriorly, pain sharp, dull/achy, burning , moderate pain, mild swelling.    Pain severity: 7/10  Frequency of symptoms: are constant  Exacerbating Factors: weight bearing, stairs, [id number]  Relieving Factors: rest, sitting  Night Pain: Yes  Pain while at rest: Yes   Numbness or tingling:  yes, unrelated.       Other PMH:  has a past medical history of Cellulitis and abscess of foot, except toes (09/26/2010), Chronic daily headache (02/15/2011), Complete rupture of rotator cuff (07/06/2009), Degenerative disc disease, Depression, major, Depressive disorder, Diabetes mellitus,  type 2 (H), Dizziness - light-headed (02/15/2011), GERD (gastroesophageal reflux disease), Hypertension goal BP (blood pressure) < 140/90, LBP (low back pain), Panic attacks, Seasonal allergies, and Vulvar dermatitis (07/17/2012).    She has no past medical history of Cancer (H), Cerebral infarction (H), Congestive heart failure (H), COPD (chronic obstructive pulmonary disease) (H), Heart disease, History of blood transfusion, Thyroid disease, or Uncomplicated asthma.  Patient Active Problem List   Diagnosis     Depression, major     Migraines     CARDIOVASCULAR SCREENING; LDL GOAL LESS THAN 160     Disc herniation     DDD (degenerative disc disease), lumbar     DDD (degenerative disc disease), cervical     Neck pain     Tobacco abuse     ELYSSA (obstructive sleep apnea)- severe (AHI 49)     Spondylolisthesis, grade 1     Chronic low back pain     Brain lipoma     History of colonic polyps     Gastroesophageal reflux disease without esophagitis     Hypertension goal BP (blood pressure) < 140/90     Traylor's esophagus determined by endoscopy     Bilateral edema of lower extremity     GABRIELA (generalized anxiety disorder)     Restless legs syndrome (RLS)     Diabetes mellitus, type 2 (H)     Morbid obesity (H)     Major depression, recurrent (H)       Surgical Hx:  has a past surgical history that includes Arthroscopy shoulder (1990); FOOT/TOES SURGERY PROC UNLISTED (1975); rotator cuff repair rt/lt (2009); carpal tunnel release rt/lt (1990); Craniotomy, excise tumor complex, combined (5/9/2011); colonoscopy (2016); and Colonoscopy (N/A, 4/29/2024).    Medications:   Current Outpatient Medications:      alcohol swab prep pads, Use to swab area of injection/haley as directed., Disp: 100 each, Rfl: 3     atorvastatin (LIPITOR) 40 MG tablet, Take 1 tablet (40 mg) by mouth daily, Disp: 90 tablet, Rfl: 3     blood glucose (ACCU-CHEK GUIDE) test strip, USE TO TEST TWICE DAILY, Disp: 200 strip, Rfl: 0     blood glucose (NO BRAND  SPECIFIED) lancets standard, Use to test blood sugar 2 times daily or as directed., Disp: 100 lancet, Rfl: 3     blood glucose monitoring (NO BRAND SPECIFIED) meter device kit, Use to test blood sugar 2 times daily or as directed., Disp: 1 kit, Rfl: 0     clobetasol (TEMOVATE) 0.05 % external cream, Apply sparingly to affected area twice daily as needed.  Do not apply to face., Disp: 120 g, Rfl: 3     cyclobenzaprine (FLEXERIL) 5 MG tablet, Take 1 tablet (5 mg) by mouth 3 times daily as needed for muscle spasms, Disp: 42 tablet, Rfl: 0     DULoxetine (CYMBALTA) 60 MG capsule, Take 2 capsules by mouth daily, Disp: 180 capsule, Rfl: 0     ferrous sulfate (FEROSUL) 325 (65 Fe) MG tablet, Take 1 tablet (325 mg) by mouth daily (with breakfast)., Disp: 90 tablet, Rfl: 2     fluticasone (FLONASE) 50 MCG/ACT nasal spray, Spray 2 sprays into both nostrils daily., Disp: 9.9 mL, Rfl: 0     furosemide (LASIX) 20 MG tablet, TAKE 1 TABLET BY MOUTH TWICE DAILY, Disp: 180 tablet, Rfl: 2     insulin pen needle (BD BRANDY U/F) 32G X 4 MM miscellaneous, Use 1 daily as directed., Disp: 100 each, Rfl: 4     liraglutide (VICTOZA) 18 MG/3ML solution, Inject 1.8 mg Subcutaneous daily, Disp: 9 mL, Rfl: 0     losartan (COZAAR) 25 MG tablet, Take 1 tablet (25 mg) by mouth daily, Disp: 90 tablet, Rfl: 3     metFORMIN (GLUCOPHAGE XR) 500 MG 24 hr tablet, Take 2 tablets (1,000 mg) by mouth 2 times daily (with meals), Disp: 360 tablet, Rfl: 3     metoprolol succinate ER (TOPROL XL) 25 MG 24 hr tablet, Take 1 tablet (25 mg) by mouth daily, Disp: 90 tablet, Rfl: 3     omeprazole (PRILOSEC) 20 MG DR capsule, Take 1 capsule by mouth daily as needed (for Gerd), Disp: 90 capsule, Rfl: 0     traZODone (DESYREL) 50 MG tablet, Take 1 tablet (50 mg) by mouth at bedtime, Disp: 30 tablet, Rfl: 1    Allergies:   Allergies   Allergen Reactions     Amoxicillin-Pot Clavulanate Rash     Buspar [Buspirone Hcl] Rash     Lisinopril Cough     Seasonal Allergies       "Seroquel [Quetiapine] Itching       Social Hx: elementary school para.   reports that she quit smoking about 5 years ago. Her smoking use included cigarettes. She started smoking about 49 years ago. She has a 34.6 pack-year smoking history. She has been exposed to tobacco smoke. She has never used smokeless tobacco. She reports current alcohol use. She reports that she does not use drugs.    Family Hx: family history includes Breast Cancer in her paternal grandmother; C.A.D. in her father; Cancer in her brother and maternal grandmother; Cancer - colorectal in her brother; Cerebrovascular Disease in her paternal grandmother; Colon Cancer in her brother; Diabetes in her brother; Hypertension in her mother; Macular Degeneration in her mother; Neurologic Disorder in her sister.    REVIEW OF SYSTEMS: 10 point ROS neg other than the symptoms noted above in the HPI and PMH. Notables include  CONSTITUTIONAL:NEGATIVE for fever, chills, change in weight  INTEGUMENTARY/SKIN: NEGATIVE for worrisome rashes, moles or lesions  MUSCULOSKELETAL:See HPI above  NEURO: NEGATIVE for weakness, dizziness or paresthesias    PHYSICAL EXAM:  /84   Pulse 76   Ht 1.575 m (5' 2\")   Wt 103 kg (227 lb)   BMI 41.52 kg/m     GENERAL APPEARANCE: healthy, alert, no distress  SKIN: no suspicious lesions or rashes  NEURO: Normal strength and tone, mentation intact and speech normal  PSYCH:  mentation appears normal and affect normal, not anxious  RESPIRATORY: No increased work of breathing.  HANDS: no clubbing, nail pitting  LYMPH: no palpable popliteal lymphadenopathy.    BILATERAL LOWER EXTREMITIES:  Gait: antalgic favoring left.  Alignment: varus  No gross deformities or masses.  No Quad atrophy, strength normal.  Intact sensation deep peroneal nerve, superficial peroneal nerve, med/lat tibial nerve, sural nerve, saphenous nerve  Intact EHL, EDL, TA, FHL, GS, quadriceps hamstrings and hip flexors   Bilateral calf soft and nttp or " squeeze.  DTRs: achilles 2+, patella 2+.  Edema: 1+    LEFT KNEE EXAM:    Skin: intact, no ecchymosis or erythema   Mild anterior soft tissue swelling, more anterolateral, tibial tubercle.  ROM: 3  extension to 95 flexion with anterior discomfort.  Tight hamstrings on straight leg raise.  Effusion: none  Tender: medial and lateral joint line, anterior knee, tibial tubercle, popliteal fossa  Posterior fullness.     MCL: stable, and non-painful at both 0 and 30 degrees knee flexion  Varus stress: stable, and non-painful at both 0 and 30 degrees knee flexion  Lachmans: neg, firm endpoint  Posterior Drawer stable  Patellofemoral joint:                Apprehension: negative              Crepitations: mild-moderate   Grind: positive.    RIGHT KNEE EXAM:    Skin: intact, no ecchymosis or erythema   ROM: full extension to full flexion  Tight hamstrings on straight leg raise.  Effusion: none  Tender: NTTP med/lat joint line, anterior or posterior knee  McMurrays: negative    MCL: stable, and non-painful at both 0 and 30 degrees knee flexion  Varus stress: stable, and non-painful at both 0 and 30 degrees knee flexion  Lachmans: neg, firm endpoint  Posterior Drawer stable  Patellofemoral joint:                Apprehension: negative              Crepitations: mild    X-RAY:  3 views left knee from 10/29/2024 were reviewed in clinic today. On my review, no obvious fractures or dislocations.   Anatomic alignment left knee. No acute displaced left knee fracture. Moderate medial and moderate to severe patellofemoral compartment left knee osteoarthritis. Suspect several loose bodies in the left knee particularly posteriorly. Chronic   ossicle along the superior left patella. No sizable left knee joint effusion. No significant anterior left knee soft tissue swelling.                ASSESSMENT/PLAN: Jessica Jay is a 64 year old female with acute  left knee pain status post injury, contusion, advanced left knee primary  "osteoarthritis.     * reviewed imaging studies with patient, showing arthritic changes, or wearing of the cartilage in the knee. This can be caused by normal \"wear and tear\" over the years or following prior injury to the knee.  Also soft tissue contusion/bruising. This will take time to improve, likely 4-6 weeks possibly longer.  The fall likely aggravated underlying osteoarthritis, as noted back in xrays 2021.  Treatment typically starts nonsurgically. Surgical indication for total knee arthroplasty  when nonsurgical management is no longer effective.    Non-surgical treatment for knee arthritis includes:    * rest, sitting  * Activity modification - avoid impact activities or activities that aggravate symptoms.  * NSAIDS (non-steroidal anti-inflammatory medications; e.g. Aleve, advil, motrin, ibuprofen) - regular use for inflammation ( twice daily or three times daily), with food, as long as no contra-indications Please discuss with primary care doctor if needed  * ice, 15-20 minutes at a time several times a day or as needed.  * Strengthening of quadriceps muscles  * Physical Therapy for strengthening, stretching and range of motion exercises of legs  * Tylenol as needed for pain, consider Tylenol arthritis or similar  * Weight loss: Weight loss:  Body mass index is 41.52 kg/m .. weight loss benefits, not only for the current pain symptoms, but also overall health. Recommend a good diet plan that works for the patient, with the assistance of a dietician or primary care doctor as needed. Also, a good, low-impact exercise program for at least 20 minutes per day, 3 times per week, such as exercise bike, elliptical , or pool.  * Exercise: low impact such as stationary bike, elliptical, pool.  * Injections: cortisone versus viscosupplementation (hyaluronic acid, \"rooster comb\", \"gel shots\"); risks and perceived benefits discussed today. Patient elects   to proceed.  * Bracing: bracing the knee may offer some " relief of symptoms when worn and provide some stability.  * over the counter supplements such as glucosamine and chondroitin sulfate may help with joint pain.  * topical ointments may help as well  Workability note provided.    * return to clinic as needed.         Ki May M.D., M.S.  Dept. of Orthopaedic Surgery  [facility]     Large Joint Injection/Arthocentesis: L knee joint    Date/Time: 11/4/2024 4:03 PM    Performed by: Ki May MD  Authorized by: Ki May MD    Indications:  Pain  Needle Size:  22 G  Guidance: landmark guided    Location:  Knee      Medications:  80 mg methylPREDNISolone 80 MG/ML  Outcome:  Tolerated well, no immediate complications  Procedure discussed: discussed risks, benefits, and alternatives    Consent Given by:  Patient  Timeout: timeout called immediately prior to procedure    Prep: patient was prepped and draped in usual sterile fashion          Again, thank you for allowing me to participate in the care of your patient.        Sincerely,        Ki May MD

## 2024-11-04 NOTE — LETTER
November 4, 2024      Jessica Jay  [address] [facility] [address]        To whom it may concern:     Jessica Jay was seen in my clinic today for a work comp left knee injury/contusion on 10/21/24, along with osteoarthritis. She may return to work on 11/6/24. Please allow sitting and resting as needed. She will return to clinic as needed. Please contact my office with any questions.        Electronically Signed (as below)  Dr. Ki May M.D.

## 2024-11-08 ENCOUNTER — PATIENT OUTREACH (OUTPATIENT)
Dept: CARE COORDINATION | Facility: CLINIC | Age: 64
End: 2024-11-08
Payer: COMMERCIAL

## 2024-12-03 ENCOUNTER — PATIENT OUTREACH (OUTPATIENT)
Dept: CARE COORDINATION | Facility: CLINIC | Age: 64
End: 2024-12-03
Payer: COMMERCIAL

## 2024-12-03 NOTE — PROGRESS NOTES
Clinic Care Coordination Contact  [facility]/Voicemail    Clinical Data: Care Coordinator Outreach    Outreach Documentation Number of Outreach Attempt   12/3/2024   3:30 PM 1       Left message on patient's voicemail with call back information and requested return call.      Plan: Care Coordinator will try to reach patient again in 10-12 business days.    JAQUELIN Mensah   [facility] - Care Coordination     [phone]

## 2024-12-03 NOTE — LETTER
It was a pleasure to speak with you.  I would like to provide you with the enclosed information for your records.  As part of care coordination, we are developing care plans to assist in accomplishing your health care goals.  When we speak next, please feel free to let me know if you want to add or change any information on your care plans.    As always, feel free to contact me if you have any questions or concerns.  I look forward to working with you in the effort to achieve your health care and wellness goals .        Sincerely,      Bell Brito, [facility]  Clinic Care Coordination  [phone]    [facility]  Patient Centered Plan of Care  About Me:        Patient Name:  Jessica Jay    YOB: 1960  Age:         64 year old   [place] MRN:    [id number] Telephone Information:  Home Phone [phone]   Mobile [phone]       Address:  [address] [place] [id number] Email address:  [email or web address]      Emergency Contact(s)    Name Relationship Lgl Grd Work Phone Home Phone Mobile Phone   1. NOAH TOBIAS Sister No   [phone]   2. ILIANA JAY Mother No   [phone]           Primary language:  English     needed? No   [place] [employer]:  [phone] op. 1  Other communication barriers:Glasses    Preferred Method of Communication:  Portia  Current living arrangement: I live alone    Mobility Status/ Medical Equipment: Independent        Health Maintenance  Health Maintenance Reviewed: Due/Overdue   Health Maintenance Due   Topic Date Due    URINE DRUG SCREEN  Never done    INFLUENZA VACCINE (1) 09/01/2024    COVID-19 Vaccine (6 - 2024-25 season) 09/01/2024    EYE EXAM  10/05/2024    PHQ-9  11/03/2024    A1C  11/20/2024    YEARLY PREVENTIVE VISIT  12/08/2024    DIABETIC FOOT EXAM  12/08/2024    LUNG CANCER SCREENING  12/29/2024           My Access Plan  Medical Emergency 911   Primary Clinic Line [facility] - [phone]    24 Hour Appointment Line [phone] or  [phone]) (toll-free)   24 Hour Nurse Line [phone] (toll-free)   Preferred Urgent Care Other     Preferred Hospital [facility] [place]  [phone]     Preferred Pharmacy [facility] - [address]     Behavioral Health Crisis Line The National Suicide Prevention Lifeline at 1-924.826.7309 or Text/Call 988           My Care Team Members  Patient Care Team         Relationship Specialty Notifications Start End    Marybeth Silver MD PCP - General Family Practice  5/16/18     Referred to Dermatology    Phone: [phone] Fax: [phone] [address] [place] [address]    Marybeth Silver MD Assigned PCP   4/15/18     Phone: [phone] Fax: [phone] [address] [place] [address]    Bell Brito LSW Lead Care Coordinator Primary Care - CC Admissions 1/29/20     Phone: [phone] Fax: [phone]        Mayela Plummer [facility] Pharmacist Pharmacist  5/19/21     Phone: [phone] Fax: [phone] [address] [facility] [address]    Shobha Donovan RD Diabetes Educator Dietitian, Registered  8/18/22     Phone: [phone] Fax: [phone]        Brenda Ortiz APRN CNP Nurse Practitioner Dermatology  12/14/23     Phone: [phone] Fax: [phone] [address] [facility] [place] [address]    Brenda Ortiz APRN CNP Assigned Surgical Provider   2/23/24     Phone: [phone] Fax: [phone] [address] [facility] [place] [address]    Brent Higgins DO Assigned Sleep Provider   3/23/24     Phone: [phone] Fax: [phone] [address] [place] [address]    Ki May MD Assigned Musculoskeletal Provider   11/23/24     Phone: [phone] Fax: [phone] [address] [facility] [address]                My Care Plans  Self Management and Treatment Plan    Care Plan  Care  Plan: Financial Wellbeing       Problem: Patient expresses financial resource strain       Long-Range Goal: Create an action plan to increase financial stability       Start Date: 6/27/2022 Expected End Date: 2/19/2025    This Visit's Progress: 80% Recent Progress: 80%    Note:     Barriers: course needed is not available (quick books)  Strengths: going to class's    Patient expressed understanding of goal: yes  Action steps to achieve this goal:  1. I will continue with classes  2. I will look for the course I need and schedule  3. I will complete my resume  4. I will work with the [employer] [facility] to get the needed programs  5. I will reach out to [facility] [employer] for support in rebuilding my credit                            Care Plan: Physical Activity       Problem: Patient is inactive       Long-Range Goal: Exercise at Least 20 Minutes per Day       Start Date: 4/27/2020 Expected End Date: 2/19/2025    This Visit's Progress: 30% Recent Progress: 40%    Note:     Barriers: Depression, back pain, social distancing to some extent  Strengths: Understand that increasing my exercise will help with managing my weight and back pain    Patient expressed understanding of goal: Yes  Action steps to achieve this goal:  1. I will walk in one direction for 10 minutes and then return for a total of 20 minutes  2. I will not get discouraged if I cannot make it the 10 minutes in one direction before having to turn around  3. I will celebrate my successes    10/4/24 - work is making it challenging to exercise after work                              Action Plans on File:            Depression          Advance Care Plans/Directives:   Advanced Care Plan/Directives on file: Yes    Status of Document(s): No data recorded  Advanced Care Plan/Directives Type: Advanced Directive - On File           My Medical and Care Information  Problem List   Patient Active Problem List   Diagnosis    Depression, major    Migraines     CARDIOVASCULAR SCREENING; LDL GOAL LESS THAN 160    Disc herniation    DDD (degenerative disc disease), lumbar    DDD (degenerative disc disease), cervical    Neck pain    Tobacco abuse    ELYSSA (obstructive sleep apnea)- severe (AHI 49)    Spondylolisthesis, grade 1    Chronic low back pain    Brain lipoma    History of colonic polyps    Gastroesophageal reflux disease without esophagitis    Hypertension goal BP (blood pressure) < 140/90    Traylor's esophagus determined by endoscopy    Bilateral edema of lower extremity    GABRIELA (generalized anxiety disorder)    Restless legs syndrome (RLS)    Diabetes mellitus, type 2 (H)    Morbid obesity (H)    Major depression, recurrent (H)      Current Medications:  Please refer to the most recent medication list provided to you by your medical team and reach out to your provider with any questions or to make any corrections.    Care Coordination Start Date: 1/29/2020   Frequency of Care Coordination: monthly, more frequently as needed     Form Last Updated: 12/03/2024

## 2024-12-03 NOTE — PROGRESS NOTES
Clinic Care Coordination Contact  Follow Up Progress Note      Assessment: patient reported that she lost her job just before Thanksgiving due to an anger response to a situation.  She recognizes her fault and is talking with her counselor weekly and considering DBT.  She has been to DBT several years ago and found it helpful yet feels she needs to go again due to losing the skills.     Pt noted her knee is a little better. She does feel she will need additional options/supports for her knee pain. She has an appointment on 12/9 with her PCP and will discuss options.     Care Gaps:    Health Maintenance Due   Topic Date Due    URINE DRUG SCREEN  Never done    INFLUENZA VACCINE (1) 09/01/2024    COVID-19 Vaccine (6 - 2024-25 season) 09/01/2024    EYE EXAM  10/05/2024    PHQ-9  11/03/2024    A1C  11/20/2024    YEARLY PREVENTIVE VISIT  12/08/2024    DIABETIC FOOT EXAM  12/08/2024    LUNG CANCER SCREENING  12/29/2024       Scheduled 1/27/25      Care Plans  Care Plan: Financial Wellbeing       Problem: Patient expresses financial resource strain       Long-Range Goal: Create an action plan to increase financial stability       Start Date: 6/27/2022 Expected End Date: 2/19/2025    This Visit's Progress: 80% Recent Progress: 80%    Note:     Barriers: course needed is not available (quick books)  Strengths: going to class's    Patient expressed understanding of goal: yes  Action steps to achieve this goal:  1. I will continue with classes  2. I will look for the course I need and schedule  3. I will complete my resume  4. I will work with the workforce center to get the needed programs  5. I will reach out to [facility]  for support in rebuilding my credit                            Care Plan: Physical Activity       Problem: Patient is inactive       Long-Range Goal: Exercise at Least 20 Minutes per Day       Start Date: 4/27/2020 Expected End Date: 2/19/2025    This Visit's Progress: 30% Recent Progress: 40%     Note:     Barriers: Depression, back pain, social distancing to some extent  Strengths: Understand that increasing my exercise will help with managing my weight and back pain    Patient expressed understanding of goal: Yes  Action steps to achieve this goal:  1. I will walk in one direction for 10 minutes and then return for a total of 20 minutes  2. I will not get discouraged if I cannot make it the 10 minutes in one direction before having to turn around  3. I will celebrate my successes    10/4/24 - work is making it challenging to exercise after work                              Intervention/Education provided during outreach: encouraged pt to continue with counseling and schedule DBT.     Outreach Frequency: monthly, more frequently as needed      Plan:   Pt to work with counseling and look for a new job.   Care Coordinator will follow up in 3 weeks to talk before Rony which is a difficult time for her.   Care plan sent via [employer].    JAQUELIN Mensah   [facility] - Care Coordination  [facility]   [phone]

## 2024-12-04 SDOH — HEALTH STABILITY: PHYSICAL HEALTH: ON AVERAGE, HOW MANY MINUTES DO YOU ENGAGE IN EXERCISE AT THIS LEVEL?: 0 MIN

## 2024-12-04 SDOH — HEALTH STABILITY: PHYSICAL HEALTH: ON AVERAGE, HOW MANY DAYS PER WEEK DO YOU ENGAGE IN MODERATE TO STRENUOUS EXERCISE (LIKE A BRISK WALK)?: 0 DAYS

## 2024-12-04 ASSESSMENT — SOCIAL DETERMINANTS OF HEALTH (SDOH): HOW OFTEN DO YOU GET TOGETHER WITH FRIENDS OR RELATIVES?: NEVER

## 2024-12-09 ENCOUNTER — OFFICE VISIT (OUTPATIENT)
Dept: FAMILY MEDICINE | Facility: CLINIC | Age: 64
End: 2024-12-09
Attending: FAMILY MEDICINE
Payer: COMMERCIAL

## 2024-12-09 VITALS
OXYGEN SATURATION: 99 % | BODY MASS INDEX: 41.86 KG/M2 | DIASTOLIC BLOOD PRESSURE: 68 MMHG | TEMPERATURE: 97.8 F | WEIGHT: 221.7 LBS | HEART RATE: 98 BPM | RESPIRATION RATE: 18 BRPM | HEIGHT: 61 IN | SYSTOLIC BLOOD PRESSURE: 122 MMHG

## 2024-12-09 DIAGNOSIS — K21.9 GASTROESOPHAGEAL REFLUX DISEASE, UNSPECIFIED WHETHER ESOPHAGITIS PRESENT: ICD-10-CM

## 2024-12-09 DIAGNOSIS — E66.01 MORBID OBESITY (H): ICD-10-CM

## 2024-12-09 DIAGNOSIS — E78.5 HYPERLIPIDEMIA LDL GOAL <100: ICD-10-CM

## 2024-12-09 DIAGNOSIS — I10 HYPERTENSION GOAL BP (BLOOD PRESSURE) < 140/90: ICD-10-CM

## 2024-12-09 DIAGNOSIS — Z00.00 ROUTINE GENERAL MEDICAL EXAMINATION AT A HEALTH CARE FACILITY: Primary | ICD-10-CM

## 2024-12-09 DIAGNOSIS — F10.21 ALCOHOL DEPENDENCE, IN REMISSION (H): ICD-10-CM

## 2024-12-09 DIAGNOSIS — F33.1 MODERATE EPISODE OF RECURRENT MAJOR DEPRESSIVE DISORDER (H): ICD-10-CM

## 2024-12-09 DIAGNOSIS — R45.851 PASSIVE SUICIDAL IDEATIONS: ICD-10-CM

## 2024-12-09 DIAGNOSIS — E11.65 TYPE 2 DIABETES MELLITUS WITH HYPERGLYCEMIA, WITHOUT LONG-TERM CURRENT USE OF INSULIN (H): ICD-10-CM

## 2024-12-09 DIAGNOSIS — Z23 NEED FOR VACCINATION: ICD-10-CM

## 2024-12-09 DIAGNOSIS — F41.1 GAD (GENERALIZED ANXIETY DISORDER): ICD-10-CM

## 2024-12-09 DIAGNOSIS — Z87.891 PERSONAL HISTORY OF TOBACCO USE, PRESENTING HAZARDS TO HEALTH: ICD-10-CM

## 2024-12-09 LAB
ERYTHROCYTE [DISTWIDTH] IN BLOOD BY AUTOMATED COUNT: 14.2 % (ref 10–15)
EST. AVERAGE GLUCOSE BLD GHB EST-MCNC: 148 MG/DL
HBA1C MFR BLD: 6.8 % (ref 0–5.6)
HCT VFR BLD AUTO: 42.6 % (ref 35–47)
HGB BLD-MCNC: 13.8 G/DL (ref 11.7–15.7)
HOLD SPECIMEN: NORMAL
HOLD SPECIMEN: NORMAL
MCH RBC QN AUTO: 27.7 PG (ref 26.5–33)
MCHC RBC AUTO-ENTMCNC: 32.4 G/DL (ref 31.5–36.5)
MCV RBC AUTO: 86 FL (ref 78–100)
PLATELET # BLD AUTO: 330 10E3/UL (ref 150–450)
RBC # BLD AUTO: 4.98 10E6/UL (ref 3.8–5.2)
WBC # BLD AUTO: 7.9 10E3/UL (ref 4–11)

## 2024-12-09 PROCEDURE — 99207 PR FOOT EXAM NO CHARGE: CPT | Mod: 25 | Performed by: FAMILY MEDICINE

## 2024-12-09 PROCEDURE — 91320 SARSCV2 VAC 30MCG TRS-SUC IM: CPT | Performed by: FAMILY MEDICINE

## 2024-12-09 PROCEDURE — 99214 OFFICE O/P EST MOD 30 MIN: CPT | Mod: 25 | Performed by: FAMILY MEDICINE

## 2024-12-09 PROCEDURE — 36415 COLL VENOUS BLD VENIPUNCTURE: CPT | Performed by: FAMILY MEDICINE

## 2024-12-09 PROCEDURE — 84443 ASSAY THYROID STIM HORMONE: CPT | Performed by: FAMILY MEDICINE

## 2024-12-09 PROCEDURE — 80053 COMPREHEN METABOLIC PANEL: CPT | Performed by: FAMILY MEDICINE

## 2024-12-09 PROCEDURE — 99396 PREV VISIT EST AGE 40-64: CPT | Performed by: FAMILY MEDICINE

## 2024-12-09 PROCEDURE — 90480 ADMN SARSCOV2 VAC 1/ONLY CMP: CPT | Performed by: FAMILY MEDICINE

## 2024-12-09 PROCEDURE — 83036 HEMOGLOBIN GLYCOSYLATED A1C: CPT | Performed by: FAMILY MEDICINE

## 2024-12-09 PROCEDURE — 85027 COMPLETE CBC AUTOMATED: CPT | Performed by: FAMILY MEDICINE

## 2024-12-09 RX ORDER — LIRAGLUTIDE 6 MG/ML
1.8 INJECTION SUBCUTANEOUS DAILY
Qty: 9 ML | Refills: 3 | Status: SHIPPED | OUTPATIENT
Start: 2024-12-09

## 2024-12-09 RX ORDER — METOPROLOL SUCCINATE 25 MG/1
25 TABLET, EXTENDED RELEASE ORAL DAILY
Qty: 90 TABLET | Refills: 3 | Status: SHIPPED | OUTPATIENT
Start: 2024-12-09

## 2024-12-09 RX ORDER — METFORMIN HYDROCHLORIDE 500 MG/1
1000 TABLET, EXTENDED RELEASE ORAL 2 TIMES DAILY WITH MEALS
Qty: 360 TABLET | Refills: 3 | Status: SHIPPED | OUTPATIENT
Start: 2024-12-09

## 2024-12-09 RX ORDER — FERROUS SULFATE 325(65) MG
325 TABLET ORAL
Qty: 90 TABLET | Refills: 2 | Status: CANCELLED | OUTPATIENT
Start: 2024-12-09

## 2024-12-09 RX ORDER — ATORVASTATIN CALCIUM 40 MG/1
40 TABLET, FILM COATED ORAL DAILY
Qty: 90 TABLET | Refills: 3 | Status: SHIPPED | OUTPATIENT
Start: 2024-12-09

## 2024-12-09 RX ORDER — DULOXETIN HYDROCHLORIDE 60 MG/1
120 CAPSULE, DELAYED RELEASE ORAL DAILY
Qty: 180 CAPSULE | Refills: 0 | Status: SHIPPED | OUTPATIENT
Start: 2024-12-09

## 2024-12-09 RX ORDER — FUROSEMIDE 40 MG/1
40 TABLET ORAL DAILY
Qty: 90 TABLET | Refills: 3 | Status: SHIPPED | OUTPATIENT
Start: 2024-12-09

## 2024-12-09 RX ORDER — LOSARTAN POTASSIUM 25 MG/1
25 TABLET ORAL DAILY
Qty: 90 TABLET | Refills: 3 | Status: SHIPPED | OUTPATIENT
Start: 2024-12-09

## 2024-12-09 ASSESSMENT — PATIENT HEALTH QUESTIONNAIRE - PHQ9
SUM OF ALL RESPONSES TO PHQ QUESTIONS 1-9: 13
10. IF YOU CHECKED OFF ANY PROBLEMS, HOW DIFFICULT HAVE THESE PROBLEMS MADE IT FOR YOU TO DO YOUR WORK, TAKE CARE OF THINGS AT HOME, OR GET ALONG WITH OTHER PEOPLE: SOMEWHAT DIFFICULT
5. POOR APPETITE OR OVEREATING: NOT AT ALL
SUM OF ALL RESPONSES TO PHQ QUESTIONS 1-9: 13

## 2024-12-09 ASSESSMENT — ANXIETY QUESTIONNAIRES
IF YOU CHECKED OFF ANY PROBLEMS ON THIS QUESTIONNAIRE, HOW DIFFICULT HAVE THESE PROBLEMS MADE IT FOR YOU TO DO YOUR WORK, TAKE CARE OF THINGS AT HOME, OR GET ALONG WITH OTHER PEOPLE: SOMEWHAT DIFFICULT
1. FEELING NERVOUS, ANXIOUS, OR ON EDGE: NOT AT ALL
GAD7 TOTAL SCORE: 7
7. FEELING AFRAID AS IF SOMETHING AWFUL MIGHT HAPPEN: NOT AT ALL
6. BECOMING EASILY ANNOYED OR IRRITABLE: SEVERAL DAYS
3. WORRYING TOO MUCH ABOUT DIFFERENT THINGS: NEARLY EVERY DAY
5. BEING SO RESTLESS THAT IT IS HARD TO SIT STILL: NOT AT ALL
2. NOT BEING ABLE TO STOP OR CONTROL WORRYING: NEARLY EVERY DAY
GAD7 TOTAL SCORE: 7

## 2024-12-09 NOTE — PATIENT INSTRUCTIONS
Patient Education   Preventive Care Advice   This is general advice given by our system to help you stay healthy. However, your care team may have specific advice just for you. Please talk to your care team about your preventive care needs.  Nutrition  Eat 5 or more servings of fruits and vegetables each day.  Try wheat bread, brown rice and whole grain pasta (instead of white bread, rice, and pasta).  Get enough calcium and vitamin D. Check the label on foods and aim for 100% of the RDA (recommended daily allowance).  Lifestyle  Exercise at least 150 minutes each week  (30 minutes a day, 5 days a week).  Do muscle strengthening activities 2 days a week. These help control your weight and prevent disease.  No smoking.  Wear sunscreen to prevent skin cancer.  Have a dental exam and cleaning every 6 months.  Yearly exams  See your health care team every year to talk about:  Any changes in your health.  Any medicines your care team has prescribed.  Preventive care, family planning, and ways to prevent chronic diseases.  Shots (vaccines)   HPV shots (up to age 26), if you've never had them before.  Hepatitis B shots (up to age 59), if you've never had them before.  COVID-19 shot: Get this shot when it's due.  Flu shot: Get a flu shot every year.  Tetanus shot: Get a tetanus shot every 10 years.  Pneumococcal, hepatitis A, and RSV shots: Ask your care team if you need these based on your risk.  Shingles shot (for age 50 and up)  General health tests  Diabetes screening:  Starting at age 35, Get screened for diabetes at least every 3 years.  If you are younger than age 35, ask your care team if you should be screened for diabetes.  Cholesterol test: At age 39, start having a cholesterol test every 5 years, or more often if advised.  Bone density scan (DEXA): At age 50, ask your care team if you should have this scan for osteoporosis (brittle bones).  Hepatitis C: Get tested at least once in your life.  STIs (sexually  transmitted infections)  Before age 24: Ask your care team if you should be screened for STIs.  After age 24: Get screened for STIs if you're at risk. You are at risk for STIs (including HIV) if:  You are sexually active with more than one person.  You don't use condoms every time.  You or a partner was diagnosed with a sexually transmitted infection.  If you are at risk for HIV, ask about PrEP medicine to prevent HIV.  Get tested for HIV at least once in your life, whether you are at risk for HIV or not.  Cancer screening tests  Cervical cancer screening: If you have a cervix, begin getting regular cervical cancer screening tests starting at age 21.  Breast cancer scan (mammogram): If you've ever had breasts, begin having regular mammograms starting at age 40. This is a scan to check for breast cancer.  Colon cancer screening: It is important to start screening for colon cancer at age 45.  Have a colonoscopy test every 10 years (or more often if you're at risk) Or, ask your provider about stool tests like a FIT test every year or Cologuard test every 3 years.  To learn more about your testing options, visit:   .  For help making a decision, visit:   https://bit.ly/mm03411.  Prostate cancer screening test: If you have a prostate, ask your care team if a prostate cancer screening test (PSA) at age 55 is right for you.  Lung cancer screening: If you are a current or former smoker ages 50 to 80, ask your care team if ongoing lung cancer screenings are right for you.  For informational purposes only. Not to replace the advice of your health care provider. Copyright   2023 [facility]. All rights reserved. Clinically reviewed by the [facility]. doo [id number] - REV 01/24.  Preventing Falls: Care Instructions  Injuries and health problems such as trouble walking or poor eyesight can increase your risk of falling. So can some medicines. But there are things you can do to help  "prevent falls. You can exercise to get stronger. You can also arrange your home to make it safer.    Talk to your doctor about the medicines you take. Ask if any of them increase the risk of falls and whether they can be changed or stopped.   Try to exercise regularly. It can help improve your strength and balance. This can help lower your risk of falling.         Practice fall safety and prevention.   Wear low-heeled shoes that fit well and give your feet good support. Talk to your doctor if you have foot problems that make this hard.  Carry a cellphone or wear a medical alert device that you can use to call for help.  Use stepladders instead of chairs to reach high objects. Don't climb if you're at risk for falls. Ask for help, if needed.  Wear the correct eyeglasses, if you need them.        Make your home safer.   Remove rugs, cords, clutter, and furniture from walkways.  Keep your house well lit. Use night-lights in hallways and bathrooms.  Install and use sturdy handrails on stairways.  Wear nonskid footwear, even inside. Don't walk barefoot or in socks without shoes.        Be safe outside.   Use handrails, curb cuts, and ramps whenever possible.  Keep your hands free by using a shoulder bag or backpack.  Try to walk in well-lit areas. Watch out for uneven ground, changes in pavement, and debris.  Be careful in the winter. Walk on the grass or gravel when sidewalks are slippery. Use de-icer on steps and walkways. Add non-slip devices to shoes.    Put grab bars and nonskid mats in your shower or tub and near the toilet. Try to use a shower chair or bath bench when bathing.   Get into a tub or shower by putting in your weaker leg first. Get out with your strong side first. Have a phone or medical alert device in the bathroom with you.   Where can you learn more?  Go to https://www.Ranovuswise.net/patiented  Enter G117 in the search box to learn more about \"Preventing Falls: Care Instructions.\"  Current as of: " July 17, 2023  Content Version: 14.2 2024 [employer].   Care instructions adapted under license by your healthcare professional. If you have questions about a medical condition or this instruction, always ask your healthcare professional. Healthwise, Incorporated disclaims any warranty or liability for your use of this information.    Learning About Stress  What is stress?     Stress is your body's response to a hard situation. Your body can have a physical, emotional, or mental response. Stress is a fact of life for most people, and it affects everyone differently. What causes stress for you may not be stressful for someone else.  A lot of things can cause stress. You may feel stress when you go on a job interview, take a test, or run a race. This kind of short-term stress is normal and even useful. It can help you if you need to work hard or react quickly. For example, stress can help you finish an important job on time.  Long-term stress is caused by ongoing stressful situations or events. Examples of long-term stress include long-term health problems, ongoing problems at work, or conflicts in your family. Long-term stress can harm your health.  How does stress affect your health?  When you are stressed, your body responds as though you are in danger. It makes hormones that speed up your heart, make you breathe faster, and give you a burst of energy. This is called the fight-or-flight stress response. If the stress is over quickly, your body goes back to normal and no harm is done.  But if stress happens too often or lasts too long, it can have bad effects. Long-term stress can make you more likely to get sick, and it can make symptoms of some diseases worse. If you tense up when you are stressed, you may develop neck, shoulder, or low back pain. Stress is linked to high blood pressure and heart disease.  Stress also harms your emotional health. It can make you perera, tense, or depressed. Your  relationships may suffer, and you may not do well at work or school.  What can you do to manage stress?  You can try these things to help manage stress:   Do something active. Exercise or activity can help reduce stress. Walking is a great way to get started. Even everyday activities such as housecleaning or yard work can help.  Try yoga or elder chi. These techniques combine exercise and meditation. You may need some training at first to learn them.  Do something you enjoy. For example, listen to music or go to a movie. Practice your hobby or do volunteer work.  Meditate. This can help you relax, because you are not worrying about what happened before or what may happen in the future.  Do guided imagery. Imagine yourself in any setting that helps you feel calm. You can use online videos, books, or a teacher to guide you.  Do breathing exercises. For example:  From a standing position, bend forward from the waist with your knees slightly bent. Let your arms dangle close to the floor.  Breathe in slowly and deeply as you return to a standing position. Roll up slowly and lift your head last.  Hold your breath for just a few seconds in the standing position.  Breathe out slowly and bend forward from the waist.  Let your feelings out. Talk, laugh, cry, and express anger when you need to. Talking with supportive friends or family, a counselor, or a tena leader about your feelings is a healthy way to relieve stress. Avoid discussing your feelings with people who make you feel worse.  Write. It may help to write about things that are bothering you. This helps you find out how much stress you feel and what is causing it. When you know this, you can find better ways to cope.  What can you do to prevent stress?  You might try some of these things to help prevent stress:  Manage your time. This helps you find time to do the things you want and need to do.  Get enough sleep. Your body recovers from the stresses of the day while  "you are sleeping.  Get support. Your family, friends, and community can make a difference in how you experience stress.  Limit your news feed. Avoid or limit time on social media or news that may make you feel stressed.  Do something active. Exercise or activity can help reduce stress. Walking is a great way to get started.  Where can you learn more?  Go to https://www.PanXchange.net/patiented  Enter N032 in the search box to learn more about \"Learning About Stress.\"  Current as of: October 24, 2023  Content Version: 14.2 2024 [employer].   Care instructions adapted under license by your healthcare professional. If you have questions about a medical condition or this instruction, always ask your healthcare professional. Healthwise[employer] disclaims any warranty or liability for your use of this information.    Learning About Depression Screening  What is depression screening?  Depression screening is a way to see if you have depression symptoms. It may be done by a doctor or counselor. It's often part of a routine checkup. That's because your mental health is just as important as your physical health.  Depression is a mental health condition that affects how you feel, think, and act. You may:  Have less energy.  Lose interest in your daily activities.  Feel sad and grouchy for a long time.  Depression is very common. It affects people of all ages.  Many things can lead to depression. Some people become depressed after they have a stroke or find out they have a major illness like cancer or heart disease. The death of a loved one or a breakup may lead to depression. It can run in families. Most experts believe that a combination of inherited genes and stressful life events can cause it.  What happens during screening?  You may be asked to fill out a form about your depression symptoms. You and the doctor will discuss your answers. The doctor may ask you more questions to learn more about how you " "think, act, and feel.  What happens after screening?  If you have symptoms of depression, your doctor will talk to you about your options.  Doctors usually treat depression with medicines or counseling. Often, combining the two works best. Many people don't get help because they think that they'll get over the depression on their own. But people with depression may not get better unless they get treatment.  The cause of depression is not well understood. There may be many factors involved. But if you have depression, it's not your fault.  A serious symptom of depression is thinking about death or suicide. If you or someone you care about talks about this or about feeling hopeless, get help right away.  It's important to know that depression can be treated. Medicine, counseling, and self-care may help.  Where can you learn more?  Go to https://www.Iagnosis.net/patiented  Enter T185 in the search box to learn more about \"Learning About Depression Screening.\"  Current as of: June 24, 2023  Content Version: 14.2 2024 [facility] "[employer].   Care instructions adapted under license by your healthcare professional. If you have questions about a medical condition or this instruction, always ask your healthcare professional. Healthwise, Incorporated disclaims any warranty or liability for your use of this information.       "

## 2024-12-09 NOTE — PROGRESS NOTES
Preventive Care Visit  [facility] KIRSTIE Silver MD, Family Medicine  Dec 9, 2024      Assessment & Plan     Gemma was seen today for physical.    Diagnoses and all orders for this visit:    Routine general medical examination at a health care facility  -     PRIMARY CARE FOLLOW-UP SCHEDULING  -     Comprehensive metabolic panel (BMP + Alb, Alk Phos, ALT, AST, Total. Bili, TP)  -     TSH with free T4 reflex  -     CBC with platelets    Hypertension goal BP (blood pressure) < 140/90, controlled  -     Refill: furosemide (LASIX) 40 MG tablet; Take 1 tablet (40 mg) by mouth daily.  -     Refill: losartan (COZAAR) 25 MG tablet; Take 1 tablet (25 mg) by mouth daily.  -     Refill: metoprolol succinate ER (TOPROL XL) 25 MG 24 hr tablet; Take 1 tablet (25 mg) by mouth daily.    Type 2 diabetes mellitus with hyperglycemia, without long-term current use of insulin (H), controlled with an A1c of   -     HEMOGLOBIN A1C  -     Refill: liraglutide (VICTOZA) 18 MG/3ML solution; Inject 1.8 mg subcutaneously daily.  -     Refill: metFORMIN (GLUCOPHAGE XR) 500 MG 24 hr tablet; Take 2 tablets (1,000 mg) by mouth 2 times daily (with meals).  -     Adult Eye  Referral; Future  -     FOOT EXAM  -     TSH with free T4 reflex  -     Orthopedic  Referral; Future- Foot Care  -     Consider switching Victoza to weekly GLP-1a if covered by insurance    Hyperlipidemia LDL goal <100  -     Refill atorvastatin (LIPITOR) 40 MG tablet; Take 1 tablet (40 mg) by mouth daily.    GABRIELA (generalized anxiety disorder)  -  PHQ-9/GABRIELA 7 completed, see below/Epic for details      -  Refill: DULoxetine (CYMBALTA) 60 MG capsule; Take 2 capsules (120 mg) by mouth daily.  -  Continue Psychotherapy counseling    Moderate episode of recurrent major depressive disorder (H) with Passive suicidal ideations. No active plans  -  PHQ-9/GABRIELA 7 completed, see below/Epic for details       -  Refill: DULoxetine (CYMBALTA) 60 MG  "capsule; Take 2 capsules (120 mg) by mouth daily.  -  Continue Psychotherapy Counseling    Gastroesophageal reflux disease, unspecified whether esophagitis present  -     Discussed effects of long term use of PPI  -     Recommend to taper down PPI: omeprazole (PRILOSEC) 20 MG DR capsule; Take 1 capsule (20 mg) by mouth daily as needed (GERD).    Personal history of tobacco use, presenting hazards to health  -     CT Chest Lung Cancer Screen Low Dose Without; Future    Alcohol dependence, in remission (H)       -    Encouraged continued sobriety          Need for vaccination  -     COVID-19 12+ (PFIZER)    Morbid obesity (H)        -     Weight management plan: Discussed healthy diet and exercise guidelines        -     Consider switching Victoza to weekly GLP-1a if covered by insurance        BMI  Estimated body mass index is 42.13 kg/m  as calculated from the following:    Height as of this encounter: 1.545 m (5' 0.83\").    Weight as of this encounter: 100.6 kg (221 lb 11.2 oz).   Weight management plan: Discussed healthy diet and exercise guidelines    Depression Screening Follow Up        12/9/2024     3:49 PM   PHQ   PHQ-9 Total Score 13    Q9: Thoughts of better off dead/self-harm past 2 weeks Several days    F/U: Thoughts of suicide or self-harm No    F/U: Safety concerns Yes        Patient-reported         12/9/2024     3:49 PM   Last PHQ-9   1.  Little interest or pleasure in doing things 3    2.  Feeling down, depressed, or hopeless 2    3.  Trouble falling or staying asleep, or sleeping too much 2    4.  Feeling tired or having little energy 2    5.  Poor appetite or overeating 0    6.  Feeling bad about yourself 3    7.  Trouble concentrating 0    8.  Moving slowly or restless 0    Q9: Thoughts of better off dead/self-harm past 2 weeks 1    PHQ-9 Total Score 13    In the past two weeks have you had thoughts of suicide or self harm? No    Do you have concerns about your personal safety or the safety of " others? Yes        Patient-reported           Follow Up Actions Taken  Crisis resource information provided in the After Visit Summary  Mental Health Referral placed    Discussed the following ways the patient can remain in a safe environment:  be around others      Return in about 3 months (around 3/9/2025) for Diabetes Follow Up with a HgbA1C prior to visit.      Subjective   Gemma is a 64 year old, presenting for the following:  Physical        12/9/2024     4:16 PM   Additional Questions   Roomed by Huong   Accompanied by self       History of Present Illness       Reason for visit:  Yearly exam?   She is taking medications regularly.    Gemma is a pleasant 64 year old female patient of mine here for her Annual Physical and chronic disease management.     DEPRESSION AND ANXIETY - Patient has a long history of Depression and anxiety of moderate severity requiring medication for control with recent symptoms being unchanged..Current symptoms of depression include - see PHQ-9/GABRIELA 7 below with ongoing intermittent suicidal thoughts without plan.   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         12/9/2024     3:49 PM   Last PHQ-9   1.  Little interest or pleasure in doing things 3    2.  Feeling down, depressed, or hopeless 2    3.  Trouble falling or staying asleep, or sleeping too much 2    4.  Feeling tired or having little energy 2    5.  Poor appetite or overeating 0    6.  Feeling bad about yourself 3    7.  Trouble concentrating 0    8.  Moving slowly or restless 0    Q9: Thoughts of better off dead/self-harm past 2 weeks 1    PHQ-9 Total Score 13    In the past two weeks have you had thoughts of suicide or self harm? No    Do you have concerns about your personal safety or the safety of others? Yes        Patient-reported         12/9/2024     4:50 PM   GABRIELA-7    1. Feeling nervous, anxious, or on edge 0   2. Not being able to stop or control worrying 3    3. Worrying too much about different things 3   4. Trouble relaxing 0   5. Being so restless that it is hard to sit still 0   6. Becoming easily annoyed or irritable 1   7. Feeling afraid, as if something awful might happen 0   GABREILA-7 Total Score 7   If you checked any problems, how difficult have they made it for you to do your work, take care of things at home, or get along with other people? Somewhat difficult     In the past two weeks have you had thoughts of suicide or self-harm?  Yes  In the past two weeks have you thought of a plan or intent to harm yourself? No.  Do you have concerns about your personal safety or the safety of others?   No        DIABETES - Patient has a longstanding history of DiabetesType Type II . Patient is being treated with oral agents- Metformin 1000 mg BID and Victoza 1.8mg daily and denies significant side effects. Control has been good. Complicating factors include but are not limited to: hypertension, hyperlipidemia, and morbid obesity .   Due for diabetes related labs.     HYPERLIPIDEMIA - Patient has a long history of significant Hyperlipidemia requiring medication for treatment with recent good control. Patient reports no problems or side effects with the medication.   Lipid panel-  Recent Labs   Lab Test 05/24/24  0709 02/24/23  0923   CHOL 155 130   HDL 52 49*   LDL 76 60   TRIG 133 107         HYPERTENSION - Patient has longstanding history of HTN , currently denies any symptoms referable to elevated blood pressure. Specifically denies chest pain, palpitations, dyspnea, orthopnea, PND or peripheral edema. Blood pressure readings have been in normal range. Current medication regimen is as listed below. Patient denies any side effects of medication.  BP Readings from Last 3 Encounters:   12/09/24 122/68   11/04/24 125/84   10/29/24 133/84     GERD-   On a PPI daily. Denies having any reflux symptoms. Agreeable to trial to take medication as needed.     Alcohol- in remission.    Denies any recent ETOH use.     HEALTH CARE MAINTENANCE: Due for labs and Lung CT scan.       Health Care Directive  Patient has a Health Care Directive on file  Advance care planning document is on file but is outdated.  Patient encouraged to update.      12/4/2024   General Health   How would you rate your overall physical health? (!) FAIR   Feel stress (tense, anxious, or unable to sleep) Very much      (!) STRESS CONCERN      12/4/2024   Nutrition   Three or more servings of calcium each day? (!) NO   Diet: Diabetic    Carbohydrate counting   How many servings of fruit and vegetables per day? (!) 0-1   How many sweetened beverages each day? 0-1       Multiple values from one day are sorted in reverse-chronological order         12/4/2024   Exercise   Days per week of moderate/strenous exercise 0 days   Average minutes spent exercising at this level 0 min      (!) EXERCISE CONCERN      12/4/2024   Social Factors   Frequency of gathering with friends or relatives Never   Worry food won't last until get money to buy more No   Food not last or not have enough money for food? No   Do you have housing? (Housing is defined as stable permanent housing and does not include staying ouside in a car, in a tent, in an abandoned building, in an overnight shelter, or couch-surfing.) Yes   Are you worried about losing your housing? No   Lack of transportation? No   Unable to get utilities (heat,electricity)? No      (!) SOCIAL CONNECTIONS CONCERN      12/9/2024   Fall Risk   Reason Gait Speed Test Not Completed Patient declines   Reason for decline feeling ligtheaded             12/4/2024   Dental   Dentist two times every year? (!) NO            12/4/2024   TB Screening   Were you born outside of the [place]? No          Today's PHQ-9 Score:       12/9/2024     3:49 PM   PHQ-9 SCORE   PHQ-9 Total Score MyChart 13 (Moderate depression)   PHQ-9 Total Score 13        Patient-reported         12/4/2024   Substance Use   Alcohol more  than 3/day or more than 7/wk No   Do you use any other substances recreationally? No        Social History     Tobacco Use    Smoking status: Former     Current packs/day: 0.00     Average packs/day: 0.8 packs/day for 43.2 years (34.6 ttl pk-yrs)     Types: Cigarettes     Start date: 10/15/1975     Quit date: 2018     Years since quittin.9     Passive exposure: Past    Smokeless tobacco: Never    Tobacco comments:     1 PPD x 30. Quit 2017   Vaping Use    Vaping status: Never Used   Substance Use Topics    Alcohol use: Yes     Comment: very seldom drink    Drug use: No           2023   LAST FHS-7 RESULTS   1st degree relative breast or ovarian cancer Yes    Any relative bilateral breast cancer Unknown    Any male have breast cancer No    Any ONE woman have BOTH breast AND ovarian cancer No    Any woman with breast cancer before 50yrs No    2 or more relatives with breast AND/OR ovarian cancer No    2 or more relatives with breast AND/OR bowel cancer No        Patient-reported     Mammogram Screening - Mammogram every 1-2 years updated in Health Maintenance based on mutual decision making        2024   STI Screening   New sexual partner(s) since last STI/HIV test? No        History of abnormal Pap smear: No - age 30-64 HPV with reflex Pap every 5 years recommended        Latest Ref Rng & Units 2022     4:09 PM 2016     3:41 PM 2016    12:00 AM   PAP / HPV   PAP  Negative for Intraepithelial Lesion or Malignancy (NILM)      PAP (Historical)    NIL    HPV 16 DNA Negative Negative  Negative     HPV 18 DNA Negative Negative  Negative     Other HR HPV Negative Negative  Negative       ASCVD Risk   The 10-year ASCVD risk score (Juhi ROCHE, et al., 2019) is: 10.2%    Values used to calculate the score:      Age: 64 years      Sex: Female      Is Non- : No      Diabetic: Yes      Tobacco smoker: No      Systolic Blood Pressure: 122 mmHg      Is BP treated:  Yes      HDL Cholesterol: 52 mg/dL      Total Cholesterol: 155 mg/dL        Reviewed and updated as needed this visit by Provider                    Past Medical History:   Diagnosis Date    Cellulitis and abscess of foot, except toes 09/26/2010    [facility]    Chronic daily headache 02/15/2011    Complete rupture of rotator cuff 07/06/2009    Degenerative disc disease     cervical    Depression, major     Depressive disorder     Diabetes mellitus, type 2 (H)     Dizziness - light-headed 02/15/2011    GERD (gastroesophageal reflux disease)     Hypertension goal BP (blood pressure) < 140/90     LBP (low back pain)     Panic attacks     Seasonal allergies     Vulvar dermatitis 07/17/2012     Past Surgical History:   Procedure Laterality Date    ARTHROSCOPY SHOULDER  01/01/1990    Right/spurs    CARPAL TUNNEL RELEASE RT/LT  01/01/1990    Left    COLONOSCOPY  01/01/2016    COLONOSCOPY N/A 04/29/2024    Procedure: Colonoscopy with polypectomy and biopsies;  Surgeon: Ruben Keller MD;  Location: [facility]    CRANIOTOMY, EXCISE TUMOR COMPLEX, COMBINED  05/09/2011    Procedure:COMBINED CRANIOTOMY, EXCISE TUMOR COMPLEX; 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; Surgeon:LEE ANN PAULA; Location:[facility]    ROTATOR CUFF REPAIR RT/LT  01/01/2009     Left    TRANSPLANT      [facility] FOOT/TOES SURGERY PROC UNLISTED  01/01/1975    Toe fracture, left     Lab work is in process  Labs reviewed in EPIC  BP Readings from Last 3 Encounters:   12/09/24 122/68   11/04/24 125/84   10/29/24 133/84    Wt Readings from Last 3 Encounters:   12/09/24 100.6 kg (221 lb 11.2 oz)   11/04/24 103 kg (227 lb)   09/25/24 104.3 kg (230 lb)                  Patient Active Problem List   Diagnosis    Depression, major    Migraines    CARDIOVASCULAR SCREENING; LDL GOAL LESS THAN 160    Disc herniation    DDD (degenerative disc disease), lumbar    DDD (degenerative disc disease), cervical    Neck pain    Tobacco abuse    ELYSSA (obstructive  sleep apnea)- severe (AHI 49)    Spondylolisthesis, grade 1    Chronic low back pain    Brain lipoma    History of colonic polyps    Gastroesophageal reflux disease without esophagitis    Hypertension goal BP (blood pressure) < 140/90    Traylor's esophagus determined by endoscopy    Bilateral edema of lower extremity    GABRIELA (generalized anxiety disorder)    Restless legs syndrome (RLS)    Diabetes mellitus, type 2 (H)    Morbid obesity (H)    Major depression, recurrent (H)    Alcohol dependence, in remission (H)     Past Surgical History:   Procedure Laterality Date    ARTHROSCOPY SHOULDER  1990    Right/spurs    CARPAL TUNNEL RELEASE RT/LT  1990    Left    COLONOSCOPY  2016    COLONOSCOPY N/A 2024    Procedure: Colonoscopy with polypectomy and biopsies;  Surgeon: Ruben Keller MD;  Location: [facility]    CRANIOTOMY, EXCISE TUMOR COMPLEX, COMBINED  2011    Procedure:COMBINED CRANIOTOMY, EXCISE TUMOR COMPLEX; 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; Surgeon:LEE ANN PAULA; Location:[facility]    ROTATOR CUFF REPAIR RT/LT  2009     Left    TRANSPLANT      [facility] FOOT/TOES SURGERY PROC UNLISTED  1975    Toe fracture, left       Social History     Tobacco Use    Smoking status: Former     Current packs/day: 0.00     Average packs/day: 0.8 packs/day for 43.2 years (34.6 ttl pk-yrs)     Types: Cigarettes     Start date: 10/15/1975     Quit date: 2018     Years since quittin.9     Passive exposure: Past    Smokeless tobacco: Never    Tobacco comments:     1 PPD x 30. Quit 2017   Substance Use Topics    Alcohol use: Yes     Comment: very seldom drink     Family History   Problem Relation Age of Onset    Macular Degeneration Mother         Dry    Hypertension Mother     C.A.D. Father     Cancer Maternal Grandmother     Cerebrovascular Disease Paternal Grandmother     Breast Cancer Paternal Grandmother     Cancer Brother     Cancer - colorectal Brother      Colon Cancer Brother     Diabetes Brother     Neurologic Disorder Sister         migraine    Glaucoma No family hx of          Current Outpatient Medications   Medication Sig Dispense Refill    alcohol swab prep pads Use to swab area of injection/haley as directed. 100 each 3    atorvastatin (LIPITOR) 40 MG tablet Take 1 tablet (40 mg) by mouth daily. 90 tablet 3    blood glucose (ACCU-CHEK GUIDE) test strip USE TO TEST TWICE DAILY 200 strip 0    blood glucose (NO BRAND SPECIFIED) lancets standard Use to test blood sugar 2 times daily or as directed. 100 lancet 3    blood glucose monitoring (NO BRAND SPECIFIED) meter device kit Use to test blood sugar 2 times daily or as directed. 1 kit 0    clobetasol (TEMOVATE) 0.05 % external cream Apply sparingly to affected area twice daily as needed.  Do not apply to face. 120 g 3    DULoxetine (CYMBALTA) 60 MG capsule Take 2 capsules (120 mg) by mouth daily. 180 capsule 0    ferrous sulfate (FEROSUL) 325 (65 Fe) MG tablet Take 1 tablet (325 mg) by mouth daily (with breakfast). 90 tablet 2    fluticasone (FLONASE) 50 MCG/ACT nasal spray Spray 2 sprays into both nostrils daily. 9.9 mL 0    furosemide (LASIX) 40 MG tablet Take 1 tablet (40 mg) by mouth daily. 90 tablet 3    insulin pen needle (BD BRANDY U/F) 32G X 4 MM miscellaneous Use 1 daily as directed. 100 each 4    liraglutide (VICTOZA) 18 MG/3ML solution Inject 1.8 mg subcutaneously daily. 9 mL 3    losartan (COZAAR) 25 MG tablet Take 1 tablet (25 mg) by mouth daily. 90 tablet 3    metFORMIN (GLUCOPHAGE XR) 500 MG 24 hr tablet Take 2 tablets (1,000 mg) by mouth 2 times daily (with meals). 360 tablet 3    metoprolol succinate ER (TOPROL XL) 25 MG 24 hr tablet Take 1 tablet (25 mg) by mouth daily. 90 tablet 3    omeprazole (PRILOSEC) 20 MG DR capsule Take 1 capsule (20 mg) by mouth daily as needed (GERD). 90 capsule 0     Allergies   Allergen Reactions    Amoxicillin-Pot Clavulanate Rash    Buspar [Buspirone Hcl] Rash     "Lisinopril Cough    Seasonal Allergies     Seroquel [Quetiapine] Itching         Review of Systems  Constitutional, HEENT, cardiovascular, pulmonary, gi and gu systems are negative, except as otherwise noted.     Objective    Exam  /68   Pulse 98   Temp 97.8  F (36.6  C) (Temporal)   Resp 18   Ht 1.545 m (5' 0.83\")   Wt 100.6 kg (221 lb 11.2 oz)   LMP  (LMP Unknown)   SpO2 99%   BMI 42.13 kg/m     Estimated body mass index is 42.13 kg/m  as calculated from the following:    Height as of this encounter: 1.545 m (5' 0.83\").    Weight as of this encounter: 100.6 kg (221 lb 11.2 oz).    Physical Exam  GENERAL: alert and no distress  EYES: Eyes grossly normal to inspection, PERRL and conjunctivae and sclerae normal  HENT: ear canals and TM's normal, nose and mouth without ulcers or lesions  NECK: no adenopathy, no asymmetry, masses, or scars  RESP: lungs clear to auscultation - no rales, rhonchi or wheezes  CV: regular rate and rhythm, normal S1 S2, no S3 or S4, no murmur, click or rub, no peripheral edema  ABDOMEN: soft, nontender, no hepatosplenomegaly, no masses and bowel sounds normal  MS: no gross musculoskeletal defects noted, no edema  SKIN: no suspicious lesions or rashes  NEURO: Normal strength and tone, mentation intact and speech normal  PSYCH: mentation appears normal, affect normal/bright  Diabetic foot exam: normal DP and PT pulses,  non-ulcerative trophic changes on the plantar aspect the foot affecting the sensory exam and monofilament exam    DATA  Reviewed and discussed with patient prior to discharge.  Results for orders placed or performed in visit on 12/09/24   HEMOGLOBIN A1C     Status: Abnormal   Result Value Ref Range    Estimated Average Glucose 148 (H) <117 mg/dL    Hemoglobin A1C 6.8 (H) 0.0 - 5.6 %   Extra Tube     Status: None    Narrative    The following orders were created for panel order Extra Tube.  Procedure                               Abnormality         Status          "            ---------                               -----------         ------                     Extra Serum Separator Tu...[[id number]]                      Final result               Extra Green Top (Lithium...[[id number]]                      Final result                 Please view results for these tests on the individual orders.   Extra Serum Separator Tube (SST)     Status: None   Result Value Ref Range    Hold Specimen JI    Extra Green Top (Lithium Heparin) Tube     Status: None   Result Value Ref Range    Hold Specimen JIC    Comprehensive metabolic panel (BMP + Alb, Alk Phos, ALT, AST, Total. Bili, TP)     Status: Normal   Result Value Ref Range    Sodium 138 135 - 145 mmol/L    Potassium 5.0 3.4 - 5.3 mmol/L    Carbon Dioxide (CO2) 27 22 - 29 mmol/L    Anion Gap 10 7 - 15 mmol/L    Urea Nitrogen 15.5 8.0 - 23.0 mg/dL    Creatinine 0.76 0.51 - 0.95 mg/dL    GFR Estimate 87 >60 mL/min/1.73m2    Calcium 10.4 8.8 - 10.4 mg/dL    Chloride 101 98 - 107 mmol/L    Glucose 80 70 - 99 mg/dL    Alkaline Phosphatase 81 40 - 150 U/L    AST 18 0 - 45 U/L    ALT 17 0 - 50 U/L    Protein Total 7.1 6.4 - 8.3 g/dL    Albumin 4.1 3.5 - 5.2 g/dL    Bilirubin Total 0.2 <=1.2 mg/dL   TSH with free T4 reflex     Status: Normal   Result Value Ref Range    TSH 3.61 0.30 - 4.20 uIU/mL   CBC with platelets     Status: Normal   Result Value Ref Range    WBC Count 7.9 4.0 - 11.0 10e3/uL    RBC Count 4.98 3.80 - 5.20 10e6/uL    Hemoglobin 13.8 11.7 - 15.7 g/dL    Hematocrit 42.6 35.0 - 47.0 %    MCV 86 78 - 100 fL    MCH 27.7 26.5 - 33.0 pg    MCHC 32.4 31.5 - 36.5 g/dL    RDW 14.2 10.0 - 15.0 %    Platelet Count 330 150 - 450 10e3/uL         Signed Electronically by: Marybeth Silver MD

## 2024-12-10 ENCOUNTER — PATIENT OUTREACH (OUTPATIENT)
Dept: CARE COORDINATION | Facility: CLINIC | Age: 64
End: 2024-12-10
Payer: COMMERCIAL

## 2024-12-10 LAB
ALBUMIN SERPL BCG-MCNC: 4.1 G/DL (ref 3.5–5.2)
ALP SERPL-CCNC: 81 U/L (ref 40–150)
ALT SERPL W P-5'-P-CCNC: 17 U/L (ref 0–50)
ANION GAP SERPL CALCULATED.3IONS-SCNC: 10 MMOL/L (ref 7–15)
AST SERPL W P-5'-P-CCNC: 18 U/L (ref 0–45)
BILIRUB SERPL-MCNC: 0.2 MG/DL
BUN SERPL-MCNC: 15.5 MG/DL (ref 8–23)
CALCIUM SERPL-MCNC: 10.4 MG/DL (ref 8.8–10.4)
CHLORIDE SERPL-SCNC: 101 MMOL/L (ref 98–107)
CREAT SERPL-MCNC: 0.76 MG/DL (ref 0.51–0.95)
EGFRCR SERPLBLD CKD-EPI 2021: 87 ML/MIN/1.73M2
GLUCOSE SERPL-MCNC: 80 MG/DL (ref 70–99)
HCO3 SERPL-SCNC: 27 MMOL/L (ref 22–29)
POTASSIUM SERPL-SCNC: 5 MMOL/L (ref 3.4–5.3)
PROT SERPL-MCNC: 7.1 G/DL (ref 6.4–8.3)
SODIUM SERPL-SCNC: 138 MMOL/L (ref 135–145)
TSH SERPL DL<=0.005 MIU/L-ACNC: 3.61 UIU/ML (ref 0.3–4.2)

## 2024-12-12 ENCOUNTER — OFFICE VISIT (OUTPATIENT)
Dept: PODIATRY | Facility: CLINIC | Age: 64
End: 2024-12-12
Attending: FAMILY MEDICINE
Payer: COMMERCIAL

## 2024-12-12 VITALS
DIASTOLIC BLOOD PRESSURE: 78 MMHG | BODY MASS INDEX: 41.81 KG/M2 | SYSTOLIC BLOOD PRESSURE: 130 MMHG | WEIGHT: 220 LBS | HEART RATE: 60 BPM

## 2024-12-12 DIAGNOSIS — B35.9 TINEA: ICD-10-CM

## 2024-12-12 DIAGNOSIS — B35.1 ONYCHOMYCOSIS: ICD-10-CM

## 2024-12-12 DIAGNOSIS — S90.222A SUBUNGUAL HEMATOMA OF TOE OF LEFT FOOT, INITIAL ENCOUNTER: Primary | ICD-10-CM

## 2024-12-12 DIAGNOSIS — E11.65 TYPE 2 DIABETES MELLITUS WITH HYPERGLYCEMIA, WITHOUT LONG-TERM CURRENT USE OF INSULIN (H): ICD-10-CM

## 2024-12-12 NOTE — LETTER
12/12/2024      Jessica Jay  [address] [facility] [address]      Dear Colleague,    Thank you for referring your patient, Jessica Jay, to the [facility]. Please see a copy of my visit note below.    Patient seen today in consult from Dr. Silver and complains of discolored nail on left 1st toe.  Started recently.  States she had her nail on her other foot.  Discolored and loose.  Slight pain.  Denies purulence odor or drainage.  She has diabetes.  It is well-controlled.  Denies history of foot ulcers.  Complains of dry skin on both feet.  Has tried lotion and has not helped.  Quit smoking in 2018.    ROS:  see above       Allergies   Allergen Reactions     Amoxicillin-Pot Clavulanate Rash     Buspar [Buspirone Hcl] Rash     Lisinopril Cough     Seasonal Allergies      Seroquel [Quetiapine] Itching       Current Outpatient Medications   Medication Sig Dispense Refill     alcohol swab prep pads Use to swab area of injection/haley as directed. 100 each 3     atorvastatin (LIPITOR) 40 MG tablet Take 1 tablet (40 mg) by mouth daily. 90 tablet 3     blood glucose (ACCU-CHEK GUIDE) test strip USE TO TEST TWICE DAILY 200 strip 0     blood glucose (NO BRAND SPECIFIED) lancets standard Use to test blood sugar 2 times daily or as directed. 100 lancet 3     blood glucose monitoring (NO BRAND SPECIFIED) meter device kit Use to test blood sugar 2 times daily or as directed. 1 kit 0     clobetasol (TEMOVATE) 0.05 % external cream Apply sparingly to affected area twice daily as needed.  Do not apply to face. 120 g 3     DULoxetine (CYMBALTA) 60 MG capsule Take 2 capsules (120 mg) by mouth daily. 180 capsule 0     ferrous sulfate (FEROSUL) 325 (65 Fe) MG tablet Take 1 tablet (325 mg) by mouth daily (with breakfast). 90 tablet 2     fluticasone (FLONASE) 50 MCG/ACT nasal spray Spray 2 sprays into both nostrils daily. 9.9 mL 0     furosemide (LASIX) 40 MG tablet Take 1 tablet (40 mg) by  mouth daily. 90 tablet 3     insulin pen needle (BD BRANDY U/F) 32G X 4 MM miscellaneous Use 1 daily as directed. 100 each 4     liraglutide (VICTOZA) 18 MG/3ML solution Inject 1.8 mg subcutaneously daily. 9 mL 3     losartan (COZAAR) 25 MG tablet Take 1 tablet (25 mg) by mouth daily. 90 tablet 3     metFORMIN (GLUCOPHAGE XR) 500 MG 24 hr tablet Take 2 tablets (1,000 mg) by mouth 2 times daily (with meals). 360 tablet 3     metoprolol succinate ER (TOPROL XL) 25 MG 24 hr tablet Take 1 tablet (25 mg) by mouth daily. 90 tablet 3     omeprazole (PRILOSEC) 20 MG DR capsule Take 1 capsule (20 mg) by mouth daily as needed (GERD). 90 capsule 0       Patient Active Problem List   Diagnosis     Depression, major     Migraines     CARDIOVASCULAR SCREENING; LDL GOAL LESS THAN 160     Disc herniation     DDD (degenerative disc disease), lumbar     DDD (degenerative disc disease), cervical     Neck pain     Tobacco abuse     ELYSSA (obstructive sleep apnea)- severe (AHI 49)     Spondylolisthesis, grade 1     Chronic low back pain     Brain lipoma     History of colonic polyps     Gastroesophageal reflux disease without esophagitis     Hypertension goal BP (blood pressure) < 140/90     Traylor's esophagus determined by endoscopy     Bilateral edema of lower extremity     GABRIELA (generalized anxiety disorder)     Restless legs syndrome (RLS)     Diabetes mellitus, type 2 (H)     Morbid obesity (H)     Major depression, recurrent (H)     Alcohol dependence, in remission (H)       Past Medical History:   Diagnosis Date     Cellulitis and abscess of foot, except toes 09/26/2010    [facility]     Chronic daily headache 02/15/2011     Complete rupture of rotator cuff 07/06/2009     Degenerative disc disease     cervical     Depression, major      Depressive disorder      Diabetes mellitus, type 2 (H)      Dizziness - light-headed 02/15/2011     GERD (gastroesophageal reflux disease)      Hypertension goal BP (blood pressure) < 140/90       LBP (low back pain)      Panic attacks      Seasonal allergies      Vulvar dermatitis 2012       Past Surgical History:   Procedure Laterality Date     ARTHROSCOPY SHOULDER  1990    Right/spurs     CARPAL TUNNEL RELEASE RT/LT  1990    Left     COLONOSCOPY  2016     COLONOSCOPY N/A 2024    Procedure: Colonoscopy with polypectomy and biopsies;  Surgeon: Ruben Keller MD;  Location: [facility]     CRANIOTOMY, EXCISE TUMOR COMPLEX, COMBINED  2011    Procedure:COMBINED CRANIOTOMY, EXCISE TUMOR COMPLEX; 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; Surgeon:LEE ANN PAULA; [facility]     ROTATOR CUFF REPAIR RT/LT  2009     Left     TRANSPLANT       ZZC FOOT/TOES SURGERY PROC UNLISTED  1975    Toe fracture, left       Family History   Problem Relation Age of Onset     Macular Degeneration Mother         Dry     Hypertension Mother      C.A.D. Father      Cancer Maternal Grandmother      Cerebrovascular Disease Paternal Grandmother      Breast Cancer Paternal Grandmother      Cancer Brother      Cancer - colorectal Brother      Colon Cancer Brother      Diabetes Brother      Neurologic Disorder Sister         migraine     Glaucoma No family hx of        Social History     Tobacco Use     Smoking status: Former     Current packs/day: 0.00     Average packs/day: 0.8 packs/day for 43.2 years (34.6 ttl pk-yrs)     Types: Cigarettes     Start date: 10/15/1975     Quit date: 2018     Years since quittin.9     Passive exposure: Past     Smokeless tobacco: Never     Tobacco comments:     1 PPD x 30. Quit 2017   Substance Use Topics     Alcohol use: Yes     Comment: very seldom drink         Exam:    Vitals: /78   Pulse 60   Wt 99.8 kg (220 lb)   LMP  (LMP Unknown)   BMI 41.81 kg/m    BMI: Body mass index is 41.81 kg/m .  Height: Data Unavailable    Constitutional/ general:  Pt is in no apparent distress, appears well-nourished.  Cooperative  with history and physical exam.     Psych:  The patient answered questions appropriately.  Normal affect.  Seems to have reasonable expectations, in terms of treatment.     Lungs:  Non labored breathing, non labored speech. No cough.  No audible wheezing. Even, quiet breathing.       Vascular:  positive pedal pulses bilaterally for both the DP and PT arteries.  CFT < 3 sec.  positive ankle edema.  negative pedal hair growth.    Neuro:  Alert and oriented x 3. Coordinated gait.  Light touch sensation is intact     Derm: Normal texture and turgor.  No erythema, ecchymosis, or cyanosis.   All nails thickened elongated discolored with subungual debris.  Skin dry moccasin distribution bilaterally.    Musculoskeletal:    Lower extremity muscle strength is normal.  Patient is ambulatory without an assistive device or brace.  No gross deformities.  Normal arch.     left 1st nail darkened.  No erythema, edema, drainage, pain on palpation.  With trimming this back dried blood noted and nailbed healthy.  No signs of subungual masses or exostosis.                 Component  Ref Range & Units 3 d ago  (12/9/24) 3 mo ago  (8/20/24) 6 mo ago  (5/24/24) 1 yr ago  (11/27/23) 1 yr ago  (8/31/23) 1 yr ago  (5/26/23) 1 yr ago  (2/24/23)    Estimated Average Glucose  <117 mg/dL 148 High           Hemoglobin A1C  0.0 - 5.6 % 6.8 High  6.4 High  CM 6.9 High  CM 6.6 High  CM 7.8 High  CM 7.9 High  CM 7.7 High  CM            A/P    Diabetes melitis   subungual hematoma  Onychomycosis  Dry skin possible chronic tinea    Trim back left hallux loose nail.  Reassured no underlying nailbed ulcerations or infection.  Discussed with patient there was bleeding under nail and may take 6 months to grow this out.  Patient to prevent future trauma to this nail.    Discussed cause of onychomycosis.  She would rather just watch this for now.  Discussed dry skin could be from chronic tinea.  We use Lamisil cream for 1 month to see if helps resolve.  Can  also use AmLactin on this to soften skin.  RETURN TO CLINIC prn.  Thank you for allowing me participate in the care of this patient.        Marshall Grewal DPM, FACFAS      Again, thank you for allowing me to participate in the care of your patient.        Sincerely,        Marshall Grewal DPM

## 2024-12-12 NOTE — PATIENT INSTRUCTIONS
We wish you continued good healing. If you have any questions or concerns, please do not hesitate to contact us at  [phone]    [email or web address] (secure e-mail communication and access to your chart) to send a message or to make an appointment.    Please remember to call and schedule a follow up appointment if one was recommended at your earliest convenience.     PODIATRY CLINIC HOURS  TELEPHONE NUMBER    Dr. Marshall POPEPGAVIN FACFAS        Clinics:  [facility]   [place]  Tuesday 1PM-6PM  [place]  Wednesday 745AM-330PM  [place]  Monday 2nd,4th  830AM-4PM  Thursday/Friday 745AM-230PM     [place] APPOINTMENTS  [phone]    [place] APPOINTMENTS  [phone]          If you need a medication refill, please contact us you may need lab work and/or a follow up visit prior to your refill (i.e. Antifungal medications).  If MRI needed please call Imaging at [phone]   HOW DO I GET MY KNEE SCOOTER? Knee scooters can be picked up at ANY Medical Supply stores with your knee scooter Prescription.  OR  Bring your signed prescription to an [facility] Medical Equipment showroom.   Set up an appointment for your custom Orthotics. Call any Orthotics locations call [phone]

## 2024-12-12 NOTE — PROGRESS NOTES
Patient seen today in consult from Dr. Silver and complains of discolored nail on left 1st toe.  Started recently.  States she had her nail on her other foot.  Discolored and loose.  Slight pain.  Denies purulence odor or drainage.  She has diabetes.  It is well-controlled.  Denies history of foot ulcers.  Complains of dry skin on both feet.  Has tried lotion and has not helped.  Quit smoking in 2018.    ROS:  see above       Allergies   Allergen Reactions    Amoxicillin-Pot Clavulanate Rash    Buspar [Buspirone Hcl] Rash    Lisinopril Cough    Seasonal Allergies     Seroquel [Quetiapine] Itching       Current Outpatient Medications   Medication Sig Dispense Refill    alcohol swab prep pads Use to swab area of injection/haley as directed. 100 each 3    atorvastatin (LIPITOR) 40 MG tablet Take 1 tablet (40 mg) by mouth daily. 90 tablet 3    blood glucose (ACCU-CHEK GUIDE) test strip USE TO TEST TWICE DAILY 200 strip 0    blood glucose (NO BRAND SPECIFIED) lancets standard Use to test blood sugar 2 times daily or as directed. 100 lancet 3    blood glucose monitoring (NO BRAND SPECIFIED) meter device kit Use to test blood sugar 2 times daily or as directed. 1 kit 0    clobetasol (TEMOVATE) 0.05 % external cream Apply sparingly to affected area twice daily as needed.  Do not apply to face. 120 g 3    DULoxetine (CYMBALTA) 60 MG capsule Take 2 capsules (120 mg) by mouth daily. 180 capsule 0    ferrous sulfate (FEROSUL) 325 (65 Fe) MG tablet Take 1 tablet (325 mg) by mouth daily (with breakfast). 90 tablet 2    fluticasone (FLONASE) 50 MCG/ACT nasal spray Spray 2 sprays into both nostrils daily. 9.9 mL 0    furosemide (LASIX) 40 MG tablet Take 1 tablet (40 mg) by mouth daily. 90 tablet 3    insulin pen needle (BD BRANDY U/F) 32G X 4 MM miscellaneous Use 1 daily as directed. 100 each 4    liraglutide (VICTOZA) 18 MG/3ML solution Inject 1.8 mg subcutaneously daily. 9 mL 3    losartan (COZAAR) 25 MG tablet Take 1 tablet (25  mg) by mouth daily. 90 tablet 3    metFORMIN (GLUCOPHAGE XR) 500 MG 24 hr tablet Take 2 tablets (1,000 mg) by mouth 2 times daily (with meals). 360 tablet 3    metoprolol succinate ER (TOPROL XL) 25 MG 24 hr tablet Take 1 tablet (25 mg) by mouth daily. 90 tablet 3    omeprazole (PRILOSEC) 20 MG DR capsule Take 1 capsule (20 mg) by mouth daily as needed (GERD). 90 capsule 0       Patient Active Problem List   Diagnosis    Depression, major    Migraines    CARDIOVASCULAR SCREENING; LDL GOAL LESS THAN 160    Disc herniation    DDD (degenerative disc disease), lumbar    DDD (degenerative disc disease), cervical    Neck pain    Tobacco abuse    ELYSSA (obstructive sleep apnea)- severe (AHI 49)    Spondylolisthesis, grade 1    Chronic low back pain    Brain lipoma    History of colonic polyps    Gastroesophageal reflux disease without esophagitis    Hypertension goal BP (blood pressure) < 140/90    Traylor's esophagus determined by endoscopy    Bilateral edema of lower extremity    GABRIELA (generalized anxiety disorder)    Restless legs syndrome (RLS)    Diabetes mellitus, type 2 (H)    Morbid obesity (H)    Major depression, recurrent (H)    Alcohol dependence, in remission (H)       Past Medical History:   Diagnosis Date    Cellulitis and abscess of foot, except toes 09/26/2010    [facility]    Chronic daily headache 02/15/2011    Complete rupture of rotator cuff 07/06/2009    Degenerative disc disease     cervical    Depression, major     Depressive disorder     Diabetes mellitus, type 2 (H)     Dizziness - light-headed 02/15/2011    GERD (gastroesophageal reflux disease)     Hypertension goal BP (blood pressure) < 140/90     LBP (low back pain)     Panic attacks     Seasonal allergies     Vulvar dermatitis 07/17/2012       Past Surgical History:   Procedure Laterality Date    ARTHROSCOPY SHOULDER  01/01/1990    Right/spurs    CARPAL TUNNEL RELEASE RT/LT  01/01/1990    Left    COLONOSCOPY  01/01/2016    COLONOSCOPY N/A  2024    Procedure: Colonoscopy with polypectomy and biopsies;  Surgeon: Ruben Keller MD;  Location: [facility]    CRANIOTOMY, EXCISE TUMOR COMPLEX, COMBINED  2011    Procedure:COMBINED CRANIOTOMY, EXCISE TUMOR COMPLEX; 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; Surgeon:LEE ANN PAULA; Location[facility]    ROTATOR CUFF REPAIR RT/LT  2009     Left    TRANSPLANT      ZZC FOOT/TOES SURGERY PROC UNLISTED  1975    Toe fracture, left       Family History   Problem Relation Age of Onset    Macular Degeneration Mother         Dry    Hypertension Mother     C.A.D. Father     Cancer Maternal Grandmother     Cerebrovascular Disease Paternal Grandmother     Breast Cancer Paternal Grandmother     Cancer Brother     Cancer - colorectal Brother     Colon Cancer Brother     Diabetes Brother     Neurologic Disorder Sister         migraine    Glaucoma No family hx of        Social History     Tobacco Use    Smoking status: Former     Current packs/day: 0.00     Average packs/day: 0.8 packs/day for 43.2 years (34.6 ttl pk-yrs)     Types: Cigarettes     Start date: 10/15/1975     Quit date: 2018     Years since quittin.9     Passive exposure: Past    Smokeless tobacco: Never    Tobacco comments:     1 PPD x 30. Quit 2017   Substance Use Topics    Alcohol use: Yes     Comment: very seldom drink         Exam:    Vitals: /78   Pulse 60   Wt 99.8 kg (220 lb)   LMP  (LMP Unknown)   BMI 41.81 kg/m    BMI: Body mass index is 41.81 kg/m .  Height: Data Unavailable    Constitutional/ general:  Pt is in no apparent distress, appears well-nourished.  Cooperative with history and physical exam.     Psych:  The patient answered questions appropriately.  Normal affect.  Seems to have reasonable expectations, in terms of treatment.     Lungs:  Non labored breathing, non labored speech. No cough.  No audible wheezing. Even, quiet breathing.       Vascular:  positive pedal pulses bilaterally  for both the DP and PT arteries.  CFT < 3 sec.  positive ankle edema.  negative pedal hair growth.    Neuro:  Alert and oriented x 3. Coordinated gait.  Light touch sensation is intact     Derm: Normal texture and turgor.  No erythema, ecchymosis, or cyanosis.   All nails thickened elongated discolored with subungual debris.  Skin dry moccasin distribution bilaterally.    Musculoskeletal:    Lower extremity muscle strength is normal.  Patient is ambulatory without an assistive device or brace.  No gross deformities.  Normal arch.     left 1st nail darkened.  No erythema, edema, drainage, pain on palpation.  With trimming this back dried blood noted and nailbed healthy.  No signs of subungual masses or exostosis.                 Component  Ref Range & Units 3 d ago  (12/9/24) 3 mo ago  (8/20/24) 6 mo ago  (5/24/24) 1 yr ago  (11/27/23) 1 yr ago  (8/31/23) 1 yr ago  (5/26/23) 1 yr ago  (2/24/23)    Estimated Average Glucose  <117 mg/dL 148 High           Hemoglobin A1C  0.0 - 5.6 % 6.8 High  6.4 High  CM 6.9 High  CM 6.6 High  CM 7.8 High  CM 7.9 High  CM 7.7 High  CM            A/P    Diabetes melitis   subungual hematoma  Onychomycosis  Dry skin possible chronic tinea    Trim back left hallux loose nail.  Reassured no underlying nailbed ulcerations or infection.  Discussed with patient there was bleeding under nail and may take 6 months to grow this out.  Patient to prevent future trauma to this nail.    Discussed cause of onychomycosis.  She would rather just watch this for now.  Discussed dry skin could be from chronic tinea.  We use Lamisil cream for 1 month to see if helps resolve.  Can also use AmLactin on this to soften skin.  RETURN TO CLINIC prn.  Thank you for allowing me participate in the care of this patient.        Marshall Grewal, ALBERT, SLIM

## 2024-12-24 ENCOUNTER — PATIENT OUTREACH (OUTPATIENT)
Dept: CARE COORDINATION | Facility: CLINIC | Age: 64
End: 2024-12-24
Payer: COMMERCIAL

## 2024-12-24 NOTE — PROGRESS NOTES
"Clinic Care Coordination Contact  Follow Up Progress Note      Assessment: patient reporting that she is considering a modified DBT course.  She does not feel like she needs to attend full DBT yet wants some additional refresher/help with managing her anger issues.  She is trying to find work and is worried she is \"ruined\" her chances to work in school again due to her anger outburst.    She has plans for [place] and is talking about some additional activities she is considering to get more active.    Care Gaps:    Health Maintenance Due   Topic Date Due    URINE DRUG SCREEN  Never done    INFLUENZA VACCINE (1) 09/01/2024    EYE EXAM  10/05/2024    LUNG CANCER SCREENING  12/29/2024       Postponed to future appointments.      Care Plans  Care Plan: Financial Wellbeing       Problem: Patient expresses financial resource strain       Long-Range Goal: Create an action plan to increase financial stability       Start Date: 6/27/2022 Expected End Date: 2/19/2025    This Visit's Progress: 70% Recent Progress: 80%    Note:     Barriers: course needed is not available (quick books)  Strengths: going to class's    Patient expressed understanding of goal: yes  Action steps to achieve this goal:  1. I will continue with classes  2. I will look for the course I need and schedule  3. I will complete my resume  4. I will work with the workforce center to get the needed programs  5. I will reach out to [facility]  for support in rebuilding my credit                            Care Plan: Physical Activity       Problem: Patient is inactive       Long-Range Goal: Exercise at Least 20 Minutes per Day       Start Date: 4/27/2020 Expected End Date: 2/19/2025    This Visit's Progress: 30% Recent Progress: 30%    Note:     Barriers: Depression, back pain, social distancing to some extent  Strengths: Understand that increasing my exercise will help with managing my weight and back pain    Patient expressed understanding of " goal: Yes  Action steps to achieve this goal:  1. I will walk in one direction for 10 minutes and then return for a total of 20 minutes  2. I will not get discouraged if I cannot make it the 10 minutes in one direction before having to turn around  3. I will celebrate my successes    10/4/24 - work is making it challenging to exercise after work                              Intervention/Education provided during outreach: encouraged pt to work on short exercises to help her be active and help with her mood. Provided a couple of resources for DBT options.      Outreach Frequency: monthly, more frequently as needed      Plan:   Pt to work on finding a DBT option.   Care Coordinator will follow up in one month.     JAQUELIN Mensah   [facility] - Care Coordination  [facility]   [phone]

## 2024-12-30 ENCOUNTER — ANCILLARY PROCEDURE (OUTPATIENT)
Dept: CT IMAGING | Facility: CLINIC | Age: 64
End: 2024-12-30
Attending: FAMILY MEDICINE
Payer: COMMERCIAL

## 2024-12-30 DIAGNOSIS — Z87.891 PERSONAL HISTORY OF TOBACCO USE, PRESENTING HAZARDS TO HEALTH: ICD-10-CM

## 2024-12-30 PROCEDURE — 71271 CT THORAX LUNG CANCER SCR C-: CPT | Mod: TC | Performed by: RADIOLOGY

## 2025-01-02 ENCOUNTER — VIRTUAL VISIT (OUTPATIENT)
Dept: EDUCATION SERVICES | Facility: CLINIC | Age: 65
End: 2025-01-02
Payer: COMMERCIAL

## 2025-01-02 DIAGNOSIS — E11.65 TYPE 2 DIABETES MELLITUS WITH HYPERGLYCEMIA, WITHOUT LONG-TERM CURRENT USE OF INSULIN (H): Primary | Chronic | ICD-10-CM

## 2025-01-02 NOTE — PROGRESS NOTES
Diabetes Self-Management Education & Support    Presents for: Individual review    Type of Service: Telephone Visit    Originating Location (Patient Location): Home  Distant Location (Provider Location): [facility]  Mode of Communication:  Telephone    Telephone Visit Start Time:  4 pm  Telephone Visit End Time (telephone visit stop time): 4:45  pm    How would patient like to obtain AVS? Portia      Assessment  -she is struggling with motivation, lost her job and is down.  She is struggling with her food choices.   We discussed healthier options like fish, chicken, lean pork chop and non starchy vegetables.    Patient's most recent   Lab Results   Component Value Date    A1C 6.8 12/09/2024    A1C 8.3 06/22/2021     is meeting goal of <7.0    Diabetes knowledge and skills assessment:   Patient is knowledgeable in diabetes management concepts related to: Healthy Eating, Being Active, Monitoring, and Taking Medication    Based on learning assessment above, most appropriate setting for further diabetes education would be: Individual setting.    Care Plan and Education Provided:  Healthy Eating: Balanced meals and Being Active: Finding a physical activity routine that works for you    Patient verbalized understanding of diabetes self-management education concepts discussed, opportunities for ongoing education and support, and recommendations provided today.    Plan  Look into Mounjaro or Ozempic for coverage for insurance.      2.     Work on doing more lean meats and non-starchy vegetable meals (broccoli, green beans, brussel sprouts).  Chicken, fish and lean pork are good options, look on pinadriana for recipes and how to cook the meat properly.    3   Work on walking your indoor loop.  Work on doing it 3 rounds after each meal.      Follow-up:  Upcoming Diabetes Ed Appointments     Visit Type Date Time Department    DIABETES ED 1/2/2025  4:00 PM [facility] DIABETES ED        See Care Plan for  "co-developed, patient-state behavior change goals.    Education Materials Provided:  No new materials provided today      Subjective/Objective  Gemma is an 64 year old year old, presenting for the following diabetes education related to: Presents for: Individual review  Accompanied by: Self  Diabetes education in the past 24mo: Yes  Diabetes type: Type 2  Disease course: Stable  How confident are you filling out medical forms by yourself:: Extremely  Cultural Influences/Ethnic Background:  Not  or       Diabetes Symptoms & Complications:  Diabetes Related Symptoms: Polyuria (increased urination)  Weight trend: Stable  Symptom course: Stable  Disease course: Stable       Patient Problem List and Family Medical History reviewed for relevant medical history, current medical status, and diabetes risk factors.    Vitals:  LMP  (LMP Unknown)   Estimated body mass index is 41.81 kg/m  as calculated from the following:    Height as of 12/9/24: 1.545 m (5' 0.83\").    Weight as of 12/12/24: 99.8 kg (220 lb).   Last 3 BP:   BP Readings from Last 3 Encounters:   12/12/24 130/78   12/09/24 122/68   11/04/24 125/84       History   Smoking Status    Former    Types: Cigarettes   Smokeless Tobacco    Never       Labs:  Lab Results   Component Value Date    A1C 6.8 12/09/2024    A1C 8.3 06/22/2021     Lab Results   Component Value Date    GLC 80 12/09/2024     04/29/2024     03/08/2023     10/22/2020     Lab Results   Component Value Date    LDL 76 05/24/2024     10/22/2020     HDL Cholesterol   Date Value Ref Range Status   10/22/2020 47 (L) >49 mg/dL Final     Direct Measure HDL   Date Value Ref Range Status   05/24/2024 52 >=50 mg/dL Final   ]  GFR Estimate   Date Value Ref Range Status   12/09/2024 87 >60 mL/min/1.73m2 Final     Comment:     eGFR calculated using 2021 CKD-EPI equation.   10/22/2020 >90 >60 mL/min/[1.73_m2] Final     Comment:     Non  GFR Calc  Starting " "12/18/2018, serum creatinine based estimated GFR (eGFR) will be   calculated using the Chronic Kidney Disease Epidemiology Collaboration   (CKD-EPI) equation.       GFR, ESTIMATED POCT   Date Value Ref Range Status   04/03/2024 >60 >60 mL/min/1.73m2 Final     GFR Estimate If Black   Date Value Ref Range Status   10/22/2020 >90 >60 mL/min/[1.73_m2] Final     Comment:      GFR Calc  Starting 12/18/2018, serum creatinine based estimated GFR (eGFR) will be   calculated using the Chronic Kidney Disease Epidemiology Collaboration   (CKD-EPI) equation.       Lab Results   Component Value Date    CR 0.76 12/09/2024    CR 0.69 10/22/2020     No results found for: \"MICROALBUMIN\"    Healthy Eating:  Healthy Eating Assessed Today: Yes  Cultural/Judaism diet restrictions?: No  How many times a week on average do you eat food made away from home (restaurant/take-out)?: 1  Meals include: Breakfast, Dinner  Lunch: rice sides, ramen  Dinner: not eating so good right now, very down over loss of job  Other: staying stable with weight, I feel pt would really benefit from mounjaro or ozempic.  Beverages: Water    Shobha Donovan RD  Time Spent: 30 minutes  Encounter Type: Individual    Any diabetes medication dose changes were made via the CDCES Standing Orders under the patient's referring provider.  Answers submitted by the patient for this visit:  Questionnaire about: Diabetes problem (Submitted on 12/30/2024)  Chief Complaint: Diabetes problem    "

## 2025-01-02 NOTE — PATIENT INSTRUCTIONS
Look into Mounjaro or Ozempic for coverage for insurance.      2.     Work on doing more lean meats and non-starchy vegetable meals (broccoli, green beans, brussel sprouts).  Chicken, fish and lean pork are good options, look on kathy for recipes and how to cook the meat properly.    3   Work on walking your indoor loop.  Work on doing it 3 rounds after each meal.

## 2025-01-16 DIAGNOSIS — E11.65 TYPE 2 DIABETES MELLITUS WITH HYPERGLYCEMIA, WITHOUT LONG-TERM CURRENT USE OF INSULIN (H): Primary | ICD-10-CM

## 2025-01-16 DIAGNOSIS — E66.01 MORBID OBESITY (H): ICD-10-CM

## 2025-01-29 DIAGNOSIS — I10 HYPERTENSION GOAL BP (BLOOD PRESSURE) < 140/90: ICD-10-CM

## 2025-01-30 RX ORDER — FUROSEMIDE 20 MG/1
20 TABLET ORAL 2 TIMES DAILY
Qty: 180 TABLET | Refills: 0 | OUTPATIENT
Start: 2025-01-30

## 2025-01-30 NOTE — TELEPHONE ENCOUNTER
Pharmacy sent refill request for Lasix 20 mg BID (auto fill?).    Patient has prescription on file for Lasix 40 mg once daily  Written on 12/9/24 for 90 with 3 refills.     Carmencita Franco RN BSN  [facility]

## 2025-02-10 ENCOUNTER — TELEPHONE (OUTPATIENT)
Dept: EDUCATION SERVICES | Facility: CLINIC | Age: 65
End: 2025-02-10
Payer: COMMERCIAL

## 2025-02-10 ASSESSMENT — PATIENT HEALTH QUESTIONNAIRE - PHQ9
SUM OF ALL RESPONSES TO PHQ QUESTIONS 1-9: 2
10. IF YOU CHECKED OFF ANY PROBLEMS, HOW DIFFICULT HAVE THESE PROBLEMS MADE IT FOR YOU TO DO YOUR WORK, TAKE CARE OF THINGS AT HOME, OR GET ALONG WITH OTHER PEOPLE: NOT DIFFICULT AT ALL
SUM OF ALL RESPONSES TO PHQ QUESTIONS 1-9: 2

## 2025-02-11 ENCOUNTER — PATIENT OUTREACH (OUTPATIENT)
Dept: CARE COORDINATION | Facility: CLINIC | Age: 65
End: 2025-02-11

## 2025-02-11 ENCOUNTER — VIRTUAL VISIT (OUTPATIENT)
Dept: FAMILY MEDICINE | Facility: CLINIC | Age: 65
End: 2025-02-11
Payer: COMMERCIAL

## 2025-02-11 DIAGNOSIS — E66.813 CLASS 3 SEVERE OBESITY DUE TO EXCESS CALORIES WITH SERIOUS COMORBIDITY AND BODY MASS INDEX (BMI) OF 40.0 TO 44.9 IN ADULT (H): Primary | ICD-10-CM

## 2025-02-11 DIAGNOSIS — E11.65 TYPE 2 DIABETES MELLITUS WITH HYPERGLYCEMIA, WITHOUT LONG-TERM CURRENT USE OF INSULIN (H): ICD-10-CM

## 2025-02-11 DIAGNOSIS — E66.01 CLASS 3 SEVERE OBESITY DUE TO EXCESS CALORIES WITH SERIOUS COMORBIDITY AND BODY MASS INDEX (BMI) OF 40.0 TO 44.9 IN ADULT (H): Primary | ICD-10-CM

## 2025-02-11 PROCEDURE — 98014 SYNCH AUDIO-ONLY EST MOD 30: CPT | Performed by: FAMILY MEDICINE

## 2025-02-11 NOTE — PROGRESS NOTES
Gemma is a 64 year old who is being evaluated via a billable telephone visit.    What phone number would you like to be contacted at? [phone]  How would you like to obtain your AVS? Portia  Originating Location (pt. Location): Home  Distant Location (provider location):  On-site  Telephone visit completed due to the patient did not have access to video, while the distant provider did.    Assessment & Plan     Gemma was seen today for weight problem and recheck medication.    Diagnoses and all orders for this visit:    Class 3 severe obesity due to excess calories with serious comorbidity and body mass index (BMI) of 40.0 to 44.9 in adult (H)  -     Discussed Ozempic- mechanism of action, risks and benefits. Patient's questions answered, wishes to try it.   -     Start: semaglutide (OZEMPIC) 2 MG/3ML pen; Inject 0.25 mg subcutaneously every 7 days.  -     Increase dose monthly as tolerated.   -     Weight management plan: Discussed healthy diet and exercise guidelines  Weight loss goals:   Prevent further weight gain   Aim to lose at least 1- 2 lbs/week.   Eat a well balanced heart healthy diet, cut out soda/sugary drinks and control portion sizes.   Avoid missing breakfast.  Exercise regularly; increase exercise gradually as tolerated to a goal of  30-45 minutes/5 days a week.      Type 2 diabetes mellitus with hyperglycemia, without long-term current use of insulin (H), recent A1c 6.8  -   Continue metformin therapy   -    Augment with: semaglutide (OZEMPIC) 2 MG/3ML pen; Inject 0.25 mg subcutaneously every 7 days.      Return in about 1 month (around 3/11/2025) for Diabetes Follow Up with a HgbA1C prior to visit.        Subjective   Gemma is a 64 year old, presenting for the following health issues:  Weight Problem (Medication concerns ) and Recheck Medication        2/11/2025     8:23 AM   Additional Questions   Roomed by Self My Chart   Accompanied by Self     History of Present Illness       Reason for visit:   Medication She is missing 1 dose(s) of medications per week.  She is not taking prescribed medications regularly due to remembering to take.     Weight loss medication-   Mounjaro was denied.   Ozempic is covered but patient has questions about it. Hasn't yet picked it up.        Obesity-  Wt Readings from Last 4 Encounters:   12/12/24 99.8 kg (220 lb)   12/09/24 100.6 kg (221 lb 11.2 oz)   11/04/24 103 kg (227 lb)   09/25/24 104.3 kg (230 lb)     Last BMI-   12/9/2024  4:23 PM   Vital Signs    BMI (Calculated) 42.13      Has underlying history of Type 2 Diabetes-  Last A1c  Component      Latest Ref Rng 12/9/2024  4:03 PM   Estimated Average Glucose      <117 mg/dL 148 (H)    Hemoglobin A1C      0.0 - 5.6 % 6.8 (H)    Currently on Metformin 1000 mg BID        Patient Active Problem List   Diagnosis    Depression, major    Migraines    CARDIOVASCULAR SCREENING; LDL GOAL LESS THAN 160    Disc herniation    DDD (degenerative disc disease), lumbar    DDD (degenerative disc disease), cervical    Neck pain    Tobacco abuse    ELYSSA (obstructive sleep apnea)- severe (AHI 49)    Spondylolisthesis, grade 1    Chronic low back pain    Brain lipoma    History of colonic polyps    Gastroesophageal reflux disease without esophagitis    Hypertension goal BP (blood pressure) < 140/90    Traylor's esophagus determined by endoscopy    Bilateral edema of lower extremity    GABRIELA (generalized anxiety disorder)    Restless legs syndrome (RLS)    Diabetes mellitus, type 2 (H)    Morbid obesity (H)    Major depression, recurrent (H)    Alcohol dependence, in remission (H)     Past Surgical History:   Procedure Laterality Date    ARTHROSCOPY SHOULDER  01/01/1990    Right/spurs    CARPAL TUNNEL RELEASE RT/LT  01/01/1990    Left    COLONOSCOPY  01/01/2016    COLONOSCOPY N/A 04/29/2024    Procedure: Colonoscopy with polypectomy and biopsies;  Surgeon: Ruben Kellre MD;  Location: [facility]    CRANIOTOMY, EXCISE TUMOR COMPLEX, COMBINED  05/09/2011     Procedure:COMBINED CRANIOTOMY, EXCISE TUMOR COMPLEX; 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; Surgeon:LEE ANN PAULA; Location:[facility]    ROTATOR CUFF REPAIR RT/LT  2009     Left    TRANSPLANT      Z FOOT/TOES SURGERY PROC UNLISTED  1975    Toe fracture, left       Social History     Tobacco Use    Smoking status: Former     Current packs/day: 0.00     Average packs/day: 0.8 packs/day for 43.2 years (34.6 ttl pk-yrs)     Types: Cigarettes     Start date: 10/15/1975     Quit date: 2018     Years since quittin.1     Passive exposure: Past    Smokeless tobacco: Never    Tobacco comments:     1 PPD x 30. Quit 2017   Substance Use Topics    Alcohol use: Yes     Comment: very seldom drink     Family History   Problem Relation Age of Onset    Macular Degeneration Mother         Dry    Hypertension Mother     C.A.D. Father     Cancer Maternal Grandmother     Cerebrovascular Disease Paternal Grandmother     Breast Cancer Paternal Grandmother     Cancer Brother     Cancer - colorectal Brother     Colon Cancer Brother     Diabetes Brother     Neurologic Disorder Sister         migraine    Glaucoma No family hx of          Current Outpatient Medications   Medication Sig Dispense Refill    alcohol swab prep pads Use to swab area of injection/haley as directed. 100 each 3    atorvastatin (LIPITOR) 40 MG tablet Take 1 tablet (40 mg) by mouth daily. 90 tablet 3    blood glucose (ACCU-CHEK GUIDE) test strip USE TO TEST TWICE DAILY 200 strip 0    blood glucose (NO BRAND SPECIFIED) lancets standard Use to test blood sugar 2 times daily or as directed. 100 lancet 3    blood glucose monitoring (NO BRAND SPECIFIED) meter device kit Use to test blood sugar 2 times daily or as directed. 1 kit 0    clobetasol (TEMOVATE) 0.05 % external cream Apply sparingly to affected area twice daily as needed.  Do not apply to face. 120 g 3    DULoxetine (CYMBALTA) 60 MG capsule Take 2 capsules (120 mg)  by mouth daily. 180 capsule 0    ferrous sulfate (FEROSUL) 325 (65 Fe) MG tablet Take 1 tablet (325 mg) by mouth daily (with breakfast). 90 tablet 2    fluticasone (FLONASE) 50 MCG/ACT nasal spray Spray 2 sprays into both nostrils daily. 9.9 mL 0    furosemide (LASIX) 40 MG tablet Take 1 tablet (40 mg) by mouth daily. 90 tablet 3    insulin pen needle (BD BRANDY U/F) 32G X 4 MM miscellaneous Use 1 daily as directed. 100 each 4    losartan (COZAAR) 25 MG tablet Take 1 tablet (25 mg) by mouth daily. 90 tablet 3    metFORMIN (GLUCOPHAGE XR) 500 MG 24 hr tablet Take 2 tablets (1,000 mg) by mouth 2 times daily (with meals). 360 tablet 3    metoprolol succinate ER (TOPROL XL) 25 MG 24 hr tablet Take 1 tablet (25 mg) by mouth daily. 90 tablet 3    omeprazole (PRILOSEC) 20 MG DR capsule Take 1 capsule (20 mg) by mouth daily as needed (GERD). 90 capsule 0    semaglutide (OZEMPIC) 2 MG/3ML pen Inject 0.25 mg subcutaneously every 7 days. 3 mL 0     Allergies   Allergen Reactions    Amoxicillin-Pot Clavulanate Rash    Buspar [Buspirone Hcl] Rash    Lisinopril Cough    Seasonal Allergies     Seroquel [Quetiapine] Itching     Recent Labs   Lab Test 12/09/24  1603 08/20/24  1117 05/24/24  0709 04/03/24  0835 04/02/24  1205 08/31/23  0709 05/26/23  0833 03/08/23  0939 02/24/23  0923 01/11/22  1544 10/23/21  1042 10/23/21  1042 04/09/21  1023 10/22/20  1147 03/19/20  0951   A1C 6.8* 6.4* 6.9*  --   --    < > 7.9*  --  7.7*   < >  --   --    < >  --   --    LDL  --   --  76  --   --   --   --   --  60  --   --  86  --  158*  --    HDL  --   --  52  --   --   --   --   --  49*  --   --  50  --  47*  --    TRIG  --   --  133  --   --   --   --   --  107  --   --  100  --  112  --    ALT 17  --   --   --   --   --   --  33  --   --   --  35  --  200* 98*   CR 0.76  --   --  0.8 0.76  --   --  0.82  --   --   --  0.92   < > 0.69 0.70   GFRESTIMATED 87  --   --  >60 88  --   --  80  --   --   --  67   < > >90 >90   GFRESTBLACK  --   --   --    --   --   --   --   --   --   --   --   --   --  >90 >90   POTASSIUM 5.0  --   --   --  5.0  --   --  4.6  --   --   --  4.5   < > 4.0 4.4   TSH 3.61  --   --   --   --   --  4.15* 4.60*  --   --    < >  --    < > 5.38* 6.79*    < > = values in this interval not displayed.                Review of Systems  Constitutional, HEENT, cardiovascular, pulmonary, gi and gu systems are negative, except as otherwise noted.      Objective           Vitals:  No vitals were obtained today due to virtual visit.    Physical Exam   General: Alert and no distress //Respiratory: No audible wheeze, cough, or shortness of breath // Psychiatric:  Appropriate affect, tone, and pace of words      DATA  Component      Latest Ref Rng 12/9/2024  4:03 PM   Estimated Average Glucose      <117 mg/dL 148 (H)    Hemoglobin A1C      0.0 - 5.6 % 6.8 (H)             Phone call duration: 20 minutes  Signed Electronically by: Marybeth Silver MD

## 2025-02-20 ENCOUNTER — PATIENT OUTREACH (OUTPATIENT)
Dept: CARE COORDINATION | Facility: CLINIC | Age: 65
End: 2025-02-20
Payer: COMMERCIAL

## 2025-02-20 ASSESSMENT — ACTIVITIES OF DAILY LIVING (ADL): DEPENDENT_IADLS:: INDEPENDENT

## 2025-02-20 NOTE — PROGRESS NOTES
Clinic Care Coordination Contact  Clinic Care Coordination Contact  OUTREACH    Referral Information:  Referral Source: Specialist    Primary Diagnosis: Behavioral Health    Chief Complaint   Patient presents with    Clinic Care Coordination - Follow-up     [facility]        [place] Utilization: no concerns  Clinic Utilization  Difficulty keeping appointments:: No  Compliance Concerns: No  No-Show Concerns: No  No PCP office visit in Past Year: No  Utilization      No Show Count (past year)  1             ED Visits  0             Hospital Admissions  1                    Current as of: 2/20/2025  6:10 AM                Clinical Concerns:  Current Medical Concerns:  pt continues to have knee pain and managing her weight.    Current Behavioral Concerns: pt sees counselor twice per month.     Education Provided to patient: education not given   Pain  Pain (GOAL):: Yes  Type: Chronic (>3mo)  Health Maintenance Reviewed: Due/Overdue   Health Maintenance Due   Topic Date Due    URINE DRUG SCREEN  Never done    INFLUENZA VACCINE (1) 09/01/2024    EYE EXAM  10/05/2024    MAMMO SCREENING  03/13/2025       Clinical Pathway: None    Medication Management:  Medication review status: Medications reviewed and no changes reported per patient.        Pt notices some challenges with remembering to take when not a regular schedule.     Functional Status:  Dependent ADLs:: Independent  Dependent IADLs:: Independent  Bed or wheelchair confined:: No  Mobility Status: Independent  Fallen 2 or more times in the past year?: No  Any fall with injury in the past year?: Yes    Living Situation:  Current living arrangement:: I live alone  [facility]    Lifestyle & Psychosocial Needs:    Social Drivers of Health     Food Insecurity: Low Risk  (12/4/2024)    Food Insecurity     Within the past 12 months, did you worry that your food would run out before you got money to buy more?: No     Within the past 12 months, did  the food you bought just not last and you didn t have money to get more?: No   Depression: Not at risk (2/11/2025)    PHQ-2     PHQ-2 Score: 0   Recent Concern: Depression - At risk (12/9/2024)    PHQ-2     PHQ-2 Score: 5   Housing Stability: Low Risk  (12/4/2024)    Housing Stability     Do you have housing? : Yes     Are you worried about losing your housing?: No   Tobacco Use: Medium Risk (2/11/2025)    Patient History     Smoking Tobacco Use: Former     Smokeless Tobacco Use: Never     Passive Exposure: Past   Financial Resource Strain: Low Risk  (12/4/2024)    Financial Resource Strain     Within the past 12 months, have you or your family members you live with been unable to get utilities (heat, electricity) when it was really needed?: No   Alcohol Use: Not on file   Transportation Needs: Low Risk  (12/4/2024)    Transportation Needs     Within the past 12 months, has lack of transportation kept you from medical appointments, getting your medicines, non-medical meetings or appointments, work, or from getting things that you need?: No   Physical Activity: Inactive (12/4/2024)    Exercise Vital Sign     Days of Exercise per Week: 0 days     Minutes of Exercise per Session: 0 min   Interpersonal Safety: Low Risk  (12/9/2024)    Interpersonal Safety     Do you feel physically and emotionally safe where you currently live?: Yes     Within the past 12 months, have you been hit, slapped, kicked or otherwise physically hurt by someone?: No     Within the past 12 months, have you been humiliated or emotionally abused in other ways by your partner or ex-partner?: No   Stress: Stress Concern Present (12/4/2024)    [place] [employer] - Occupational Stress Questionnaire     Feeling of Stress : Very much   Social Connections: Unknown (12/4/2024)    Social Connection and Isolation Panel [NHANES]     Frequency of Communication with Friends and Family: Not on file     Frequency of Social Gatherings with  Friends and Family: Never     Attends Confucianist Services: Not on file     Active Member of Clubs or Organizations: Not on file     Attends Club or Organization Meetings: Not on file     Marital Status: Not on file   Health Literacy: Not on file     Diet:: Regular (trying to do good portion control and adjusting menu)  Inadequate nutrition (GOAL):: No  Tube Feeding: No  Inadequate activity/exercise (GOAL):: No  Significant changes in sleep pattern (GOAL): No  Transportation means:: Regular car     Confucianist or spiritual beliefs that impact treatment:: No  Mental health DX:: Yes  Mental health DX how managed:: Medication, Outpatient Counseling  Mental health management concern (GOAL):: Yes  Chemical Dependency Status: No Current Concerns  Informal Support system:: Family, Huong based, Parent        Pt noted that she has been working and this has helped financial. She is working on eating better and this has impacted her budget and if she looses her SNAP due to working she will have more challenges.  Signed pt up for [facility] today and will send information.     Pt has had the same goals for 3-5 years and discussed going to maintenance and reassess in two months.  Pt in agreement.      Resources and Interventions:  Current Resources:      Community Resources: None  Supplies Currently Used at Home: None  Equipment Currently Used at Home: none  Employment Status: employed part-time (transitioning to full time when able)         Advance Care Plan/Directive  Advanced Care Plans/Directives on file:: Yes  Type Advanced Care Plans/Directives: Advanced Directive - On File    Referrals Placed: None ([place] food helpline)       Care Plan:completed      Patient/Caregiver understanding: pt to continue to work on her exercise and financial.     Outreach Frequency: monthly, more frequently as needed  Future Appointments                In 1 month Marybeth Silver MD [facility] KIRSTIE Hathaway    In 1 month Venus  KAR[facility]            Plan: will send information on [facility] and call in two months.     JAQUELIN Mensah   [facility] - Care Coordination  [facility]   [phone]

## 2025-03-11 ENCOUNTER — PATIENT OUTREACH (OUTPATIENT)
Dept: CARE COORDINATION | Facility: CLINIC | Age: 65
End: 2025-03-11
Payer: COMMERCIAL

## 2025-03-27 ENCOUNTER — TELEPHONE (OUTPATIENT)
Dept: EDUCATION SERVICES | Facility: CLINIC | Age: 65
End: 2025-03-27
Payer: COMMERCIAL

## 2025-03-27 DIAGNOSIS — E11.65 TYPE 2 DIABETES MELLITUS WITH HYPERGLYCEMIA, WITHOUT LONG-TERM CURRENT USE OF INSULIN (H): Primary | ICD-10-CM

## 2025-03-27 NOTE — TELEPHONE ENCOUNTER
Dr Silver,    Can you please place a new diabetes education referral for Gemma?    Thanks! Shobha Donovan RD, [facility]

## 2025-03-28 ENCOUNTER — ANCILLARY ORDERS (OUTPATIENT)
Dept: MAMMOGRAPHY | Facility: CLINIC | Age: 65
End: 2025-03-28
Payer: COMMERCIAL

## 2025-03-28 DIAGNOSIS — Z12.31 VISIT FOR SCREENING MAMMOGRAM: Primary | ICD-10-CM

## 2025-03-29 DIAGNOSIS — F41.1 GAD (GENERALIZED ANXIETY DISORDER): ICD-10-CM

## 2025-03-29 DIAGNOSIS — F33.1 MODERATE EPISODE OF RECURRENT MAJOR DEPRESSIVE DISORDER (H): ICD-10-CM

## 2025-03-30 ENCOUNTER — HEALTH MAINTENANCE LETTER (OUTPATIENT)
Age: 65
End: 2025-03-30

## 2025-03-31 ENCOUNTER — OFFICE VISIT (OUTPATIENT)
Dept: FAMILY MEDICINE | Facility: CLINIC | Age: 65
End: 2025-03-31
Payer: COMMERCIAL

## 2025-03-31 VITALS
RESPIRATION RATE: 20 BRPM | BODY MASS INDEX: 43.23 KG/M2 | DIASTOLIC BLOOD PRESSURE: 74 MMHG | SYSTOLIC BLOOD PRESSURE: 132 MMHG | WEIGHT: 229 LBS | HEART RATE: 95 BPM | TEMPERATURE: 97.2 F | HEIGHT: 61 IN | OXYGEN SATURATION: 98 %

## 2025-03-31 DIAGNOSIS — I10 HYPERTENSION GOAL BP (BLOOD PRESSURE) < 140/90: ICD-10-CM

## 2025-03-31 DIAGNOSIS — E66.813 CLASS 3 SEVERE OBESITY DUE TO EXCESS CALORIES WITH SERIOUS COMORBIDITY AND BODY MASS INDEX (BMI) OF 40.0 TO 44.9 IN ADULT (H): ICD-10-CM

## 2025-03-31 DIAGNOSIS — F10.21 ALCOHOL DEPENDENCE, IN REMISSION (H): ICD-10-CM

## 2025-03-31 DIAGNOSIS — E11.65 TYPE 2 DIABETES MELLITUS WITH HYPERGLYCEMIA, WITHOUT LONG-TERM CURRENT USE OF INSULIN (H): Primary | ICD-10-CM

## 2025-03-31 DIAGNOSIS — F33.1 MODERATE EPISODE OF RECURRENT MAJOR DEPRESSIVE DISORDER (H): ICD-10-CM

## 2025-03-31 DIAGNOSIS — E66.01 CLASS 3 SEVERE OBESITY DUE TO EXCESS CALORIES WITH SERIOUS COMORBIDITY AND BODY MASS INDEX (BMI) OF 40.0 TO 44.9 IN ADULT (H): ICD-10-CM

## 2025-03-31 LAB
EST. AVERAGE GLUCOSE BLD GHB EST-MCNC: 146 MG/DL
HBA1C MFR BLD: 6.7 % (ref 0–5.6)

## 2025-03-31 PROCEDURE — 99214 OFFICE O/P EST MOD 30 MIN: CPT | Performed by: FAMILY MEDICINE

## 2025-03-31 PROCEDURE — 1126F AMNT PAIN NOTED NONE PRSNT: CPT | Performed by: FAMILY MEDICINE

## 2025-03-31 PROCEDURE — 83036 HEMOGLOBIN GLYCOSYLATED A1C: CPT | Performed by: FAMILY MEDICINE

## 2025-03-31 PROCEDURE — 3078F DIAST BP <80 MM HG: CPT | Performed by: FAMILY MEDICINE

## 2025-03-31 PROCEDURE — 36415 COLL VENOUS BLD VENIPUNCTURE: CPT | Performed by: FAMILY MEDICINE

## 2025-03-31 PROCEDURE — 3075F SYST BP GE 130 - 139MM HG: CPT | Performed by: FAMILY MEDICINE

## 2025-03-31 RX ORDER — DULOXETIN HYDROCHLORIDE 60 MG/1
120 CAPSULE, DELAYED RELEASE ORAL DAILY
Qty: 180 CAPSULE | Refills: 0 | Status: SHIPPED | OUTPATIENT
Start: 2025-03-31

## 2025-03-31 ASSESSMENT — ANXIETY QUESTIONNAIRES
6. BECOMING EASILY ANNOYED OR IRRITABLE: MORE THAN HALF THE DAYS
1. FEELING NERVOUS, ANXIOUS, OR ON EDGE: NOT AT ALL
3. WORRYING TOO MUCH ABOUT DIFFERENT THINGS: NEARLY EVERY DAY
2. NOT BEING ABLE TO STOP OR CONTROL WORRYING: NEARLY EVERY DAY
7. FEELING AFRAID AS IF SOMETHING AWFUL MIGHT HAPPEN: NOT AT ALL
5. BEING SO RESTLESS THAT IT IS HARD TO SIT STILL: MORE THAN HALF THE DAYS
IF YOU CHECKED OFF ANY PROBLEMS ON THIS QUESTIONNAIRE, HOW DIFFICULT HAVE THESE PROBLEMS MADE IT FOR YOU TO DO YOUR WORK, TAKE CARE OF THINGS AT HOME, OR GET ALONG WITH OTHER PEOPLE: SOMEWHAT DIFFICULT
GAD7 TOTAL SCORE: 10
GAD7 TOTAL SCORE: 10

## 2025-03-31 ASSESSMENT — PATIENT HEALTH QUESTIONNAIRE - PHQ9
5. POOR APPETITE OR OVEREATING: NOT AT ALL
SUM OF ALL RESPONSES TO PHQ QUESTIONS 1-9: 11

## 2025-03-31 ASSESSMENT — PAIN SCALES - GENERAL: PAINLEVEL_OUTOF10: NO PAIN (0)

## 2025-03-31 NOTE — PROGRESS NOTES
Assessment & Plan     Gemma was seen today for hypertension, lipids, diabetes and depression.    Diagnoses and all orders for this visit:    Type 2 diabetes mellitus with hyperglycemia, without long-term current use of insulin (H), controlled with an A1c of 6.7  -     HEMOGLOBIN A1C  -     Refill: semaglutide (OZEMPIC) 2 MG/3ML pen; Inject 0.25 mg subcutaneously every 7 days for 30 days, THEN 0.5 mg every 7 days.  -     blood glucose (NO BRAND SPECIFIED) lancets standard; Use to test blood sugar 2 times daily or as directed.  -     blood glucose (ACCU-CHEK GUIDE) test strip; USE TO TEST TWICE DAILY  -     Adult Eye  Referral; Future    Hypertension goal BP (blood pressure) < 140/90, controlled        -    Continue current management.    Moderate episode of recurrent major depressive disorder (H), stable        -    PHQ-9/GABRIELA 7 completed, see below/Epic for details          -    Continue medication and Psychotherapy counseling     Alcohol dependence, in remission (H)       -     Congratulated on continued efforts to abstain from alcohol use.    Class 3 severe obesity due to excess calories with serious comorbidity and body mass index (BMI) of 40.0 to 44.9 in adult (H)  -     REVIEW OF HEALTH MAINTENANCE PROTOCOL ORDERS  -     Start: semaglutide (OZEMPIC) 2 MG/3ML pen; Inject 0.25 mg subcutaneously every 7 days for 30 days, THEN 0.5 mg every 7 days.  -     Weight management plan: Discussed healthy diet and exercise guidelines      Return in about 3 months (around 6/30/2025) for Diabetes Follow Up with a HgbA1C prior to visit.      Subjective   Gemma is a 64 year old, presenting for the following health issues:  Hypertension, Lipids, Diabetes, and Depression        3/31/2025     4:04 PM   Additional Questions   Roomed by Chely Issa CMA   Accompanied by None     History of Present Illness       Mental Health Follow-up:  Patient presents to follow-up on Depression.Patient's depression since last visit has been:   Medium  The patient is not having other symptoms associated with depression.      Any significant life events: job concerns and financial concerns  Patient is not feeling anxious or having panic attacks.  Patient has no concerns about alcohol or drug use.    Diabetes:   She presents for follow up of diabetes.  She is checking home blood glucose a few times a month.   She checks blood glucose before meals and at bedtime.  Blood glucose is sometimes over 200 and never under 70. She is aware of hypoglycemia symptoms including shakiness.    She has no concerns regarding her diabetes at this time.  She is having weight gain.  The patient has not had a diabetic eye exam in the last 12 months.          Hyperlipidemia:  She presents for follow up of hyperlipidemia.   She is taking medication to lower cholesterol. She is not having myalgia or other side effects to statin medications.    Hypertension: She presents for follow up of hypertension.  She does not check blood pressure  regularly outside of the clinic. Outpatient blood pressures have not been over 140/90. She follows a low salt diet.     She eats 0-1 servings of fruits and vegetables daily.She consumes 0 sweetened beverage(s) daily.She exercises with enough effort to increase her heart rate 9 or less minutes per day.  She exercises with enough effort to increase her heart rate 3 or less days per week.   She is taking medications regularly.        DEPRESSION - Patient has a long history of Depression of moderate severity requiring medication - Cymbalta for control with recent symptoms being unchanged. Current symptoms of depression include depressed mood.        3/31/2025     4:58 PM   Last PHQ-9   1.  Little interest or pleasure in doing things 0   2.  Feeling down, depressed, or hopeless 1   3.  Trouble falling or staying asleep, or sleeping too much 3   4.  Feeling tired or having little energy 2   5.  Poor appetite or overeating 2   6.  Feeling bad about  yourself 1   7.  Trouble concentrating 0   8.  Moving slowly or restless 2   Q9: Thoughts of better off dead/self-harm past 2 weeks 0   PHQ-9 Total Score 11   Difficulty at work, home, or with people Somewhat difficult         3/31/2025     4:58 PM   GABRIELA-7    1. Feeling nervous, anxious, or on edge 0   2. Not being able to stop or control worrying 3   3. Worrying too much about different things 3   4. Trouble relaxing 0   5. Being so restless that it is hard to sit still 2   6. Becoming easily annoyed or irritable 2   7. Feeling afraid, as if something awful might happen 0   GABRIELA-7 Total Score 10   If you checked any problems, how difficult have they made it for you to do your work, take care of things at home, or get along with other people? Somewhat difficult     In the past two weeks have you had thoughts of suicide or self-harm?  No.    Do you have concerns about your personal safety or the safety of others?   No    HYPERTENSION - Patient has longstanding history of HTN , currently denies any symptoms referable to elevated blood pressure. Specifically denies chest pain, palpitations, dyspnea, orthopnea, PND or peripheral edema. Blood pressure readings have been in normal range. Current medication regimen is as listed below. Patient denies any side effects of medication - Losartan 25 mg/day, Metoprolol 25 mg/day and Furosemide 40 mg/day.     BP Readings from Last 3 Encounters:   03/31/25 132/74   12/12/24 130/78   12/09/24 122/68     Patient Active Problem List   Diagnosis    Depression, major    Migraines    CARDIOVASCULAR SCREENING; LDL GOAL LESS THAN 160    Disc herniation    DDD (degenerative disc disease), lumbar    DDD (degenerative disc disease), cervical    Neck pain    Tobacco abuse    ELYSSA (obstructive sleep apnea)- severe (AHI 49)    Spondylolisthesis, grade 1    Chronic low back pain    Brain lipoma    History of colonic polyps    Gastroesophageal reflux disease without esophagitis    Hypertension goal  BP (blood pressure) < 140/90    Traylor's esophagus determined by endoscopy    Bilateral edema of lower extremity    GABRIELA (generalized anxiety disorder)    Restless legs syndrome (RLS)    Diabetes mellitus, type 2 (H)    Morbid obesity (H)    Moderate episode of recurrent major depressive disorder (H)    Alcohol dependence, in remission (H)     Past Surgical History:   Procedure Laterality Date    ARTHROSCOPY SHOULDER  1990    Right/spurs    CARPAL TUNNEL RELEASE RT/LT  1990    Left    COLONOSCOPY  2016    COLONOSCOPY N/A 2024    Procedure: Colonoscopy with polypectomy and biopsies;  Surgeon: Ruben Keller MD;  Location: [facility]    CRANIOTOMY, EXCISE TUMOR COMPLEX, COMBINED  2011    Procedure:COMBINED CRANIOTOMY, EXCISE TUMOR COMPLEX; 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; Surgeon:LEE ANN APULA; Location:[facility]    ROTATOR CUFF REPAIR RT/LT  2009     Left    TRANSPLANT      ZZC FOOT/TOES SURGERY PROC UNLISTED  1975    Toe fracture, left       Social History     Tobacco Use    Smoking status: Former     Current packs/day: 0.00     Average packs/day: 0.8 packs/day for 43.2 years (34.6 ttl pk-yrs)     Types: Cigarettes     Start date: 10/15/1975     Quit date: 2018     Years since quittin.2     Passive exposure: Past    Smokeless tobacco: Never    Tobacco comments:     1 PPD x 30. Quit 2017   Substance Use Topics    Alcohol use: Yes     Comment: very seldom drink     Family History   Problem Relation Age of Onset    Macular Degeneration Mother         Dry    Hypertension Mother     C.A.D. Father     Cancer Maternal Grandmother     Cerebrovascular Disease Paternal Grandmother     Breast Cancer Paternal Grandmother     Cancer Brother     Cancer - colorectal Brother     Colon Cancer Brother     Diabetes Brother     Neurologic Disorder Sister         migraine    Glaucoma No family hx of          Current Outpatient Medications   Medication Sig  Dispense Refill    alcohol swab prep pads Use to swab area of injection/haley as directed. 100 each 3    atorvastatin (LIPITOR) 40 MG tablet Take 1 tablet (40 mg) by mouth daily. 90 tablet 3    blood glucose (ACCU-CHEK GUIDE) test strip USE TO TEST TWICE DAILY 200 strip 0    blood glucose (NO BRAND SPECIFIED) lancets standard Use to test blood sugar 2 times daily or as directed. 100 Lancet 3    blood glucose monitoring (NO BRAND SPECIFIED) meter device kit Use to test blood sugar 2 times daily or as directed. 1 kit 0    clobetasol (TEMOVATE) 0.05 % external cream Apply sparingly to affected area twice daily as needed.  Do not apply to face. 120 g 3    DULoxetine (CYMBALTA) 60 MG capsule Take 2 capsules (120 mg) by mouth daily. 180 capsule 0    ferrous sulfate (FEROSUL) 325 (65 Fe) MG tablet Take 1 tablet (325 mg) by mouth daily (with breakfast). 90 tablet 2    fluticasone (FLONASE) 50 MCG/ACT nasal spray Spray 2 sprays into both nostrils daily. 9.9 mL 0    furosemide (LASIX) 40 MG tablet Take 1 tablet (40 mg) by mouth daily. 90 tablet 3    insulin pen needle (BD BRANDY U/F) 32G X 4 MM miscellaneous Use 1 daily as directed. 100 each 4    losartan (COZAAR) 25 MG tablet Take 1 tablet (25 mg) by mouth daily. 90 tablet 3    metFORMIN (GLUCOPHAGE XR) 500 MG 24 hr tablet Take 2 tablets (1,000 mg) by mouth 2 times daily (with meals). 360 tablet 3    metoprolol succinate ER (TOPROL XL) 25 MG 24 hr tablet Take 1 tablet (25 mg) by mouth daily. 90 tablet 3    omeprazole (PRILOSEC) 20 MG DR capsule Take 1 capsule (20 mg) by mouth daily as needed (GERD). 90 capsule 0    semaglutide (OZEMPIC) 2 MG/3ML pen Inject 0.25 mg subcutaneously every 7 days for 30 days, THEN 0.5 mg every 7 days. 6 mL 0     Allergies   Allergen Reactions    Amoxicillin-Pot Clavulanate Rash    Buspar [Buspirone Hcl] Rash    Lisinopril Cough    Seasonal Allergies     Seroquel [Quetiapine] Itching     Recent Labs   Lab Test 03/31/25  1639 12/09/24  1603  "08/20/24  1117 05/24/24  0709 04/03/24  0835 04/02/24  1205 08/31/23  0709 05/26/23  0833 03/08/23  0939 02/24/23  0923 01/11/22  1544 10/23/21  1042 10/23/21  1042 04/09/21  1023 10/22/20  1147 03/19/20  0951   A1C 6.7* 6.8* 6.4* 6.9*  --   --    < > 7.9*  --  7.7*   < >  --   --    < >  --   --    LDL  --   --   --  76  --   --   --   --   --  60  --   --  86  --  158*  --    HDL  --   --   --  52  --   --   --   --   --  49*  --   --  50  --  47*  --    TRIG  --   --   --  133  --   --   --   --   --  107  --   --  100  --  112  --    ALT  --  17  --   --   --   --   --   --  33  --   --   --  35  --  200* 98*   CR  --  0.76  --   --  0.8 0.76  --   --  0.82  --   --   --  0.92   < > 0.69 0.70   GFRESTIMATED  --  87  --   --  >60 88  --   --  80  --   --   --  67   < > >90 >90   GFRESTBLACK  --   --   --   --   --   --   --   --   --   --   --   --   --   --  >90 >90   POTASSIUM  --  5.0  --   --   --  5.0  --   --  4.6  --   --   --  4.5   < > 4.0 4.4   TSH  --  3.61  --   --   --   --   --  4.15* 4.60*  --   --    < >  --    < > 5.38* 6.79*    < > = values in this interval not displayed.      BP Readings from Last 3 Encounters:   03/31/25 132/74   12/12/24 130/78   12/09/24 122/68    Wt Readings from Last 3 Encounters:   03/31/25 103.9 kg (229 lb)   12/12/24 99.8 kg (220 lb)   12/09/24 100.6 kg (221 lb 11.2 oz)                        Review of Systems  Constitutional, HEENT, cardiovascular, pulmonary, gi and gu systems are negative, except as otherwise noted.      Objective    /74   Pulse 95   Temp 97.2  F (36.2  C) (Temporal)   Resp 20   Ht 1.549 m (5' 1\")   Wt 103.9 kg (229 lb)   LMP  (LMP Unknown)   SpO2 98%   BMI 43.27 kg/m    Body mass index is 43.27 kg/m .  Physical Exam   GENERAL: alert and no distress  EYES: Eyes grossly normal to inspection.  No discharge or erythema, or obvious scleral/conjunctival abnormalities.  RESP: No audible wheeze, cough, or visible cyanosis.    SKIN: Visible skin " clear. No significant rash, abnormal pigmentation or lesions.  NEURO: Cranial nerves grossly intact.  Mentation and speech appropriate for age.  PSYCH: Appropriate affect, tone, and pace of words    DATA  Results for orders placed or performed in visit on 03/31/25   HEMOGLOBIN A1C     Status: Abnormal   Result Value Ref Range    Estimated Average Glucose 146 (H) <117 mg/dL    Hemoglobin A1C 6.7 (H) 0.0 - 5.6 %             Signed Electronically by: Marybeth Silver MD

## 2025-04-03 ENCOUNTER — VIRTUAL VISIT (OUTPATIENT)
Dept: EDUCATION SERVICES | Facility: CLINIC | Age: 65
End: 2025-04-03
Payer: COMMERCIAL

## 2025-04-03 ENCOUNTER — DOCUMENTATION ONLY (OUTPATIENT)
Dept: SLEEP MEDICINE | Facility: CLINIC | Age: 65
End: 2025-04-03

## 2025-04-03 DIAGNOSIS — E11.65 TYPE 2 DIABETES MELLITUS WITH HYPERGLYCEMIA, WITHOUT LONG-TERM CURRENT USE OF INSULIN (H): ICD-10-CM

## 2025-04-03 DIAGNOSIS — G47.33 OSA (OBSTRUCTIVE SLEEP APNEA): Primary | ICD-10-CM

## 2025-04-03 NOTE — PROGRESS NOTES
Diabetes Self-Management Education & Support    Presents for: Individual review    Type of Service: Telephone Visit    Originating Location (Patient Location): Home  Distant Location (Provider Location): [facility]  Mode of Communication:  Telephone    Telephone Visit Start Time:  5:10 pm  Telephone Visit End Time (telephone visit stop time): 5:55 pm    How would patient like to obtain AVS? 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  -A1c is improved   Patient's most recent   Lab Results   Component Value Date    A1C 6.7 03/31/2025    A1C 8.3 06/22/2021     is meeting goal of <7.0    Diabetes knowledge and skills assessment:   Patient is knowledgeable in diabetes management concepts related to: Healthy Eating, Being Active, and Monitoring    Based on learning assessment above, most appropriate setting for further diabetes education would be: Individual setting.    Care Plan and Education Provided:  There are no care plans that you recently modified to display for this patient.      Patient verbalized understanding of diabetes self-management education concepts discussed, opportunities for ongoing education and support, and recommendations provided today.    Plan  Start taking the Ozempic .25 mg dose weekly, take for 4 week on Sunday. Then increase to 0.50 mg weekly.      2.    Working on meal plan is a great plan: with Meat, Vegetables, Side dish (keep portion of this to less than one cup).  The salad at lunch are great, add some protein to them.      3.  The activity you are getting at school is excellent.      Follow-up:  Upcoming Diabetes Ed Appointments     Visit Type Date Time Department    DIABETES ED 4/3/2025  5:00 PM [facility] DIABETES ED        See Care Plan for  "co-developed, patient-state behavior change goals.    Education Materials Provided:  No new materials provided today      Subjective/Objective  Gemma is an 64 year old year old, presenting for the following diabetes education related to: Presents for: Individual review  Accompanied by: Self  Diabetes type: Type 2  How confident are you filling out medical forms by yourself:: Quite a bit  Other concerns:: Glasses  Cultural Influences/Ethnic Background:  Not  or       Diabetes Symptoms & Complications:          Patient Problem List and Family Medical History reviewed for relevant medical history, current medical status, and diabetes risk factors.    Vitals:  LMP  (LMP Unknown)   Estimated body mass index is 43.27 kg/m  as calculated from the following:    Height as of 3/31/25: 1.549 m (5' 1\").    Weight as of 3/31/25: 103.9 kg (229 lb).   Last 3 BP:   BP Readings from Last 3 Encounters:   03/31/25 132/74   12/12/24 130/78   12/09/24 122/68       History   Smoking Status    Former    Types: Cigarettes   Smokeless Tobacco    Never       Labs:  Lab Results   Component Value Date    A1C 6.7 03/31/2025    A1C 8.3 06/22/2021     Lab Results   Component Value Date    GLC 80 12/09/2024     04/29/2024     03/08/2023     10/22/2020     Lab Results   Component Value Date    LDL 76 05/24/2024     10/22/2020     HDL Cholesterol   Date Value Ref Range Status   10/22/2020 47 (L) >49 mg/dL Final     Direct Measure HDL   Date Value Ref Range Status   05/24/2024 52 >=50 mg/dL Final   ]  GFR Estimate   Date Value Ref Range Status   12/09/2024 87 >60 mL/min/1.73m2 Final     Comment:     eGFR calculated using 2021 CKD-EPI equation.   10/22/2020 >90 >60 mL/min/[1.73_m2] Final     Comment:     Non  GFR Calc  Starting 12/18/2018, serum creatinine based estimated GFR (eGFR) will be   calculated using the Chronic Kidney Disease Epidemiology Collaboration   (CKD-EPI) equation.       GFR, " "ESTIMATED POCT   Date Value Ref Range Status   04/03/2024 >60 >60 mL/min/1.73m2 Final     GFR Estimate If Black   Date Value Ref Range Status   10/22/2020 >90 >60 mL/min/[1.73_m2] Final     Comment:      GFR Calc  Starting 12/18/2018, serum creatinine based estimated GFR (eGFR) will be   calculated using the Chronic Kidney Disease Epidemiology Collaboration   (CKD-EPI) equation.       Lab Results   Component Value Date    CR 0.76 12/09/2024    CR 0.69 10/22/2020     No results found for: \"MICROALBUMIN\"    Healthy Eating:  Healthy Eating Assessed Today: Yes  Breakfast: doing cereal still  Lunch: salad, string cheese this week, sandwich 1/2  Dinner: chicken, pork chops on ori agudelo, do veggies, side dish: starch this what she is wanting to work on. 1/4 pizza.  fish on fridays  Snacks: girl  cookies  Other: still working on good food choices.  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  Starting this sunday with the ozempic.    Being Active:  Being Active Assessed Today: Yes  Exercise:: Yes    Monitoring:   A1c improved    Shobha Donovan RD  Time Spent: 45 minutes  Encounter Type: Individual    Any diabetes medication dose changes were made via the CDCES Standing Orders under the patient's referring provider.  "

## 2025-04-03 NOTE — PATIENT INSTRUCTIONS
Start taking the Ozempic .25 mg dose weekly, take for 4 week on Sunday. Then increase to 0.50 mg weekly.      2.    Working on meal plan is a great plan: with Meat, Vegetables, Side dish (keep portion of this to less than one cup).  The salad at lunch are great, add some protein to them.      3.  The activity you are getting at school is excellent.

## 2025-04-09 NOTE — TELEPHONE ENCOUNTER
Called [phone] (home).   Did they answer the phone: No, left a message on voicemail to return call to the [facility] at [phone], and to ask for any available triage nurse.  (RN did not leave specific details on voicemail for confidential reasons)    Rpua WINTER RN  Triage Nurse  [facility]       normal

## 2025-04-15 ENCOUNTER — ANCILLARY PROCEDURE (OUTPATIENT)
Dept: MAMMOGRAPHY | Facility: CLINIC | Age: 65
End: 2025-04-15
Attending: FAMILY MEDICINE
Payer: COMMERCIAL

## 2025-04-15 DIAGNOSIS — Z12.31 VISIT FOR SCREENING MAMMOGRAM: ICD-10-CM

## 2025-04-15 PROCEDURE — 77067 SCR MAMMO BI INCL CAD: CPT | Mod: TC | Performed by: RADIOLOGY

## 2025-04-15 PROCEDURE — 77063 BREAST TOMOSYNTHESIS BI: CPT | Mod: TC | Performed by: RADIOLOGY

## 2025-04-21 ENCOUNTER — PATIENT OUTREACH (OUTPATIENT)
Dept: CARE COORDINATION | Facility: CLINIC | Age: 65
End: 2025-04-21
Payer: COMMERCIAL

## 2025-04-21 NOTE — LETTER
[facility] COORDINATION  [address] [place] [address]    April 21, 2025    Jessica Jay  [address] [facility] [address]    Dear Jessica,  Your Care Team congratulates you on your journey to maintain wellness. This document will help guide you on your journey to maintain a healthy lifestyle.  You can use this to help you overcome any barriers you may encounter.  If you should have any questions or concerns, you can contact the members of your Care Team or contact your Primary Care Clinic for assistance.     Patient feedback is important to us and helps us continue to improve the way we care for patients as well as celebrate the things we do well. You may receive a short survey from [facility] on behalf of the care coordination team. We would appreciate hearing from you!    Health Maintenance  Health Maintenance Reviewed: Due/Overdue   Health Maintenance Due   Topic Date Due    INFLUENZA VACCINE (1) 09/01/2024    EYE EXAM  10/05/2024    DEPRESSION 6 MO INDEX REPEAT PHQ-9  04/10/2025         My Access Plan  Medical Emergency 911   Primary Clinic Line [facility] [phone]   24 Hour Appointment Line [phone] or  [phone]) (toll-free)   24 Hour Nurse Line [phone] (toll-free)   Preferred Urgent Care     Preferred Hospital     Preferred Pharmacy [facility] [place] - [address]     Behavioral Health Crisis Line The National Suicide Prevention Lifeline at 1-333.433.1921 or 911     My Care Team Members  Patient Care Team         Relationship Specialty Notifications Start End    Marybeth Silver MD PCP - General Family Practice  5/16/18     Referred to Dermatology    Phone: [phone] Fax: [phone]         [address] [place] [address]    Marybeth Silver MD Assigned PCP   4/15/18     Phone: [phone] Fax: [phone] [address] [address]    Bell Brito LSW  Lead Care Coordinator Primary Care - CC Admissions 1/29/20     Phone: [phone] Fax: [phone]        Mayela Plummer [facility] ED Pharmacist Pharmacist  5/19/21     Phone: [phone] Fax: [phone] [address] [facility] [address]    Shobha Donovan RD Diabetes Educator Dietitian, Registered  8/18/22     Phone: [phone] Fax: [phone]        Brenda Ortiz APRN CNP Nurse Practitioner Dermatology  12/14/23     Phone: [phone] Fax: [phone] [address] [facility] [address]    Brent Higgins DO Assigned Sleep Provider   3/23/24     Phone: [phone] Fax: [phone] [address] [address]    Ki May MD Assigned Musculoskeletal Provider   11/23/24     Phone: [phone] Fax: [phone] [address] [address]    Marshall Grewal DPM Assigned Surgical Provider   12/23/24     Phone: [phone] Fax: [phone] [address] [facility] [address]    Brenda Ortiz APRN CNP Assigned Dermatology Provider   3/23/25     Phone: [phone] Fax: [phone] [address] [facility] [address]              Advance Care Plans/Directives Type:      Thank you for providing us with your Advance Directive. We will keep this on file and recommend that it be updated every 2 years, if your health situation changes, if there is a death of one of your health care agents, and/or if there are changes in your marital status.    It has been your Clinic Care Team's pleasure to work with you on accomplishing your goals.  Feel free to call at any time with any needs.  I have enjoyed our work together.    Regards,    Bell Brito, W   [facility] - Care Coordination  [facility]   [phone]

## 2025-04-21 NOTE — PROGRESS NOTES
Clinic Care Coordination Contact    Assessment: Care Coordinator contacted patient for 2 month follow up.  Patient has continued to follow the plan of care and assessment is negative for any new needs or concerns.    Enrollment status: Graduated.      Plan: No further outreaches at this time.  Patient will continue to follow the plan of care.  If new needs arise a new Care Coordination referral may be placed.      JAQUELIN Mensah   [facility] - Care Coordination  [facility]   [phone]

## 2025-05-23 ENCOUNTER — ANCILLARY PROCEDURE (OUTPATIENT)
Dept: GENERAL RADIOLOGY | Facility: CLINIC | Age: 65
End: 2025-05-23
Attending: FAMILY MEDICINE
Payer: COMMERCIAL

## 2025-05-23 ENCOUNTER — OFFICE VISIT (OUTPATIENT)
Dept: ORTHOPEDICS | Facility: CLINIC | Age: 65
End: 2025-05-23
Payer: COMMERCIAL

## 2025-05-23 VITALS — BODY MASS INDEX: 43.23 KG/M2 | WEIGHT: 229 LBS | HEIGHT: 61 IN

## 2025-05-23 DIAGNOSIS — G89.29 BILATERAL CHRONIC KNEE PAIN: ICD-10-CM

## 2025-05-23 DIAGNOSIS — M25.562 BILATERAL CHRONIC KNEE PAIN: ICD-10-CM

## 2025-05-23 DIAGNOSIS — M25.561 BILATERAL CHRONIC KNEE PAIN: ICD-10-CM

## 2025-05-23 DIAGNOSIS — M76.01 GLUTEAL TENDINITIS OF RIGHT BUTTOCK: ICD-10-CM

## 2025-05-23 DIAGNOSIS — M70.61 TROCHANTERIC BURSITIS OF RIGHT HIP: ICD-10-CM

## 2025-05-23 DIAGNOSIS — M17.0 PRIMARY OSTEOARTHRITIS OF BOTH KNEES: ICD-10-CM

## 2025-05-23 DIAGNOSIS — M25.551 RIGHT HIP PAIN: Primary | ICD-10-CM

## 2025-05-23 DIAGNOSIS — M25.551 RIGHT HIP PAIN: ICD-10-CM

## 2025-05-23 PROCEDURE — 20610 DRAIN/INJ JOINT/BURSA W/O US: CPT | Mod: RT | Performed by: FAMILY MEDICINE

## 2025-05-23 PROCEDURE — 99214 OFFICE O/P EST MOD 30 MIN: CPT | Mod: 25 | Performed by: FAMILY MEDICINE

## 2025-05-23 PROCEDURE — 73502 X-RAY EXAM HIP UNI 2-3 VIEWS: CPT | Mod: TC | Performed by: RADIOLOGY

## 2025-05-23 RX ADMIN — TRIAMCINOLONE ACETONIDE 40 MG: 40 INJECTION, SUSPENSION INTRA-ARTICULAR; INTRAMUSCULAR at 14:29

## 2025-05-23 RX ADMIN — BUPIVACAINE HYDROCHLORIDE 2 ML: 5 INJECTION, SOLUTION PERINEURAL at 14:29

## 2025-05-23 RX ADMIN — LIDOCAINE HYDROCHLORIDE 2 ML: 10 INJECTION, SOLUTION INFILTRATION; PERINEURAL at 14:29

## 2025-05-23 SDOH — HEALTH STABILITY: PHYSICAL HEALTH: ON AVERAGE, HOW MANY DAYS PER WEEK DO YOU ENGAGE IN MODERATE TO STRENUOUS EXERCISE (LIKE A BRISK WALK)?: 4 DAYS

## 2025-05-23 SDOH — HEALTH STABILITY: PHYSICAL HEALTH: ON AVERAGE, HOW MANY MINUTES DO YOU ENGAGE IN EXERCISE AT THIS LEVEL?: 120 MIN

## 2025-05-23 NOTE — PROGRESS NOTES
Jessica Jay  :  1960  DOS: 2025  MRN: [id number]    Sports Medicine Clinic Visit    PCP: Marybeth Silver MD   Jessica Jay is a 64 year old female who is seen as a self referralpresenting with right hip pain.     Injury: Patient describes injury as she fell on her left knee. Feels she has been overcompensating for her knee that now her right hip began to hurt. 1.5 week(s) ago.  Pain located over lateral hip. Radiating pain down the thigh only when she lays down. Additional Features:  Positive: instability and weakness. Denies numbness and tingling. Symptoms are better with laying down.  Symptoms are worse with: moving, laying on her side, everything. Other evaluation and/or treatments so far consists of: Heat and Rest.  Recent imaging completed: No recent imaging completed.  Prior History of related problems: History of DDD of LB. Epidural injections.     Social History: paraprofessional     Review of Systems  Musculoskeletal: as above  Remainder of review of systems is negative including constitutional, CV, pulmonary, GI, Skin and Neurologic except as noted in HPI or medical history.    Past Medical History:   Diagnosis Date    Cellulitis and abscess of foot, except toes 2010    [facility]    Chronic daily headache 02/15/2011    Complete rupture of rotator cuff 2009    Degenerative disc disease     cervical    Depression, major     Depressive disorder     Diabetes mellitus, type 2 (H)     Dizziness - light-headed 02/15/2011    GERD (gastroesophageal reflux disease)     Hypertension goal BP (blood pressure) < 140/90     LBP (low back pain)     Panic attacks     Seasonal allergies     Vulvar dermatitis 2012     Past Surgical History:   Procedure Laterality Date    ARTHROSCOPY SHOULDER  1990    Right/spurs    CARPAL TUNNEL RELEASE RT/LT  1990    Left    COLONOSCOPY  2016    COLONOSCOPY N/A 2024    Procedure: Colonoscopy with polypectomy and  "biopsies;  Surgeon: Ruben Keller MD;  Location: [facility]    CRANIOTOMY, EXCISE TUMOR COMPLEX, COMBINED  05/09/2011    Procedure:COMBINED CRANIOTOMY, EXCISE TUMOR COMPLEX; 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; Surgeon:LEE ANN PAULA; Location:[facility]    ROTATOR CUFF REPAIR RT/LT  01/01/2009     Left    TRANSPLANT      ZZC FOOT/TOES SURGERY PROC UNLISTED  01/01/1975    Toe fracture, left     Family History   Problem Relation Age of Onset    Macular Degeneration Mother         Dry    Hypertension Mother     C.A.D. Father     Cancer Maternal Grandmother     Cerebrovascular Disease Paternal Grandmother     Breast Cancer Paternal Grandmother     Cancer Brother     Cancer - colorectal Brother     Colon Cancer Brother     Diabetes Brother     Neurologic Disorder Sister         migraine    Glaucoma No family hx of        Objective  Ht 1.549 m (5' 1\")   Wt 103.9 kg (229 lb)   LMP  (LMP Unknown)   BMI 43.27 kg/m      General: healthy, alert and in no distress    HEENT: no scleral icterus or conjunctival erythema   Skin: no suspicious lesions or rash. No jaundice.   CV: regular rhythm by palpation, 2+ distal pulses, no pedal edema    Resp: normal respiratory effort without conversational dyspnea   Psych: normal mood and affect    Gait: mildly antalgic, appropriate coordination and balance   Neuro: normal light touch sensory exam of the extremities. Motor strength as noted below     Right hip exam    Inspection:        no edema or ecchymosis in hip area    ROM:       Decreased terminal active and passive ROM due to pain, worst with ER, flexion    Strength:        flexion 5/5       extension 5/5       abduction 5/5       adduction 5/5    Tender:        greater trochanter       SI joint       Gluteal muscles and tendons    Non Tender:        remainder of hip area       illiac crest       ASIS       Anterior hip    Sensation:        grossly intact in hip and thigh    Skin:       well perfused       " capillary refill brisk    Special Tests:        neg (-) VELIA       neg (-) FADIR       neg (-) scour       positive (+) Halley       Neg SLR and Slump    Bilateral Knee exam    ROM:        Flexion 120 degrees b/l, more painful on the left       Extension -2 degrees       Range of motion limited by pain on the left    Inspection:       no visible ecchymosis        effusion noted trace right, small left    Skin:       no visible deformities       well perfused       capillary refill brisk    Patellar Motion:        Crepitus noted in the patellofemoral joint L>R    Tender:        lateral patellar border L>R       medial joint line L>R       lateral joint line L    Non Tender:         remainder of knee area        along MCL        distal IT Band        infrapatellar tendon        tibial tubercle       pes anserine bursa    Special Tests:        neg (-) varus at 0 deg and 30 deg       neg (-) valgus at 0 deg and 30 deg    Evaluation of ipsilateral kinetic chain       B/l decreased quad tone and modest core stability      Radiology  Recent Results (from the past 744 hours)   XR Pelvis w Hip RT 1 View    Narrative    EXAM: XR PELVIS AND HIP RIGHT 1 VIEW  LOCATION: [facility]  DATE: 5/23/2025    INDICATION:  Right hip pain  COMPARISON: None.      Impression    IMPRESSION: Mild degenerative arthritis both hips. No acute fracture. Lower lumbar spondylosis.     EXAM: XR KNEE LEFT 3 VIEWS  LOCATION: [facility]  DATE: 10/29/2024     INDICATION: Left knee injury. Knee pain.  COMPARISON: None.                                                                      IMPRESSION: Anatomic alignment left knee. No acute displaced left knee fracture. Moderate medial and moderate to severe patellofemoral compartment left knee osteoarthritis. Suspect several loose bodies in the left knee particularly posteriorly. Chronic   ossicle along the superior left patella. No sizable left knee joint  effusion. No significant anterior left knee soft tissue swelling.     MR right knee without contrast 5/5/2022 2:02 PM     Techniques: Multiplanar multisequence imaging of the right knee was  obtained without administration of intra-articular or intravenous  contrast using routing protocol.     History: Knee trauma, meniscal/ligament injury suspected, xray done  (Age >= 1y); Acute pain of right knee     Comparison: 4/29/2022     Findings:     Motion partially degrading images.     MENISCI:  Medial meniscus: Subtle signal abnormality violating free edge/femoral  articular surface of the medial meniscal body and also tibial  articular surface of the posterior horn, possible tearing. Otherwise,  no tear.     Lateral meniscus: Intact.     LIGAMENTS  Cruciate ligaments: Intact.  Medial supporting structures: Intact. Thickening of proximal tibial  collateral ligament, likely from remote trauma.  Lateral supporting structures: Intact.     EXTENSOR MECHANISM  Intact.     FLUID  No joint effusion. Trace Jarvis's cyst. Loculated, internally septated  heterogeneous fluid collection anteriorly along the patellar tendon  and lateral retinaculum with perilesional edema/leakage of fluid,  likely hematoma in the provided clinical setting of recent trauma.     OSSEOUS and ARTICULAR STRUCTURES  Bones: No fracture, contusion, or osseous lesion is seen. 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     Patellofemoral compartment: Diffuse chondral thinning/loss of the  patellar articular cartilage with subtle subchondral edema-like marrow  signal intensity at the patellar median ridge. High-grade chondral  fissure/loss of the medial trochlea with subchondral bone plate  contour irregularity.     Medial compartment: Signal heterogeneity with moderate chondral loss  with possible delamination in the central weightbearing surface of the  medial femoral condyle.     Lateral compartment: No  high-grade hyaline cartilage disease.     ANCILLARY FINDINGS  None.                                                                      Impression:  1. Query subtle meniscal tearing at the body and posterior horn, which  may be degenerative rather than posttraumatic.  2. Presumed anterior soft tissue hematoma.  3. Grade IV chondromalacia of patellofemoral compartment and grade  II/III chondromalacia of the medial compartment.     KNEE RIGHT THREE VIEW  DATE/TIME: 4/29/2022 9:50 AM     INDICATION: Acute pain of right knee.  COMPARISON: 4/22/2022                                                                      IMPRESSION: Anatomic alignment right knee. No acute displaced right  knee fracture. Unchanged mild medial and patellofemoral compartment  right knee osteoarthritis. No significant knee joint effusion.  Anterior knee soft tissue swelling.    Large Joint Injection/Arthocentesis: R greater trochanteric bursa    Date/Time: 5/23/2025 2:29 PM    Performed by: Francisco Priest DO  Authorized by: Francisco Priest DO    Indications:  Pain  Needle Size:  25 G  Guidance: landmark guided    Approach:  Posterolateral  Location:  Hip      Site:  R greater trochanteric bursa  Medications:  2 mL lidocaine 1 %; 40 mg triamcinolone 40 MG/ML; 2 mL BUPivacaine 0.5 %  Outcome:  Tolerated well, no immediate complications  Procedure discussed: discussed risks, benefits, and alternatives    Consent Given by:  Patient  Timeout: timeout called immediately prior to procedure    Prep: patient was prepped and draped in usual sterile fashion        Assessment:  1. Right hip pain    2. Primary osteoarthritis of both knees    3. Trochanteric bursitis of right hip    4. Gluteal tendinitis of right buttock    5. Bilateral chronic knee pain        Plan:  Discussed the assessment with the patient.  Follow up: 3 weeks for close follow up and plan for viscosupplementation trial in both knees  Chronic b/l knee pain, known  underlying DJD  Worsened recently on the L>R from a fall, contusion pain improving but having ongoing DJD knee pain b/l L>R  Has had relief from CSI in the past  Reviewed wt loss, activity modification and progressive increase in activity as tolerated and guided by pain  Reviewed options for potential steroid vs viscosupplementation injections and the possibility for future orthopedic referral prn  Reviewed safe and appropriate OTC medication choices, try tylenol first  Up to 3000mg daily of tylenol is generally safe, NSAID dosing and duration limitations reviewed  Discussed nature of degenerative arthrosis of the knee.   Discussed symptom treatment with ice or heat, topical treatments, and rest if needed.   She would like to trial viscosupplementation, pre-authorization submitted today and plan for next visit in 3 weeks if approved  Acute right hip pain does seem to be compensatory  XR images independently visualized and reviewed with patient today in clinic  Mild DJD present, but does not seem as contributory clinically  Most focal pain in gluteal muscles and over right GTB  After review of the alternatives, relative risks, goals and limitations of corticosteroid injection, the patient elected to proceed with CSI to the right trochanteric bursa today  PT options reviewed as well as assistive devices and low impact activity strategies  Expectations and goals of CSI reviewed  Often 2-3 days for steroid effect, and can take up to two weeks for maximum effect  We discussed modified progressive pain-free activity as tolerated  Do not overuse in first two weeks if feeling better due to concern for vulnerability while steroid is working  Supportive care reviewed  All questions were answered today  Contact us with additional questions or concerns  Signs and sx of concern reviewed      Francisco Priest DO, CATHIE  Sports Medicine Physician  [facility]      Time spent in chart review,  one-on-one evaluation, discussion with patient regarding: nature of problem, clinical course, prior treatments, therapeutic options, shared-decision making, potential procedures and referrals, and charting related to the visit: 38 minutes.  If applicable, time does not include time spent performing any procedure.        Disclaimer: This note consists of symbols derived from keyboarding, dictation and/or voice recognition software. As a result, there may be errors in the script that have gone undetected. Please consider this when interpreting information found in this chart.

## 2025-05-23 NOTE — LETTER
2025      Jessica Jay  [address] [facility] [address]      Dear Colleague,    Thank you for referring your patient, Jessica Jay, to the [facility]. Please see a copy of my visit note below.    Jessica Jay  :  1960  DOS: 2025  MRN: [id number]    Sports Medicine Clinic Visit    PCP: Marybeth Silver MD   Jessica Jay is a 64 year old female who is seen as a self referralpresenting with right hip pain.     Injury: Patient describes injury as she fell on her left knee. Feels she has been overcompensating for her knee that now her right hip began to hurt. 1.5 week(s) ago.  Pain located over lateral hip. Radiating pain down the thigh only when she lays down. Additional Features:  Positive: instability and weakness. Denies numbness and tingling. Symptoms are better with laying down.  Symptoms are worse with: moving, laying on her side, everything. Other evaluation and/or treatments so far consists of: Heat and Rest.  Recent imaging completed: No recent imaging completed.  Prior History of related problems: History of DDD of LB. Epidural injections.     Social History: paraprofessional     Review of Systems  Musculoskeletal: as above  Remainder of review of systems is negative including constitutional, CV, pulmonary, GI, Skin and Neurologic except as noted in HPI or medical history.    Past Medical History:   Diagnosis Date     Cellulitis and abscess of foot, except toes 2010    [facility]     Chronic daily headache 02/15/2011     Complete rupture of rotator cuff 2009     Degenerative disc disease     cervical     Depression, major      Depressive disorder      Diabetes mellitus, type 2 (H)      Dizziness - light-headed 02/15/2011     GERD (gastroesophageal reflux disease)      Hypertension goal BP (blood pressure) < 140/90      LBP (low back pain)      Panic attacks      Seasonal allergies      Vulvar dermatitis  "07/17/2012     Past Surgical History:   Procedure Laterality Date     ARTHROSCOPY SHOULDER  01/01/1990    Right/spurs     CARPAL TUNNEL RELEASE RT/LT  01/01/1990    Left     COLONOSCOPY  01/01/2016     COLONOSCOPY N/A 04/29/2024    Procedure: Colonoscopy with polypectomy and biopsies;  Surgeon: Ruben Keller MD;  Location: [facility]     CRANIOTOMY, EXCISE TUMOR COMPLEX, COMBINED  05/09/2011    Procedure:COMBINED CRANIOTOMY, EXCISE TUMOR COMPLEX; 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; Surgeon:LEE ANN PAULA; Location:[facility]     ROTATOR CUFF REPAIR RT/LT  01/01/2009     Left     TRANSPLANT       [facility] FOOT/TOES SURGERY PROC UNLISTED  01/01/1975    Toe fracture, left     Family History   Problem Relation Age of Onset     Macular Degeneration Mother         Dry     Hypertension Mother      C.A.D. Father      Cancer Maternal Grandmother      Cerebrovascular Disease Paternal Grandmother      Breast Cancer Paternal Grandmother      Cancer Brother      Cancer - colorectal Brother      Colon Cancer Brother      Diabetes Brother      Neurologic Disorder Sister         migraine     Glaucoma No family hx of        Objective  Ht 1.549 m (5' 1\")   Wt 103.9 kg (229 lb)   LMP  (LMP Unknown)   BMI 43.27 kg/m      General: healthy, alert and in no distress    HEENT: no scleral icterus or conjunctival erythema   Skin: no suspicious lesions or rash. No jaundice.   CV: regular rhythm by palpation, 2+ distal pulses, no pedal edema    Resp: normal respiratory effort without conversational dyspnea   Psych: normal mood and affect    Gait: mildly antalgic, appropriate coordination and balance   Neuro: normal light touch sensory exam of the extremities. Motor strength as noted below     Right hip exam    Inspection:        no edema or ecchymosis in hip area    ROM:       Decreased terminal active and passive ROM due to pain, worst with ER, flexion    Strength:        flexion 5/5       extension 5/5       abduction " 5/5       adduction 5/5    Tender:        greater trochanter       SI joint       Gluteal muscles and tendons    Non Tender:        remainder of hip area       illiac crest       ASIS       Anterior hip    Sensation:        grossly intact in hip and thigh    Skin:       well perfused       capillary refill brisk    Special Tests:        neg (-) VELIA       neg (-) FADIR       neg (-) scour       positive (+) Halley       Neg SLR and Slump    Bilateral Knee exam    ROM:        Flexion 120 degrees b/l, more painful on the left       Extension -2 degrees       Range of motion limited by pain on the left    Inspection:       no visible ecchymosis        effusion noted trace right, small left    Skin:       no visible deformities       well perfused       capillary refill brisk    Patellar Motion:        Crepitus noted in the patellofemoral joint L>R    Tender:        lateral patellar border L>R       medial joint line L>R       lateral joint line L    Non Tender:         remainder of knee area        along MCL        distal IT Band        infrapatellar tendon        tibial tubercle       pes anserine bursa    Special Tests:        neg (-) varus at 0 deg and 30 deg       neg (-) valgus at 0 deg and 30 deg    Evaluation of ipsilateral kinetic chain       B/l decreased quad tone and modest core stability      Radiology  Recent Results (from the past 744 hours)   XR Pelvis w Hip RT 1 View    Narrative    EXAM: XR PELVIS AND HIP RIGHT 1 VIEW  LOCATION: [facility]  DATE: 5/23/2025    INDICATION:  Right hip pain  COMPARISON: None.      Impression    IMPRESSION: Mild degenerative arthritis both hips. No acute fracture. Lower lumbar spondylosis.     EXAM: XR KNEE LEFT 3 VIEWS  [facility]  DATE: 10/29/2024     INDICATION: Left knee injury. Knee pain.  COMPARISON: None.                                                                      IMPRESSION: Anatomic alignment  left knee. No acute displaced left knee fracture. Moderate medial and moderate to severe patellofemoral compartment left knee osteoarthritis. Suspect several loose bodies in the left knee particularly posteriorly. Chronic   ossicle along the superior left patella. No sizable left knee joint effusion. No significant anterior left knee soft tissue swelling.     MR right knee without contrast 5/5/2022 2:02 PM     Techniques: Multiplanar multisequence imaging of the right knee was  obtained without administration of intra-articular or intravenous  contrast using routing protocol.     History: Knee trauma, meniscal/ligament injury suspected, xray done  (Age >= 1y); Acute pain of right knee     Comparison: 4/29/2022     Findings:     Motion partially degrading images.     MENISCI:  Medial meniscus: Subtle signal abnormality violating free edge/femoral  articular surface of the medial meniscal body and also tibial  articular surface of the posterior horn, possible tearing. Otherwise,  no tear.     Lateral meniscus: Intact.     LIGAMENTS  Cruciate ligaments: Intact.  Medial supporting structures: Intact. Thickening of proximal tibial  collateral ligament, likely from remote trauma.  Lateral supporting structures: Intact.     EXTENSOR MECHANISM  Intact.     FLUID  No joint effusion. Trace Jarvis's cyst. Loculated, internally septated  heterogeneous fluid collection anteriorly along the patellar tendon  and lateral retinaculum with perilesional edema/leakage of fluid,  likely hematoma in the provided clinical setting of recent trauma.     OSSEOUS and ARTICULAR STRUCTURES  Bones: No fracture, contusion, or osseous lesion is seen. 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     Patellofemoral compartment: Diffuse chondral thinning/loss of the  patellar articular cartilage with subtle subchondral edema-like marrow  signal intensity at the patellar median ridge. High-grade  chondral  fissure/loss of the medial trochlea with subchondral bone plate  contour irregularity.     Medial compartment: Signal heterogeneity with moderate chondral loss  with possible delamination in the central weightbearing surface of the  medial femoral condyle.     Lateral compartment: No high-grade hyaline cartilage disease.     ANCILLARY FINDINGS  None.                                                                      Impression:  1. Query subtle meniscal tearing at the body and posterior horn, which  may be degenerative rather than posttraumatic.  2. Presumed anterior soft tissue hematoma.  3. Grade IV chondromalacia of patellofemoral compartment and grade  II/III chondromalacia of the medial compartment.     KNEE RIGHT THREE VIEW  DATE/TIME: 4/29/2022 9:50 AM     INDICATION: Acute pain of right knee.  COMPARISON: 4/22/2022                                                                      IMPRESSION: Anatomic alignment right knee. No acute displaced right  knee fracture. Unchanged mild medial and patellofemoral compartment  right knee osteoarthritis. No significant knee joint effusion.  Anterior knee soft tissue swelling.    Large Joint Injection/Arthocentesis: R greater trochanteric bursa    Date/Time: 5/23/2025 2:29 PM    Performed by: Francisco Priest DO  Authorized by: Francisco Priest DO    Indications:  Pain  Needle Size:  25 G  Guidance: landmark guided    Approach:  Posterolateral  Location:  Hip      Site:  R greater trochanteric bursa  Medications:  2 mL lidocaine 1 %; 40 mg triamcinolone 40 MG/ML; 2 mL BUPivacaine 0.5 %  Outcome:  Tolerated well, no immediate complications  Procedure discussed: discussed risks, benefits, and alternatives    Consent Given by:  Patient  Timeout: timeout called immediately prior to procedure    Prep: patient was prepped and draped in usual sterile fashion        Assessment:  1. Right hip pain    2. Primary osteoarthritis of both knees    3.  Trochanteric bursitis of right hip    4. Gluteal tendinitis of right buttock    5. Bilateral chronic knee pain        Plan:  Discussed the assessment with the patient.  Follow up: 3 weeks for close follow up and plan for viscosupplementation trial in both knees  Chronic b/l knee pain, known underlying DJD  Worsened recently on the L>R from a fall, contusion pain improving but having ongoing DJD knee pain b/l L>R  Has had relief from CSI in the past  Reviewed wt loss, activity modification and progressive increase in activity as tolerated and guided by pain  Reviewed options for potential steroid vs viscosupplementation injections and the possibility for future orthopedic referral prn  Reviewed safe and appropriate OTC medication choices, try tylenol first  Up to 3000mg daily of tylenol is generally safe, NSAID dosing and duration limitations reviewed  Discussed nature of degenerative arthrosis of the knee.   Discussed symptom treatment with ice or heat, topical treatments, and rest if needed.   She would like to trial viscosupplementation, pre-authorization submitted today and plan for next visit in 3 weeks if approved  Acute right hip pain does seem to be compensatory  XR images independently visualized and reviewed with patient today in clinic  Mild DJD present, but does not seem as contributory clinically  Most focal pain in gluteal muscles and over right GTB  After review of the alternatives, relative risks, goals and limitations of corticosteroid injection, the patient elected to proceed with CSI to the right trochanteric bursa today  PT options reviewed as well as assistive devices and low impact activity strategies  Expectations and goals of CSI reviewed  Often 2-3 days for steroid effect, and can take up to two weeks for maximum effect  We discussed modified progressive pain-free activity as tolerated  Do not overuse in first two weeks if feeling better due to concern for vulnerability while steroid is  working  Supportive care reviewed  All questions were answered today  Contact us with additional questions or concerns  Signs and sx of concern reviewed      Francisco Priest DO, CAQ  Sports Medicine Physician  [facility]      Time spent in chart review, one-on-one evaluation, discussion with patient regarding: nature of problem, clinical course, prior treatments, therapeutic options, shared-decision making, potential procedures and referrals, and charting related to the visit: 38 minutes.  If applicable, time does not include time spent performing any procedure.        Disclaimer: This note consists of symbols derived from keyboarding, dictation and/or voice recognition software. As a result, there may be errors in the script that have gone undetected. Please consider this when interpreting information found in this chart.      Again, thank you for allowing me to participate in the care of your patient.        Sincerely,        Francisco Priest DO    Electronically signed

## 2025-05-26 RX ORDER — BUPIVACAINE HYDROCHLORIDE 5 MG/ML
2 INJECTION, SOLUTION PERINEURAL
Status: COMPLETED | OUTPATIENT
Start: 2025-05-23 | End: 2025-05-23

## 2025-05-26 RX ORDER — TRIAMCINOLONE ACETONIDE 40 MG/ML
40 INJECTION, SUSPENSION INTRA-ARTICULAR; INTRAMUSCULAR
Status: COMPLETED | OUTPATIENT
Start: 2025-05-23 | End: 2025-05-23

## 2025-05-26 RX ORDER — LIDOCAINE HYDROCHLORIDE 10 MG/ML
2 INJECTION, SOLUTION INFILTRATION; PERINEURAL
Status: COMPLETED | OUTPATIENT
Start: 2025-05-23 | End: 2025-05-23

## 2025-06-13 DIAGNOSIS — E66.813 CLASS 3 SEVERE OBESITY DUE TO EXCESS CALORIES WITH SERIOUS COMORBIDITY AND BODY MASS INDEX (BMI) OF 40.0 TO 44.9 IN ADULT (H): ICD-10-CM

## 2025-06-13 DIAGNOSIS — E11.65 TYPE 2 DIABETES MELLITUS WITH HYPERGLYCEMIA, WITHOUT LONG-TERM CURRENT USE OF INSULIN (H): ICD-10-CM

## 2025-06-16 DIAGNOSIS — E11.65 TYPE 2 DIABETES MELLITUS WITH HYPERGLYCEMIA, WITHOUT LONG-TERM CURRENT USE OF INSULIN (H): Primary | ICD-10-CM

## 2025-06-16 DIAGNOSIS — E66.813 CLASS 3 SEVERE OBESITY DUE TO EXCESS CALORIES WITH SERIOUS COMORBIDITY AND BODY MASS INDEX (BMI) OF 40.0 TO 44.9 IN ADULT (H): ICD-10-CM

## 2025-06-16 RX ORDER — SEMAGLUTIDE 0.68 MG/ML
INJECTION, SOLUTION SUBCUTANEOUS
Qty: 6 ML | Refills: 0 | OUTPATIENT
Start: 2025-06-16

## 2025-06-28 DIAGNOSIS — F33.1 MODERATE EPISODE OF RECURRENT MAJOR DEPRESSIVE DISORDER (H): ICD-10-CM

## 2025-06-28 DIAGNOSIS — F41.1 GAD (GENERALIZED ANXIETY DISORDER): ICD-10-CM

## 2025-06-30 RX ORDER — DULOXETIN HYDROCHLORIDE 60 MG/1
120 CAPSULE, DELAYED RELEASE ORAL DAILY
Qty: 180 CAPSULE | Refills: 0 | Status: SHIPPED | OUTPATIENT
Start: 2025-06-30

## 2025-07-09 ENCOUNTER — THERAPY VISIT (OUTPATIENT)
Dept: PHYSICAL THERAPY | Facility: CLINIC | Age: 65
End: 2025-07-09
Attending: FAMILY MEDICINE
Payer: COMMERCIAL

## 2025-07-09 ENCOUNTER — OFFICE VISIT (OUTPATIENT)
Dept: ORTHOPEDICS | Facility: CLINIC | Age: 65
End: 2025-07-09
Payer: COMMERCIAL

## 2025-07-09 DIAGNOSIS — M25.562 ACUTE PAIN OF LEFT KNEE: ICD-10-CM

## 2025-07-09 DIAGNOSIS — M25.551 RIGHT HIP PAIN: Primary | ICD-10-CM

## 2025-07-09 DIAGNOSIS — M16.11 PRIMARY OSTEOARTHRITIS OF RIGHT HIP: ICD-10-CM

## 2025-07-09 DIAGNOSIS — R26.9 IMPAIRED GAIT: ICD-10-CM

## 2025-07-09 DIAGNOSIS — M17.0 PRIMARY OSTEOARTHRITIS OF BOTH KNEES: ICD-10-CM

## 2025-07-09 DIAGNOSIS — M25.551 RIGHT HIP PAIN: ICD-10-CM

## 2025-07-09 DIAGNOSIS — M76.01 GLUTEAL TENDINITIS OF RIGHT BUTTOCK: ICD-10-CM

## 2025-07-09 PROCEDURE — 97161 PT EVAL LOW COMPLEX 20 MIN: CPT | Mod: GP

## 2025-07-09 PROCEDURE — 99214 OFFICE O/P EST MOD 30 MIN: CPT | Mod: 25 | Performed by: FAMILY MEDICINE

## 2025-07-09 PROCEDURE — 20611 DRAIN/INJ JOINT/BURSA W/US: CPT | Mod: LT | Performed by: FAMILY MEDICINE

## 2025-07-09 PROCEDURE — 97110 THERAPEUTIC EXERCISES: CPT | Mod: GP

## 2025-07-09 RX ORDER — TRIAMCINOLONE ACETONIDE 40 MG/ML
40 INJECTION, SUSPENSION INTRA-ARTICULAR; INTRAMUSCULAR
Status: COMPLETED | OUTPATIENT
Start: 2025-07-09 | End: 2025-07-09

## 2025-07-09 RX ORDER — ROPIVACAINE HYDROCHLORIDE 5 MG/ML
3 INJECTION, SOLUTION EPIDURAL; INFILTRATION; PERINEURAL
Status: COMPLETED | OUTPATIENT
Start: 2025-07-09 | End: 2025-07-09

## 2025-07-09 RX ADMIN — ROPIVACAINE HYDROCHLORIDE 3 ML: 5 INJECTION, SOLUTION EPIDURAL; INFILTRATION; PERINEURAL at 09:34

## 2025-07-09 RX ADMIN — TRIAMCINOLONE ACETONIDE 40 MG: 40 INJECTION, SUSPENSION INTRA-ARTICULAR; INTRAMUSCULAR at 09:34

## 2025-07-09 ASSESSMENT — ACTIVITIES OF DAILY LIVING (ADL)
STAND: ACTIVITY IS MINIMALLY DIFFICULT
KNEEL ON THE FRONT OF YOUR KNEE: ACTIVITY IS VERY DIFFICULT
PAIN: THE SYMPTOM AFFECTS MY ACTIVITY SLIGHTLY
HOW_WOULD_YOU_RATE_THE_CURRENT_FUNCTION_OF_YOUR_KNEE_DURING_YOUR_USUAL_DAILY_ACTIVITIES_ON_A_SCALE_FROM_0_TO_100_WITH_100_BEING_YOUR_LEVEL_OF_KNEE_FUNCTION_PRIOR_TO_YOUR_INJURY_AND_0_BEING_THE_INABILITY_TO_PERFORM_ANY_OF_YOUR_USUAL_DAILY_ACTIVITIES?: 25
SIT WITH YOUR KNEE BENT: ACTIVITY IS MINIMALLY DIFFICULT
AS_A_RESULT_OF_YOUR_KNEE_INJURY,_HOW_WOULD_YOU_RATE_YOUR_CURRENT_LEVEL_OF_DAILY_ACTIVITY?: NEARLY NORMAL
HOW_WOULD_YOU_RATE_THE_CURRENT_FUNCTION_OF_YOUR_KNEE_DURING_YOUR_USUAL_DAILY_ACTIVITIES_ON_A_SCALE_FROM_0_TO_100_WITH_100_BEING_YOUR_LEVEL_OF_KNEE_FUNCTION_PRIOR_TO_YOUR_INJURY_AND_0_BEING_THE_INABILITY_TO_PERFORM_ANY_OF_YOUR_USUAL_DAILY_ACTIVITIES?: 25
SIT WITH YOUR KNEE BENT: ACTIVITY IS MINIMALLY DIFFICULT
GIVING WAY, BUCKLING OR SHIFTING OF KNEE: THE SYMPTOM AFFECTS MY ACTIVITY SLIGHTLY
WEAKNESS: THE SYMPTOM AFFECTS MY ACTIVITY SLIGHTLY
RAW_SCORE: 38
GO DOWN STAIRS: ACTIVITY IS FAIRLY DIFFICULT
HOW_WOULD_YOU_RATE_THE_OVERALL_FUNCTION_OF_YOUR_KNEE_DURING_YOUR_USUAL_DAILY_ACTIVITIES?: ABNORMAL
GO DOWN STAIRS: ACTIVITY IS FAIRLY DIFFICULT
GIVING WAY, BUCKLING OR SHIFTING OF KNEE: THE SYMPTOM AFFECTS MY ACTIVITY SLIGHTLY
HOW_WOULD_YOU_RATE_THE_OVERALL_FUNCTION_OF_YOUR_KNEE_DURING_YOUR_USUAL_DAILY_ACTIVITIES?: ABNORMAL
LIMPING: THE SYMPTOM AFFECTS MY ACTIVITY MODERATELY
KNEE_ACTIVITY_OF_DAILY_LIVING_SCORE: 54.29
STIFFNESS: THE SYMPTOM AFFECTS MY ACTIVITY SLIGHTLY
STIFFNESS: THE SYMPTOM AFFECTS MY ACTIVITY SLIGHTLY
RISE FROM A CHAIR: ACTIVITY IS SOMEWHAT DIFFICULT
KNEEL ON THE FRONT OF YOUR KNEE: ACTIVITY IS VERY DIFFICULT
LIMPING: THE SYMPTOM AFFECTS MY ACTIVITY MODERATELY
KNEE_ACTIVITY_OF_DAILY_LIVING_SUM: 38
SQUAT: ACTIVITY IS VERY DIFFICULT
SWELLING: I HAVE THE SYMPTOM BUT IT DOES NOT AFFECT MY ACTIVITY
SWELLING: I HAVE THE SYMPTOM BUT IT DOES NOT AFFECT MY ACTIVITY
WALK: ACTIVITY IS SOMEWHAT DIFFICULT
WALK: ACTIVITY IS SOMEWHAT DIFFICULT
STAND: ACTIVITY IS MINIMALLY DIFFICULT
AS_A_RESULT_OF_YOUR_KNEE_INJURY,_HOW_WOULD_YOU_RATE_YOUR_CURRENT_LEVEL_OF_DAILY_ACTIVITY?: NEARLY NORMAL
WEAKNESS: THE SYMPTOM AFFECTS MY ACTIVITY SLIGHTLY
SQUAT: ACTIVITY IS VERY DIFFICULT
RISE FROM A CHAIR: ACTIVITY IS SOMEWHAT DIFFICULT
GO UP STAIRS: ACTIVITY IS FAIRLY DIFFICULT
GO UP STAIRS: ACTIVITY IS FAIRLY DIFFICULT
PAIN: THE SYMPTOM AFFECTS MY ACTIVITY SLIGHTLY
PLEASE_INDICATE_YOR_PRIMARY_REASON_FOR_REFERRAL_TO_THERAPY:: KNEE

## 2025-07-09 NOTE — PROGRESS NOTES
"PHYSICAL THERAPY EVALUATION  Type of Visit: Evaluation        Fall Risk Screen:  Have you fallen 2 or more times in the past year?: Yes  Have you fallen and had an injury in the past year?: Yes      Subjective         Presenting condition or subjective complaint: had cortisone shot in hip last month and a cortisone shot in knee today  Date of onset: 06/01/25    Relevant medical history: Arthritis; Depression; Diabetes   Dates & types of surgery: foot 1975 carpal tunnel 80s or 90s    Prior diagnostic imaging/testing results: X-ray     Prior therapy history for the same diagnosis, illness or injury: No      Pt presents to therapy today with complaints of R hip and L knee pain. L knee was pain free due to cortisone injection this morning (7/9/25). Fall last year at school resulted in injured L knee in which her R hip started to hurt this June as she was avoiding the L side. She received a cortisone shot in her R hip which improved it, but still had remaining pain that has been very \"annoying\" since. She started to use a SPC when her hip pain came on.     Prior Level of Function  Ambulation: Independent    Living Environment  Social support: Alone   Type of home: [facility]; 2-story   Stairs to enter the home: Yes 14 Is there a railing: Yes     Ramp: No   Stairs inside the home: Yes 14 Is there a railing: Yes     Help at home: Medication and/or finances; Home and Yard maintenance tasks  Equipment owned: XTRM Cane     Employment: No    Hobbies/Interests: reading watching sports history    Patient goals for therapy: get up and down stairs with less pain       Objective   KNEE EVALUATION  PAIN: R knee is still numb from injection today  ROM: AROM WNL  STRENGTH: WNL  R knee ext provoking of pain  FLEXIBILITY: B hamstrings WNL       HIP EVALUATION  PAIN: Pain Level at Rest: 6/10  Pain Level with Use: 9/10  Pain Location: R lateral hip   Pain Quality: Aching, Dull, and Nagging  Pain Frequency: intermittent  Pain is Worst: " n/a  Pain is Exacerbated By: stairs, walking, lying on her side  Pain is Relieved By: rest and whirlpool relaxes muscles for a short period of time   Pain Progression: improved slightly after injection, pain has remained unchanged since then  GAIT:   Assistive Device(s): Cane (single end)  Gait Deviations: Antalgic  ROM: B hip flexion WNL; L hip IR/ER limited with pain; R hip IR limited with pain; R hip abd 14 deg compared to L hip abd 30 deg    STRENGTH:   Pain: - none + mild ++ moderate +++ severe  Strength Scale: 0-5/5 Left Right   Hip Flexion 4 4   Hip Abduction - tested in supine d/t pain in side lying 2+ 2   Hip Adduction 4 4   Hip Internal Rotation 3, + (mild) 3   Hip External Rotation 3 3, ++ (mod)    Knee Flexion 4 4   Knee Extension 4 3+, + (mild)   SPECIAL TESTS:    Left Right   VELIA Negative  Negative    FADIR/Labrum/SRIDEVI Negative  Negative    Scour Negative  Negative    PALPATION: TTP bilateral IT bands, bilateral greater trochanter, R glute med, min, and max, significant tightness on R side as well    Assessment & Plan   CLINICAL IMPRESSIONS  Medical Diagnosis: M25.551 (ICD-10-CM) - Right hip pain  M17.0 (ICD-10-CM) - Primary osteoarthritis of both knees  M76.01 (ICD-10-CM) - Gluteal tendinitis of right buttock  M25.562 (ICD-10-CM) - Acute pain of left knee  M16.11 (ICD-10-CM) - Primary osteoarthritis of right hip  R26.9 (ICD-10-CM) - Impaired gait    Treatment Diagnosis: R hip pain; L knee pain   Impression/Assessment: Patient is a 64 year old female with R hip and L knee complaints.  The following significant findings have been identified: Pain, Decreased ROM/flexibility, Decreased strength, Impaired balance, and Impaired gait. These impairments interfere with their ability to perform self care tasks, work tasks, recreational activities, household mobility, and community mobility as compared to previous level of function.     Clinical Decision Making (Complexity):  Clinical Presentation:  Stable/Uncomplicated  Clinical Presentation Rationale: based on medical and personal factors listed in PT evaluation  Clinical Decision Making (Complexity): Low complexity    PLAN OF CARE  Treatment Interventions:  Modalities: as indicated  Interventions: Gait Training, Manual Therapy, Neuromuscular Re-education, Therapeutic Activity, Therapeutic Exercise    Long Term Goals     PT Goal 1  Goal Identifier: 1  Goal Description: Pt will be able to navigate a full flight of stairs with pain of less or equal to 3/10 to be more functional at home  Rationale: to maximize safety and independence with performance of ADLs and functional tasks;to maximize safety and independence within the home;to maximize safety and independence within the community  Target Date: 09/07/25  PT Goal 2  Goal Identifier: 2  Goal Description: Pt will be able to ambulate 1000' independently and without use of AD in order to return to work  Rationale: to maximize safety and independence with performance of ADLs and functional tasks;to maximize safety and independence within the community  Target Date: 09/07/25      Frequency of Treatment: 10 visits  Duration of Treatment: 6 months    Recommended Referrals to Other Professionals: as indicated  Education Assessment:   Learner/Method: Patient;Demonstration;No Barriers to Learning;Listening    Risks and benefits of evaluation/treatment have been explained.   Patient/Family/caregiver agrees with Plan of Care.     Evaluation Time:     PT Eval, Low Complexity Minutes (96233): 15  Evaluation Only     Signing Clinician: Klarissa Guerra, PT        [facility]                                                                                   OUTPATIENT PHYSICAL THERAPY      PLAN OF TREATMENT FOR OUTPATIENT REHABILITATION   Patient's Last Name, First Name, Jessica Cazares YOB: 1960   Provider's Name   [facility]   Medical  Record No.  [id number]     Onset Date: 06/01/25  Start of Care Date: 07/09/25     Medical Diagnosis:  M25.551 (ICD-10-CM) - Right hip pain  M17.0 (ICD-10-CM) - Primary osteoarthritis of both knees  M76.01 (ICD-10-CM) - Gluteal tendinitis of right buttock  M25.562 (ICD-10-CM) - Acute pain of left knee  M16.11 (ICD-10-CM) - Primary osteoarthritis of right hip  R26.9 (ICD-10-CM) - Impaired gait      PT Treatment Diagnosis:  R hip pain; L knee pain Plan of Treatment  Frequency/Duration: 10 visits/ 6 months    Certification date from 07/09/25 to 10/06/25         See note for plan of treatment details and functional goals     Klarissa Guerra, PT                         I CERTIFY THE NEED FOR THESE SERVICES FURNISHED UNDER        THIS PLAN OF TREATMENT AND WHILE UNDER MY CARE     (Physician attestation of this document indicates review and certification of the therapy plan).              Referring Provider:  Francisco Priest    Initial Assessment  See Epic Evaluation- Start of Care Date: 07/09/25

## 2025-07-09 NOTE — PROGRESS NOTES
Jessica Jay  :  1960  DOS: 25  MRN: [id number]    Sports Medicine Clinic Visit    PCP: Marybeth Silver MD   Jessica Jay is a 64 year old female who is seen as a self referralpresenting with right hip pain.     Injury: Patient describes injury as she fell on her left knee. Feels she has been overcompensating for her knee that now her right hip began to hurt. 1.5 week(s) ago.  Pain located over lateral hip. Radiating pain down the thigh only when she lays down. Additional Features:  Positive: instability and weakness. Denies numbness and tingling. Symptoms are better with laying down.  Symptoms are worse with: moving, laying on her side, everything. Other evaluation and/or treatments so far consists of: Heat and Rest.  Recent imaging completed: No recent imaging completed.  Prior History of related problems: History of DDD of LB. Epidural injections.     Social History: paraprofessional     Interim History - 2025  Since last visit on 2025 patient has moderate right lateral hip pain & tenderness.  Right hip trochanteric bursa injection completed on 2025 provided ~ 80% relief.  She notes most pain with walking and lying on right hip.  Denies any deep anterior hip/groin pain with radiating pain/numbness to her anterior thigh.  No new injury in the interim.    Review of Systems  Musculoskeletal: as above  Remainder of review of systems is negative including constitutional, CV, pulmonary, GI, Skin and Neurologic except as noted in HPI or medical history.    Past Medical History:   Diagnosis Date    Cellulitis and abscess of foot, except toes 2010    [facility]    Chronic daily headache 02/15/2011    Complete rupture of rotator cuff 2009    Degenerative disc disease     cervical    Depression, major     Depressive disorder     Diabetes mellitus, type 2 (H)     Dizziness - light-headed 02/15/2011    GERD (gastroesophageal reflux disease)     Hypertension goal BP  (blood pressure) < 140/90     LBP (low back pain)     Panic attacks     Seasonal allergies     Vulvar dermatitis 07/17/2012     Past Surgical History:   Procedure Laterality Date    ARTHROSCOPY SHOULDER  01/01/1990    Right/spurs    CARPAL TUNNEL RELEASE RT/LT  01/01/1990    Left    COLONOSCOPY  01/01/2016    COLONOSCOPY N/A 04/29/2024    Procedure: Colonoscopy with polypectomy and biopsies;  Surgeon: Ruben Keller MD;  Location: [facility]    CRANIOTOMY, EXCISE TUMOR COMPLEX, COMBINED  05/09/2011    Procedure:COMBINED CRANIOTOMY, EXCISE TUMOR COMPLEX; 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; Surgeon:LEE ANN PAULA; Location:[facility]    ROTATOR CUFF REPAIR RT/LT  01/01/2009     Left    TRANSPLANT      ZZC FOOT/TOES SURGERY PROC UNLISTED  01/01/1975    Toe fracture, left     Family History   Problem Relation Age of Onset    Macular Degeneration Mother         Dry    Hypertension Mother     C.A.D. Father     Cancer Maternal Grandmother     Cerebrovascular Disease Paternal Grandmother     Breast Cancer Paternal Grandmother     Cancer Brother     Cancer - colorectal Brother     Colon Cancer Brother     Diabetes Brother     Neurologic Disorder Sister         migraine    Glaucoma No family hx of        Objective  LMP  (LMP Unknown)     General: healthy, alert and in no distress    HEENT: no scleral icterus or conjunctival erythema   Skin: no suspicious lesions or rash. No jaundice.   CV: regular rhythm by palpation, 2+ distal pulses, no pedal edema    Resp: normal respiratory effort without conversational dyspnea   Psych: normal mood and affect    Gait: mildly antalgic, appropriate coordination and balance   Neuro: normal light touch sensory exam of the extremities. Motor strength as noted below     Right hip exam    Inspection:        no edema or ecchymosis in hip area    ROM:       Decreased terminal active and passive ROM due to pain, worst with ER, flexion    Strength:        flexion 5/5        extension 5/5       abduction 5/5       adduction 5/5    Tender:        greater trochanter       SI joint minimal       Gluteal muscles and tendons moderately improved    Non Tender:        remainder of hip area       illiac crest       ASIS       Anterior hip    Sensation:        grossly intact in hip and thigh    Skin:       well perfused       capillary refill brisk    Special Tests:        neg (-) VELIA       positive FADIR       Seemingly neg (-) scour, painful log roll       positive (+) Halley       Neg SLR and Slump    Bilateral Knee exam    ROM:        Flexion 120 degrees b/l, more painful on the left       Extension -2 degrees       Range of motion limited by pain on the left    Inspection:       no visible ecchymosis        effusion noted trace right and left by palpation    Skin:       no visible deformities       well perfused       capillary refill brisk    Patellar Motion:        Crepitus noted in the patellofemoral joint L>R    Tender:        lateral patellar border L>R       medial joint line L>R, focal left       lateral joint line L    Non Tender:         remainder of knee area        along MCL        distal IT Band        infrapatellar tendon        tibial tubercle       pes anserine bursa    Special Tests:        neg (-) varus at 0 deg and 30 deg       neg (-) valgus at 0 deg and 30 deg    Evaluation of ipsilateral kinetic chain       B/l decreased quad tone and modest core stability      Radiology  Recent Results (from the past 744 hours)   XR Pelvis w Hip RT 1 View    Narrative    EXAM: XR PELVIS AND HIP RIGHT 1 VIEW  LOCATION: [facility]  DATE: 5/23/2025    INDICATION:  Right hip pain  COMPARISON: None.      Impression    IMPRESSION: Mild degenerative arthritis both hips. No acute fracture. Lower lumbar spondylosis.     EXAM: XR KNEE LEFT 3 VIEWS  [facility]  DATE: 10/29/2024     INDICATION: Left knee injury. Knee  pain.  COMPARISON: None.                                                                      IMPRESSION: Anatomic alignment left knee. No acute displaced left knee fracture. Moderate medial and moderate to severe patellofemoral compartment left knee osteoarthritis. Suspect several loose bodies in the left knee particularly posteriorly. Chronic   ossicle along the superior left patella. No sizable left knee joint effusion. No significant anterior left knee soft tissue swelling.     MR right knee without contrast 5/5/2022 2:02 PM     Techniques: Multiplanar multisequence imaging of the right knee was  obtained without administration of intra-articular or intravenous  contrast using routing protocol.     History: Knee trauma, meniscal/ligament injury suspected, xray done  (Age >= 1y); Acute pain of right knee     Comparison: 4/29/2022     Findings:     Motion partially degrading images.     MENISCI:  Medial meniscus: Subtle signal abnormality violating free edge/femoral  articular surface of the medial meniscal body and also tibial  articular surface of the posterior horn, possible tearing. Otherwise,  no tear.     Lateral meniscus: Intact.     LIGAMENTS  Cruciate ligaments: Intact.  Medial supporting structures: Intact. Thickening of proximal tibial  collateral ligament, likely from remote trauma.  Lateral supporting structures: Intact.     EXTENSOR MECHANISM  Intact.     FLUID  No joint effusion. Trace Jarvis's cyst. Loculated, internally septated  heterogeneous fluid collection anteriorly along the patellar tendon  and lateral retinaculum with perilesional edema/leakage of fluid,  likely hematoma in the provided clinical setting of recent trauma.     OSSEOUS and ARTICULAR STRUCTURES  Bones: No fracture, contusion, or osseous lesion is seen. 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     Patellofemoral compartment: Diffuse chondral thinning/loss of the  patellar  articular cartilage with subtle subchondral edema-like marrow  signal intensity at the patellar median ridge. High-grade chondral  fissure/loss of the medial trochlea with subchondral bone plate  contour irregularity.     Medial compartment: Signal heterogeneity with moderate chondral loss  with possible delamination in the central weightbearing surface of the  medial femoral condyle.     Lateral compartment: No high-grade hyaline cartilage disease.     ANCILLARY FINDINGS  None.                                                                      Impression:  1. Query subtle meniscal tearing at the body and posterior horn, which  may be degenerative rather than posttraumatic.  2. Presumed anterior soft tissue hematoma.  3. Grade IV chondromalacia of patellofemoral compartment and grade  II/III chondromalacia of the medial compartment.     KNEE RIGHT THREE VIEW  DATE/TIME: 4/29/2022 9:50 AM     INDICATION: Acute pain of right knee.  COMPARISON: 4/22/2022                                                                      IMPRESSION: Anatomic alignment right knee. No acute displaced right  knee fracture. Unchanged mild medial and patellofemoral compartment  right knee osteoarthritis. No significant knee joint effusion.  Anterior knee soft tissue swelling.    Large Joint Injection/Arthocentesis: L knee joint    Date/Time: 7/9/2025 9:34 AM    Performed by: Francisco Priest DO  Authorized by: Francisco Priest DO    Indications:  Pain and osteoarthritis  Needle Size:  21 G  Guidance: ultrasound    Approach:  Superolateral  Location:  Knee      Medications:  40 mg triamcinolone 40 MG/ML; 3 mL ROPivacaine 5 MG/ML  Outcome:  Tolerated well, no immediate complications  Procedure discussed: discussed risks, benefits, and alternatives    Consent Given by:  Patient  Timeout: timeout called immediately prior to procedure    Prep: patient was prepped and draped in usual sterile fashion     Ultrasound images of  procedure were permanently stored.       Assessment:  1. Right hip pain    2. Primary osteoarthritis of both knees    3. Gluteal tendinitis of right buttock    4. Acute pain of left knee    5. Primary osteoarthritis of right hip    6. Impaired gait          Plan:  Discussed the assessment with the patient.  Follow up: 3 weeks for close follow up and plan for viscosupplementation trial in both knees in the future based on short term clinical progress  Chronic b/l knee pain, known underlying DJD  Worsened recently on the L>R from a fall, contusion pain improved but having ongoing DJD knee pain b/l L>>R  Has had relief from CSI in the past  Reviewed wt loss, activity modification and progressive increase in activity as tolerated and guided by pain  Reviewed options for potential steroid vs viscosupplementation injections and the possibility for future orthopedic referral prn  Reviewed safe and appropriate OTC medication choices, try tylenol first  Up to 3000mg daily of tylenol is generally safe, NSAID dosing and duration limitations reviewed  Discussed nature of degenerative arthrosis of the knee.   Discussed symptom treatment with ice or heat, topical treatments, and rest if needed.   She would like to trial viscosupplementation in the future, pre-authorization submitted and plan to revisit in the future based on short term clinical progress  After review of the alternatives, relative risks, goals and limitations of corticosteroid injection, the patient elected to proceed with US guided left knee CSI today  Acute right hip pain does seem to be compensatory, has both hip joint and lateral hip pain today, although reports overall improvement in right hip pain after GTB CSI last visit  XR images independently visualized and reviewed with patient today in clinic  PT options reviewed as well as assistive devices and low impact activity strategies, PT ordered  Expectations and goals of CSI reviewed  Often 2-3 days for  steroid effect, and can take up to two weeks for maximum effect  We discussed modified progressive pain-free activity as tolerated  Do not overuse in first two weeks if feeling better due to concern for vulnerability while steroid is working  Supportive care reviewed  All questions were answered today  Contact us with additional questions or concerns  Signs and sx of concern reviewed      Francisco Priest DO, CATHIE  Sports Medicine Physician  [facility]      Time spent in chart review, one-on-one evaluation, discussion with patient regarding: nature of problem, clinical course, prior treatments, therapeutic options, shared-decision making, potential procedures and referrals, and charting related to the visit: 34 minutes.  If applicable, time does not include time spent performing any procedure.        Disclaimer: This note consists of symbols derived from keyboarding, dictation and/or voice recognition software. As a result, there may be errors in the script that have gone undetected. Please consider this when interpreting information found in this chart.

## 2025-07-09 NOTE — LETTER
2025      Jessica Jay  [address] [facility] [address]      Dear Colleague,    Thank you for referring your patient, Jessica Jay, to the [facility]. Please see a copy of my visit note below.    Jessica Jay  :  1960  DOS: 25  MRN: [id number]    Sports Medicine Clinic Visit    PCP: Marybeth Silver MD   Jessica Jay is a 64 year old female who is seen as a self referralpresenting with right hip pain.     Injury: Patient describes injury as she fell on her left knee. Feels she has been overcompensating for her knee that now her right hip began to hurt. 1.5 week(s) ago.  Pain located over lateral hip. Radiating pain down the thigh only when she lays down. Additional Features:  Positive: instability and weakness. Denies numbness and tingling. Symptoms are better with laying down.  Symptoms are worse with: moving, laying on her side, everything. Other evaluation and/or treatments so far consists of: Heat and Rest.  Recent imaging completed: No recent imaging completed.  Prior History of related problems: History of DDD of LB. Epidural injections.     Social History: paraprofessional     Interim History - 2025  Since last visit on 2025 patient has moderate right lateral hip pain & tenderness.  Right hip trochanteric bursa injection completed on 2025 provided ~ 80% relief.  She notes most pain with walking and lying on right hip.  Denies any deep anterior hip/groin pain with radiating pain/numbness to her anterior thigh.  No new injury in the interim.    Review of Systems  Musculoskeletal: as above  Remainder of review of systems is negative including constitutional, CV, pulmonary, GI, Skin and Neurologic except as noted in HPI or medical history.    Past Medical History:   Diagnosis Date     Cellulitis and abscess of foot, except toes 2010    [facility]     Chronic daily headache 02/15/2011     Complete  rupture of rotator cuff 07/06/2009     Degenerative disc disease     cervical     Depression, major      Depressive disorder      Diabetes mellitus, type 2 (H)      Dizziness - light-headed 02/15/2011     GERD (gastroesophageal reflux disease)      Hypertension goal BP (blood pressure) < 140/90      LBP (low back pain)      Panic attacks      Seasonal allergies      Vulvar dermatitis 07/17/2012     Past Surgical History:   Procedure Laterality Date     ARTHROSCOPY SHOULDER  01/01/1990    Right/spurs     CARPAL TUNNEL RELEASE RT/LT  01/01/1990    Left     COLONOSCOPY  01/01/2016     COLONOSCOPY N/A 04/29/2024    Procedure: Colonoscopy with polypectomy and biopsies;  Surgeon: Ruben Keller MD;  Location: [facility]     CRANIOTOMY, EXCISE TUMOR COMPLEX, COMBINED  05/09/2011    Procedure:COMBINED CRANIOTOMY, EXCISE TUMOR COMPLEX; 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; Surgeon:LEE ANN PAULA; Location:[facility]     ROTATOR CUFF REPAIR RT/LT  01/01/2009     Left     TRANSPLANT       ZZC FOOT/TOES SURGERY PROC UNLISTED  01/01/1975    Toe fracture, left     Family History   Problem Relation Age of Onset     Macular Degeneration Mother         Dry     Hypertension Mother      C.A.D. Father      Cancer Maternal Grandmother      Cerebrovascular Disease Paternal Grandmother      Breast Cancer Paternal Grandmother      Cancer Brother      Cancer - colorectal Brother      Colon Cancer Brother      Diabetes Brother      Neurologic Disorder Sister         migraine     Glaucoma No family hx of        Objective  LMP  (LMP Unknown)     General: healthy, alert and in no distress    HEENT: no scleral icterus or conjunctival erythema   Skin: no suspicious lesions or rash. No jaundice.   CV: regular rhythm by palpation, 2+ distal pulses, no pedal edema    Resp: normal respiratory effort without conversational dyspnea   Psych: normal mood and affect    Gait: mildly antalgic, appropriate coordination and balance    Neuro: normal light touch sensory exam of the extremities. Motor strength as noted below     Right hip exam    Inspection:        no edema or ecchymosis in hip area    ROM:       Decreased terminal active and passive ROM due to pain, worst with ER, flexion    Strength:        flexion 5/5       extension 5/5       abduction 5/5       adduction 5/5    Tender:        greater trochanter       SI joint minimal       Gluteal muscles and tendons moderately improved    Non Tender:        remainder of hip area       illiac crest       ASIS       Anterior hip    Sensation:        grossly intact in hip and thigh    Skin:       well perfused       capillary refill brisk    Special Tests:        neg (-) VELIA       positive FADIR       Seemingly neg (-) scour, painful log roll       positive (+) Halley       Neg SLR and Slump    Bilateral Knee exam    ROM:        Flexion 120 degrees b/l, more painful on the left       Extension -2 degrees       Range of motion limited by pain on the left    Inspection:       no visible ecchymosis        effusion noted trace right and left by palpation    Skin:       no visible deformities       well perfused       capillary refill brisk    Patellar Motion:        Crepitus noted in the patellofemoral joint L>R    Tender:        lateral patellar border L>R       medial joint line L>R, focal left       lateral joint line L    Non Tender:         remainder of knee area        along MCL        distal IT Band        infrapatellar tendon        tibial tubercle       pes anserine bursa    Special Tests:        neg (-) varus at 0 deg and 30 deg       neg (-) valgus at 0 deg and 30 deg    Evaluation of ipsilateral kinetic chain       B/l decreased quad tone and modest core stability      Radiology  Recent Results (from the past 744 hours)   XR Pelvis w Hip RT 1 View    Narrative    EXAM: XR PELVIS AND HIP RIGHT 1 VIEW  LOCATION: [facility]  DATE: 5/23/2025    INDICATION:   Right hip pain  COMPARISON: None.      Impression    IMPRESSION: Mild degenerative arthritis both hips. No acute fracture. Lower lumbar spondylosis.     EXAM: XR KNEE LEFT 3 VIEWS  [facility]  DATE: 10/29/2024     INDICATION: Left knee injury. Knee pain.  COMPARISON: None.                                                                      IMPRESSION: Anatomic alignment left knee. No acute displaced left knee fracture. Moderate medial and moderate to severe patellofemoral compartment left knee osteoarthritis. Suspect several loose bodies in the left knee particularly posteriorly. Chronic   ossicle along the superior left patella. No sizable left knee joint effusion. No significant anterior left knee soft tissue swelling.     MR right knee without contrast 5/5/2022 2:02 PM     Techniques: Multiplanar multisequence imaging of the right knee was  obtained without administration of intra-articular or intravenous  contrast using routing protocol.     History: Knee trauma, meniscal/ligament injury suspected, xray done  (Age >= 1y); Acute pain of right knee     Comparison: 4/29/2022     Findings:     Motion partially degrading images.     MENISCI:  Medial meniscus: Subtle signal abnormality violating free edge/femoral  articular surface of the medial meniscal body and also tibial  articular surface of the posterior horn, possible tearing. Otherwise,  no tear.     Lateral meniscus: Intact.     LIGAMENTS  Cruciate ligaments: Intact.  Medial supporting structures: Intact. Thickening of proximal tibial  collateral ligament, likely from remote trauma.  Lateral supporting structures: Intact.     EXTENSOR MECHANISM  Intact.     FLUID  No joint effusion. Trace Jarvis's cyst. Loculated, internally septated  heterogeneous fluid collection anteriorly along the patellar tendon  and lateral retinaculum with perilesional edema/leakage of fluid,  likely hematoma in the provided clinical setting of recent  trauma.     OSSEOUS and ARTICULAR STRUCTURES  Bones: No fracture, contusion, or osseous lesion is seen. 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     Patellofemoral compartment: Diffuse chondral thinning/loss of the  patellar articular cartilage with subtle subchondral edema-like marrow  signal intensity at the patellar median ridge. High-grade chondral  fissure/loss of the medial trochlea with subchondral bone plate  contour irregularity.     Medial compartment: Signal heterogeneity with moderate chondral loss  with possible delamination in the central weightbearing surface of the  medial femoral condyle.     Lateral compartment: No high-grade hyaline cartilage disease.     ANCILLARY FINDINGS  None.                                                                      Impression:  1. Query subtle meniscal tearing at the body and posterior horn, which  may be degenerative rather than posttraumatic.  2. Presumed anterior soft tissue hematoma.  3. Grade IV chondromalacia of patellofemoral compartment and grade  II/III chondromalacia of the medial compartment.     KNEE RIGHT THREE VIEW  DATE/TIME: 4/29/2022 9:50 AM     INDICATION: Acute pain of right knee.  COMPARISON: 4/22/2022                                                                      IMPRESSION: Anatomic alignment right knee. No acute displaced right  knee fracture. Unchanged mild medial and patellofemoral compartment  right knee osteoarthritis. No significant knee joint effusion.  Anterior knee soft tissue swelling.    Large Joint Injection/Arthocentesis: L knee joint    Date/Time: 7/9/2025 9:34 AM    Performed by: Francisco Priest DO  Authorized by: Francisco Priest DO    Indications:  Pain and osteoarthritis  Needle Size:  21 G  Guidance: ultrasound    Approach:  Superolateral  Location:  Knee      Medications:  40 mg triamcinolone 40 MG/ML; 3 mL ROPivacaine 5 MG/ML  Outcome:  Tolerated  well, no immediate complications  Procedure discussed: discussed risks, benefits, and alternatives    Consent Given by:  Patient  Timeout: timeout called immediately prior to procedure    Prep: patient was prepped and draped in usual sterile fashion     Ultrasound images of procedure were permanently stored.       Assessment:  1. Right hip pain    2. Primary osteoarthritis of both knees    3. Gluteal tendinitis of right buttock    4. Acute pain of left knee    5. Primary osteoarthritis of right hip    6. Impaired gait          Plan:  Discussed the assessment with the patient.  Follow up: 3 weeks for close follow up and plan for viscosupplementation trial in both knees in the future based on short term clinical progress  Chronic b/l knee pain, known underlying DJD  Worsened recently on the L>R from a fall, contusion pain improved but having ongoing DJD knee pain b/l L>>R  Has had relief from CSI in the past  Reviewed wt loss, activity modification and progressive increase in activity as tolerated and guided by pain  Reviewed options for potential steroid vs viscosupplementation injections and the possibility for future orthopedic referral prn  Reviewed safe and appropriate OTC medication choices, try tylenol first  Up to 3000mg daily of tylenol is generally safe, NSAID dosing and duration limitations reviewed  Discussed nature of degenerative arthrosis of the knee.   Discussed symptom treatment with ice or heat, topical treatments, and rest if needed.   She would like to trial viscosupplementation in the future, pre-authorization submitted and plan to revisit in the future based on short term clinical progress  After review of the alternatives, relative risks, goals and limitations of corticosteroid injection, the patient elected to proceed with US guided left knee CSI today  Acute right hip pain does seem to be compensatory, has both hip joint and lateral hip pain today, although reports overall improvement in right  hip pain after GTB CSI last visit  XR images independently visualized and reviewed with patient today in clinic  PT options reviewed as well as assistive devices and low impact activity strategies, PT ordered  Expectations and goals of CSI reviewed  Often 2-3 days for steroid effect, and can take up to two weeks for maximum effect  We discussed modified progressive pain-free activity as tolerated  Do not overuse in first two weeks if feeling better due to concern for vulnerability while steroid is working  Supportive care reviewed  All questions were answered today  Contact us with additional questions or concerns  Signs and sx of concern reviewed      Francisco Priest DO, CATHIE  Sports Medicine Physician  [facility]      Time spent in chart review, one-on-one evaluation, discussion with patient regarding: nature of problem, clinical course, prior treatments, therapeutic options, shared-decision making, potential procedures and referrals, and charting related to the visit: 34 minutes.  If applicable, time does not include time spent performing any procedure.        Disclaimer: This note consists of symbols derived from keyboarding, dictation and/or voice recognition software. As a result, there may be errors in the script that have gone undetected. Please consider this when interpreting information found in this chart.      Again, thank you for allowing me to participate in the care of your patient.        Sincerely,        Francisco Priest DO    Electronically signed

## 2025-07-13 ENCOUNTER — HEALTH MAINTENANCE LETTER (OUTPATIENT)
Age: 65
End: 2025-07-13

## 2025-07-14 ENCOUNTER — VIRTUAL VISIT (OUTPATIENT)
Dept: EDUCATION SERVICES | Facility: CLINIC | Age: 65
End: 2025-07-14
Payer: COMMERCIAL

## 2025-07-14 DIAGNOSIS — E11.65 TYPE 2 DIABETES MELLITUS WITH HYPERGLYCEMIA, WITHOUT LONG-TERM CURRENT USE OF INSULIN (H): ICD-10-CM

## 2025-07-14 DIAGNOSIS — E66.813 CLASS 3 SEVERE OBESITY DUE TO EXCESS CALORIES WITH SERIOUS COMORBIDITY AND BODY MASS INDEX (BMI) OF 40.0 TO 44.9 IN ADULT (H): ICD-10-CM

## 2025-07-14 DIAGNOSIS — E11.65 TYPE 2 DIABETES MELLITUS WITH HYPERGLYCEMIA, WITHOUT LONG-TERM CURRENT USE OF INSULIN (H): Primary | Chronic | ICD-10-CM

## 2025-07-14 PROCEDURE — G0108 DIAB MANAGE TRN  PER INDIV: HCPCS | Mod: 93 | Performed by: DIETITIAN, REGISTERED

## 2025-07-14 NOTE — PATIENT INSTRUCTIONS
Work on doing some meals that you can freeze: pork chops, loin, burger, chicken, meatballs.  Freeze in single serve containers so you have the perfect portion.      Work on walking the best you can, try to take small walks outside.

## 2025-07-14 NOTE — PROGRESS NOTES
Diabetes Self-Management Education & Support    Presents for: Individual review    Type of Service: Telephone Visit    Originating Location (Patient Location): Home  Distant Location (Provider Location): [facility]  Mode of Communication:  Telephone    Telephone Visit Start Time: 4:02 pm  Telephone Visit End Time (telephone visit stop time): 5:04 pm    How would patient like to obtain AVS? MyChart      Assessment  -she is in therapy for knee, movement. Hopes to do more walking as able outside.  -she is watching her portions. Trying to come up with ideas for meals.  -we discussed meat ideas: chops or loin or chicken or meatballs.  Things she can freeze and take out to make.  -does not feel ozempic is not helping, increased to 2 mg dose.  -blood sugars, have not tested lately. Has had a couple cortizon shots lately so number were expected to be higher as well.  Patient's most recent   Lab Results   Component Value Date    A1C 6.7 03/31/2025    A1C 8.3 06/22/2021     is meeting goal of <7.0    Diabetes knowledge and skills assessment:   Patient is knowledgeable in diabetes management concepts related to: Healthy Eating, Being Active, and Monitoring    Based on learning assessment above, most appropriate setting for further diabetes education would be: Individual setting.    Care Plan and Education Provided:  Healthy Eating: Portion control  Being Active: Finding a physical activity routine that works for you    Patient verbalized understanding of diabetes self-management education concepts discussed, opportunities for ongoing education and support, and recommendations provided today.    Plan  Work on doing some meals that you can freeze: pork chops, loin, burger, chicken, meatballs.      Work on walking the best you can, try to take small walks outside.      See Care Plan for co-developed, patient-state behavior change goals.    Education Materials Provided:  No new materials provided  "today      Subjective/Objective  Gemma is an 64 year old, presenting for the following diabetes education related to: Individual review  Accompanied by: Self  Diabetes type: Type 2  Cultural Influences/Ethnic Background:  Not  or       Diabetes Symptoms & Complications:          Patient Problem List and Family Medical History reviewed for relevant medical history, current medical status, and diabetes risk factors.    Vitals:  LMP  (LMP Unknown)   Estimated body mass index is 43.27 kg/m  as calculated from the following:    Height as of 5/23/25: 1.549 m (5' 1\").    Weight as of 5/23/25: 103.9 kg (229 lb).   Last 3 BP:   BP Readings from Last 3 Encounters:   03/31/25 132/74   12/12/24 130/78   12/09/24 122/68       History   Smoking Status    Former    Types: Cigarettes   Smokeless Tobacco    Never       Labs:  Lab Results   Component Value Date    A1C 6.7 03/31/2025    A1C 8.3 06/22/2021     Lab Results   Component Value Date    GLC 80 12/09/2024     04/29/2024     03/08/2023     10/22/2020     Lab Results   Component Value Date    LDL 76 05/24/2024     10/22/2020     HDL Cholesterol   Date Value Ref Range Status   10/22/2020 47 (L) >49 mg/dL Final     Direct Measure HDL   Date Value Ref Range Status   05/24/2024 52 >=50 mg/dL Final     GFR Estimate   Date Value Ref Range Status   12/09/2024 87 >60 mL/min/1.73m2 Final     Comment:     eGFR calculated using 2021 CKD-EPI equation.   10/22/2020 >90 >60 mL/min/[1.73_m2] Final     Comment:     Non  GFR Calc  Starting 12/18/2018, serum creatinine based estimated GFR (eGFR) will be   calculated using the Chronic Kidney Disease Epidemiology Collaboration   (CKD-EPI) equation.       GFR, ESTIMATED POCT   Date Value Ref Range Status   04/03/2024 >60 >60 mL/min/1.73m2 Final     GFR Estimate If Black   Date Value Ref Range Status   10/22/2020 >90 >60 mL/min/[1.73_m2] Final     Comment:      GFR " Calc  Starting 12/18/2018, serum creatinine based estimated GFR (eGFR) will be   calculated using the Chronic Kidney Disease Epidemiology Collaboration   (CKD-EPI) equation.       Lab Results   Component Value Date    CR 0.76 12/09/2024    CR 0.69 10/22/2020     Lab Results   Component Value Date    MICROL <12.0 08/20/2024    UMALCR  08/20/2024      Comment:      Unable to calculate, urine albumin and/or urine creatinine is outside detectable limits.  Microalbuminuria is defined as an albumin:creatinine ratio of 17 to 299 for males and 25 to 299 for females. A ratio of albumin:creatinine of 300 or higher is indicative of overt proteinuria.  Due to biologic variability, positive results should be confirmed by a second, first-morning random or 24-hour timed urine specimen. If there is discrepancy, a third specimen is recommended. When 2 out of 3 results are in the microalbuminuria range, this is evidence for incipient nephropathy and warrants increased efforts at glucose control, blood pressure control, and institution of therapy with an angiotensin-converting-enzyme (ACE) inhibitor (if the patient can tolerate it).      UCRR 76.9 08/20/2024 7/14/2025   Healthy Eating   Healthy Eating Assessed Today Yes   Breakfast bowl of cereal and blueberries   Lunch snack: yogurt, cheeseburger pie   Dinner pork chops, burger, chicken.  She is going to work on getting the grill out again.   Snacks fruit   Other working on finding a job.  working on cleaning out house, downsizing.  220#         7/14/2025   Being Active   Being Active Assessed Today Yes       uses cane if at [employer] and stuff.   less pain.  went for walk saturday.         1/2/2025   Monitoring   Blood Glucose Meter Accu-chek   Times checking blood sugar at home (number) 1   Times checking blood sugar at home (per) Week     Blood sugars:       Time Spent: 62 minutes  Encounter Type: Individual    Any diabetes medication dose changes were [facility] via the [facility]  Standing Orders under the patient's referring provider.

## 2025-07-15 RX ORDER — SEMAGLUTIDE 1.34 MG/ML
INJECTION, SOLUTION SUBCUTANEOUS
Qty: 3 ML | Refills: 0 | Status: SHIPPED | OUTPATIENT
Start: 2025-07-15

## 2025-07-16 ENCOUNTER — THERAPY VISIT (OUTPATIENT)
Dept: PHYSICAL THERAPY | Facility: CLINIC | Age: 65
End: 2025-07-16
Payer: COMMERCIAL

## 2025-07-16 DIAGNOSIS — M25.562 ACUTE PAIN OF LEFT KNEE: ICD-10-CM

## 2025-07-16 DIAGNOSIS — M16.11 PRIMARY OSTEOARTHRITIS OF RIGHT HIP: ICD-10-CM

## 2025-07-16 DIAGNOSIS — M76.01 GLUTEAL TENDINITIS OF RIGHT BUTTOCK: ICD-10-CM

## 2025-07-16 DIAGNOSIS — R26.9 IMPAIRED GAIT: ICD-10-CM

## 2025-07-16 DIAGNOSIS — M17.0 PRIMARY OSTEOARTHRITIS OF BOTH KNEES: ICD-10-CM

## 2025-07-16 DIAGNOSIS — M25.551 RIGHT HIP PAIN: Primary | ICD-10-CM

## 2025-07-16 PROCEDURE — 97110 THERAPEUTIC EXERCISES: CPT | Mod: GP | Performed by: PHYSICAL THERAPIST

## 2025-07-22 ENCOUNTER — THERAPY VISIT (OUTPATIENT)
Dept: PHYSICAL THERAPY | Facility: CLINIC | Age: 65
End: 2025-07-22
Payer: COMMERCIAL

## 2025-07-22 DIAGNOSIS — M25.551 RIGHT HIP PAIN: Primary | ICD-10-CM

## 2025-07-22 DIAGNOSIS — M17.0 PRIMARY OSTEOARTHRITIS OF BOTH KNEES: ICD-10-CM

## 2025-07-22 PROCEDURE — 97110 THERAPEUTIC EXERCISES: CPT | Mod: GP | Performed by: PHYSICAL THERAPIST

## (undated) DEVICE — ENDO SNARE EXACTO COLD 9MM LOOP 2.4MMX230CM 00711115

## (undated) RX ORDER — PROPOFOL 10 MG/ML
INJECTION, EMULSION INTRAVENOUS
Status: DISPENSED
Start: 2024-04-29